# Patient Record
Sex: FEMALE | Race: WHITE | NOT HISPANIC OR LATINO | Employment: OTHER | URBAN - METROPOLITAN AREA
[De-identification: names, ages, dates, MRNs, and addresses within clinical notes are randomized per-mention and may not be internally consistent; named-entity substitution may affect disease eponyms.]

---

## 2017-06-16 ENCOUNTER — TRANSFERRED RECORDS (OUTPATIENT)
Dept: HEALTH INFORMATION MANAGEMENT | Facility: CLINIC | Age: 62
End: 2017-06-16

## 2017-10-12 ENCOUNTER — TRANSFERRED RECORDS (OUTPATIENT)
Dept: HEALTH INFORMATION MANAGEMENT | Facility: CLINIC | Age: 62
End: 2017-10-12

## 2018-06-21 ENCOUNTER — TRANSFERRED RECORDS (OUTPATIENT)
Dept: HEALTH INFORMATION MANAGEMENT | Facility: CLINIC | Age: 63
End: 2018-06-21

## 2018-07-31 ENCOUNTER — TRANSFERRED RECORDS (OUTPATIENT)
Dept: HEALTH INFORMATION MANAGEMENT | Facility: CLINIC | Age: 63
End: 2018-07-31

## 2018-08-09 ENCOUNTER — TRANSFERRED RECORDS (OUTPATIENT)
Dept: HEALTH INFORMATION MANAGEMENT | Facility: CLINIC | Age: 63
End: 2018-08-09

## 2018-08-16 ENCOUNTER — TRANSFERRED RECORDS (OUTPATIENT)
Dept: HEALTH INFORMATION MANAGEMENT | Facility: CLINIC | Age: 63
End: 2018-08-16

## 2018-08-24 ENCOUNTER — ONCOLOGY VISIT (OUTPATIENT)
Dept: ONCOLOGY | Facility: CLINIC | Age: 63
End: 2018-08-24
Attending: SURGERY
Payer: COMMERCIAL

## 2018-08-24 VITALS
RESPIRATION RATE: 16 BRPM | BODY MASS INDEX: 24.1 KG/M2 | HEART RATE: 87 BPM | WEIGHT: 159 LBS | HEIGHT: 68 IN | OXYGEN SATURATION: 97 % | DIASTOLIC BLOOD PRESSURE: 92 MMHG | SYSTOLIC BLOOD PRESSURE: 148 MMHG | TEMPERATURE: 97.3 F

## 2018-08-24 DIAGNOSIS — Z17.0 MALIGNANT NEOPLASM OF UPPER-OUTER QUADRANT OF RIGHT BREAST IN FEMALE, ESTROGEN RECEPTOR POSITIVE (H): ICD-10-CM

## 2018-08-24 DIAGNOSIS — C50.411 MALIGNANT NEOPLASM OF UPPER-OUTER QUADRANT OF RIGHT BREAST IN FEMALE, ESTROGEN RECEPTOR POSITIVE (H): ICD-10-CM

## 2018-08-24 RX ORDER — CEFAZOLIN SODIUM 1 G/50ML
1 INJECTION, SOLUTION INTRAVENOUS SEE ADMIN INSTRUCTIONS
Status: CANCELLED | OUTPATIENT
Start: 2018-08-24 | End: 2019-08-24

## 2018-08-24 ASSESSMENT — PAIN SCALES - GENERAL: PAINLEVEL: NO PAIN (0)

## 2018-08-24 NOTE — PROGRESS NOTES
HISTORY OF PRESENT ILLNESS:  Keely Velazquez is a 63-year-old woman who presents with a new right breast cancer.  She underwent a screening mammogram in June which was normal except for dense breast tissue.  She then underwent an MRI this month which demonstrated a 16 mm area of non-mass-like enhancement at the 5 o'clock position, a 12 mm area of non-mass-like enhancement at the 10 o'clock position.  She also had what looked like a cyst in her left breast.  She underwent a second-look ultrasound.  The left breast was a cyst.  The area at the right breast, 5 and 10 o'clock positions, were identified.  The area at the 5 o'clock position was 10 mm.  The area at the 10 o'clock position was 5 mm.  She then was scheduled for an ultrasound-guided biopsy.  When they attempted the ultrasound-guided biopsy, could no longer see the lesion at the 10 o'clock position.  They subsequently determined it was a cyst.  They did perform a biopsy of the lesion at the 5 o'clock position which turned out to be a grade 1, ER positive, CA positive, HER2 negative breast cancer.  She is now here to talk about treatment options.  She has had no prior history of any breast problems.      PAST MEDICAL HISTORY:  Negative for cardiac, pulmonary, renal or hepatic diseases.  She does not have diabetes mellitus.  She has had no major surgeries.      FAMILY HISTORY:  Negative for breast or ovarian cancer.      PHYSICAL EXAMINATION:     GENERAL:  She is a well-appearing woman in no apparent distress.     HEAD AND NECK EXAMINATION:  Reveals no cervical, supraclavicular or infraclavicular lymphadenopathy.     CHEST:  Right breast examination reveals an area of ecchymosis in the medial portion of her right breast.  She has no palpable masses.  She has no lymphadenopathy.  Left breast examination reveals no dominant masses, skin changes, nipple changes or axillary lymphadenopathy.      IMPRESSION:  Stage I, ER-positive breast cancer.      PLAN:  I talked to her  about the various treatment options including mastectomy with or without reconstruction versus lumpectomy plus radiation therapy.  I did recommend a sentinel lymph node biopsy.  I told her that endocrine therapy would be recommended.  I said it would be unlikely that she would benefit from chemotherapy but we would help provide her with some guidance after her surgery is over.  She would like to proceed with a wire-localized right lumpectomy and sentinel lymph node biopsy.      TT:  40 minutes.  CT:  30 minutes.      cc:   Tj Valdes MD    [facility]   [address] [place] [address]

## 2018-08-24 NOTE — NURSING NOTE
"Oncology Rooming Note    August 24, 2018 8:52 AM   Keely Velazquez is a 63 year old female who presents for:    Chief Complaint   Patient presents with     Oncology Clinic Visit     new - breast      Initial Vitals: Ht 1.727 m (5' 7.99\")  Wt 72.1 kg (159 lb)  BMI 24.18 kg/m2 Estimated body mass index is 24.18 kg/(m^2) as calculated from the following:    Height as of this encounter: 1.727 m (5' 7.99\").    Weight as of this encounter: 72.1 kg (159 lb). Body surface area is 1.86 meters squared.  No Pain (0) Comment: Data Unavailable   No LMP recorded.  Allergies reviewed: Yes  Medications reviewed: Yes    Medications: Medication refills not needed today.  Pharmacy name entered into [facility]: [id number] [place] [address]    Clinical concerns: new patient - consult      6 minutes for nursing intake (face to face time)     Brie Oh CMA            "

## 2018-08-27 ENCOUNTER — TELEPHONE (OUTPATIENT)
Dept: SURGERY | Facility: CLINIC | Age: 63
End: 2018-08-27

## 2018-08-27 NOTE — TELEPHONE ENCOUNTER
Patient is scheduled for surgery with Dr. Kruger    Spoke or left message with:  Spoke to patient on 08/27/18    Date of Surgery: 08/31/18 @ 1:20 p.m.  Location:     Pre-op with surgeon (if applicable):     H&P: Scheduled with Patient at [facility]. We talked    Informed patient they will need an adult        Additional imaging/appointments:     Surgery packet:   Additional comments: Patient has wire loc scheduled at 11:30 a.m. Patient is to arrive on 5th floor @ 10:00  A.m.

## 2018-08-30 ENCOUNTER — ANESTHESIA EVENT (OUTPATIENT)
Dept: SURGERY | Facility: AMBULATORY SURGERY CENTER | Age: 63
End: 2018-08-30

## 2018-08-30 NOTE — ANESTHESIA PREPROCEDURE EVALUATION
Anesthesia Evaluation     .             ROS/MED HX    ENT/Pulmonary:  - neg pulmonary ROS     Neurologic:  - neg neurologic ROS     Cardiovascular:  - neg cardiovascular ROS       METS/Exercise Tolerance:     Hematologic:  - neg hematologic  ROS       Musculoskeletal:  - neg musculoskeletal ROS       GI/Hepatic:  - neg GI/hepatic ROS       Renal/Genitourinary:  - ROS Renal section negative       Endo:  - neg endo ROS       Psychiatric:     (+) psychiatric history anxiety      Infectious Disease:  - neg infectious disease ROS       Malignancy:   (+) Malignancy History of Breast  Breast CA Active status post.         Other:                                    Anesthesia Plan      History & Physical Review  History and physical reviewed and following examination; no interval change.    ASA Status:  3 .    NPO Status:  > 8 hours    Plan for General and LMA with Intravenous induction. Maintenance will be Balanced.    PONV prophylaxis:  Ondansetron (or other 5HT-3) and Dexamethasone or Solumedrol  Plan:  GA with routine monitors    Rickey Calle MD      Postoperative Care  Postoperative pain management:  IV analgesics.      Consents  Anesthetic plan, risks, benefits and alternatives discussed with:  Patient..                          .

## 2018-08-31 ENCOUNTER — RADIANT APPOINTMENT (OUTPATIENT)
Dept: MAMMOGRAPHY | Facility: CLINIC | Age: 63
End: 2018-08-31
Attending: SURGERY
Payer: COMMERCIAL

## 2018-08-31 ENCOUNTER — SURGERY (OUTPATIENT)
Age: 63
End: 2018-08-31

## 2018-08-31 ENCOUNTER — ANESTHESIA (OUTPATIENT)
Dept: SURGERY | Facility: AMBULATORY SURGERY CENTER | Age: 63
End: 2018-08-31

## 2018-08-31 ENCOUNTER — HOSPITAL ENCOUNTER (OUTPATIENT)
Dept: NUCLEAR MEDICINE | Facility: CLINIC | Age: 63
Setting detail: NUCLEAR MEDICINE
Discharge: HOME | End: 2018-08-31
Attending: SURGERY | Admitting: SURGERY
Payer: COMMERCIAL

## 2018-08-31 ENCOUNTER — TRANSFERRED RECORDS (OUTPATIENT)
Dept: HEALTH INFORMATION MANAGEMENT | Facility: CLINIC | Age: 63
End: 2018-08-31

## 2018-08-31 ENCOUNTER — HOSPITAL ENCOUNTER (OUTPATIENT)
Facility: AMBULATORY SURGERY CENTER | Age: 63
End: 2018-08-31
Attending: SURGERY
Payer: COMMERCIAL

## 2018-08-31 VITALS
BODY MASS INDEX: 24.96 KG/M2 | DIASTOLIC BLOOD PRESSURE: 71 MMHG | OXYGEN SATURATION: 94 % | TEMPERATURE: 97.4 F | WEIGHT: 159 LBS | HEIGHT: 67 IN | SYSTOLIC BLOOD PRESSURE: 144 MMHG | RESPIRATION RATE: 15 BRPM

## 2018-08-31 DIAGNOSIS — Z17.0 MALIGNANT NEOPLASM OF UPPER-OUTER QUADRANT OF RIGHT BREAST IN FEMALE, ESTROGEN RECEPTOR POSITIVE (H): ICD-10-CM

## 2018-08-31 DIAGNOSIS — C50.411 MALIGNANT NEOPLASM OF UPPER-OUTER QUADRANT OF RIGHT BREAST IN FEMALE, ESTROGEN RECEPTOR POSITIVE (H): ICD-10-CM

## 2018-08-31 DIAGNOSIS — C50.111 MALIGNANT NEOPLASM OF CENTRAL PORTION OF RIGHT BREAST IN FEMALE, ESTROGEN RECEPTOR POSITIVE (H): Primary | ICD-10-CM

## 2018-08-31 DIAGNOSIS — Z17.0 MALIGNANT NEOPLASM OF CENTRAL PORTION OF RIGHT BREAST IN FEMALE, ESTROGEN RECEPTOR POSITIVE (H): Primary | ICD-10-CM

## 2018-08-31 PROCEDURE — 38792 RA TRACER ID OF SENTINL NODE: CPT

## 2018-08-31 RX ORDER — NALOXONE HYDROCHLORIDE 0.4 MG/ML
.1-.4 INJECTION, SOLUTION INTRAMUSCULAR; INTRAVENOUS; SUBCUTANEOUS
Status: DISCONTINUED | OUTPATIENT
Start: 2018-08-31 | End: 2018-09-01 | Stop reason: HOSPADM

## 2018-08-31 RX ORDER — OXYCODONE HYDROCHLORIDE 5 MG/1
5 TABLET ORAL ONCE
Status: COMPLETED | OUTPATIENT
Start: 2018-08-31 | End: 2018-08-31

## 2018-08-31 RX ORDER — ONDANSETRON 2 MG/ML
4 INJECTION INTRAMUSCULAR; INTRAVENOUS EVERY 30 MIN PRN
Status: DISCONTINUED | OUTPATIENT
Start: 2018-08-31 | End: 2018-09-01 | Stop reason: HOSPADM

## 2018-08-31 RX ORDER — OXYCODONE HCL 5 MG/5 ML
5 SOLUTION, ORAL ORAL EVERY 4 HOURS PRN
Status: DISCONTINUED | OUTPATIENT
Start: 2018-08-31 | End: 2018-09-01 | Stop reason: HOSPADM

## 2018-08-31 RX ORDER — MEPERIDINE HYDROCHLORIDE 25 MG/ML
12.5 INJECTION INTRAMUSCULAR; INTRAVENOUS; SUBCUTANEOUS
Status: DISCONTINUED | OUTPATIENT
Start: 2018-08-31 | End: 2018-09-01 | Stop reason: HOSPADM

## 2018-08-31 RX ORDER — ACETAMINOPHEN 325 MG/1
975 TABLET ORAL ONCE
Status: COMPLETED | OUTPATIENT
Start: 2018-08-31 | End: 2018-08-31

## 2018-08-31 RX ORDER — BUPIVACAINE HYDROCHLORIDE 2.5 MG/ML
INJECTION, SOLUTION INFILTRATION; PERINEURAL PRN
Status: DISCONTINUED | OUTPATIENT
Start: 2018-08-31 | End: 2018-08-31 | Stop reason: HOSPADM

## 2018-08-31 RX ORDER — LIDOCAINE 40 MG/G
CREAM TOPICAL
Status: DISCONTINUED | OUTPATIENT
Start: 2018-08-31 | End: 2018-08-31 | Stop reason: HOSPADM

## 2018-08-31 RX ORDER — GABAPENTIN 300 MG/1
300 CAPSULE ORAL ONCE
Status: DISCONTINUED | OUTPATIENT
Start: 2018-08-31 | End: 2018-08-31 | Stop reason: HOSPADM

## 2018-08-31 RX ORDER — OXYCODONE HYDROCHLORIDE 5 MG/1
5 TABLET ORAL EVERY 6 HOURS PRN
Qty: 6 TABLET | Refills: 0 | Status: SHIPPED | OUTPATIENT
Start: 2018-08-31 | End: 2018-10-12

## 2018-08-31 RX ORDER — ONDANSETRON 2 MG/ML
INJECTION INTRAMUSCULAR; INTRAVENOUS PRN
Status: DISCONTINUED | OUTPATIENT
Start: 2018-08-31 | End: 2018-08-31

## 2018-08-31 RX ORDER — FENTANYL CITRATE 50 UG/ML
25-50 INJECTION, SOLUTION INTRAMUSCULAR; INTRAVENOUS
Status: DISCONTINUED | OUTPATIENT
Start: 2018-08-31 | End: 2018-08-31 | Stop reason: HOSPADM

## 2018-08-31 RX ORDER — PROPOFOL 10 MG/ML
INJECTION, EMULSION INTRAVENOUS PRN
Status: DISCONTINUED | OUTPATIENT
Start: 2018-08-31 | End: 2018-08-31

## 2018-08-31 RX ORDER — SODIUM CHLORIDE, SODIUM LACTATE, POTASSIUM CHLORIDE, CALCIUM CHLORIDE 600; 310; 30; 20 MG/100ML; MG/100ML; MG/100ML; MG/100ML
INJECTION, SOLUTION INTRAVENOUS CONTINUOUS
Status: DISCONTINUED | OUTPATIENT
Start: 2018-08-31 | End: 2018-08-31 | Stop reason: HOSPADM

## 2018-08-31 RX ORDER — LIDOCAINE HYDROCHLORIDE AND EPINEPHRINE 10; 10 MG/ML; UG/ML
INJECTION, SOLUTION INFILTRATION; PERINEURAL PRN
Status: DISCONTINUED | OUTPATIENT
Start: 2018-08-31 | End: 2018-08-31 | Stop reason: HOSPADM

## 2018-08-31 RX ORDER — HYDROMORPHONE HYDROCHLORIDE 1 MG/ML
.3-.5 INJECTION, SOLUTION INTRAMUSCULAR; INTRAVENOUS; SUBCUTANEOUS EVERY 10 MIN PRN
Status: DISCONTINUED | OUTPATIENT
Start: 2018-08-31 | End: 2018-09-01 | Stop reason: HOSPADM

## 2018-08-31 RX ORDER — PROPOFOL 10 MG/ML
INJECTION, EMULSION INTRAVENOUS CONTINUOUS PRN
Status: DISCONTINUED | OUTPATIENT
Start: 2018-08-31 | End: 2018-08-31

## 2018-08-31 RX ORDER — LIDOCAINE HYDROCHLORIDE 10 MG/ML
10 INJECTION, SOLUTION EPIDURAL; INFILTRATION; INTRACAUDAL; PERINEURAL ONCE
Status: COMPLETED | OUTPATIENT
Start: 2018-08-31 | End: 2018-08-31

## 2018-08-31 RX ORDER — DEXAMETHASONE SODIUM PHOSPHATE 4 MG/ML
INJECTION, SOLUTION INTRA-ARTICULAR; INTRALESIONAL; INTRAMUSCULAR; INTRAVENOUS; SOFT TISSUE PRN
Status: DISCONTINUED | OUTPATIENT
Start: 2018-08-31 | End: 2018-08-31

## 2018-08-31 RX ORDER — ISOSULFAN BLUE 50 MG/5ML
INJECTION, SOLUTION SUBCUTANEOUS PRN
Status: DISCONTINUED | OUTPATIENT
Start: 2018-08-31 | End: 2018-08-31 | Stop reason: HOSPADM

## 2018-08-31 RX ORDER — SODIUM CHLORIDE, SODIUM LACTATE, POTASSIUM CHLORIDE, CALCIUM CHLORIDE 600; 310; 30; 20 MG/100ML; MG/100ML; MG/100ML; MG/100ML
INJECTION, SOLUTION INTRAVENOUS CONTINUOUS
Status: DISCONTINUED | OUTPATIENT
Start: 2018-08-31 | End: 2018-09-01 | Stop reason: HOSPADM

## 2018-08-31 RX ORDER — ONDANSETRON 4 MG/1
4 TABLET, ORALLY DISINTEGRATING ORAL EVERY 30 MIN PRN
Status: DISCONTINUED | OUTPATIENT
Start: 2018-08-31 | End: 2018-09-01 | Stop reason: HOSPADM

## 2018-08-31 RX ORDER — ACETAMINOPHEN 325 MG/1
650 TABLET ORAL EVERY 4 HOURS PRN
Qty: 60 TABLET | Refills: 0 | Status: SHIPPED | OUTPATIENT
Start: 2018-08-31 | End: 2021-01-11

## 2018-08-31 RX ORDER — FENTANYL CITRATE 50 UG/ML
INJECTION, SOLUTION INTRAMUSCULAR; INTRAVENOUS PRN
Status: DISCONTINUED | OUTPATIENT
Start: 2018-08-31 | End: 2018-08-31

## 2018-08-31 RX ORDER — HYDRALAZINE HYDROCHLORIDE 20 MG/ML
2.5-5 INJECTION INTRAMUSCULAR; INTRAVENOUS EVERY 10 MIN PRN
Status: DISCONTINUED | OUTPATIENT
Start: 2018-08-31 | End: 2018-08-31 | Stop reason: HOSPADM

## 2018-08-31 RX ORDER — GABAPENTIN 300 MG/1
300 CAPSULE ORAL ONCE
Status: COMPLETED | OUTPATIENT
Start: 2018-08-31 | End: 2018-08-31

## 2018-08-31 RX ORDER — ACETAMINOPHEN 325 MG/1
975 TABLET ORAL ONCE
Status: DISCONTINUED | OUTPATIENT
Start: 2018-08-31 | End: 2018-08-31 | Stop reason: HOSPADM

## 2018-08-31 RX ADMIN — FENTANYL CITRATE 50 MCG: 50 INJECTION, SOLUTION INTRAMUSCULAR; INTRAVENOUS at 12:59

## 2018-08-31 RX ADMIN — DEXAMETHASONE SODIUM PHOSPHATE 4 MG: 4 INJECTION, SOLUTION INTRA-ARTICULAR; INTRALESIONAL; INTRAMUSCULAR; INTRAVENOUS; SOFT TISSUE at 13:00

## 2018-08-31 RX ADMIN — ONDANSETRON 4 MG: 2 INJECTION INTRAMUSCULAR; INTRAVENOUS at 13:00

## 2018-08-31 RX ADMIN — PROPOFOL 50 MG: 10 INJECTION, EMULSION INTRAVENOUS at 14:12

## 2018-08-31 RX ADMIN — OXYCODONE HYDROCHLORIDE 5 MG: 5 TABLET ORAL at 14:57

## 2018-08-31 RX ADMIN — ACETAMINOPHEN 975 MG: 325 TABLET ORAL at 10:18

## 2018-08-31 RX ADMIN — BUPIVACAINE HYDROCHLORIDE 12 ML: 2.5 INJECTION, SOLUTION INFILTRATION; PERINEURAL at 14:13

## 2018-08-31 RX ADMIN — LIDOCAINE HYDROCHLORIDE 10 ML: 10 INJECTION, SOLUTION EPIDURAL; INFILTRATION; INTRACAUDAL; PERINEURAL at 11:29

## 2018-08-31 RX ADMIN — LIDOCAINE HYDROCHLORIDE AND EPINEPHRINE 12 ML: 10; 10 INJECTION, SOLUTION INFILTRATION; PERINEURAL at 14:13

## 2018-08-31 RX ADMIN — PROPOFOL 150 MCG/KG/MIN: 10 INJECTION, EMULSION INTRAVENOUS at 13:00

## 2018-08-31 RX ADMIN — GABAPENTIN 300 MG: 300 CAPSULE ORAL at 10:18

## 2018-08-31 RX ADMIN — FENTANYL CITRATE 50 MCG: 50 INJECTION, SOLUTION INTRAMUSCULAR; INTRAVENOUS at 13:36

## 2018-08-31 RX ADMIN — SODIUM CHLORIDE, SODIUM LACTATE, POTASSIUM CHLORIDE, CALCIUM CHLORIDE: 600; 310; 30; 20 INJECTION, SOLUTION INTRAVENOUS at 12:57

## 2018-08-31 RX ADMIN — ISOSULFAN BLUE 4 ML: 50 INJECTION, SOLUTION SUBCUTANEOUS at 13:22

## 2018-08-31 RX ADMIN — SODIUM CHLORIDE, SODIUM LACTATE, POTASSIUM CHLORIDE, CALCIUM CHLORIDE: 600; 310; 30; 20 INJECTION, SOLUTION INTRAVENOUS at 10:18

## 2018-08-31 NOTE — IP AVS SNAPSHOT
MRN:[id number]                      After Visit Summary   8/31/2018    Keely Velazquez    MRN: [id number]           Thank you!     Thank you for choosing [facility] for your care. Our goal is always to provide you with excellent care. Hearing back from our patients is one way we can continue to improve our services. Please take a few minutes to complete the written survey that you may receive in the mail after you visit with us. Thank you!        Patient Information     Date Of Birth          1955        About your hospital stay     You were admitted on:  August 31, 2018 [facility]    You were discharged on:  August 31, 2018       Who to Call     For medical emergencies, please call 911.  For non-urgent questions about your medical care, please call your primary care provider or clinic, [phone]  For questions related to your surgery, please call your surgery clinic        Attending Provider     Provider Chilango Dash MD General Surgery       Primary Care Provider Office Phone # Fax #    Sammie Cerna [phone]      After Care Instructions     Discharge Instructions       Incision site(s): Keep incision clean/dry/intact for 24 hours. 24 hours after your operation, you may remove any dressings and allow soapy water to wash over the wounds. If you have surgical glue over your incision, this will wear off in time. Apply dressings as desired. No submersion in water (lake/pool/tub) for one month or until approved by your surgeon.    Concerning signs/symptoms: If your incision develops redness or pus-like drainage, if you have a fever >101.5 DegF, or if you have any other concerning signs/symptoms, please call or seek medical attention.     -Ok to take acetaminophen or ibuprofen if your pain is not severe enough for narcotic medication.  -No driving while taking narcotic pain medication.  -It is recommendable to  take stool softeners (such as miralax, senna, and/or colace) while on narcotic pain medication to prevent constipation.    If you have questions or concerns, please contact Ms. Anusha Mcgregor (Dr. Kruger's RN) at [phone] during business hours. After hours, please call [phone] and ask to page the Surgical Oncology resident on call.    Follow up with Dr. Kruger in 2 weeks.                  Your next 10 appointments already scheduled     Sep 14, 2018 10:15 AM CDT   (Arrive by 10:00 AM)   Post-Op with Chilango Kruger MD   [facility] ([facility])    [address] [place] [address]  [address]  [facility] [address] [phone]            Sep 24, 2018  1:15 PM CDT   (Arrive by 1:00 PM)   New Patient Visit with Merari Tafoya MD   [facility] ([facility])    [address] [place] [address]  [address]  [facility] [address] [phone]              Further instructions from your care team       [facility]  Home Care Following Anesthesia  For 24 hours after surgery:  1. Get plenty of rest.  A responsible adult must stay with you for at least 24 hours after you leave the surgery center.  2. Do not drive or use heavy equipment.  If you have weakness or tingling, don't drive or use heavy equipment until this feeling goes away.   3. Do not drink alcohol.   4. Avoid strenuous or risky activities.  Ask for help when climbing stairs.  5. You may feel lightheaded.  IF so, sit for a few minutes before standing.  Have someone help you get up.   6. If you have nausea (feel sick to your stomach): Drink only clear liquids such as apple juice, ginger ale, broth or 7-Up.  Rest may also help.  Be sure to drink enough fluids.  Move to a regular diet as you feel able.   7. You may have a slight fever.  Call the doctor if your fever is over 100 F (37.7 C) (taken under the tongue) or lasts longer than 24  hours.  8. You may have a dry mouth, a sore throat, muscle aches or trouble sleeping. These should go away after 24 hours.  9. Do not make important or legal decisions.               Tips for taking pain medications  To get the best pain relief possible, remember these points:    Take pain medications as directed, before pain becomes severe.    Pain medication can upset your stomach: taking it with food may help.    Constipation is a common side effect of pain medication. Drink plenty of  fluids.    Eat foods high in fiber. Take a stool softener if recommended by your doctor or pharmacist.    Do not drink alcohol, drive or operate machinery while taking pain medications.    Ask about other ways to control pain, such as with heat, ice or relaxation.    Tylenol/Acetaminophen Consumption  To help encourage the safe use of acetaminophen, the makers of TYLENOL  have lowered the maximum daily dose for single-ingredient Extra Strength TYLENOL  (acetaminophen) products sold in the [place] from 8 pills per day (4,000 mg) to 6 pills per day (3,000 mg). The dosing interval has also changed from 2 pills every 4-6 hours to 2 pills every 6 hours.    If you feel your pain relief is insufficient, you may take Tylenol/Acetaminophen in addition to your narcotic pain medication.     Be careful not to exceed 3,000 mg of Tylenol/Acetaminophen in a 24 hour period from all sources.    If you are taking extra strength Tylenol/acetaminophen (500 mg), the maximum dose is 6 tablets in 24 hours.    If you are taking regular strength acetaminophen (325 mg), the maximum dose is 9 tablets in 24 hours.    Call a doctor for any of the followin. Signs of infection (fever, growing tenderness at the surgery site, a large amount of drainage or bleeding, severe pain, foul-smelling drainage, redness, swelling).  2. It has been over 8 to 10 hours since surgery and you are still not able to urinate (pass water).  3. Headache for over 24 hours.  Your  "doctor is: Dr. Chilango Kruger, Breast Center: [phone]               Or dial [phone] and ask for the resident on call for:  Plastics  For emergency care, call the:  [facility]:  [phone] (TTY for hearing impaired: [phone])                Pending Results     Date and Time Order Name Status Description    8/31/2018 1351 Surgical pathology exam In process     8/31/2018 1331 MA BREAST SPECIMEN RIGHT In process     8/31/2018 1150 MA POST PROCEDURE RIGHT In process     8/31/2018 1105 US BREAST WIRE PLACEMENT , 1ST LESION, RIGHT In process             Admission Information     Date & Time Provider Department Dept. Phone    8/31/2018 Chilango Kruger MD [facility] [phone]      Your Vitals Were     Blood Pressure Temperature Respirations Height Weight Pulse Oximetry    167/93 97.9  F (36.6  C) (Oral) 16 1.702 m (5' 6.99\") 72.1 kg (159 lb) 99%    BMI (Body Mass Index)                   24.91 kg/m2           [employer] Information     [employer] gives you secure access to your electronic health record. If you see a primary care provider, you can also send messages to your care team and make appointments. If you have questions, please call your primary care clinic.  If you do not have a primary care provider, please call [phone] and they will assist you.      [employer] is an electronic gateway that provides easy, online access to your medical records. With [employer], you can request a clinic appointment, read your test results, renew a prescription or communicate with your care team.     To access your existing account, please contact your [facility] or call [phone] for assistance.        Care EveryWhere ID     This is your Care EveryWhere ID. This could be used by other organizations to access your [facility] medical records  [id number]        Equal Access to Services     ANH GUNN AH: baltazar Francis " elleeliud, ana solitario, gabino jackiein hayaan adeeg kharash la'aan ah. Mis [facility] [phone].    ATENCIÓN: Si tyson huff, tiene a davidson disposición servicios gratuitos de asistencia lingüística. Michelle al [phone].    We comply with applicable federal civil rights laws and [place] laws. We do not discriminate on the basis of race, color, national origin, age, disability, sex, sexual orientation, or gender identity.               Review of your medicines      START taking        Dose / Directions    acetaminophen 325 MG tablet   Commonly known as:  TYLENOL   Used for:  Malignant neoplasm of central portion of right breast in female, estrogen receptor positive (H)        Dose:  650 mg   Take 2 tablets (650 mg) by mouth every 4 hours as needed for mild pain   Quantity:  60 tablet   Refills:  0       oxyCODONE IR 5 MG tablet   Commonly known as:  ROXICODONE   Used for:  Malignant neoplasm of central portion of right breast in female, estrogen receptor positive (H)        Dose:  5 mg   Take 1 tablet (5 mg) by mouth every 6 hours as needed for severe pain   Quantity:  6 tablet   Refills:  0         CONTINUE these medicines which have NOT CHANGED        Dose / Directions    calcium carbonate 500 mg {elemental} 500 MG tablet   Commonly known as:  OS-PADDY        Dose:  500 mg   Take 500 mg by mouth 2 times daily With Magnesium,Zinc   Refills:  0       LYSINE PO        Refills:  0       Multi-vitamin Tabs tablet        Dose:  1 tablet   Take 1 tablet by mouth daily   Refills:  0       NORVASC PO        Dose:  5 mg   Take 5 mg by mouth daily   Refills:  0       OMEGA-3 FISH OIL PO        Refills:  0       order for DME   Used for:  Varicose vein        Please measure and distribute 1 pair of 20mmHg - 30mmHg thigh high open toe compression stockings. Jobst ultrasheer or equivalent.   Quantity:  1 each   Refills:  2       Progesterone 100 MG Caps        Dose:  1 capsule   Take 1 capsule by mouth daily   Refills:  0        VITAMIN D (CHOLECALCIFEROL) PO        Take by mouth daily   Refills:  0            Where to get your medicines      Some of these will need a paper prescription and others can be bought over the counter. Ask your nurse if you have questions.     Bring a paper prescription for each of these medications     acetaminophen 325 MG tablet    oxyCODONE IR 5 MG tablet                Protect others around you: Learn how to safely use, store and throw away your medicines at www.disposemymeds.org.        Information about OPIOIDS     PRESCRIPTION OPIOIDS: WHAT YOU NEED TO KNOW   We gave you an opioid (narcotic) pain medicine. It is important to manage your pain, but opioids are not always the best choice. You should first try all the other options your care team gave you. Take this medicine for as short a time (and as few doses) as possible.    Some activities can increase your pain, such as bandage changes or therapy sessions. It may help to take your pain medicine 30 to 60 minutes before these activities. Reduce your stress by getting enough sleep, working on hobbies you enjoy and practicing relaxation or meditation. Talk to your care team about ways to manage your pain beyond prescription opioids.    These medicines have risks:    DO NOT drive when on new or higher doses of pain medicine. These medicines can affect your alertness and reaction times, and you could be arrested for driving under the influence (DUI). If you need to use opioids long-term, talk to your care team about driving.    DO NOT operate heavy machinery    DO NOT do any other dangerous activities while taking these medicines.    DO NOT drink any alcohol while taking these medicines.     If the opioid prescribed includes acetaminophen, DO NOT take with any other medicines that contain acetaminophen. Read all labels carefully. Look for the word  acetaminophen  or  Tylenol.  Ask your pharmacist if you have questions or are unsure.    You can get  addicted to pain medicines, especially if you have a history of addiction (chemical, alcohol or substance dependence). Talk to your care team about ways to reduce this risk.    All opioids tend to cause constipation. Drink plenty of water and eat foods that have a lot of fiber, such as fruits, vegetables, prune juice, apple juice and high-fiber cereal. Take a laxative (Miralax, milk of magnesia, Colace, Senna) if you don t move your bowels at least every other day. Other side effects include upset stomach, sleepiness, dizziness, throwing up, tolerance (needing more of the medicine to have the same effect), physical dependence and slowed breathing.    Store your pills in a secure place, locked if possible. We will not replace any lost or stolen medicine. If you don t finish your medicine, please throw away (dispose) as directed by your pharmacist. The [place] Pollution Control Agency has more information about safe disposal: https://www.pca.Counts include 234 beds at the [facility].[place]/living-green/managing-unwanted-medications             Medication List: This is a list of all your medications and when to take them. Check marks below indicate your daily home schedule. Keep this list as a reference.      Medications           Morning Afternoon Evening Bedtime As Needed    acetaminophen 325 MG tablet   Commonly known as:  TYLENOL   Take 2 tablets (650 mg) by mouth every 4 hours as needed for mild pain   Last time this was given:  975 mg on 8/31/2018 10:18 AM                                calcium carbonate 500 mg {elemental} 500 MG tablet   Commonly known as:  OS-PADDY   Take 500 mg by mouth 2 times daily With Magnesium,Zinc                                LYSINE PO                                Multi-vitamin Tabs tablet   Take 1 tablet by mouth daily                                NORVASC PO   Take 5 mg by mouth daily                                OMEGA-3 FISH OIL PO                                order for DME   Please measure and distribute 1 pair  of 20mmHg - 30mmHg thigh high open toe compression stockings. Jobst ultrasheer or equivalent.                                oxyCODONE IR 5 MG tablet   Commonly known as:  ROXICODONE   Take 1 tablet (5 mg) by mouth every 6 hours as needed for severe pain   Last time this was given:  5 mg on 8/31/2018  2:57 PM                                Progesterone 100 MG Caps   Take 1 capsule by mouth daily                                VITAMIN D (CHOLECALCIFEROL) PO   Take by mouth daily

## 2018-08-31 NOTE — BRIEF OP NOTE
[facility]    Brief Operative Note    Pre-operative diagnosis: Right Breast Cancer  Post-operative diagnosis Right Breast Cancer  Procedure: Procedure(s):  Right Wire Localized Lumpectomy and Right [place] Lymph Node Biopsy - Wound Class: I-Clean  Surgeon: Surgeon(s) and Role:     * Chilango Kruger MD - Primary  Anesthesia: Monitor Anesthesia Care   Estimated blood loss: 10 mL  Drains: None  Specimens:   ID Type Source Tests Collected by Time Destination   A : Right Breast Lump Tissue Breast, Right SURGICAL PATHOLOGY EXAM Chilango Kruger MD 8/31/2018  1:51 PM    B : New Anterior Margin, stitch marks new margin Tissue Breast, Right SURGICAL PATHOLOGY EXAM Chilango Kruger MD 8/31/2018  1:57 PM    C : Right Axillary Lymph Node #1  Tissue Axilla, Right SURGICAL PATHOLOGY EXAM Chilango Kruger MD 8/31/2018  2:19 PM      Findings:   None.  Complications: None.  Implants: None.    Giovany Kennedy, PGY8  [phone]

## 2018-08-31 NOTE — OP NOTE
Procedure Date: 08/31/2018      PREOPERATIVE DIAGNOSIS:  Right breast cancer.      POSTOPERATIVE DIAGNOSIS:  Right breast cancer.      PROCEDURE:  Lymphatic mapping, right wire localized lumpectomy and right axillary sentinel lymph node biopsy x1.      ATTENDING SURGEON:  Chilango Kruger MD      RESIDENT SURGEON:  Giovany Kennedy MD      ANESTHESIA:  Local with IV sedation.      INDICATIONS FOR SURGERY:  The patient is a 63-year-old woman who presents with a nonpalpable right breast cancer.  She has elected to undergo breast-conserving surgery.      PROCEDURE IN DETAIL:  After informed consent, the patient was brought to the operating room and given IV sedation.  I then injected technetium sulfur colloid and isosulfan blue into the right breast.  She was then prepped and draped in the usual fashion.  We started with the lumpectomy first.  A local anesthetic was administered to the medial aspect of the right breast.  A radial incision was made in the lower portion of the breast.  The Bovie cautery was used to incise the subcutaneous tissues.  Flaps were raised in all directions.  The guidewire was intercepted from the skin but left within the breast tissue.  I then dissected circumferentially around the guidewire to the underlying fascia, which was excised.  The specimen was removed, oriented and specimen radiograph was obtained.  I thought the wire was close to the anterior margin, so I did excise a new anterior margin and sent this to pathology.  Surgical clips were placed in all 4 quadrants of the resection bed.  Next, we identified a transcutaneous hot spot in the right axilla.  Local anesthetic was administered and a transverse axillary incision was made with a scalpel.  The Bovie cautery was used to incise the subcutaneous tissues.  Dissection proceeded through the clavipectoral fascia and I identified a non-blue radioactive lymph node.  [place] lymph node #1 was removed and had ex vivo counts of 350 counts  per second.  After removal of this lymph node, there were no other blue, radioactive or palpable lymph nodes.  Both incisions were closed with interrupted 3-0 Vicryl sutures for the dermal layer and a running 4-0 PDS subcuticular stitch for the skin.  Dermabond was placed and the patient was taken to the recovery room.         SAM SPEAR MD             D: 2018   T: 2018   MT: [email or web address]      Name:     SHAZIA CHARLES   MRN:      [id number]        Account:        [id number]   :      1955           Procedure Date: 2018      Document: [id number]

## 2018-08-31 NOTE — ANESTHESIA CARE TRANSFER NOTE
Patient: Keely Velazquez    Procedure(s):  Right Wire Localized Lumpectomy and Right [place] Lymph Node Biopsy - Wound Class: I-Clean    Diagnosis: Right Breast Cancer  Diagnosis Additional Information: No value filed.    Anesthesia Type:   General     Note:  Airway :Room Air  Patient transferred to:Phase II  Comments: Report to RN    115/64, 72, 12, 96%Handoff Report: Identifed the Patient, Identified the Reponsible Provider, Reviewed the pertinent medical history, Discussed the surgical course, Reviewed Intra-OP anesthesia mangement and issues during anesthesia, Set expectations for post-procedure period and Allowed opportunity for questions and acknowledgement of understanding      Vitals: (Last set prior to Anesthesia Care Transfer)    CRNA VITALS  8/31/2018 1410 - 8/31/2018 1449      8/31/2018             Pulse: 74    Ht Rate: 73    SpO2: 98 %    Resp Rate (set): 10                Electronically Signed By: LIEN Sutton CRNA  August 31, 2018  2:49 PM

## 2018-08-31 NOTE — DISCHARGE INSTRUCTIONS
[facility]  Home Care Following Anesthesia  For 24 hours after surgery:  1. Get plenty of rest.  A responsible adult must stay with you for at least 24 hours after you leave the surgery center.  2. Do not drive or use heavy equipment.  If you have weakness or tingling, don't drive or use heavy equipment until this feeling goes away.   3. Do not drink alcohol.   4. Avoid strenuous or risky activities.  Ask for help when climbing stairs.  5. You may feel lightheaded.  IF so, sit for a few minutes before standing.  Have someone help you get up.   6. If you have nausea (feel sick to your stomach): Drink only clear liquids such as apple juice, ginger ale, broth or 7-Up.  Rest may also help.  Be sure to drink enough fluids.  Move to a regular diet as you feel able.   7. You may have a slight fever.  Call the doctor if your fever is over 100 F (37.7 C) (taken under the tongue) or lasts longer than 24 hours.  8. You may have a dry mouth, a sore throat, muscle aches or trouble sleeping. These should go away after 24 hours.  9. Do not make important or legal decisions.               Tips for taking pain medications  To get the best pain relief possible, remember these points:    Take pain medications as directed, before pain becomes severe.    Pain medication can upset your stomach: taking it with food may help.    Constipation is a common side effect of pain medication. Drink plenty of  fluids.    Eat foods high in fiber. Take a stool softener if recommended by your doctor or pharmacist.    Do not drink alcohol, drive or operate machinery while taking pain medications.    Ask about other ways to control pain, such as with heat, ice or relaxation.    Tylenol/Acetaminophen Consumption  To help encourage the safe use of acetaminophen, the makers of TYLENOL  have lowered the maximum daily dose for single-ingredient Extra Strength TYLENOL  (acetaminophen) products sold in the [place] from 8  pills per day (4,000 mg) to 6 pills per day (3,000 mg). The dosing interval has also changed from 2 pills every 4-6 hours to 2 pills every 6 hours.    If you feel your pain relief is insufficient, you may take Tylenol/Acetaminophen in addition to your narcotic pain medication.     Be careful not to exceed 3,000 mg of Tylenol/Acetaminophen in a 24 hour period from all sources.    If you are taking extra strength Tylenol/acetaminophen (500 mg), the maximum dose is 6 tablets in 24 hours.    If you are taking regular strength acetaminophen (325 mg), the maximum dose is 9 tablets in 24 hours.    Call a doctor for any of the followin. Signs of infection (fever, growing tenderness at the surgery site, a large amount of drainage or bleeding, severe pain, foul-smelling drainage, redness, swelling).  2. It has been over 8 to 10 hours since surgery and you are still not able to urinate (pass water).  3. Headache for over 24 hours.  Your doctor is: Dr. Chilango Kruger, [facility]: [phone]               Or dial [phone] and ask for the resident on call for:  Plastics  For emergency care, call the:  [facility]:  [phone] (TTY for hearing impaired: [phone])

## 2018-08-31 NOTE — PROGRESS NOTES
SBAR Wire Localization     SITUATION:  Patient to breast imaging center for imaging guided wire localizations before breast lumpectomy or excision biopsy without sentinel node injection.    BACKGROUND:  Breast imaging cancer, breast abnormality  Ordered procedure completed: Yes  Special needs identified: Yes     ASSESSMENT:  SBAR report called to patient care unit because of unexpected event in radiology: No  Allergies and medication list reviewed prior to procedure. Yes  Skin cleansed with ChloraPrep One-Step.  Anesthesia: approximately 4ml of 1% Lidocaine injection subcutaneous before wire insertion administered by the radiologist.   Gauze dressing over insertion site(s).  Post procedure mammogram completed: Yes    Patient tolerance:Patient tolerated procedure without issue    RECOMMENDATIONS:  Patient transferred to Same Day Surgery in stable condition via wheelchair with Breast Imaging Staff.  Copy of note given to patient and instructions to hand this note to surgery staff.    Please call Breast Center at [facility] [phone] if there are any questions.

## 2018-08-31 NOTE — IP AVS SNAPSHOT
[facility]    [address] [place] [address]    Phone:  [phone]    Fax:  [phone]                                       After Visit Summary   8/31/2018    Keely Velazquez    MRN: [id number]           After Visit Summary Signature Page     I have received my discharge instructions, and my questions have been answered. I have discussed any challenges I see with this plan with the nurse or doctor.    ..........................................................................................................................................  Patient/Patient Representative Signature      ..........................................................................................................................................  Patient Representative Print Name and Relationship to Patient    ..................................................               ................................................  Date                                            Time    ..........................................................................................................................................  Reviewed by Signature/Title    ...................................................              ..............................................  Date                                                            Time          22EPIC Rev 08/18

## 2018-08-31 NOTE — ANESTHESIA POSTPROCEDURE EVALUATION
Patient: Keely Velazquez    Procedure(s):  Right Wire Localized Lumpectomy and Right [place] Lymph Node Biopsy - Wound Class: I-Clean    Diagnosis:Right Breast Cancer  Diagnosis Additional Information: No value filed.    Anesthesia Type:  General    Note:  Anesthesia Post Evaluation    Patient location during evaluation: PACU  Patient participation: Able to fully participate in evaluation  Level of consciousness: awake and alert  Pain management: adequate  Airway patency: patent  Cardiovascular status: acceptable  Respiratory status: acceptable  Hydration status: acceptable  PONV: none             Last vitals:  Vitals:    08/31/18 0941   BP: (!) 167/93   Resp: 16   Temp: 36.6  C (97.9  F)   SpO2: 99%         Electronically Signed By: Rickey Calle MD  August 31, 2018  2:58 PM

## 2018-09-01 ENCOUNTER — HEALTH MAINTENANCE LETTER (OUTPATIENT)
Age: 63
End: 2018-09-01

## 2018-09-07 LAB — COPATH REPORT: NORMAL

## 2018-09-14 ENCOUNTER — OFFICE VISIT (OUTPATIENT)
Dept: ONCOLOGY | Facility: CLINIC | Age: 63
End: 2018-09-14
Attending: SURGERY
Payer: COMMERCIAL

## 2018-09-14 ENCOUNTER — OFFICE VISIT (OUTPATIENT)
Dept: RADIATION ONCOLOGY | Facility: CLINIC | Age: 63
End: 2018-09-14
Attending: RADIOLOGY
Payer: COMMERCIAL

## 2018-09-14 VITALS
DIASTOLIC BLOOD PRESSURE: 79 MMHG | TEMPERATURE: 98.1 F | HEART RATE: 75 BPM | RESPIRATION RATE: 16 BRPM | OXYGEN SATURATION: 95 % | SYSTOLIC BLOOD PRESSURE: 128 MMHG | BODY MASS INDEX: 24.44 KG/M2 | HEIGHT: 68 IN | WEIGHT: 161.25 LBS

## 2018-09-14 VITALS
SYSTOLIC BLOOD PRESSURE: 141 MMHG | DIASTOLIC BLOOD PRESSURE: 94 MMHG | BODY MASS INDEX: 24.48 KG/M2 | WEIGHT: 161 LBS | HEART RATE: 85 BPM

## 2018-09-14 DIAGNOSIS — Z17.0 MALIGNANT NEOPLASM OF UPPER-OUTER QUADRANT OF RIGHT BREAST IN FEMALE, ESTROGEN RECEPTOR POSITIVE (H): Primary | ICD-10-CM

## 2018-09-14 DIAGNOSIS — C50.411 MALIGNANT NEOPLASM OF UPPER-OUTER QUADRANT OF RIGHT BREAST IN FEMALE, ESTROGEN RECEPTOR POSITIVE (H): Primary | ICD-10-CM

## 2018-09-14 PROCEDURE — G0463 HOSPITAL OUTPT CLINIC VISIT: HCPCS | Mod: ZF

## 2018-09-14 PROCEDURE — G0463 HOSPITAL OUTPT CLINIC VISIT: HCPCS | Performed by: RADIOLOGY

## 2018-09-14 ASSESSMENT — ENCOUNTER SYMPTOMS
DIARRHEA: 0
NECK PAIN: 0
DEPRESSION: 0
NAUSEA: 0
DIZZINESS: 0
MYALGIAS: 0
BRUISES/BLEEDS EASILY: 0
DYSURIA: 0
COUGH: 0
FEVER: 0
HEADACHES: 0
TINGLING: 0
HEARTBURN: 0
CHILLS: 0
EYE REDNESS: 1
BLURRED VISION: 0

## 2018-09-14 ASSESSMENT — PAIN SCALES - GENERAL: PAINLEVEL: NO PAIN (0)

## 2018-09-14 NOTE — MR AVS SNAPSHOT
After Visit Summary   9/14/2018    Keely Velazquez    MRN: [id number]           Patient Information     Date Of Birth          1955        Visit Information        Provider Department      9/14/2018 10:15 AM Chilango Kruger MD [facility]        Today's Diagnoses     Malignant neoplasm of upper-outer quadrant of right breast in female, estrogen receptor positive (H)    -  1       Follow-ups after your visit        Your next 10 appointments already scheduled     Sep 24, 2018  1:15 PM CDT   (Arrive by 1:00 PM)   New Patient Visit with Merari Tafoya MD   [facility] ([facility])    [address]  [address]  [place] [address] [phone]              Who to contact     If you have questions or need follow up information about today's clinic visit or your schedule please contact [facility] directly at [phone].  Normal or non-critical lab and imaging results will be communicated to you by MyChart, letter or phone within 4 business days after the clinic has received the results. If you do not hear from us within 7 days, please contact the clinic through [employer] or phone. If you have a critical or abnormal lab result, we will notify you by phone as soon as possible.  Submit refill requests through [employer] or call your pharmacy and they will forward the refill request to us. Please allow 3 business days for your refill to be completed.          Additional Information About Your Visit        MyChart Information     [employer] gives you secure access to your electronic health record. If you see a primary care provider, you can also send messages to your care team and make appointments. If you have questions, please call your primary care clinic.  If you do not have a primary care provider, please call [phone] and they will assist you.        Care EveryWhere ID     This is your Care EveryWhere ID. This could  "be used by other organizations to access your [facility] medical records  [id number]        Your Vitals Were     Pulse Temperature Respirations Height Pulse Oximetry BMI (Body Mass Index)    75 98.1  F (36.7  C) (Oral) 16 1.727 m (5' 8\") 95% 24.52 kg/m2       Blood Pressure from Last 3 Encounters:   09/14/18 (!) 141/94   09/14/18 128/79   08/31/18 144/71    Weight from Last 3 Encounters:   09/14/18 73 kg (161 lb)   09/14/18 73.1 kg (161 lb 4 oz)   08/31/18 72.1 kg (159 lb)              Today, you had the following     No orders found for display       Primary Care Provider Office Phone # Fax #    Sammie Cerna [phone]       [address] [address]        Equal Access to Services     KIERRA GUNN : Hadii french menon hadasho Soomaali, waaxda luqadaha, qaybta kaalmada adeegyada, waxay jackiein ganesh walker . So [facility] [phone].    ATENCIÓN: Si habla español, tiene a davidson disposición servicios gratuitos de asistencia lingüística. Michelle al [phone].    We comply with applicable federal civil rights laws and [place] laws. We do not discriminate on the basis of race, color, national origin, age, disability, sex, sexual orientation, or gender identity.            Thank you!     Thank you for choosing [facility]  for your care. Our goal is always to provide you with excellent care. Hearing back from our patients is one way we can continue to improve our services. Please take a few minutes to complete the written survey that you may receive in the mail after your visit with us. Thank you!             Your Updated Medication List - Protect others around you: Learn how to safely use, store and throw away your medicines at www.disposemymeds.org.          This list is accurate as of 9/14/18 11:59 PM.  Always use your most recent med list.                   Brand Name Dispense Instructions for use Diagnosis    acetaminophen 325 MG tablet    TYLENOL    60 " tablet    Take 2 tablets (650 mg) by mouth every 4 hours as needed for mild pain    Malignant neoplasm of central portion of right breast in female, estrogen receptor positive (H)       calcium carbonate 500 mg {elemental} 500 MG tablet    OS-PADDY     Take 500 mg by mouth 2 times daily With Magnesium,Zinc        LYSINE PO           Multi-vitamin Tabs tablet      Take 1 tablet by mouth daily        NORVASC PO      Take 5 mg by mouth daily        OMEGA-3 FISH OIL PO           oxyCODONE IR 5 MG tablet    ROXICODONE    6 tablet    Take 1 tablet (5 mg) by mouth every 6 hours as needed for severe pain    Malignant neoplasm of central portion of right breast in female, estrogen receptor positive (H)       VITAMIN D (CHOLECALCIFEROL) PO      Take by mouth daily

## 2018-09-14 NOTE — LETTER
9/14/2018       RE: Keely Velazquez  [address]  [facility] [facility] [address]     Dear Colleague,    Thank you for referring your patient, Keely Velazquez, to the RADIATION ONCOLOGY CLINIC. Please see a copy of my visit note below.      HPI  INITIAL PATIENT ASSESSMENT    Diagnosis: right breast cancer    Prior radiation therapy: None    Prior chemotherapy: None    Prior hormonal therapy: Progesterone listed on EPIC med list.  Keely says she did not know what that was and had not taken this.  History report says entered 2015.      Pain Eval:  Denies- took one oxycodone post op- tylenol or aleve if needed now      Psychosocial  Living arrangements: lives by self, boyfriend here today Terry, retired,   Fall Risk: independent   referral needs: Not needed    Advanced Directive: Yes - Location: at home  Implantable Cardiac Device? No    Onset of menarche: about age 14 or 15  LMP: No LMP recorded. Patient is postmenopausal.  Onset of menopause: about late 50s, had intermittent bleeding afterward,   Abnormal vaginal bleeding/discharge: No  Are you pregnant? No  Reproductive note: 3 children    Nurse face-to-face time: Level 3:  10 min face to face time    Review of Systems   Constitutional: Negative for chills and fever.   HENT: Negative for hearing loss.    Eyes: Positive for redness (hx iritis). Negative for blurred vision.   Respiratory: Negative for cough.    Cardiovascular: Negative for chest pain.   Gastrointestinal: Negative for diarrhea, heartburn and nausea.   Genitourinary: Negative for dysuria and urgency.   Musculoskeletal: Negative for myalgias and neck pain.   Neurological: Negative for dizziness, tingling and headaches.   Endo/Heme/Allergies: Does not bruise/bleed easily.   Psychiatric/Behavioral: Negative for depression.                 Department of Therapeutic Radiology--Radiation Oncology                   [place] [address] [place]  [address]  Office:   [phone]  Fax:  [phone]   Radiation Oncology Clinic  [address] [place] [address]  Phone:  [phone]  Fax:  [phone]     RE: Keely Velazquez : 1955   MRN: [id number] ABHIJIT: 2018     OUTPATIENT VISIT NOTE       PROBLEM: clinical stage IA, [id number], prognostic group IA, invasive ductal carcinoma of the right breast, grade 1, ER+, MA+, HER-2 negative by IHC.      was seen for initial consultation in the Dept of Therapeutic Radiology on 2018 at the request of Dr. Kruger.    HISTORY OF PRESENT ILLNESS:   Ms. Velazquez is a postmenopausal 63-year old woman with a newly diagnosed pathologic stage IA, [id number], invasive ductal carcinoma of the right breast, grade 1, ER positive, MA positive, HER2 negative by IHC.    The patient underwent a screening mammogram at an outside facility on 2018 which showed heterogeneously dense breast tissue bilaterally.  Her gynecologist recommended breast MRI after she incidentally showed her this result. On 2018, bilateral breast MRI showed a nodular to non-masslike enhancement in the RIGHT medial breast toward the 5 o clock location, mid to posterior depth, that measured 16 x 7 x 11 mm and another nodular area of enhancement in the upper outer quadrant towards 10 o clock, middle depth, measuring 12 x 7 x 9 mm. There were also additional scattered minute foci of enhancement in the right breast. A subsequent ultrasound on 18 showed a 10 mm hypoechoic mass at the 5 o clock position and a 5 mm hypoechoic mass at the 10 o clock position. Ultrasound-guided core biopsy of the mass in the 5 o clock location on the right breast on 2018 revealed invasive ductal carcinoma, Arnulfo grade 1, ER positive (95% moderate), MA positive (96% strong), HER2 negative by IHC.  Biopsy of the 10 o clock position was attempted but ultrasound could not locate any hypoechoic nodule at that time, so no biopsy was taken at that site.    She was  referred to Dr. Chilango Kruger in surgery here at [facility].  She did not have any clinical evidence of axillary masses or palpable breast mass.  She underwent uncomplicated lumpectomy and right axillary sentinel lymph node biopsy on 8/31/2018. Following initial excision Dr. Kruger performed an additional anterior resection. 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 Surgical margins were negative for tumor, with the closest margin in the primary specimen to invasive carcinoma anterior at 3 mm and the closest margin to DCIS in the anterior-inferior corner at 1 mm. The second resection specimen showed intermediate DCIS 1 mm from new anterior margin.  Right axillary sentinel lymph node biopsy was positive for carcinoma in 0/1 node.       OncotypeDx result is pending. She has an appointment to follow-up these results with Dr. Merari Tafoya in medical oncology.      The patient reports that she is doing well. Pain in right chest and axilla has much improved in the past 2 weeks since surgery.    PAST MEDICAL HISTORY:    MDD  Anxiety  Hypertension    CHEMOTHERAPY HISTORY: N/A    PAST RADIATION THERAPY HISTORY: N/A    PACEMAKER: N/A    PREGNANT: N/A    MEDICATIONS:   Amlodipine  Celebrex  Fish oil  Folic acid  Lysine  Multivitamin  Vitamin D  Calcium carbonate    ALLERGIES: Codeine, macrobid, sulfa    SOCIAL HISTORY: Never smoker, presents with her significant other.    FAMILY HISTORY: Denies family history of malignancy    REVIEW OF SYMPTOMS:  A full 14-point review of systems was performed.  Pertinent negatives and positives reviewed.  Constitutional: Negative for chills and fever.   HENT: Negative for hearing loss.    Eyes: Positive for redness (hx iritis). Negative for blurred vision.   Respiratory: Negative for cough.    Cardiovascular: Negative for chest pain.   Gastrointestinal: Negative for diarrhea, heartburn and nausea.    Genitourinary: Negative for dysuria and urgency.   Musculoskeletal: Negative for myalgias and neck pain.   Neurological: Negative for dizziness, tingling and headaches.   Endo/Heme/Allergies: Does not bruise/bleed easily.   Psychiatric/Behavioral: Negative for depression.        PHYSICAL EXAMINATION:    Gen: Alert, in NAD  Eyes: EOMI, sclera anicteric  HENT      Head: NC/AT     Ears: No external auricular lesions     Nose/sinus: No rhinorrhea or epistaxis     Oral Cavity/Oropharynx: MMM, no thrush noted  Pulm: Breathing comfortably on room air, no audible wheezes or ronchi  CV: Well-perfused, no cyanosis  Abdominal: Soft, nondistended  Skin: Normal color and turgor  Neurologic/MSK: motor grossly intact, normal gait  Lymph: No axillary or supraclavicular lymphadenopathy  Breast: Well-healing surgical and excisions over right breast and in right axilla.  She does have small, firm, mobile masses beneath her axillary and breast incision consistent with seromas.  Otherwise no masses.  She has inverted nipples bilaterally which she reports are normal for her.  Psych: Appropriate mood and affect    ASSESSMENT AND PLAN:   This is a 63-year-old woman with pathologic stage IA, [id number], invasive ductal carcinoma of the right breast, grade 1, ER positive, CO positive, HER2 negative by IHC in the setting of DCIS status post right lumpectomy and axillary sentinel lymph node biopsy.  She had adequate surgical margins of her invasive cancer; her DCIS was approximately 1 mm from her anterior margin.    We discussed with the patient that we recommend adjuvant whole breast radiotherapy to reduce her risk of local recurrence.  Given her close margins of DCIS, we also recommend a boost to the lumpectomy cavity.  She is still waiting on the results of her Oncotype DX testing, which will likely guide the decision for chemotherapy or not.  We will plan to begin radiotherapy approximately 4-6 weeks after her surgery, or 3 weeks after her  last cycle of chemotherapy if she does receive it.  Therefore, we will follow-up on the Oncotype DX results in order to plan her next steps.  She has an appointment scheduled with Dr. Tafoya to address systemic therapy.    Tentatively, plan to treat the whole right breast with 4240 cGy in 16 daily fractions followed by a boost to the right lumpectomy cavity of 1250 cGy in 5 daily fractions.    We discussed the risks, benefits, side effects, and alternatives to radiotherapy, as well as the technical aspects of the procedure.  The patient and her significant other were encouraged to ask questions, which were answered to the best of our ability.  The patient consented for treatment and we will plan for simulation based on the results of her Oncotype DX score and plan for chemotherapy or not.    Thank you for allowing us to participate in this patient's care.  Please feel free to call with any questions or concerns.       The patient was seen and examined with Dr. Still, who agrees with the above assessment and plan.    Christiano Uribe MD PGY-2   Radiation Oncology, [facility]  [phone] clinic  Pager [phone]         I saw and examined the patient with the resident.  I have reviewed and edited the resident's note and agree with the plan of care.      Ruthann Still MD    CC  Patient Care Team:  Sammie Cerna as PCP - General (Nurse Practitioner)  Esvin Schwartz MD as MD (Radiology)  SAM SPEAR

## 2018-09-14 NOTE — PROGRESS NOTES
Department of Therapeutic Radiology--Radiation Oncology                   [place] Mail [address] [place]  [address]  Office:  [phone]  Fax:  [phone]   Radiation Oncology Clinic  [address] [place] [address]  Phone:  [phone]  Fax:  [phone]     RE: Keely Velazquez : 1955   MRN: [id number] ABHIJIT: 2018     OUTPATIENT VISIT NOTE       PROBLEM: clinical stage IA, [id number], prognostic group IA, invasive ductal carcinoma of the right breast, grade 1, ER+, MS+, HER-2 negative by IHC.      was seen for initial consultation in the Dept of Therapeutic Radiology on 2018 at the request of Dr. Kruger.    HISTORY OF PRESENT ILLNESS:   Ms. Velazquez is a postmenopausal 63-year old woman with a newly diagnosed pathologic stage IA, [id number], invasive ductal carcinoma of the right breast, grade 1, ER positive, MS positive, HER2 negative by IHC.    The patient underwent a screening mammogram at an outside facility on 2018 which showed heterogeneously dense breast tissue bilaterally.  Her gynecologist recommended breast MRI after she incidentally showed her this result. On 2018, bilateral breast MRI showed a nodular to non-masslike enhancement in the RIGHT medial breast toward the 5 o clock location, mid to posterior depth, that measured 16 x 7 x 11 mm and another nodular area of enhancement in the upper outer quadrant towards 10 o clock, middle depth, measuring 12 x 7 x 9 mm. There were also additional scattered minute foci of enhancement in the right breast. A subsequent ultrasound on 18 showed a 10 mm hypoechoic mass at the 5 o clock position and a 5 mm hypoechoic mass at the 10 o clock position. Ultrasound-guided core biopsy of the mass in the 5 o clock location on the right breast on 2018 revealed invasive ductal carcinoma, Arnulfo grade 1, ER positive (95% moderate), MS positive (96% strong), HER2 negative by IHC.  Biopsy of the 10  o clock position was attempted but ultrasound could not locate any hypoechoic nodule at that time, so no biopsy was taken at that site.    She was referred to Dr. Chilango Kruger in surgery here at [facility].  She did not have any clinical evidence of axillary masses or palpable breast mass.  She underwent uncomplicated lumpectomy and right axillary sentinel lymph node biopsy on 8/31/2018. Following initial excision Dr. Kruger performed an additional anterior resection. 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 Surgical margins were negative for tumor, with the closest margin in the primary specimen to invasive carcinoma anterior at 3 mm and the closest margin to DCIS in the anterior-inferior corner at 1 mm. The second resection specimen showed intermediate DCIS 1 mm from new anterior margin.  Right axillary sentinel lymph node biopsy was positive for carcinoma in 0/1 node.       OncotypeDx result is pending. She has an appointment to follow-up these results with Dr. Merari Tafoya in medical oncology.      The patient reports that she is doing well. Pain in right chest and axilla has much improved in the past 2 weeks since surgery.    PAST MEDICAL HISTORY:    MDD  Anxiety  Hypertension    CHEMOTHERAPY HISTORY: N/A    PAST RADIATION THERAPY HISTORY: N/A    PACEMAKER: N/A    PREGNANT: N/A    MEDICATIONS:   Amlodipine  Celebrex  Fish oil  Folic acid  Lysine  Multivitamin  Vitamin D  Calcium carbonate    ALLERGIES: Codeine, macrobid, sulfa    SOCIAL HISTORY: Never smoker, presents with her significant other.    FAMILY HISTORY: Denies family history of malignancy    REVIEW OF SYMPTOMS:  A full 14-point review of systems was performed.  Pertinent negatives and positives reviewed.  Constitutional: Negative for chills and fever.   HENT: Negative for hearing loss.    Eyes: Positive for redness (hx iritis). Negative for blurred vision.    Respiratory: Negative for cough.    Cardiovascular: Negative for chest pain.   Gastrointestinal: Negative for diarrhea, heartburn and nausea.   Genitourinary: Negative for dysuria and urgency.   Musculoskeletal: Negative for myalgias and neck pain.   Neurological: Negative for dizziness, tingling and headaches.   Endo/Heme/Allergies: Does not bruise/bleed easily.   Psychiatric/Behavioral: Negative for depression.        PHYSICAL EXAMINATION:    Gen: Alert, in NAD  Eyes: EOMI, sclera anicteric  HENT      Head: NC/AT     Ears: No external auricular lesions     Nose/sinus: No rhinorrhea or epistaxis     Oral Cavity/Oropharynx: MMM, no thrush noted  Pulm: Breathing comfortably on room air, no audible wheezes or ronchi  CV: Well-perfused, no cyanosis  Abdominal: Soft, nondistended  Skin: Normal color and turgor  Neurologic/MSK: motor grossly intact, normal gait  Lymph: No axillary or supraclavicular lymphadenopathy  Breast: Well-healing surgical and excisions over right breast and in right axilla.  She does have small, firm, mobile masses beneath her axillary and breast incision consistent with seromas.  Otherwise no masses.  She has inverted nipples bilaterally which she reports are normal for her.  Psych: Appropriate mood and affect    ASSESSMENT AND PLAN:   This is a 63-year-old woman with pathologic stage IA, [id number], invasive ductal carcinoma of the right breast, grade 1, ER positive, GA positive, HER2 negative by IHC in the setting of DCIS status post right lumpectomy and axillary sentinel lymph node biopsy.  She had adequate surgical margins of her invasive cancer; her DCIS was approximately 1 mm from her anterior margin.    We discussed with the patient that we recommend adjuvant whole breast radiotherapy to reduce her risk of local recurrence.  Given her close margins of DCIS, we also recommend a boost to the lumpectomy cavity.  She is still waiting on the results of her Oncotype DX testing, which will likely  guide the decision for chemotherapy or not.  We will plan to begin radiotherapy approximately 4-6 weeks after her surgery, or 3 weeks after her last cycle of chemotherapy if she does receive it.  Therefore, we will follow-up on the Oncotype DX results in order to plan her next steps.  She has an appointment scheduled with Dr. Tafoya to address systemic therapy.    Tentatively, plan to treat the whole right breast with 4240 cGy in 16 daily fractions followed by a boost to the right lumpectomy cavity of 1250 cGy in 5 daily fractions.    We discussed the risks, benefits, side effects, and alternatives to radiotherapy, as well as the technical aspects of the procedure.  The patient and her significant other were encouraged to ask questions, which were answered to the best of our ability.  The patient consented for treatment and we will plan for simulation based on the results of her Oncotype DX score and plan for chemotherapy or not.    Thank you for allowing us to participate in this patient's care.  Please feel free to call with any questions or concerns.       The patient was seen and examined with Dr. Still, who agrees with the above assessment and plan.    Christiano Uribe MD PGY-2   Radiation Oncology, [facility]  [phone] clinic  Pager [phone]         I saw and examined the patient with the resident.  I have reviewed and edited the resident's note and agree with the plan of care.      Ruthann Still MD      Patient Care Team:  Sammie Cerna as PCP - General (Nurse Practitioner)  Esvin Schwartz MD as MD (Radiology)  SAM SPEAR

## 2018-09-14 NOTE — LETTER
9/14/2018       RE: Keely Velazquez  [address]  [place] [address]     Dear Colleague,    Thank you for referring your patient, Keely Velazquez, to the [facility] at [facility]. Please see a copy of my visit note below.    HISTORY OF PRESENT ILLNESS:  Keely Velazquez is here for a postoperative visit after undergoing a right lumpectomy, sentinel lymph node biopsy.  She has done well since her surgery with no postoperative complications.  Her final pathology report revealed a 1.2 cm invasive cancer with DCIS.  All of her margins are negative.  Her sentinel node biopsy is negative.      IMPRESSION:  Postop check.      PLAN:  I talked to her about the results of her pathology.  I told her she is going to need endocrine therapy and radiation therapy.  She did have Oncotype DX test ordered.  This is still pending.         Again, thank you for allowing me to participate in the care of your patient.      Sincerely,    Chilango Kruger MD

## 2018-09-14 NOTE — MR AVS SNAPSHOT
After Visit Summary   9/14/2018    Keely Velazquez    MRN: [id number]           Patient Information     Date Of Birth          1955        Visit Information        Provider Department      9/14/2018 11:00 AM Ruthann Still MD Radiation Oncology Clinic        Today's Diagnoses     Malignant neoplasm of upper-outer quadrant of right breast in female, estrogen receptor positive (H)    -  1       Follow-ups after your visit        Your next 10 appointments already scheduled     Sep 24, 2018  1:15 PM CDT   (Arrive by 1:00 PM)   New Patient Visit with Merari Tafoya MD   [facility] ([facility])    [address]  [address]  [place] [address] [phone]              Who to contact     Please call your clinic at [phone] to:    Ask questions about your health    Make or cancel appointments    Discuss your medicines    Learn about your test results    Speak to your doctor            Additional Information About Your Visit        MyChart Information     JOOR gives you secure access to your electronic health record. If you see a primary care provider, you can also send messages to your care team and make appointments. If you have questions, please call your primary care clinic.  If you do not have a primary care provider, please call [phone] and they will assist you.      JOOR is an electronic gateway that provides easy, online access to your medical records. With JOOR, you can request a clinic appointment, read your test results, renew a prescription or communicate with your care team.     To access your existing account, please contact your [facility] or call [phone] for assistance.        Care EveryWhere ID     This is your Care EveryWhere ID. This could be used by other organizations to access your [facility] medical records  [id number]        Your Vitals Were     Pulse BMI (Body  Mass Index)                85 24.48 kg/m2           Blood Pressure from Last 3 Encounters:   09/14/18 (!) 141/94   09/14/18 128/79   08/31/18 144/71    Weight from Last 3 Encounters:   09/14/18 73 kg (161 lb)   09/14/18 73.1 kg (161 lb 4 oz)   08/31/18 72.1 kg (159 lb)              Today, you had the following     No orders found for display       Primary Care Provider Office Phone # Fax #    Sammie Cerna [phone]       [facility] [address] [address]        Equal Access to Services     [facility]: Hadii french menon hadbryon Sobrown, waaxda lukimaniadaha, qaybta kaalmada zanyamata, gabino walker . So [facility] [phone].    ATENCIÓN: Si habla español, tiene a davidson disposición servicios gratuitos de asistencia lingüística. [facility] [phone].    We comply with applicable federal civil rights laws and [place] laws. We do not discriminate on the basis of race, color, national origin, age, disability, sex, sexual orientation, or gender identity.            Thank you!     Thank you for choosing RADIATION ONCOLOGY CLINIC  for your care. Our goal is always to provide you with excellent care. Hearing back from our patients is one way we can continue to improve our services. Please take a few minutes to complete the written survey that you may receive in the mail after your visit with us. Thank you!             Your Updated Medication List - Protect others around you: Learn how to safely use, store and throw away your medicines at www.disposemymeds.org.          This list is accurate as of 9/14/18 11:59 PM.  Always use your most recent med list.                   Brand Name Dispense Instructions for use Diagnosis    acetaminophen 325 MG tablet    TYLENOL    60 tablet    Take 2 tablets (650 mg) by mouth every 4 hours as needed for mild pain    Malignant neoplasm of central portion of right breast in female, estrogen receptor positive (H)       calcium carbonate  500 mg {elemental} 500 MG tablet    OS-PADDY     Take 500 mg by mouth 2 times daily With Magnesium,Zinc        LYSINE PO           Multi-vitamin Tabs tablet      Take 1 tablet by mouth daily        NORVASC PO      Take 5 mg by mouth daily        OMEGA-3 FISH OIL PO           oxyCODONE IR 5 MG tablet    ROXICODONE    6 tablet    Take 1 tablet (5 mg) by mouth every 6 hours as needed for severe pain    Malignant neoplasm of central portion of right breast in female, estrogen receptor positive (H)       VITAMIN D (CHOLECALCIFEROL) PO      Take by mouth daily

## 2018-09-14 NOTE — PROGRESS NOTES
HISTORY OF PRESENT ILLNESS:  Keely Velazquez is here for a postoperative visit after undergoing a right lumpectomy, sentinel lymph node biopsy.  She has done well since her surgery with no postoperative complications.  Her final pathology report revealed a 1.2 cm invasive cancer with DCIS.  All of her margins are negative.  Her sentinel node biopsy is negative.      IMPRESSION:  Postop check.      PLAN:  I talked to her about the results of her pathology.  I told her she is going to need endocrine therapy and radiation therapy.  She did have Oncotype DX test ordered.  This is still pending.

## 2018-09-14 NOTE — PROGRESS NOTES
HPI  INITIAL PATIENT ASSESSMENT    Diagnosis: right breast cancer    Prior radiation therapy: None    Prior chemotherapy: None    Prior hormonal therapy: Progesterone listed on EPIC med list.  Keely says she did not know what that was and had not taken this.  History report says entered 2015.      Pain Eval:  Denies- took one oxycodone post op- tylenol or aleve if needed now      Psychosocial  Living arrangements: lives by self, boyfriend here today Terry, retired,   Fall Risk: independent   referral needs: Not needed    Advanced Directive: Yes - Location: at home  Implantable Cardiac Device? No    Onset of menarche: about age 14 or 15  LMP: No LMP recorded. Patient is postmenopausal.  Onset of menopause: about late 50s, had intermittent bleeding afterward,   Abnormal vaginal bleeding/discharge: No  Are you pregnant? No  Reproductive note: 3 children    Nurse face-to-face time: Level 3:  10 min face to face time    Review of Systems   Constitutional: Negative for chills and fever.   HENT: Negative for hearing loss.    Eyes: Positive for redness (hx iritis). Negative for blurred vision.   Respiratory: Negative for cough.    Cardiovascular: Negative for chest pain.   Gastrointestinal: Negative for diarrhea, heartburn and nausea.   Genitourinary: Negative for dysuria and urgency.   Musculoskeletal: Negative for myalgias and neck pain.   Neurological: Negative for dizziness, tingling and headaches.   Endo/Heme/Allergies: Does not bruise/bleed easily.   Psychiatric/Behavioral: Negative for depression.

## 2018-09-14 NOTE — NURSING NOTE
"Oncology Rooming Note    September 14, 2018 9:21 AM   Keely Velazquez is a 63 year old female who presents for:    Chief Complaint   Patient presents with     Oncology Clinic Visit     Return: Post OP     Initial Vitals: /79  Pulse 75  Temp 98.1  F (36.7  C) (Oral)  Resp 16  Ht 1.727 m (5' 8\")  Wt 73.1 kg (161 lb 4 oz)  SpO2 95%  BMI 24.52 kg/m2 Estimated body mass index is 24.52 kg/(m^2) as calculated from the following:    Height as of this encounter: 1.727 m (5' 8\").    Weight as of this encounter: 73.1 kg (161 lb 4 oz). Body surface area is 1.87 meters squared.  No Pain (0) Comment: Data Unavailable   No LMP recorded. Patient is postmenopausal.  Allergies reviewed: Yes  Medications reviewed: Yes    Medications: Medication refills not needed today.  Pharmacy name entered into [facility]: [id number] [place] [address]    Clinical concerns: no new concerns today, just check her incision Dr. Kruger was notified.    10 minutes for nursing intake (face to face time)     Germán Cruz CMA              "

## 2018-09-18 LAB — LAB SCANNED RESULT: NORMAL

## 2018-09-22 NOTE — PROGRESS NOTES
Oncology Follow Up:  Date on this visit: 9/24/2018    Keely Velazquez is referred by Dr.Todd RAFAEL Kruger for an oncology consultation. She requires evaluation for new diagnosis of breast cancer.    Primary Physician: Sammie Cerna     History Of Present Illness:  Ms. Velazquez is a 63 year old female with right breast cancer.  Ms. Velazquez had routine screening mammogram in June, which demonstrated dense breast tissue, but no dominant masses.  She presented to her gynecologist's office in August for a Pap smear and mentioned that she has high breast density.  Her gynecologist recommended proceeding with breast MRI.  Breast MRI on 08/16/2018 showed nodular to non-mass enhancement measuring 1.6 cm in the right breast at 5 o'clock.  There was a similar area of enhancement at 10 o'clock measuring 1.2 cm and a cyst in the left breast at 3 o'clock measuring 1.3 cm.  Right breast ultrasound confirmed a mass at 5 o'clock.  Right breast biopsy demonstrated a grade 1 invasive ductal carcinoma.  She met in consultation with Dr. Kruger, who recommended a right breast lumpectomy and sentinel lymph node procedure.  This was performed on 08/31/2018.  Pathology demonstrated a 1.2 cm, grade 1 invasive mammary carcinoma.  Estrogen-receptor staining was moderate in 95%; progesterone-receptor staining was strong in 6%.  HER-2 was nonamplified by immunohistochemistry with a score of 1+.  There was associated intermediate-grade DCIS.  Surgical margins were negative for both noninvasive and invasive carcinoma; however, DCIS was 1  mm from the anterior margin.  A single sentinel lymph node was negative for malignancy. Oncotype DX testing of the breast tumor returned with a recurrent score of 7.  She presents today with her significant other to discuss adjuvant treatment options.      She states that she is generally in good health.  Prior to surgery, she was started on an antihypertensive for newly diagnosed hypertension.  She has a history of a prior  breast biopsy 15-20 years ago, which was benign.  She denies other prior breast issues.  She underwent menarche at 15 years old.  She has 3 children with first pregnancy at age 21.  She  for a total of greater than 2 years.  She was on OCPs for a few years.  Last menstrual period was in her late 40s.  She denies hormone replacement therapy; however, states that she has been on Sottopelle for approximately the past 4 years, her last dose being 06/06.  She denies bone or joint aches or pains.  She has no cough, shortness of breath or chest pain.  No abdominal complaints.  No headaches, visual changes or focal neurologic complaints.  The remainder of a complete 12 point review of systems was completed and was negative with the exception of that mentioned above.       Past Medical/Surgical History:  Past Medical History:   Diagnosis Date     Hypertension      Iritis      Past Surgical History:   Procedure Laterality Date     CATARACT IOL, RT/LT Left 2006     HYSTERECTOMY  2016     LUMPECTOMY BREAST WITH SENTINEL NODE, COMBINED Right 8/31/2018    Procedure: COMBINED LUMPECTOMY BREAST WITH SENTINEL NODE;  Right Wire Localized Lumpectomy and Right [place] Lymph Node Biopsy;  Surgeon: Chilango Kruger MD;  Location: [facility]     OOPHORECTOMY  2014     Allergies:  Allergies as of 09/24/2018 - Vitaliy as Reviewed 09/14/2018   Allergen Reaction Noted     Codeine Nausea and Vomiting 08/06/2015     Macrobid [nitrofurantoin] Nausea and Vomiting 08/06/2015     Sulfa drugs Itching 08/06/2015     Current Medications:  Current Outpatient Prescriptions   Medication Sig Dispense Refill     acetaminophen (TYLENOL) 325 MG tablet Take 2 tablets (650 mg) by mouth every 4 hours as needed for mild pain 60 tablet 0     AmLODIPine Besylate (NORVASC PO) Take 5 mg by mouth daily       calcium carbonate (OS-PADDY 500 MG [place]. [place]) 500 MG tablet Take 500 mg by mouth 2 times daily With Magnesium,Zinc       LYSINE PO        multivitamin, therapeutic  "with minerals (MULTI-VITAMIN) TABS Take 1 tablet by mouth daily       Omega-3 Fatty Acids (OMEGA-3 FISH OIL PO)        oxyCODONE IR (ROXICODONE) 5 MG tablet Take 1 tablet (5 mg) by mouth every 6 hours as needed for severe pain (Patient not taking: Reported on 9/14/2018) 6 tablet 0     VITAMIN D, CHOLECALCIFEROL, PO Take by mouth daily        Family History:  Maternal \"half-aunt\" with h/o breast cancer in her 60's.  Denies other family history of malignancy.    Social History:  In a monogamous relationship.  Has 3 children.  Retired from working as a  for [employer].  Lifetime nonsmoker.  Has 1-2 glasses of wine per day.    Physical Exam:  /79 (BP Location: Left arm, Patient Position: Sitting, Cuff Size: Adult Regular)  Pulse 86  Temp 97.5  F (36.4  C) (Oral)  Resp 16  Ht 1.727 m (5' 7.99\")  Wt 72.8 kg (160 lb 6.4 oz)  SpO2 96%  BMI 24.39 kg/m2  General:  Well appearing, well-nourished adult female in NAD.  HEENT:  Normocephalic.  Sclera anicteric.  MMM.  No lesions of the oropharynx.  Lymph:  No palpable cervical, supraclavicular, or axillary LAD.  Chest:  CTA bilaterally.  No wheezes or crackles.  CV:  RRR.  Nl S1 and S2.  No m/r/g.  Breast:  Right axillary incision with small seroma.  Right lower inner breast incision appears to be healing well and is without significant underlying fibrosis.  Bilateral breasts are of increased fibroglandular density.  There are no discretely palpable masses in either breast.  Bilateral nipples are everted.  No nipple discharge.  Abd:  Soft/NT/ND.  BSs normoactive.  No hepatosplenomegaly.  Ext:  No pitting edema of the bilateral lower extremities.  Pulses 2+ and symmetric.  Musculo:  Strength 5/5 throughout.  Neuro:  Cranial nerves grossly intact.  Psych:  Mood and affect appear normal.  Skin:  Basal cell carcinoma left nasal fold.    Laboratory/Imaging Studies:  6/21/18 Bilateral screening mammogram:  Heterogeneously dense breast tissue.  No dominant " masses.    8/16/18 Breast MRI:  Moderate background enhancement  Nodular to non-masslike enhancement measuring 1.6 cm right medial breast 5:00.  Similar area of enhancement at 10:00 measuring 1.2 cm.  Circumscribed oval nodule left breast 3:00 measuring 1.3 cm.  No axillary LAD.    8/16/18 Right breast ultrasound:  1 cm hypoechoic foci at 5:00  0.5 cm hypoechoic foci at 10:00    Left breast ultrasound:  1 cm cyst at 3:00    8/16/18 Right breast biopsy:  5:00, 9 cm from nipple = grade 1 invasive ductal carcinoma    8/31/18 Right breast lumpectomy and sentinel lymph node:  - Grade 1 invasive mammary carcinoma measuring 1.2 cm  - Estrogen receptor moderate in 95%  - Progesterone receptor strong in 96%  - HER-2 non-amplified by IHC (score 1+)  - Intermediate grade DCIS measuring 1.5 cm  - Surgical margin negative for invasive carcinoma.  - DCIS is 1 mm from anterior margin.  - [place] lymph node negative for malignancy    Oncotype dx = 7    ASSESSMENT/PLAN:  Ms. Velazquez is a 63-year-old female with stage IA, T1c N0 M0, grade 1, ER positive, PA positive, HER2 non-amplified invasive mammary carcinoma of the right breast.  She is status post treatment with right breast lumpectomy and sentinel lymph node procedure on 08/31.  Today I discussed the diagnosis, staging, prognosis and treatment options in detail with Ms. Velazquez and her significant other, Terry.  We reviewed the results of her pathology, which demonstrates a grade 1, 1.2 cm tumor in the right breast.  A single sentinel lymph node was negative for malignancy.  She is asymptomatic of distant metastases.  Taken with grade and receptor status, staging is T1c N0 M0, stage IA.  We reviewed that the goal of treatment of stage I breast cancer is curative, or in other words, reduce the future risk of recurrence as much as possible.      We then discussed adjuvant treatment options including chemotherapy, radiation and endocrine therapy.  We reviewed the receptor status of her  breast cancer which is estrogen and progesterone receptor positive and HER2 negative.  We discussed that not every early stage estrogen receptor-positive breast cancer benefits from chemotherapy in reducing the future risk of recurrence.  We utilize genotyping such as Oncotype DX in order to determine chemotherapy benefit.  We reviewed that Oncotype DX evaluates expression levels of 21 genes within the tumor and correlates gene expression to a recurrence score.  Patients with a high risk recurrence score benefit from chemotherapy, whereas those with a low risk for do not.  Her Oncotype DX recurrence score was 7, which is well within the low risk of recurrence category.  This suggests that she will receive little to no benefit from chemotherapy.      We then discussed recommendation for adjuvant radiation in the setting of lumpectomy.  We discussed that radiation can reduce the risk of in-breast recurrence by about half.  She is agreeable to a course of radiation and has already met in consultation with Dr. Still.  I will notify Dr. Still of the low-risk Oncotype score so that radiation can be arranged to begin in the upcoming weeks.      Finally, we discussed the risks and benefits of adjuvant endocrine therapy.  Treatment with an aromatase inhibitor can reduce the risk of distant recurrence of estrogen receptor-positive breast cancer by approximately half.  I recommended treatment with 10 years of anastrozole.  We discussed anastrozole is a pill taken daily.  We reviewed the potential side effects including hot flashes, vaginal dryness, muscle and joint aches and pains, weight gain, mood disturbances, cataracts, hypertriglyceridemia and thinning of the bones.  She is agreeable to starting anastrozole at this time and I have sent a prescription to her local pharmacy.      In regards to bone health maintenance while on aromatase inhibitor therapy, I recommended we monitor a DEXA bone density scan once every 2 years  while on treatment.  I also recommended that she begin calcium 1200 mg and vitamin D 1000 international units and walk a 1/2 hour daily.  We will obtain her first DEXA within the next 1-2 months.      We then discussed surveillance visits.  I recommended that she be seen in clinic once every 3-4 months for 3 years and once every 6 months in years 4 and 5 for routine surveillance visits.  These will consist of a history and physical including breast and lymph node examination.  In addition, due to increased breast density, I recommend high-risk screening with both mammogram and breast MRI.  These studies will both be performed annually; however, spaced so that she is having some form of imaging every 6 months.  We would plan to do first mammogram in February, 2019 with a breast MRI in August, 2019.      Ms. Velazquez and her significant other had multiple questions which were answered to their satisfaction today.  A total of 50 minutes of our 60-minute face-to-face visit was spent in counseling.

## 2018-09-24 ENCOUNTER — ONCOLOGY VISIT (OUTPATIENT)
Dept: ONCOLOGY | Facility: CLINIC | Age: 63
End: 2018-09-24
Attending: INTERNAL MEDICINE
Payer: COMMERCIAL

## 2018-09-24 VITALS
RESPIRATION RATE: 16 BRPM | HEART RATE: 86 BPM | TEMPERATURE: 97.5 F | HEIGHT: 68 IN | OXYGEN SATURATION: 96 % | DIASTOLIC BLOOD PRESSURE: 79 MMHG | SYSTOLIC BLOOD PRESSURE: 142 MMHG | WEIGHT: 160.4 LBS | BODY MASS INDEX: 24.31 KG/M2

## 2018-09-24 DIAGNOSIS — Z17.0 MALIGNANT NEOPLASM OF UPPER-OUTER QUADRANT OF RIGHT BREAST IN FEMALE, ESTROGEN RECEPTOR POSITIVE (H): Primary | ICD-10-CM

## 2018-09-24 DIAGNOSIS — C50.411 MALIGNANT NEOPLASM OF UPPER-OUTER QUADRANT OF RIGHT BREAST IN FEMALE, ESTROGEN RECEPTOR POSITIVE (H): Primary | ICD-10-CM

## 2018-09-24 DIAGNOSIS — C50.919 BREAST CANCER (H): Primary | ICD-10-CM

## 2018-09-24 PROCEDURE — G0463 HOSPITAL OUTPT CLINIC VISIT: HCPCS | Mod: ZF

## 2018-09-24 PROCEDURE — 99205 OFFICE O/P NEW HI 60 MIN: CPT | Mod: ZP | Performed by: INTERNAL MEDICINE

## 2018-09-24 RX ORDER — ANASTROZOLE 1 MG/1
1 TABLET ORAL DAILY
Qty: 30 TABLET | Refills: 11 | Status: SHIPPED | OUTPATIENT
Start: 2018-09-24 | End: 2019-08-26

## 2018-09-24 RX ORDER — UREA 40 %
CREAM (GRAM) TOPICAL
COMMUNITY
Start: 2018-09-19 | End: 2021-01-11

## 2018-09-24 RX ORDER — DESONIDE 0.5 MG/G
CREAM TOPICAL
COMMUNITY
Start: 2018-09-19 | End: 2021-01-11

## 2018-09-24 ASSESSMENT — PAIN SCALES - GENERAL: PAINLEVEL: NO PAIN (0)

## 2018-09-24 NOTE — LETTER
9/24/2018       RE: Keely Velazquez  [address] [id number]     Dear Colleague,    Thank you for referring your patient, Keely Velazquez, to the [facility]. Please see a copy of my visit note below.    Oncology Follow Up:  Date on this visit: 9/24/2018    Keely Velazquez is referred by Dr.Todd RAFAEL Kruger for an oncology consultation. She requires evaluation for new diagnosis of breast cancer.    Primary Physician: Sammie Cerna     History Of Present Illness:  Ms. Velazquez is a 63 year old female with right breast cancer.  Ms. Velazquez had routine screening mammogram in June, which demonstrated dense breast tissue, but no dominant masses.  She presented to her gynecologist's office in August for a Pap smear and mentioned that she has high breast density.  Her gynecologist recommended proceeding with breast MRI.  Breast MRI on 08/16/2018 showed nodular to non-mass enhancement measuring 1.6 cm in the right breast at 5 o'clock.  There was a similar area of enhancement at 10 o'clock measuring 1.2 cm and a cyst in the left breast at 3 o'clock measuring 1.3 cm.  Right breast ultrasound confirmed a mass at 5 o'clock.  Right breast biopsy demonstrated a grade 1 invasive ductal carcinoma.  She met in consultation with Dr. Kruger, who recommended a right breast lumpectomy and sentinel lymph node procedure.  This was performed on 08/31/2018.  Pathology demonstrated a 1.2 cm, grade 1 invasive mammary carcinoma.  Estrogen-receptor staining was moderate in 95%; progesterone-receptor staining was strong in 6%.  HER-2 was nonamplified by immunohistochemistry with a score of 1+.  There was associated intermediate-grade DCIS.  Surgical margins were negative for both noninvasive and invasive carcinoma; however, DCIS was 1  mm from the anterior margin.  A single sentinel lymph node was negative for malignancy. Oncotype DX testing of the breast tumor returned with a recurrent score of 7.  She presents today with her  significant other to discuss adjuvant treatment options.      She states that she is generally in good health.  Prior to surgery, she was started on an antihypertensive for newly diagnosed hypertension.  She has a history of a prior breast biopsy 15-20 years ago, which was benign.  She denies other prior breast issues.  She underwent menarche at 15 years old.  She has 3 children with first pregnancy at age 21.  She  for a total of greater than 2 years.  She was on OCPs for a few years.  Last menstrual period was in her late 40s.  She denies hormone replacement therapy; however, states that she has been on Sottopelle for approximately the past 4 years, her last dose being 06/06.  She denies bone or joint aches or pains.  She has no cough, shortness of breath or chest pain.  No abdominal complaints.  No headaches, visual changes or focal neurologic complaints.  The remainder of a complete 12 point review of systems was completed and was negative with the exception of that mentioned above.       Past Medical/Surgical History:  Past Medical History:   Diagnosis Date     Hypertension      Iritis      Past Surgical History:   Procedure Laterality Date     CATARACT IOL, RT/LT Left 2006     HYSTERECTOMY  2016     LUMPECTOMY BREAST WITH SENTINEL NODE, COMBINED Right 8/31/2018    Procedure: COMBINED LUMPECTOMY BREAST WITH SENTINEL NODE;  Right Wire Localized Lumpectomy and Right [place] Lymph Node Biopsy;  Surgeon: Chilango Kruger MD;  Location: [facility]     OOPHORECTOMY  2014     Allergies:  Allergies as of 09/24/2018 - Vitaliy as Reviewed 09/14/2018   Allergen Reaction Noted     Codeine Nausea and Vomiting 08/06/2015     Macrobid [nitrofurantoin] Nausea and Vomiting 08/06/2015     Sulfa drugs Itching 08/06/2015     Current Medications:  Current Outpatient Prescriptions   Medication Sig Dispense Refill     acetaminophen (TYLENOL) 325 MG tablet Take 2 tablets (650 mg) by mouth every 4 hours as needed for mild pain 60  "tablet 0     AmLODIPine Besylate (NORVASC PO) Take 5 mg by mouth daily       calcium carbonate (OS-PADDY 500 MG [place]. [place]) 500 MG tablet Take 500 mg by mouth 2 times daily With Magnesium,Zinc       LYSINE PO        multivitamin, therapeutic with minerals (MULTI-VITAMIN) TABS Take 1 tablet by mouth daily       Omega-3 Fatty Acids (OMEGA-3 FISH OIL PO)        oxyCODONE IR (ROXICODONE) 5 MG tablet Take 1 tablet (5 mg) by mouth every 6 hours as needed for severe pain (Patient not taking: Reported on 9/14/2018) 6 tablet 0     VITAMIN D, CHOLECALCIFEROL, PO Take by mouth daily        Family History:  Maternal \"half-aunt\" with h/o breast cancer in her 60's.  Denies other family history of malignancy.    Social History:  In a monogamous relationship.  Has 3 children.  Retired from working as a  for [employer].  Lifetime nonsmoker.  Has 1-2 glasses of wine per day.    Physical Exam:  /79 (BP Location: Left arm, Patient Position: Sitting, Cuff Size: Adult Regular)  Pulse 86  Temp 97.5  F (36.4  C) (Oral)  Resp 16  Ht 1.727 m (5' 7.99\")  Wt 72.8 kg (160 lb 6.4 oz)  SpO2 96%  BMI 24.39 kg/m2  General:  Well appearing, well-nourished adult female in NAD.  HEENT:  Normocephalic.  Sclera anicteric.  MMM.  No lesions of the oropharynx.  Lymph:  No palpable cervical, supraclavicular, or axillary LAD.  Chest:  CTA bilaterally.  No wheezes or crackles.  CV:  RRR.  Nl S1 and S2.  No m/r/g.  Breast:  Right axillary incision with small seroma.  Right lower inner breast incision appears to be healing well and is without significant underlying fibrosis.  Bilateral breasts are of increased fibroglandular density.  There are no discretely palpable masses in either breast.  Bilateral nipples are everted.  No nipple discharge.  Abd:  Soft/NT/ND.  BSs normoactive.  No hepatosplenomegaly.  Ext:  No pitting edema of the bilateral lower extremities.  Pulses 2+ and symmetric.  Musculo:  Strength 5/5 throughout.  Neuro:  Cranial " nerves grossly intact.  Psych:  Mood and affect appear normal.  Skin:  Basal cell carcinoma left nasal fold.    Laboratory/Imaging Studies:  6/21/18 Bilateral screening mammogram:  Heterogeneously dense breast tissue.  No dominant masses.    8/16/18 Breast MRI:  Moderate background enhancement  Nodular to non-masslike enhancement measuring 1.6 cm right medial breast 5:00.  Similar area of enhancement at 10:00 measuring 1.2 cm.  Circumscribed oval nodule left breast 3:00 measuring 1.3 cm.  No axillary LAD.    8/16/18 Right breast ultrasound:  1 cm hypoechoic foci at 5:00  0.5 cm hypoechoic foci at 10:00    Left breast ultrasound:  1 cm cyst at 3:00    8/16/18 Right breast biopsy:  5:00, 9 cm from nipple = grade 1 invasive ductal carcinoma    8/31/18 Right breast lumpectomy and sentinel lymph node:  - Grade 1 invasive mammary carcinoma measuring 1.2 cm  - Estrogen receptor moderate in 95%  - Progesterone receptor strong in 96%  - HER-2 non-amplified by IHC (score 1+)  - Intermediate grade DCIS measuring 1.5 cm  - Surgical margin negative for invasive carcinoma.  - DCIS is 1 mm from anterior margin.  - [place] lymph node negative for malignancy    Oncotype dx = 7    ASSESSMENT/PLAN:  Ms. Velazquez is a 63-year-old female with stage IA, T1c N0 M0, grade 1, ER positive, PA positive, HER2 non-amplified invasive mammary carcinoma of the right breast.  She is status post treatment with right breast lumpectomy and sentinel lymph node procedure on 08/31.  Today I discussed the diagnosis, staging, prognosis and treatment options in detail with Ms. Velazquez and her significant other, Terry.  We reviewed the results of her pathology, which demonstrates a grade 1, 1.2 cm tumor in the right breast.  A single sentinel lymph node was negative for malignancy.  She is asymptomatic of distant metastases.  Taken with grade and receptor status, staging is T1c N0 M0, stage IA.  We reviewed that the goal of treatment of stage I breast cancer is  curative, or in other words, reduce the future risk of recurrence as much as possible.      We then discussed adjuvant treatment options including chemotherapy, radiation and endocrine therapy.  We reviewed the receptor status of her breast cancer which is estrogen and progesterone receptor positive and HER2 negative.  We discussed that not every early stage estrogen receptor-positive breast cancer benefits from chemotherapy in reducing the future risk of recurrence.  We utilize genotyping such as Oncotype DX in order to determine chemotherapy benefit.  We reviewed that Oncotype DX evaluates expression levels of 21 genes within the tumor and correlates gene expression to a recurrence score.  Patients with a high risk recurrence score benefit from chemotherapy, whereas those with a low risk for do not.  Her Oncotype DX recurrence score was 7, which is well within the low risk of recurrence category.  This suggests that she will receive little to no benefit from chemotherapy.      We then discussed recommendation for adjuvant radiation in the setting of lumpectomy.  We discussed that radiation can reduce the risk of in-breast recurrence by about half.  She is agreeable to a course of radiation and has already met in consultation with Dr. Still.  I will notify Dr. Still of the low-risk Oncotype score so that radiation can be arranged to begin in the upcoming weeks.      Finally, we discussed the risks and benefits of adjuvant endocrine therapy.  Treatment with an aromatase inhibitor can reduce the risk of distant recurrence of estrogen receptor-positive breast cancer by approximately half.  I recommended treatment with 10 years of anastrozole.  We discussed anastrozole is a pill taken daily.  We reviewed the potential side effects including hot flashes, vaginal dryness, muscle and joint aches and pains, weight gain, mood disturbances, cataracts, hypertriglyceridemia and thinning of the bones.  She is agreeable to  starting anastrozole at this time and I have sent a prescription to her local pharmacy.      In regards to bone health maintenance while on aromatase inhibitor therapy, I recommended we monitor a DEXA bone density scan once every 2 years while on treatment.  I also recommended that she begin calcium 1200 mg and vitamin D 1000 international units and walk a 1/2 hour daily.  We will obtain her first DEXA within the next 1-2 months.      We then discussed surveillance visits.  I recommended that she be seen in clinic once every 3-4 months for 3 years and once every 6 months in years 4 and 5 for routine surveillance visits.  These will consist of a history and physical including breast and lymph node examination.  In addition, due to increased breast density, I recommend high-risk screening with both mammogram and breast MRI.  These studies will both be performed annually; however, spaced so that she is having some form of imaging every 6 months.  We would plan to do first mammogram in February, 2019 with a breast MRI in August, 2019.      Ms. Velazquez and her significant other had multiple questions which were answered to their satisfaction today.  A total of 50 minutes of our 60-minute face-to-face visit was spent in counseling.       Again, thank you for allowing me to participate in the care of your patient.      Sincerely,    Merari Tafoya MD

## 2018-09-24 NOTE — MR AVS SNAPSHOT
After Visit Summary   9/24/2018    Keely Velazquez    MRN: [id number]           Patient Information     Date Of Birth          1955        Visit Information        Provider Department      9/24/2018 1:15 PM Merari Tafoya MD [facility]        Today's Diagnoses     Malignant neoplasm of upper-outer quadrant of right breast in female, estrogen receptor positive (H)    -  1       Follow-ups after your visit        Your next 10 appointments already scheduled     Oct 02, 2018 10:00 AM CDT   DX HIP/PELVIS/SPINE with UCDX1   [facility] Dexa ([facility])    [address] [address] [phone]           How do I prepare for my exam? (Food and drink instructions) No Food and Drink Restrictions.  How do I prepare for my exam? (Other instructions) Please do not take any of the following 24 hours prior to the day of your exam: vitamins, calcium tablets, antacids.  What should I wear: If possible, please wear clothes without metal (snaps, zippers). A sweat suit works well.  How long does the exam take: The exam takes about 20 minutes.  What should I bring: Bring a list of your current medicines to your exam (including vitamins, minerals and over-the-counter drugs).  Do I need a :  No  is needed.  What should I do after the exam: No restrictions, You may resume normal activities.  How do I prepare for my exam? (Food and drink instructions) A DEXA scan is a bone-density scan. It uses a low level of radiation to check the strength of your bones. As you lie on a padded table, a machine will take X-rays. We most often scan the hips and lower spine.  Who should I call with questions: If you have any questions, please call the Imaging Department where you will have your exam. Directions, parking instructions, and other information is available on our website, [email or web address].            Dec 31,  "2018  1:15 PM CST   (Arrive by 1:00 PM)   New Patient Visit with Merari Tafoya MD   [facility] ([facility])    [address]  [address]  [facility] [address] [phone]              Future tests that were ordered for you today     Open Future Orders        Priority Expected Expires Ordered    Dexa hip/pelvis/spine* Routine  4/22/2019 9/24/2018            Who to contact     If you have questions or need follow up information about today's clinic visit or your schedule please contact [facility] directly at [phone].  Normal or non-critical lab and imaging results will be communicated to you by MyChart, letter or phone within 4 business days after the clinic has received the results. If you do not hear from us within 7 days, please contact the clinic through [employer] or phone. If you have a critical or abnormal lab result, we will notify you by phone as soon as possible.  Submit refill requests through [employer] or call your pharmacy and they will forward the refill request to us. Please allow 3 business days for your refill to be completed.          Additional Information About Your Visit        [employer] Information     [employer] gives you secure access to your electronic health record. If you see a primary care provider, you can also send messages to your care team and make appointments. If you have questions, please call your primary care clinic.  If you do not have a primary care provider, please call [phone] and they will assist you.        Care EveryWhere ID     This is your Care EveryWhere ID. This could be used by other organizations to access your [place] medical records  [id number]        Your Vitals Were     Pulse Temperature Respirations Height Pulse Oximetry BMI (Body Mass Index)    86 97.5  F (36.4  C) (Oral) 16 1.727 m (5' 7.99\") 96% 24.39 kg/m2       Blood Pressure from Last 3 Encounters:   09/24/18 " 142/79   09/14/18 (!) 141/94   09/14/18 128/79    Weight from Last 3 Encounters:   09/24/18 72.8 kg (160 lb 6.4 oz)   09/14/18 73 kg (161 lb)   09/14/18 73.1 kg (161 lb 4 oz)              We Performed the Following     Colonoscopy - HIM Scan          Today's Medication Changes          These changes are accurate as of 9/24/18  3:01 PM.  If you have any questions, ask your nurse or doctor.               Start taking these medicines.        Dose/Directions    anastrozole 1 MG tablet   Commonly known as:  ARIMIDEX   Used for:  Malignant neoplasm of upper-outer quadrant of right breast in female, estrogen receptor positive (H)   Started by:  Merari Tafoya MD        Dose:  1 mg   Take 1 tablet (1 mg) by mouth daily   Quantity:  30 tablet   Refills:  11            Where to get your medicines      These medications were sent to [place] [address] [place] [facility] [place] [address] [facility] [facility]  [address] [address]     Phone:  [phone]     anastrozole 1 MG tablet                Primary Care Provider Office Phone # Fax #    Sammie Cerna [phone]       [address] [address]        Equal Access to Services     ANH GUNN AH: Hadii french ku hadasho Soomaali, waaxda luqadaha, qaybta kaalmada adeegyada, waxay jackiein haymitzyn zan naranjo. So [facility] [phone].    ATENCIÓN: Si habla español, tiene a davidson disposición servicios gratuitos de asistencia lingüística. Llame al [phone].    We comply with applicable federal civil rights laws and [place] laws. We do not discriminate on the basis of race, color, national origin, age, disability, sex, sexual orientation, or gender identity.            Thank you!     Thank you for choosing [facility]  for your care. Our goal is always to provide you with excellent care. Hearing back from our patients is one way we can continue to improve our services. Please take a few minutes to  complete the written survey that you may receive in the mail after your visit with us. Thank you!             Your Updated Medication List - Protect others around you: Learn how to safely use, store and throw away your medicines at www.disposemymeds.org.          This list is accurate as of 9/24/18  3:01 PM.  Always use your most recent med list.                   Brand Name Dispense Instructions for use Diagnosis    acetaminophen 325 MG tablet    TYLENOL    60 tablet    Take 2 tablets (650 mg) by mouth every 4 hours as needed for mild pain    Malignant neoplasm of central portion of right breast in female, estrogen receptor positive (H)       anastrozole 1 MG tablet    ARIMIDEX    30 tablet    Take 1 tablet (1 mg) by mouth daily    Malignant neoplasm of upper-outer quadrant of right breast in female, estrogen receptor positive (H)       calcium carbonate 500 mg {elemental} 500 MG tablet    OS-PADDY     Take 500 mg by mouth 2 times daily With Magnesium,Zinc        desonide 0.05 % cream    DESOWEN          LYSINE PO           Multi-vitamin Tabs tablet      Take 1 tablet by mouth daily        NORVASC PO      Take 5 mg by mouth daily        OMEGA-3 FISH OIL PO           oxyCODONE IR 5 MG tablet    ROXICODONE    6 tablet    Take 1 tablet (5 mg) by mouth every 6 hours as needed for severe pain    Malignant neoplasm of central portion of right breast in female, estrogen receptor positive (H)       Urea 40 % Crea      Apply to fingernails twice daily, especially at bedtime.        VITAMIN D (CHOLECALCIFEROL) PO      Take by mouth daily

## 2018-09-24 NOTE — NURSING NOTE
"Oncology Rooming Note    September 24, 2018 1:10 PM   Keely Velazquez is a 63 year old female who presents for:    Chief Complaint   Patient presents with     Oncology Clinic Visit     New Patient, Breast Ca     Initial Vitals: /79 (BP Location: Left arm, Patient Position: Sitting, Cuff Size: Adult Regular)  Pulse 86  Temp 97.5  F (36.4  C) (Oral)  Resp 16  Ht 1.727 m (5' 7.99\")  Wt 72.8 kg (160 lb 6.4 oz)  SpO2 96%  BMI 24.39 kg/m2 Estimated body mass index is 24.39 kg/(m^2) as calculated from the following:    Height as of this encounter: 1.727 m (5' 7.99\").    Weight as of this encounter: 72.8 kg (160 lb 6.4 oz). Body surface area is 1.87 meters squared.  No Pain (0) Comment: Data Unavailable   No LMP recorded. Patient is postmenopausal.  Allergies reviewed: Yes  Medications reviewed: Yes    Medications: Medication refills not needed today.  Pharmacy name entered into [facility]: [id number] [place] [address]    Clinical concerns: None, Dr Tafoya was NOT notified.    10 minutes for nursing intake (face to face time)     PEDRO ORNELAS LPN            "

## 2018-10-02 ENCOUNTER — RADIANT APPOINTMENT (OUTPATIENT)
Dept: BONE DENSITY | Facility: CLINIC | Age: 63
End: 2018-10-02
Attending: INTERNAL MEDICINE
Payer: COMMERCIAL

## 2018-10-02 DIAGNOSIS — C50.919 BREAST CANCER (H): ICD-10-CM

## 2018-10-03 ENCOUNTER — ALLIED HEALTH/NURSE VISIT (OUTPATIENT)
Dept: RADIATION ONCOLOGY | Facility: CLINIC | Age: 63
End: 2018-10-03
Attending: RADIOLOGY
Payer: COMMERCIAL

## 2018-10-03 DIAGNOSIS — Z17.0 MALIGNANT NEOPLASM OF UPPER-OUTER QUADRANT OF RIGHT BREAST IN FEMALE, ESTROGEN RECEPTOR POSITIVE (H): Primary | ICD-10-CM

## 2018-10-03 DIAGNOSIS — C50.411 MALIGNANT NEOPLASM OF UPPER-OUTER QUADRANT OF RIGHT BREAST IN FEMALE, ESTROGEN RECEPTOR POSITIVE (H): Primary | ICD-10-CM

## 2018-10-03 PROCEDURE — 77332 RADIATION TREATMENT AID(S): CPT | Performed by: RADIOLOGY

## 2018-10-03 PROCEDURE — 77290 THER RAD SIMULAJ FIELD CPLX: CPT | Performed by: RADIOLOGY

## 2018-10-10 ENCOUNTER — APPOINTMENT (OUTPATIENT)
Dept: RADIATION ONCOLOGY | Facility: CLINIC | Age: 63
End: 2018-10-10
Attending: RADIOLOGY
Payer: COMMERCIAL

## 2018-10-10 PROCEDURE — 77280 THER RAD SIMULAJ FIELD SMPL: CPT | Performed by: RADIOLOGY

## 2018-10-11 ENCOUNTER — APPOINTMENT (OUTPATIENT)
Dept: RADIATION ONCOLOGY | Facility: CLINIC | Age: 63
End: 2018-10-11
Attending: RADIOLOGY
Payer: COMMERCIAL

## 2018-10-11 PROCEDURE — 77412 RADIATION TX DELIVERY LVL 3: CPT | Performed by: RADIOLOGY

## 2018-10-12 ENCOUNTER — APPOINTMENT (OUTPATIENT)
Dept: RADIATION ONCOLOGY | Facility: CLINIC | Age: 63
End: 2018-10-12
Attending: RADIOLOGY
Payer: COMMERCIAL

## 2018-10-12 VITALS — WEIGHT: 160.8 LBS | BODY MASS INDEX: 24.46 KG/M2

## 2018-10-12 DIAGNOSIS — C50.411 MALIGNANT NEOPLASM OF UPPER-OUTER QUADRANT OF RIGHT BREAST IN FEMALE, ESTROGEN RECEPTOR POSITIVE (H): Primary | ICD-10-CM

## 2018-10-12 DIAGNOSIS — Z17.0 MALIGNANT NEOPLASM OF UPPER-OUTER QUADRANT OF RIGHT BREAST IN FEMALE, ESTROGEN RECEPTOR POSITIVE (H): Primary | ICD-10-CM

## 2018-10-12 PROCEDURE — 77412 RADIATION TX DELIVERY LVL 3: CPT | Performed by: RADIOLOGY

## 2018-10-12 NOTE — LETTER
10/12/2018       RE: Keely Velazquez  [address] [place] [address]     Dear Colleague,    Thank you for referring your patient, Keely Velazquez, to the RADIATION ONCOLOGY CLINIC. Please see a copy of my visit note below.    [facility] PHYSICIANS  SPECIALIZING IN BREAKTHROUGHS  Radiation Oncology    On Treatment Visit Note      Keely Velazquez      Date: 10/12/2018   MRN: [id number]   : 1955  Diagnosis: R breast cancer      Reason for Visit:  On Radiation Treatment Visit     Treatment Summary to Date  Treatment Site: R breast Current Dose: 530/5940cGy cGy Fractions:            ED Visit/Hosiptal Admission: None    Treatment Breaks: No      Subjective:   Keely tolerated the first two treatments well.  She has been using aquaphor for skin care.     Nursing ROS:      Skin  Skin Reaction: 0 - No changes  Skin Note: using aquaphor           Pain Assessment  0-10 Pain Scale: 0      Objective:   Wt 72.9 kg (160 lb 12.8 oz)  BMI 24.46 kg/m2  Gen: Appears well, in no acute distress  Skin: No erythema    Labs:  CBC RESULTS: No results for input(s): WBC, RBC, HGB, HCT, MCV, MCH, MCHC, RDW, PLT in the last 49667 hours.  ELECTROLYTES:  No results for input(s): NA, POTASSIUM, CHLORIDE, PADDY, CO2, BUN, CR, GLC in the last 18479 hours.    Assessment:    Tolerating radiation therapy well.  All questions and concerns addressed.    Toxicities:  Dermatitis: Grade 0: No toxicity    Plan:   1. Continue current therapy.        Mosaiq chart and setup information reviewed  Ports checked                  Ruthann Still MD/PhD  [phone] clinic  Pager [phone]    Please do not send letter to referring physician.      Again, thank you for allowing me to participate in the care of your patient.      Sincerely,    Ruthann Stlil MD

## 2018-10-12 NOTE — MR AVS SNAPSHOT
After Visit Summary   10/12/2018    Keely Velazquez    MRN: [id number]           Patient Information     Date Of Birth          1955        Visit Information        Provider Department      10/12/2018 8:15 AM Ruthann Still MD Radiation Oncology Clinic         Follow-ups after your visit        Your next 10 appointments already scheduled     Oct 15, 2018  8:00 AM CDT   EXTERNAL RADIATION TREATMENT with UMP RAD ONC VARIAN   Radiation Oncology Clinic ([facility])    [facility]  1st Floor  [address] [address]   [phone]            Oct 16, 2018  8:00 AM CDT   EXTERNAL RADIATION TREATMENT with UMP RAD ONC VARIAN   Radiation Oncology Clinic ([facility])    [facility]  1st Floor  [address] [address]   [phone]            Oct 17, 2018  8:00 AM CDT   EXTERNAL RADIATION TREATMENT with UMP RAD ONC VARIAN   Radiation Oncology Clinic ([facility])    [facility]  1st Floor  [address] [address]   [phone]            Oct 18, 2018  8:00 AM CDT   EXTERNAL RADIATION TREATMENT with UMP RAD ONC VARIAN   Radiation Oncology Clinic ([facility])    [facility]  1st Floor  [address] [address]   [phone]            Oct 19, 2018  8:00 AM CDT   EXTERNAL RADIATION TREATMENT with UMP RAD ONC VARIAN   Radiation Oncology Clinic ([facility])    [facility]  1st Floor  [address] [address]   [phone]            Oct 19, 2018  8:15 AM CDT   Return Visit with Ruthann Still MD   Radiation Oncology Clinic ([facility])    [facility]  1st Floor  [address] [address]   [phone]            Oct 22, 2018  8:00 AM CDT   EXTERNAL RADIATION TREATMENT with UMP RAD ONC VARIAN    Radiation Oncology Clinic ([facility])    [facility]  1st Floor  [address] [address]   [phone]            Oct 23, 2018  8:00 AM CDT   EXTERNAL RADIATION TREATMENT with UMP RAD ONC VARIAN   Radiation Oncology Clinic ([facility])    [facility]  1st Floor  [address] [address]   [phone]            Oct 24, 2018  8:00 AM CDT   EXTERNAL RADIATION TREATMENT with UMP RAD ONC VARIAN   Radiation Oncology Clinic ([facility])    [facility]  1st Floor  [address] [address]   [phone]            Oct 25, 2018  8:00 AM CDT   EXTERNAL RADIATION TREATMENT with UMP RAD ONC VARIAN   Radiation Oncology Clinic ([facility])    [facility]  1st Floor  [address] [address] [phone]              Who to contact     Please call your clinic at [phone] to:    Ask questions about your health    Make or cancel appointments    Discuss your medicines    Learn about your test results    Speak to your doctor            Additional Information About Your Visit        [employer] Information     [employer] gives you secure access to your electronic health record. If you see a primary care provider, you can also send messages to your care team and make appointments. If you have questions, please call your primary care clinic.  If you do not have a primary care provider, please call [phone] and they will assist you.      [employer] is an electronic gateway that provides easy, online access to your medical records. With [employer], you can request a clinic appointment, read your test results, renew a prescription or communicate with your care team.     To access your existing account, please contact your [facility] or call [phone] for assistance.        Care EveryWhere ID      This is your Care EveryWhere ID. This could be used by other organizations to access your [place] medical records  [id number]        Your Vitals Were     BMI (Body Mass Index)                   24.46 kg/m2            Blood Pressure from Last 3 Encounters:   09/24/18 142/79   09/14/18 (!) 141/94   09/14/18 128/79    Weight from Last 3 Encounters:   10/12/18 72.9 kg (160 lb 12.8 oz)   09/24/18 72.8 kg (160 lb 6.4 oz)   09/14/18 73 kg (161 lb)              Today, you had the following     No orders found for display       Primary Care Provider    Provider Not In System                Equal Access to Services     KIERRA GUNN : Hadii french Stern, baltazar rolle, ana cabanalmamata solitario, gabino walker . So [facility] [phone].    ATENCIÓN: Si habla español, tiene a davidson disposición servicios gratuitos de asistencia lingüística. Llame al [phone].    We comply with applicable federal civil rights laws and [place] laws. We do not discriminate on the basis of race, color, national origin, age, disability, sex, sexual orientation, or gender identity.            Thank you!     Thank you for choosing RAD[facility]ATION ONCOLOGY CLINIC  for your care. Our goal is always to provide you with excellent care. Hearing back from our patients is one way we can continue to improve our services. Please take a few minutes to complete the written survey that you may receive in the mail after your visit with us. Thank you!             Your Updated Medication List - Protect others around you: Learn how to safely use, store and throw away your medicines at www.disposemymeds.org.          This list is accurate as of 10/12/18 12:26 PM.  Always use your most recent med list.                   Brand Name Dispense Instructions for use Diagnosis    acetaminophen 325 MG tablet    TYLENOL    60 tablet    Take 2 tablets (650 mg) by mouth every 4 hours as needed for mild pain    Malignant neoplasm of central  portion of right breast in female, estrogen receptor positive (H)       anastrozole 1 MG tablet    ARIMIDEX    30 tablet    Take 1 tablet (1 mg) by mouth daily    Malignant neoplasm of upper-outer quadrant of right breast in female, estrogen receptor positive (H)       calcium carbonate 500 mg (elemental) 500 MG tablet    OS-PADDY     Take 500 mg by mouth 2 times daily With Magnesium,Zinc        desonide 0.05 % cream    DESOWEN          LYSINE PO           Multi-vitamin Tabs tablet      Take 1 tablet by mouth daily        NORVASC PO      Take 5 mg by mouth daily        OMEGA-3 FISH OIL PO           Urea 40 % Crea      Apply to fingernails twice daily, especially at bedtime.        VITAMIN D (CHOLECALCIFEROL) PO      Take 1,000 Units by mouth daily

## 2018-10-12 NOTE — LETTER
Date:October 16, 2018      Provider requested that no letter be sent. Do not send.       [facility] Health Information

## 2018-10-13 NOTE — PROGRESS NOTES
[facility] PHYSICIANS  SPECIALIZING IN BREAKTHROUGHS  Radiation Oncology    On Treatment Visit Note      Keely Velazquez      Date: 10/12/2018   MRN: [id number]   : 1955  Diagnosis: R breast cancer      Reason for Visit:  On Radiation Treatment Visit     Treatment Summary to Date  Treatment Site: R breast Current Dose: 530/5940cGy cGy Fractions:            ED Visit/Hosiptal Admission: None    Treatment Breaks: No      Subjective:   Keely tolerated the first two treatments well.  She has been using aquaphor for skin care.     Nursing ROS:      Skin  Skin Reaction: 0 - No changes  Skin Note: using aquaphor           Pain Assessment  0-10 Pain Scale: 0      Objective:   Wt 72.9 kg (160 lb 12.8 oz)  BMI 24.46 kg/m2  Gen: Appears well, in no acute distress  Skin: No erythema    Labs:  CBC RESULTS: No results for input(s): WBC, RBC, HGB, HCT, MCV, MCH, MCHC, RDW, PLT in the last 94594 hours.  ELECTROLYTES:  No results for input(s): NA, POTASSIUM, CHLORIDE, PADDY, CO2, BUN, CR, GLC in the last 22634 hours.    Assessment:    Tolerating radiation therapy well.  All questions and concerns addressed.    Toxicities:  Dermatitis: Grade 0: No toxicity    Plan:   1. Continue current therapy.        [employer] chart and setup information reviewed  Ports checked                  Ruthann Still MD/PhD  [phone] clinic  Pager [phone]    Please do not send letter to referring physician.

## 2018-10-15 ENCOUNTER — APPOINTMENT (OUTPATIENT)
Dept: RADIATION ONCOLOGY | Facility: CLINIC | Age: 63
End: 2018-10-15
Attending: RADIOLOGY
Payer: COMMERCIAL

## 2018-10-15 PROCEDURE — 77412 RADIATION TX DELIVERY LVL 3: CPT | Performed by: RADIOLOGY

## 2018-10-16 ENCOUNTER — APPOINTMENT (OUTPATIENT)
Dept: RADIATION ONCOLOGY | Facility: CLINIC | Age: 63
End: 2018-10-16
Attending: RADIOLOGY
Payer: COMMERCIAL

## 2018-10-16 PROCEDURE — 77412 RADIATION TX DELIVERY LVL 3: CPT | Performed by: RADIOLOGY

## 2018-10-17 ENCOUNTER — APPOINTMENT (OUTPATIENT)
Dept: RADIATION ONCOLOGY | Facility: CLINIC | Age: 63
End: 2018-10-17
Attending: RADIOLOGY
Payer: COMMERCIAL

## 2018-10-17 PROCEDURE — 77412 RADIATION TX DELIVERY LVL 3: CPT | Performed by: RADIOLOGY

## 2018-10-17 PROCEDURE — 77336 RADIATION PHYSICS CONSULT: CPT | Performed by: RADIOLOGY

## 2018-10-18 ENCOUNTER — APPOINTMENT (OUTPATIENT)
Dept: RADIATION ONCOLOGY | Facility: CLINIC | Age: 63
End: 2018-10-18
Attending: RADIOLOGY
Payer: COMMERCIAL

## 2018-10-18 PROCEDURE — 77417 THER RADIOLOGY PORT IMAGE(S): CPT | Performed by: RADIOLOGY

## 2018-10-18 PROCEDURE — 77412 RADIATION TX DELIVERY LVL 3: CPT | Performed by: RADIOLOGY

## 2018-10-19 ENCOUNTER — OFFICE VISIT (OUTPATIENT)
Dept: RADIATION ONCOLOGY | Facility: CLINIC | Age: 63
End: 2018-10-19
Attending: RADIOLOGY
Payer: COMMERCIAL

## 2018-10-19 VITALS — WEIGHT: 162.9 LBS | BODY MASS INDEX: 24.77 KG/M2

## 2018-10-19 DIAGNOSIS — C50.411 MALIGNANT NEOPLASM OF UPPER-OUTER QUADRANT OF RIGHT BREAST IN FEMALE, ESTROGEN RECEPTOR POSITIVE (H): Primary | ICD-10-CM

## 2018-10-19 DIAGNOSIS — Z17.0 MALIGNANT NEOPLASM OF UPPER-OUTER QUADRANT OF RIGHT BREAST IN FEMALE, ESTROGEN RECEPTOR POSITIVE (H): Primary | ICD-10-CM

## 2018-10-19 PROCEDURE — 77412 RADIATION TX DELIVERY LVL 3: CPT | Performed by: RADIOLOGY

## 2018-10-19 PROCEDURE — 77307 TELETHX ISODOSE PLAN CPLX: CPT | Performed by: RADIOLOGY

## 2018-10-19 PROCEDURE — 77334 RADIATION TREATMENT AID(S): CPT | Performed by: RADIOLOGY

## 2018-10-19 NOTE — MR AVS SNAPSHOT
After Visit Summary   10/19/2018    Keely Velazquez    MRN: [id number]           Patient Information     Date Of Birth          1955        Visit Information        Provider Department      10/19/2018 8:15 AM Ruthann Still MD Radiation Oncology Clinic        Today's Diagnoses     Malignant neoplasm of upper-outer quadrant of right breast in female, estrogen receptor positive (H)    -  1       Follow-ups after your visit        Your next 10 appointments already scheduled     Oct 22, 2018  8:00 AM CDT   EXTERNAL RADIATION TREATMENT with UMP RAD ONC VARIAN   Radiation Oncology Clinic ([facility])    [facility]  1st Floor  [address] [address]   [phone]            Oct 23, 2018  8:00 AM CDT   EXTERNAL RADIATION TREATMENT with UMP RAD ONC VARIAN   Radiation Oncology Clinic ([facility])    [facility]  1st Floor  [address] [address]   [phone]            Oct 24, 2018  8:00 AM CDT   EXTERNAL RADIATION TREATMENT with UMP RAD ONC VARIAN   Radiation Oncology Clinic ([facility])    [facility]  1st Floor  [address] [address]   [phone]            Oct 25, 2018  8:00 AM CDT   EXTERNAL RADIATION TREATMENT with UMP RAD ONC VARIAN   Radiation Oncology Clinic ([facility])    [facility]  1st Floor  [address] [address]   [phone]            Oct 25, 2018  8:15 AM CDT   Return Visit with Ruthann Still MD   Radiation Oncology Clinic ([facility])    [facility]  1st Floor  [address] [address]   [phone]            Oct 26, 2018  8:00 AM CDT   EXTERNAL RADIATION TREATMENT with UMP RAD ONC VARIAN   Radiation Oncology Clinic ([facility])    [facility]  1st Floor  [address]  [facility] [address]   [phone]            Oct 29, 2018  8:00 AM CDT   EXTERNAL RADIATION TREATMENT with UMP RAD ONC VARIAN   Radiation Oncology Clinic ([facility])    [facility]  1st Floor  [address] [facility] [address]   [phone]            Oct 30, 2018  8:00 AM CDT   EXTERNAL RADIATION TREATMENT with UMP RAD ONC VARIAN   Radiation Oncology Clinic ([facility])    [facility]  1st Floor  [address] [facility] [address]   [phone]            Oct 31, 2018  8:00 AM CDT   EXTERNAL RADIATION TREATMENT with UMP RAD ONC VARIAN   Radiation Oncology Clinic ([facility])    [facility]  1st Floor  [address] [facility] [address]   [phone]            Nov 01, 2018  8:00 AM CDT   EXTERNAL RADIATION TREATMENT with UMP RAD ONC VARIAN   Radiation Oncology Clinic ([facility])    [facility]  1st Floor  [address] [facility] [address]   [phone]              Who to contact     Please call your clinic at [phone] to:    Ask questions about your health    Make or cancel appointments    Discuss your medicines    Learn about your test results    Speak to your doctor            Additional Information About Your Visit        [employer] Information     [employer] gives you secure access to your electronic health record. If you see a primary care provider, you can also send messages to your care team and make appointments. If you have questions, please call your primary care clinic.  If you do not have a primary care provider, please call [phone] and they will assist you.      [employer] is an electronic gateway that provides easy, online access to your medical records. With [employer], you can request a clinic appointment, read your test results, renew a prescription or communicate with your care team.     To access your existing  account, please contact your [facility] or call [phone] for assistance.        Care EveryWhere ID     This is your Care EveryWhere ID. This could be used by other organizations to access your [place] medical records  [id number]        Your Vitals Were     BMI (Body Mass Index)                   24.77 kg/m2            Blood Pressure from Last 3 Encounters:   09/24/18 142/79   09/14/18 (!) 141/94   09/14/18 128/79    Weight from Last 3 Encounters:   10/19/18 73.9 kg (162 lb 14.4 oz)   10/12/18 72.9 kg (160 lb 12.8 oz)   09/24/18 72.8 kg (160 lb 6.4 oz)              Today, you had the following     No orders found for display       Primary Care Provider    Provider Not In System                Equal Access to Services     ANH GUNN : Hadii french Stern, wagisellada luqadaha, qaybta kaalmada altaf, gabino walker . So [facility] [phone].    ATENCIÓN: Si habla español, tiene a davidson disposición servicios gratuitos de asistencia lingüística. Llame al [phone].    We comply with applicable federal civil rights laws and [place] laws. We do not discriminate on the basis of race, color, national origin, age, disability, sex, sexual orientation, or gender identity.            Thank you!     Thank you for choosing RADIATION ONCOLOGY CLINIC  for your care. Our goal is always to provide you with excellent care. Hearing back from our patients is one way we can continue to improve our services. Please take a few minutes to complete the written survey that you may receive in the mail after your visit with us. Thank you!             Your Updated Medication List - Protect others around you: Learn how to safely use, store and throw away your medicines at www.disposemymeds.org.          This list is accurate as of 10/19/18  8:43 AM.  Always use your most recent med list.                   Brand Name Dispense Instructions for use Diagnosis    acetaminophen  325 MG tablet    TYLENOL    60 tablet    Take 2 tablets (650 mg) by mouth every 4 hours as needed for mild pain    Malignant neoplasm of central portion of right breast in female, estrogen receptor positive (H)       anastrozole 1 MG tablet    ARIMIDEX    30 tablet    Take 1 tablet (1 mg) by mouth daily    Malignant neoplasm of upper-outer quadrant of right breast in female, estrogen receptor positive (H)       calcium carbonate 500 mg (elemental) 500 MG tablet    OS-PADDY     Take 500 mg by mouth 2 times daily With Magnesium,Zinc        desonide 0.05 % cream    DESOWEN          LYSINE PO           Multi-vitamin Tabs tablet      Take 1 tablet by mouth daily        NORVASC PO      Take 5 mg by mouth daily        OMEGA-3 FISH OIL PO           Urea 40 % Crea      Apply to fingernails twice daily, especially at bedtime.        VITAMIN D (CHOLECALCIFEROL) PO      Take 1,000 Units by mouth daily

## 2018-10-19 NOTE — LETTER
Date:October 22, 2018      Provider requested that no letter be sent. Do not send.       [facility] Health Information

## 2018-10-19 NOTE — PROGRESS NOTES
[facility] PHYSICIANS  SPECIALIZING IN BREAKTHROUGHS  Radiation Oncology    On Treatment Visit Note      Keely Velazquez      Date: 10/19/2018   MRN: [id number]   : 1955  Diagnosis: R breast cancer      Reason for Visit:  On Radiation Treatment Visit     Treatment Summary to Date  Treatment Site: R breast Current Dose: 1855/5940cGy cGy Fractions:         ED Visit/Hosiptal Admission: None    Treatment Breaks: None      Subjective:   Keely is doing well.  She noticed very faint erythema of the right breast.  No fatigue -- she's been busy with her grandchildren who have KAREEM this week.  She has a skin cancer on her nose and is awaiting Mohs surgery.  She wonders if she can have it done while on radiation.    Nursing ROS:   Nutrition Alteration  Diet Type: Patient's Preference  Skin  Skin Reaction: 1 - Faint erythema or dry desquamation  Skin Note: using aquaphor        Psychosocial  Pyschosocial Note: good energy, has not noticed fatigue  Pain Assessment  0-10 Pain Scale: 0      Objective:   Wt 73.9 kg (162 lb 14.4 oz)  BMI 24.77 kg/m2  Gen: Appears well, in no acute distress  Skin: Mild diffuse erythema over treatment field    Labs:  CBC RESULTS: No results for input(s): WBC, RBC, HGB, HCT, MCV, MCH, MCHC, RDW, PLT in the last 08650 hours.  ELECTROLYTES:  No results for input(s): NA, POTASSIUM, CHLORIDE, PADDY, CO2, BUN, CR, GLC in the last 22342 hours.    Assessment:    Tolerating radiation therapy well.  All questions and concerns addressed.    Toxicities:  Dermatitis: Grade 1: Faint erythema or dry desquamation    Plan:   1. Continue current therapy.    2. I told her that Mohs surgery will not interfere with her breast radiation.  She can get her Mohs done when it is available.       Mosaiq chart and setup information reviewed  Ports checked         Educational Topic Discussed  Education Instructions: reviewed        Ruthann Still MD/PhD  [phone] clinic  Pager [phone]    Please do  not send letter to referring physician.

## 2018-10-22 ENCOUNTER — APPOINTMENT (OUTPATIENT)
Dept: RADIATION ONCOLOGY | Facility: CLINIC | Age: 63
End: 2018-10-22
Attending: RADIOLOGY
Payer: COMMERCIAL

## 2018-10-22 PROCEDURE — 77412 RADIATION TX DELIVERY LVL 3: CPT | Performed by: RADIOLOGY

## 2018-10-23 ENCOUNTER — APPOINTMENT (OUTPATIENT)
Dept: RADIATION ONCOLOGY | Facility: CLINIC | Age: 63
End: 2018-10-23
Attending: RADIOLOGY
Payer: COMMERCIAL

## 2018-10-23 PROCEDURE — 77417 THER RADIOLOGY PORT IMAGE(S): CPT | Performed by: RADIOLOGY

## 2018-10-23 PROCEDURE — 77412 RADIATION TX DELIVERY LVL 3: CPT | Performed by: RADIOLOGY

## 2018-10-24 ENCOUNTER — APPOINTMENT (OUTPATIENT)
Dept: RADIATION ONCOLOGY | Facility: CLINIC | Age: 63
End: 2018-10-24
Attending: RADIOLOGY
Payer: COMMERCIAL

## 2018-10-24 PROCEDURE — 77336 RADIATION PHYSICS CONSULT: CPT | Performed by: RADIOLOGY

## 2018-10-24 PROCEDURE — 77412 RADIATION TX DELIVERY LVL 3: CPT | Performed by: RADIOLOGY

## 2018-10-25 ENCOUNTER — APPOINTMENT (OUTPATIENT)
Dept: RADIATION ONCOLOGY | Facility: CLINIC | Age: 63
End: 2018-10-25
Attending: RADIOLOGY
Payer: COMMERCIAL

## 2018-10-25 VITALS — BODY MASS INDEX: 24.82 KG/M2 | WEIGHT: 163.2 LBS

## 2018-10-25 DIAGNOSIS — C50.411 MALIGNANT NEOPLASM OF UPPER-OUTER QUADRANT OF RIGHT BREAST IN FEMALE, ESTROGEN RECEPTOR POSITIVE (H): Primary | ICD-10-CM

## 2018-10-25 DIAGNOSIS — Z17.0 MALIGNANT NEOPLASM OF UPPER-OUTER QUADRANT OF RIGHT BREAST IN FEMALE, ESTROGEN RECEPTOR POSITIVE (H): Primary | ICD-10-CM

## 2018-10-25 PROCEDURE — 77412 RADIATION TX DELIVERY LVL 3: CPT | Performed by: RADIOLOGY

## 2018-10-25 NOTE — LETTER
Date:October 29, 2018      Provider requested that no letter be sent. Do not send.       [facility] Information

## 2018-10-25 NOTE — MR AVS SNAPSHOT
After Visit Summary   10/25/2018    Keely Velazquez    MRN: [id number]           Patient Information     Date Of Birth          1955        Visit Information        Provider Department      10/25/2018 8:15 AM Ruthann Still MD Radiation Oncology Clinic        Today's Diagnoses     Malignant neoplasm of upper-outer quadrant of right breast in female, estrogen receptor positive (H)    -  1       Follow-ups after your visit        Your next 10 appointments already scheduled     Oct 26, 2018  8:00 AM CDT   EXTERNAL RADIATION TREATMENT with UMP RAD ONC VARIAN   Radiation Oncology Clinic ([facility])    [facility]  1st Floor  [address] [address]   [phone]            Oct 29, 2018  8:00 AM CDT   EXTERNAL RADIATION TREATMENT with UMP RAD ONC VARIAN   Radiation Oncology Clinic ([facility])    [facility]  1st Floor  [address] [address]   [phone]            Oct 30, 2018  8:00 AM CDT   EXTERNAL RADIATION TREATMENT with UMP RAD ONC VARIAN   Radiation Oncology Clinic ([facility])    [facility]  1st Floor  [address] [address]   [phone]            Oct 31, 2018  8:00 AM CDT   EXTERNAL RADIATION TREATMENT with UMP RAD ONC VARIAN   Radiation Oncology Clinic ([facility])    [facility]  1st Floor  [address] [address]   [phone]            Nov 01, 2018  8:00 AM CDT   EXTERNAL RADIATION TREATMENT with UMP RAD ONC VARIAN   Radiation Oncology Clinic ([facility])    [facility]  1st Floor  [address] [address]   [phone]            Nov 01, 2018  8:15 AM CDT   Return Visit with Ruthann Still MD   Radiation Oncology Clinic ([facility])    [facility]  1st Floor  [address] [facility] [facility] [address] [facility]  [facility] [address]   [phone]            Nov 02, 2018  8:00 AM CDT   EXTERNAL RADIATION TREATMENT with UMP RAD ONC VARIAN   Radiation Oncology Clinic ([facility])    [facility]  1st Floor  [address] [facility] [address]   [phone]            Nov 05, 2018  8:00 AM CST   EXTERNAL RADIATION TREATMENT with UMP RAD ONC VARIAN   Radiation Oncology Clinic ([facility])    [facility]  1st Floor  [address] [facility] [address]   [phone]            Nov 06, 2018  8:00 AM CST   EXTERNAL RADIATION TREATMENT with UMP RAD ONC VARIAN   Radiation Oncology Clinic ([facility])    [facility]  1st Floor  [address] [address]   [phone]            Nov 07, 2018  8:00 AM CST   EXTERNAL RADIATION TREATMENT with UMP RAD ONC VARIAN   Radiation Oncology Clinic ([facility])    [facility]  1st Floor  [address] [address]   [phone]              Who to contact     Please call your clinic at [phone] to:    Ask questions about your health    Make or cancel appointments    Discuss your medicines    Learn about your test results    Speak to your doctor            Additional Information About Your Visit        [employer] Information     [employer] gives you secure access to your electronic health record. If you see a primary care provider, you can also send messages to your care team and make appointments. If you have questions, please call your primary care clinic.  If you do not have a primary care provider, please call [phone] and they will assist you.      [employer] is an electronic gateway that provides easy, online access to your medical records. With [employer], you can request a clinic appointment, read your test results, renew a prescription or communicate with your care team.     To access your existing  account, please contact your [facility] or call [phone] for assistance.        Care EveryWhere ID     This is your Care EveryWhere ID. This could be used by other organizations to access your [place] medical records  [id number]        Your Vitals Were     BMI (Body Mass Index)                   24.82 kg/m2            Blood Pressure from Last 3 Encounters:   09/24/18 142/79   09/14/18 (!) 141/94   09/14/18 128/79    Weight from Last 3 Encounters:   10/25/18 74 kg (163 lb 3.2 oz)   10/19/18 73.9 kg (162 lb 14.4 oz)   10/12/18 72.9 kg (160 lb 12.8 oz)              Today, you had the following     No orders found for display       Primary Care Provider    Provider Not In System                Equal Access to Services     ANH GUNN : Hadii french greenwood Sobrown, waaxda luqadaha, qaybta kaalmada adeegyada, gabino walker . So [facility] [phone].    ATENCIÓN: Si habla español, tiene a davidson disposición servicios gratuitos de asistencia lingüística. Llame al [phone].    We comply with applicable federal civil rights laws and [place] laws. We do not discriminate on the basis of race, color, national origin, age, disability, sex, sexual orientation, or gender identity.            Thank you!     Thank you for choosing [facility]COLOGY CLINIC  for your care. Our goal is always to provide you with excellent care. Hearing back from our patients is one way we can continue to improve our services. Please take a few minutes to complete the written survey that you may receive in the mail after your visit with us. Thank you!             Your Updated Medication List - Protect others around you: Learn how to safely use, store and throw away your medicines at www.disposemymeds.org.          This list is accurate as of 10/25/18  8:19 AM.  Always use your most recent med list.                   Brand Name Dispense Instructions for use Diagnosis    acetaminophen 325  MG tablet    TYLENOL    60 tablet    Take 2 tablets (650 mg) by mouth every 4 hours as needed for mild pain    Malignant neoplasm of central portion of right breast in female, estrogen receptor positive (H)       anastrozole 1 MG tablet    ARIMIDEX    30 tablet    Take 1 tablet (1 mg) by mouth daily    Malignant neoplasm of upper-outer quadrant of right breast in female, estrogen receptor positive (H)       calcium carbonate 500 mg (elemental) 500 MG tablet    OS-PADDY     Take 500 mg by mouth 2 times daily With Magnesium,Zinc        desonide 0.05 % cream    DESOWEN          LYSINE PO           Multi-vitamin Tabs tablet      Take 1 tablet by mouth daily        NORVASC PO      Take 5 mg by mouth daily        OMEGA-3 FISH OIL PO           Urea 40 % Crea      Apply to fingernails twice daily, especially at bedtime.        VITAMIN D (CHOLECALCIFEROL) PO      Take 1,000 Units by mouth daily

## 2018-10-25 NOTE — LETTER
10/25/2018       RE: Keely Velazquez  [address]  [place] [address]     Dear Colleague,    Thank you for referring your patient, Keely Velazquez, to the RADIATION ONCOLOGY CLINIC. Please see a copy of my visit note below.    [facility] PHYSICIANS  SPECIALIZING IN BREAKTHROUGHS  Radiation Oncology    On Treatment Visit Note      Keely Velazquez      Date: 10/25/2018   MRN: [id number]   : 1955  Diagnosis: R breast cancer      Reason for Visit:  On Radiation Treatment Visit     Treatment Summary to Date  Treatment Site: R breast Current Dose: 2915/5940cGy cGy Fractions:         ED Visit/Hosiptal Admission: None    Treatment Breaks: None      Subjective:   Keely is doing well.  She noticed very faint erythema of the right breast. She has a mild fatigue.    Nursing ROS:   Nutrition Alteration  Diet Type: Patient's Preference  Skin  Skin Reaction: 1 - Faint erythema or dry desquamation  Skin Note: using aquaphor        Psychosocial  Pyschosocial Note: good energy, has not noticed fatigue  Pain Assessment  0-10 Pain Scale: 0      Objective:   Wt Readings from Last 2 Encounters:   10/25/18 74 kg (163 lb 3.2 oz)   10/19/18 73.9 kg (162 lb 14.4 oz)     Gen: Appears well, in no acute distress  Skin: Mild diffuse erythema over treatment field      Assessment:    Tolerating radiation therapy well.  All questions and concerns addressed.    Toxicities:  Dermatitis: Grade 1: Faint erythema or dry desquamation    Plan:   Continue current therapy.      Mosaiq chart and setup information reviewed  Ports checked             L. Galdino Cowan M.D.  Department of Radiation Oncology  [facility]    Please do not send letter to referring physician.      Again, thank you for allowing me to participate in the care of your patient.      Sincerely,    Galdino Cowan MD

## 2018-10-25 NOTE — PROGRESS NOTES
[facility] PHYSICIANS  SPECIALIZING IN BREAKTHROUGHS  Radiation Oncology    On Treatment Visit Note      Keely Velazquez      Date: 10/25/2018   MRN: [id number]   : 1955  Diagnosis: R breast cancer      Reason for Visit:  On Radiation Treatment Visit     Treatment Summary to Date  Treatment Site: R breast Current Dose: 2915/5940cGy cGy Fractions: x        ED Visit/Hosiptal Admission: None    Treatment Breaks: None      Subjective:   Keely is doing well.  She noticed very faint erythema of the right breast. She has a mild fatigue.    Nursing ROS:   Nutrition Alteration  Diet Type: Patient's Preference  Skin  Skin Reaction: 1 - Faint erythema or dry desquamation  Skin Note: using aquaphor        Psychosocial  Pyschosocial Note: good energy, has not noticed fatigue  Pain Assessment  0-10 Pain Scale: 0      Objective:   Wt Readings from Last 2 Encounters:   10/25/18 74 kg (163 lb 3.2 oz)   10/19/18 73.9 kg (162 lb 14.4 oz)     Gen: Appears well, in no acute distress  Skin: Mild diffuse erythema over treatment field      Assessment:    Tolerating radiation therapy well.  All questions and concerns addressed.    Toxicities:  Dermatitis: Grade 1: Faint erythema or dry desquamation    Plan:   Continue current therapy.      Mosaiq chart and setup information reviewed  Ports carlo Cowan M.D.  Department of Radiation Oncology  [facility]    Please do not send letter to referring physician.

## 2018-10-26 ENCOUNTER — APPOINTMENT (OUTPATIENT)
Dept: RADIATION ONCOLOGY | Facility: CLINIC | Age: 63
End: 2018-10-26
Attending: RADIOLOGY
Payer: COMMERCIAL

## 2018-10-26 PROCEDURE — 77417 THER RADIOLOGY PORT IMAGE(S): CPT | Performed by: RADIOLOGY

## 2018-10-26 PROCEDURE — 77412 RADIATION TX DELIVERY LVL 3: CPT | Performed by: RADIOLOGY

## 2018-10-29 ENCOUNTER — APPOINTMENT (OUTPATIENT)
Dept: RADIATION ONCOLOGY | Facility: CLINIC | Age: 63
End: 2018-10-29
Attending: RADIOLOGY
Payer: COMMERCIAL

## 2018-10-29 PROCEDURE — 77280 THER RAD SIMULAJ FIELD SMPL: CPT | Performed by: RADIOLOGY

## 2018-10-29 PROCEDURE — 77412 RADIATION TX DELIVERY LVL 3: CPT | Performed by: RADIOLOGY

## 2018-10-30 ENCOUNTER — APPOINTMENT (OUTPATIENT)
Dept: RADIATION ONCOLOGY | Facility: CLINIC | Age: 63
End: 2018-10-30
Attending: RADIOLOGY
Payer: COMMERCIAL

## 2018-10-30 PROCEDURE — 77417 THER RADIOLOGY PORT IMAGE(S): CPT | Performed by: RADIOLOGY

## 2018-10-30 PROCEDURE — 77412 RADIATION TX DELIVERY LVL 3: CPT | Performed by: RADIOLOGY

## 2018-10-31 ENCOUNTER — APPOINTMENT (OUTPATIENT)
Dept: RADIATION ONCOLOGY | Facility: CLINIC | Age: 63
End: 2018-10-31
Attending: RADIOLOGY
Payer: COMMERCIAL

## 2018-10-31 PROCEDURE — 77412 RADIATION TX DELIVERY LVL 3: CPT | Performed by: RADIOLOGY

## 2018-10-31 PROCEDURE — 77336 RADIATION PHYSICS CONSULT: CPT | Performed by: RADIOLOGY

## 2018-11-01 ENCOUNTER — APPOINTMENT (OUTPATIENT)
Dept: RADIATION ONCOLOGY | Facility: CLINIC | Age: 63
End: 2018-11-01
Attending: RADIOLOGY
Payer: COMMERCIAL

## 2018-11-01 DIAGNOSIS — Z17.0 MALIGNANT NEOPLASM OF UPPER-OUTER QUADRANT OF RIGHT BREAST IN FEMALE, ESTROGEN RECEPTOR POSITIVE (H): Primary | ICD-10-CM

## 2018-11-01 DIAGNOSIS — Z53.9 ERRONEOUS ENCOUNTER--DISREGARD: ICD-10-CM

## 2018-11-01 DIAGNOSIS — C50.411 MALIGNANT NEOPLASM OF UPPER-OUTER QUADRANT OF RIGHT BREAST IN FEMALE, ESTROGEN RECEPTOR POSITIVE (H): Primary | ICD-10-CM

## 2018-11-01 PROCEDURE — 77412 RADIATION TX DELIVERY LVL 3: CPT | Performed by: RADIOLOGY

## 2018-11-01 NOTE — LETTER
11/1/2018       RE: Keely Velazquez  [address] [address]     Dear Colleague,    Thank you for referring your patient, Keely Velazquez, to the RADIATION ONCOLOGY CLINIC. Please see a copy of my visit note below.      This encounter was opened in error. Please disregard.    Again, thank you for allowing me to participate in the care of your patient.      Sincerely,    Ruthann Still MD

## 2018-11-01 NOTE — MR AVS SNAPSHOT
After Visit Summary   11/1/2018    Keely Velazquez    MRN: [id number]           Patient Information     Date Of Birth          1955        Visit Information        Provider Department      11/1/2018 8:15 AM Ruthann Still MD Radiation Oncology Clinic         Follow-ups after your visit        Your next 10 appointments already scheduled     Nov 02, 2018  8:00 AM CDT   EXTERNAL RADIATION TREATMENT with UMP RAD ONC VARIAN   Radiation Oncology Clinic ([facility])    [facility]  1st Floor  [address] [address]   [phone]            Nov 05, 2018  8:00 AM CST   EXTERNAL RADIATION TREATMENT with UMP RAD ONC VARIAN   Radiation Oncology Clinic ([facility])    [facility]  1st Floor  [address] [address]   [phone]            Nov 06, 2018  8:00 AM CST   EXTERNAL RADIATION TREATMENT with UMP RAD ONC VARIAN   Radiation Oncology Clinic ([facility])    [facility]  1st Floor  [address] [address]   [phone]            Nov 07, 2018  8:00 AM CST   EXTERNAL RADIATION TREATMENT with UMP RAD ONC VARIAN   Radiation Oncology Clinic ([facility])    [facility]  1st Floor  [address] [address]   [phone]            Nov 08, 2018  8:00 AM CST   EXTERNAL RADIATION TREATMENT with UMP RAD ONC VARIAN   Radiation Oncology Clinic ([facility])    [facility]  1st Floor  [address] [address]   [phone]            Dec 31, 2018  1:15 PM CST   (Arrive by 1:00 PM)   New Patient Visit with Merari Tafoya MD   [facility] ([facility])    [address]  [address]  [place] [address] [phone]              Who to contact     Please call your clinic at [phone]  to:    Ask questions about your health    Make or cancel appointments    Discuss your medicines    Learn about your test results    Speak to your doctor            Additional Information About Your Visit        [employer] Information     "[employer]" gives you secure access to your electronic health record. If you see a primary care provider, you can also send messages to your care team and make appointments. If you have questions, please call your primary care clinic.  If you do not have a primary care provider, please call [phone] and they will assist you.      "[employer]" is an electronic gateway that provides easy, online access to your medical records. With "[employer]", you can request a clinic appointment, read your test results, renew a prescription or communicate with your care team.     To access your existing account, please contact your [facility] or call [phone] for assistance.        Care EveryWhere ID     This is your Care EveryWhere ID. This could be used by other organizations to access your [facility] medical records  [id number]         Blood Pressure from Last 3 Encounters:   09/24/18 142/79   09/14/18 (!) 141/94   09/14/18 128/79    Weight from Last 3 Encounters:   10/25/18 74 kg (163 lb 3.2 oz)   10/19/18 73.9 kg (162 lb 14.4 oz)   10/12/18 72.9 kg (160 lb 12.8 oz)              Today, you had the following     No orders found for display       Primary Care Provider Fax #    Provider Not In System [phone]                Equal Access to Services     [facility] : Hadii aad ku hadasho Soomaali, waaxda luqadaha, qaybta kaalmada adeegyada, gabino walker . So [facility] [phone].    ATENCIÓN: Si habla español, tiene a davidson disposición servicios gratuitos de asistencia lingüística. Llame al [phone].    We comply with applicable federal civil rights laws and [place] laws. We do not discriminate on the basis of race, color, national origin, age,  disability, sex, sexual orientation, or gender identity.            Thank you!     Thank you for choosing RADIATION ONCOLOGY CLINIC  for your care. Our goal is always to provide you with excellent care. Hearing back from our patients is one way we can continue to improve our services. Please take a few minutes to complete the written survey that you may receive in the mail after your visit with us. Thank you!             Your Updated Medication List - Protect others around you: Learn how to safely use, store and throw away your medicines at www.disposemymeds.org.          This list is accurate as of 11/1/18  8:46 AM.  Always use your most recent med list.                   Brand Name Dispense Instructions for use Diagnosis    acetaminophen 325 MG tablet    TYLENOL    60 tablet    Take 2 tablets (650 mg) by mouth every 4 hours as needed for mild pain    Malignant neoplasm of central portion of right breast in female, estrogen receptor positive (H)       anastrozole 1 MG tablet    ARIMIDEX    30 tablet    Take 1 tablet (1 mg) by mouth daily    Malignant neoplasm of upper-outer quadrant of right breast in female, estrogen receptor positive (H)       calcium carbonate 500 mg (elemental) 500 MG tablet    OS-PADDY     Take 500 mg by mouth 2 times daily With Magnesium,Zinc        desonide 0.05 % cream    DESOWEN          LYSINE PO           Multi-vitamin Tabs tablet      Take 1 tablet by mouth daily        NORVASC PO      Take 5 mg by mouth daily        OMEGA-3 FISH OIL PO           Urea 40 % Crea      Apply to fingernails twice daily, especially at bedtime.        VITAMIN D (CHOLECALCIFEROL) PO      Take 1,000 Units by mouth daily

## 2018-11-02 ENCOUNTER — OFFICE VISIT (OUTPATIENT)
Dept: RADIATION ONCOLOGY | Facility: CLINIC | Age: 63
End: 2018-11-02
Attending: RADIOLOGY
Payer: COMMERCIAL

## 2018-11-02 VITALS — WEIGHT: 165 LBS | BODY MASS INDEX: 25.09 KG/M2

## 2018-11-02 DIAGNOSIS — Z17.0 MALIGNANT NEOPLASM OF UPPER-OUTER QUADRANT OF RIGHT BREAST IN FEMALE, ESTROGEN RECEPTOR POSITIVE (H): Primary | ICD-10-CM

## 2018-11-02 DIAGNOSIS — C50.411 MALIGNANT NEOPLASM OF UPPER-OUTER QUADRANT OF RIGHT BREAST IN FEMALE, ESTROGEN RECEPTOR POSITIVE (H): Primary | ICD-10-CM

## 2018-11-02 PROCEDURE — 77412 RADIATION TX DELIVERY LVL 3: CPT | Performed by: RADIOLOGY

## 2018-11-02 RX ORDER — HYDROCORTISONE VALERATE 2 MG/G
OINTMENT TOPICAL
Qty: 45 G | Refills: 0 | Status: SHIPPED | OUTPATIENT
Start: 2018-11-02 | End: 2018-11-07

## 2018-11-02 NOTE — LETTER
2018       RE: Keely Velazquez  [address]  [place] [address]     Dear Colleague,    Thank you for referring your patient, Keely Velazquez, to the RADIATION ONCOLOGY CLINIC. Please see a copy of my visit note below.    [facility]  SPECIALIZING IN BREAKTHROUGHS  Radiation Oncology    On Treatment Visit Note      Keely Velazquez      Date: 2018   MRN: [id number]   : 1955  Diagnosis: R breast cancer      Reason for Visit:  On Radiation Treatment Visit     Treatment Summary to Date  Treatment Site: R breast Current Dose: 4490/5940cGy cGy Fractions:         ED Visit/Hosiptal Admission: None    Treatment Breaks: None      Subjective:   Keely is doing well.  She noticed very faint erythema of the right breast. She has a mild fatigue.    Nursing ROS:   Nutrition Alteration  Diet Type: Patient's Preference  Skin  Skin Reaction: 1 - Faint erythema or dry desquamation  Skin Note: using aquaphor        Psychosocial  Pyschosocial Note: good energy, has not noticed fatigue  Pain Assessment  0-10 Pain Scale: 0      Objective:   Wt Readings from Last 2 Encounters:   18 74.8 kg (165 lb)   18 74.8 kg (165 lb)     Gen: Appears well, in no acute distress  Skin: Mild diffuse erythema over treatment field    Assessment:    Tolerating radiation therapy well.  All questions and concerns addressed.    Toxicities:  Dermatitis: Grade 1: Faint erythema or dry desquamation    Plan:   Continue current therapy.      Mosaiq chart and setup information reviewed  Ports checked    Jacque Lerma MD  Department of Radiation Oncology  [facility]          Please do not send letter to referring physician.      Again, thank you for allowing me to participate in the care of your patient.      Sincerely,    Jacque Lerma MD

## 2018-11-02 NOTE — MR AVS SNAPSHOT
After Visit Summary   11/2/2018    Keely Velazquez    MRN: [id number]           Patient Information     Date Of Birth          1955        Visit Information        Provider Department      11/2/2018 8:30 AM Jacque Lerma MD Radiation Oncology Clinic         Follow-ups after your visit        Your next 10 appointments already scheduled     Nov 05, 2018  8:00 AM CST   EXTERNAL RADIATION TREATMENT with UMP RAD ONC VARIAN   Radiation Oncology Clinic ([facility])    [facility]  1st Floor  [address] [address]   [phone]            Nov 06, 2018  8:00 AM CST   EXTERNAL RADIATION TREATMENT with UMP RAD ONC VARIAN   Radiation Oncology Clinic ([facility])    [facility]  1st Floor  [address] [address]   [phone]            Nov 07, 2018  8:00 AM CST   EXTERNAL RADIATION TREATMENT with UMP RAD ONC VARIAN   Radiation Oncology Clinic ([facility])    [facility]  1st Floor  [address] [address]   [phone]            Nov 08, 2018  8:00 AM CST   EXTERNAL RADIATION TREATMENT with UMP RAD ONC VARIAN   Radiation Oncology Clinic ([facility])    [facility]  1st Floor  [address] [address]   [phone]            Dec 31, 2018  1:15 PM CST   (Arrive by 1:00 PM)   New Patient Visit with Merari Tafoya MD   [facility] ([facility])    [address]  [address]  [place] [address] [phone]              Who to contact     Please call your clinic at [phone] to:    Ask questions about your health    Make or cancel appointments    Discuss your medicines    Learn about your test results    Speak to your doctor            Additional Information About Your Visit        MyChart Information     MyChart gives  you secure access to your electronic health record. If you see a primary care provider, you can also send messages to your care team and make appointments. If you have questions, please call your primary care clinic.  If you do not have a primary care provider, please call [phone] and they will assist you.      [employer] is an electronic gateway that provides easy, online access to your medical records. With [employer], you can request a clinic appointment, read your test results, renew a prescription or communicate with your care team.     To access your existing account, please contact your [facility] or call [phone] for assistance.        Care EveryWhere ID     This is your Care EveryWhere ID. This could be used by other organizations to access your [facility] medical records  [id number]        Your Vitals Were     BMI (Body Mass Index)                   25.09 kg/m2            Blood Pressure from Last 3 Encounters:   09/24/18 142/79   09/14/18 (!) 141/94   09/14/18 128/79    Weight from Last 3 Encounters:   11/02/18 74.8 kg (165 lb)   10/25/18 74 kg (163 lb 3.2 oz)   10/19/18 73.9 kg (162 lb 14.4 oz)              Today, you had the following     No orders found for display       Primary Care Provider Fax #    Provider Not In System [phone]                Equal Access to Services     ANH GUNN : Hadii aad ku hadasho Soomaali, waaxda luqadaha, qaybta kaalmada adeegyada, gabino whitaker hayrenee walker . So [facility] [phone].    ATENCIÓN: Si habla español, tiene a davidson disposición servicios gratuitos de asistencia lingüística. Llame al [phone].    We comply with applicable federal civil rights laws and [place] laws. We do not discriminate on the basis of race, color, national origin, age, disability, sex, sexual orientation, or gender identity.            Thank you!     Thank you for choosing RADIATION ONCOLOGY CLINIC  for your care. Our goal is always to  provide you with excellent care. Hearing back from our patients is one way we can continue to improve our services. Please take a few minutes to complete the written survey that you may receive in the mail after your visit with us. Thank you!             Your Updated Medication List - Protect others around you: Learn how to safely use, store and throw away your medicines at www.disposemymeds.org.          This list is accurate as of 11/2/18  9:15 AM.  Always use your most recent med list.                   Brand Name Dispense Instructions for use Diagnosis    acetaminophen 325 MG tablet    TYLENOL    60 tablet    Take 2 tablets (650 mg) by mouth every 4 hours as needed for mild pain    Malignant neoplasm of central portion of right breast in female, estrogen receptor positive (H)       anastrozole 1 MG tablet    ARIMIDEX    30 tablet    Take 1 tablet (1 mg) by mouth daily    Malignant neoplasm of upper-outer quadrant of right breast in female, estrogen receptor positive (H)       calcium carbonate 500 mg (elemental) 500 MG tablet    OS-PADDY     Take 500 mg by mouth 2 times daily With Magnesium,Zinc        desonide 0.05 % cream    DESOWEN          hydrocortisone valerate 0.2 % ointment    WEST-HEIDI    45 g    Apply sparingly to affected area three times daily as needed.    Malignant neoplasm of upper-outer quadrant of right breast in female, estrogen receptor positive (H)       LYSINE PO           Multi-vitamin Tabs tablet      Take 1 tablet by mouth daily        NORVASC PO      Take 5 mg by mouth daily        OMEGA-3 FISH OIL PO           Urea 40 % Crea      Apply to fingernails twice daily, especially at bedtime.        VITAMIN D (CHOLECALCIFEROL) PO      Take 1,000 Units by mouth daily

## 2018-11-02 NOTE — LETTER
Date:November 12, 2018      Provider requested that no letter be sent. Do not send.       [facility] Information

## 2018-11-05 ENCOUNTER — APPOINTMENT (OUTPATIENT)
Dept: RADIATION ONCOLOGY | Facility: CLINIC | Age: 63
End: 2018-11-05
Attending: RADIOLOGY
Payer: COMMERCIAL

## 2018-11-05 PROCEDURE — 77412 RADIATION TX DELIVERY LVL 3: CPT | Performed by: RADIOLOGY

## 2018-11-06 ENCOUNTER — APPOINTMENT (OUTPATIENT)
Dept: RADIATION ONCOLOGY | Facility: CLINIC | Age: 63
End: 2018-11-06
Attending: RADIOLOGY
Payer: COMMERCIAL

## 2018-11-06 PROCEDURE — 77412 RADIATION TX DELIVERY LVL 3: CPT | Performed by: RADIOLOGY

## 2018-11-07 ENCOUNTER — APPOINTMENT (OUTPATIENT)
Dept: RADIATION ONCOLOGY | Facility: CLINIC | Age: 63
End: 2018-11-07
Attending: RADIOLOGY
Payer: COMMERCIAL

## 2018-11-07 VITALS — BODY MASS INDEX: 25.09 KG/M2 | WEIGHT: 165 LBS

## 2018-11-07 DIAGNOSIS — Z17.0 MALIGNANT NEOPLASM OF UPPER-OUTER QUADRANT OF RIGHT BREAST IN FEMALE, ESTROGEN RECEPTOR POSITIVE (H): ICD-10-CM

## 2018-11-07 DIAGNOSIS — C50.411 MALIGNANT NEOPLASM OF UPPER-OUTER QUADRANT OF RIGHT BREAST IN FEMALE, ESTROGEN RECEPTOR POSITIVE (H): ICD-10-CM

## 2018-11-07 DIAGNOSIS — R52 PAIN: ICD-10-CM

## 2018-11-07 DIAGNOSIS — R23.4 LOCALIZED SKIN DESQUAMATION: Primary | ICD-10-CM

## 2018-11-07 PROCEDURE — 77336 RADIATION PHYSICS CONSULT: CPT | Performed by: RADIOLOGY

## 2018-11-07 PROCEDURE — 77412 RADIATION TX DELIVERY LVL 3: CPT | Performed by: RADIOLOGY

## 2018-11-07 RX ORDER — HYDROCORTISONE VALERATE 2 MG/G
OINTMENT TOPICAL
Qty: 45 G | Refills: 0 | Status: SHIPPED | OUTPATIENT
Start: 2018-11-07 | End: 2018-11-14

## 2018-11-07 RX ORDER — HYDROCORTISONE VALERATE 2 MG/G
OINTMENT TOPICAL
Qty: 45 G | Refills: 0 | Status: SHIPPED | OUTPATIENT
Start: 2018-11-07 | End: 2018-11-07

## 2018-11-07 NOTE — MR AVS SNAPSHOT
After Visit Summary   11/7/2018    Keely Velazquez    MRN: [id number]           Patient Information     Date Of Birth          1955        Visit Information        Provider Department      11/7/2018 8:15 AM Ruthann Still MD Radiation Oncology Clinic        Today's Diagnoses     Malignant neoplasm of upper-outer quadrant of right breast in female, estrogen receptor positive (H)          Care Instructions          Managing radiation side effects after treatment has ended    The side effects of radiation therapy should gradually decrease in 2 to 3 weeks after you have finished radiation.  Some effects take longer to resolve.    Fatigue caused by radiation therapy will decrease and your energy will improve.    Skin reactions:  Skin changes (such as redness or irritation) will slowly begin to get better. Some people may have a permanent change in skin color.  Their skin may be more pink or  tan  than the untreated skin. The skin may be thinner or more fragile than before treatment.  Continue to use a gentle moisturizing lotion for several months.  You should always protect the skin in the area that was treated by using sunscreen of SPF30 or higher.      Other skin care instructions:        For concerns or questions call [facility] Radiation Oncology [phone]          Follow-ups after your visit        Follow-up notes from your care team     Return for Follow up.      Your next 10 appointments already scheduled     Nov 08, 2018  8:00 AM CST   EXTERNAL RADIATION TREATMENT with [facility] RAD ONC VARIAN   Radiation Oncology Clinic ([facility])    [facility]  1st Floor  [address] [address]   [phone]            Dec 03, 2018 10:30 AM CST   Return Visit with LIEN Comer CNP   Radiation Oncology Clinic ([facility])    [facility]  1st Floor  [address]  [address]   [phone]            Dec 31, 2018  1:15 PM CST   (Arrive by 1:00 PM)   New Patient Visit with Merari Tafoya MD   [facility] [place] [facility] ([facility])    [address]  [address]  [place] [address] [phone]              Who to contact     Please call your clinic at [phone] to:    Ask questions about your health    Make or cancel appointments    Discuss your medicines    Learn about your test results    Speak to your doctor            Additional Information About Your Visit        [employer] Information     [employer] gives you secure access to your electronic health record. If you see a primary care provider, you can also send messages to your care team and make appointments. If you have questions, please call your primary care clinic.  If you do not have a primary care provider, please call [phone] and they will assist you.      [employer] is an electronic gateway that provides easy, online access to your medical records. With [employer], you can request a clinic appointment, read your test results, renew a prescription or communicate with your care team.     To access your existing account, please contact your [facility] or call [phone] for assistance.        Care EveryWhere ID     This is your Care EveryWhere ID. This could be used by other organizations to access your [facility] medical records  [id number]        Your Vitals Were     BMI (Body Mass Index)                   25.09 kg/m2            Blood Pressure from Last 3 Encounters:   09/24/18 142/79   09/14/18 (!) 141/94   09/14/18 128/79    Weight from Last 3 Encounters:   11/07/18 74.8 kg (165 lb)   11/02/18 74.8 kg (165 lb)   10/25/18 74 kg (163 lb 3.2 oz)              Today, you had the following     No orders found for display         Where to get your medicines      These medications were sent to [facility] -  [place], [place] - [address]  [address] [place] [address]     Phone:  [phone]     hydrocortisone valerate 0.2 % ointment          Primary Care Provider Fax #    Provider Not In System [phone]                Equal Access to Services     ANH GUNN : Hadii aad ku hadbryon Sozachali, wagisellada luqadaha, qaybta kaalmada aderajni, gabino jackiein hayaamacario zuluaga yanaanupam naranjo. So [facility] [phone].    ATENCIÓN: Si habla español, tiene a davidson disposición servicios gratuitos de asistencia lingüística. Llame al [phone].    We comply with applicable federal civil rights laws and [place] laws. We do not discriminate on the basis of race, color, national origin, age, disability, sex, sexual orientation, or gender identity.            Thank you!     Thank you for choosing RADIATION ONCOLOGY CLINIC  for your care. Our goal is always to provide you with excellent care. Hearing back from our patients is one way we can continue to improve our services. Please take a few minutes to complete the written survey that you may receive in the mail after your visit with us. Thank you!             Your Updated Medication List - Protect others around you: Learn how to safely use, store and throw away your medicines at www.disposemymeds.org.          This list is accurate as of 11/7/18  8:31 AM.  Always use your most recent med list.                   Brand Name Dispense Instructions for use Diagnosis    acetaminophen 325 MG tablet    TYLENOL    60 tablet    Take 2 tablets (650 mg) by mouth every 4 hours as needed for mild pain    Malignant neoplasm of central portion of right breast in female, estrogen receptor positive (H)       anastrozole 1 MG tablet    ARIMIDEX    30 tablet    Take 1 tablet (1 mg) by mouth daily    Malignant neoplasm of upper-outer quadrant of right breast in female, estrogen receptor positive (H)       calcium carbonate 500 mg (elemental) 500 MG tablet    OS-PADDY     Take 500 mg by mouth 2  times daily With Magnesium,Zinc        desonide 0.05 % cream    DESOWEN          hydrocortisone valerate 0.2 % ointment    WEST-HEIDI    45 g    Apply sparingly to affected area three times daily as needed.    Malignant neoplasm of upper-outer quadrant of right breast in female, estrogen receptor positive (H)       LYSINE PO           Multi-vitamin Tabs tablet      Take 1 tablet by mouth daily        NORVASC PO      Take 5 mg by mouth daily        OMEGA-3 FISH OIL PO           Urea 40 % Crea      Apply to fingernails twice daily, especially at bedtime.        VITAMIN D (CHOLECALCIFEROL) PO      Take 1,000 Units by mouth daily

## 2018-11-07 NOTE — LETTER
Date:November 9, 2018      Provider requested that no letter be sent. Do not send.       [facility] Information

## 2018-11-07 NOTE — LETTER
2018       RE: Keely Velazquez  [address] [address]     Dear Colleague,    Thank you for referring your patient, Keely Velazquez, to the RADIATION ONCOLOGY CLINIC. Please see a copy of my visit note below.    [facility] PHYSICIANS  SPECIALIZING IN BREAKTHROUGHS  Radiation Oncology    On Treatment Visit Note      Keely Velazquez      Date: 2018   MRN: [id number]   : 1955  Diagnosis: R breast cancer      Reason for Visit:  On Radiation Treatment Visit     Treatment Summary to Date  Treatment Site: R breast Current Dose: 5240/5490cGy cGy Fractions:         ED Visit/Hosiptal Admission: None    Treatment Breaks: None      Subjective:   Keely has increased erythema.  She also has quite intense itching.  She was given 2.5% HC cream, which was helpful.  She requests a refill of it.  She otherwise has no complaints.     Nursing ROS:   Nutrition Alteration  Diet Type: Patient's Preference  Skin  Skin Reaction: 1 - Faint erythema or dry desquamation  Skin Note: tried 1% hydrocortisone- still itching (will try rx hydrocortisone and calendula over the weekend)        Cardiovascular  Respiratory effort: 1 - Normal - without distress        Psychosocial  Pyschosocial Note: good energy, has not noticed fatigue  Pain Assessment  0-10 Pain Scale: 0      Objective:   Wt 74.8 kg (165 lb)  BMI 25.09 kg/m2  Gen: Appears well, in no acute distress  Skin:  Erythema of the right breast, increased from last week.  No overt breakdown.    Labs:  CBC RESULTS: No results for input(s): WBC, RBC, HGB, HCT, MCV, MCH, MCHC, RDW, PLT in the last 93378 hours.  ELECTROLYTES:  No results for input(s): NA, POTASSIUM, CHLORIDE, PADDY, CO2, BUN, CR, GLC in the last 57614 hours.    Assessment:    Tolerating radiation therapy well.  All questions and concerns addressed.    Toxicities:  Dermatitis: Grade 1: Faint erythema or dry desquamation    Plan:   1. Continue current therapy.    2. Refilled 2.5% HC cream.     3. Follow-up in 1 month with Syeda Babcock NP.      Mosaiq chart and setup information reviewed  Ports checked    Medication Review  Med list reviewed with patient?: Yes    Educational Topic Discussed  Education Instructions: reviewed        Ruthann Still MD/PhD  [phone] clinic  Pager [phone]    Please do not send letter to referring physician.      Again, thank you for allowing me to participate in the care of your patient.      Sincerely,    Ruthann Still MD

## 2018-11-07 NOTE — PATIENT INSTRUCTIONS
Managing radiation side effects after treatment has ended    The side effects of radiation therapy should gradually decrease in 2 to 3 weeks after you have finished radiation.  Some effects take longer to resolve.    Fatigue caused by radiation therapy will decrease and your energy will improve.    Skin reactions:  Skin changes (such as redness or irritation) will slowly begin to get better. Some people may have a permanent change in skin color.  Their skin may be more pink or  tan  than the untreated skin. The skin may be thinner or more fragile than before treatment.  Continue to use a gentle moisturizing lotion for several months.  You should always protect the skin in the area that was treated by using sunscreen of SPF30 or higher.      Other skin care instructions:        For concerns or questions call [facility] Radiation Oncology [phone]

## 2018-11-08 ENCOUNTER — APPOINTMENT (OUTPATIENT)
Dept: RADIATION ONCOLOGY | Facility: CLINIC | Age: 63
End: 2018-11-08
Attending: RADIOLOGY
Payer: COMMERCIAL

## 2018-11-08 PROCEDURE — 77412 RADIATION TX DELIVERY LVL 3: CPT | Performed by: RADIOLOGY

## 2018-11-08 NOTE — PROGRESS NOTES
[facility] PHYSICIANS  SPECIALIZING IN BREAKTHROUGHS  Radiation Oncology    On Treatment Visit Note      Keely Velazquez      Date: 2018   MRN: [id number]   : 1955  Diagnosis: R breast cancer      Reason for Visit:  On Radiation Treatment Visit     Treatment Summary to Date  Treatment Site: R breast Current Dose: 5240/5490cGy cGy Fractions:         ED Visit/Hosiptal Admission: None    Treatment Breaks: None      Subjective:   Keely has increased erythema.  She also has quite intense itching.  She was given 2.5% HC cream, which was helpful.  She requests a refill of it.  She otherwise has no complaints.     Nursing ROS:   Nutrition Alteration  Diet Type: Patient's Preference  Skin  Skin Reaction: 1 - Faint erythema or dry desquamation  Skin Note: tried 1% hydrocortisone- still itching (will try rx hydrocortisone and calendula over the weekend)        Cardiovascular  Respiratory effort: 1 - Normal - without distress        Psychosocial  Pyschosocial Note: good energy, has not noticed fatigue  Pain Assessment  0-10 Pain Scale: 0      Objective:   Wt 74.8 kg (165 lb)  BMI 25.09 kg/m2  Gen: Appears well, in no acute distress  Skin:  Erythema of the right breast, increased from last week.  No overt breakdown.    Labs:  CBC RESULTS: No results for input(s): WBC, RBC, HGB, HCT, MCV, MCH, MCHC, RDW, PLT in the last 89133 hours.  ELECTROLYTES:  No results for input(s): NA, POTASSIUM, CHLORIDE, PADDY, CO2, BUN, CR, GLC in the last 54780 hours.    Assessment:    Tolerating radiation therapy well.  All questions and concerns addressed.    Toxicities:  Dermatitis: Grade 1: Faint erythema or dry desquamation    Plan:   1. Continue current therapy.    2. Refilled 2.5% HC cream.    3. Follow-up in 1 month with Syeda Babcock NP.      Mosaiq chart and setup information reviewed  Ports checked    Medication Review  Med list reviewed with patient?: Yes    Educational Topic Discussed  Education Instructions:  reviewed        Ruthann Still MD/PhD  [phone] clinic  Pager [phone]    Please do not send letter to referring physician.

## 2018-11-12 ENCOUNTER — TELEPHONE (OUTPATIENT)
Dept: RADIATION ONCOLOGY | Facility: CLINIC | Age: 63
End: 2018-11-12

## 2018-11-12 RX ORDER — HYDROCODONE BITARTRATE AND ACETAMINOPHEN 5; 325 MG/1; MG/1
1 TABLET ORAL EVERY 6 HOURS PRN
Qty: 30 TABLET | Refills: 0 | Status: SHIPPED | OUTPATIENT
Start: 2018-11-12 | End: 2020-03-16

## 2018-11-12 RX ORDER — SILVER 32 PPM
GEL (GRAM) TOPICAL 2 TIMES DAILY
Qty: 1 TUBE | Refills: 1 | Status: SHIPPED | OUTPATIENT
Start: 2018-11-12 | End: 2018-11-14

## 2018-11-12 NOTE — TELEPHONE ENCOUNTER
RN spoke with pt re: skin care and pain. RN reviewed skin care with additional teaching r/t the silvastat. Pt also having significant pain and pain that has been keeping her up at night. Dr. Still notified and ordered some Vicodin. Prescription brought to 3rd floor pharmacy for pt to  with the silvastat. Pt verbalizes understanding and will call with any additional questions.

## 2018-11-13 NOTE — PROCEDURES
Radiotherapy Treatment Summary          Date of Report: 2018     PATIENT: SHAZIA CHARLES  MEDICAL RECORD NO: [id number]  : 1955     DIAGNOSIS: C50.511 Malignant neoplasm of lower-outer quadrant of right female breast  INTENT OF RADIOTHERAPY: Cure (adjuvant)  PATHOLOGY:   ER+, DC+, HER2- IDC of the right breast  STAGE: T1c N0 M0 / Stage IA   SYSTEMIC THERAPY: None                   Details of the treatments summarized below are found in records kept in the Department of Radiation   Oncology at [facility].     Treatment Summary:  Radiation Oncology - Course: 1    Treatment Site Dose Modality From To Days Fx.  1 R breast  4,240 cGy 06 X 10/11/2018 2018  21 16  1 R breast boost  1,250 cGy e12 2018   6  5     Dose per Fraction:  265 cGy / 250 cGy      Total Dose: 5490 cGy             COMMENTS:   Ms. Charles is a very pleasant 64 year old woman with recent diagnosis of screening-detected, pathologic stage   IA, [id number], invasive ductal carcinoma of the right breast, grade 1, ER positive, DC positive, HER2 negative   by IHC status, Oncotype DX 7, post right lumpectomy and right axillary sentinel lymph node biopsy on   2018.  She had adequate surgical margins of her invasive cancer; her DCIS was approximately 1 mm   from her anterior margin. As such she underwent an adjuvant course of radiotherapy in two phases as detailed   above.  The first phase was delivered to the whole right breast via tangential methods with 6 MV photons to a   dose of 4240 cGy in 16 fractions.  The second phase was delivered to the lumpectomy cavity to a dose of 1250   cGy in 5 fractions with 12 MeV electrons in 5 daily fractions given close DCIS margins.      She did not require any unanticipated treatment breaks.  By the end of treatment she experienced a maximum   of CTCAE v5.0 grade 1 skin toxicity symptomatic of erythema with mild itching.  She did trial hydrocortisone   cream with minimal effect.  She  did not require any treatment related supportive management.  She did not   have any unanticipated treatment breaks.      PAIN MANAGEMENT:   Ms. Velazquez did not require any pain management to manage treatment related pain.                           FOLLOW UP PLAN: Ms. Velazquez will follow-up in 1 month with our NP.                           Resident Physician: Tyson Moore M.D.   Staff Physician: Ruthann Still M.D.  Physicist: Yessi Mae, PhD     CC:   Sammie Cerna MD                                    Radiation Oncology:  [facility] [address] [place] [address]

## 2018-11-14 ENCOUNTER — OFFICE VISIT (OUTPATIENT)
Dept: RADIATION ONCOLOGY | Facility: CLINIC | Age: 63
End: 2018-11-14
Attending: RADIOLOGY
Payer: COMMERCIAL

## 2018-11-14 VITALS — TEMPERATURE: 98.1 F | HEART RATE: 102 BPM | SYSTOLIC BLOOD PRESSURE: 161 MMHG | DIASTOLIC BLOOD PRESSURE: 77 MMHG

## 2018-11-14 DIAGNOSIS — C50.411 MALIGNANT NEOPLASM OF UPPER-OUTER QUADRANT OF RIGHT BREAST IN FEMALE, ESTROGEN RECEPTOR POSITIVE (H): Primary | ICD-10-CM

## 2018-11-14 DIAGNOSIS — Z17.0 MALIGNANT NEOPLASM OF UPPER-OUTER QUADRANT OF RIGHT BREAST IN FEMALE, ESTROGEN RECEPTOR POSITIVE (H): Primary | ICD-10-CM

## 2018-11-14 RX ORDER — FLUCONAZOLE 100 MG/1
TABLET ORAL
Qty: 11 TABLET | Refills: 0 | Status: SHIPPED | OUTPATIENT
Start: 2018-11-14 | End: 2018-12-03

## 2018-11-14 RX ORDER — OXYCODONE HYDROCHLORIDE 5 MG/1
TABLET ORAL
Qty: 30 TABLET | Refills: 0 | Status: SHIPPED | OUTPATIENT
Start: 2018-11-14 | End: 2020-03-16

## 2018-11-14 NOTE — MR AVS SNAPSHOT
After Visit Summary   11/14/2018    Keely Velazquez    MRN: [id number]           Patient Information     Date Of Birth          1955        Visit Information        Provider Department      11/14/2018 9:30 AM Jacque Lerma MD Radiation Oncology Clinic        Today's Diagnoses     Malignant neoplasm of upper-outer quadrant of right breast in female, estrogen receptor positive (H)    -  1      Care Instructions    Stop silvrstat  Stop hydrocortisone  Use aquaphor ointment (may use A&D ointment)   Stop hydrocodone    Start prescriptions for oxycodone as directed  Start antifungal prescription  Use senna-s stool softener as needed          Follow-ups after your visit        Your next 10 appointments already scheduled     Nov 14, 2018  9:30 AM CST   Return Visit with Jacque Lerma MD   Radiation Oncology Clinic ([facility])    [facility]  1st Floor  [address] [address]   [phone]            Nov 16, 2018  9:30 AM CST   Return Visit with LIEN Comer CNP   Radiation Oncology Clinic ([facility])    [facility]  1st Floor  [address] [address]   [phone]            Dec 03, 2018 10:30 AM CST   Return Visit with LIEN Comer CNP   Radiation Oncology Clinic ([facility])    [facility]  1st Floor  [address] [address]   [phone]            Dec 31, 2018  1:15 PM CST   (Arrive by 1:00 PM)   New Patient Visit with Merari Tafoya MD   [facility] ([facility])    [address]  [address]  [facility] [id number] [phone]              Who to contact     Please call your clinic at [phone] to:    Ask questions about your health    Make or cancel appointments    Discuss your medicines    Learn about your test results    Speak  to your doctor            Additional Information About Your Visit        [employer] Information     [employer] gives you secure access to your electronic health record. If you see a primary care provider, you can also send messages to your care team and make appointments. If you have questions, please call your primary care clinic.  If you do not have a primary care provider, please call [phone] and they will assist you.      [employer] is an electronic gateway that provides easy, online access to your medical records. With [employer], you can request a clinic appointment, read your test results, renew a prescription or communicate with your care team.     To access your existing account, please contact your [facility] or call [phone] for assistance.        Care EveryWhere ID     This is your Care EveryWhere ID. This could be used by other organizations to access your [facility] medical records  [id number]        Your Vitals Were     Pulse Temperature                102 98.1  F (36.7  C)           Blood Pressure from Last 3 Encounters:   11/14/18 161/77   09/24/18 142/79   09/14/18 (!) 141/94    Weight from Last 3 Encounters:   11/07/18 74.8 kg (165 lb)   11/02/18 74.8 kg (165 lb)   10/25/18 74 kg (163 lb 3.2 oz)              Today, you had the following     No orders found for display         Today's Medication Changes          These changes are accurate as of 11/14/18  9:16 AM.  If you have any questions, ask your nurse or doctor.               Start taking these medicines.        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 Please contact your care team if you have questions.     hydrocortisone valerate 0.2 % ointment   Commonly known as:  WEST-HEIDI   Stopped by:  Jacque Lerma MD           SILVRSTAT WOUND DRESSING Gel   Stopped by:  Jacque Lerma MD                Where to get your medicines      These medications were sent to [facility] - [place], [place] - [address]  [address], [facility] [address]     Phone:  [phone]     fluconazole 100 MG tablet         Some of these will need a paper prescription and others can be bought over the counter.  Ask your nurse if you have questions.     Bring a paper prescription for each of these medications     oxyCODONE IR 5 MG tablet               Information about OPIOIDS     PRESCRIPTION OPIOIDS: WHAT YOU NEED TO KNOW   We gave you an opioid (narcotic) pain medicine. It is important to manage your pain, but opioids are not always the best choice. You should first try all the other options your care team gave you. Take this medicine for as short a time (and as few doses) as possible.    Some activities can increase your pain, such as bandage changes or therapy sessions. It may help to take your pain medicine 30 to 60 minutes before these activities. Reduce your stress by getting enough sleep, working on hobbies you enjoy and practicing relaxation or meditation. Talk to your care team about ways to manage your pain beyond prescription opioids.    These medicines have risks:    DO NOT drive when on new or higher doses of pain medicine. These medicines can affect your alertness and reaction times, and you could be arrested for driving under the  influence (DUI). If you need to use opioids long-term, talk to your care team about driving.    DO NOT operate heavy machinery    DO NOT do any other dangerous activities while taking these medicines.    DO NOT drink any alcohol while taking these medicines.     If the opioid prescribed includes acetaminophen, DO NOT take with any other medicines that contain acetaminophen. Read all labels carefully. Look for the word  acetaminophen  or  Tylenol.  Ask your pharmacist if you have questions or are unsure.    You can get addicted to pain medicines, especially if you have a history of addiction (chemical, alcohol or substance dependence). Talk to your care team about ways to reduce this risk.    All opioids tend to cause constipation. Drink plenty of water and eat foods that have a lot of fiber, such as fruits, vegetables, prune juice, apple juice and high-fiber cereal. Take a laxative (Miralax, milk of magnesia, Colace, Senna) if you don t move your bowels at least every other day. Other side effects include upset stomach, sleepiness, dizziness, throwing up, tolerance (needing more of the medicine to have the same effect), physical dependence and slowed breathing.    Store your pills in a secure place, locked if possible. We will not replace any lost or stolen medicine. If you don t finish your medicine, please throw away (dispose) as directed by your pharmacist. The [place] Pollution [employer] has more information about safe disposal: https://www.pca.[facility].us/living-green/managing-unwanted-medications         Primary Care Provider    None Specified       No primary provider on file.        Equal Access to Services     KIERRA GUNN : Chrissie Stern, wamarley rolle, qaybta kaalgabino gonzalez. So [facility] [phone].    ATENCIÓN: Si habla español, tiene a davidson disposición servicios gratuitos de asistencia lingüística. Llame al [phone].    We comply  with applicable federal civil rights laws and [place] laws. We do not discriminate on the basis of race, color, national origin, age, disability, sex, sexual orientation, or gender identity.            Thank you!     Thank you for choosing RADIATION ONCOLOGY CLINIC  for your care. Our goal is always to provide you with excellent care. Hearing back from our patients is one way we can continue to improve our services. Please take a few minutes to complete the written survey that you may receive in the mail after your visit with us. Thank you!             Your Updated Medication List - Protect others around you: Learn how to safely use, store and throw away your medicines at www.disposemymeds.org.          This list is accurate as of 11/14/18  9:16 AM.  Always use your most recent med list.                   Brand Name Dispense Instructions for use Diagnosis    acetaminophen 325 MG tablet    TYLENOL    60 tablet    Take 2 tablets (650 mg) by mouth every 4 hours as needed for mild pain    Malignant neoplasm of central portion of right breast in female, estrogen receptor positive (H)       anastrozole 1 MG tablet    ARIMIDEX    30 tablet    Take 1 tablet (1 mg) by mouth daily    Malignant neoplasm of upper-outer quadrant of right breast in female, estrogen receptor positive (H)       calcium carbonate 500 mg (elemental) 500 MG tablet    OS-PADDY     Take 500 mg by mouth 2 times daily With Magnesium,Zinc        desonide 0.05 % cream    DESOWEN          fluconazole 100 MG tablet    DIFLUCAN    11 tablet    200 mg by mouth day 1 then 100 mg by mouth daily days 2 - 10    Malignant neoplasm of upper-outer quadrant of right breast in female, estrogen receptor positive (H)       HYDROcodone-acetaminophen 5-325 MG per tablet    NORCO    30 tablet    Take 1 tablet by mouth every 6 hours as needed for severe pain    Pain       LYSINE PO           Multi-vitamin Tabs tablet      Take 1 tablet by mouth daily        NORVASC PO       Take 5 mg by mouth daily        OMEGA-3 FISH OIL PO           oxyCODONE IR 5 MG tablet    ROXICODONE    30 tablet    5-10 mg by mouth every 4 to 6 hours as needed for pain    Malignant neoplasm of upper-outer quadrant of right breast in female, estrogen receptor positive (H)       Urea 40 % Crea      Apply to fingernails twice daily, especially at bedtime.        VITAMIN D (CHOLECALCIFEROL) PO      Take 1,000 Units by mouth daily

## 2018-11-14 NOTE — NURSING NOTE
Keely here for skin check after completing radiation therapy to right breast 11/8/18.  Bright erythema in breast, axilla area.  Some macules extending down arm, to upper abdomen.  Says it has gotten worse since Monday.  Painful, itching.  Not open or oozing.  Dr. Moore here to see, Dr. Lerma here to see.

## 2018-11-14 NOTE — PROGRESS NOTES
Radiation Oncology Follow-up Visit  2018    PATIENT NAME: Keely Velazquez  MRN: [id number]   : 1955     DISEASE TREATED: pathologic stage IA, [id number], IDC of the right breast, grade 1, ER positive, [place] positive, HER2 negative by IHC status, Oncotype DX 7,    RADIATION THERAPY DELIVERED:     Treatment Site Dose Modality From To Days Fx.  1 R breast  4,240 cGy 06 X 10/11/2018 2018  21 16  1 R breast boost  1,250 cGy e12 2018   6  5    Dose per Fraction:  265 cGy / 250 cGy      Total Dose: 5490 cGy         INTERVAL SINCE COMPLETION OF RADIATION THERAPY: ~ 1 week (completed: 18)    HPI:   Ms. Velazquez is a very pleasant 63 year old woman with recent diagnosis of screening-detected, pathologic stage IA, [id number], invasive ductal carcinoma of the right breast, grade 1, ER positive, FL positive, HER2 negative by IHC status, Oncotype DX 7, post right lumpectomy and right axillary sentinel lymph node biopsy on 2018.  She had adequate surgical margins of her invasive cancer; her DCIS was approximately 1 mm from her anterior margin. As such she underwent an adjuvant course of radiotherapy in two phases as detailed above.  The first phase was delivered to the whole right breast via tangential methods with 6 MV photons to a dose of 4240 cGy in 16 fractions.  The second phase was delivered to the lumpectomy cavity to a dose of 1250 cGy in 5 fractions with 12 MeV electrons in 5 daily fractions given close DCIS margins.     She did not require any unanticipated treatment breaks.  By the end of treatment she experienced a maximum of CTCAE v5.0 grade 1 skin toxicity symptomatic of erythema with mild itching.  She did trial hydrocortisone cream with minimal effect.  She did not require any treatment related supportive management.  She did not have any unanticipated treatment breaks.     SUBJECTIVE:   Ms. Velazquez presents today 6 days after the completion of her with the complaint of right  breast pain arising from a vigorous skin reaction.  She reports that since the completion of treatment she has had increased swelling, warmth, pain, and tenderness of her right breast approximately the treatment field.  She reports that subjectively she is felt slightly febrile however she has not had any formal fevers.  She called our office on Monday, 11/12/2018, with the symptoms and was prescribed SilvrStat ointment and given a prescription for Norco 5-325mg.  She has taken two doses of Norco as well as Benadryl.  She noted minimal pain relief lasting approximately 30 minutes per administration.  She is continue to use Aquaphor over the treatment field since the completion of treatment.  Additionally she began SilvrStat ointment on Monday (11/12/2018).  She had been using hydrocortisone cream since the end of treatment for skin irritation.  She does note that her skin is itchy.  She receives some response from her pruritis with Benadryl. She denies any exposure to allergens.  She has known allergies to codeine, Macrobid, and sulfa drugs which she has not had any exposure to.  She has never had a similar skin reaction prior, however she does note that she is very sensitive to soaps and perfumes and uses baby soap.  She also has a collection of patchy red skin changes on her right wrist which was detected on examination today, however Ms. Velazquez has never noticed this prior and thus no history is available in regards to the clinical course of these lesions.     PHYSICAL EXAM:  Vital Signs: /77  Pulse 102  Temp 98.1  F (36.7  C)  Gen: Alert, in NAD  Eyes: EOMI, sclera anicteric  Pulm: Breathing comfortably on room air, no audible wheezes or ronchi  CV: Well-perfused, no cyanosis  Skin: Erythematous papules noted over the ventral component of the right wrist.  Erythema and associated warmth of the right breast within the treatment field and adjacent with bordering papules outside of the treatment field.  The  region of erythema was well demarcated and crossed midline at the nipple line to the medial left breast and was limited laterally to the mid axillary line.  There was a small patch of disclamation noted in the right axilla measuring approximately 2-3 cm.  Additionally there was tenderness to palpation of the right breast.  No significant tenderness to palpation of the left breast.  Murphy 2      LABS AND IMAGING:  Reviewed.    IMPRESSION:   Ms. Velazquez is a very pleasant 63 year old woman with recent diagnosis of screening-detected, pathologic stage IA, [id number], invasive ductal carcinoma of the right breast, grade 1, ER positive, [place] positive, HER2 negative by IHC status, Oncotype DX 7, post right lumpectomy and right axillary sentinel lymph node biopsy on 8/31/2018    She is approximately 1 week status post adjuvant external beam radiotherapy with a vigorous grade 2 skin reaction over the skin of the treatment field as well as the skin immediately adjacent to the treatment field symptomatic of warmth, redness, pruritis, pain/tenderness, and dry desquamation within the axilla.  There is also a collection of erythematous papules on the ventralsurface of her right wrist.  Currently the differential diagnosis includes cutaneous candidiasis and contact dermatitis superimposed on the expected radiation treatment reaction.     PLAN:   1. Return to clinic on Friday, 11/16/2018  2. Fluconazole 100 mg p.o. daily for 10 days with 200 mg today 11/14/2018 as empiric treatment for cutaneous candidiasis  3. Discontinue SilvrStat ointment  4. Discontinue topical hydrocortisone cream  5. Continue application of topical emollients such as Aquaphor or A&D  6. Pain control  1. Discontinue Norco 5mg-325mg  2. Begin oxycodone 5-10 mg every 4-6 hours with adjunctive acetaminophen (no more than 3000 mg every 24 hours) as needed for pain control.   3. Use senna-S stool softener as needed with titration to 1 soft bowel movement  daily  4. Ms. Velazquez was counseled as to the cautious use of benedryl due to concern for potentiation of some side effects of narcotics  7. Mepilex dressing as desired    Ms. Velazquez was seen and discussed with staff, Dr. Lerma.  Thank you very much for allowing us to participate in the care of this patient. Please don't hesitate to contact us with any questions.    Tyson Moore MD  Radiation Oncology Resident, PGY-4  [facility]  Phone: [phone]    Ms. Velazquez was seen and examined by me. Note above by Dr. Moore  was reviewed and edited by me and reflects our mutual findings and plan of care.    Jacque Lerma MD  Department of Radiation Oncology  [facility]        CC  Patient Care Team:  Esvin Schwartz MD as MD (Radiology)  GUNNAR LALA

## 2018-11-14 NOTE — PATIENT INSTRUCTIONS
Stop silvrstat  Stop hydrocortisone  Use aquaphor ointment (may use A&D ointment)   Stop hydrocodone    Start prescriptions for oxycodone as directed  Start antifungal prescription  Use senna-s stool softener as needed

## 2018-11-14 NOTE — LETTER
2018       RE: Keely Vealzquez  [address] [facility] [address]     Dear Colleague,    Thank you for referring your patient, Keely Velazquez, to the RADIATION ONCOLOGY CLINIC. Please see a copy of my visit note below.    Radiation Oncology Follow-up Visit  2018    PATIENT NAME: Keely Velazquez  MRN: [id number]   : 1955     DISEASE TREATED: pathologic stage IA, [id number], IDC of the right breast, grade 1, ER positive, MA positive, HER2 negative by IHC status, Oncotype DX 7,    RADIATION THERAPY DELIVERED:     Treatment Site Dose Modality From To Days Fx.  1 R breast  4,240 cGy 06 X 10/11/2018 2018  21 16  1 R breast boost  1,250 cGy e12 2018   6  5    Dose per Fraction:  265 cGy / 250 cGy      Total Dose: 5490 cGy         INTERVAL SINCE COMPLETION OF RADIATION THERAPY: ~ 1 week (completed: 18)    HPI:   Ms. Velazquez is a very pleasant 63 year old woman with recent diagnosis of screening-detected, pathologic stage IA, [id number], invasive ductal carcinoma of the right breast, grade 1, ER positive, MA positive, HER2 negative by IHC status, Oncotype DX 7, post right lumpectomy and right axillary sentinel lymph node biopsy on 2018.  She had adequate surgical margins of her invasive cancer; her DCIS was approximately 1 mm from her anterior margin. As such she underwent an adjuvant course of radiotherapy in two phases as detailed above.  The first phase was delivered to the whole right breast via tangential methods with 6 MV photons to a dose of 4240 cGy in 16 fractions.  The second phase was delivered to the lumpectomy cavity to a dose of 1250 cGy in 5 fractions with 12 MeV electrons in 5 daily fractions given close DCIS margins.     She did not require any unanticipated treatment breaks.  By the end of treatment she experienced a maximum of CTCAE v5.0 grade 1 skin toxicity symptomatic of erythema with mild itching.  She did trial hydrocortisone cream with minimal effect.   She did not require any treatment related supportive management.  She did not have any unanticipated treatment breaks.     SUBJECTIVE:   Ms. Velazquez presents today 6 days after the completion of her with the complaint of right breast pain arising from a vigorous skin reaction.  She reports that since the completion of treatment she has had increased swelling, warmth, pain, and tenderness of her right breast approximately the treatment field.  She reports that subjectively she is felt slightly febrile however she has not had any formal fevers.  She called our office on Monday, 11/12/2018, with the symptoms and was prescribed SilvrStat ointment and given a prescription for Norco 5-325mg.  She has taken two doses of Norco as well as Benadryl.  She noted minimal pain relief lasting approximately 30 minutes per administration.  She is continue to use Aquaphor over the treatment field since the completion of treatment.  Additionally she began SilvrStat ointment on Monday (11/12/2018).  She had been using hydrocortisone cream since the end of treatment for skin irritation.  She does note that her skin is itchy.  She receives some response from her pruritis with Benadryl. She denies any exposure to allergens.  She has known allergies to codeine, Macrobid, and sulfa drugs which she has not had any exposure to.  She has never had a similar skin reaction prior, however she does note that she is very sensitive to soaps and perfumes and uses baby soap.  She also has a collection of patchy red skin changes on her right wrist which was detected on examination today, however Ms. Velazquez has never noticed this prior and thus no history is available in regards to the clinical course of these lesions.     PHYSICAL EXAM:  Vital Signs: /77  Pulse 102  Temp 98.1  F (36.7  C)  Gen: Alert, in NAD  Eyes: EOMI, sclera anicteric  Pulm: Breathing comfortably on room air, no audible wheezes or ronchi  CV: Well-perfused, no cyanosis  Skin:  Erythematous papules noted over the ventral component of the right wrist.  Erythema and associated warmth of the right breast within the treatment field and adjacent with bordering papules outside of the treatment field.  The region of erythema was well demarcated and crossed midline at the nipple line to the medial left breast and was limited laterally to the mid axillary line.  There was a small patch of disclamation noted in the right axilla measuring approximately 2-3 cm.  Additionally there was tenderness to palpation of the right breast.  No significant tenderness to palpation of the left breast.  Murphy 2      LABS AND IMAGING:  Reviewed.    IMPRESSION:   Ms. Velazquez is a very pleasant 63 year old woman with recent diagnosis of screening-detected, pathologic stage IA, [id number], invasive ductal carcinoma of the right breast, grade 1, ER positive, ME positive, HER2 negative by IHC status, Oncotype DX 7, post right lumpectomy and right axillary sentinel lymph node biopsy on 8/31/2018    She is approximately 1 week status post adjuvant external beam radiotherapy with a vigorous grade 2 skin reaction over the skin of the treatment field as well as the skin immediately adjacent to the treatment field symptomatic of warmth, redness, pruritis, pain/tenderness, and dry desquamation within the axilla.  There is also a collection of erythematous papules on the ventralsurface of her right wrist.  Currently the differential diagnosis includes cutaneous candidiasis and contact dermatitis superimposed on the expected radiation treatment reaction.     PLAN:   1. Return to clinic on Friday, 11/16/2018  2. Fluconazole 100 mg p.o. daily for 10 days with 200 mg today 11/14/2018 as empiric treatment for cutaneous candidiasis  3. Discontinue SilvrStat ointment  4. Discontinue topical hydrocortisone cream  5. Continue application of topical emollients such as Aquaphor or A&D  6. Pain control  1. Discontinue Norco  5mg-325mg  2. Begin oxycodone 5-10 mg every 4-6 hours with adjunctive acetaminophen (no more than 3000 mg every 24 hours) as needed for pain control.   3. Use senna-S stool softener as needed with titration to 1 soft bowel movement daily  4. Ms. Velazquez was counseled as to the cautious use of benedryl due to concern for potentiation of some side effects of narcotics  7. Mepilex dressing as desired    Ms. Velazquez was seen and discussed with staff, Dr. Lerma.  Thank you very much for allowing us to participate in the care of this patient. Please don't hesitate to contact us with any questions.    Tyson Moore MD  Radiation Oncology Resident, PGY-4  [facility]  Phone: [phone]    Ms. Velazquez was seen and examined by me. Note above by Dr. Moore  was reviewed and edited by me and reflects our mutual findings and plan of care.    Jacque Lerma MD  Department of Radiation Oncology  [facility]        CC  Patient Care Team:  Esvin Schwartz MD as MD (Radiology)  GUNNAR LALA

## 2018-11-15 ENCOUNTER — OFFICE VISIT (OUTPATIENT)
Dept: DERMATOLOGY | Facility: CLINIC | Age: 63
End: 2018-11-15
Payer: COMMERCIAL

## 2018-11-15 ENCOUNTER — APPOINTMENT (OUTPATIENT)
Dept: RADIATION ONCOLOGY | Facility: CLINIC | Age: 63
End: 2018-11-15
Attending: RADIOLOGY
Payer: COMMERCIAL

## 2018-11-15 DIAGNOSIS — L23.9 ALLERGIC CONTACT DERMATITIS, UNSPECIFIED TRIGGER: Primary | ICD-10-CM

## 2018-11-15 RX ORDER — PREDNISONE 5 MG/1
TABLET ORAL
Qty: 72 TABLET | Refills: 0 | Status: SHIPPED | OUTPATIENT
Start: 2018-11-15 | End: 2018-12-03

## 2018-11-15 ASSESSMENT — PAIN SCALES - GENERAL: PAINLEVEL: NO PAIN (0)

## 2018-11-15 NOTE — PROGRESS NOTES
[facility] Dermatology Note      Dermatology Problem List:  1. Suspected allergic contact dermatitis with id reaction in setting of radiotherapy and use of topical products, including hydrocortisone cream   - Recommended avoidance of all topical products except Vaseline   - Ordered 16-day prednisone taper   - Continue with PO Benadryl PRN itch   - Consider allergy testing in the future   - RTC in 1 month as of 11/15/18    Encounter Date: Nov 15, 2018    CC:   Chief Complaint   Patient presents with     Derm Problem     Keely is here for a body rash .         History of Present Illness:  Ms. Keely Velazquez is a 63 year old female who presents as a referral from Dr. Moore for an acute rash. The patient says that she recently had another session of local radiotherapy of the right breast for stage 1A invasive ductal carcinoma. She experienced erythema of the treated area, which she has had after prior sessions, but the erythema then expanded and developed into small, weeping blisters. The primary symptoms are pain and pruritus. Earlier this week, she reports having used topical hydrocortisone and SilvrStat ointment, then developed further expansion of the rash to involve the contralateral breast, arms and right side of the neck. She is very concerned it will begin to involve the face. She denies constitutional symptoms or respiratory distress.    Past Medical History:   Patient Active Problem List   Diagnosis     Symptomatic varicose veins of both lower extremities     Malignant neoplasm of upper-outer quadrant of right breast in female, estrogen receptor positive (H)     Past Medical History:   Diagnosis Date     Hypertension      Iritis      Past Surgical History:   Procedure Laterality Date     CATARACT IOL, RT/LT Left 2006     HYSTERECTOMY  2016     LUMPECTOMY BREAST WITH SENTINEL NODE, COMBINED Right 8/31/2018    Procedure: COMBINED LUMPECTOMY BREAST WITH SENTINEL NODE;  Right Wire Localized  Lumpectomy and Right [place] Lymph Node Biopsy;  Surgeon: Chilango Kruger MD;  Location: [facility] OR     OOPHORECTOMY  2014       Social History:  Patient  reports that she has never smoked. She has never used smokeless tobacco. She reports that she drinks alcohol. She reports that she does not use illicit drugs.    Family History:  No family history on file.    Medications:  Current Outpatient Prescriptions   Medication Sig Dispense Refill     AmLODIPine Besylate (NORVASC PO) Take 5 mg by mouth daily       anastrozole (ARIMIDEX) 1 MG tablet Take 1 tablet (1 mg) by mouth daily 30 tablet 11     calcium carbonate (OS-PADDY 500 MG [place]) 500 MG tablet Take 500 mg by mouth 2 times daily With Magnesium,Zinc       desonide (DESOWEN) 0.05 % cream        fluconazole (DIFLUCAN) 100 MG tablet 200 mg by mouth day 1 then 100 mg by mouth daily days 2 - 10 11 tablet 0     HYDROcodone-acetaminophen (NORCO) 5-325 MG per tablet Take 1 tablet by mouth every 6 hours as needed for severe pain 30 tablet 0     LYSINE PO        multivitamin, therapeutic with minerals (MULTI-VITAMIN) TABS Take 1 tablet by mouth daily       Omega-3 Fatty Acids (OMEGA-3 FISH OIL PO)        oxyCODONE IR (ROXICODONE) 5 MG tablet 5-10 mg by mouth every 4 to 6 hours as needed for pain 30 tablet 0     Urea 40 % CREA Apply to fingernails twice daily, especially at bedtime.       VITAMIN D, CHOLECALCIFEROL, PO Take 1,000 Units by mouth daily        acetaminophen (TYLENOL) 325 MG tablet Take 2 tablets (650 mg) by mouth every 4 hours as needed for mild pain (Patient not taking: Reported on 10/19/2018) 60 tablet 0        Allergies   Allergen Reactions     Codeine Nausea and Vomiting     Macrobid [Nitrofurantoin] Nausea and Vomiting     Sulfa Drugs Itching       Review of Systems:  -Skin/Heme New Pt: The patient denies frequent sun exposure. The patient denies excessive scarring or problems healing except as per HPI. The patient denies excessive bleeding.  -Constitutional:  Otherwise feeling well today, in usual state of health.  -HEENT: Patient denies nonhealing oral sores.  -Skin: As above in HPI. No additional skin concerns.    Physical exam:  Vitals: There were no vitals taken for this visit.  GEN: This is a well developed, well-nourished female in no acute distress, in a pleasant mood.    SKIN: Waist-up skin, which includes the head/face, neck, both arms, chest, back, abdomen, digits and/or nails was examined.  - At the right breast, axilla and right abdomen, there is a large contiguous blanching erythematous patch with some papules and an area of dark red at the lateral aspect of the breast. There is further involvement of the blanching erythematous patches of the contralateral breast, right side of th neck and right volar wrist with some scattered erythematous papules as well. There is no skin breakdown or vesicles today.  -No other lesions of concern on areas examined.   - Photographs taken for future comparison                    Impression/Plan:  1. Allergic contact dermatitis in the setting of recent local radiotherapy    Local radiotherapy can make the skin more sensitive and prone to allergic reactions. The local radiotherapy by itself can cause erythematous patches at the treated sites, then the patient applied topical products, including topical hydrocortisone and SilvrStat ointment, which likely contributed to an allergic contact dermatitis. Topical hydrocortisone is known to cause allergic contact dermatitis in some patients. The involvement of other unrelated body parts, including the contralateral breast, arms and neck is likely related to an id reaction.    We will treat her inflammatory rash with systemic steroids.    Recommended avoidance of topical products, including SilvrStat ointment and hydrocortisone, and use only Vaseline for comfort    Ordered oral prednisone starting at 40 mg every day and decreasing by 5 mg every two days for a total course of 16  days    Okay to continue PO Benadryl PRN pruritus    Consider allergy testing in the future    RTC in 1 month to recheck rash    Instructed that she could contact clinic if above measures to not lead to improvement    Rash not likely fungal or yeast mediated, so Diflucan not likely to help    Photographs taken for future comparison    CC Dr. Moore on close of this encounter.  Follow-up in 1 months, earlier for new or changing lesions.     Dr. Junior staffed the patient.    Staff Involved:  Resident(Cipriano Murcia)/Staff(as above)  .I, Lorrie Junior MD, saw this patient with the resident and agree with the resident s findings and plan of care as documented in the resident s note.

## 2018-11-15 NOTE — PATIENT INSTRUCTIONS
We believe that your skin became sensitized by the radiotherapy, and then the skin was further irritated by the application of some topical product, be it silver or cortisone, which some people are allergic to. Regardless of the trigger, the oral prednisone will decrease the inflammation in your entire body. This medication can make you hungry and the jitters. It can also raise the blood pressure and blood sugar.    Please stop using the other topical medications. We do not believe this is related to fungus or yeast.    Otherwise, we will see you back in clinic in four weeks, which we will schedule today.

## 2018-11-15 NOTE — MR AVS SNAPSHOT
After Visit Summary   11/15/2018    Keely Velazquez    MRN: [id number]           Patient Information     Date Of Birth          1955        Visit Information        Provider Department      11/15/2018 10:00 AM Cipriano Murcia MD [facility]        Today's Diagnoses     Allergic contact dermatitis, unspecified trigger    -  1      Care Instructions    We believe that your skin became sensitized by the radiotherapy, and then the skin was further irritated by the application of some topical product, be it silver or cortisone, which some people are allergic to. Regardless of the trigger, the oral prednisone will decrease the inflammation in your entire body. This medication can make you hungry and the jitters. It can also raise the blood pressure and blood sugar.    Please stop using the other topical medications. We do not believe this is related to fungus or yeast.    Otherwise, we will see you back in clinic in four weeks, which we will schedule today.              Follow-ups after your visit        Follow-up notes from your care team     Return in about 4 weeks (around 12/13/2018).      Your next 10 appointments already scheduled     Dec 03, 2018 10:30 AM CST   Return Visit with LIEN Comer CNP   Radiation Oncology Clinic ([facility])    [facility]  1st Floor  [address]  [facility] [facility] [address]   [phone]            Dec 13, 2018  8:30 AM CST   (Arrive by 8:15 AM)   Return Visit with Cipriano Murcia MD   [facility] ([facility])    [address]  3rd Floor  [place] [address] [phone]            Dec 31, 2018  1:15 PM CST   (Arrive by 1:00 PM)   New Patient Visit with Merari Tafoya MD   [facility] ([facility])    [address]  [address]  [place] [address] [phone]              Who to contact     Please  call your clinic at [phone] to:    Ask questions about your health    Make or cancel appointments    Discuss your medicines    Learn about your test results    Speak to your doctor            Additional Information About Your Visit        [employer] Information     [employer] gives you secure access to your electronic health record. If you see a primary care provider, you can also send messages to your care team and make appointments. If you have questions, please call your primary care clinic.  If you do not have a primary care provider, please call [phone] and they will assist you.      [employer] is an electronic gateway that provides easy, online access to your medical records. With [employer], you can request a clinic appointment, read your test results, renew a prescription or communicate with your care team.     To access your existing account, please contact your [facility] or call [phone] for assistance.        Care EveryWhere ID     This is your Care EveryWhere ID. This could be used by other organizations to access your [place] medical records  [id number]         Blood Pressure from Last 3 Encounters:   11/14/18 161/77   09/24/18 142/79   09/14/18 (!) 141/94    Weight from Last 3 Encounters:   11/07/18 74.8 kg (165 lb)   11/02/18 74.8 kg (165 lb)   10/25/18 74 kg (163 lb 3.2 oz)              Today, you had the following     No orders found for display         Today's Medication Changes          These changes are accurate as of 11/15/18 10:36 AM.  If you have any questions, ask your nurse or doctor.               Start taking these medicines.        Dose/Directions    predniSONE 5 MG tablet   Commonly known as:  DELTASONE   Used for:  Allergic contact dermatitis, unspecified trigger   Started by:  Cipriano Murcia MD        8,8,7,7,6,6,5,5,4,4,3,3,2,2,1,1   Quantity:  72 tablet   Refills:  0            Where to get your medicines      These medications were sent to  [place], [place] - [address]  [address], [place] [address]    Hours:  TRANSPLANT PHONE NUMBER [phone] Phone:  [phone]     predniSONE 5 MG tablet                Primary Care Provider    None Specified       No primary provider on file.        Equal Access to Services     ANH GUNN : Hadii french ku hadasho Soomaali, waaxda luqadaha, qaybta kaalmada adepattiyada, gabino lopezloyanupam walker . So [facility] [phone].    ATENCIÓN: Si habla español, tiene a davidson disposición servicios gratuitos de asistencia lingüística. Michelle al [phone].    We comply with applicable federal civil rights laws and [place] laws. We do not discriminate on the basis of race, color, national origin, age, disability, sex, sexual orientation, or gender identity.            Thank you!     Thank you for choosing [facility] DERMAT[facility]  for your care. Our goal is always to provide you with excellent care. Hearing back from our patients is one way we can continue to improve our services. Please take a few minutes to complete the written survey that you may receive in the mail after your visit with us. Thank you!             Your Updated Medication List - Protect others around you: Learn how to safely use, store and throw away your medicines at www.disposemymeds.org.          This list is accurate as of 11/15/18 10:36 AM.  Always use your most recent med list.                   Brand Name Dispense Instructions for use Diagnosis    acetaminophen 325 MG tablet    TYLENOL    60 tablet    Take 2 tablets (650 mg) by mouth every 4 hours as needed for mild pain    Malignant neoplasm of central portion of right breast in female, estrogen receptor positive (H)       anastrozole 1 MG tablet    ARIMIDEX    30 tablet    Take 1 tablet (1 mg) by mouth daily    Malignant neoplasm of upper-outer quadrant of right breast in female, estrogen receptor positive (H)        calcium carbonate 500 mg (elemental) 500 MG tablet    OS-PADDY     Take 500 mg by mouth 2 times daily With Magnesium,Zinc        desonide 0.05 % cream    DESOWEN          fluconazole 100 MG tablet    DIFLUCAN    11 tablet    200 mg by mouth day 1 then 100 mg by mouth daily days 2 - 10    Malignant neoplasm of upper-outer quadrant of right breast in female, estrogen receptor positive (H)       HYDROcodone-acetaminophen 5-325 MG per tablet    NORCO    30 tablet    Take 1 tablet by mouth every 6 hours as needed for severe pain    Pain       LYSINE PO           Multi-vitamin Tabs tablet      Take 1 tablet by mouth daily        NORVASC PO      Take 5 mg by mouth daily        OMEGA-3 FISH OIL PO           oxyCODONE IR 5 MG tablet    ROXICODONE    30 tablet    5-10 mg by mouth every 4 to 6 hours as needed for pain    Malignant neoplasm of upper-outer quadrant of right breast in female, estrogen receptor positive (H)       predniSONE 5 MG tablet    DELTASONE    72 tablet    8,8,7,7,6,6,5,5,4,4,3,3,2,2,1,1    Allergic contact dermatitis, unspecified trigger       Urea 40 % Crea      Apply to fingernails twice daily, especially at bedtime.        VITAMIN D (CHOLECALCIFEROL) PO      Take 1,000 Units by mouth daily

## 2018-11-15 NOTE — LETTER
11/15/2018       RE: Keely Velazquez  [address]  [facility] [address]     Dear Colleague,    Thank you for referring your patient, Keely Velazquez, to the [facility] DERMATOLOGY at [facility]. Please see a copy of my visit note below.    [facility] Dermatology Note      Dermatology Problem List:  1. Suspected allergic contact dermatitis with id reaction in setting of radiotherapy and use of topical products, including hydrocortisone cream   - Recommended avoidance of all topical products except Vaseline   - Ordered 16-day prednisone taper   - Continue with PO Benadryl PRN itch   - Consider allergy testing in the future   - RTC in 1 month as of 11/15/18    Encounter Date: Nov 15, 2018    CC:   Chief Complaint   Patient presents with     Derm Problem     Keely is here for a body rash .         History of Present Illness:  Ms. Keely Velazquez is a 63 year old female who presents as a referral from Dr. Moore for an acute rash. The patient says that she recently had another session of local radiotherapy of the right breast for stage 1A invasive ductal carcinoma. She experienced erythema of the treated area, which she has had after prior sessions, but the erythema then expanded and developed into small, weeping blisters. The primary symptoms are pain and pruritus. Earlier this week, she reports having used topical hydrocortisone and SilvrStat ointment, then developed further expansion of the rash to involve the contralateral breast, arms and right side of the neck. She is very concerned it will begin to involve the face. She denies constitutional symptoms or respiratory distress.    Past Medical History:   Patient Active Problem List   Diagnosis     Symptomatic varicose veins of both lower extremities     Malignant neoplasm of upper-outer quadrant of right breast in female, estrogen receptor positive (H)     Past Medical History:   Diagnosis Date     Hypertension       Iritis      Past Surgical History:   Procedure Laterality Date     CATARACT IOL, RT/LT Left 2006     HYSTERECTOMY  2016     LUMPECTOMY BREAST WITH SENTINEL NODE, COMBINED Right 8/31/2018    Procedure: COMBINED LUMPECTOMY BREAST WITH SENTINEL NODE;  Right Wire Localized Lumpectomy and Right [place] Lymph Node Biopsy;  Surgeon: Chilango Kruger MD;  Location: [facility] OR     OOPHORECTOMY  2014       Social History:  Patient  reports that she has never smoked. She has never used smokeless tobacco. She reports that she drinks alcohol. She reports that she does not use illicit drugs.    Family History:  No family history on file.    Medications:  Current Outpatient Prescriptions   Medication Sig Dispense Refill     AmLODIPine Besylate (NORVASC PO) Take 5 mg by mouth daily       anastrozole (ARIMIDEX) 1 MG tablet Take 1 tablet (1 mg) by mouth daily 30 tablet 11     calcium carbonate (OS-PADDY 500 MG [place]. [place]) 500 MG tablet Take 500 mg by mouth 2 times daily With Magnesium,Zinc       desonide (DESOWEN) 0.05 % cream        fluconazole (DIFLUCAN) 100 MG tablet 200 mg by mouth day 1 then 100 mg by mouth daily days 2 - 10 11 tablet 0     HYDROcodone-acetaminophen (NORCO) 5-325 MG per tablet Take 1 tablet by mouth every 6 hours as needed for severe pain 30 tablet 0     LYSINE PO        multivitamin, therapeutic with minerals (MULTI-VITAMIN) TABS Take 1 tablet by mouth daily       Omega-3 Fatty Acids (OMEGA-3 FISH OIL PO)        oxyCODONE IR (ROXICODONE) 5 MG tablet 5-10 mg by mouth every 4 to 6 hours as needed for pain 30 tablet 0     Urea 40 % CREA Apply to fingernails twice daily, especially at bedtime.       VITAMIN D, CHOLECALCIFEROL, PO Take 1,000 Units by mouth daily        acetaminophen (TYLENOL) 325 MG tablet Take 2 tablets (650 mg) by mouth every 4 hours as needed for mild pain (Patient not taking: Reported on 10/19/2018) 60 tablet 0        Allergies   Allergen Reactions     Codeine Nausea and Vomiting     Macrobid  [Nitrofurantoin] Nausea and Vomiting     Sulfa Drugs Itching       Review of Systems:  -Skin/Heme New Pt: The patient denies frequent sun exposure. The patient denies excessive scarring or problems healing except as per HPI. The patient denies excessive bleeding.  -Constitutional: Otherwise feeling well today, in usual state of health.  -HEENT: Patient denies nonhealing oral sores.  -Skin: As above in HPI. No additional skin concerns.    Physical exam:  Vitals: There were no vitals taken for this visit.  GEN: This is a well developed, well-nourished female in no acute distress, in a pleasant mood.    SKIN: Waist-up skin, which includes the head/face, neck, both arms, chest, back, abdomen, digits and/or nails was examined.  - At the right breast, axilla and right abdomen, there is a large contiguous blanching erythematous patch with some papules and an area of dark red at the lateral aspect of the breast. There is further involvement of the blanching erythematous patches of the contralateral breast, right side of th neck and right volar wrist with some scattered erythematous papules as well. There is no skin breakdown or vesicles today.  -No other lesions of concern on areas examined.   - Photographs taken for future comparison                    Impression/Plan:  1. Allergic contact dermatitis in the setting of recent local radiotherapy    Local radiotherapy can make the skin more sensitive and prone to allergic reactions. The local radiotherapy by itself can cause erythematous patches at the treated sites, then the patient applied topical products, including topical hydrocortisone and SilvrStat ointment, which likely contributed to an allergic contact dermatitis. Topical hydrocortisone is known to cause allergic contact dermatitis in some patients. The involvement of other unrelated body parts, including the contralateral breast, arms and neck is likely related to an id reaction.    We will treat her inflammatory  rash with systemic steroids.    Recommended avoidance of topical products, including SilvrStat ointment and hydrocortisone, and use only Vaseline for comfort    Ordered oral prednisone starting at 40 mg every day and decreasing by 5 mg every two days for a total course of 16 days    Okay to continue PO Benadryl PRN pruritus    Consider allergy testing in the future    RTC in 1 month to recheck rash    Instructed that she could contact clinic if above measures to not lead to improvement    Rash not likely fungal or yeast mediated, so Diflucan not likely to help    Photographs taken for future comparison    CC Dr. Moore on close of this encounter.  Follow-up in 1 months, earlier for new or changing lesions.     Dr. Junior staffed the patient.    Staff Involved:  Resident(Cipriano Murcia)/Staff(as above)  .I, Lorrie Junior MD, saw this patient with the resident and agree with the resident s findings and plan of care as documented in the resident s note.    Again, thank you for allowing me to participate in the care of your patient.      Sincerely,    Cipriano Murcia MD

## 2018-11-15 NOTE — NURSING NOTE
Dermatology Rooming Note    Keely Velazquez's goals for this visit include:   Chief Complaint   Patient presents with     Derm Problem     Keely is here for a body rash .   Chanel Phipps, CMA

## 2018-12-03 ENCOUNTER — OFFICE VISIT (OUTPATIENT)
Dept: RADIATION ONCOLOGY | Facility: CLINIC | Age: 63
End: 2018-12-03
Attending: RADIOLOGY
Payer: COMMERCIAL

## 2018-12-03 VITALS — HEART RATE: 75 BPM | DIASTOLIC BLOOD PRESSURE: 81 MMHG | SYSTOLIC BLOOD PRESSURE: 129 MMHG

## 2018-12-03 DIAGNOSIS — Z17.0 MALIGNANT NEOPLASM OF UPPER-OUTER QUADRANT OF RIGHT BREAST IN FEMALE, ESTROGEN RECEPTOR POSITIVE (H): Primary | ICD-10-CM

## 2018-12-03 DIAGNOSIS — C50.411 MALIGNANT NEOPLASM OF UPPER-OUTER QUADRANT OF RIGHT BREAST IN FEMALE, ESTROGEN RECEPTOR POSITIVE (H): Primary | ICD-10-CM

## 2018-12-03 PROCEDURE — G0463 HOSPITAL OUTPT CLINIC VISIT: HCPCS | Performed by: NURSE PRACTITIONER

## 2018-12-03 RX ORDER — DOXYCYCLINE 100 MG/1
CAPSULE ORAL
COMMUNITY
Start: 2018-11-26 | End: 2020-09-14

## 2018-12-03 NOTE — LETTER
12/3/2018       RE: Keely Velazquez  [address] [facility] [address]     Dear Colleague,    Thank you for referring your patient, Keely Velazquez, to the RADIATION ONCOLOGY CLINIC. Please see a copy of my visit note below.    Radiation Oncology Follow-up Visit  December 3, 2018    Keely Velazquez  MRN: [id number]   : 1955     DISEASE TREATED:   Invasive ductal carcinoma of the right breast, grade 1, Stage IA cN0 M0, +ER/+IL/ HER2- negative    RADIATION THERAPY DELIVERED:   5,490 cGy in 21 fractions completed 18.    INTERVAL SINCE COMPLETION OF RADIATION THERAPY:   1 month    HPI (from Dr. Still's note):  Ms. Velazquez is a postmenopausal 63-year old woman with a newly diagnosed pathologic stage IA, [id number], invasive ductal carcinoma of the right breast, grade 1, ER positive, IL positive, HER2 negative by IHC.     The patient underwent a screening mammogram at an outside facility on 2018 which showed heterogeneously dense breast tissue bilaterally.  Her gynecologist recommended breast MRI after she incidentally showed her this result. On 2018, bilateral breast MRI showed a nodular to non-masslike enhancement in the RIGHT medial breast toward the 5 o clock location, mid to posterior depth, that measured 16 x 7 x 11 mm and another nodular area of enhancement in the upper outer quadrant towards 10 o clock, middle depth, measuring 12 x 7 x 9 mm. There were also additional scattered minute foci of enhancement in the right breast. A subsequent ultrasound on 18 showed a 10 mm hypoechoic mass at the 5 o clock position and a 5 mm hypoechoic mass at the 10 o clock position. Ultrasound-guided core biopsy of the mass in the 5 o clock location on the right breast on 2018 revealed invasive ductal carcinoma, Arnulfo grade 1, ER positive (95% moderate), IL positive (96% strong), HER2 negative by IHC.  Biopsy of the 10 o clock position was attempted but ultrasound could not locate any hypoechoic nodule  at that time, so no biopsy was taken at that site.     She was referred to Dr. Chilango Kruger in surgery here at [facility].  She did not have any clinical evidence of axillary masses or palpable breast mass.  She underwent uncomplicated lumpectomy and right axillary sentinel lymph node biopsy on 8/31/2018. Following initial excision Dr. Kruger performed an additional anterior resection. 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 Surgical margins were negative for tumor, with the closest margin in the primary specimen to invasive carcinoma anterior at 3 mm and the closest margin to DCIS in the anterior-inferior corner at 1 mm. The second resection specimen showed intermediate DCIS 1 mm from new anterior margin.  Right axillary sentinel lymph node biopsy was positive for carcinoma in 0/1 node.          SUBJECTIVE:   Keely Velazquez is a 63 year old female who is here today for routine 1 month follow up after completing radiation therapy.  Keely had an allergic reaction at the end of treatment r/t to either topical cortisone or topical silverstat.  This reaction was significant and she saw dermatology and was put on oral steroids and told to stop all topicals except vaseline.  She finished that and reaction has completely resolved.  She is now only using topical vaseline for moisturizer.  Her fatigue is better.  She has follow up with Dr. Tafoya scheduled for end of December.  She has been taking Arimidex since end of September and tolerating that very well.    ROS:  Complete review of systems is negative except for symptoms discussed in subjective above.    Current Outpatient Prescriptions   Medication     acetaminophen (TYLENOL) 325 MG tablet     AmLODIPine Besylate (NORVASC PO)     anastrozole (ARIMIDEX) 1 MG tablet     calcium carbonate (OS-PADDY 500 MG [place]. [place]) 500 MG tablet     desonide (DESOWEN) 0.05 % cream     doxycycline hyclate  (VIBRAMYCIN) 100 MG capsule     HYDROcodone-acetaminophen (NORCO) 5-325 MG per tablet     LYSINE PO     multivitamin, therapeutic with minerals (MULTI-VITAMIN) TABS     Omega-3 Fatty Acids (OMEGA-3 FISH OIL PO)     oxyCODONE IR (ROXICODONE) 5 MG tablet     Urea 40 % CREA     VITAMIN D, CHOLECALCIFEROL, PO     No current facility-administered medications for this visit.           Allergies   Allergen Reactions     Codeine Nausea and Vomiting     Macrobid [Nitrofurantoin] Nausea and Vomiting     Sulfa Drugs Itching     Silver Rash       Past Medical History:   Diagnosis Date     Hypertension      Iritis          PHYSICAL EXAM:  /81  Pulse 75  Gen: Alert, in NAD  Pulm: No wheezing, stridor or respiratory distress  CV: Well-perfused, no cyanosis, no pedal edema  Skin: Normal color and turgor  Psychiatric: Appropriate mood and affect  BREAST:  Skin reaction has healed nicely.  No masses or discharge bilaterally. No signs of previous contact dermatitis.    LABS AND IMAGING:  Reviewed.    IMPRESSION:   Ms. Velazquez is a 63 year old female with a stage IA invasive ductal carcinoma of the right breast. S/p lumpectomy and now 1 month out from completion of radiations.    PLAN:   Patient has recovered nicely from acute side effects of radiation therapy.  Discussed long term care with continued moisturizing of the treated breast, sunscreen or avoidance of sun.  Recommended staying on Arimidex since she is tolerating that very well.  Patient will follow up with medical oncology for continued care and imaging.  She will follow up here as needed.  Discussed to come in with any concerns or questions that may develop.      Syeda Dominguez NP  Radiation Oncology

## 2018-12-03 NOTE — NURSING NOTE
FOLLOW-UP VISIT    Patient Name: Keely Velazquez      : 1955     Age: 63 year old        ______________________________________________________________________________     Chief Complaint   Patient presents with     Cancer     Breast Cancer: Right breast 5490 cGy completed 18     /81  Pulse 75     Pain  Denies    Labs  Other Labs: No    Imaging  None      Other Appointments:     MD Name:  Appointment Date:    MD Name: Appointment Date:   MD Name: Appointment Date:   Other Appointment Notes:     Residual Radiation side effect: skin reaction resolved    Additional Instructions:     Nurse face-to-face time: Level 2:  5 min face to face time

## 2018-12-03 NOTE — PROGRESS NOTES
Radiation Oncology Follow-up Visit  December 3, 2018    Keely Velazquez  MRN: [id number]   : 1955     DISEASE TREATED:   Invasive ductal carcinoma of the right breast, grade 1, Stage IA cN0 M0, +ER/+MD/ HER2- negative    RADIATION THERAPY DELIVERED:   5,490 cGy in 21 fractions completed 18.    INTERVAL SINCE COMPLETION OF RADIATION THERAPY:   1 month    HPI (from Dr. Still's note):  Ms. Velazquez is a postmenopausal 63-year old woman with a newly diagnosed pathologic stage IA, [id number], invasive ductal carcinoma of the right breast, grade 1, ER positive, MD positive, HER2 negative by IHC.     The patient underwent a screening mammogram at an outside facility on 2018 which showed heterogeneously dense breast tissue bilaterally.  Her gynecologist recommended breast MRI after she incidentally showed her this result. On 2018, bilateral breast MRI showed a nodular to non-masslike enhancement in the RIGHT medial breast toward the 5 o clock location, mid to posterior depth, that measured 16 x 7 x 11 mm and another nodular area of enhancement in the upper outer quadrant towards 10 o clock, middle depth, measuring 12 x 7 x 9 mm. There were also additional scattered minute foci of enhancement in the right breast. A subsequent ultrasound on 18 showed a 10 mm hypoechoic mass at the 5 o clock position and a 5 mm hypoechoic mass at the 10 o clock position. Ultrasound-guided core biopsy of the mass in the 5 o clock location on the right breast on 2018 revealed invasive ductal carcinoma, Arnulfo grade 1, ER positive (95% moderate), MD positive (96% strong), HER2 negative by IHC.  Biopsy of the 10 o clock position was attempted but ultrasound could not locate any hypoechoic nodule at that time, so no biopsy was taken at that site.     She was referred to Dr. Chilango Kruger in surgery here at [facility].  She did not have any clinical evidence of axillary masses or palpable breast mass.  She underwent uncomplicated  lumpectomy and right axillary sentinel lymph node biopsy on 8/31/2018. Following initial excision Dr. Kruger performed an additional anterior resection. 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 Surgical margins were negative for tumor, with the closest margin in the primary specimen to invasive carcinoma anterior at 3 mm and the closest margin to DCIS in the anterior-inferior corner at 1 mm. The second resection specimen showed intermediate DCIS 1 mm from new anterior margin.  Right axillary sentinel lymph node biopsy was positive for carcinoma in 0/1 node.          SUBJECTIVE:   Keely Velazquez is a 63 year old female who is here today for routine 1 month follow up after completing radiation therapy.  Keely had an allergic reaction at the end of treatment r/t to either topical cortisone or topical silverstat.  This reaction was significant and she saw dermatology and was put on oral steroids and told to stop all topicals except vaseline.  She finished that and reaction has completely resolved.  She is now only using topical vaseline for moisturizer.  Her fatigue is better.  She has follow up with Dr. Tafoya scheduled for end of December.  She has been taking Arimidex since end of September and tolerating that very well.    ROS:  Complete review of systems is negative except for symptoms discussed in subjective above.    Current Outpatient Prescriptions   Medication     acetaminophen (TYLENOL) 325 MG tablet     AmLODIPine Besylate (NORVASC PO)     anastrozole (ARIMIDEX) 1 MG tablet     calcium carbonate (OS-PADDY 500 MG [place]. [place]) 500 MG tablet     desonide (DESOWEN) 0.05 % cream     doxycycline hyclate (VIBRAMYCIN) 100 MG capsule     HYDROcodone-acetaminophen (NORCO) 5-325 MG per tablet     LYSINE PO     multivitamin, therapeutic with minerals (MULTI-VITAMIN) TABS     Omega-3 Fatty Acids (OMEGA-3 FISH OIL PO)     oxyCODONE IR  (ROXICODONE) 5 MG tablet     Urea 40 % CREA     VITAMIN D, CHOLECALCIFEROL, PO     No current facility-administered medications for this visit.           Allergies   Allergen Reactions     Codeine Nausea and Vomiting     Macrobid [Nitrofurantoin] Nausea and Vomiting     Sulfa Drugs Itching     Silver Rash       Past Medical History:   Diagnosis Date     Hypertension      Iritis          PHYSICAL EXAM:  /81  Pulse 75  Gen: Alert, in NAD  Pulm: No wheezing, stridor or respiratory distress  CV: Well-perfused, no cyanosis, no pedal edema  Skin: Normal color and turgor  Psychiatric: Appropriate mood and affect  BREAST:  Skin reaction has healed nicely.  No masses or discharge bilaterally. No signs of previous contact dermatitis.    LABS AND IMAGING:  Reviewed.    IMPRESSION:   Ms. Velazquez is a 63 year old female with a stage IA invasive ductal carcinoma of the right breast. S/p lumpectomy and now 1 month out from completion of radiations.    PLAN:   Patient has recovered nicely from acute side effects of radiation therapy.  Discussed long term care with continued moisturizing of the treated breast, sunscreen or avoidance of sun.  Recommended staying on Arimidex since she is tolerating that very well.  Patient will follow up with medical oncology for continued care and imaging.  She will follow up here as needed.  Discussed to come in with any concerns or questions that may develop.      Syeda Dominguez NP  Radiation Oncology

## 2018-12-03 NOTE — MR AVS SNAPSHOT
After Visit Summary   12/3/2018    Keely Velazquez    MRN: [id number]           Patient Information     Date Of Birth          1955        Visit Information        Provider Department      12/3/2018 10:30 AM Syeda Dominguez APRN CNP [facility]        Today's Diagnoses     Malignant neoplasm of upper-outer quadrant of right breast in female, estrogen receptor positive (H)    -  1       Follow-ups after your visit        Your next 10 appointments already scheduled     Dec 13, 2018  8:30 AM CST   (Arrive by 8:15 AM)   Return Visit with Cipriano Murcia MD   [facility] ([facility])    [address]  3rd Floor  [facility] [id number] [phone]            Dec 31, 2018  1:15 PM CST   (Arrive by 1:00 PM)   New Patient Visit with Merari Tafoya MD   [facility] ([facility])    [address]  [address]  [place] [address] [phone]              Who to contact     Please call your clinic at [phone] to:    Ask questions about your health    Make or cancel appointments    Discuss your medicines    Learn about your test results    Speak to your doctor            Additional Information About Your Visit        AdmeldharPoken Information     [facility] gives you secure access to your electronic health record. If you see a primary care provider, you can also send messages to your care team and make appointments. If you have questions, please call your primary care clinic.  If you do not have a primary care provider, please call [phone] and they will assist you.      [facility] is an electronic gateway that provides easy, online access to your medical records. With [facility], you can request a clinic appointment, read your test results, renew a prescription or communicate with your care team.     To access your existing account, please contact your [facility] or call [phone] for assistance.        Care EveryWhere ID     This is your Care EveryWhere ID. This could be used by other organizations to access your [place] medical records  [id number]        Your Vitals Were     Pulse                   75            Blood Pressure from Last 3 Encounters:   12/03/18 129/81   11/14/18 161/77   09/24/18 142/79    Weight from Last 3 Encounters:   11/07/18 74.8 kg (165 lb)   11/02/18 74.8 kg (165 lb)   10/25/18 74 kg (163 lb 3.2 oz)              Today, you had the following     No orders found for display       Primary Care Provider    None Specified       No primary provider on file.        Equal Access to Services     ANH GUNN : Chrissie Stern, baltazar rolle, ana solitario, gabino naranjo. So [facility] [phone].    ATENCIÓN: Si habla español, tiene a davidson disposición servicios gratuitos de asistencia lingüística. Llame al [phone].    We comply with applicable federal civil rights laws and [place] laws. We do not discriminate on the basis of race, color, national origin, age, disability, sex, sexual orientation, or gender identity.            Thank you!     Thank you for choosing [facility]  for your care. Our goal is always to provide you with excellent care. Hearing back from our patients is one way we can continue to improve our services. Please take a few minutes to complete the written survey that you may receive in the mail after your visit with us. Thank you!             Your Updated Medication List - Protect others around you: Learn how to safely use, store and throw away your medicines at www.disposemymeds.org.          This list is accurate as of 12/3/18 11:28 AM.  Always use your most recent med list.                   Brand Name Dispense Instructions for use Diagnosis    acetaminophen 325 MG tablet    TYLENOL    60 tablet    Take 2 tablets (650 mg) by mouth every 4 hours as  needed for mild pain    Malignant neoplasm of central portion of right breast in female, estrogen receptor positive (H)       anastrozole 1 MG tablet    ARIMIDEX    30 tablet    Take 1 tablet (1 mg) by mouth daily    Malignant neoplasm of upper-outer quadrant of right breast in female, estrogen receptor positive (H)       calcium carbonate 500 MG tablet    OS-PADDY     Take 500 mg by mouth 2 times daily With Magnesium,Zinc        desonide 0.05 % external cream    DESOWEN          doxycycline hyclate 100 MG capsule    VIBRAMYCIN     Take one capsule twice daily for 10 days        HYDROcodone-acetaminophen 5-325 MG tablet    NORCO    30 tablet    Take 1 tablet by mouth every 6 hours as needed for severe pain    Pain       LYSINE PO           Multi-vitamin tablet      Take 1 tablet by mouth daily        NORVASC PO      Take 5 mg by mouth daily        OMEGA-3 FISH OIL PO           oxyCODONE 5 MG tablet    ROXICODONE    30 tablet    5-10 mg by mouth every 4 to 6 hours as needed for pain    Malignant neoplasm of upper-outer quadrant of right breast in female, estrogen receptor positive (H)       Urea 40 % Crea      Apply to fingernails twice daily, especially at bedtime.        VITAMIN D (CHOLECALCIFEROL) PO      Take 1,000 Units by mouth daily

## 2018-12-13 ENCOUNTER — OFFICE VISIT (OUTPATIENT)
Dept: DERMATOLOGY | Facility: CLINIC | Age: 63
End: 2018-12-13
Payer: COMMERCIAL

## 2018-12-13 DIAGNOSIS — L23.9 ALLERGIC CONTACT DERMATITIS, UNSPECIFIED TRIGGER: Primary | ICD-10-CM

## 2018-12-13 ASSESSMENT — PAIN SCALES - GENERAL: PAINLEVEL: NO PAIN (0)

## 2018-12-13 NOTE — PATIENT INSTRUCTIONS
We think that it would be a wise idea to get you scheduled with Dr. Arias for allergy testing, especially given the rash to either topical hydrocortisone and SilvrStat ointment. You can do this after you come back from your trip!    We are so happy the rash is resolved!      Patch Testing Information  What are allergen patch tests?    The test is done to look for skin allergies that may be causing rashes and irritation.    A patch test is a way of identifying whether a substance has caused a delayed reaction with skin inflammation, such as contact eczema or delayed (after days) reactions to drugs.     We will use various types of test compounds, which may include common allergens you may come in contact with in daily life such as preservatives, fragrances or even drugs.     Most of the time we will use standardized, prefabricated test solutions. The choice of the substances and series tested will depend on your history of reactions. Sometimes we will test your own products as well.     In order to avoid severe toxic reactions we need detailed information or safety sheets for each of the test compounds.    The test panels are set up with a small amount of common substances that cause skin allergies. They are taped to your skin with a clear hypoallergenic bandage and reinforced hypoallergenic tape.    The substances are numbered, so it is easy to tell what is causing a skin reaction.  What do I need to do in preparation for the test?    Stop all systemic steroids 1 month prior to the testing.     Stop applying topical steroids to the test area one week prior to the test. It is to use them elsewhere throughout the testing process. If this is not possible then discuss options with the Allergy specialist.    Do not go sunbathing or tanning for one week prior to testing    It is okay to take antihistamines as normal.     Please wear dark colors the week of the test since we will write on you with a dark marker that  may transfer and stain clothing or bedding.     Some medications can affect the reactions to allergens during the tests. Therefore reveal all the medications you take to the allergy team. The doctor will discuss the medications with you before the tests.     Eat how you normally would.    Avoid the following:    You cannot get the test area wet during the entire test period. This means no bathing or swimming the entire test period.    No strenuous exercise that may cause sweating.    Do not scratch the test area, this can cause the allergen to spread and give us false positives.     Avoid exposure to UV irradiation. This means no tanning or UV treatment during the testing period.   What can I expect?    Patch testing is done over three appointments.   o The usual schedule is: Monday (Allergen patches are placed), Wednesday (Patches are removed and skin is examined by the MD), and Friday (Skin is examined by the MD)      If you are allergic, there will be an area of irritation where the test was placed.    Itching or burning at the test site might happen if you are allergic to the allergen.  Do not rub or scratch the test site since this may spread the allergen and possibly cause false positives. If itching or burning is not tolerable please contact the clinic.    The marker we draw on your back with ma take up to 5 weeks to wash off completely. Rubbing alcohol can help speed up this process.     Reactions can occur 1 to 2 weeks after the tests are applied. If this happens please take photos of the area and contact the clinic.  What should I do after the tests are placed?    Keep the area dry. No showering or getting the test area wet from the time we see you at your first visit until after your third appointment. If you get the test area wet you are washing off the test and we could get false negative reactions.    If you notice any of the test patches coming loose put on more tape to re-secure the test.    If the  marker that is applied fades you can use a dark pen to yunior around the panel sites.    Cover the test area when you are outside to avoid any sun exposure while the test is in place.     You can remove the tests only if there is a severe reaction (itch, pain, blistering). Please report this to your doctor immediately. If you have to remove the tests please yunior the locations of each test field with a grid so we can identify the allergen.  WHAT ARE THE POSSIBLE RISKS OF PATCH TESTS     If you are allergic to a compound tested you will develop a localized skin reaction similar to your previous reaction, this may take days to develop. These reactions include a formation of red, itchy skin lesions that could be about a centimeter with small vesicles or possibly even blisters. The lesions will fade over time, this may take weeks. The test might leave some skin pigmentation for a few months.     In rare cases a localized reaction to patch testing can become generalized.     The tests with your own products might have some risks because they are not standardized and unanticipated reactions could occur. We need as much information as possible to evaluate if your own products are safe to test and under what conditions. This has to be evaluated for each individual product.   Useful Contact Information    To contact your doctor you can either send a Medrio message or call [phone]     Address  o [address] [place] [address]    If you develop any serious or adverse allergic reaction after office hours please seek immediate medical assistance at the nearest clinic, urgent care, or emergency room.

## 2018-12-13 NOTE — LETTER
12/13/2018       RE: Keely Velazquez  [address]  [facility] [facility] [address]     Dear Colleague,    Thank you for referring your patient, Keely Velazquez, to the [facility] at [facility]. Please see a copy of my visit note below.    [facility] Dermatology Note      Dermatology Problem List:    [facility] patient of Dr. Feroz Murcia  1. Suspected allergic contact dermatitis with id reaction in setting of radiotherapy and use of topical products, including hydrocortisone cream              - Recommended avoidance of all topical products except Vaseline              - Completely resolved after 16-day prednisone taper              - Referred to Dr. Tano Arias for allergy testing given her report of other possible skin allergies  2. Report of BCC of left nasal ala, s/p MMS at outside clinic in November 2018    Encounter Date: Dec 13, 2018    CC:   Chief Complaint   Patient presents with     Derm Problem     Keely is here for follow up rash.       History of Present Illness:  Ms. Keely Velazquez is a 63 year old female who presents as a follow-up for suspected allergic contact dermatitis with id reaction in the setting of radiotherapy and use of topical products, including hydrocortisone cream. The patient was last seen on 11/15/2018, when she was given a 16-day prednisone taper and says that by day 3-4, the rash had almost completely resolved. Today, she is completely rash-free. She also reports a history of other possible skin allergies, including to a variety of soaps and other hygienic products, as well as copper-containing earrings. She says that she had MMS of a left nasal ala BCC several weeks ago and presents with a bandage over the area. She denies constitutional symptoms today.    Past Medical History:   Patient Active Problem List   Diagnosis     Symptomatic varicose veins of both lower extremities     Malignant neoplasm of upper-outer quadrant  of right breast in female, estrogen receptor positive (H)     Past Medical History:   Diagnosis Date     Hypertension      Iritis      Past Surgical History:   Procedure Laterality Date     CATARACT IOL, RT/LT Left 2006     HYSTERECTOMY  2016     LUMPECTOMY BREAST WITH SENTINEL NODE, COMBINED Right 8/31/2018    Procedure: COMBINED LUMPECTOMY BREAST WITH SENTINEL NODE;  Right Wire Localized Lumpectomy and Right [place] Lymph Node Biopsy;  Surgeon: Chilango Kruger MD;  Location: [facility] OR     OOPHORECTOMY  2014       Social History:  Patient reports that  has never smoked. she has never used smokeless tobacco. She reports that she drinks alcohol. She reports that she does not use drugs.    Family History:  No family history on file.    Medications:  Current Outpatient Medications   Medication Sig Dispense Refill     acetaminophen (TYLENOL) 325 MG tablet Take 2 tablets (650 mg) by mouth every 4 hours as needed for mild pain 60 tablet 0     AmLODIPine Besylate (NORVASC PO) Take 5 mg by mouth daily       anastrozole (ARIMIDEX) 1 MG tablet Take 1 tablet (1 mg) by mouth daily 30 tablet 11     calcium carbonate (OS-PADDY 500 MG [place]. [place]) 500 MG tablet Take 500 mg by mouth 2 times daily With Magnesium,Zinc       desonide (DESOWEN) 0.05 % cream        doxycycline hyclate (VIBRAMYCIN) 100 MG capsule Take one capsule twice daily for 10 days       HYDROcodone-acetaminophen (NORCO) 5-325 MG per tablet Take 1 tablet by mouth every 6 hours as needed for severe pain 30 tablet 0     LYSINE PO        multivitamin, therapeutic with minerals (MULTI-VITAMIN) TABS Take 1 tablet by mouth daily       Omega-3 Fatty Acids (OMEGA-3 FISH OIL PO)        oxyCODONE IR (ROXICODONE) 5 MG tablet 5-10 mg by mouth every 4 to 6 hours as needed for pain 30 tablet 0     Urea 40 % CREA Apply to fingernails twice daily, especially at bedtime.       VITAMIN D, CHOLECALCIFEROL, PO Take 1,000 Units by mouth daily           Allergies   Allergen Reactions     Codeine  Nausea and Vomiting     Macrobid [Nitrofurantoin] Nausea and Vomiting     Sulfa Drugs Itching     Silver Rash     Physical exam:  Vitals: There were no vitals taken for this visit.  GEN: This is a well developed, well-nourished female in no acute distress, in a pleasant mood.    SKIN: Waist-up skin, which includes the head/face, neck, both arms, chest, back, abdomen, digits and/or nails was examined.  - She is clear of any rash today.  - At the left nasal ala, there appears to be a well-healing recent MMS site.  -No other lesions of concern on areas examined.     Impression/Plan:  1. Allergic contact dermatitis in the setting of recent local radiotherapy and use of topical hydrocortisone and SilvrStat ointment.    Local radiotherapy can make the skin more sensitive and prone to allergic reactions. The local radiotherapy by itself can cause erythematous patches at the treated sites, then the patient applied topical products, including topical hydrocortisone and SilvrStat ointment, which likely contributed to an allergic contact dermatitis. Topical hydrocortisone is known to cause allergic contact dermatitis in some patients. The involvement of other unrelated body parts, including the contralateral breast, arms and neck is likely related to an id reaction.    Her rash completely resolved with systemic steroids.    Recommended avoidance of topical products, including SilvrStat ointment and hydrocortisone, and use only Vaseline for comfort.    Referred to Dr. Tano Arias for allergy testing, possibly including above topicals and preservatives  2. MMS of left nasal ala at outside clinic    Appears to be healing well    CC Referred Self, MD  No address on file on close of this encounter.  Follow-up PRN for new or changing lesions. She was referred to Dr. Arias for allergy testing.    Dr. Junior staffed the patient.    Staff Involved:  Resident/Staff  .I, Lorrie Junior MD, saw this patient with the resident  and agree with the resident s findings and plan of care as documented in the resident s note.    Again, thank you for allowing me to participate in the care of your patient.      Sincerely,    Cipriano Murcia MD

## 2018-12-13 NOTE — PROGRESS NOTES
[facility] Dermatology Note      Dermatology Problem List:    [facility] of Dr. Feroz Murcia  1. Suspected allergic contact dermatitis with id reaction in setting of radiotherapy and use of topical products, including hydrocortisone cream              - Recommended avoidance of all topical products except Vaseline              - Completely resolved after 16-day prednisone taper              - Referred to Dr. Tano Arias for allergy testing given her report of other possible skin allergies  2. Report of BCC of left nasal ala, s/p MMS at outside clinic in November 2018    Encounter Date: Dec 13, 2018    CC:   Chief Complaint   Patient presents with     Derm Problem     Keely is here for follow up rash.       History of Present Illness:  Ms. Keely Velazquez is a 63 year old female who presents as a follow-up for suspected allergic contact dermatitis with id reaction in the setting of radiotherapy and use of topical products, including hydrocortisone cream. The patient was last seen on 11/15/2018, when she was given a 16-day prednisone taper and says that by day 3-4, the rash had almost completely resolved. Today, she is completely rash-free. She also reports a history of other possible skin allergies, including to a variety of soaps and other hygienic products, as well as copper-containing earrings. She says that she had MMS of a left nasal ala BCC several weeks ago and presents with a bandage over the area. She denies constitutional symptoms today.    Past Medical History:   Patient Active Problem List   Diagnosis     Symptomatic varicose veins of both lower extremities     Malignant neoplasm of upper-outer quadrant of right breast in female, estrogen receptor positive (H)     Past Medical History:   Diagnosis Date     Hypertension      Iritis      Past Surgical History:   Procedure Laterality Date     CATARACT IOL, RT/LT Left 2006     HYSTERECTOMY  2016     LUMPECTOMY BREAST WITH  SENTINEL NODE, COMBINED Right 8/31/2018    Procedure: COMBINED LUMPECTOMY BREAST WITH SENTINEL NODE;  Right Wire Localized Lumpectomy and Right [place] Lymph Node Biopsy;  Surgeon: Chilango Kruger MD;  Location: [facility] OR     OOPHORECTOMY  2014       Social History:  Patient reports that  has never smoked. she has never used smokeless tobacco. She reports that she drinks alcohol. She reports that she does not use drugs.    Family History:  No family history on file.    Medications:  Current Outpatient Medications   Medication Sig Dispense Refill     acetaminophen (TYLENOL) 325 MG tablet Take 2 tablets (650 mg) by mouth every 4 hours as needed for mild pain 60 tablet 0     AmLODIPine Besylate (NORVASC PO) Take 5 mg by mouth daily       anastrozole (ARIMIDEX) 1 MG tablet Take 1 tablet (1 mg) by mouth daily 30 tablet 11     calcium carbonate (OS-PADDY 500 MG [place]. [place]) 500 MG tablet Take 500 mg by mouth 2 times daily With Magnesium,Zinc       desonide (DESOWEN) 0.05 % cream        doxycycline hyclate (VIBRAMYCIN) 100 MG capsule Take one capsule twice daily for 10 days       HYDROcodone-acetaminophen (NORCO) 5-325 MG per tablet Take 1 tablet by mouth every 6 hours as needed for severe pain 30 tablet 0     LYSINE PO        multivitamin, therapeutic with minerals (MULTI-VITAMIN) TABS Take 1 tablet by mouth daily       Omega-3 Fatty Acids (OMEGA-3 FISH OIL PO)        oxyCODONE IR (ROXICODONE) 5 MG tablet 5-10 mg by mouth every 4 to 6 hours as needed for pain 30 tablet 0     Urea 40 % CREA Apply to fingernails twice daily, especially at bedtime.       VITAMIN D, CHOLECALCIFEROL, PO Take 1,000 Units by mouth daily           Allergies   Allergen Reactions     Codeine Nausea and Vomiting     Macrobid [Nitrofurantoin] Nausea and Vomiting     Sulfa Drugs Itching     Silver Rash     Review of Systems:  -Skin Establ Pt: The patient denies any new rash, pruritus, or lesions that are symptomatic, changing or bleeding, except as per  HPI.  -Constitutional: Otherwise feeling well today, in usual state of health.  -HEENT: Patient denies nonhealing oral sores.  -Skin: As above in HPI. No additional skin concerns.    Physical exam:  Vitals: There were no vitals taken for this visit.  GEN: This is a well developed, well-nourished female in no acute distress, in a pleasant mood.    SKIN: Waist-up skin, which includes the head/face, neck, both arms, chest, back, abdomen, digits and/or nails was examined.  - She is clear of any rash today.  - At the left nasal ala, there appears to be a well-healing recent MMS site.  -No other lesions of concern on areas examined.     Impression/Plan:  1. Allergic contact dermatitis in the setting of recent local radiotherapy and use of topical hydrocortisone and SilvrStat ointment.    Local radiotherapy can make the skin more sensitive and prone to allergic reactions. The local radiotherapy by itself can cause erythematous patches at the treated sites, then the patient applied topical products, including topical hydrocortisone and SilvrStat ointment, which likely contributed to an allergic contact dermatitis. Topical hydrocortisone is known to cause allergic contact dermatitis in some patients. The involvement of other unrelated body parts, including the contralateral breast, arms and neck is likely related to an id reaction.    Her rash completely resolved with systemic steroids.    Recommended avoidance of topical products, including SilvrStat ointment and hydrocortisone, and use only Vaseline for comfort.    Referred to Dr. Tano Arias for allergy testing, possibly including above topicals and preservatives  2. MMS of left nasal ala at outside clinic    Appears to be healing well    CC Referred Self, MD  No address on file on close of this encounter.  Follow-up PRN for new or changing lesions. She was referred to Dr. Arias for allergy testing.    Dr. Junior staffed the patient.    Staff  Involved:  Resident/Staff  .I, Lorrie Junior MD, saw this patient with the resident and agree with the resident s findings and plan of care as documented in the resident s note.

## 2018-12-13 NOTE — NURSING NOTE
Dermatology Rooming Note    Keely Velazquez's goals for this visit include:   Chief Complaint   Patient presents with     Derm Problem     Keely is here for follow up rash.     Chanel Phipps, CMA

## 2018-12-28 NOTE — PROGRESS NOTES
Oncology Follow Up:  Date on this visit: 12/31/2018    Diagnosis:  [id number], grade I, ER positive, MA positive, HER-2 nonamplified invasive ductal carcinoma of the right breast. Oncotype DX recurrence score = 7.    Primary Physician: Sammie Cerna     History Of Present Illness:  Ms. Velazquez is a 63 year old female with right breast cancer.  Ms. Velazquez had routine screening mammogram in 06/2018, which demonstrated dense breast tissue, but no dominant masses.  She presented to her gynecologist's office in August for a Pap smear and mentioned the high breast density.  Her gynecologist recommended proceeding with breast MRI.  Breast MRI on 08/16/2018 showed nodular to non-mass enhancement measuring 1.6 cm in the right breast at 5 o'clock.  There was a similar area of enhancement at 10 o'clock measuring 1.2 cm and a cyst in the left breast at 3 o'clock measuring 1.3 cm.  Right breast ultrasound confirmed a mass at 5 o'clock.  Right breast biopsy demonstrated a grade 1 invasive ductal carcinoma.  Estrogen-receptor staining was moderate in 95%; progesterone-receptor staining was strong in 96%.  HER-2 was nonamplified by immunohistochemistry with a score of 1+.  There was no ultrasound correlate at 10:00 right breast.  She met in consultation with Dr. Kruger, who recommended a right breast lumpectomy and sentinel lymph node procedure.  This was performed on 08/31/2018.  Pathology demonstrated a 1.2 cm, grade 1 invasive mammary carcinoma.  There was associated intermediate-grade DCIS.  Surgical margins were negative for both noninvasive and invasive carcinoma; however, DCIS was 1  mm from the anterior margin.  A single sentinel lymph node was negative for malignancy. Oncotype DX testing of the breast tumor returned with a recurrent score of 7.  She completed adjuvant radiation (4240 cGy to the whole breast and additional 1250 cGy to the lumpectomy site) on 11/8/18.  She has been on anastrozole since 9/24/18.     Interval  History:  Ms. Velazquez comes in to clinic today for routine breast cancer followup.  She continues on treatment with anastrozole.  She reports virtually no side effects from the medication.  She denies bone or joint aches or pains.  She has no hot flashes.  She states mood has been somewhat down; however, she relates this to the skin complications she experienced with radiation therapy.  She had depression approximately 9 years ago and states that her current mood changes does not feel like that.  She states that near the end of radiation, she developed severe skin reaction over the breast and under the arm.  Ultimately, it was determined that she likely had an allergy to the Silvadene cream she was using.  She was placed on steroids and noticed rapid improvement.  She denies current breast pain or discomfort.  She reports some hyperpigmentation of the breast and denies other breast changes.  She has no new bone or joint aches or pains.  She denies infectious complaints.  She has no cough, shortness of breath or chest pain.  She has no abdominal complaints. No headaches, visual changes or focal neurologic complaints.  She has noted mild bilateral ankle swelling over the last couple of weeks.  The remainder of a complete 12-point review of systems was reviewed with the patient and was otherwise negative.       Past Medical/Surgical History:  Past Medical History:   Diagnosis Date     Hypertension      Iritis      Past Surgical History:   Procedure Laterality Date     CATARACT IOL, RT/LT Left 2006     HYSTERECTOMY  2016     LUMPECTOMY BREAST WITH SENTINEL NODE, COMBINED Right 8/31/2018    Procedure: COMBINED LUMPECTOMY BREAST WITH SENTINEL NODE;  Right Wire Localized Lumpectomy and Right [place] Lymph Node Biopsy;  Surgeon: Chilango Kruger MD;  Location: [facility] OR     OOPHORECTOMY  2014     Allergies:  Allergies as of 12/31/2018 - Reviewed 12/13/2018   Allergen Reaction Noted     Codeine Nausea and Vomiting 08/06/2015      Macrobid [nitrofurantoin] Nausea and Vomiting 08/06/2015     Sulfa drugs Itching 08/06/2015     Silver Rash 12/03/2018     Current Medications:  Current Outpatient Medications   Medication Sig Dispense Refill     acetaminophen (TYLENOL) 325 MG tablet Take 2 tablets (650 mg) by mouth every 4 hours as needed for mild pain 60 tablet 0     AmLODIPine Besylate (NORVASC PO) Take 5 mg by mouth daily       anastrozole (ARIMIDEX) 1 MG tablet Take 1 tablet (1 mg) by mouth daily 30 tablet 11     calcium carbonate (OS-PADDY 500 MG [place]. [place]) 500 MG tablet Take 500 mg by mouth 2 times daily With Magnesium,Zinc       desonide (DESOWEN) 0.05 % cream        doxycycline hyclate (VIBRAMYCIN) 100 MG capsule Take one capsule twice daily for 10 days       HYDROcodone-acetaminophen (NORCO) 5-325 MG per tablet Take 1 tablet by mouth every 6 hours as needed for severe pain 30 tablet 0     LYSINE PO        multivitamin, therapeutic with minerals (MULTI-VITAMIN) TABS Take 1 tablet by mouth daily       Omega-3 Fatty Acids (OMEGA-3 FISH OIL PO)        oxyCODONE IR (ROXICODONE) 5 MG tablet 5-10 mg by mouth every 4 to 6 hours as needed for pain 30 tablet 0     Urea 40 % CREA Apply to fingernails twice daily, especially at bedtime.       VITAMIN D, CHOLECALCIFEROL, PO Take 1,000 Units by mouth daily         Family and Social History:  Reviewed and unchanged from prior.  Please see initial consultation dated 9/24/18 for further details.    Physical Exam:  /71   Pulse 79   Temp 97.6  F (36.4  C) (Oral)   Resp 16   Wt 75 kg (165 lb 5.5 oz)   SpO2 97%   BMI 25.15 kg/m    General:  Well appearing, well-nourished adult female in NAD.  HEENT:  Normocephalic.  Sclera anicteric.  MMM.  No lesions of the oropharynx.  Lymph:  No palpable cervical, supraclavicular, or axillary LAD.  Chest:  CTA bilaterally.  No wheezes or crackles.  CV:  RRR.  Nl S1 and S2.  No m/r/g.  Breast:  Hyperpigmentation in the right axilla.  Right lower inner breast  incision appears to be healing well and is without significant underlying fibrosis.  There are no discretely palpable masses in either breast.  Bilateral nipples are everted.  No nipple discharge.  Abd:  Soft/NT/ND.  BSs normoactive.  No hepatosplenomegaly.  Ext:  No pitting edema of the bilateral lower extremities.  Pulses 2+ and symmetric.  Musculo:  Strength 5/5 throughout.  Neuro:  Cranial nerves grossly intact.  Gait stable.  Psych:  Mood and affect appear normal.    Laboratory/Imaging Studies:  10/2/18 DEXA bone density scan:  Lowest T-score = -0.5    ASSESSMENT/PLAN:  Ms. Velazquez is a 63-year-old female with a h/o stage IA, T1c N0 M0, grade 1, ER positive, [place] positive, HER2 non-amplified invasive mammary carcinoma of the right breast.  She is status post treatment with right breast lumpectomy and radiation.  On anastrozole since 9/24/18.    1.  Right breast cancer:   Ms. Velazquez is 4 months out from excision of a right breast cancer.  She continues on anastrozole.  She is overall tolerating the medication well.  We plan to continue anastrozole to complete an approximate 10 year course.  Of note, her skin has healed well from the severe reaction she had to Silvadene cream.    At this time will begin routine surveillance visits.  I would like her seen in survivor clinic in order to obtain a treatment summary.  I will then see her back in clinic 3 months later.  Due to increased breast density, I recommend high-risk screening with both mammogram and breast MRI.  These studies will both be performed annually; however, spaced so that she is having some form of imaging every 6 months.  We would plan to do first mammogram in February, 2019 with a breast MRI in August, 2019.     We reviewed there is no role for whole body imaging in surveillance after breast cancer, as it has not been shown to improve outcomes.  We discussed that future imaging will be symptomatic based only with the exception of the breast screening  recommended above.     We reviewed lifestyle changes suggested to prevent breast cancer including 150 minutes of cardiovascular exercise per week, maintaining a normal BMI, a diet low in saturated fat, a daily baby aspirin, and reducing alcohol intake to less than 3-4 drinks per week.    2.  Bone Health:  At risk for bone loss on aromatase inhibitor therapy.  DEXA in 10/2018 with lowest T-score of -0.5.  Continue calcium 1200 mg and vitamin D 1000 international units and walk a 1/2 hour daily.     3.  Skin cancer:  S/p excision of a basal cell carcinoma from her nasal fold. Ongoing follow up with dermatology; she confirms next visit is already scheduled for 03/2019.    4.  Follow Up:  Bilateral diagnostic mammograms and survivor clinic visit with Funmilayo Barrientos in 03/2019.  Return to clinic in 06/2019 for visit with me.    It was a pleasure to meet with Ms. Velazquez in clinic today.  A total of 25 minutes of our 30 minute face to face visit was spent in counseling regarding surveillance after breast cancer therapy.

## 2018-12-31 ENCOUNTER — ONCOLOGY VISIT (OUTPATIENT)
Dept: ONCOLOGY | Facility: CLINIC | Age: 63
End: 2018-12-31
Attending: INTERNAL MEDICINE
Payer: COMMERCIAL

## 2018-12-31 VITALS
SYSTOLIC BLOOD PRESSURE: 147 MMHG | BODY MASS INDEX: 25.15 KG/M2 | OXYGEN SATURATION: 97 % | WEIGHT: 165.34 LBS | HEART RATE: 79 BPM | DIASTOLIC BLOOD PRESSURE: 71 MMHG | TEMPERATURE: 97.6 F | RESPIRATION RATE: 16 BRPM

## 2018-12-31 DIAGNOSIS — Z17.0 MALIGNANT NEOPLASM OF LOWER-INNER QUADRANT OF RIGHT BREAST OF FEMALE, ESTROGEN RECEPTOR POSITIVE (H): Primary | ICD-10-CM

## 2018-12-31 DIAGNOSIS — C50.311 MALIGNANT NEOPLASM OF LOWER-INNER QUADRANT OF RIGHT BREAST OF FEMALE, ESTROGEN RECEPTOR POSITIVE (H): Primary | ICD-10-CM

## 2018-12-31 PROCEDURE — G0463 HOSPITAL OUTPT CLINIC VISIT: HCPCS | Mod: ZF

## 2018-12-31 PROCEDURE — 99214 OFFICE O/P EST MOD 30 MIN: CPT | Mod: ZP | Performed by: INTERNAL MEDICINE

## 2018-12-31 ASSESSMENT — PAIN SCALES - GENERAL: PAINLEVEL: NO PAIN (0)

## 2018-12-31 NOTE — LETTER
12/31/2018       RE: Keely Velazquez  [address] [facility] [id number]     Dear Colleague,    Thank you for referring your patient, Keely Velazquez, to the [facility]. Please see a copy of my visit note below.    Oncology Follow Up:  Date on this visit: 12/31/2018    Diagnosis:  [id number], grade I, ER positive, [place] positive, HER-2 nonamplified invasive ductal carcinoma of the right breast. Oncotype DX recurrence score = 7.    Primary Physician: Sammie Cerna     History Of Present Illness:  Ms. Velazquez is a 63 year old female with right breast cancer.  Ms. Velazquez had routine screening mammogram in 06/2018, which demonstrated dense breast tissue, but no dominant masses.  She presented to her gynecologist's office in August for a Pap smear and mentioned the high breast density.  Her gynecologist recommended proceeding with breast MRI.  Breast MRI on 08/16/2018 showed nodular to non-mass enhancement measuring 1.6 cm in the right breast at 5 o'clock.  There was a similar area of enhancement at 10 o'clock measuring 1.2 cm and a cyst in the left breast at 3 o'clock measuring 1.3 cm.  Right breast ultrasound confirmed a mass at 5 o'clock.  Right breast biopsy demonstrated a grade 1 invasive ductal carcinoma.  Estrogen-receptor staining was moderate in 95%; progesterone-receptor staining was strong in 96%.  HER-2 was nonamplified by immunohistochemistry with a score of 1+.  There was no ultrasound correlate at 10:00 right breast.  She met in consultation with Dr. Kruger, who recommended a right breast lumpectomy and sentinel lymph node procedure.  This was performed on 08/31/2018.  Pathology demonstrated a 1.2 cm, grade 1 invasive mammary carcinoma.  There was associated intermediate-grade DCIS.  Surgical margins were negative for both noninvasive and invasive carcinoma; however, DCIS was 1  mm from the anterior margin.  A single sentinel lymph node was negative for malignancy. Oncotype DX testing of the  breast tumor returned with a recurrent score of 7.  She completed adjuvant radiation (4240 cGy to the whole breast and additional 1250 cGy to the lumpectomy site) on 11/8/18.  She has been on anastrozole since 9/24/18.     Interval History:  Ms. Velazquez comes in to clinic today for routine breast cancer followup.  She continues on treatment with anastrozole.  She reports virtually no side effects from the medication.  She denies bone or joint aches or pains.  She has no hot flashes.  She states mood has been somewhat down; however, she relates this to the skin complications she experienced with radiation therapy.  She had depression approximately 9 years ago and states that her current mood changes does not feel like that.  She states that near the end of radiation, she developed severe skin reaction over the breast and under the arm.  Ultimately, it was determined that she likely had an allergy to the Silvadene cream she was using.  She was placed on steroids and noticed rapid improvement.  She denies current breast pain or discomfort.  She reports some hyperpigmentation of the breast and denies other breast changes.  She has no new bone or joint aches or pains.  She denies infectious complaints.  She has no cough, shortness of breath or chest pain.  She has no abdominal complaints. No headaches, visual changes or focal neurologic complaints.  She has noted mild bilateral ankle swelling over the last couple of weeks.  The remainder of a complete 12-point review of systems was reviewed with the patient and was otherwise negative.       Past Medical/Surgical History:  Past Medical History:   Diagnosis Date     Hypertension      Iritis      Past Surgical History:   Procedure Laterality Date     CATARACT IOL, RT/LT Left 2006     HYSTERECTOMY  2016     LUMPECTOMY BREAST WITH SENTINEL NODE, COMBINED Right 8/31/2018    Procedure: COMBINED LUMPECTOMY BREAST WITH SENTINEL NODE;  Right Wire Localized Lumpectomy and Right  [place] Lymph Node Biopsy;  Surgeon: Chilango Krugre MD;  Location: [facility] OR     OOPHORECTOMY  2014     Allergies:  Allergies as of 12/31/2018 - Reviewed 12/13/2018   Allergen Reaction Noted     Codeine Nausea and Vomiting 08/06/2015     Macrobid [nitrofurantoin] Nausea and Vomiting 08/06/2015     Sulfa drugs Itching 08/06/2015     Silver Rash 12/03/2018     Current Medications:  Current Outpatient Medications   Medication Sig Dispense Refill     acetaminophen (TYLENOL) 325 MG tablet Take 2 tablets (650 mg) by mouth every 4 hours as needed for mild pain 60 tablet 0     AmLODIPine Besylate (NORVASC PO) Take 5 mg by mouth daily       anastrozole (ARIMIDEX) 1 MG tablet Take 1 tablet (1 mg) by mouth daily 30 tablet 11     calcium carbonate (OS-PADDY 500 MG [place]. [place]) 500 MG tablet Take 500 mg by mouth 2 times daily With Magnesium,Zinc       desonide (DESOWEN) 0.05 % cream        doxycycline hyclate (VIBRAMYCIN) 100 MG capsule Take one capsule twice daily for 10 days       HYDROcodone-acetaminophen (NORCO) 5-325 MG per tablet Take 1 tablet by mouth every 6 hours as needed for severe pain 30 tablet 0     LYSINE PO        multivitamin, therapeutic with minerals (MULTI-VITAMIN) TABS Take 1 tablet by mouth daily       Omega-3 Fatty Acids (OMEGA-3 FISH OIL PO)        oxyCODONE IR (ROXICODONE) 5 MG tablet 5-10 mg by mouth every 4 to 6 hours as needed for pain 30 tablet 0     Urea 40 % CREA Apply to fingernails twice daily, especially at bedtime.       VITAMIN D, CHOLECALCIFEROL, PO Take 1,000 Units by mouth daily         Family and Social History:  Reviewed and unchanged from prior.  Please see initial consultation dated 9/24/18 for further details.    Physical Exam:  /71   Pulse 79   Temp 97.6  F (36.4  C) (Oral)   Resp 16   Wt 75 kg (165 lb 5.5 oz)   SpO2 97%   BMI 25.15 kg/m     General:  Well appearing, well-nourished adult female in NAD.  HEENT:  Normocephalic.  Sclera anicteric.  MMM.  No lesions of the  oropharynx.  Lymph:  No palpable cervical, supraclavicular, or axillary LAD.  Chest:  CTA bilaterally.  No wheezes or crackles.  CV:  RRR.  Nl S1 and S2.  No m/r/g.  Breast:  Hyperpigmentation in the right axilla.  Right lower inner breast incision appears to be healing well and is without significant underlying fibrosis.  There are no discretely palpable masses in either breast.  Bilateral nipples are everted.  No nipple discharge.  Abd:  Soft/NT/ND.  BSs normoactive.  No hepatosplenomegaly.  Ext:  No pitting edema of the bilateral lower extremities.  Pulses 2+ and symmetric.  Musculo:  Strength 5/5 throughout.  Neuro:  Cranial nerves grossly intact.  Gait stable.  Psych:  Mood and affect appear normal.    Laboratory/Imaging Studies:  10/2/18 DEXA bone density scan:  Lowest T-score = -0.5    ASSESSMENT/PLAN:  Ms. Velazquez is a 63-year-old female with a h/o stage IA, T1c N0 M0, grade 1, ER positive, [place] positive, HER2 non-amplified invasive mammary carcinoma of the right breast.  She is status post treatment with right breast lumpectomy and radiation.  On anastrozole since 9/24/18.    1.  Right breast cancer:   Ms. Velazquez is 4 months out from excision of a right breast cancer.  She continues on anastrozole.  She is overall tolerating the medication well.  We plan to continue anastrozole to complete an approximate 10 year course.  Of note, her skin has healed well from the severe reaction she had to Silvadene cream.    At this time will begin routine surveillance visits.  I would like her seen in survivor clinic in order to obtain a treatment summary.  I will then see her back in clinic 3 months later.  Due to increased breast density, I recommend high-risk screening with both mammogram and breast MRI.  These studies will both be performed annually; however, spaced so that she is having some form of imaging every 6 months.  We would plan to do first mammogram in February, 2019 with a breast MRI in August, 2019.     We  reviewed there is no role for whole body imaging in surveillance after breast cancer, as it has not been shown to improve outcomes.  We discussed that future imaging will be symptomatic based only with the exception of the breast screening recommended above.     We reviewed lifestyle changes suggested to prevent breast cancer including 150 minutes of cardiovascular exercise per week, maintaining a normal BMI, a diet low in saturated fat, a daily baby aspirin, and reducing alcohol intake to less than 3-4 drinks per week.    2.  Bone Health:  At risk for bone loss on aromatase inhibitor therapy.  DEXA in 10/2018 with lowest T-score of -0.5.  Continue calcium 1200 mg and vitamin D 1000 international units and walk a 1/2 hour daily.     3.  Skin cancer:  S/p excision of a basal cell carcinoma from her nasal fold. Ongoing follow up with dermatology; she confirms next visit is already scheduled for 03/2019.    4.  Follow Up:  Bilateral diagnostic mammograms and survivor clinic visit with Funmilayo Barrientos in 03/2019.  Return to clinic in 06/2019 for visit with me.    It was a pleasure to meet with Ms. Velazquez in clinic today.  A total of 25 minutes of our 30 minute face to face visit was spent in counseling regarding surveillance after breast cancer therapy.      Again, thank you for allowing me to participate in the care of your patient.      Sincerely,    Merari Tafoya MD

## 2019-02-14 ENCOUNTER — DOCUMENTATION ONLY (OUTPATIENT)
Dept: CARE COORDINATION | Facility: CLINIC | Age: 64
End: 2019-02-14

## 2019-02-26 PROBLEM — C50.411 MALIGNANT NEOPLASM OF UPPER-OUTER QUADRANT OF RIGHT BREAST IN FEMALE, ESTROGEN RECEPTOR POSITIVE (H): Status: ACTIVE | Noted: 2018-08-23

## 2019-02-26 PROBLEM — Z17.0 MALIGNANT NEOPLASM OF UPPER-OUTER QUADRANT OF RIGHT BREAST IN FEMALE, ESTROGEN RECEPTOR POSITIVE (H): Status: ACTIVE | Noted: 2018-08-23

## 2019-03-01 ENCOUNTER — PRE VISIT (OUTPATIENT)
Dept: DERMATOLOGY | Facility: CLINIC | Age: 64
End: 2019-03-01

## 2019-03-01 ENCOUNTER — DOCUMENTATION ONLY (OUTPATIENT)
Dept: DERMATOLOGY | Facility: CLINIC | Age: 64
End: 2019-03-01

## 2019-03-01 ENCOUNTER — OFFICE VISIT (OUTPATIENT)
Dept: DERMATOLOGY | Facility: CLINIC | Age: 64
End: 2019-03-01
Payer: MEDICARE

## 2019-03-01 DIAGNOSIS — L23.89 ALLERGIC CONTACT DERMATITIS DUE TO OTHER AGENTS: Primary | ICD-10-CM

## 2019-03-01 ASSESSMENT — PAIN SCALES - GENERAL: PAINLEVEL: NO PAIN (0)

## 2019-03-01 NOTE — LETTER
"3/1/2019       RE: Keely Velazquez  [address]  [facility] [id number]     Dear Colleague,    Thank you for referring your patient, Keely Velazquez, to the [facility] DERMATOLOGY at [facility]. Please see a copy of my visit note below.    [facility] Dermatology Note    Dermatology Clinic  [facility]  [address] [place] [id number]    Dermatology Problem List:    [facility] patient of Dr. Feroz Murcia  1. Suspected allergic contact dermatitis with id reaction in setting of radiotherapy and use of topical products, including hydrocortisone cream              - Recommended avoidance of all topical products except Vaseline              - Completely resolved after 16-day prednisone taper   - Patch testing scheduled    2. 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       History of Present Illness:  Ms. Keely Velazquez is a 63 year old female who presents as a referral from Dr. Junior in regards to allergic contact dermatitis. Her previous visit within our department was on 12/13/2018 with Dr. Junior as a follow-up to her current dermatitis issue. At that time it was recommended to avoid topical products, including SilvrStat ointment and hydrocortisone, and use only Vaseline for comfort. The previous note mentions that her rash completely resolved with the use of systemic steroids. Today she states she was sent over from oncology due to a \"terrible reaction\" from the radiotherapy. This therapy began ~Oct 2018 and finished Nov 2018. The radiology focus was on the right breast, which is where the reaction emerged. She used vasoline and Aquaphor on the site to keep it moisturized. From there she noticed a burning sensation at the site, and began an " over-the-counter 1% hydrocortisone cream regimen. She does not believe there were any antibiotics in this cream. The irritation continued to worsen resulting in blistering/weeping lesions which spread to her armpit and wrists. From there she was prescribed hydrocortisone, followed by SilvrStat. She states the SilvrStat caused a worsening of her symptoms. The patient notes she is allergic to nickel which causes redness and scaling of the skin it contacts. She cannot wear wool, due to it irritating her shin. In addition, she avoids perfumed creams and products which can irritates her skin. In order to combat this she uses mild soaps and products. No Hx pf hay fever, food alergies, or asthma. Sometimes in the spring (~May timeframe) she will get itchy eyes and headaches. Patient has no other derm concerns at this time.      Past Medical History:   Patient Active Problem List   Diagnosis     Symptomatic varicose veins of both lower extremities     Malignant neoplasm of upper-outer quadrant of right breast in female, estrogen receptor positive (H)     Past Medical History:   Diagnosis Date     Hypertension      Iritis      Past Surgical History:   Procedure Laterality Date     CATARACT IOL, RT/LT Left 2006     HYSTERECTOMY  2016     LUMPECTOMY BREAST WITH SENTINEL NODE, COMBINED Right 8/31/2018    Procedure: COMBINED LUMPECTOMY BREAST WITH SENTINEL NODE;  Right Wire Localized Lumpectomy and Right [place] Lymph Node Biopsy;  Surgeon: Chilango Kruger MD;  Location: [facility] OR     OOPHORECTOMY  2014       Social History:  Patient reports that  has never smoked. she has never used smokeless tobacco. She reports that she drinks alcohol. She reports that she does not use drugs.    Family History:  No family history on file.    Medications:  Current Outpatient Medications   Medication Sig Dispense Refill     acetaminophen (TYLENOL) 325 MG tablet Take 2 tablets (650 mg) by mouth every 4 hours as needed for mild pain 60 tablet 0      AmLODIPine Besylate (NORVASC PO) Take 5 mg by mouth daily       anastrozole (ARIMIDEX) 1 MG tablet Take 1 tablet (1 mg) by mouth daily 30 tablet 11     calcium carbonate (OS-PADDY 500 MG [place]. [place]) 500 MG tablet Take 500 mg by mouth 2 times daily With Magnesium,Zinc       desonide (DESOWEN) 0.05 % cream        doxycycline hyclate (VIBRAMYCIN) 100 MG capsule Take one capsule twice daily for 10 days       HYDROcodone-acetaminophen (NORCO) 5-325 MG per tablet Take 1 tablet by mouth every 6 hours as needed for severe pain 30 tablet 0     LYSINE PO        multivitamin, therapeutic with minerals (MULTI-VITAMIN) TABS Take 1 tablet by mouth daily       Omega-3 Fatty Acids (OMEGA-3 FISH OIL PO)        oxyCODONE IR (ROXICODONE) 5 MG tablet 5-10 mg by mouth every 4 to 6 hours as needed for pain 30 tablet 0     Urea 40 % CREA Apply to fingernails twice daily, especially at bedtime.       VITAMIN D, CHOLECALCIFEROL, PO Take 1,000 Units by mouth daily          Allergies   Allergen Reactions     Codeine Nausea and Vomiting     Macrobid [Nitrofurantoin] Nausea and Vomiting     Sulfa Drugs Itching     Silver Rash     atopic predisposition and possible seasonal allergis with  - rhino conjunctivitis to tree pollen.  - Wool intolerance  - Nickel or metal allergy  - atopic dermatitis around ears and nose sometimes    Review of Systems:  - as per HPI  - Otherwise nml state of health. No other skin concerns.    Physical exam:  Vitals: There were no vitals taken for this visit.  GEN: This is a well developed, well-nourished female in no acute distress, in a pleasant mood.    SKIN: Focused examination of the arms was performed. Reviewed prior photos.  - No lesions today but according to the photos in the 2018 notes there were eczematous lesions on wrists, R axilla, and R breast. This spreading manifested in weeping eczematous lesions.  -No other lesions of concern on areas examined.       Impression/Plan:  1. atopic predisposition and possible  seasonal allergis with  - rhino conjunctivitis to tree pollen.  - Wool intolerance  - Nickel or metal allergy  - atopic dermatitis around ears and nose sometimes    2. suspicion for allergic contact dermatitis to to hydrocortisone crea/ Aquaphor/silverstat gel (preservative , additives, or hyrocortizone)    Patch test to scheduled      Order for PATCH TESTS    [x] Outpatient  [] Inpatient: [facility]..../ Bed ....      Skin Atopy (atopic dermatitis) [x] Yes   [] No  Rhinitis/Sinusitis:   [x] Yes   [] No  Allergic Asthma:   [] Yes   [x] No  Food Allergy:   [] Yes   [x] No  Leg ulcers:   [] Yes   [x] No  Hand eczema:   [] Yes   [x] No   Leading hand:   [x] R   [] L       [] Ambidextrous                        Reason for tests (suspected allergy): possible allergic contact dermatitis to Hydrocortisone cream, Silvrstat or Aquaphor  Known previous allergies: Nickel    Standardized panels  [x] Standard panel (40 tests)  [x] Preservatives & Antimicrobials (31 tests)  [x] Emulsifiers & Additives (25 tests)   [] Perfumes/Flavours & Plants (25 tests)  [] Hairdresser panel (12 tests)  [] Rubber Chemicals (22 tests)  [] Plastics (26 tests)  [] Colorants/Dyes/Food additives (20 tests)  [] Metals (implants/dental) (23 tests)  [] Local anaesthetics/NSAIDs (13 tests)  [x] Antibiotics & Antimycotics (14 tests)   [x] Corticosteroids (15 tests)   [] Photopatch test (32 tests)   [x] others: from metal series add silver      [x] Patient's own products: Aquaphor, Silvrstat and hydrocortisone (OTC and prescription)    DO NOT test if chemical or biological identity is unknown!     always ask from patient the product information and safety sheets (MSDS)     [] Patient needs consultation with Allergy team (changes of tests may apply)  [] Tests discussed with Allergy team (can have direct appointment for patch tests)  Staff Involved:    Scribe Disclosure  I, Zac Call, am serving as a scribe to document services personally performed by  Dr. Tano Arias, based on data collection and the provider's statements to me.     I spent a total of 30 min face to face with Keely Velazquez during today's office visit. About 50% of the time was spent counseling the patient and/or coordinating care regarding their allergy.    Again, thank you for allowing me to participate in the care of your patient.      Sincerely,    Tano Arias MD

## 2019-03-01 NOTE — NURSING NOTE
Dermatology Rooming Note    Keely Velazquez's goals for this visit include:   Chief Complaint   Patient presents with     Allergies     Keely is her efor allergy consult.     Chanel Phipps, CMA

## 2019-03-01 NOTE — PATIENT INSTRUCTIONS
Patch Testing Information  What are allergen patch tests?    The test is done to look for skin allergies that may be causing rashes and irritation.    A patch test is a way of identifying whether a substance has caused a delayed reaction with skin inflammation, such as contact eczema or delayed (after days) reactions to drugs.     We will use various types of test compounds, which may include common allergens you may come in contact with in daily life such as preservatives, fragrances or even drugs.     Most of the time we will use standardized, prefabricated test solutions. The choice of the substances and series tested will depend on your history of reactions. Sometimes we will test your own products as well.     In order to avoid severe toxic reactions we need detailed information or safety sheets for each of the test compounds.    The test panels are set up with a small amount of common substances that cause skin allergies. They are taped to your skin with a clear hypoallergenic bandage and reinforced hypoallergenic tape.    The substances are numbered, so it is easy to tell what is causing a skin reaction.  What do I need to do in preparation for the test?    Stop all systemic steroids 1 month prior to the testing.     Stop applying topical steroids to the test area one week prior to the test. It is to use them elsewhere throughout the testing process. If this is not possible then discuss options with the Allergy specialist.    Do not go sunbathing or tanning for one week prior to testing    It is okay to take antihistamines as normal.     Please wear dark colors the week of the test since we will write on you with a dark marker that may transfer and stain clothing or bedding.     Some medications can affect the reactions to allergens during the tests. Therefore reveal all the medications you take to the allergy team. The doctor will discuss the medications with you before the tests.     Eat how you normally  would.    Avoid the following:    You cannot get the test area wet during the entire test period. This means no bathing or swimming the entire test period.    No strenuous exercise that may cause sweating.    Do not scratch the test area, this can cause the allergen to spread and give us false positives.     Avoid exposure to UV irradiation. This means no tanning or UV treatment during the testing period.   What can I expect?    Patch testing is done over three appointments.   o The usual schedule is: Monday (Allergen patches are placed), Wednesday (Patches are removed and skin is examined by the MD), and Friday (Skin is examined by the MD)      If you are allergic, there will be an area of irritation where the test was placed.    Itching or burning at the test site might happen if you are allergic to the allergen.  Do not rub or scratch the test site since this may spread the allergen and possibly cause false positives. If itching or burning is not tolerable please contact the clinic.    The marker we draw on your back with ma take up to 5 weeks to wash off completely. Rubbing alcohol can help speed up this process.     Reactions can occur 1 to 2 weeks after the tests are applied. If this happens please take photos of the area and contact the clinic.  What should I do after the tests are placed?    Keep the area dry. No showering or getting the test area wet from the time we see you at your first visit until after your third appointment. If you get the test area wet you are washing off the test and we could get false negative reactions.    If you notice any of the test patches coming loose put on more tape to re-secure the test.    If the marker that is applied fades you can use a dark pen to yunior around the panel sites.    Cover the test area when you are outside to avoid any sun exposure while the test is in place.     You can remove the tests only if there is a severe reaction (itch, pain, blistering). Please  report this to your doctor immediately. If you have to remove the tests please yunior the locations of each test field with a grid so we can identify the allergen.  WHAT ARE THE POSSIBLE RISKS OF PATCH TESTS     If you are allergic to a compound tested you will develop a localized skin reaction similar to your previous reaction, this may take days to develop. These reactions include a formation of red, itchy skin lesions that could be about a centimeter with small vesicles or possibly even blisters. The lesions will fade over time, this may take weeks. The test might leave some skin pigmentation for a few months.     In rare cases a localized reaction to patch testing can become generalized.     The tests with your own products might have some risks because they are not standardized and unanticipated reactions could occur. We need as much information as possible to evaluate if your own products are safe to test and under what conditions. This has to be evaluated for each individual product.   Useful Contact Information    To contact your doctor you can either send a [employer] message or call [phone]     Address  o [address] [place] [address]    If you develop any serious or adverse allergic reaction after office hours please seek immediate medical assistance at the nearest clinic, urgent care, or emergency room.

## 2019-03-01 NOTE — PROGRESS NOTES
Date/time of application:03/11/2019  Physician/Nurse:  / Chanel Phipps, [facility]               Localization of application: Back    STANDARD Series         No Substance 2 days 4 days remarks   1 Regino Mix [C] - -    2 Colophony - -    3  2-Mercaptobenzothiazole  - -     4 Methylisothiazolinone - -    5 Carba Mix - -    6 Thiuram Mix [A] - -    7 Bisphenol A Epoxy Resin - -    8 P-Wfqr-Uhrmfmhpilx-Formaldehyde Resin - -    9 Mercapto Mix [A] - -    10 Black Rubber Mix- PPD [B] - -    11 Potassium Dichromate  -  -    12 Balsam of [place] (Myroxylon Pereirae Resin) - -    13 Nickel Sulphate Hexahydrate - -    14 Mixed Dialkyl Thiourea - -    15 Paraben Mix [B] - -    16 Methyldibromo Glutaronitrile - -    17 Fragrance Mix - -    18 2-Bromo-2-Nitropropane-1,3-Diol (Bronopol) - -    19 Lyral - -    20 Tixocortol-21- Pivalate - -    21 Diazolidiyl Urea (Germall II) - -    22 Methyl Methacrylate - -    23 Cobalt (II) Chloride Hexahydrate - -    24 Fragrance Mix II  - -    25 Compositae Mix - -    26 Benzoyl Peroxide - -    27 Bacitracin - -    28 Formaldehyde - -    29 Methylchloroisothiazolinone / Methylisothiazolinone - -    30 Corticosteroid Mix - -    31 Sodium Lauryl Sulfate - -    32 Lanolin Alcohol - -    33 Turpentine - -    34 Cetylstearylalcohol - -    35 Chlorhexidine Dicluconate - -    36 Budenoside - -    37 Imidazolidinyl Urea  - -    38 Ethyl-2 Cyanoacrylate - -    39 Quaternium 15 (Dowicil 200) - -    40 Decyl Glucoside - -      EMULSIFIERS & ADDITIVES        No Substance 2 days 4 days remarks   41 Polyethylene Glycol-400 - -    42 Cocamidopropyl Betaine - -    43 Amerchol L101 - -    44 Propylene Glycol - -    45 Triethanolamine - -    46 Sorbitane Sesquiolate - -    47 Isopropylmyristate - -    48 Polysorbate 80  - -    49 Amidoamine   (Stearamidopropyl Dimethylamine) - -    50 Oleamidopropyl Dimethylamine - -    51 Lauryl Glucoside - -    52 Coconut Diethanolamide  - -    53 2-Hydroxy-4-Methoxy  Benzophenone (Oxybenzone) - -    54 Benzophenone-4 (Sulisobenzon) - -    55 Propolis - -    56 Dexpanthenol - -    57 Carboxymethyl Cellulose Sodium - -    58 Abitol - -    59 Tert-Butylhydroquinone - -    60 Benzyl Salicylate - -     Antioxidant      61 Dodecyl Gallate - -    62 Butylhydroxyanisole (BHA) - -    63 Butylhydroxytoluene (BHT) - -    64 Di-Alpha-Tocopherol (Vit E) - -    65 Propyl Gallate - -      CORTICOSTEROIDS    No Substance 2 days 4 days remarks Allergy  class   66 Amcinonide - -  B   67 Betametasone-17,21 Dipropionate - -  D1   68 Desoximetasone - -  C   69 Betamethasone-17-Valerate - -  D1   70 Dexamethasone - -  C   71 Hydrocortisone - -  A   72 Clobetasol-17-Propionate - -  D1   73 Dexamethasone-21-Phosphate Disodium Salt - -  C   74 Hydrocortisone-17 Butyrate - -  D2   75 Prednisolone - -  A   76 Mometason Furoate - -  D1   77 Triamcinolone Acetonide - -  B   78 Methylprednisolone Aceponate - -  D2   79 Hydrocortisone-21-Acetate - -  A   80 Prednicarbate - -  D2       Group Characteristics of group Generic name Name  cross reactions   A Hydrocortisone   Cloprednole, Fludrocortisone acétate, Hydrocortison acetate, Methylprednisolone, Prednisolone, Tixocortolpivalate Alfacortone, Fucidin H, Dermacalm, Hexacortone, Premandole, Imacort With group D2   B Triamcinolone-acetonide   Budenoside (R-isomer), Amcinonide, Desonide, Fluocinolone acetonide, Triamcinolone acetonide Locapred, Locatop  Synalar, Pevisone, Kenacort -   C Betamethasone (Without Radha)   Betamethasone, Dexamethasone, Flumethasone pivalate, Halomethasone Daivobet, Dexasalyl, Locasalen,   -   D1 Betamethasone-diproprionate   Betamethasone dipropionate, Betamethasone-17-valerate, Clobetasole-propionate, Fluticasone propionate, Mometasone furoate Betnovate, Diprogenta, Diprosalic, Diprosone, Celestoderm, Fucicort,  Cutivate, Axotide, Elocom -   D2 Methylprednisolone-aceponate   Hydrocortisone-aceponate, Hydrocortisone-buteprate,  Hydrocortisone-17-butyrate, Methylprednisolone aceponate, Prednicarbate Locoïd, Advantan,  Prednitop With group A and Budesonide (S-isomer)     ANTIBIOTICS & ANTIMYCOTICS         No Substance 2 days 4 days remarks   81 Erythromycine - -    82 Framycetine Sulphate - -    83 Fusidic Acid Sodium Salt - -    84 Gentamicin Sulphate - -    85 Neomycine Sulphate - -    86 Oxytetracycline  - -    87 Polymyxin B Sulphate - -    88 Tetracycline-HCL - -    89 Sulfanilamide - -    90 Metronidazole - -    91 Oxyquinoline Mix - -    92 Nitrofurazone - -    93 Nystatin - -    94 Clotrimazole - -        PRESERVATIVES & ANTIMICROBIALS         No Substance 2 days 4 days remarks   95  1,2-Benzisothiazoline-3-One, Sodium Salt - -    96  1,3,5-Ryder (2-Hydroxyethyl) - Hexahydrotriazine (Grotan BK) - -    97 1-Luucyxokjsbho-3-Nitro-1, 3-Propanediol - -    98  3, 4, 4' - Triclocarban - -    99 4 - Chloro - 3 - Cresol - -    100 4 - Chloro - 4 - Xylenol (PCMX) - -    101 7-Ethylbicyclooxazolidine (Bioban [id number]) - -    102 Benzalkonium Chloride - -    103 Benzyl Alcohol - -    104 Cetalkonium Chloride - -    105 Cetylpyrimidine Chloride  - -    106 Chloroacetamide - -    107 DMDM Hydantoin - -    108 Glutaraldehyde - -    109 Triclosan - -    110 Glyoxal Trimeric Dihydrate - -    111 Iodopropynyl Butylcarbamate - -    112 Octylisothiazoline - -    113 Iodoform - -    114 (Nitrobutyl) Morpholine/(Ethylnitro-Trimethylene) Dimorpholine (Bioban P 1487) - -    115 Phenoxyethanol - -    116 Phenyl Salicylate - -    117 Povidone Iodine - -    118 Sodium Benzoate - -    119 Sodium Disulfite - -    120 Sorbic Acid - -    121 Thimerosal - -     Parabens      122 Butyl-P-Hydroxybenzoate - -    123 Ethyl-P-Hydroxybenzoate - -    124 Methyl-P-Hydroxybenzoate - -    125 Propyl-P-Hydroxybenzoate - -      METALS (Implants / Dental)         No Substance 2 days 4 days remarks   126 Silver Nitrate - -           Results of patch tests:                          Interpretation:    - Negative                    A    = Allergic      (+) Erythema    TI   = Toxic/irritant   + E + Infiltration    RaP = Relevance at Present     ++ E/I + Papulovesicle   Rpr  = Relevance Previously     +++ E/I/P + Blister     nR   = No Relevance

## 2019-03-01 NOTE — PROGRESS NOTES
CANCER SURVIVORSHIP VISIT - END OF TREATMENT VISIT  Mar 4, 2019    REASON FOR VISIT: survivorship visit for history of breast cancer    CANCER HISTORY AND TREATMENT:  Ms. Keely Velazquez is a 63 year old female with past medical history of HTN and basal cell carcinoma s/p excision with diagnosis of [id number], grade 1, ER/MD positive , HER-2 nonamplified invasive ductal carcinoma of the right breast. She was diagnosed after having a breast MRI  8/16/18 for history of high breast density. Of note she had a normal screening mammogram with dense breast tissue but no other abnormalities in 6/2018. The MRI showed a nodular to non-mass enhancement measuring 1.6 cm in the R breast at 5:00. There was another area of similar enhancement at 10:00 measuring 1.2 cm and a cyst in the left breast measuring 1.3 cm. Right breast US confirmed a mass at 5:00 but no mass at 10:00. She had a biopsy done of 5:00 mass showing grade 1 invasive ductal carcinoma which strongly positive ER and MD staining and HER-2 nonamplified by IHC. She met with Dr. Kruger who recommended a R breast lumpectomy and SLNB. This was done on 8/31/18. Pathology showed a 1.2 cm, grade 1 invasive mammary carcinoma with associated intermediate grade DCIS. Surgical margins were negative however DCIS was 1 mm from anterior margin. A single sentinel lymph node was negative for malignancy. She had Oncotype DX testing with a recurrence score of 7. She completed adjuvant radiation to the R breast on 11/8/18. She started anastrozole on 9/24/18. She did not have genetic testing or counseling.     INTERVAL HISTORY:   Ms. Velazquez is here alone today and is feeling well. She was anxious to come in for her mammogram but is relieved to hear the mammogram was normal. She has had no breast concerns. She has no issues with breast achiness or pains after lumpectomy. No lymphedema. She continues to tolerate anastrozole well. No joint stiffness, hot flashes, vaginal dryness. She has good  energy and appetite. No cough, SOB, bone or joint pain, headaches, or neuro symptoms. She feels like she has been coping well. She has backed off on exercise and is eating more carbs and has gained some weight. She is planning to start a stricter diet and walking again once the weather improves.     Otherwise, ROS negative for: fevers, headaches, visual changes, new sites of pain, shortness of breath, cough, chest pain, abdominal pain, nausea, vomiting, abnormal vaginal bleeding, or leg swelling.      Current Outpatient Medications   Medication Sig Dispense Refill     acetaminophen (TYLENOL) 325 MG tablet Take 2 tablets (650 mg) by mouth every 4 hours as needed for mild pain 60 tablet 0     AmLODIPine Besylate (NORVASC PO) Take 5 mg by mouth daily       anastrozole (ARIMIDEX) 1 MG tablet Take 1 tablet (1 mg) by mouth daily 30 tablet 11     calcium carbonate (OS-PADDY 500 MG [place]. [place]) 500 MG tablet Take 500 mg by mouth 2 times daily With Magnesium,Zinc       desonide (DESOWEN) 0.05 % cream        doxycycline hyclate (VIBRAMYCIN) 100 MG capsule Take one capsule twice daily for 10 days       HYDROcodone-acetaminophen (NORCO) 5-325 MG per tablet Take 1 tablet by mouth every 6 hours as needed for severe pain 30 tablet 0     LYSINE PO        multivitamin, therapeutic with minerals (MULTI-VITAMIN) TABS Take 1 tablet by mouth daily       Omega-3 Fatty Acids (OMEGA-3 FISH OIL PO)        oxyCODONE IR (ROXICODONE) 5 MG tablet 5-10 mg by mouth every 4 to 6 hours as needed for pain 30 tablet 0     Urea 40 % CREA Apply to fingernails twice daily, especially at bedtime.       VITAMIN D, CHOLECALCIFEROL, PO Take 1,000 Units by mouth daily             Allergies   Allergen Reactions     Codeine Nausea and Vomiting     Macrobid [Nitrofurantoin] Nausea and Vomiting     Sulfa Drugs Itching     Silver Rash       PHYSICAL EXAMINATION  /68 (BP Location: Right arm, Patient Position: Sitting, Cuff Size: Adult Regular)   Pulse 76   Temp  "97  F (36.1  C) (Oral)   Resp 16   Ht 1.727 m (5' 7.99\")   Wt 74.6 kg (164 lb 6.4 oz)   SpO2 96%   BMI 25.00 kg/m    Constitutional: Alert, oriented female in no visible distress.  Eyes: PERRL. Anicteric sclerae.  ENT/Mouth: OM moist and pink without lesions or thrush.  CV: RRR, no murmurs appreciated.  Resp: CTAB throughout  Breast: S/p R lumpectomy with some fibrosis beneath incision, visible pores, and mild hyperpigmentation. Axillary incision is well-healed. No evidence of lymphedema. Left breast is benign. Bilateral nipples are inverted, stable finding per patient   Abdomen: Soft, non-tender, non-distended. Bowel sounds present. No masses appreciated. No organomegaly noted.  Extremities: No lower extremity edema appreciated.  Skin: Warm, dry. No bruising or petechiae noted.  Lymph: No cervical or supraclavicular lymphadenopathy appreciated.   Neuro: CN II-XII grossly intact.    LABS: None today     Imaging: Bilateral mammogram with leeann--benign findings     IMPRESSION/PLAN:  Keely Velazquez is a 63 year old female with a history of stage IA, [id number], grade 1, ER/WV positive, HER2 non-amplified invasive mammary carcinoma of the right breast, here for cancer survivorship visit following completion of cancer treatment.    Cancer history. She is s/p lumpectomy with sentinel lymph node procedure followed by adjuvant radiation. She has been on anastrozole since 9/2018. She is currently 6 months out from her lumpectomy. She is feeling well, is tolerating anastrozole well without any adverse effects, and has no clinical evidence of recurrence at this time. Her mammogram today showed no evidence of malignancy. Plan for a 10 year course of anastrozole. She will continue to follow-up with her oncology team as scheduled, with plan for follow-up every 3 months for the first 2-3 years, every 6 months for years 4-5, and annually thereafter. She will have high-risk imaging screening with alternating with mammogram and breast " MRI, so she has imaging every 6 months. Mammogram was done today. Breast MRI will be due in late August/early September 2019.     She was given a treatment plan summary today as well as a survivorship care plan from [facility] via [facility]. I reviewed important lifestyle changes to maintain which have been suggested to reduce breast cancer recurrence including 150 minutes of cardiovascular exercise per week, working toward a normal BMI, adhering to a diet low in saturated fat, taking a daily baby aspirin as well as vitamin D, and reduced alcohol intake a minimal amount (less than 3-4 drinks per week.     Genetic testing. She did not have genetic testing. I would not recommend this at this time given her age at diagnosis and lack of family cancer history.     Aromatase inhibitor side effects. There is a rare risk of cataracts. She should let us or her eye doctor know if there is any blurred vision or change in acuity. There is a risk of increase in lipids so her cholesterol should be monitored through PCP. Main side effects of treatment include menopausal symptoms and joint stiffness as well as loss of bone density, see below.     Risk of developing osteoporosis. She is post- menopausal and is on an aromatase inhibitor. She should continue to have DEXA scans every 2 years, and was recommended to perform weightbearing exercises 2-3 days per week, and to supplement with 1200 mg of calcium, 1000 international unites of vitamin D daily.     Radiation side effects. Radiation would have included the lungs, bone, and skin. She should seek medical attention if she notices any new skin lesions in the area of radiation. The bones are at risk for fractures, so she should inform a provider with any new pains in these areas. The lungs are at risk for fibrosis, restrictive and/or obstructive lung disease. Currently, she is asymptomatic of the above. If she should develop any shortness of breath, hemoptysis, cough, would  consider further evaluation with CT or X-ray. No routine screening for potential lung complications unless symptomatic.    Coping: She is coping well and has no mental health concerns today.     Fatigue. She denies any fatigue concerns today     Sexuality concerns. She has not had issues with vaginal dryness or sexuality concerns.     Risk of lymphedema: Can occur at any time. She will contact the clinic if she notices any swelling in her arm, and will be given a referral to the lymphedema specialists.    Cancer screening. She should undergo routine screening for women in her age group. She will have a Pap and pelvic exam as directed by her primary care provider.     Healthy lifestyle. She should maintain a healthy weight with a BMI between 20-25. She should exercise at least 150 minutes weekly at moderate intensity. She should continue to see her PCP regularly. She should see the eye doctor every 1-2 years, and dentist every 6 months for cleanings. She should not use any tobacco. She should minimize alcohol intake as above.     Greater than 60 minutes was spent with this patient with greater than 45 minutes spent in counseling and coordination of care.    Funmilayo Barrientos PA-C    [facility]  [address] [place] [id number]  [phone]

## 2019-03-01 NOTE — PROGRESS NOTES
"[facility] Dermatology Note    Dermatology Clinic  [facility]  [address] [place] [id number]    Dermatology Problem List:    [facility] patient of Dr. Feroz Murcia  1. Suspected allergic contact dermatitis with id reaction in setting of radiotherapy and use of topical products, including hydrocortisone cream              - Recommended avoidance of all topical products except Vaseline              - Completely resolved after 16-day prednisone taper   - Patch testing scheduled    2. 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       History of Present Illness:  Ms. Keely Velazquez is a 63 year old female who presents as a referral from Dr. Junior in regards to allergic contact dermatitis. Her previous visit within our department was on 12/13/2018 with Dr. Junior as a follow-up to her current dermatitis issue. At that time it was recommended to avoid topical products, including SilvrStat ointment and hydrocortisone, and use only Vaseline for comfort. The previous note mentions that her rash completely resolved with the use of systemic steroids. Today she states she was sent over from oncology due to a \"terrible reaction\" from the radiotherapy. This therapy began ~Oct 2018 and finished Nov 2018. The radiology focus was on the right breast, which is where the reaction emerged. She used vasoline and Aquaphor on the site to keep it moisturized. From there she noticed a burning sensation at the site, and began an over-the-counter 1% hydrocortisone cream regimen. She does not believe there were any antibiotics in this cream. The irritation continued to worsen resulting in blistering/weeping lesions which spread to her armpit and wrists. From there she was prescribed hydrocortisone, " followed by SilvrStat. She states the SilvrStat caused a worsening of her symptoms. The patient notes she is allergic to nickel which causes redness and scaling of the skin it contacts. She cannot wear wool, due to it irritating her shin. In addition, she avoids perfumed creams and products which can irritates her skin. In order to combat this she uses mild soaps and products. No Hx pf hay fever, food alergies, or asthma. Sometimes in the spring (~May timeframe) she will get itchy eyes and headaches. Patient has no other derm concerns at this time.      Past Medical History:   Patient Active Problem List   Diagnosis     Symptomatic varicose veins of both lower extremities     Malignant neoplasm of upper-outer quadrant of right breast in female, estrogen receptor positive (H)     Past Medical History:   Diagnosis Date     Hypertension      Iritis      Past Surgical History:   Procedure Laterality Date     CATARACT IOL, RT/LT Left 2006     HYSTERECTOMY  2016     LUMPECTOMY BREAST WITH SENTINEL NODE, COMBINED Right 8/31/2018    Procedure: COMBINED LUMPECTOMY BREAST WITH SENTINEL NODE;  Right Wire Localized Lumpectomy and Right [place] Lymph Node Biopsy;  Surgeon: Chilango Kruger MD;  Location: [facility] OR     OOPHORECTOMY  2014       Social History:  Patient reports that  has never smoked. she has never used smokeless tobacco. She reports that she drinks alcohol. She reports that she does not use drugs.    Family History:  No family history on file.    Medications:  Current Outpatient Medications   Medication Sig Dispense Refill     acetaminophen (TYLENOL) 325 MG tablet Take 2 tablets (650 mg) by mouth every 4 hours as needed for mild pain 60 tablet 0     AmLODIPine Besylate (NORVASC PO) Take 5 mg by mouth daily       anastrozole (ARIMIDEX) 1 MG tablet Take 1 tablet (1 mg) by mouth daily 30 tablet 11     calcium carbonate (OS-PADDY 500 MG [place]. [place]) 500 MG tablet Take 500 mg by mouth 2 times daily With Magnesium,Zinc        desonide (DESOWEN) 0.05 % cream        doxycycline hyclate (VIBRAMYCIN) 100 MG capsule Take one capsule twice daily for 10 days       HYDROcodone-acetaminophen (NORCO) 5-325 MG per tablet Take 1 tablet by mouth every 6 hours as needed for severe pain 30 tablet 0     LYSINE PO        multivitamin, therapeutic with minerals (MULTI-VITAMIN) TABS Take 1 tablet by mouth daily       Omega-3 Fatty Acids (OMEGA-3 FISH OIL PO)        oxyCODONE IR (ROXICODONE) 5 MG tablet 5-10 mg by mouth every 4 to 6 hours as needed for pain 30 tablet 0     Urea 40 % CREA Apply to fingernails twice daily, especially at bedtime.       VITAMIN D, CHOLECALCIFEROL, PO Take 1,000 Units by mouth daily          Allergies   Allergen Reactions     Codeine Nausea and Vomiting     Macrobid [Nitrofurantoin] Nausea and Vomiting     Sulfa Drugs Itching     Silver Rash     atopic predisposition and possible seasonal allergis with  - rhino conjunctivitis to tree pollen.  - Wool intolerance  - Nickel or metal allergy  - atopic dermatitis around ears and nose sometimes    Review of Systems:  - as per HPI  - Otherwise nml state of health. No other skin concerns.    Physical exam:  Vitals: There were no vitals taken for this visit.  GEN: This is a well developed, well-nourished female in no acute distress, in a pleasant mood.    SKIN: Focused examination of the arms was performed. Reviewed prior photos.  - No lesions today but according to the photos in the 2018 notes there were eczematous lesions on wrists, R axilla, and R breast. This spreading manifested in weeping eczematous lesions.  -No other lesions of concern on areas examined.       Impression/Plan:  1. atopic predisposition and possible seasonal allergis with  - rhino conjunctivitis to tree pollen.  - Wool intolerance  - Nickel or metal allergy  - atopic dermatitis around ears and nose sometimes    2. suspicion for allergic contact dermatitis to to hydrocortisone crea/ Aquaphor/silverstat gel  (preservative , additives, or hyrocortizone)    Patch test to scheduled      Order for PATCH TESTS    [x] Outpatient  [] Inpatient: [facility]..../ Bed ....      Skin Atopy (atopic dermatitis) [x] Yes   [] No  Rhinitis/Sinusitis:   [x] Yes   [] No  Allergic Asthma:   [] Yes   [x] No  Food Allergy:   [] Yes   [x] No  Leg ulcers:   [] Yes   [x] No  Hand eczema:   [] Yes   [x] No   Leading hand:   [x] R   [] L       [] Ambidextrous                        Reason for tests (suspected allergy): possible allergic contact dermatitis to Hydrocortisone cream, Silvrstat or Aquaphor  Known previous allergies: Nickel    Standardized panels  [x] Standard panel (40 tests)  [x] Preservatives & Antimicrobials (31 tests)  [x] Emulsifiers & Additives (25 tests)   [] Perfumes/Flavours & Plants (25 tests)  [] Hairdresser panel (12 tests)  [] Rubber Chemicals (22 tests)  [] Plastics (26 tests)  [] Colorants/Dyes/Food additives (20 tests)  [] Metals (implants/dental) (23 tests)  [] Local anaesthetics/NSAIDs (13 tests)  [x] Antibiotics & Antimycotics (14 tests)   [x] Corticosteroids (15 tests)   [] Photopatch test (32 tests)   [x] others: from metal series add silver      [x] Patient's own products: Aquaphor, Silvrstat and hydrocortisone (OTC and prescription)    DO NOT test if chemical or biological identity is unknown!     always ask from patient the product information and safety sheets (MSDS)     [] Patient needs consultation with Allergy team (changes of tests may apply)  [] Tests discussed with Allergy team (can have direct appointment for patch tests)        Staff Involved:    Scribe Disclosure  I, Zac Call, am serving as a scribe to document services personally performed by Dr. Tano Arias, based on data collection and the provider's statements to me.     I spent a total of 30 min face to face with Keely Velazquez during today's office visit. About 50% of the time was spent counseling the patient and/or coordinating care  regarding their allergy.

## 2019-03-04 ENCOUNTER — ONCOLOGY SURVIVORSHIP VISIT (OUTPATIENT)
Dept: ONCOLOGY | Facility: CLINIC | Age: 64
End: 2019-03-04
Attending: INTERNAL MEDICINE
Payer: COMMERCIAL

## 2019-03-04 ENCOUNTER — ANCILLARY PROCEDURE (OUTPATIENT)
Dept: MAMMOGRAPHY | Facility: CLINIC | Age: 64
End: 2019-03-04
Attending: INTERNAL MEDICINE
Payer: MEDICARE

## 2019-03-04 VITALS
RESPIRATION RATE: 16 BRPM | OXYGEN SATURATION: 96 % | HEIGHT: 68 IN | DIASTOLIC BLOOD PRESSURE: 68 MMHG | BODY MASS INDEX: 24.92 KG/M2 | HEART RATE: 76 BPM | WEIGHT: 164.4 LBS | SYSTOLIC BLOOD PRESSURE: 144 MMHG | TEMPERATURE: 97 F

## 2019-03-04 DIAGNOSIS — C50.311 MALIGNANT NEOPLASM OF LOWER-INNER QUADRANT OF RIGHT BREAST OF FEMALE, ESTROGEN RECEPTOR POSITIVE (H): ICD-10-CM

## 2019-03-04 DIAGNOSIS — Z17.0 MALIGNANT NEOPLASM OF LOWER-INNER QUADRANT OF RIGHT BREAST OF FEMALE, ESTROGEN RECEPTOR POSITIVE (H): ICD-10-CM

## 2019-03-04 DIAGNOSIS — C50.411 MALIGNANT NEOPLASM OF UPPER-OUTER QUADRANT OF RIGHT BREAST IN FEMALE, ESTROGEN RECEPTOR POSITIVE (H): Primary | ICD-10-CM

## 2019-03-04 DIAGNOSIS — Z17.0 MALIGNANT NEOPLASM OF UPPER-OUTER QUADRANT OF RIGHT BREAST IN FEMALE, ESTROGEN RECEPTOR POSITIVE (H): Primary | ICD-10-CM

## 2019-03-04 PROCEDURE — 99215 OFFICE O/P EST HI 40 MIN: CPT | Mod: ZP | Performed by: PHYSICIAN ASSISTANT

## 2019-03-04 PROCEDURE — G0463 HOSPITAL OUTPT CLINIC VISIT: HCPCS | Mod: ZF

## 2019-03-04 ASSESSMENT — MIFFLIN-ST. JEOR: SCORE: 1344.08

## 2019-03-04 ASSESSMENT — PAIN SCALES - GENERAL: PAINLEVEL: NO PAIN (0)

## 2019-03-04 NOTE — LETTER
3/4/2019       RE: Keely Velazquez  [address] [facility], [place] [address]     Dear Colleague,    Thank you for referring your patient, Keely Velazquez, to the [facility]. Please see a copy of my visit note below.    CANCER SURVIVORSHIP VISIT - END OF TREATMENT VISIT  Mar 4, 2019    REASON FOR VISIT: survivorship visit for history of breast cancer    CANCER HISTORY AND TREATMENT:  Ms. Keely Velazquez is a 63 year old female with past medical history of HTN and basal cell carcinoma s/p excision with diagnosis of [id number], grade 1, ER/AR positive , HER-2 nonamplified invasive ductal carcinoma of the right breast. She was diagnosed after having a breast MRI  8/16/18 for history of high breast density. Of note she had a normal screening mammogram with dense breast tissue but no other abnormalities in 6/2018. The MRI showed a nodular to non-mass enhancement measuring 1.6 cm in the R breast at 5:00. There was another area of similar enhancement at 10:00 measuring 1.2 cm and a cyst in the left breast measuring 1.3 cm. Right breast US confirmed a mass at 5:00 but no mass at 10:00. She had a biopsy done of 5:00 mass showing grade 1 invasive ductal carcinoma which strongly positive ER and AR staining and HER-2 nonamplified by IHC. She met with Dr. Kruger who recommended a R breast lumpectomy and SLNB. This was done on 8/31/18. Pathology showed a 1.2 cm, grade 1 invasive mammary carcinoma with associated intermediate grade DCIS. Surgical margins were negative however DCIS was 1 mm from anterior margin. A single sentinel lymph node was negative for malignancy. She had Oncotype DX testing with a recurrence score of 7. She completed adjuvant radiation to the R breast on 11/8/18. She started anastrozole on 9/24/18. She did not have genetic testing or counseling.     INTERVAL HISTORY:   Ms. Velazquez is here alone today and is feeling well. She was anxious to come in for her mammogram but is relieved to hear the mammogram was  normal. She has had no breast concerns. She has no issues with breast achiness or pains after lumpectomy. No lymphedema. She continues to tolerate anastrozole well. No joint stiffness, hot flashes, vaginal dryness. She has good energy and appetite. No cough, SOB, bone or joint pain, headaches, or neuro symptoms. She feels like she has been coping well. She has backed off on exercise and is eating more carbs and has gained some weight. She is planning to start a stricter diet and walking again once the weather improves.     Otherwise, ROS negative for: fevers, headaches, visual changes, new sites of pain, shortness of breath, cough, chest pain, abdominal pain, nausea, vomiting, abnormal vaginal bleeding, or leg swelling.      Current Outpatient Medications   Medication Sig Dispense Refill     acetaminophen (TYLENOL) 325 MG tablet Take 2 tablets (650 mg) by mouth every 4 hours as needed for mild pain 60 tablet 0     AmLODIPine Besylate (NORVASC PO) Take 5 mg by mouth daily       anastrozole (ARIMIDEX) 1 MG tablet Take 1 tablet (1 mg) by mouth daily 30 tablet 11     calcium carbonate (OS-PADDY 500 MG [place]. [place]) 500 MG tablet Take 500 mg by mouth 2 times daily With Magnesium,Zinc       desonide (DESOWEN) 0.05 % cream        doxycycline hyclate (VIBRAMYCIN) 100 MG capsule Take one capsule twice daily for 10 days       HYDROcodone-acetaminophen (NORCO) 5-325 MG per tablet Take 1 tablet by mouth every 6 hours as needed for severe pain 30 tablet 0     LYSINE PO        multivitamin, therapeutic with minerals (MULTI-VITAMIN) TABS Take 1 tablet by mouth daily       Omega-3 Fatty Acids (OMEGA-3 FISH OIL PO)        oxyCODONE IR (ROXICODONE) 5 MG tablet 5-10 mg by mouth every 4 to 6 hours as needed for pain 30 tablet 0     Urea 40 % CREA Apply to fingernails twice daily, especially at bedtime.       VITAMIN D, CHOLECALCIFEROL, PO Take 1,000 Units by mouth daily             Allergies   Allergen Reactions     Codeine Nausea and  "Vomiting     Macrobid [Nitrofurantoin] Nausea and Vomiting     Sulfa Drugs Itching     Silver Rash       PHYSICAL EXAMINATION  /68 (BP Location: Right arm, Patient Position: Sitting, Cuff Size: Adult Regular)   Pulse 76   Temp 97  F (36.1  C) (Oral)   Resp 16   Ht 1.727 m (5' 7.99\")   Wt 74.6 kg (164 lb 6.4 oz)   SpO2 96%   BMI 25.00 kg/m     Constitutional: Alert, oriented female in no visible distress.  Eyes: PERRL. Anicteric sclerae.  ENT/Mouth: OM moist and pink without lesions or thrush.  CV: RRR, no murmurs appreciated.  Resp: CTAB throughout  Breast: S/p R lumpectomy with some fibrosis beneath incision, visible pores, and mild hyperpigmentation. Axillary incision is well-healed. No evidence of lymphedema. Left breast is benign. Bilateral nipples are inverted, stable finding per patient   Abdomen: Soft, non-tender, non-distended. Bowel sounds present. No masses appreciated. No organomegaly noted.  Extremities: No lower extremity edema appreciated.  Skin: Warm, dry. No bruising or petechiae noted.  Lymph: No cervical or supraclavicular lymphadenopathy appreciated.   Neuro: CN II-XII grossly intact.    LABS: None today     Imaging: Bilateral mammogram with leeann--benign findings     IMPRESSION/PLAN:  Keely Velazquez is a 63 year old female with a history of stage IA, G2oK2K6, grade 1, ER/NC positive, HER2 non-amplified invasive mammary carcinoma of the right breast, here for cancer survivorship visit following completion of cancer treatment.    Cancer history. She is s/p lumpectomy with sentinel lymph node procedure followed by adjuvant radiation. She has been on anastrozole since 9/2018. She is currently 6 months out from her lumpectomy. She is feeling well, is tolerating anastrozole well without any adverse effects, and has no clinical evidence of recurrence at this time. Her mammogram today showed no evidence of malignancy. Plan for a 10 year course of anastrozole. She will continue to follow-up with " her oncology team as scheduled, with plan for follow-up every 3 months for the first 2-3 years, every 6 months for years 4-5, and annually thereafter. She will have high-risk imaging screening with alternating with mammogram and breast MRI, so she has imaging every 6 months. Mammogram was done today. Breast MRI will be due in late August/early September 2019.     She was given a treatment plan summary today as well as a survivorship care plan from [facility] via [facility]. I reviewed important lifestyle changes to maintain which have been suggested to reduce breast cancer recurrence including 150 minutes of cardiovascular exercise per week, working toward a normal BMI, adhering to a diet low in saturated fat, taking a daily baby aspirin as well as vitamin D, and reduced alcohol intake a minimal amount (less than 3-4 drinks per week.     Genetic testing. She did not have genetic testing. I would not recommend this at this time given her age at diagnosis and lack of family cancer history.     Aromatase inhibitor side effects. There is a rare risk of cataracts. She should let us or her eye doctor know if there is any blurred vision or change in acuity. There is a risk of increase in lipids so her cholesterol should be monitored through PCP. Main side effects of treatment include menopausal symptoms and joint stiffness as well as loss of bone density, see below.     Risk of developing osteoporosis. She is post- menopausal and is on an aromatase inhibitor. She should continue to have DEXA scans every 2 years, and was recommended to perform weightbearing exercises 2-3 days per week, and to supplement with 1200 mg of calcium, 1000 international unites of vitamin D daily.     Radiation side effects. Radiation would have included the lungs, bone, and skin. She should seek medical attention if she notices any new skin lesions in the area of radiation. The bones are at risk for fractures, so she should inform a  provider with any new pains in these areas. The lungs are at risk for fibrosis, restrictive and/or obstructive lung disease. Currently, she is asymptomatic of the above. If she should develop any shortness of breath, hemoptysis, cough, would consider further evaluation with CT or X-ray. No routine screening for potential lung complications unless symptomatic.    Coping: She is coping well and has no mental health concerns today.     Fatigue. She denies any fatigue concerns today     Sexuality concerns. She has not had issues with vaginal dryness or sexuality concerns.     Risk of lymphedema: Can occur at any time. She will contact the clinic if she notices any swelling in her arm, and will be given a referral to the lymphedema specialists.    Cancer screening. She should undergo routine screening for women in her age group. She will have a Pap and pelvic exam as directed by her primary care provider.     Healthy lifestyle. She should maintain a healthy weight with a BMI between 20-25. She should exercise at least 150 minutes weekly at moderate intensity. She should continue to see her PCP regularly. She should see the eye doctor every 1-2 years, and dentist every 6 months for cleanings. She should not use any tobacco. She should minimize alcohol intake as above.     Greater than 60 minutes was spent with this patient with greater than 45 minutes spent in counseling and coordination of care.    Funmilayo Barrientos PA-C    [facility] [address] [id number] [phone]                  Again, thank you for allowing me to participate in the care of your patient.      Sincerely,    Funmilayo Barrientos PA-C

## 2019-03-04 NOTE — NURSING NOTE
"Oncology Rooming Note    March 4, 2019 10:50 AM   Keely Velazquez is a 63 year old female who presents for:    Chief Complaint   Patient presents with     RECHECK     ONC survivorship visit     Initial Vitals: /68 (BP Location: Right arm, Patient Position: Sitting, Cuff Size: Adult Regular)   Pulse 76   Temp 97  F (36.1  C) (Oral)   Resp 16   Ht 1.727 m (5' 7.99\")   Wt 74.6 kg (164 lb 6.4 oz)   SpO2 96%   BMI 25.00 kg/m   Estimated body mass index is 25 kg/m  as calculated from the following:    Height as of this encounter: 1.727 m (5' 7.99\").    Weight as of this encounter: 74.6 kg (164 lb 6.4 oz). Body surface area is 1.89 meters squared.  No Pain (0) Comment: Data Unavailable   No LMP recorded. Patient is postmenopausal.  Allergies reviewed: Yes  Medications reviewed: Yes    Medications: Medication refills not needed today.  Pharmacy name entered into [facility]:    [id number] [place] [facility] - [place], [place] - [address] [place] [address] [facility] - [place], [place] - [address] [facility]    Clinical concerns: none        Renetta Eduardo, CMA              "

## 2019-03-11 ENCOUNTER — OFFICE VISIT (OUTPATIENT)
Dept: DERMATOLOGY | Facility: CLINIC | Age: 64
End: 2019-03-11
Payer: MEDICARE

## 2019-03-11 DIAGNOSIS — L23.89 ALLERGIC CONTACT DERMATITIS DUE TO OTHER AGENTS: Primary | ICD-10-CM

## 2019-03-11 ASSESSMENT — PAIN SCALES - GENERAL: PAINLEVEL: NO PAIN (0)

## 2019-03-11 NOTE — PROGRESS NOTES
Patient had 126 patch tests placed this visit.    Standard panel (40 tests)    Preservatives & Antimicrobials (31 tests)    Emulsifiers & Additives (25 tests)     Metals (implants/dental) (1 tests)    Antibiotics & Antimycotics (14 tests)     Corticosteroids (15 tests)       Reviewed by Bonita Bravo LPN

## 2019-03-11 NOTE — PROGRESS NOTES
Patient had 132 patch tests placed this visit.    Standard panel (40 tests)    Preservatives & Antimicrobials (31 tests)    Emulsifiers & Additives (25 tests)     Antibiotics & Antimycotics (14 tests)     Corticosteroids (15 tests)     Metal (1 test)    Pt own(6 test)

## 2019-03-11 NOTE — LETTER
"3/11/2019       RE: Keely Velazquez  [address]  [facility] [address]     Dear Colleague,    Thank you for referring your patient, Keely Velazquez, to the [facility] DERMATOLOGY at [facility]. Please see a copy of my visit note below.    [facility] Dermatology Note      Dermatology Problem List:  [facility] patient of Dr. Feroz Murcia  1. Suspected allergic contact dermatitis with id reaction in setting of radiotherapy and use of topical products, including hydrocortisone cream              - Recommended avoidance of all topical products except Vaseline              - Completely resolved after 16-day prednisone taper              - Patch testing beginning 3/11/19     2. 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 Keely Velazquez is a 63 year old female who presents as a follow-up for patch testing. The patient was last seen 3/1/19 when she was evaluated for ACD. Pt states she has been doing well since last seen 3/1. She doesn't have any new issues or questions. She brought several home products with her that were used around the time of her outbreak.   Pt otherwise feels well without any changes in general state of health. Denies any new, growing, changing, bleeding, or otherwise concerning/symptomatic skin findings. No other questions or concerns today.     From initial evaluation 3/1:  \"Ms. Keely Velazquez is a 63 year old female who presents as a referral from Dr. Junior in regards to allergic contact dermatitis. Her previous visit within our department was on 12/13/2018 with Dr. Junior as a follow-up to her current dermatitis issue. At that time it was recommended to avoid topical products, including SilvrStat ointment and hydrocortisone, and " "use only Vaseline for comfort. The previous note mentions that her rash completely resolved with the use of systemic steroids. Today she states she was sent over from oncology due to a \"terrible reaction\" from the radiotherapy. This therapy began ~Oct 2018 and finished Nov 2018. The radiology focus was on the right breast, which is where the reaction emerged. She used vasoline and Aquaphor on the site to keep it moisturized. From there she noticed a burning sensation at the site, and began an over-the-counter 1% hydrocortisone cream regimen. She does not believe there were any antibiotics in this cream. The irritation continued to worsen resulting in blistering/weeping lesions which spread to her armpit and wrists. From there she was prescribed hydrocortisone, followed by SilvrStat. She states the SilvrStat caused a worsening of her symptoms. The patient notes she is allergic to nickel which causes redness and scaling of the skin it contacts. She cannot wear wool, due to it irritating her shin. In addition, she avoids perfumed creams and products which can irritates her skin. In order to combat this she uses mild soaps and products. No Hx pf hay fever, food alergies, or asthma. Sometimes in the spring (~May timeframe) she will get itchy eyes and headaches. Patient has no other derm concerns at this time.\"       Past Medical History:   Patient Active Problem List   Diagnosis     Symptomatic varicose veins of both lower extremities     Malignant neoplasm of upper-outer quadrant of right breast in female, estrogen receptor positive (H)     Past Medical History:   Diagnosis Date     Hypertension      Iritis      Past Surgical History:   Procedure Laterality Date     CATARACT IOL, RT/LT Left 2006     HYSTERECTOMY  2016     LUMPECTOMY BREAST WITH SENTINEL NODE, COMBINED Right 8/31/2018    Procedure: COMBINED LUMPECTOMY BREAST WITH SENTINEL NODE;  Right Wire Localized Lumpectomy and Right [place] Lymph Node Biopsy;  " Surgeon: Chilango Kruger MD;  Location:  OR     OOPHORECTOMY  2014       Social History:  Patient reports that  has never smoked. she has never used smokeless tobacco. She reports that she drinks alcohol. She reports that she does not use drugs.    Family History:  No family history on file.    Medications:  Current Outpatient Medications   Medication Sig Dispense Refill     acetaminophen (TYLENOL) 325 MG tablet Take 2 tablets (650 mg) by mouth every 4 hours as needed for mild pain 60 tablet 0     AmLODIPine Besylate (NORVASC PO) Take 5 mg by mouth daily       anastrozole (ARIMIDEX) 1 MG tablet Take 1 tablet (1 mg) by mouth daily 30 tablet 11     calcium carbonate (OS-PADDY 500 MG Inupiat. CA) 500 MG tablet Take 500 mg by mouth 2 times daily With Magnesium,Zinc       desonide (DESOWEN) 0.05 % cream        doxycycline hyclate (VIBRAMYCIN) 100 MG capsule Take one capsule twice daily for 10 days       LYSINE PO        multivitamin, therapeutic with minerals (MULTI-VITAMIN) TABS Take 1 tablet by mouth daily       Omega-3 Fatty Acids (OMEGA-3 FISH OIL PO)        oxyCODONE IR (ROXICODONE) 5 MG tablet 5-10 mg by mouth every 4 to 6 hours as needed for pain 30 tablet 0     Urea 40 % CREA Apply to fingernails twice daily, especially at bedtime.       VITAMIN D, CHOLECALCIFEROL, PO Take 1,000 Units by mouth daily        HYDROcodone-acetaminophen (NORCO) 5-325 MG per tablet Take 1 tablet by mouth every 6 hours as needed for severe pain (Patient not taking: Reported on 3/4/2019) 30 tablet 0        Allergies   Allergen Reactions     Codeine Nausea and Vomiting     Macrobid [Nitrofurantoin] Nausea and Vomiting     Sulfa Drugs Itching     Silver Rash         Review of Systems:  -As per HPI  -Constitutional: Otherwise feeling well today, in usual state of health.  -HEENT: Patient denies nonhealing oral sores.  -Skin: As above in HPI. No additional skin concerns.    Physical exam:  Vitals: There were no vitals taken for this  visit.  GEN: This is a well developed, well-nourished female in no acute distress, in a pleasant mood.    SKIN: Focused examination of the face, neck, arms, back was performed.  - no lesions today but according to the photos in the 2018 notes there were eczematous lesions on wrists, R axilla, and R breast. This spreading manifested in weeping eczematous lesions.  - no other lesions of concern on areas examined.     Impression/Plan:    1. Atopic predisposition and possible seasonal allergis with  - rhino conjunctivitis to tree pollen.  - Wool intolerance  - Nickel or metal allergy  - atopic dermatitis around ears and nose sometimes     2. Suspicion for allergic contact dermatitis to to hydrocortisone cream/ Aquaphor/silverstat gel (preservative , additives, or hyrocortizone)    Patch testing today as below        Order for PATCH TESTS     [x] Outpatient                          [] Inpatient: [facility]..../ Bed ....                    Skin Atopy (atopic dermatitis)        [x] Yes   [] No  Rhinitis/Sinusitis:                              [x] Yes   [] No  Allergic Asthma:                                [] Yes   [x] No  Food Allergy:                                     [] Yes   [x] No  Leg ulcers:                                         [] Yes   [x] No  Hand eczema:                                    [] Yes   [x] No                                 Leading hand:   [x] R   [] L       [] Ambidextrous                         Reason for tests (suspected allergy): possible allergic contact dermatitis to Hydrocortisone cream, Silvrstat or Aquaphor  Known previous allergies: Nickel     Standardized panels  [x] Standard panel (40 tests)  [x] Preservatives & Antimicrobials (31 tests)  [x] Emulsifiers & Additives (25 tests)   [] Perfumes/Flavours & Plants (25 tests)  [] Hairdresser panel (12 tests)  [] Rubber Chemicals (22 tests)  [] Plastics (26 tests)  [] Colorants/Dyes/Food additives (20 tests)  [] Metals (implants/dental) (23  tests)  [] Local anaesthetics/NSAIDs (13 tests)  [x] Antibiotics & Antimycotics (14 tests)   [x] Corticosteroids (15 tests)   [] Photopatch test (32 tests)   [x] others: from metal series add silver                         [x] Patient's own products: Aquaphor, Silvrstat, Hydrocortisone valerate, [place] cream, Hydrocortisone cream with aloe, Aquaphor, Aloe vera 100% gel    DO NOT test if chemical or biological identity is unknown!     always ask from patient the product information and safety sheets (MSDS)      [] Patient needs consultation with Allergy team (changes of tests may apply)  [] Tests discussed with Allergy team (can have direct appointment for patch tests)    RESULTS & EVALUATION of PATCH TESTS    Patch test readings after     [] 2 days, [] 3 days [] 4 days, [] 5 days,   Other duration: ...    Applied patch tests with results (import here the list of patch tests):  Date/time of application:03/11/2019  Physician/Nurse:  / Chanel Phipps, [facility]               Localization of application: Back    STANDARD Series         No Substance 2 days 4 days remarks   1 Regino Mix [C] - -    2 Colophony - -    3  2-Mercaptobenzothiazole  - -     4 Methylisothiazolinone - -    5 Carba Mix - -    6 Thiuram Mix [A] - -    7 Bisphenol A Epoxy Resin - -    8 J-Snvv-Rizxyqkndto-Formaldehyde Resin - -    9 Mercapto Mix [A] - -    10 Black Rubber Mix- PPD [B] - -    11 Potassium Dichromate  -  -    12 Balsam of [place] (Myroxylon Pereirae Resin) - -    13 Nickel Sulphate Hexahydrate - -    14 Mixed Dialkyl Thiourea - -    15 Paraben Mix [B] - -    16 Methyldibromo Glutaronitrile - -    17 Fragrance Mix - -    18 2-Bromo-2-Nitropropane-1,3-Diol (Bronopol) - -    19 Lyral - -    20 Tixocortol-21- Pivalate - -    21 Diazolidiyl Urea (Germall II) - -    22 Methyl Methacrylate - -    23 Cobalt (II) Chloride Hexahydrate - -    24 Fragrance Mix II  - -    25 Compositae Mix - -    26 Benzoyl Peroxide - -    27 Bacitracin - -     28 Formaldehyde - -    29 Methylchloroisothiazolinone / Methylisothiazolinone - -    30 Corticosteroid Mix - -    31 Sodium Lauryl Sulfate - -    32 Lanolin Alcohol - -    33 Turpentine - -    34 Cetylstearylalcohol - -    35 Chlorhexidine Dicluconate - -    36 Budenoside - -    37 Imidazolidinyl Urea  - -    38 Ethyl-2 Cyanoacrylate - -    39 Quaternium 15 (Dowicil 200) - -    40 Decyl Glucoside - -      EMULSIFIERS & ADDITIVES        No Substance 2 days 4 days remarks   41 Polyethylene Glycol-400 - -    42 Cocamidopropyl Betaine - -    43 Amerchol L101 - -    44 Propylene Glycol - -    45 Triethanolamine - -    46 Sorbitane Sesquiolate - -    47 Isopropylmyristate - -    48 Polysorbate 80  - -    49 Amidoamine   (Stearamidopropyl Dimethylamine) - -    50 Oleamidopropyl Dimethylamine - -    51 Lauryl Glucoside - -    52 Coconut Diethanolamide  - -    53 2-Hydroxy-4-Methoxy Benzophenone (Oxybenzone) - -    54 Benzophenone-4 (Sulisobenzon) - -    55 Propolis - -    56 Dexpanthenol - -    57 Carboxymethyl Cellulose Sodium - -    58 Abitol - -    59 Tert-Butylhydroquinone - -    60 Benzyl Salicylate - -     Antioxidant      61 Dodecyl Gallate - -    62 Butylhydroxyanisole (BHA) - -    63 Butylhydroxytoluene (BHT) - -    64 Di-Alpha-Tocopherol (Vit E) - -    65 Propyl Gallate - -      CORTICOSTEROIDS    No Substance 2 days 4 days remarks Allergy  class   66 Amcinonide - -  B   67 Betametasone-17,21 Dipropionate - -  D1   68 Desoximetasone - -  C   69 Betamethasone-17-Valerate - -  D1   70 Dexamethasone - -  C   71 Hydrocortisone - -  A   72 Clobetasol-17-Propionate - -  D1   73 Dexamethasone-21-Phosphate Disodium Salt - -  C   74 Hydrocortisone-17 Butyrate - -  D2   75 Prednisolone - -  A   76 Mometason Furoate - -  D1   77 Triamcinolone Acetonide - -  B   78 Methylprednisolone Aceponate - -  D2   79 Hydrocortisone-21-Acetate - -  A   80 Prednicarbate - -  D2       Group Characteristics of group Generic name Name   cross reactions   A Hydrocortisone   Cloprednole, Fludrocortisone acétate, Hydrocortison acetate, Methylprednisolone, Prednisolone, Tixocortolpivalate Alfacortone, Fucidin H, Dermacalm, Hexacortone, Premandole, Imacort With group D2   B Triamcinolone-acetonide   Budenoside (R-isomer), Amcinonide, Desonide, Fluocinolone acetonide, Triamcinolone acetonide Locapred, Locatop  Synalar, Pevisone, [place] -   C Betamethasone (Without Radha)   Betamethasone, Dexamethasone, Flumethasone pivalate, Halomethasone Daivobet, Dexasalyl, Locasalen,   -   D1 Betamethasone-diproprionate   Betamethasone dipropionate, Betamethasone-17-valerate, Clobetasole-propionate, Fluticasone propionate, Mometasone furoate Betnovate, Diprogenta, Diprosalic, Diprosone, Celestoderm, Fucicort,  Cutivate, Axotide, Elocom -   D2 Methylprednisolone-aceponate   Hydrocortisone-aceponate, Hydrocortisone-buteprate, Hydrocortisone-17-butyrate, Methylprednisolone aceponate, Prednicarbate Locoïd, Advantan,  Prednitop With group A and Budesonide (S-isomer)     ANTIBIOTICS & ANTIMYCOTICS         No Substance 2 days 4 days remarks   81 Erythromycine - -    82 Framycetine Sulphate - -    83 Fusidic Acid Sodium Salt - -    84 Gentamicin Sulphate - -    85 Neomycine Sulphate - -    86 Oxytetracycline  - -    87 Polymyxin B Sulphate - -    88 Tetracycline-HCL - -    89 Sulfanilamide - -    90 Metronidazole - -    91 Oxyquinoline Mix - -    92 Nitrofurazone - -    93 Nystatin - -    94 Clotrimazole - -        PRESERVATIVES & ANTIMICROBIALS         No Substance 2 days 4 days remarks   95  1,2-Benzisothiazoline-3-One, Sodium Salt - -    96  1,3,5-Ryder (2-Hydroxyethyl) - Hexahydrotriazine (Grotan BK) - -    97 6-Laskcpdtpojod-8-Nitro-1, 3-Propanediol - -    98  3, 4, 4' - Triclocarban - -    99 4 - Chloro - 3 - Cresol - -    100 4 - Chloro - 4 - Xylenol (PCMX) - -    101 7-Ethylbicyclooxazolidine ([facility] [id number]) - -    102 Benzalkonium Chloride - -    103 Benzyl Alcohol  "- -    104 Cetalkonium Chloride - -    105 Cetylpyrimidine Chloride  - -    106 Chloroacetamide - -    107 DMDM Hydantoin - -    108 Glutaraldehyde - -    109 Triclosan - -    110 Glyoxal Trimeric Dihydrate - -    111 Iodopropynyl Butylcarbamate - -    112 Octylisothiazoline - -    113 Iodoform - -    114 (Nitrobutyl) Morpholine/(Ethylnitro-Trimethylene) Dimorpholine (Bioban P 1487) - -    115 Phenoxyethanol - -    116 Phenyl Salicylate - -    117 Povidone Iodine - -    118 Sodium Benzoate - -    119 Sodium Disulfite - -    120 Sorbic Acid - -    121 Thimerosal - -     Parabens      122 Butyl-P-Hydroxybenzoate - -    123 Ethyl-P-Hydroxybenzoate - -    124 Methyl-P-Hydroxybenzoate - -    125 Propyl-P-Hydroxybenzoate - -      METALS (Implants / Dental)         No Substance 2 days 4 days remarks   126 Silver Nitrate - -      Patients own:  7 products      PATIENTS OWN PRODUCTS         No Substance Conc  % solv 2 days 4 days remarks   127 Aquaphor        128 Aloe Vera        129 Candendura cream        130 Hydrocortison valerate        131 Hydrocortisone cream with Aloe        132 Silvrstat                                   1           Patients own products:  è DO NOT test if chemical or biological identity is unknown!   - always ask from patient the product information and safety sheets and consult with the physician   - check neutral pH with pH indicator paper (do not test pH below 5 or over 8 or consult with physician)  - leave-on cosmetics can be tested \"as is\"  - rinse-off products have to be diluted with water, buffer, olive oil or paraffin (discuss with physician)  à remember:   - non-specific toxic/corrosive or biological reactions can occur  - tests with patients own products are not standardized and test conditions are not optimized for patch tests. Whenever possible test with standardized test series and correlate use of product with the result of the patch tests  - if not sure if compounds can be tested " under occlusion in patch tests consider open application tests       Results of patch tests:                         Interpretation:    - Negative                    A    = Allergic      (+) Erythema    TI   = Toxic/irritant   + E + Infiltration    RaP = Relevance at Present     ++ E/I + Papulovesicle   Rpr  = Relevance Previously     +++ E/I/P + Blister     nR   = No Relevance      [] No relevant allergic reaction observed    [] Allergic reaction diagnosed against: see later      Interpretation/ remarks:   See later    [] Patient information given   [] ACSD information   [] [employer] information    ==> final Diagnosis:  See later    ==> Treatment prescribed/Plan:  See later      These conclusions are made at the best of ones knowledge and belief  based on the provided evidence such as patients history and allergy test results and they can change over time or can be incomplete because of missing informations.    CC Referred MD Raymond  No address on file on close of this encounter.  Follow-up Wednesday.     Staff Physician Comments:  I saw and evaluated the patient with the resident and I agree with the assessment and plan as documented in the resident's note.    I spent a total of 20 min face to face with Keely Velazquez during today's office visit. About 50% of the time was spent counseling the patient and/or coordinating care regarding their allergy.      Staff Involved:  Resident(Denton Camilo)/Staff      Patient had 132 patch tests placed this visit.    Standard panel (40 tests)    Preservatives & Antimicrobials (31 tests)    Emulsifiers & Additives (25 tests)     Antibiotics & Antimycotics (14 tests)     Corticosteroids (15 tests)     Metal (1 test)    Pt own(6 test)      Again, thank you for allowing me to participate in the care of your patient.      Sincerely,    Tano Arias MD

## 2019-03-11 NOTE — PROGRESS NOTES
"[facility] D[facility]atology Note      Dermatology Problem List:  [facility] patient of Dr. Feroz Murcia  1. Suspected allergic contact dermatitis with id reaction in setting of radiotherapy and use of topical products, including hydrocortisone cream              - Recommended avoidance of all topical products except Vaseline              - Completely resolved after 16-day prednisone taper              - Patch testing beginning 3/11/19     2. 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 Keely Velazquez is a 63 year old female who presents as a follow-up for patch testing. The patient was last seen 3/1/19 when she was evaluated for ACD. Pt states she has been doing well since last seen 3/1. She doesn't have any new issues or questions. She brought several home products with her that were used around the time of her outbreak.   Pt otherwise feels well without any changes in general state of health. Denies any new, growing, changing, bleeding, or otherwise concerning/symptomatic skin findings. No other questions or concerns today.     From initial evaluation 3/1:  \"Ms. Keely Velaqzuez is a 63 year old female who presents as a referral from Dr. Junior in regards to allergic contact dermatitis. Her previous visit within our department was on 12/13/2018 with Dr. Junior as a follow-up to her current dermatitis issue. At that time it was recommended to avoid topical products, including SilvrStat ointment and hydrocortisone, and use only Vaseline for comfort. The previous note mentions that her rash completely resolved with the use of systemic steroids. Today she states she was sent over from oncology due to a \"terrible reaction\" from the radiotherapy. This therapy began ~Oct 2018 and finished Nov " "2018. The radiology focus was on the right breast, which is where the reaction emerged. She used vasoline and Aquaphor on the site to keep it moisturized. From there she noticed a burning sensation at the site, and began an over-the-counter 1% hydrocortisone cream regimen. She does not believe there were any antibiotics in this cream. The irritation continued to worsen resulting in blistering/weeping lesions which spread to her armpit and wrists. From there she was prescribed hydrocortisone, followed by SilvrStat. She states the SilvrStat caused a worsening of her symptoms. The patient notes she is allergic to nickel which causes redness and scaling of the skin it contacts. She cannot wear wool, due to it irritating her shin. In addition, she avoids perfumed creams and products which can irritates her skin. In order to combat this she uses mild soaps and products. No Hx pf hay fever, food alergies, or asthma. Sometimes in the spring (~May timeframe) she will get itchy eyes and headaches. Patient has no other derm concerns at this time.\"       Past Medical History:   Patient Active Problem List   Diagnosis     Symptomatic varicose veins of both lower extremities     Malignant neoplasm of upper-outer quadrant of right breast in female, estrogen receptor positive (H)     Past Medical History:   Diagnosis Date     Hypertension      Iritis      Past Surgical History:   Procedure Laterality Date     CATARACT IOL, RT/LT Left 2006     HYSTERECTOMY  2016     LUMPECTOMY BREAST WITH SENTINEL NODE, COMBINED Right 8/31/2018    Procedure: COMBINED LUMPECTOMY BREAST WITH SENTINEL NODE;  Right Wire Localized Lumpectomy and Right [place] Lymph Node Biopsy;  Surgeon: Chilango Kruger MD;  Location: [facility] OR     OOPHORECTOMY  2014       Social History:  Patient reports that  has never smoked. she has never used smokeless tobacco. She reports that she drinks alcohol. She reports that she does not use drugs.    Family History:  No family " history on file.    Medications:  Current Outpatient Medications   Medication Sig Dispense Refill     acetaminophen (TYLENOL) 325 MG tablet Take 2 tablets (650 mg) by mouth every 4 hours as needed for mild pain 60 tablet 0     AmLODIPine Besylate (NORVASC PO) Take 5 mg by mouth daily       anastrozole (ARIMIDEX) 1 MG tablet Take 1 tablet (1 mg) by mouth daily 30 tablet 11     calcium carbonate (OS-PADDY 500 MG [place]) 500 MG tablet Take 500 mg by mouth 2 times daily With Magnesium,Zinc       desonide (DESOWEN) 0.05 % cream        doxycycline hyclate (VIBRAMYCIN) 100 MG capsule Take one capsule twice daily for 10 days       LYSINE PO        multivitamin, therapeutic with minerals (MULTI-VITAMIN) TABS Take 1 tablet by mouth daily       Omega-3 Fatty Acids (OMEGA-3 FISH OIL PO)        oxyCODONE IR (ROXICODONE) 5 MG tablet 5-10 mg by mouth every 4 to 6 hours as needed for pain 30 tablet 0     Urea 40 % CREA Apply to fingernails twice daily, especially at bedtime.       VITAMIN D, CHOLECALCIFEROL, PO Take 1,000 Units by mouth daily        HYDROcodone-acetaminophen (NORCO) 5-325 MG per tablet Take 1 tablet by mouth every 6 hours as needed for severe pain (Patient not taking: Reported on 3/4/2019) 30 tablet 0        Allergies   Allergen Reactions     Codeine Nausea and Vomiting     Macrobid [Nitrofurantoin] Nausea and Vomiting     Sulfa Drugs Itching     Silver Rash         Review of Systems:  -As per HPI  -Constitutional: Otherwise feeling well today, in usual state of health.  -HEENT: Patient denies nonhealing oral sores.  -Skin: As above in HPI. No additional skin concerns.    Physical exam:  Vitals: There were no vitals taken for this visit.  GEN: This is a well developed, well-nourished female in no acute distress, in a pleasant mood.    SKIN: Focused examination of the face, neck, arms, back was performed.  - no lesions today but according to the photos in the 2018 notes there were eczematous lesions on wrists, R  axilla, and R breast. This spreading manifested in weeping eczematous lesions.  - no other lesions of concern on areas examined.     Impression/Plan:    1. Atopic predisposition and possible seasonal allergis with  - rhino conjunctivitis to tree pollen.  - Wool intolerance  - Nickel or metal allergy  - atopic dermatitis around ears and nose sometimes     2. Suspicion for allergic contact dermatitis to to hydrocortisone cream/ Aquaphor/silverstat gel (preservative , additives, or hyrocortizone)    Patch testing today as below        Order for PATCH TESTS     [x] Outpatient                          [] Inpatient: [facility]..../ Bed ....                    Skin Atopy (atopic dermatitis)        [x] Yes   [] No  Rhinitis/Sinusitis:                              [x] Yes   [] No  Allergic Asthma:                                [] Yes   [x] No  Food Allergy:                                     [] Yes   [x] No  Leg ulcers:                                         [] Yes   [x] No  Hand eczema:                                    [] Yes   [x] No                                 Leading hand:   [x] R   [] L       [] Ambidextrous                         Reason for tests (suspected allergy): possible allergic contact dermatitis to Hydrocortisone cream, Silvrstat or Aquaphor  Known previous allergies: Nickel     Standardized panels  [x] Standard panel (40 tests)  [x] Preservatives & Antimicrobials (31 tests)  [x] Emulsifiers & Additives (25 tests)   [] Perfumes/Flavours & Plants (25 tests)  [] Hairdresser panel (12 tests)  [] Rubber Chemicals (22 tests)  [] Plastics (26 tests)  [] Colorants/Dyes/Food additives (20 tests)  [] Metals (implants/dental) (23 tests)  [] Local anaesthetics/NSAIDs (13 tests)  [x] Antibiotics & Antimycotics (14 tests)   [x] Corticosteroids (15 tests)   [] Photopatch test (32 tests)   [x] others: from metal series add silver                         [x] Patient's own products: Aquaphor, Silvrstat, Hydrocortisone  valerate, [place] cream, Hydrocortisone cream with aloe, Aquaphor, Aloe vera 100% gel    DO NOT test if chemical or biological identity is unknown!     always ask from patient the product information and safety sheets (MSDS)      [] Patient needs consultation with Allergy team (changes of tests may apply)  [] Tests discussed with Allergy team (can have direct appointment for patch tests)    RESULTS & EVALUATION of PATCH TESTS    Patch test readings after     [] 2 days, [] 3 days [] 4 days, [] 5 days,   Other duration: ...    Applied patch tests with results (import here the list of patch tests):  Date/time of application:03/11/2019  Physician/Nurse:  / Chanel Phipps, [facility]               Localization of application: Back    STANDARD Series         No Substance 2 days 4 days remarks   1 Regino Mix [C] - -    2 Colophony - -    3  2-Mercaptobenzothiazole  - -     4 Methylisothiazolinone - -    5 Carba Mix - -    6 Thiuram Mix [A] - -    7 Bisphenol A Epoxy Resin - -    8 N-Untn-Bekpnyjwpgt-Formaldehyde Resin - -    9 Mercapto Mix [A] - -    10 Black Rubber Mix- PPD [B] - -    11 Potassium Dichromate  -  -    12 Balsam of [place] (Myroxylon Pereirae Resin) - -    13 Nickel Sulphate Hexahydrate - -    14 Mixed Dialkyl Thiourea - -    15 Paraben Mix [B] - -    16 Methyldibromo Glutaronitrile - -    17 Fragrance Mix - -    18 2-Bromo-2-Nitropropane-1,3-Diol (Bronopol) - -    19 Lyral - -    20 Tixocortol-21- Pivalate - -    21 Diazolidiyl Urea (Germall II) - -    22 Methyl Methacrylate - -    23 Cobalt (II) Chloride Hexahydrate - -    24 Fragrance Mix II  - -    25 Compositae Mix - -    26 Benzoyl Peroxide - -    27 Bacitracin - -    28 Formaldehyde - -    29 Methylchloroisothiazolinone / Methylisothiazolinone - -    30 Corticosteroid Mix - -    31 Sodium Lauryl Sulfate - -    32 Lanolin Alcohol - -    33 Turpentine - -    34 Cetylstearylalcohol - -    35 Chlorhexidine Dicluconate - -    36 Budenoside - -    37  Imidazolidinyl Urea  - -    38 Ethyl-2 Cyanoacrylate - -    39 Quaternium 15 (Dowicil 200) - -    40 Decyl Glucoside - -      EMULSIFIERS & ADDITIVES        No Substance 2 days 4 days remarks   41 Polyethylene Glycol-400 - -    42 Cocamidopropyl Betaine - -    43 Amerchol L101 - -    44 Propylene Glycol - -    45 Triethanolamine - -    46 Sorbitane Sesquiolate - -    47 Isopropylmyristate - -    48 Polysorbate 80  - -    49 Amidoamine   (Stearamidopropyl Dimethylamine) - -    50 Oleamidopropyl Dimethylamine - -    51 Lauryl Glucoside - -    52 Coconut Diethanolamide  - -    53 2-Hydroxy-4-Methoxy Benzophenone (Oxybenzone) - -    54 Benzophenone-4 (Sulisobenzon) - -    55 Propolis - -    56 Dexpanthenol - -    57 Carboxymethyl Cellulose Sodium - -    58 Abitol - -    59 Tert-Butylhydroquinone - -    60 Benzyl Salicylate - -     Antioxidant      61 Dodecyl Gallate - -    62 Butylhydroxyanisole (BHA) - -    63 Butylhydroxytoluene (BHT) - -    64 Di-Alpha-Tocopherol (Vit E) - -    65 Propyl Gallate - -      CORTICOSTEROIDS    No Substance 2 days 4 days remarks Allergy  class   66 Amcinonide - -  B   67 Betametasone-17,21 Dipropionate - -  D1   68 Desoximetasone - -  C   69 Betamethasone-17-Valerate - -  D1   70 Dexamethasone - -  C   71 Hydrocortisone - -  A   72 Clobetasol-17-Propionate - -  D1   73 Dexamethasone-21-Phosphate Disodium Salt - -  C   74 Hydrocortisone-17 Butyrate - -  D2   75 Prednisolone - -  A   76 Mometason Furoate - -  D1   77 Triamcinolone Acetonide - -  B   78 Methylprednisolone Aceponate - -  D2   79 Hydrocortisone-21-Acetate - -  A   80 Prednicarbate - -  D2       Group Characteristics of group Generic name Name  cross reactions   A Hydrocortisone   Cloprednole, Fludrocortisone acétate, Hydrocortison acetate, Methylprednisolone, Prednisolone, Tixocortolpivalate Alfacortone, Fucidin H, Dermacalm, Hexacortone, Premandole, Imacort With group D2   B Triamcinolone-acetonide   Budenoside (R-isomer),  Amcinonide, Desonide, Fluocinolone acetonide, Triamcinolone acetonide Locapred, Locatop  Synalar, Pevisone, [place]ort -   C Betamethasone (Without Radha)   Betamethasone, Dexamethasone, Flumethasone pivalate, Halomethasone Daivobet, Dexasalyl, Locasalen,   -   D1 Betamethasone-diproprionate   Betamethasone dipropionate, Betamethasone-17-valerate, Clobetasole-propionate, Fluticasone propionate, Mometasone furoate Betnovate, Diprogenta, Diprosalic, Diprosone, Celestoderm, Fucicort,  Cutivate, Axotide, Elocom -   D2 Methylprednisolone-aceponate   Hydrocortisone-aceponate, Hydrocortisone-buteprate, Hydrocortisone-17-butyrate, Methylprednisolone aceponate, Prednicarbate Locoïd, Advantan,  Prednitop With group A and Budesonide (S-isomer)     ANTIBIOTICS & ANTIMYCOTICS         No Substance 2 days 4 days remarks   81 Erythromycine - -    82 Framycetine Sulphate - -    83 Fusidic Acid Sodium Salt - -    84 Gentamicin Sulphate - -    85 Neomycine Sulphate - -    86 Oxytetracycline  - -    87 Polymyxin B Sulphate - -    88 Tetracycline-HCL - -    89 Sulfanilamide - -    90 Metronidazole - -    91 Oxyquinoline Mix - -    92 Nitrofurazone - -    93 Nystatin - -    94 Clotrimazole - -        PRESERVATIVES & ANTIMICROBIALS         No Substance 2 days 4 days remarks   95  1,2-Benzisothiazoline-3-One, Sodium Salt - -    96  1,3,5-Ryder (2-Hydroxyethyl) - Hexahydrotriazine (Grotan BK) - -    97 9-Axktonwktsraj-7-Nitro-1, 3-Propanediol - -    98  3, 4, 4' - Triclocarban - -    99 4 - Chloro - 3 - Cresol - -    100 4 - Chloro - 4 - Xylenol (PCMX) - -    101 7-Ethylbicyclooxazolidine (Bioban BC2123) - -    102 Benzalkonium Chloride - -    103 Benzyl Alcohol - -    104 Cetalkonium Chloride - -    105 Cetylpyrimidine Chloride  - -    106 Chloroacetamide - -    107 DMDM Hydantoin - -    108 Glutaraldehyde - -    109 Triclosan - -    110 Glyoxal Trimeric Dihydrate - -    111 Iodopropynyl Butylcarbamate - -    112 Octylisothiazoline - -    113  "Iodoform - -    114 (Nitrobutyl) Morpholine/(Ethylnitro-Trimethylene) Dimorpholine (Bioban P 1487) - -    115 Phenoxyethanol - -    116 Phenyl Salicylate - -    117 Povidone Iodine - -    118 Sodium Benzoate - -    119 Sodium Disulfite - -    120 Sorbic Acid - -    121 Thimerosal - -     Parabens      122 Butyl-P-Hydroxybenzoate - -    123 Ethyl-P-Hydroxybenzoate - -    124 Methyl-P-Hydroxybenzoate - -    125 Propyl-P-Hydroxybenzoate - -      METALS (Implants / Dental)         No Substance 2 days 4 days remarks   126 Silver Nitrate - -      Patients own:  7 products      PATIENTS OWN PRODUCTS         No Substance Conc  % solv 2 days 4 days remarks   127 Aquaphor        128 Aloe Vera        129 Candendura cream        130 Hydrocortison valerate        131 Hydrocortisone cream with Aloe        132 Silvrstat                                   1           Patients own products:  è DO NOT test if chemical or biological identity is unknown!   - always ask from patient the product information and safety sheets and consult with the physician   - check neutral pH with pH indicator paper (do not test pH below 5 or over 8 or consult with physician)  - leave-on cosmetics can be tested \"as is\"  - rinse-off products have to be diluted with water, buffer, olive oil or paraffin (discuss with physician)  à remember:   - non-specific toxic/corrosive or biological reactions can occur  - tests with patients own products are not standardized and test conditions are not optimized for patch tests. Whenever possible test with standardized test series and correlate use of product with the result of the patch tests  - if not sure if compounds can be tested under occlusion in patch tests consider open application tests       Results of patch tests:                         Interpretation:    - Negative                    A    = Allergic      (+) Erythema    TI   = Toxic/irritant   + E + Infiltration    RaP = Relevance at Present     ++ E/I + " Papulovesicle   Rpr  = Relevance Previously     +++ E/I/P + Blister     nR   = No Relevance      [] No relevant allergic reaction observed    [] Allergic reaction diagnosed against: see later      Interpretation/ remarks:   See later    [] Patient information given   [] ACSD information   [] [employer] information    ==> final Diagnosis:  See later    ==> Treatment prescribed/Plan:  See later      These conclusions are made at the best of ones knowledge and belief  based on the provided evidence such as patients history and allergy test results and they can change over time or can be incomplete because of missing informations.    CC Referred MD Raymond  No address on file on close of this encounter.  Follow-up Wednesday.       Staff Physician Comments:  I saw and evaluated the patient with the resident and I agree with the assessment and plan as documented in the resident's note.    Tano Arias MD  Professor  Head of Dermato-Allergy Division  Department of Dermatology  [facility]  I spent a total of 20 min face to face with Keely Velazquez during today's office visit. About 50% of the time was spent counseling the patient and/or coordinating care regarding their allergy.      Staff Involved:  Resident(Denton Camilo)/Staff

## 2019-03-11 NOTE — NURSING NOTE
Dermatology Rooming Note    Keely Velazquez's goals for this visit include:   Chief Complaint   Patient presents with     Allergies     Keely is here  for allergy tetsing day 1     Chanel Phipps, CMA

## 2019-03-11 NOTE — PATIENT INSTRUCTIONS
Patch Testing Information  What are allergen patch tests?    The test is done to look for skin allergies that may be causing rashes and irritation.    A patch test is a way of identifying whether a substance has caused a delayed reaction with skin inflammation, such as contact eczema or delayed (after days) reactions to drugs.     We will use various types of test compounds, which may include common allergens you may come in contact with in daily life such as preservatives, fragrances or even drugs.     Most of the time we will use standardized, prefabricated test solutions. The choice of the substances and series tested will depend on your history of reactions. Sometimes we will test your own products as well.     In order to avoid severe toxic reactions we need detailed information or safety sheets for each of the test compounds.    The test panels are set up with a small amount of common substances that cause skin allergies. They are taped to your skin with a clear hypoallergenic bandage and reinforced hypoallergenic tape.    The substances are numbered, so it is easy to tell what is causing a skin reaction.  What do I need to do in preparation for the test?    Stop all systemic steroids 1 month prior to the testing.     Stop applying topical steroids to the test area one week prior to the test. It is to use them elsewhere throughout the testing process. If this is not possible then discuss options with the Allergy specialist.    Do not go sunbathing or tanning for one week prior to testing    It is okay to take antihistamines as normal.     Please wear dark colors the week of the test since we will write on you with a dark marker that may transfer and stain clothing or bedding.     Some medications can affect the reactions to allergens during the tests. Therefore reveal all the medications you take to the allergy team. The doctor will discuss the medications with you before the tests.     Eat how you normally  would.    Avoid the following:    You cannot get the test area wet during the entire test period. This means no bathing or swimming the entire test period.    No strenuous exercise that may cause sweating.    Do not scratch the test area, this can cause the allergen to spread and give us false positives.     Avoid exposure to UV irradiation. This means no tanning or UV treatment during the testing period.   What can I expect?    Patch testing is done over three appointments.   o The usual schedule is: Monday (Allergen patches are placed), Wednesday (Patches are removed and skin is examined by the MD), and Friday (Skin is examined by the MD)      If you are allergic, there will be an area of irritation where the test was placed.    Itching or burning at the test site might happen if you are allergic to the allergen.  Do not rub or scratch the test site since this may spread the allergen and possibly cause false positives. If itching or burning is not tolerable please contact the clinic.    The marker we draw on your back with ma take up to 5 weeks to wash off completely. Rubbing alcohol can help speed up this process.     Reactions can occur 1 to 2 weeks after the tests are applied. If this happens please take photos of the area and contact the clinic.  What should I do after the tests are placed?    Keep the area dry. No showering or getting the test area wet from the time we see you at your first visit until after your third appointment. If you get the test area wet you are washing off the test and we could get false negative reactions.    If you notice any of the test patches coming loose put on more tape to re-secure the test.    If the marker that is applied fades you can use a dark pen to yunior around the panel sites.    Cover the test area when you are outside to avoid any sun exposure while the test is in place.     You can remove the tests only if there is a severe reaction (itch, pain, blistering). Please  report this to your doctor immediately. If you have to remove the tests please yunior the locations of each test field with a grid so we can identify the allergen.  WHAT ARE THE POSSIBLE RISKS OF PATCH TESTS     If you are allergic to a compound tested you will develop a localized skin reaction similar to your previous reaction, this may take days to develop. These reactions include a formation of red, itchy skin lesions that could be about a centimeter with small vesicles or possibly even blisters. The lesions will fade over time, this may take weeks. The test might leave some skin pigmentation for a few months.     In rare cases a localized reaction to patch testing can become generalized.     The tests with your own products might have some risks because they are not standardized and unanticipated reactions could occur. We need as much information as possible to evaluate if your own products are safe to test and under what conditions. This has to be evaluated for each individual product.   Useful Contact Information    To contact your doctor you can either send a [employer] message or call [phone]     Address  o [address] [place] [address]    If you develop any serious or adverse allergic reaction after office hours please seek immediate medical assistance at the nearest clinic, urgent care, or emergency room.

## 2019-03-13 ENCOUNTER — OFFICE VISIT (OUTPATIENT)
Dept: DERMATOLOGY | Facility: CLINIC | Age: 64
End: 2019-03-13
Payer: MEDICARE

## 2019-03-13 DIAGNOSIS — L23.89 ALLERGIC CONTACT DERMATITIS DUE TO OTHER AGENTS: Primary | ICD-10-CM

## 2019-03-13 ASSESSMENT — PAIN SCALES - GENERAL: PAINLEVEL: NO PAIN (0)

## 2019-03-13 NOTE — PROGRESS NOTES
"[facility] Dermatology Note      Dermatology Problem List:  [facility] patient of Dr. Feroz Murcia  1. Suspected allergic contact dermatitis with id reaction in setting of radiotherapy and use of topical products, including hydrocortisone cream              - Recommended avoidance of all topical products except Vaseline              - Completely resolved after 16-day prednisone taper              - Patch testing beginning 3/11/19     2. 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 Keely Velazquez is a 63 year old female who presents as a follow-up for patch testing. The patient was last seen 3/1/19 when she was evaluated for ACD. Pt states she has been doing well since last seen 3/1. She doesn't have any new issues or questions. She brought several home products with her that were used around the time of her outbreak.   Pt otherwise feels well without any changes in general state of health. Denies any new, growing, changing, bleeding, or otherwise concerning/symptomatic skin findings. No other questions or concerns today.     From initial evaluation 3/1:  \"Ms. Keely Velazquez is a 63 year old female who presents as a referral from Dr. Junior in regards to allergic contact dermatitis. Her previous visit within our department was on 12/13/2018 with Dr. Junior as a follow-up to her current dermatitis issue. At that time it was recommended to avoid topical products, including SilvrStat ointment and hydrocortisone, and use only Vaseline for comfort. The previous note mentions that her rash completely resolved with the use of systemic steroids. Today she states she was sent over from oncology due to a \"terrible reaction\" from the radiotherapy. This therapy began ~Oct 2018 and finished Nov 2018. " "The radiology focus was on the right breast, which is where the reaction emerged. She used vasoline and Aquaphor on the site to keep it moisturized. From there she noticed a burning sensation at the site, and began an over-the-counter 1% hydrocortisone cream regimen. She does not believe there were any antibiotics in this cream. The irritation continued to worsen resulting in blistering/weeping lesions which spread to her armpit and wrists. From there she was prescribed hydrocortisone, followed by SilvrStat. She states the SilvrStat caused a worsening of her symptoms. The patient notes she is allergic to nickel which causes redness and scaling of the skin it contacts. She cannot wear wool, due to it irritating her shin. In addition, she avoids perfumed creams and products which can irritates her skin. In order to combat this she uses mild soaps and products. No Hx pf hay fever, food alergies, or asthma. Sometimes in the spring (~May timeframe) she will get itchy eyes and headaches. Patient has no other derm concerns at this time.\"       Past Medical History:   Patient Active Problem List   Diagnosis     Symptomatic varicose veins of both lower extremities     Malignant neoplasm of upper-outer quadrant of right breast in female, estrogen receptor positive (H)     Past Medical History:   Diagnosis Date     Hypertension      Iritis      Past Surgical History:   Procedure Laterality Date     CATARACT IOL, RT/LT Left 2006     HYSTERECTOMY  2016     LUMPECTOMY BREAST WITH SENTINEL NODE, COMBINED Right 8/31/2018    Procedure: COMBINED LUMPECTOMY BREAST WITH SENTINEL NODE;  Right Wire Localized Lumpectomy and Right [place] Lymph Node Biopsy;  Surgeon: Chilango Kruger MD;  Location: [facility] OR     OOPHORECTOMY  2014       Social History:  Patient reports that  has never smoked. she has never used smokeless tobacco. She reports that she drinks alcohol. She reports that she does not use drugs.    Family History:  History reviewed. " No pertinent family history.    Medications:  Current Outpatient Medications   Medication Sig Dispense Refill     acetaminophen (TYLENOL) 325 MG tablet Take 2 tablets (650 mg) by mouth every 4 hours as needed for mild pain 60 tablet 0     AmLODIPine Besylate (NORVASC PO) Take 5 mg by mouth daily       anastrozole (ARIMIDEX) 1 MG tablet Take 1 tablet (1 mg) by mouth daily 30 tablet 11     calcium carbonate (OS-PADDY 500 MG [place]. [place]) 500 MG tablet Take 500 mg by mouth 2 times daily With Magnesium,Zinc       desonide (DESOWEN) 0.05 % cream        doxycycline hyclate (VIBRAMYCIN) 100 MG capsule Take one capsule twice daily for 10 days       HYDROcodone-acetaminophen (NORCO) 5-325 MG per tablet Take 1 tablet by mouth every 6 hours as needed for severe pain (Patient not taking: Reported on 3/4/2019) 30 tablet 0     LYSINE PO        multivitamin, therapeutic with minerals (MULTI-VITAMIN) TABS Take 1 tablet by mouth daily       Omega-3 Fatty Acids (OMEGA-3 FISH OIL PO)        oxyCODONE IR (ROXICODONE) 5 MG tablet 5-10 mg by mouth every 4 to 6 hours as needed for pain 30 tablet 0     Urea 40 % CREA Apply to fingernails twice daily, especially at bedtime.       VITAMIN D, CHOLECALCIFEROL, PO Take 1,000 Units by mouth daily           Allergies   Allergen Reactions     Codeine Nausea and Vomiting     Macrobid [Nitrofurantoin] Nausea and Vomiting     Sulfa Drugs Itching     Silver Rash         Review of Systems:  -As per HPI  -Constitutional: Otherwise feeling well today, in usual state of health.  -HEENT: Patient denies nonhealing oral sores.  -Skin: As above in HPI. No additional skin concerns.    Physical exam:  Vitals: There were no vitals taken for this visit.  GEN: This is a well developed, well-nourished female in no acute distress, in a pleasant mood.    SKIN: Focused examination of the face, neck, arms, back was performed.  - no lesions today but according to the photos in the 2018 notes there were eczematous lesions on  wrists, R axilla, and R breast. This spreading manifested in weeping eczematous lesions.  - no other lesions of concern on areas examined.     Impression/Plan:    1. Atopic predisposition and possible seasonal allergis with  - rhino conjunctivitis to tree pollen.  - Wool intolerance  - Nickel or metal allergy  - atopic dermatitis around ears and nose sometimes     2. Suspicion for allergic contact dermatitis to to hydrocortisone cream/ Aquaphor/silverstat gel (preservative , additives, or hyrocortizone)    Patch testing today as below        Order for PATCH TESTS     [x] Outpatient                          [] Inpatient: [facility]..../ Bed ....                    Skin Atopy (atopic dermatitis)        [x] Yes   [] No  Rhinitis/Sinusitis:                              [x] Yes   [] No  Allergic Asthma:                                [] Yes   [x] No  Food Allergy:                                     [] Yes   [x] No  Leg ulcers:                                         [] Yes   [x] No  Hand eczema:                                    [] Yes   [x] No                                 Leading hand:   [x] R   [] L       [] Ambidextrous                         Reason for tests (suspected allergy): possible allergic contact dermatitis to Hydrocortisone cream, Silvrstat or Aquaphor  Known previous allergies: Nickel     Standardized panels  [x] Standard panel (40 tests)  [x] Preservatives & Antimicrobials (31 tests)  [x] Emulsifiers & Additives (25 tests)   [] Perfumes/Flavours & Plants (25 tests)  [] Hairdresser panel (12 tests)  [] Rubber Chemicals (22 tests)  [] Plastics (26 tests)  [] Colorants/Dyes/Food additives (20 tests)  [] Metals (implants/dental) (23 tests)  [] Local anaesthetics/NSAIDs (13 tests)  [x] Antibiotics & Antimycotics (14 tests)   [x] Corticosteroids (15 tests)   [] Photopatch test (32 tests)   [x] others: from metal series add silver                         [x] Patient's own products: Aquaphor, Silvrstat,  Hydrocortisone valerate, [place] cream, Hydrocortisone cream with aloe, Aquaphor, Aloe vera 100% gel    DO NOT test if chemical or biological identity is unknown!     always ask from patient the product information and safety sheets (MSDS)      [] Patient needs consultation with Allergy team (changes of tests may apply)  [] Tests discussed with Allergy team (can have direct appointment for patch tests)    RESULTS & EVALUATION of PATCH TESTS    Patch test readings after     [] 2 days, [] 3 days [] 4 days, [] 5 days,   Other duration: ...    Applied patch tests with results (import here the list of patch tests):  Date/time of application:03/11/2019  Physician/Nurse:  / Chanel Phipps, [facility]               Localization of application: Back    STANDARD Series         No Substance 2 days 4 days remarks   1 Regino Mix [C] - -    2 Colophony - -    3  2-Mercaptobenzothiazole  - -     4 Methylisothiazolinone - -    5 Carba Mix - -    6 Thiuram Mix [A] - -    7 Bisphenol A Epoxy Resin - -    8 S-Jcbu-Cisyzpmywsy-Formaldehyde Resin - -    9 Mercapto Mix [A] - -    10 Black Rubber Mix- PPD [B] - -    11 Potassium Dichromate  -  -    12 Balsam of [place] (Myroxylon Pereirae Resin) - -    13 Nickel Sulphate Hexahydrate - -    14 Mixed Dialkyl Thiourea - -    15 Paraben Mix [B] - -    16 Methyldibromo Glutaronitrile - -    17 Fragrance Mix - -    18 2-Bromo-2-Nitropropane-1,3-Diol (Bronopol) - -    19 Lyral - -    20 Tixocortol-21- Pivalate - -    21 Diazolidiyl Urea (Germall II) - -    22 Methyl Methacrylate - -    23 Cobalt (II) Chloride Hexahydrate - -    24 Fragrance Mix II  - -    25 Compositae Mix - -    26 Benzoyl Peroxide + -    27 Bacitracin - -    28 Formaldehyde - -    29 Methylchloroisothiazolinone / Methylisothiazolinone - -    30 Corticosteroid Mix - -    31 Sodium Lauryl Sulfate - -    32 Lanolin Alcohol - -    33 Turpentine - -    34 Cetylstearylalcohol - -    35 Chlorhexidine Dicluconate - -    36  Budenoside - -    37 Imidazolidinyl Urea  - -    38 Ethyl-2 Cyanoacrylate - -    39 Quaternium 15 (Dowicil 200) - -    40 Decyl Glucoside - -      EMULSIFIERS & ADDITIVES        No Substance 2 days 4 days remarks   41 Polyethylene Glycol-400 - -    42 Cocamidopropyl Betaine - -    43 Amerchol L101 - -    44 Propylene Glycol - -    45 Triethanolamine - -    46 Sorbitane Sesquiolate (+) -    47 Isopropylmyristate (+) -    48 Polysorbate 80  (+) -    49 Amidoamine   (Stearamidopropyl Dimethylamine) - -    50 Oleamidopropyl Dimethylamine - -    51 Lauryl Glucoside - -    52 Coconut Diethanolamide  (+) -    53 2-Hydroxy-4-Methoxy Benzophenone (Oxybenzone) - -    54 Benzophenone-4 (Sulisobenzon) - -    55 Propolis - -    56 Dexpanthenol - -    57 Carboxymethyl Cellulose Sodium - -    58 Abitol - -    59 Tert-Butylhydroquinone - -    60 Benzyl Salicylate - -     Antioxidant      61 Dodecyl Gallate - -    62 Butylhydroxyanisole (BHA) - -    63 Butylhydroxytoluene (BHT) - -    64 Di-Alpha-Tocopherol (Vit E) - -    65 Propyl Gallate - -      CORTICOSTEROIDS    No Substance 2 days 4 days remarks Allergy  class   66 Amcinonide - -  B   67 Betametasone-17,21 Dipropionate - -  D1   68 Desoximetasone - -  C   69 Betamethasone-17-Valerate - -  D1   70 Dexamethasone - -  C   71 Hydrocortisone - -  A   72 Clobetasol-17-Propionate - -  D1   73 Dexamethasone-21-Phosphate Disodium Salt - -  C   74 Hydrocortisone-17 Butyrate - -  D2   75 Prednisolone - -  A   76 Mometason Furoate - -  D1   77 Triamcinolone Acetonide - -  B   78 Methylprednisolone Aceponate - -  D2   79 Hydrocortisone-21-Acetate - -  A   80 Prednicarbate - -  D2       Group Characteristics of group Generic name Name  cross reactions   A Hydrocortisone   Cloprednole, Fludrocortisone acétate, Hydrocortison acetate, Methylprednisolone, Prednisolone, Tixocortolpivalate Alfacortone, Fucidin H, Dermacalm, Hexacortone, Premandole, Imacort With group D2   B  Triamcinolone-acetonide   Budenoside (R-isomer), Amcinonide, Desonide, Fluocinolone acetonide, Triamcinolone acetonide Locapred, Locatop  Synalar, Pevisone, [place] -   C Betamethasone (Without Radha)   Betamethasone, Dexamethasone, Flumethasone pivalate, Halomethasone Daivobet, Dexasalyl, Locasalen,   -   D1 Betamethasone-diproprionate   Betamethasone dipropionate, Betamethasone-17-valerate, Clobetasole-propionate, Fluticasone propionate, Mometasone furoate Betnovate, Diprogenta, Diprosalic, Diprosone, Celestoderm, Fucicort,  Cutivate, Axotide, Elocom -   D2 Methylprednisolone-aceponate   Hydrocortisone-aceponate, Hydrocortisone-buteprate, Hydrocortisone-17-butyrate, Methylprednisolone aceponate, Prednicarbate Locoïd, Advantan,  Prednitop With group A and Budesonide (S-isomer)     ANTIBIOTICS & ANTIMYCOTICS         No Substance 2 days 4 days remarks   81 Erythromycine - -    82 Framycetine Sulphate - -    83 Fusidic Acid Sodium Salt - -    84 Gentamicin Sulphate - -    85 Neomycine Sulphate - -    86 Oxytetracycline  - -    87 Polymyxin B Sulphate - -    88 Tetracycline-HCL - -    89 Sulfanilamide - -    90 Metronidazole - -    91 Oxyquinoline Mix - -    92 Nitrofurazone - -    93 Nystatin - -    94 Clotrimazole - -        PRESERVATIVES & ANTIMICROBIALS         No Substance 2 days 4 days remarks   95  1,2-Benzisothiazoline-3-One, Sodium Salt - -    96  1,3,5-Ryder (2-Hydroxyethyl) - Hexahydrotriazine (Grotan BK) - -    97 1-Fuesuxwdgqcce-3-Nitro-1, 3-Propanediol - -    98  3, 4, 4' - Triclocarban - -    99 4 - Chloro - 3 - Cresol - -    100 4 - Chloro - 4 - Xylenol (PCMX) - -    101 7-Ethylbicyclooxazolidine (Bioban [id number]) - -    102 Benzalkonium Chloride - -    103 Benzyl Alcohol - -    104 Cetalkonium Chloride - -    105 Cetylpyrimidine Chloride  - -    106 Chloroacetamide - -    107 DMDM Hydantoin - -    108 Glutaraldehyde - -    109 Triclosan - -    110 Glyoxal Trimeric Dihydrate - -    111 Iodopropynyl  "Butylcarbamate - -    112 Octylisothiazoline - -    113 Iodoform - -    114 (Nitrobutyl) Morpholine/(Ethylnitro-Trimethylene) Dimorpholine (Bioban P 1487) - -    115 Phenoxyethanol - -    116 Phenyl Salicylate - -    117 Povidone Iodine - -    118 Sodium Benzoate - -    119 Sodium Disulfite - -    120 Sorbic Acid - -    121 Thimerosal - -     Parabens      122 Butyl-P-Hydroxybenzoate - -    123 Ethyl-P-Hydroxybenzoate - -    124 Methyl-P-Hydroxybenzoate - -    125 Propyl-P-Hydroxybenzoate - -      METALS (Implants / Dental)         No Substance 2 days 4 days remarks   126 Silver Nitrate - -      Patients own:  7 products      PATIENTS OWN PRODUCTS         No Substance Conc  % solv 2 days 4 days remarks   127 Aquaphor        128 Aloe Vera        129 Candendura cream        130 Hydrocortison valerate        131 Hydrocortisone cream with Aloe        132 Silverstat                                   1           Patients own products:  è DO NOT test if chemical or biological identity is unknown!   - always ask from patient the product information and safety sheets and consult with the physician   - check neutral pH with pH indicator paper (do not test pH below 5 or over 8 or consult with physician)  - leave-on cosmetics can be tested \"as is\"  - rinse-off products have to be diluted with water, buffer, olive oil or paraffin (discuss with physician)  à remember:   - non-specific toxic/corrosive or biological reactions can occur  - tests with patients own products are not standardized and test conditions are not optimized for patch tests. Whenever possible test with standardized test series and correlate use of product with the result of the patch tests  - if not sure if compounds can be tested under occlusion in patch tests consider open application tests       Results of patch tests:                         Interpretation:    - Negative                    A    = Allergic      (+) Erythema    TI   = Toxic/irritant   + E + " Infiltration    RaP = Relevance at Present     ++ E/I + Papulovesicle   Rpr  = Relevance Previously     +++ E/I/P + Blister     nR   = No Relevance      [x] No relevant allergic reaction observed    [] Allergic reaction diagnosed against: see later      Interpretation/ remarks:   See later    [] Patient information given   [] [facility] information   [] [facility] information    ==> final Diagnosis:  See later    ==> Treatment prescribed/Plan:  See later      These conclusions are made at the best of ones knowledge and belief  based on the provided evidence such as patients history and allergy test results and they can change over time or can be incomplete because of missing informations.    CC Referred MD Raymond  No address on file on close of this encounter.  Follow-up Wednesday.

## 2019-03-13 NOTE — NURSING NOTE
Rooming Note    Keely Velazquez's goals for this visit include:   Chief Complaint   Patient presents with     Allergies     Keely is here for patch tetsing day3     Bonita Bravo LPN

## 2019-03-13 NOTE — LETTER
"3/13/2019       RE: Keely Velazquez  [address]  [facility] [address]     Dear Colleague,    Thank you for referring your patient, Keely Velazquez, to the [facility] DERMATOLOGY at [facility]. Please see a copy of my visit note below.    Formerly [facility] Dermatology Note      Dermatology Problem List:  [facility] patient of Dr. Feroz Murcia  1. Suspected allergic contact dermatitis with id reaction in setting of radiotherapy and use of topical products, including hydrocortisone cream              - Recommended avoidance of all topical products except Vaseline              - Completely resolved after 16-day prednisone taper              - Patch testing beginning 3/11/19     2. 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 Keely Velazquez is a 63 year old female who presents as a follow-up for patch testing. The patient was last seen 3/1/19 when she was evaluated for ACD. Pt states she has been doing well since last seen 3/1. She doesn't have any new issues or questions. She brought several home products with her that were used around the time of her outbreak.   Pt otherwise feels well without any changes in general state of health. Denies any new, growing, changing, bleeding, or otherwise concerning/symptomatic skin findings. No other questions or concerns today.     From initial evaluation 3/1:  \"Ms. Keely Velazquez is a 63 year old female who presents as a referral from Dr. Junior in regards to allergic contact dermatitis. Her previous visit within our department was on 12/13/2018 with Dr. Junior as a follow-up to her current dermatitis issue. At that time it was recommended to avoid topical products, including SilvrStat ointment and hydrocortisone, and use " "only Vaseline for comfort. The previous note mentions that her rash completely resolved with the use of systemic steroids. Today she states she was sent over from oncology due to a \"terrible reaction\" from the radiotherapy. This therapy began ~Oct 2018 and finished Nov 2018. The radiology focus was on the right breast, which is where the reaction emerged. She used vasoline and Aquaphor on the site to keep it moisturized. From there she noticed a burning sensation at the site, and began an over-the-counter 1% hydrocortisone cream regimen. She does not believe there were any antibiotics in this cream. The irritation continued to worsen resulting in blistering/weeping lesions which spread to her armpit and wrists. From there she was prescribed hydrocortisone, followed by SilvrStat. She states the SilvrStat caused a worsening of her symptoms. The patient notes she is allergic to nickel which causes redness and scaling of the skin it contacts. She cannot wear wool, due to it irritating her shin. In addition, she avoids perfumed creams and products which can irritates her skin. In order to combat this she uses mild soaps and products. No Hx pf hay fever, food alergies, or asthma. Sometimes in the spring (~May timeframe) she will get itchy eyes and headaches. Patient has no other derm concerns at this time.\"       Past Medical History:   Patient Active Problem List   Diagnosis     Symptomatic varicose veins of both lower extremities     Malignant neoplasm of upper-outer quadrant of right breast in female, estrogen receptor positive (H)     Past Medical History:   Diagnosis Date     Hypertension      Iritis      Past Surgical History:   Procedure Laterality Date     CATARACT IOL, RT/LT Left 2006     HYSTERECTOMY  2016     LUMPECTOMY BREAST WITH SENTINEL NODE, COMBINED Right 8/31/2018    Procedure: COMBINED LUMPECTOMY BREAST WITH SENTINEL NODE;  Right Wire Localized Lumpectomy and Right [place] Lymph Node Biopsy;  Surgeon: " Chilango Kruger MD;  Location:  OR     OOPHORECTOMY  2014       Social History:  Patient reports that  has never smoked. she has never used smokeless tobacco. She reports that she drinks alcohol. She reports that she does not use drugs.    Family History:  History reviewed. No pertinent family history.    Medications:  Current Outpatient Medications   Medication Sig Dispense Refill     acetaminophen (TYLENOL) 325 MG tablet Take 2 tablets (650 mg) by mouth every 4 hours as needed for mild pain 60 tablet 0     AmLODIPine Besylate (NORVASC PO) Take 5 mg by mouth daily       anastrozole (ARIMIDEX) 1 MG tablet Take 1 tablet (1 mg) by mouth daily 30 tablet 11     calcium carbonate (OS-PADDY 500 MG [place]. [place]) 500 MG tablet Take 500 mg by mouth 2 times daily With Magnesium,Zinc       desonide (DESOWEN) 0.05 % cream        doxycycline hyclate (VIBRAMYCIN) 100 MG capsule Take one capsule twice daily for 10 days       HYDROcodone-acetaminophen (NORCO) 5-325 MG per tablet Take 1 tablet by mouth every 6 hours as needed for severe pain (Patient not taking: Reported on 3/4/2019) 30 tablet 0     LYSINE PO        multivitamin, therapeutic with minerals (MULTI-VITAMIN) TABS Take 1 tablet by mouth daily       Omega-3 Fatty Acids (OMEGA-3 FISH OIL PO)        oxyCODONE IR (ROXICODONE) 5 MG tablet 5-10 mg by mouth every 4 to 6 hours as needed for pain 30 tablet 0     Urea 40 % CREA Apply to fingernails twice daily, especially at bedtime.       VITAMIN D, CHOLECALCIFEROL, PO Take 1,000 Units by mouth daily           Allergies   Allergen Reactions     Codeine Nausea and Vomiting     Macrobid [Nitrofurantoin] Nausea and Vomiting     Sulfa Drugs Itching     Silver Rash         Review of Systems:  -As per HPI  -Constitutional: Otherwise feeling well today, in usual state of health.  -HEENT: Patient denies nonhealing oral sores.  -Skin: As above in HPI. No additional skin concerns.    Physical exam:  Vitals: There were no vitals taken for this  visit.  GEN: This is a well developed, well-nourished female in no acute distress, in a pleasant mood.    SKIN: Focused examination of the face, neck, arms, back was performed.  - no lesions today but according to the photos in the 2018 notes there were eczematous lesions on wrists, R axilla, and R breast. This spreading manifested in weeping eczematous lesions.  - no other lesions of concern on areas examined.     Impression/Plan:    1. Atopic predisposition and possible seasonal allergis with  - rhino conjunctivitis to tree pollen.  - Wool intolerance  - Nickel or metal allergy  - atopic dermatitis around ears and nose sometimes     2. Suspicion for allergic contact dermatitis to to hydrocortisone cream/ Aquaphor/silverstat gel (preservative , additives, or hyrocortizone)    Patch testing today as below        Order for PATCH TESTS     [x] Outpatient                          [] Inpatient: [facility]..../ Bed ....                    Skin Atopy (atopic dermatitis)        [x] Yes   [] No  Rhinitis/Sinusitis:                              [x] Yes   [] No  Allergic Asthma:                                [] Yes   [x] No  Food Allergy:                                     [] Yes   [x] No  Leg ulcers:                                         [] Yes   [x] No  Hand eczema:                                    [] Yes   [x] No                                 Leading hand:   [x] R   [] L       [] Ambidextrous                         Reason for tests (suspected allergy): possible allergic contact dermatitis to Hydrocortisone cream, Silvrstat or Aquaphor  Known previous allergies: Nickel     Standardized panels  [x] Standard panel (40 tests)  [x] Preservatives & Antimicrobials (31 tests)  [x] Emulsifiers & Additives (25 tests)   [] Perfumes/Flavours & Plants (25 tests)  [] Hairdresser panel (12 tests)  [] Rubber Chemicals (22 tests)  [] Plastics (26 tests)  [] Colorants/Dyes/Food additives (20 tests)  [] Metals (implants/dental) (23  tests)  [] Local anaesthetics/NSAIDs (13 tests)  [x] Antibiotics & Antimycotics (14 tests)   [x] Corticosteroids (15 tests)   [] Photopatch test (32 tests)   [x] others: from metal series add silver                         [x] Patient's own products: Aquaphor, Silvrstat, Hydrocortisone valerate, [place] cream, Hydrocortisone cream with aloe, Aquaphor, Aloe vera 100% gel    DO NOT test if chemical or biological identity is unknown!     always ask from patient the product information and safety sheets (MSDS)      [] Patient needs consultation with Allergy team (changes of tests may apply)  [] Tests discussed with Allergy team (can have direct appointment for patch tests)    RESULTS & EVALUATION of PATCH TESTS    Patch test readings after     [] 2 days, [] 3 days [] 4 days, [] 5 days,   Other duration: ...    Applied patch tests with results (import here the list of patch tests):  Date/time of application:03/11/2019  Physician/Nurse:  / Chanel Phipps, [facility]               Localization of application: Back    STANDARD Series         No Substance 2 days 4 days remarks   1 Regino Mix [C] - -    2 Colophony - -    3  2-Mercaptobenzothiazole  - -     4 Methylisothiazolinone - -    5 Carba Mix - -    6 Thiuram Mix [A] - -    7 Bisphenol A Epoxy Resin - -    8 S-Lehw-Ocoyvtdwnez-Formaldehyde Resin - -    9 Mercapto Mix [A] - -    10 Black Rubber Mix- PPD [B] - -    11 Potassium Dichromate  -  -    12 Balsam of [place] (Myroxylon Pereirae Resin) - -    13 Nickel Sulphate Hexahydrate - -    14 Mixed Dialkyl Thiourea - -    15 Paraben Mix [B] - -    16 Methyldibromo Glutaronitrile - -    17 Fragrance Mix - -    18 2-Bromo-2-Nitropropane-1,3-Diol (Bronopol) - -    19 Lyral - -    20 Tixocortol-21- Pivalate - -    21 Diazolidiyl Urea (Germall II) - -    22 Methyl Methacrylate - -    23 Cobalt (II) Chloride Hexahydrate - -    24 Fragrance Mix II  - -    25 Compositae Mix - -    26 Benzoyl Peroxide + -    27 Bacitracin - -     28 Formaldehyde - -    29 Methylchloroisothiazolinone / Methylisothiazolinone - -    30 Corticosteroid Mix - -    31 Sodium Lauryl Sulfate - -    32 Lanolin Alcohol - -    33 Turpentine - -    34 Cetylstearylalcohol - -    35 Chlorhexidine Dicluconate - -    36 Budenoside - -    37 Imidazolidinyl Urea  - -    38 Ethyl-2 Cyanoacrylate - -    39 Quaternium 15 (Dowicil 200) - -    40 Decyl Glucoside - -      EMULSIFIERS & ADDITIVES        No Substance 2 days 4 days remarks   41 Polyethylene Glycol-400 - -    42 Cocamidopropyl Betaine - -    43 Amerchol L101 - -    44 Propylene Glycol - -    45 Triethanolamine - -    46 Sorbitane Sesquiolate (+) -    47 Isopropylmyristate (+) -    48 Polysorbate 80  (+) -    49 Amidoamine   (Stearamidopropyl Dimethylamine) - -    50 Oleamidopropyl Dimethylamine - -    51 Lauryl Glucoside - -    52 Coconut Diethanolamide  (+) -    53 2-Hydroxy-4-Methoxy Benzophenone (Oxybenzone) - -    54 Benzophenone-4 (Sulisobenzon) - -    55 Propolis - -    56 Dexpanthenol - -    57 Carboxymethyl Cellulose Sodium - -    58 Abitol - -    59 Tert-Butylhydroquinone - -    60 Benzyl Salicylate - -     Antioxidant      61 Dodecyl Gallate - -    62 Butylhydroxyanisole (BHA) - -    63 Butylhydroxytoluene (BHT) - -    64 Di-Alpha-Tocopherol (Vit E) - -    65 Propyl Gallate - -      CORTICOSTEROIDS    No Substance 2 days 4 days remarks Allergy  class   66 Amcinonide - -  B   67 Betametasone-17,21 Dipropionate - -  D1   68 Desoximetasone - -  C   69 Betamethasone-17-Valerate - -  D1   70 Dexamethasone - -  C   71 Hydrocortisone - -  A   72 Clobetasol-17-Propionate - -  D1   73 Dexamethasone-21-Phosphate Disodium Salt - -  C   74 Hydrocortisone-17 Butyrate - -  D2   75 Prednisolone - -  A   76 Mometason Furoate - -  D1   77 Triamcinolone Acetonide - -  B   78 Methylprednisolone Aceponate - -  D2   79 Hydrocortisone-21-Acetate - -  A   80 Prednicarbate - -  D2       Group Characteristics of group Generic name  Name  cross reactions   A Hydrocortisone   Cloprednole, Fludrocortisone acétate, Hydrocortison acetate, Methylprednisolone, Prednisolone, Tixocortolpivalate Alfacortone, Fucidin H, Dermacalm, Hexacortone, Premandole, Imacort With group D2   B Triamcinolone-acetonide   Budenoside (R-isomer), Amcinonide, Desonide, Fluocinolone acetonide, Triamcinolone acetonide Locapred, Locatop  Synalar, Pevisone, [place] -   C Betamethasone (Without Radha)   Betamethasone, Dexamethasone, Flumethasone pivalate, Halomethasone Daivobet, Dexasalyl, Locasalen,   -   D1 Betamethasone-diproprionate   Betamethasone dipropionate, Betamethasone-17-valerate, Clobetasole-propionate, Fluticasone propionate, Mometasone furoate Betnovate, Diprogenta, Diprosalic, Diprosone, Celestoderm, Fucicort,  Cutivate, Axotide, Elocom -   D2 Methylprednisolone-aceponate   Hydrocortisone-aceponate, Hydrocortisone-buteprate, Hydrocortisone-17-butyrate, Methylprednisolone aceponate, Prednicarbate Locoïd, Advantan,  Prednitop With group A and Budesonide (S-isomer)     ANTIBIOTICS & ANTIMYCOTICS         No Substance 2 days 4 days remarks   81 Erythromycine - -    82 Framycetine Sulphate - -    83 Fusidic Acid Sodium Salt - -    84 Gentamicin Sulphate - -    85 Neomycine Sulphate - -    86 Oxytetracycline  - -    87 Polymyxin B Sulphate - -    88 Tetracycline-HCL - -    89 Sulfanilamide - -    90 Metronidazole - -    91 Oxyquinoline Mix - -    92 Nitrofurazone - -    93 Nystatin - -    94 Clotrimazole - -        PRESERVATIVES & ANTIMICROBIALS         No Substance 2 days 4 days remarks   95  1,2-Benzisothiazoline-3-One, Sodium Salt - -    96  1,3,5-Ryder (2-Hydroxyethyl) - Hexahydrotriazine (Grotan BK) - -    97 9-Cbfhpbwkjbtpp-3-Nitro-1, 3-Propanediol - -    98  3, 4, 4' - Triclocarban - -    99 4 - Chloro - 3 - Cresol - -    100 4 - Chloro - 4 - Xylenol (PCMX) - -    101 7-Ethylbicyclooxazolidine (Bernadette SG5020) - -    102 Benzalkonium Chloride - -    103 Benzyl  "Alcohol - -    104 Cetalkonium Chloride - -    105 Cetylpyrimidine Chloride  - -    106 Chloroacetamide - -    107 DMDM Hydantoin - -    108 Glutaraldehyde - -    109 Triclosan - -    110 Glyoxal Trimeric Dihydrate - -    111 Iodopropynyl Butylcarbamate - -    112 Octylisothiazoline - -    113 Iodoform - -    114 (Nitrobutyl) Morpholine/(Ethylnitro-Trimethylene) Dimorpholine (Bioban P 1487) - -    115 Phenoxyethanol - -    116 Phenyl Salicylate - -    117 Povidone Iodine - -    118 Sodium Benzoate - -    119 Sodium Disulfite - -    120 Sorbic Acid - -    121 Thimerosal - -     Parabens      122 Butyl-P-Hydroxybenzoate - -    123 Ethyl-P-Hydroxybenzoate - -    124 Methyl-P-Hydroxybenzoate - -    125 Propyl-P-Hydroxybenzoate - -      METALS (Implants / Dental)         No Substance 2 days 4 days remarks   126 Silver Nitrate - -      Patients own:  7 products      PATIENTS OWN PRODUCTS         No Substance Conc  % solv 2 days 4 days remarks   127 Aquaphor        128 Aloe Vera        129 Candendura cream        130 Hydrocortison valerate        131 Hydrocortisone cream with Aloe        132 Silverstat                                   1           Patients own products:  è DO NOT test if chemical or biological identity is unknown!   - always ask from patient the product information and safety sheets and consult with the physician   - check neutral pH with pH indicator paper (do not test pH below 5 or over 8 or consult with physician)  - leave-on cosmetics can be tested \"as is\"  - rinse-off products have to be diluted with water, buffer, olive oil or paraffin (discuss with physician)  à remember:   - non-specific toxic/corrosive or biological reactions can occur  - tests with patients own products are not standardized and test conditions are not optimized for patch tests. Whenever possible test with standardized test series and correlate use of product with the result of the patch tests  - if not sure if compounds can be " tested under occlusion in patch tests consider open application tests       Results of patch tests:                         Interpretation:    - Negative                    A    = Allergic      (+) Erythema    TI   = Toxic/irritant   + E + Infiltration    RaP = Relevance at Present     ++ E/I + Papulovesicle   Rpr  = Relevance Previously     +++ E/I/P + Blister     nR   = No Relevance      [x] No relevant allergic reaction observed    [] Allergic reaction diagnosed against: see later      Interpretation/ remarks:   See later    [] Patient information given   [] ACSD information   [] [employer] information    ==> final Diagnosis:  See later    ==> Treatment prescribed/Plan:  See later      These conclusions are made at the best of ones knowledge and belief  based on the provided evidence such as patients history and allergy test results and they can change over time or can be incomplete because of missing informations.    CC Boogie Andrade MD  No address on file on close of this encounter.  Follow-up Wednesday.         Again, thank you for allowing me to participate in the care of your patient.      Sincerely,    Tano Arias MD

## 2019-03-15 ENCOUNTER — OFFICE VISIT (OUTPATIENT)
Dept: DERMATOLOGY | Facility: CLINIC | Age: 64
End: 2019-03-15
Payer: MEDICARE

## 2019-03-15 DIAGNOSIS — L23.3 ALLERGIC CONTACT DERMATITIS DUE TO DRUGS IN CONTACT WITH SKIN: Primary | ICD-10-CM

## 2019-03-15 ASSESSMENT — PAIN SCALES - GENERAL: PAINLEVEL: NO PAIN (0)

## 2019-03-15 NOTE — NURSING NOTE
Dermatology Rooming Note    Keely Velazquez's goals for this visit include:   Chief Complaint   Patient presents with     Allergies     Keely is here for day 5 patch tetsing    Chanel Phipps, CMA

## 2019-03-15 NOTE — PROGRESS NOTES
"[facility] Dermatology Note      Dermatology Problem List:  [facility] patient of Dr. Feroz Murcia  1. Suspected allergic contact dermatitis with id reaction in setting of radiotherapy and use of topical products, including hydrocortisone cream              - Recommended avoidance of all topical products except Vaseline              - Completely resolved after 16-day prednisone taper              - Patch testing beginning 3/11/19     2. 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 Keely Velazquez is a 63 year old female who presents as a follow-up for patch testing. The patient was last seen 3/1/19 when she was evaluated for ACD. Pt states she has been doing well since last seen 3/1. She doesn't have any new issues or questions. She brought several home products with her that were used around the time of her outbreak.   Pt otherwise feels well without any changes in general state of health. Denies any new, growing, changing, bleeding, or otherwise concerning/symptomatic skin findings. No other questions or concerns today.     From initial evaluation 3/1:  \"Ms. Keely Velazquez is a 63 year old female who presents as a referral from Dr. Junior in regards to allergic contact dermatitis. Her previous visit within our department was on 12/13/2018 with Dr. Junior as a follow-up to her current dermatitis issue. At that time it was recommended to avoid topical products, including SilvrStat ointment and hydrocortisone, and use only Vaseline for comfort. The previous note mentions that her rash completely resolved with the use of systemic steroids. Today she states she was sent over from oncology due to a \"terrible reaction\" from the radiotherapy. This therapy began ~Oct 2018 and finished Nov 2018. " "The radiology focus was on the right breast, which is where the reaction emerged. She used vasoline and Aquaphor on the site to keep it moisturized. From there she noticed a burning sensation at the site, and began an over-the-counter 1% hydrocortisone cream regimen. She does not believe there were any antibiotics in this cream. The irritation continued to worsen resulting in blistering/weeping lesions which spread to her armpit and wrists. From there she was prescribed hydrocortisone, followed by SilvrStat. She states the SilvrStat caused a worsening of her symptoms. The patient notes she is allergic to nickel which causes redness and scaling of the skin it contacts. She cannot wear wool, due to it irritating her shin. In addition, she avoids perfumed creams and products which can irritates her skin. In order to combat this she uses mild soaps and products. No Hx pf hay fever, food alergies, or asthma. Sometimes in the spring (~May timeframe) she will get itchy eyes and headaches. Patient has no other derm concerns at this time.\"       Past Medical History:   Patient Active Problem List   Diagnosis     Symptomatic varicose veins of both lower extremities     Malignant neoplasm of upper-outer quadrant of right breast in female, estrogen receptor positive (H)     Past Medical History:   Diagnosis Date     Hypertension      Iritis      Past Surgical History:   Procedure Laterality Date     CATARACT IOL, RT/LT Left 2006     HYSTERECTOMY  2016     LUMPECTOMY BREAST WITH SENTINEL NODE, COMBINED Right 8/31/2018    Procedure: COMBINED LUMPECTOMY BREAST WITH SENTINEL NODE;  Right Wire Localized Lumpectomy and Right [place] Lymph Node Biopsy;  Surgeon: Chilango Kruger MD;  Location: [facility] OR     OOPHORECTOMY  2014       Social History:  Patient reports that  has never smoked. she has never used smokeless tobacco. She reports that she drinks alcohol. She reports that she does not use drugs.    Family History:  No family history " on file.    Medications:  Current Outpatient Medications   Medication Sig Dispense Refill     acetaminophen (TYLENOL) 325 MG tablet Take 2 tablets (650 mg) by mouth every 4 hours as needed for mild pain 60 tablet 0     AmLODIPine Besylate (NORVASC PO) Take 5 mg by mouth daily       anastrozole (ARIMIDEX) 1 MG tablet Take 1 tablet (1 mg) by mouth daily 30 tablet 11     calcium carbonate (OS-PADDY 500 MG Lummi. CA) 500 MG tablet Take 500 mg by mouth 2 times daily With Magnesium,Zinc       desonide (DESOWEN) 0.05 % cream        doxycycline hyclate (VIBRAMYCIN) 100 MG capsule Take one capsule twice daily for 10 days       HYDROcodone-acetaminophen (NORCO) 5-325 MG per tablet Take 1 tablet by mouth every 6 hours as needed for severe pain 30 tablet 0     LYSINE PO        multivitamin, therapeutic with minerals (MULTI-VITAMIN) TABS Take 1 tablet by mouth daily       Omega-3 Fatty Acids (OMEGA-3 FISH OIL PO)        oxyCODONE IR (ROXICODONE) 5 MG tablet 5-10 mg by mouth every 4 to 6 hours as needed for pain 30 tablet 0     Urea 40 % CREA Apply to fingernails twice daily, especially at bedtime.       VITAMIN D, CHOLECALCIFEROL, PO Take 1,000 Units by mouth daily           Allergies   Allergen Reactions     Codeine Nausea and Vomiting     Macrobid [Nitrofurantoin] Nausea and Vomiting     Sulfa Drugs Itching     Silver Rash         Review of Systems:  -As per HPI  -Constitutional: Otherwise feeling well today, in usual state of health.  -HEENT: Patient denies nonhealing oral sores.  -Skin: As above in HPI. No additional skin concerns.    Physical exam:  Vitals: There were no vitals taken for this visit.  GEN: This is a well developed, well-nourished female in no acute distress, in a pleasant mood.    SKIN: Focused examination of the face, neck, arms, back was performed.  - no lesions today but according to the photos in the 2018 notes there were eczematous lesions on wrists, R axilla, and R breast. This spreading manifested in  weeping eczematous lesions.  - no other lesions of concern on areas examined.     Impression/Plan:    1. Atopic predisposition and possible seasonal allergis with  - rhino conjunctivitis to tree pollen.  - Wool intolerance  - Nickel or metal allergy  - atopic dermatitis around ears and nose sometimes     2. Suspicion for allergic contact dermatitis to to hydrocortisone cream/ Aquaphor/silverstat gel (preservative , additives, or hyrocortizone)    Patch testing today as below        Order for PATCH TESTS     [x] Outpatient                          [] Inpatient: [facility]..../ Bed ....                    Skin Atopy (atopic dermatitis)        [x] Yes   [] No  Rhinitis/Sinusitis:                              [x] Yes   [] No  Allergic Asthma:                                [] Yes   [x] No  Food Allergy:                                     [] Yes   [x] No  Leg ulcers:                                         [] Yes   [x] No  Hand eczema:                                    [] Yes   [x] No                                 Leading hand:   [x] R   [] L       [] Ambidextrous                         Reason for tests (suspected allergy): possible allergic contact dermatitis to Hydrocortisone cream, Silvrstat or Aquaphor  Known previous allergies: Nickel     Standardized panels  [x] Standard panel (40 tests)  [x] Preservatives & Antimicrobials (31 tests)  [x] Emulsifiers & Additives (25 tests)   [] Perfumes/Flavours & Plants (25 tests)  [] Hairdresser panel (12 tests)  [] Rubber Chemicals (22 tests)  [] Plastics (26 tests)  [] Colorants/Dyes/Food additives (20 tests)  [] Metals (implants/dental) (23 tests)  [] Local anaesthetics/NSAIDs (13 tests)  [x] Antibiotics & Antimycotics (14 tests)   [x] Corticosteroids (15 tests)   [] Photopatch test (32 tests)   [x] others: from metal series add silver                         [x] Patient's own products: Aquaphor, Silvrstat, Hydrocortisone valerate, Calendula cream, Hydrocortisone cream with  aloe, Aquaphor, Aloe vera 100% gel    DO NOT test if chemical or biological identity is unknown!     always ask from patient the product information and safety sheets (MSDS)        RESULTS & EVALUATION of PATCH TESTS    Patch test readings after     [x] 2 days, [] 3 days [x] 4 days, [] 5 days    Applied patch tests with results (import here the list of patch tests):  Date/time of application:03/11/2019  Physician/Nurse:  / Chanel Phipps, [facility]               Localization of application: Back    STANDARD Series         No Substance 2 days 4 days remarks   1 Regino Mix [C] - -    2 Colophony - -    3  2-Mercaptobenzothiazole  - -     4 Methylisothiazolinone - -    5 Carba Mix - -    6 Thiuram Mix [A] - -    7 Bisphenol A Epoxy Resin - -    8 B-Iqtb-Tjbhfqjraoa-Formaldehyde Resin - -    9 Mercapto Mix [A] - -    10 Black Rubber Mix- PPD [B] - -    11 Potassium Dichromate  -  -    12 Balsam of [place] (Myroxylon Pereirae Resin) - -    13 Nickel Sulphate Hexahydrate - -    14 Mixed Dialkyl Thiourea - -    15 Paraben Mix [B] - -    16 Methyldibromo Glutaronitrile - -    17 Fragrance Mix - -    18 2-Bromo-2-Nitropropane-1,3-Diol (Bronopol) - -    19 Lyral - -    20 Tixocortol-21- Pivalate - -    21 Diazolidiyl Urea (Germall II) - -    22 Methyl Methacrylate - -    23 Cobalt (II) Chloride Hexahydrate - -    24 Fragrance Mix II  - -    25 Compositae Mix - -    26 Benzoyl Peroxide + +/++    27 Bacitracin - -    28 Formaldehyde - -    29 Methylchloroisothiazolinone / Methylisothiazolinone - -    30 Corticosteroid Mix - +    31 Sodium Lauryl Sulfate - -    32 Lanolin Alcohol - -    33 Turpentine - -    34 Cetylstearylalcohol - -    35 Chlorhexidine Dicluconate - -    36 Budenoside - +/++    37 Imidazolidinyl Urea  - -    38 Ethyl-2 Cyanoacrylate - -    39 Quaternium 15 (Dowicil 200) - -    40 Decyl Glucoside - -      EMULSIFIERS & ADDITIVES        No Substance 2 days 4 days remarks   41 Polyethylene Glycol-400 - -    42  Cocamidopropyl Betaine - -    43 Amerchol L101 - -    44 Propylene Glycol - -    45 Triethanolamine - -    46 Sorbitane Sesquiolate (+) -    47 Isopropylmyristate (+) -    48 Polysorbate 80  (+) -    49 Amidoamine   (Stearamidopropyl Dimethylamine) - -    50 Oleamidopropyl Dimethylamine - -    51 Lauryl Glucoside - -    52 Coconut Diethanolamide  (+) -    53 2-Hydroxy-4-Methoxy Benzophenone (Oxybenzone) - -    54 Benzophenone-4 (Sulisobenzon) - -    55 Propolis - -    56 Dexpanthenol - -    57 Carboxymethyl Cellulose Sodium - -    58 Abitol - -    59 Tert-Butylhydroquinone - -    60 Benzyl Salicylate - -     Antioxidant      61 Dodecyl Gallate - -    62 Butylhydroxyanisole (BHA) - -    63 Butylhydroxytoluene (BHT) - -    64 Di-Alpha-Tocopherol (Vit E) - -    65 Propyl Gallate - -      CORTICOSTEROIDS    No Substance 2 days 4 days remarks Allergy  class   66 Amcinonide - -  B   67 Betametasone-17,21 Dipropionate - -  D1   68 Desoximetasone - -  C   69 Betamethasone-17-Valerate - -  D1   70 Dexamethasone - -  C   71 Hydrocortisone - -  A   72 Clobetasol-17-Propionate - -  D1   73 Dexamethasone-21-Phosphate Disodium Salt - -  C   74 Hydrocortisone-17 Butyrate - -  D2   75 Prednisolone - -  A   76 Mometason Furoate - -  D1   77 Triamcinolone Acetonide - -  B   78 Methylprednisolone Aceponate - -  D2   79 Hydrocortisone-21-Acetate - -  A   80 Prednicarbate - -  D2       Group Characteristics of group Generic name Name  cross reactions   A Hydrocortisone   Cloprednole, Fludrocortisone acétate, Hydrocortison acetate, Methylprednisolone, Prednisolone, Tixocortolpivalate Alfacortone, Fucidin H, Dermacalm, Hexacortone, Premandole, Imacort With group D2   B Triamcinolone-acetonide   Budenoside (R-isomer), Amcinonide, Desonide, Fluocinolone acetonide, Triamcinolone acetonide Locapred, Locatop  Synalar, Pevisone, [place] -   C Betamethasone (Without Radha)   Betamethasone, Dexamethasone, Flumethasone pivalate, Halomethasone  Daivobet, Dexasalyl, Locasalen,   -   D1 Betamethasone-diproprionate   Betamethasone dipropionate, Betamethasone-17-valerate, Clobetasole-propionate, Fluticasone propionate, Mometasone furoate Betnovate, Diprogenta, Diprosalic, Diprosone, Celestoderm, Fucicort,  Cutivate, Axotide, Elocom -   D2 Methylprednisolone-aceponate   Hydrocortisone-aceponate, Hydrocortisone-buteprate, Hydrocortisone-17-butyrate, Methylprednisolone aceponate, Prednicarbate Locoïd, Advantan,  Prednitop With group A and Budesonide (S-isomer)     ANTIBIOTICS & ANTIMYCOTICS         No Substance 2 days 4 days remarks   81 Erythromycine - -    82 Framycetine Sulphate - +    83 Fusidic Acid Sodium Salt - -    84 Gentamicin Sulphate - -    85 Neomycine Sulphate - +/++    86 Oxytetracycline  - -    87 Polymyxin B Sulphate - -    88 Tetracycline-HCL - -    89 Sulfanilamide - -    90 Metronidazole - -    91 Oxyquinoline Mix - -    92 Nitrofurazone - -    93 Nystatin - -    94 Clotrimazole - -        PRESERVATIVES & ANTIMICROBIALS         No Substance 2 days 4 days remarks   95  1,2-Benzisothiazoline-3-One, Sodium Salt - -    96  1,3,5-Ryder (2-Hydroxyethyl) - Hexahydrotriazine (Grotan BK) - -    97 7-Xkxrzuacvjfcq-9-Nitro-1, 3-Propanediol - -    98  3, 4, 4' - Triclocarban - -    99 4 - Chloro - 3 - Cresol - -    100 4 - Chloro - 4 - Xylenol (PCMX) - -    101 7-Ethylbicyclooxazolidine (Bioban SE3165) - -    102 Benzalkonium Chloride - -    103 Benzyl Alcohol - -    104 Cetalkonium Chloride - -    105 Cetylpyrimidine Chloride  - -    106 Chloroacetamide - -    107 DMDM Hydantoin - -    108 Glutaraldehyde - -    109 Triclosan - -    110 Glyoxal Trimeric Dihydrate - -    111 Iodopropynyl Butylcarbamate - -    112 Octylisothiazoline - -    113 Iodoform - -    114 (Nitrobutyl) Morpholine/(Ethylnitro-Trimethylene) Dimorpholine (Bioban P 1487) - -    115 Phenoxyethanol - -    116 Phenyl Salicylate - -    117 Povidone Iodine - -    118 Sodium Benzoate - -    119  "Sodium Disulfite - -    120 Sorbic Acid - -    121 Thimerosal - -     Parabens      122 Butyl-P-Hydroxybenzoate - -    123 Ethyl-P-Hydroxybenzoate - -    124 Methyl-P-Hydroxybenzoate - -    125 Propyl-P-Hydroxybenzoate - -      METALS (Implants / Dental)         No Substance 2 days 4 days remarks   126 Silver Nitrate - -      Patients own:  7 products      PATIENTS OWN PRODUCTS         No Substance Conc  % solv 2 days 4 days remarks   127 Aquaphor   - -    128 Aloe Vera   - -    129 Candendula cream   - -    130 Hydrocortison valerate   - -    131 Hydrocortisone cream with Aloe   - -    132 Silverstat   - -                               1           Patients own products:  è DO NOT test if chemical or biological identity is unknown!   - always ask from patient the product information and safety sheets and consult with the physician   - check neutral pH with pH indicator paper (do not test pH below 5 or over 8 or consult with physician)  - leave-on cosmetics can be tested \"as is\"  - rinse-off products have to be diluted with water, buffer, olive oil or paraffin (discuss with physician)  à remember:   - non-specific toxic/corrosive or biological reactions can occur  - tests with patients own products are not standardized and test conditions are not optimized for patch tests. Whenever possible test with standardized test series and correlate use of product with the result of the patch tests  - if not sure if compounds can be tested under occlusion in patch tests consider open application tests       Results of patch tests:                         Interpretation:    - Negative                    A    = Allergic      (+) Erythema    TI   = Toxic/irritant   + E + Infiltration    RaP = Relevance at Present     ++ E/I + Papulovesicle   Rpr  = Relevance Previously     +++ E/I/P + Blister     nR   = No Relevance      [x] Allergic reaction diagnosed against:   +/++ benzyl peroxide, +/++ neomycin, + Framycetine " Sulphate  Corticosteroids: +/++ Budenoside, + corticosteroid mix,     Interpretation/ remarks:   Patient reacticed to Budenoside (R-isomer) which has a cross reactions with, for example, Amcinonide, Desonide, Fluocinolone acetonide, Triamcinolone acetonide. However the patient did not seem to use these types of corticosteroids during the previous regimen on the breast. For the moment there is no sign for a reaction to the hydrocortisone, therefore we will make an additional late reading after 7 days. According to the patient the reactions on the breast came about 1 week after she started using the hydrocortisone cream, which could be an argument for a corticosteroid induced reaction.  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 This is relevant for future needs which may include concrete implants/replacements, therefore cement free implants should be used.  The patient has an allergic reaction to topical antibiotics like neomycin and should therefore should avoid any product containing these. This includes neosporin, etc.    [] Patient information given   [] [facility] information   [] [employer] information    ==> final Diagnosis:  1) Atopic predisposition, with seasonal rhino conjunctivitis. Reports nickel sensitivity or metal sensitivity. Sensitive to wool.    2) Proven allergic contact dermatitis to corticosteroid and some antibiotics. Will conclude on Monday.    These conclusions are made at the best of ones knowledge and belief  based on the provided evidence such as patients history and allergy test results and they can change over time or can be incomplete because of missing informations.    ==> Treatment prescribed/Plan:          CC Referred MD Raymond  .     I spent a total of 25 min face to face with Keely Velazquez during today's office visit. About 50% of the time was spent counseling the patient and/or coordinating care regarding their allergy.

## 2019-03-15 NOTE — PATIENT INSTRUCTIONS
RESULTS & EVALUATION of PATCH TESTS    Patch test readings after     [x] 2 days, [] 3 days [x] 4 days, [] 5 days    Applied patch tests with results (import here the list of patch tests):  Date/time of application:03/11/2019  Physician/Nurse:  / Chanel Phipps [facility]               Localization of application: Back    STANDARD Series         No Substance 2 days 4 days remarks   1 Regino Mix [C] - -    2 Colophony - -    3  2-Mercaptobenzothiazole  - -     4 Methylisothiazolinone - -    5 Carba Mix - -    6 Thiuram Mix [A] - -    7 Bisphenol A Epoxy Resin - -    8 Q-Lvek-Elylardrarm-Formaldehyde Resin - -    9 Mercapto Mix [A] - -    10 Black Rubber Mix- PPD [B] - -    11 Potassium Dichromate  -  -    12 Balsam of [place] (Myroxylon Pereirae Resin) - -    13 Nickel Sulphate Hexahydrate - -    14 Mixed Dialkyl Thiourea - -    15 Paraben Mix [B] - -    16 Methyldibromo Glutaronitrile - -    17 Fragrance Mix - -    18 2-Bromo-2-Nitropropane-1,3-Diol (Bronopol) - -    19 Lyral - -    20 Tixocortol-21- Pivalate - -    21 Diazolidiyl Urea (Germall II) - -    22 Methyl Methacrylate - -    23 Cobalt (II) Chloride Hexahydrate - -    24 Fragrance Mix II  - -    25 Compositae Mix - -    26 Benzoyl Peroxide + +/++    27 Bacitracin - -    28 Formaldehyde - -    29 Methylchloroisothiazolinone / Methylisothiazolinone - -    30 Corticosteroid Mix - +    31 Sodium Lauryl Sulfate - -    32 Lanolin Alcohol - -    33 Turpentine - -    34 Cetylstearylalcohol - -    35 Chlorhexidine Dicluconate - -    36 Budenoside - +/++    37 Imidazolidinyl Urea  - -    38 Ethyl-2 Cyanoacrylate - -    39 Quaternium 15 (Dowicil 200) - -    40 Decyl Glucoside - -      EMULSIFIERS & ADDITIVES        No Substance 2 days 4 days remarks   41 Polyethylene Glycol-400 - -    42 Cocamidopropyl Betaine - -    43 Amerchol L101 - -    44 Propylene Glycol - -    45 Triethanolamine - -    46 Sorbitane Sesquiolate (+) -    47 Isopropylmyristate (+) -    48  Polysorbate 80  (+) -    49 Amidoamine   (Stearamidopropyl Dimethylamine) - -    50 Oleamidopropyl Dimethylamine - -    51 Lauryl Glucoside - -    52 Coconut Diethanolamide  (+) -    53 2-Hydroxy-4-Methoxy Benzophenone (Oxybenzone) - -    54 Benzophenone-4 (Sulisobenzon) - -    55 Propolis - -    56 Dexpanthenol - -    57 Carboxymethyl Cellulose Sodium - -    58 Abitol - -    59 Tert-Butylhydroquinone - -    60 Benzyl Salicylate - -     Antioxidant      61 Dodecyl Gallate - -    62 Butylhydroxyanisole (BHA) - -    63 Butylhydroxytoluene (BHT) - -    64 Di-Alpha-Tocopherol (Vit E) - -    65 Propyl Gallate - -      CORTICOSTEROIDS    No Substance 2 days 4 days remarks Allergy  class   66 Amcinonide - -  B   67 Betametasone-17,21 Dipropionate - -  D1   68 Desoximetasone - -  C   69 Betamethasone-17-Valerate - -  D1   70 Dexamethasone - -  C   71 Hydrocortisone - -  A   72 Clobetasol-17-Propionate - -  D1   73 Dexamethasone-21-Phosphate Disodium Salt - -  C   74 Hydrocortisone-17 Butyrate - -  D2   75 Prednisolone - -  A   76 Mometason Furoate - -  D1   77 Triamcinolone Acetonide - -  B   78 Methylprednisolone Aceponate - -  D2   79 Hydrocortisone-21-Acetate - -  A   80 Prednicarbate - -  D2       Group Characteristics of group Generic name Name  cross reactions   A Hydrocortisone   Cloprednole, Fludrocortisone acétate, Hydrocortison acetate, Methylprednisolone, Prednisolone, Tixocortolpivalate Alfacortone, Fucidin H, Dermacalm, Hexacortone, Premandole, Imacort With group D2   B Triamcinolone-acetonide   Budenoside (R-isomer), Amcinonide, Desonide, Fluocinolone acetonide, Triamcinolone acetonide Locapred, Locatop  Synalar, Pevisone, [place] -   C Betamethasone (Without Radha)   Betamethasone, Dexamethasone, Flumethasone pivalate, Halomethasone Daivobet, Dexasalyl, Locasalen,   -   D1 Betamethasone-diproprionate   Betamethasone dipropionate, Betamethasone-17-valerate, Clobetasole-propionate, Fluticasone propionate,  Mometasone furoate Betnovate, Diprogenta, Diprosalic, Diprosone, Celestoderm, Fucicort,  Cutivate, Axotide, Elocom -   D2 Methylprednisolone-aceponate   Hydrocortisone-aceponate, Hydrocortisone-buteprate, Hydrocortisone-17-butyrate, Methylprednisolone aceponate, Prednicarbate Locoïd, Advantan,  Prednitop With group A and Budesonide (S-isomer)     ANTIBIOTICS & ANTIMYCOTICS         No Substance 2 days 4 days remarks   81 Erythromycine - -    82 Framycetine Sulphate - +    83 Fusidic Acid Sodium Salt - -    84 Gentamicin Sulphate - -    85 Neomycine Sulphate - +/++    86 Oxytetracycline  - -    87 Polymyxin B Sulphate - -    88 Tetracycline-HCL - -    89 Sulfanilamide - -    90 Metronidazole - -    91 Oxyquinoline Mix - -    92 Nitrofurazone - -    93 Nystatin - -    94 Clotrimazole - -        PRESERVATIVES & ANTIMICROBIALS         No Substance 2 days 4 days remarks   95  1,2-Benzisothiazoline-3-One, Sodium Salt - -    96  1,3,5-Ryder (2-Hydroxyethyl) - Hexahydrotriazine (Grotan BK) - -    97 8-Mghkidabfggas-1-Nitro-1, 3-Propanediol - -    98  3, 4, 4' - Triclocarban - -    99 4 - Chloro - 3 - Cresol - -    100 4 - Chloro - 4 - Xylenol (PCMX) - -    101 7-Ethylbicyclooxazolidine (Bioban [id number]) - -    102 Benzalkonium Chloride - -    103 Benzyl Alcohol - -    104 Cetalkonium Chloride - -    105 Cetylpyrimidine Chloride  - -    106 Chloroacetamide - -    107 DMDM Hydantoin - -    108 Glutaraldehyde - -    109 Triclosan - -    110 Glyoxal Trimeric Dihydrate - -    111 Iodopropynyl Butylcarbamate - -    112 Octylisothiazoline - -    113 Iodoform - -    114 (Nitrobutyl) Morpholine/(Ethylnitro-Trimethylene) Dimorpholine (Bioban P 1487) - -    115 Phenoxyethanol - -    116 Phenyl Salicylate - -    117 Povidone Iodine - -    118 Sodium Benzoate - -    119 Sodium Disulfite - -    120 Sorbic Acid - -    121 Thimerosal - -     Parabens      122 Butyl-P-Hydroxybenzoate - -    123 Ethyl-P-Hydroxybenzoate - -    124  "Methyl-P-Hydroxybenzoate - -    125 Propyl-P-Hydroxybenzoate - -      METALS (Implants / Dental)         No Substance 2 days 4 days remarks   126 Silver Nitrate - -      Patients own:  7 products      PATIENTS OWN PRODUCTS         No Substance Conc  % solv 2 days 4 days remarks   127 Aquaphor   - -    128 Aloe Vera   - -    129 Candendula cream   - -    130 Hydrocortison valerate   - -    131 Hydrocortisone cream with Aloe   - -    132 Silverstat   - -                               1           Patients own products:  è DO NOT test if chemical or biological identity is unknown!   - always ask from patient the product information and safety sheets and consult with the physician   - check neutral pH with pH indicator paper (do not test pH below 5 or over 8 or consult with physician)  - leave-on cosmetics can be tested \"as is\"  - rinse-off products have to be diluted with water, buffer, olive oil or paraffin (discuss with physician)  à remember:   - non-specific toxic/corrosive or biological reactions can occur  - tests with patients own products are not standardized and test conditions are not optimized for patch tests. Whenever possible test with standardized test series and correlate use of product with the result of the patch tests  - if not sure if compounds can be tested under occlusion in patch tests consider open application tests       Results of patch tests:                         Interpretation:    - Negative                    A    = Allergic      (+) Erythema    TI   = Toxic/irritant   + E + Infiltration    RaP = Relevance at Present     ++ E/I + Papulovesicle   Rpr  = Relevance Previously     +++ E/I/P + Blister     nR   = No Relevance      [] Allergic reaction diagnosed against: see later  +/++ benzyl peroxide, +/++ neomycin, + Framycetine Sulphate  Corticosteroids: +/++ Budenoside, + corticosteroid mix,     Interpretation/ remarks:   Patient reacticed to Budenoside (R-isomer) which has a cross reactions " with, for example, Amcinonide, Desonide, Fluocinolone acetonide, Triamcinolone acetonide. However the patient did not seem to use these types of corticosteroids during the previous regimen on the breast. For the moment there is no sign for a reaction to the hydrocortisone, therefore we will make an additional late reading after 7 days. According to the patient the reactions on the breast came about 1 week after she started using the hydrocortisone cream, which could be an argument for a corticosteroid induced reaction.  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 This is relevant for future needs which may include concrete implants/replacements, therefore cement free implants should be used.  The patient has an allergic reaction to topical antibiotics like neomycin and should therefore should avoid any product containing these. This includes neosporin, etc.    => final Diagnosis:  1) Atopic predisposition, with seasonal rhino conjunctivitis. Reports nickel sensitivity or metal sensitivity. Sensitive to wool.    2) Proven allergic contact dermatitis to corticosteroid and some antibiotics. Will conclude on Monday.    ==> Treatment prescribed/Plan:      These conclusions are made at the best of ones knowledge and belief  based on the provided evidence such as patients history and allergy test results and they can change over time or can be incomplete because of missing informations.    CC Referred MD Raymond  No address on file on close of this encounter.  Follow-up Wednesday.

## 2019-03-15 NOTE — LETTER
"  RE: Keely Velazquez  [address]  [facility] [address]     Dear Colleague,    Thank you for referring your patient, Keely Velazquez, to the [facility] at [facility]. Please see a copy of my visit note below.                            [facility] Dermatology Note      Dermatology Problem List:  [facility] patient of Dr. Feroz Murcia  1. Suspected allergic contact dermatitis with id reaction in setting of radiotherapy and use of topical products, including hydrocortisone cream              - Recommended avoidance of all topical products except Vaseline              - Completely resolved after 16-day prednisone taper              - Patch testing beginning 3/11/19     2. 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 Keely Velazquez is a 63 year old female who presents as a follow-up for patch testing. The patient was last seen 3/1/19 when she was evaluated for ACD. Pt states she has been doing well since last seen 3/1. She doesn't have any new issues or questions. She brought several home products with her that were used around the time of her outbreak.   Pt otherwise feels well without any changes in general state of health. Denies any new, growing, changing, bleeding, or otherwise concerning/symptomatic skin findings. No other questions or concerns today.     From initial evaluation 3/1:  \"Ms. Keely Velazquez is a 63 year old female who presents as a referral from Dr. Junior in regards to allergic contact dermatitis. Her previous visit within our department was on 12/13/2018 with Dr. Junior as a follow-up to her current dermatitis issue. At that time it was recommended to avoid topical products, including SilvrStat ointment and " "hydrocortisone, and use only Vaseline for comfort. The previous note mentions that her rash completely resolved with the use of systemic steroids. Today she states she was sent over from oncology due to a \"terrible reaction\" from the radiotherapy. This therapy began ~Oct 2018 and finished Nov 2018. The radiology focus was on the right breast, which is where the reaction emerged. She used vasoline and Aquaphor on the site to keep it moisturized. From there she noticed a burning sensation at the site, and began an over-the-counter 1% hydrocortisone cream regimen. She does not believe there were any antibiotics in this cream. The irritation continued to worsen resulting in blistering/weeping lesions which spread to her armpit and wrists. From there she was prescribed hydrocortisone, followed by SilvrStat. She states the SilvrStat caused a worsening of her symptoms. The patient notes she is allergic to nickel which causes redness and scaling of the skin it contacts. She cannot wear wool, due to it irritating her shin. In addition, she avoids perfumed creams and products which can irritates her skin. In order to combat this she uses mild soaps and products. No Hx pf hay fever, food alergies, or asthma. Sometimes in the spring (~May timeframe) she will get itchy eyes and headaches. Patient has no other derm concerns at this time.\"       Past Medical History:   Patient Active Problem List   Diagnosis     Symptomatic varicose veins of both lower extremities     Malignant neoplasm of upper-outer quadrant of right breast in female, estrogen receptor positive (H)     Past Medical History:   Diagnosis Date     Hypertension      Iritis      Past Surgical History:   Procedure Laterality Date     CATARACT IOL, RT/LT Left 2006     HYSTERECTOMY  2016     LUMPECTOMY BREAST WITH SENTINEL NODE, COMBINED Right 8/31/2018    Procedure: COMBINED LUMPECTOMY BREAST WITH SENTINEL NODE;  Right Wire Localized Lumpectomy and Right [place] Lymph " Node Biopsy;  Surgeon: Chilango Kruger MD;  Location: [facility] OR     OOPHORECTOMY  2014       Social History:  Patient reports that  has never smoked. she has never used smokeless tobacco. She reports that she drinks alcohol. She reports that she does not use drugs.    Family History:  No family history on file.    Medications:  Current Outpatient Medications   Medication Sig Dispense Refill     acetaminophen (TYLENOL) 325 MG tablet Take 2 tablets (650 mg) by mouth every 4 hours as needed for mild pain 60 tablet 0     AmLODIPine Besylate (NORVASC PO) Take 5 mg by mouth daily       anastrozole (ARIMIDEX) 1 MG tablet Take 1 tablet (1 mg) by mouth daily 30 tablet 11     calcium carbonate (OS-PADDY 500 MG [place]. [place]) 500 MG tablet Take 500 mg by mouth 2 times daily With Magnesium,Zinc       desonide (DESOWEN) 0.05 % cream        doxycycline hyclate (VIBRAMYCIN) 100 MG capsule Take one capsule twice daily for 10 days       HYDROcodone-acetaminophen (NORCO) 5-325 MG per tablet Take 1 tablet by mouth every 6 hours as needed for severe pain 30 tablet 0     LYSINE PO        multivitamin, therapeutic with minerals (MULTI-VITAMIN) TABS Take 1 tablet by mouth daily       Omega-3 Fatty Acids (OMEGA-3 FISH OIL PO)        oxyCODONE IR (ROXICODONE) 5 MG tablet 5-10 mg by mouth every 4 to 6 hours as needed for pain 30 tablet 0     Urea 40 % CREA Apply to fingernails twice daily, especially at bedtime.       VITAMIN D, CHOLECALCIFEROL, PO Take 1,000 Units by mouth daily           Allergies   Allergen Reactions     Codeine Nausea and Vomiting     Macrobid [Nitrofurantoin] Nausea and Vomiting     Sulfa Drugs Itching     Silver Rash     Physical exam:  Vitals: There were no vitals taken for this visit.  GEN: This is a well developed, well-nourished female in no acute distress, in a pleasant mood.    SKIN: Focused examination of the face, neck, arms, back was performed.  - no lesions today but according to the photos in the 2018 notes there were  eczematous lesions on wrists, R axilla, and R breast. This spreading manifested in weeping eczematous lesions.  - no other lesions of concern on areas examined.     Impression/Plan:    1. Atopic predisposition and possible seasonal allergis with  - rhino conjunctivitis to tree pollen.  - Wool intolerance  - Nickel or metal allergy  - atopic dermatitis around ears and nose sometimes     2. Suspicion for allergic contact dermatitis to to hydrocortisone cream/ Aquaphor/silverstat gel (preservative , additives, or hyrocortizone)    Patch testing today as below        Order for PATCH TESTS     [x] Outpatient                          [] Inpatient: [facility]..../ Bed ....                    Skin Atopy (atopic dermatitis)        [x] Yes   [] No  Rhinitis/Sinusitis:                              [x] Yes   [] No  Allergic Asthma:                                [] Yes   [x] No  Food Allergy:                                     [] Yes   [x] No  Leg ulcers:                                         [] Yes   [x] No  Hand eczema:                                    [] Yes   [x] No                                 Leading hand:   [x] R   [] L       [] Ambidextrous                         Reason for tests (suspected allergy): possible allergic contact dermatitis to Hydrocortisone cream, Silvrstat or Aquaphor  Known previous allergies: Nickel     Standardized panels  [x] Standard panel (40 tests)  [x] Preservatives & Antimicrobials (31 tests)  [x] Emulsifiers & Additives (25 tests)   [] Perfumes/Flavours & Plants (25 tests)  [] Hairdresser panel (12 tests)  [] Rubber Chemicals (22 tests)  [] Plastics (26 tests)  [] Colorants/Dyes/Food additives (20 tests)  [] Metals (implants/dental) (23 tests)  [] Local anaesthetics/NSAIDs (13 tests)  [x] Antibiotics & Antimycotics (14 tests)   [x] Corticosteroids (15 tests)   [] Photopatch test (32 tests)   [x] others: from metal series add silver                         [x] Patient's own products: Aquaphor,  Silvrstat, Hydrocortisone valerate, Calendula cream, Hydrocortisone cream with aloe, Aquaphor, Aloe vera 100% gel    DO NOT test if chemical or biological identity is unknown!     always ask from patient the product information and safety sheets (MSDS)        RESULTS & EVALUATION of PATCH TESTS    Patch test readings after     [x] 2 days, [] 3 days [x] 4 days, [] 5 days    Applied patch tests with results (import here the list of patch tests):  Date/time of application:03/11/2019  Physician/Nurse:  / Chanel Phipps, [facility]               Localization of application: Back    STANDARD Series         No Substance 2 days 4 days remarks   1 Regino Mix [C] - -    2 Colophony - -    3  2-Mercaptobenzothiazole  - -     4 Methylisothiazolinone - -    5 Carba Mix - -    6 Thiuram Mix [A] - -    7 Bisphenol A Epoxy Resin - -    8 G-Mnmx-Mepptqqahdf-Formaldehyde Resin - -    9 Mercapto Mix [A] - -    10 Black Rubber Mix- PPD [B] - -    11 Potassium Dichromate  -  -    12 Balsam of [place] (Myroxylon Pereirae Resin) - -    13 Nickel Sulphate Hexahydrate - -    14 Mixed Dialkyl Thiourea - -    15 Paraben Mix [B] - -    16 Methyldibromo Glutaronitrile - -    17 Fragrance Mix - -    18 2-Bromo-2-Nitropropane-1,3-Diol (Bronopol) - -    19 Lyral - -    20 Tixocortol-21- Pivalate - -    21 Diazolidiyl Urea (Germall II) - -    22 Methyl Methacrylate - -    23 Cobalt (II) Chloride Hexahydrate - -    24 Fragrance Mix II  - -    25 Compositae Mix - -    26 Benzoyl Peroxide + +/++    27 Bacitracin - -    28 Formaldehyde - -    29 Methylchloroisothiazolinone / Methylisothiazolinone - -    30 Corticosteroid Mix - +    31 Sodium Lauryl Sulfate - -    32 Lanolin Alcohol - -    33 Turpentine - -    34 Cetylstearylalcohol - -    35 Chlorhexidine Dicluconate - -    36 Budenoside - +/++    37 Imidazolidinyl Urea  - -    38 Ethyl-2 Cyanoacrylate - -    39 Quaternium 15 (Dowicil 200) - -    40 Decyl Glucoside - -      EMULSIFIERS &  ADDITIVES        No Substance 2 days 4 days remarks   41 Polyethylene Glycol-400 - -    42 Cocamidopropyl Betaine - -    43 Amerchol L101 - -    44 Propylene Glycol - -    45 Triethanolamine - -    46 Sorbitane Sesquiolate (+) -    47 Isopropylmyristate (+) -    48 Polysorbate 80  (+) -    49 Amidoamine   (Stearamidopropyl Dimethylamine) - -    50 Oleamidopropyl Dimethylamine - -    51 Lauryl Glucoside - -    52 Coconut Diethanolamide  (+) -    53 2-Hydroxy-4-Methoxy Benzophenone (Oxybenzone) - -    54 Benzophenone-4 (Sulisobenzon) - -    55 Propolis - -    56 Dexpanthenol - -    57 Carboxymethyl Cellulose Sodium - -    58 Abitol - -    59 Tert-Butylhydroquinone - -    60 Benzyl Salicylate - -     Antioxidant      61 Dodecyl Gallate - -    62 Butylhydroxyanisole (BHA) - -    63 Butylhydroxytoluene (BHT) - -    64 Di-Alpha-Tocopherol (Vit E) - -    65 Propyl Gallate - -      CORTICOSTEROIDS    No Substance 2 days 4 days remarks Allergy  class   66 Amcinonide - -  B   67 Betametasone-17,21 Dipropionate - -  D1   68 Desoximetasone - -  C   69 Betamethasone-17-Valerate - -  D1   70 Dexamethasone - -  C   71 Hydrocortisone - -  A   72 Clobetasol-17-Propionate - -  D1   73 Dexamethasone-21-Phosphate Disodium Salt - -  C   74 Hydrocortisone-17 Butyrate - -  D2   75 Prednisolone - -  A   76 Mometason Furoate - -  D1   77 Triamcinolone Acetonide - -  B   78 Methylprednisolone Aceponate - -  D2   79 Hydrocortisone-21-Acetate - -  A   80 Prednicarbate - -  D2       Group Characteristics of group Generic name Name  cross reactions   A Hydrocortisone   Cloprednole, Fludrocortisone acétate, Hydrocortison acetate, Methylprednisolone, Prednisolone, Tixocortolpivalate Alfacortone, Fucidin H, Dermacalm, Hexacortone, Premandole, Imacort With group D2   B Triamcinolone-acetonide   Budenoside (R-isomer), Amcinonide, Desonide, Fluocinolone acetonide, Triamcinolone acetonide Locapred, Locatop  Synalar, Pevisone, [place] -   C  Betamethasone (Without Radha)   Betamethasone, Dexamethasone, Flumethasone pivalate, Halomethasone Daivobet, Dexasalyl, Locasalen,   -   D1 Betamethasone-diproprionate   Betamethasone dipropionate, Betamethasone-17-valerate, Clobetasole-propionate, Fluticasone propionate, Mometasone furoate Betnovate, Diprogenta, Diprosalic, Diprosone, Celestoderm, Fucicort,  Cutivate, Axotide, Elocom -   D2 Methylprednisolone-aceponate   Hydrocortisone-aceponate, Hydrocortisone-buteprate, Hydrocortisone-17-butyrate, Methylprednisolone aceponate, Prednicarbate Locoïd, Advantan,  Prednitop With group A and Budesonide (S-isomer)     ANTIBIOTICS & ANTIMYCOTICS         No Substance 2 days 4 days remarks   81 Erythromycine - -    82 Framycetine Sulphate - +    83 Fusidic Acid Sodium Salt - -    84 Gentamicin Sulphate - -    85 Neomycine Sulphate - +/++    86 Oxytetracycline  - -    87 Polymyxin B Sulphate - -    88 Tetracycline-HCL - -    89 Sulfanilamide - -    90 Metronidazole - -    91 Oxyquinoline Mix - -    92 Nitrofurazone - -    93 Nystatin - -    94 Clotrimazole - -        PRESERVATIVES & ANTIMICROBIALS         No Substance 2 days 4 days remarks   95  1,2-Benzisothiazoline-3-One, Sodium Salt - -    96  1,3,5-Ryder (2-Hydroxyethyl) - Hexahydrotriazine (Grotan BK) - -    97 6-Nxvxbppdqulaq-7-Nitro-1, 3-Propanediol - -    98  3, 4, 4' - Triclocarban - -    99 4 - Chloro - 3 - Cresol - -    100 4 - Chloro - 4 - Xylenol (PCMX) - -    101 7-Ethylbicyclooxazolidine (Bioban [id number]) - -    102 Benzalkonium Chloride - -    103 Benzyl Alcohol - -    104 Cetalkonium Chloride - -    105 Cetylpyrimidine Chloride  - -    106 Chloroacetamide - -    107 DMDM Hydantoin - -    108 Glutaraldehyde - -    109 Triclosan - -    110 Glyoxal Trimeric Dihydrate - -    111 Iodopropynyl Butylcarbamate - -    112 Octylisothiazoline - -    113 Iodoform - -    114 (Nitrobutyl) Morpholine/(Ethylnitro-Trimethylene) Dimorpholine ([facility] P [id number]) - -    115  "Phenoxyethanol - -    116 Phenyl Salicylate - -    117 Povidone Iodine - -    118 Sodium Benzoate - -    119 Sodium Disulfite - -    120 Sorbic Acid - -    121 Thimerosal - -     Parabens      122 Butyl-P-Hydroxybenzoate - -    123 Ethyl-P-Hydroxybenzoate - -    124 Methyl-P-Hydroxybenzoate - -    125 Propyl-P-Hydroxybenzoate - -      METALS (Implants / Dental)         No Substance 2 days 4 days remarks   126 Silver Nitrate - -      Patients own:  7 products      PATIENTS OWN PRODUCTS         No Substance Conc  % solv 2 days 4 days remarks   127 Aquaphor   - -    128 Aloe Vera   - -    129 Candendula cream   - -    130 Hydrocortison valerate   - -    131 Hydrocortisone cream with Aloe   - -    132 Silverstat   - -                               1           Patients own products:  è DO NOT test if chemical or biological identity is unknown!   - always ask from patient the product information and safety sheets and consult with the physician   - check neutral pH with pH indicator paper (do not test pH below 5 or over 8 or consult with physician)  - leave-on cosmetics can be tested \"as is\"  - rinse-off products have to be diluted with water, buffer, olive oil or paraffin (discuss with physician)  à remember:   - non-specific toxic/corrosive or biological reactions can occur  - tests with patients own products are not standardized and test conditions are not optimized for patch tests. Whenever possible test with standardized test series and correlate use of product with the result of the patch tests  - if not sure if compounds can be tested under occlusion in patch tests consider open application tests       Results of patch tests:                         Interpretation:    - Negative                    A    = Allergic      (+) Erythema    TI   = Toxic/irritant   + E + Infiltration    RaP = Relevance at Present     ++ E/I + Papulovesicle   Rpr  = Relevance Previously     +++ E/I/P + Blister     nR   = No " Relevance      [x] Allergic reaction diagnosed against:   +/++ benzyl peroxide, +/++ neomycin, + Framycetine Sulphate  Corticosteroids: +/++ Budenoside, + corticosteroid mix,     Interpretation/ remarks:   Patient reacticed to Budenoside (R-isomer) which has a cross reactions with, for example, Amcinonide, Desonide, Fluocinolone acetonide, Triamcinolone acetonide. However the patient did not seem to use these types of corticosteroids during the previous regimen on the breast. For the moment there is no sign for a reaction to the hydrocortisone, therefore we will make an additional late reading after 7 days. According to the patient the reactions on the breast came about 1 week after she started using the hydrocortisone cream, which could be an argument for a corticosteroid induced reaction.  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 This is relevant for future needs which may include concrete implants/replacements, therefore cement free implants should be used.  The patient has an allergic reaction to topical antibiotics like neomycin and should therefore should avoid any product containing these. This includes neosporin, etc.    [] Patient information given   [] [facility] information   [] [employer] information    ==> final Diagnosis:  1) Atopic predisposition, with seasonal rhino conjunctivitis. Reports nickel sensitivity or metal sensitivity. Sensitive to wool.    2) Proven allergic contact dermatitis to corticosteroid and some antibiotics. Will conclude on Monday.    These conclusions are made at the best of ones knowledge and belief  based on the provided evidence such as patients history and allergy test results and they can change over time or can be incomplete because of missing informations.    ==> Treatment prescribed/Plan:    CC Referred Self, MD  .     I spent a total of 25 min face to face with Keely Velazquez during today's office visit. About 50% of the time was spent counseling the  patient and/or coordinating care regarding their allergy.    Again, thank you for allowing me to participate in the care of your patient.      Sincerely,    Tano Arias MD

## 2019-03-18 ENCOUNTER — OFFICE VISIT (OUTPATIENT)
Dept: DERMATOLOGY | Facility: CLINIC | Age: 64
End: 2019-03-18
Payer: MEDICARE

## 2019-03-18 DIAGNOSIS — L23.89 ALLERGIC CONTACT DERMATITIS DUE TO OTHER AGENTS: Primary | ICD-10-CM

## 2019-03-18 DIAGNOSIS — L60.3 NAIL DYSTROPHY: ICD-10-CM

## 2019-03-18 RX ORDER — MOMETASONE FUROATE 1 MG/G
CREAM TOPICAL DAILY
Qty: 15 G | Refills: 1 | Status: SHIPPED | OUTPATIENT
Start: 2019-03-18 | End: 2021-01-11

## 2019-03-18 RX ORDER — BIOTIN 1 MG
TABLET ORAL
Qty: 30 TABLET | Refills: 1 | Status: SHIPPED | OUTPATIENT
Start: 2019-03-18 | End: 2021-12-29

## 2019-03-18 ASSESSMENT — PAIN SCALES - GENERAL: PAINLEVEL: NO PAIN (0)

## 2019-03-18 NOTE — NURSING NOTE
Allergy Rooming Note    Keely Velazquez's goals for this visit include:   Chief Complaint   Patient presents with     Allergies     Keely is here for patch tetsing follow up day 7     Bonita Bravo LPN

## 2019-03-18 NOTE — LETTER
"3/18/2019       RE: Keely Velazquez  [address]  [facility] [address]     Dear Colleague,    Thank you for referring your patient, Keely Velazquez, to the [facility] DERMATOLOGY at [facility]. Please see a copy of my visit note below.    [facility] Dermatology Note      Dermatology Problem List:  [facility] patient of Dr. Feroz Murcia  1. Suspected allergic contact dermatitis with id reaction in setting of radiotherapy and use of topical products, including hydrocortisone cream              - Recommended avoidance of all topical products except Vaseline              - Completely resolved after 16-day prednisone taper              - Patch testing beginning 3/11/19     2. 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 Keely Velazquez is a 63 year old female who presents as a follow-up for patch testing. The patient was last seen 3/1/19 when she was evaluated for ACD. Pt states she has been doing well since last seen 3/1. She doesn't have any new issues or questions. She brought several home products with her that were used around the time of her outbreak.   Pt otherwise feels well without any changes in general state of health. Denies any new, growing, changing, bleeding, or otherwise concerning/symptomatic skin findings. No other questions or concerns today.     From initial evaluation 3/1:  \"Ms. Keely Velazquez is a 63 year old female who presents as a referral from Dr. Junior in regards to allergic contact dermatitis. Her previous visit within our department was on 12/13/2018 with Dr. Junior as a follow-up to her current dermatitis issue. At that time it was recommended to avoid topical products, including SilvrStat ointment and hydrocortisone, " "and use only Vaseline for comfort. The previous note mentions that her rash completely resolved with the use of systemic steroids. Today she states she was sent over from oncology due to a \"terrible reaction\" from the radiotherapy. This therapy began ~Oct 2018 and finished Nov 2018. The radiology focus was on the right breast, which is where the reaction emerged. She used vasoline and Aquaphor on the site to keep it moisturized. From there she noticed a burning sensation at the site, and began an over-the-counter 1% hydrocortisone cream regimen. She does not believe there were any antibiotics in this cream. The irritation continued to worsen resulting in blistering/weeping lesions which spread to her armpit and wrists. From there she was prescribed hydrocortisone, followed by SilvrStat. She states the SilvrStat caused a worsening of her symptoms. The patient notes she is allergic to nickel which causes redness and scaling of the skin it contacts. She cannot wear wool, due to it irritating her shin. In addition, she avoids perfumed creams and products which can irritates her skin. In order to combat this she uses mild soaps and products. No Hx pf hay fever, food alergies, or asthma. Sometimes in the spring (~May timeframe) she will get itchy eyes and headaches. Patient has no other derm concerns at this time.\"       Past Medical History:   Patient Active Problem List   Diagnosis     Symptomatic varicose veins of both lower extremities     Malignant neoplasm of upper-outer quadrant of right breast in female, estrogen receptor positive (H)     Past Medical History:   Diagnosis Date     Hypertension      Iritis      Past Surgical History:   Procedure Laterality Date     CATARACT IOL, RT/LT Left 2006     HYSTERECTOMY  2016     LUMPECTOMY BREAST WITH SENTINEL NODE, COMBINED Right 8/31/2018    Procedure: COMBINED LUMPECTOMY BREAST WITH SENTINEL NODE;  Right Wire Localized Lumpectomy and Right Huffman Lymph Node Biopsy;  " Surgeon: Chilango Kruger MD;  Location:  OR     OOPHORECTOMY  2014       Social History:  Patient reports that  has never smoked. she has never used smokeless tobacco. She reports that she drinks alcohol. She reports that she does not use drugs.    Family History:  History reviewed. No pertinent family history.    Medications:  Current Outpatient Medications   Medication Sig Dispense Refill     acetaminophen (TYLENOL) 325 MG tablet Take 2 tablets (650 mg) by mouth every 4 hours as needed for mild pain 60 tablet 0     AmLODIPine Besylate (NORVASC PO) Take 5 mg by mouth daily       anastrozole (ARIMIDEX) 1 MG tablet Take 1 tablet (1 mg) by mouth daily 30 tablet 11     calcium carbonate (OS-PADDY 500 MG [place]) 500 MG tablet Take 500 mg by mouth 2 times daily With Magnesium,Zinc       desonide (DESOWEN) 0.05 % cream        doxycycline hyclate (VIBRAMYCIN) 100 MG capsule Take one capsule twice daily for 10 days       HYDROcodone-acetaminophen (NORCO) 5-325 MG per tablet Take 1 tablet by mouth every 6 hours as needed for severe pain 30 tablet 0     LYSINE PO        multivitamin, therapeutic with minerals (MULTI-VITAMIN) TABS Take 1 tablet by mouth daily       Omega-3 Fatty Acids (OMEGA-3 FISH OIL PO)        oxyCODONE IR (ROXICODONE) 5 MG tablet 5-10 mg by mouth every 4 to 6 hours as needed for pain 30 tablet 0     Urea 40 % CREA Apply to fingernails twice daily, especially at bedtime.       VITAMIN D, CHOLECALCIFEROL, PO Take 1,000 Units by mouth daily           Allergies   Allergen Reactions     Codeine Nausea and Vomiting     Macrobid [Nitrofurantoin] Nausea and Vomiting     Sulfa Drugs Itching     Silver Rash         Review of Systems:  -As per HPI  -Constitutional: Otherwise feeling well today, in usual state of health.  -HEENT: Patient denies nonhealing oral sores.  -Skin: As above in HPI. No additional skin concerns.    Physical exam:  Vitals: There were no vitals taken for this visit.  GEN: This is a well  developed, well-nourished female in no acute distress, in a pleasant mood.    SKIN: Focused examination of the face, neck, arms, back was performed.  - no lesions today but according to the photos in the 2018 notes there were eczematous lesions on wrists, R axilla, and R breast. This spreading manifested in weeping eczematous lesions.  - no other lesions of concern on areas examined.     Impression/Plan:    1. Atopic predisposition and possible seasonal allergis with  - rhino conjunctivitis to tree pollen.  - Wool intolerance  - Nickel or metal allergy  - atopic dermatitis around ears and nose sometimes     2. Suspicion for allergic contact dermatitis to to hydrocortisone cream/ Aquaphor/silverstat gel (preservative , additives, or hyrocortizone)    Patch testing today as below        Order for PATCH TESTS     [x] Outpatient                          [] Inpatient: [facility]..../ Bed ....                    Skin Atopy (atopic dermatitis)        [x] Yes   [] No  Rhinitis/Sinusitis:                              [x] Yes   [] No  Allergic Asthma:                                [] Yes   [x] No  Food Allergy:                                     [] Yes   [x] No  Leg ulcers:                                         [] Yes   [x] No  Hand eczema:                                    [] Yes   [x] No                                 Leading hand:   [x] R   [] L       [] Ambidextrous                         Reason for tests (suspected allergy): possible allergic contact dermatitis to Hydrocortisone cream, Silvrstat or Aquaphor  Known previous allergies: Nickel     Standardized panels  [x] Standard panel (40 tests)  [x] Preservatives & Antimicrobials (31 tests)  [x] Emulsifiers & Additives (25 tests)   [] Perfumes/Flavours & Plants (25 tests)  [] Hairdresser panel (12 tests)  [] Rubber Chemicals (22 tests)  [] Plastics (26 tests)  [] Colorants/Dyes/Food additives (20 tests)  [] Metals (implants/dental) (23 tests)  [] Local  anaesthetics/NSAIDs (13 tests)  [x] Antibiotics & Antimycotics (14 tests)   [x] Corticosteroids (15 tests)   [] Photopatch test (32 tests)   [x] others: from metal series add silver                         [x] Patient's own products: Aquaphor, Silvrstat, Hydrocortisone valerate, Calendula cream, Hydrocortisone cream with aloe, Aquaphor, Aloe vera 100% gel    DO NOT test if chemical or biological identity is unknown!     always ask from patient the product information and safety sheets (MSDS)        RESULTS & EVALUATION of PATCH TESTS    Patch test readings after     [x] 2 days, [] 3 days [x] 4 days, [] 5 days    Applied patch tests with results (import here the list of patch tests):  Date/time of application:03/11/2019  Physician/Nurse:  / Chanel Phipps, [facility]               Localization of application: Back    STANDARD Series         No Substance 2 days 4 days 7 days   1 Regino Mix [C] - - -   2 Colophony - - -   3  2-Mercaptobenzothiazole  - -  -   4 Methylisothiazolinone - - -   5 Carba Mix - - -   6 Thiuram Mix [A] - - -   7 Bisphenol A Epoxy Resin - - -   8 D-Tayd-Auvwjnonhtf-Formaldehyde Resin - - -   9 Mercapto Mix [A] - - -   10 Black Rubber Mix- PPD [B] - - -   11 Potassium Dichromate  -  - -   12 Balsam of [place] (Myroxylon Pereirae Resin) - - -   13 Nickel Sulphate Hexahydrate - - -   14 Mixed Dialkyl Thiourea - - -   15 Paraben Mix [B] - - -   16 Methyldibromo Glutaronitrile - - -   17 Fragrance Mix - - -   18 2-Bromo-2-Nitropropane-1,3-Diol (Bronopol) - - -   19 Lyral - - -   20 Tixocortol-21- Pivalate - - -   21 Diazolidiyl Urea (Germall II) - - -   22 Methyl Methacrylate - - -   23 Cobalt (II) Chloride Hexahydrate - - -   24 Fragrance Mix II  - - -   25 Compositae Mix - - -   26 Benzoyl Peroxide + +/++ +/++   27 Bacitracin - - -   28 Formaldehyde - - -   29 Methylchloroisothiazolinone / Methylisothiazolinone - - -   30 Corticosteroid Mix - + +   31 Sodium Lauryl Sulfate - - -   32 Lanolin  Alcohol - - -   33 Turpentine - - -   34 Cetylstearylalcohol - - -   35 Chlorhexidine Dicluconate - - -   36 Budenoside - +/++ +/++   37 Imidazolidinyl Urea  - - -   38 Ethyl-2 Cyanoacrylate - - -   39 Quaternium 15 (Dowicil 200) - - -   40 Decyl Glucoside - - -     EMULSIFIERS & ADDITIVES        No Substance 2 days 4 days 7 days   41 Polyethylene Glycol-400 - - -   42 Cocamidopropyl Betaine - - -   43 Amerchol L101 - - -   44 Propylene Glycol - - -   45 Triethanolamine - - -   46 Sorbitane Sesquiolate (+) - -   47 Isopropylmyristate (+) - -   48 Polysorbate 80  (+) - -   49 Amidoamine   (Stearamidopropyl Dimethylamine) - - -   50 Oleamidopropyl Dimethylamine - - -   51 Lauryl Glucoside - - -   52 Coconut Diethanolamide  (+) - -   53 2-Hydroxy-4-Methoxy Benzophenone (Oxybenzone) - - -   54 Benzophenone-4 (Sulisobenzon) - - -   55 Propolis - - -   56 Dexpanthenol - - -   57 Carboxymethyl Cellulose Sodium - - -   58 Abitol - - -   59 Tert-Butylhydroquinone - - -   60 Benzyl Salicylate - - -    Antioxidant   -   61 Dodecyl Gallate - - -   62 Butylhydroxyanisole (BHA) - - -   63 Butylhydroxytoluene (BHT) - - -   64 Di-Alpha-Tocopherol (Vit E) - - -   65 Propyl Gallate - - -     CORTICOSTEROIDS    No Substance 2 days 4 days 7 days Allergy  class   66 Amcinonide - - - B   67 Betametasone-17,21 Dipropionate - - - D1   68 Desoximetasone - - - C   69 Betamethasone-17-Valerate - - - D1   70 Dexamethasone - - - C   71 Hydrocortisone - - - A   72 Clobetasol-17-Propionate - - - D1   73 Dexamethasone-21-Phosphate Disodium Salt - - - C   74 Hydrocortisone-17 Butyrate - - - D2   75 Prednisolone - - - A   76 Mometason Furoate - - - D1   77 Triamcinolone Acetonide - - - B   78 Methylprednisolone Aceponate - - - D2   79 Hydrocortisone-21-Acetate - - - A   80 Prednicarbate - - - D2       Group Characteristics of group Generic name Name  cross reactions   A Hydrocortisone   Cloprednole, Fludrocortisone acétate, Hydrocortison acetate,  Methylprednisolone, Prednisolone, Tixocortolpivalate Alfacortone, Fucidin H, Dermacalm, Hexacortone, Premandole, Imacort With group D2   B Triamcinolone-acetonide   Budenoside (R-isomer), Amcinonide, Desonide, Fluocinolone acetonide, Triamcinolone acetonide Locapred, Locatop  Synalar, Pevisone, [place] -   C Betamethasone (Without Radha)   Betamethasone, Dexamethasone, Flumethasone pivalate, Halomethasone Daivobet, Dexasalyl, Locasalen,   -   D1 Betamethasone-diproprionate   Betamethasone dipropionate, Betamethasone-17-valerate, Clobetasole-propionate, Fluticasone propionate, Mometasone furoate Betnovate, Diprogenta, Diprosalic, Diprosone, Celestoderm, Fucicort,  Cutivate, Axotide, Elocom -   D2 Methylprednisolone-aceponate   Hydrocortisone-aceponate, Hydrocortisone-buteprate, Hydrocortisone-17-butyrate, Methylprednisolone aceponate, Prednicarbate Locoïd, Advantan,  Prednitop With group A and Budesonide (S-isomer)     ANTIBIOTICS & ANTIMYCOTICS         No Substance 2 days 4 days 7 days   81 Erythromycine - - -   82 Framycetine Sulphate - + +   83 Fusidic Acid Sodium Salt - - -   84 Gentamicin Sulphate - - -   85 Neomycine Sulphate - +/++ ++   86 Oxytetracycline  - - -   87 Polymyxin B Sulphate - - -   88 Tetracycline-HCL - - -   89 Sulfanilamide - - -   90 Metronidazole - - -   91 Oxyquinoline Mix - - -   92 Nitrofurazone - - -   93 Nystatin - - -   94 Clotrimazole - - -       PRESERVATIVES & ANTIMICROBIALS         No Substance 2 days 4 days 7 days   95  1,2-Benzisothiazoline-3-One, Sodium Salt - - -   96  1,3,5-Ryder (2-Hydroxyethyl) - Hexahydrotriazine (Grotan BK) - - -   97 3-Nhelshuooeaiu-9-Nitro-1, 3-Propanediol - - -   98  3, 4, 4' - Triclocarban - - -   99 4 - Chloro - 3 - Cresol - - -   100 4 - Chloro - 4 - Xylenol (PCMX) - - -   101 7-Ethylbicyclooxazolidine (Bernadette [id number]) - - -   102 Benzalkonium Chloride - - -   103 Benzyl Alcohol - - -   104 Cetalkonium Chloride - - -   105 Cetylpyrimidine Chloride  - -  -   106 Chloroacetamide - - -   107 DMDM Hydantoin - - -   108 Glutaraldehyde - - -   109 Triclosan - - -   110 Glyoxal Trimeric Dihydrate - - -   111 Iodopropynyl Butylcarbamate - - -   112 Octylisothiazoline - - -   113 Iodoform - - -   114 (Nitrobutyl) Morpholine/(Ethylnitro-Trimethylene) Dimorpholine (Bioban P 1487) - - -   115 Phenoxyethanol - - -   116 Phenyl Salicylate - - -   117 Povidone Iodine - - -   118 Sodium Benzoate - - -   119 Sodium Disulfite - - -   120 Sorbic Acid - - -   121 Thimerosal - - -    Parabens   -   122 Butyl-P-Hydroxybenzoate - - -   123 Ethyl-P-Hydroxybenzoate - - -   124 Methyl-P-Hydroxybenzoate - - -   125 Propyl-P-Hydroxybenzoate - - -     METALS (Implants / Dental)         No Substance 2 days 4 days 7 days   126 Silver Nitrate - - -     Patients own:  7 products      PATIENTS OWN PRODUCTS         No Substance Conc  % solv 2 days 4 days 7 days   127 Aquaphor   - - -   128 Aloe Vera gel   - - -   129 Candendula cream   - - -   130 Hydrocortison valerate   - - -   131 Hydrocortisone cream with Aloe   - - -   132 Silverstat   - - -                              1                Results of patch tests:                         Interpretation:    - Negative                    A    = Allergic      (+) Erythema    TI   = Toxic/irritant   + E + Infiltration    RaP = Relevance at Present     ++ E/I + Papulovesicle   Rpr  = Relevance Previously     +++ E/I/P + Blister     nR   = No Relevance      [x] Allergic reaction diagnosed against:   +/++ benzyl peroxide, +/++ Neomycin, + Framycetine Sulphate  Corticosteroids: +/++ Budenoside, + corticosteroid mix,     Interpretation/ remarks:   Patient reacticed to Budenoside (R-isomer) which has a cross reactions with, for example, Amcinonide, Desonide, Fluocinolone acetonide, Triamcinolone acetonide. However the patient did not seem to use these types of corticosteroids during the previous regimen on the breast. In the patch tests there is no sign for  a reaction to the hydrocortisone and Hydrocortisone valerate and according to the history this was the only corticosteroid used during the time where patient had acute allergic contact dermatitis on breast area. According to the patient the reactions on the breast came about 1 week after she started using the hydrocortisone valerate ointment, which could be an argument for a corticosteroid induced reaction. Patient is clear that she did not use Desonide cream during this time.  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 This is relevant for future needs which may include concrete implants/replacements, therefore cement free implants should be used.  The patient has an allergic reaction to topical antibiotics like neomycin and should therefore should avoid any product containing these. This includes neosporin, etc. Patient did NOT use any Neomycine product.  ==>  not clear what exactly induced this allergic contact allergy, however there are suspicions on Corticosteroids and as well Neomycine containing products (even used only 1-2 days?). Based on history, I would still propose that patient reacted to the Hydrocortisone (group A) and/or Hydrocortisone Valerate (group D2) and therefore I would propose to avoid in future Corticosteroids from Group A, B and D2.     [] Patient information given   [] ACSD information   [x] [employer] information    ==> final Diagnosis:  1) Atopic predisposition, with seasonal rhino conjunctivitis. Reports nickel sensitivity or metal sensitivity. Sensitive to wool.    2) Proven allergic contact dermatitis to corticosteroid (group A, B, D2) and some antibiotics (Neomycine and  Framycetine) and the disinfectant Benzoylperoxide   --> use as alternative Corticosteroids of group C (e.g.Betamethasone, Dexamethasone, Flumethasone pivalate,   Halomethasone) and group D1 (Betamethasone dipropionate, Betamethasone-17-valerate, Clobetasole-propionate,   Fluticasone  propionate, Mometasone furoate)    These conclusions are made at the best of ones knowledge and belief  based on the provided evidence such as patients history and allergy test results and they can change over time or can be incomplete because of missing informations.    ==> Treatment prescribed/Plan:  --> AVOID in future Corticosteroids of Class B (Budenoside (R-isomer), Amcinonide, Desonide, Fluocinolone acetonide,  Triamcinolone acetonide),  A (Cloprednole, Fludrocortisone acétate, Hydrocortison acetate, Methylprednisolone,  Prednisolone, Tixocortolpivalate) and D2 (Hydrocortisone-aceponate, Hydrocortisone-buteprate,  Hydrocortisone-17-butyrate, Methylprednisolone aceponate, Prednicarbate)  --> USE only ALTERNATIVE corticosteroids of group  C (e.g.Betamethasone, Dexamethasone, Flumethasone pivalate,   Halomethasone) and group D1 (Betamethasone dipropionate, Betamethasone-17-valerate, Clobetasole-propionate,   Fluticasone propionate, Mometasone furoate)  --> AVOID topical antibiotics like Neomycine and Framycetin  --> Avoid disinfection with Benzoylperoxide and be aware that Benzoylperoxide can be used in orthopedic or dental bone  cement as catalyst. Check with physicians that they either use cement free implants or cement that does not contain  Benzoylperoxide      Maybe patient could try an open application test with Calendula cream and maybe Hydrocortisone cream on elbow 1-2 times daily for 7 days. If reaction use only Mometasone ointment or cream.    CC Referred MD Raymond  .     I spent a total of 60 min face to face with Keely Velazquez during today's office visit. About 50% of the time was spent counseling the patient and/or coordinating care regarding their allergy.    Again, thank you for allowing me to participate in the care of your patient.      Sincerely,    Tano Arias MD

## 2019-03-18 NOTE — PROGRESS NOTES
"[facility] Dermatology Note      Dermatology Problem List:  [facility] patient of Dr. Feroz Murcia  1. Suspected allergic contact dermatitis with id reaction in setting of radiotherapy and use of topical products, including hydrocortisone cream              - Recommended avoidance of all topical products except Vaseline              - Completely resolved after 16-day prednisone taper              - Patch testing beginning 3/11/19     2. 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 Keely Velazquez is a 63 year old female who presents as a follow-up for patch testing. The patient was last seen 3/1/19 when she was evaluated for ACD. Pt states she has been doing well since last seen 3/1. She doesn't have any new issues or questions. She brought several home products with her that were used around the time of her outbreak.   Pt otherwise feels well without any changes in general state of health. Denies any new, growing, changing, bleeding, or otherwise concerning/symptomatic skin findings. No other questions or concerns today.     From initial evaluation 3/1:  \"Ms. Keely Velazquez is a 63 year old female who presents as a referral from Dr. Junior in regards to allergic contact dermatitis. Her previous visit within our department was on 12/13/2018 with Dr. Junior as a follow-up to her current dermatitis issue. At that time it was recommended to avoid topical products, including SilvrStat ointment and hydrocortisone, and use only Vaseline for comfort. The previous note mentions that her rash completely resolved with the use of systemic steroids. Today she states she was sent over from oncology due to a \"terrible reaction\" from the radiotherapy. This therapy began ~Oct 2018 and finished " "Nov 2018. The radiology focus was on the right breast, which is where the reaction emerged. She used vasoline and Aquaphor on the site to keep it moisturized. From there she noticed a burning sensation at the site, and began an over-the-counter 1% hydrocortisone cream regimen. She does not believe there were any antibiotics in this cream. The irritation continued to worsen resulting in blistering/weeping lesions which spread to her armpit and wrists. From there she was prescribed hydrocortisone, followed by SilvrStat. She states the SilvrStat caused a worsening of her symptoms. The patient notes she is allergic to nickel which causes redness and scaling of the skin it contacts. She cannot wear wool, due to it irritating her shin. In addition, she avoids perfumed creams and products which can irritates her skin. In order to combat this she uses mild soaps and products. No Hx pf hay fever, food alergies, or asthma. Sometimes in the spring (~May timeframe) she will get itchy eyes and headaches. Patient has no other derm concerns at this time.\"       Past Medical History:   Patient Active Problem List   Diagnosis     Symptomatic varicose veins of both lower extremities     Malignant neoplasm of upper-outer quadrant of right breast in female, estrogen receptor positive (H)     Past Medical History:   Diagnosis Date     Hypertension      Iritis      Past Surgical History:   Procedure Laterality Date     CATARACT IOL, RT/LT Left 2006     HYSTERECTOMY  2016     LUMPECTOMY BREAST WITH SENTINEL NODE, COMBINED Right 8/31/2018    Procedure: COMBINED LUMPECTOMY BREAST WITH SENTINEL NODE;  Right Wire Localized Lumpectomy and Right [place] Lymph Node Biopsy;  Surgeon: Chilango Kruger MD;  Location:  OR     OOPHORECTOMY  2014       Social History:  Patient reports that  has never smoked. she has never used smokeless tobacco. She reports that she drinks alcohol. She reports that she does not use drugs.    Family History:  History " reviewed. No pertinent family history.    Medications:  Current Outpatient Medications   Medication Sig Dispense Refill     acetaminophen (TYLENOL) 325 MG tablet Take 2 tablets (650 mg) by mouth every 4 hours as needed for mild pain 60 tablet 0     AmLODIPine Besylate (NORVASC PO) Take 5 mg by mouth daily       anastrozole (ARIMIDEX) 1 MG tablet Take 1 tablet (1 mg) by mouth daily 30 tablet 11     calcium carbonate (OS-PADDY 500 MG [place]. [place]) 500 MG tablet Take 500 mg by mouth 2 times daily With Magnesium,Zinc       desonide (DESOWEN) 0.05 % cream        doxycycline hyclate (VIBRAMYCIN) 100 MG capsule Take one capsule twice daily for 10 days       HYDROcodone-acetaminophen (NORCO) 5-325 MG per tablet Take 1 tablet by mouth every 6 hours as needed for severe pain 30 tablet 0     LYSINE PO        multivitamin, therapeutic with minerals (MULTI-VITAMIN) TABS Take 1 tablet by mouth daily       Omega-3 Fatty Acids (OMEGA-3 FISH OIL PO)        oxyCODONE IR (ROXICODONE) 5 MG tablet 5-10 mg by mouth every 4 to 6 hours as needed for pain 30 tablet 0     Urea 40 % CREA Apply to fingernails twice daily, especially at bedtime.       VITAMIN D, CHOLECALCIFEROL, PO Take 1,000 Units by mouth daily           Allergies   Allergen Reactions     Codeine Nausea and Vomiting     Macrobid [Nitrofurantoin] Nausea and Vomiting     Sulfa Drugs Itching     Silver Rash         Review of Systems:  -As per HPI  -Constitutional: Otherwise feeling well today, in usual state of health.  -HEENT: Patient denies nonhealing oral sores.  -Skin: As above in HPI. No additional skin concerns.    Physical exam:  Vitals: There were no vitals taken for this visit.  GEN: This is a well developed, well-nourished female in no acute distress, in a pleasant mood.    SKIN: Focused examination of the face, neck, arms, back was performed.  - no lesions today but according to the photos in the 2018 notes there were eczematous lesions on wrists, R axilla, and R breast.  This spreading manifested in weeping eczematous lesions.  - no other lesions of concern on areas examined.     Impression/Plan:    1. Atopic predisposition and possible seasonal allergis with  - rhino conjunctivitis to tree pollen.  - Wool intolerance  - Nickel or metal allergy  - atopic dermatitis around ears and nose sometimes     2. Suspicion for allergic contact dermatitis to to hydrocortisone cream/ Aquaphor/silverstat gel (preservative , additives, or hyrocortizone)    Patch testing today as below        Order for PATCH TESTS     [x] Outpatient                          [] Inpatient: [facility]..../ Bed ....                    Skin Atopy (atopic dermatitis)        [x] Yes   [] No  Rhinitis/Sinusitis:                              [x] Yes   [] No  Allergic Asthma:                                [] Yes   [x] No  Food Allergy:                                     [] Yes   [x] No  Leg ulcers:                                         [] Yes   [x] No  Hand eczema:                                    [] Yes   [x] No                                 Leading hand:   [x] R   [] L       [] Ambidextrous                         Reason for tests (suspected allergy): possible allergic contact dermatitis to Hydrocortisone cream, Silvrstat or Aquaphor  Known previous allergies: Nickel     Standardized panels  [x] Standard panel (40 tests)  [x] Preservatives & Antimicrobials (31 tests)  [x] Emulsifiers & Additives (25 tests)   [] Perfumes/Flavours & Plants (25 tests)  [] Hairdresser panel (12 tests)  [] Rubber Chemicals (22 tests)  [] Plastics (26 tests)  [] Colorants/Dyes/Food additives (20 tests)  [] Metals (implants/dental) (23 tests)  [] Local anaesthetics/NSAIDs (13 tests)  [x] Antibiotics & Antimycotics (14 tests)   [x] Corticosteroids (15 tests)   [] Photopatch test (32 tests)   [x] others: from metal series add silver                         [x] Patient's own products: Aquaphor, Silvrstat, Hydrocortisone valerate, Calendula cream,  Hydrocortisone cream with aloe, Aquaphor, Aloe vera 100% gel    DO NOT test if chemical or biological identity is unknown!     always ask from patient the product information and safety sheets (MSDS)        RESULTS & EVALUATION of PATCH TESTS    Patch test readings after     [x] 2 days, [] 3 days [x] 4 days, [] 5 days    Applied patch tests with results (import here the list of patch tests):  Date/time of application:03/11/2019  Physician/Nurse:  / Chanel Phipps, [facility]               Localization of application: Back    STANDARD Series         No Substance 2 days 4 days 7 days   1 Regino Mix [C] - - -   2 Colophony - - -   3  2-Mercaptobenzothiazole  - -  -   4 Methylisothiazolinone - - -   5 Carba Mix - - -   6 Thiuram Mix [A] - - -   7 Bisphenol A Epoxy Resin - - -   8 N-Ohuu-Aqixawrmqkc-Formaldehyde Resin - - -   9 Mercapto Mix [A] - - -   10 Black Rubber Mix- PPD [B] - - -   11 Potassium Dichromate  -  - -   12 Balsam of [place] (Myroxylon Pereirae Resin) - - -   13 Nickel Sulphate Hexahydrate - - -   14 Mixed Dialkyl Thiourea - - -   15 Paraben Mix [B] - - -   16 Methyldibromo Glutaronitrile - - -   17 Fragrance Mix - - -   18 2-Bromo-2-Nitropropane-1,3-Diol (Bronopol) - - -   19 Lyral - - -   20 Tixocortol-21- Pivalate - - -   21 Diazolidiyl Urea (Germall II) - - -   22 Methyl Methacrylate - - -   23 Cobalt (II) Chloride Hexahydrate - - -   24 Fragrance Mix II  - - -   25 Compositae Mix - - -   26 Benzoyl Peroxide + +/++ +/++   27 Bacitracin - - -   28 Formaldehyde - - -   29 Methylchloroisothiazolinone / Methylisothiazolinone - - -   30 Corticosteroid Mix - + +   31 Sodium Lauryl Sulfate - - -   32 Lanolin Alcohol - - -   33 Turpentine - - -   34 Cetylstearylalcohol - - -   35 Chlorhexidine Dicluconate - - -   36 Budenoside - +/++ +/++   37 Imidazolidinyl Urea  - - -   38 Ethyl-2 Cyanoacrylate - - -   39 Quaternium 15 (Dowicil 200) - - -   40 Decyl Glucoside - - -     EMULSIFIERS & ADDITIVES        No  Substance 2 days 4 days 7 days   41 Polyethylene Glycol-400 - - -   42 Cocamidopropyl Betaine - - -   43 Amerchol L101 - - -   44 Propylene Glycol - - -   45 Triethanolamine - - -   46 Sorbitane Sesquiolate (+) - -   47 Isopropylmyristate (+) - -   48 Polysorbate 80  (+) - -   49 Amidoamine   (Stearamidopropyl Dimethylamine) - - -   50 Oleamidopropyl Dimethylamine - - -   51 Lauryl Glucoside - - -   52 Coconut Diethanolamide  (+) - -   53 2-Hydroxy-4-Methoxy Benzophenone (Oxybenzone) - - -   54 Benzophenone-4 (Sulisobenzon) - - -   55 Propolis - - -   56 Dexpanthenol - - -   57 Carboxymethyl Cellulose Sodium - - -   58 Abitol - - -   59 Tert-Butylhydroquinone - - -   60 Benzyl Salicylate - - -    Antioxidant   -   61 Dodecyl Gallate - - -   62 Butylhydroxyanisole (BHA) - - -   63 Butylhydroxytoluene (BHT) - - -   64 Di-Alpha-Tocopherol (Vit E) - - -   65 Propyl Gallate - - -     CORTICOSTEROIDS    No Substance 2 days 4 days 7 days Allergy  class   66 Amcinonide - - - B   67 Betametasone-17,21 Dipropionate - - - D1   68 Desoximetasone - - - C   69 Betamethasone-17-Valerate - - - D1   70 Dexamethasone - - - C   71 Hydrocortisone - - - A   72 Clobetasol-17-Propionate - - - D1   73 Dexamethasone-21-Phosphate Disodium Salt - - - C   74 Hydrocortisone-17 Butyrate - - - D2   75 Prednisolone - - - A   76 Mometason Furoate - - - D1   77 Triamcinolone Acetonide - - - B   78 Methylprednisolone Aceponate - - - D2   79 Hydrocortisone-21-Acetate - - - A   80 Prednicarbate - - - D2       Group Characteristics of group Generic name Name  cross reactions   A Hydrocortisone   Cloprednole, Fludrocortisone acétate, Hydrocortison acetate, Methylprednisolone, Prednisolone, Tixocortolpivalate Alfacortone, Fucidin H, Dermacalm, Hexacortone, Premandole, Imacort With group D2   B Triamcinolone-acetonide   Budenoside (R-isomer), Amcinonide, Desonide, Fluocinolone acetonide, Triamcinolone acetonide Locapred, Locatop  Synalar, Pevisone,  [place] -   C Betamethasone (Without Radha)   Betamethasone, Dexamethasone, Flumethasone pivalate, Halomethasone Daivobet, Dexasalyl, Locasalen,   -   D1 Betamethasone-diproprionate   Betamethasone dipropionate, Betamethasone-17-valerate, Clobetasole-propionate, Fluticasone propionate, Mometasone furoate Betnovate, Diprogenta, Diprosalic, Diprosone, Celestoderm, Fucicort,  Cutivate, Axotide, Elocom -   D2 Methylprednisolone-aceponate   Hydrocortisone-aceponate, Hydrocortisone-buteprate, Hydrocortisone-17-butyrate, Methylprednisolone aceponate, Prednicarbate Locoïd, Advantan,  Prednitop With group A and Budesonide (S-isomer)     ANTIBIOTICS & ANTIMYCOTICS         No Substance 2 days 4 days 7 days   81 Erythromycine - - -   82 Framycetine Sulphate - + +   83 Fusidic Acid Sodium Salt - - -   84 Gentamicin Sulphate - - -   85 Neomycine Sulphate - +/++ ++   86 Oxytetracycline  - - -   87 Polymyxin B Sulphate - - -   88 Tetracycline-HCL - - -   89 Sulfanilamide - - -   90 Metronidazole - - -   91 Oxyquinoline Mix - - -   92 Nitrofurazone - - -   93 Nystatin - - -   94 Clotrimazole - - -       PRESERVATIVES & ANTIMICROBIALS         No Substance 2 days 4 days 7 days   95  1,2-Benzisothiazoline-3-One, Sodium Salt - - -   96  1,3,5-Ryder (2-Hydroxyethyl) - Hexahydrotriazine (Grotan BK) - - -   97 8-Yjwbzfhlvcaht-2-Nitro-1, 3-Propanediol - - -   98  3, 4, 4' - Triclocarban - - -   99 4 - Chloro - 3 - Cresol - - -   100 4 - Chloro - 4 - Xylenol (PCMX) - - -   101 7-Ethylbicyclooxazolidine (Bioban [id number]) - - -   102 Benzalkonium Chloride - - -   103 Benzyl Alcohol - - -   104 Cetalkonium Chloride - - -   105 Cetylpyrimidine Chloride  - - -   106 Chloroacetamide - - -   107 DMDM Hydantoin - - -   108 Glutaraldehyde - - -   109 Triclosan - - -   110 Glyoxal Trimeric Dihydrate - - -   111 Iodopropynyl Butylcarbamate - - -   112 Octylisothiazoline - - -   113 Iodoform - - -   114 (Nitrobutyl) Morpholine/(Ethylnitro-Trimethylene)  Dimorpholine ([facility] P 1487) - - -   115 Phenoxyethanol - - -   116 Phenyl Salicylate - - -   117 Povidone Iodine - - -   118 Sodium Benzoate - - -   119 Sodium Disulfite - - -   120 Sorbic Acid - - -   121 Thimerosal - - -    Parabens   -   122 Butyl-P-Hydroxybenzoate - - -   123 Ethyl-P-Hydroxybenzoate - - -   124 Methyl-P-Hydroxybenzoate - - -   125 Propyl-P-Hydroxybenzoate - - -     METALS (Implants / Dental)         No Substance 2 days 4 days 7 days   126 Silver Nitrate - - -     Patients own:  7 products      PATIENTS OWN PRODUCTS         No Substance Conc  % solv 2 days 4 days 7 days   127 Aquaphor   - - -   128 Aloe Vera gel   - - -   129 Candendula cream   - - -   130 Hydrocortison valerate   - - -   131 Hydrocortisone cream with Aloe   - - -   132 Silverstat   - - -                              1                Results of patch tests:                         Interpretation:    - Negative                    A    = Allergic      (+) Erythema    TI   = Toxic/irritant   + E + Infiltration    RaP = Relevance at Present     ++ E/I + Papulovesicle   Rpr  = Relevance Previously     +++ E/I/P + Blister     nR   = No Relevance      [x] Allergic reaction diagnosed against:   +/++ benzyl peroxide, +/++ Neomycin, + Framycetine Sulphate  Corticosteroids: +/++ Budenoside, + corticosteroid mix,     Interpretation/ remarks:   Patient reacticed to Budenoside (R-isomer) which has a cross reactions with, for example, Amcinonide, Desonide, Fluocinolone acetonide, Triamcinolone acetonide. However the patient did not seem to use these types of corticosteroids during the previous regimen on the breast. In the patch tests there is no sign for a reaction to the hydrocortisone and Hydrocortisone valerate and according to the history this was the only corticosteroid used during the time where patient had acute allergic contact dermatitis on breast area. According to the patient the reactions on the breast came about 1 week after she  started using the hydrocortisone valerate ointment, which could be an argument for a corticosteroid induced reaction. Patient is clear that she did not use Desonide cream during this time.  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 This is relevant for future needs which may include concrete implants/replacements, therefore cement free implants should be used.  The patient has an allergic reaction to topical antibiotics like neomycin and should therefore should avoid any product containing these. This includes neosporin, etc. Patient did NOT use any Neomycine product.  ==>  not clear what exactly induced this allergic contact allergy, however there are suspicions on Corticosteroids and as well Neomycine containing products (even used only 1-2 days?). Based on history, I would still propose that patient reacted to the Hydrocortisone (group A) and/or Hydrocortisone Valerate (group D2) and therefore I would propose to avoid in future Corticosteroids from Group A, B and D2.     [] Patient information given   [] [facility] information   [x] [employer] information    ==> final Diagnosis:  1) Atopic predisposition, with seasonal rhino conjunctivitis. Reports nickel sensitivity or metal sensitivity. Sensitive to wool.    2) Proven allergic contact dermatitis to corticosteroid (group A, B, D2) and some antibiotics (Neomycine and  Framycetine) and the disinfectant Benzoylperoxide   --> use as alternative Corticosteroids of group C (e.g.Betamethasone, Dexamethasone, Flumethasone pivalate,   Halomethasone) and group D1 (Betamethasone dipropionate, Betamethasone-17-valerate, Clobetasole-propionate,   Fluticasone propionate, Mometasone furoate)    These conclusions are made at the best of ones knowledge and belief  based on the provided evidence such as patients history and allergy test results and they can change over time or can be incomplete because of missing informations.    ==> Treatment  prescribed/Plan:  --> AVOID in future Corticosteroids of Class B (Budenoside (R-isomer), Amcinonide, Desonide, Fluocinolone acetonide,  Triamcinolone acetonide),  A (Cloprednole, Fludrocortisone acétate, Hydrocortison acetate, Methylprednisolone,  Prednisolone, Tixocortolpivalate) and D2 (Hydrocortisone-aceponate, Hydrocortisone-buteprate,  Hydrocortisone-17-butyrate, Methylprednisolone aceponate, Prednicarbate)  --> USE only ALTERNATIVE corticosteroids of group  C (e.g.Betamethasone, Dexamethasone, Flumethasone pivalate,   Halomethasone) and group D1 (Betamethasone dipropionate, Betamethasone-17-valerate, Clobetasole-propionate,   Fluticasone propionate, Mometasone furoate)  --> AVOID topical antibiotics like Neomycine and Framycetin  --> Avoid disinfection with Benzoylperoxide and be aware that Benzoylperoxide can be used in orthopedic or dental bone  cement as catalyst. Check with physicians that they either use cement free implants or cement that does not contain  Benzoylperoxide      Maybe patient could try an open application test with Calendula cream and maybe Hydrocortisone cream on elbow 1-2 times daily for 7 days. If reaction use only Mometasone ointment or cream.    CC Referred MD Raymond  .     I spent a total of 60 min face to face with Keely Velazquez during today's office visit. About 50% of the time was spent counseling the patient and/or coordinating care regarding their allergy.

## 2019-03-18 NOTE — LETTER
March 18, 2019      Keely Velazquez  [address] [facility] [id number]              To whom it concerns,    Thank you for referring your patient, Keely Velazquez, for allergy testing. This patient was seen on 3/11/19, 3/13/19, 3/15/19, and 3/18/19 for patch testing. The below compounds were tested. Please see below for my interpretation and a list of tests preformed.       Positive Reactions:  +/++ benzyl peroxide, +/++ Neomycin, + Framycetine Sulphate  Corticosteroids: +/++ Budenoside, + corticosteroid mix,       Interpretation/Remarks  Patient reacticed to Budenoside (R-isomer) which has a cross reactions with, for example, Amcinonide, Desonide, Fluocinolone acetonide, Triamcinolone acetonide. However the patient did not seem to use these types of corticosteroids during the previous regimen on the breast. In the patch tests there is no sign for a reaction to the hydrocortisone and Hydrocortisone valerate and according to the history this was the only corticosteroid used during the time where patient had acute allergic contact dermatitis on breast area. According to the patient the reactions on the breast came about 1 week after she started using the hydrocortisone valerate ointment, which could be an argument for a corticosteroid induced reaction. Patient is clear that she did not use Desonide cream during this time.  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 This is relevant for future needs which may include concrete implants/replacements, therefore cement free implants should be used.  The patient has an allergic reaction to topical antibiotics like neomycin and should therefore should avoid any product containing these. This includes neosporin, etc. Patient did NOT use any Neomycine product.  ==>  not clear what exactly induced this allergic contact allergy, however there are suspicions on Corticosteroids and as well Neomycine containing products (even used only 1-2 days?). Based on  history, I would still propose that patient reacted to the Hydrocortisone (group A) and/or Hydrocortisone Valerate (group D2) and therefore I would propose to avoid in future Corticosteroids from Group A, B and D2.   Final Diagnosis  1) Atopic predisposition, with seasonal rhino conjunctivitis. Reports nickel sensitivity or metal sensitivity. Sensitive to wool.  2) Proven allergic contact dermatitis to corticosteroid (group A, B, D2) and some antibiotics (Neomycine and Framycetine) and the disinfectant Benzoylperoxide   --> use as alternative Corticosteroids of group C (e.g.Betamethasone, Dexamethasone, Flumethasone pivalate, Halomethasone) and group D1 (Betamethasone dipropionate, Betamethasone-17-valerate, Clobetasole-propionate, Fluticasone propionate, Mometasone furoate)      Treatment/Plan  --> AVOID in future Corticosteroids of Class B (Budenoside (R-isomer), Amcinonide, Desonide, Fluocinolone acetonide, Triamcinolone acetonide),  A (Cloprednole, Fludrocortisone acétate, Hydrocortison acetate, Methylprednisolone,  Prednisolone, Tixocortolpivalate) and D2 (Hydrocortisone-aceponate, Hydrocortisone-buteprate, Hydrocortisone-17-butyrate, Methylprednisolone aceponate, Prednicarbate)  --> USE only ALTERNATIVE corticosteroids of group  C (e.g.Betamethasone, Dexamethasone, Flumethasone pivalate, Halomethasone) and group D1 (Betamethasone dipropionate, Betamethasone-17-valerate, Clobetasole-propionate, Fluticasone propionate, Mometasone furoate)  --> AVOID topical antibiotics like Neomycine and Framycetin  --> Avoid disinfection with Benzoylperoxide and be aware that Benzoylperoxide can be used in orthopedic or dental bone cement as catalyst. Check with physicians that they either use cement free implants or cement that does not contain Benzoylperoxide  Maybe patient could try an open application test with Calendula cream and maybe Hydrocortisone cream on elbow 1-2 times daily for 7 days. If reaction use only  Mometasone ointment or cream.      Tested Products  (Standard) Regino Mix [C], Colophony, 2-Mercaptobenzothiazole, Methylisothiazolinone, Carba Mix, Thiuram Mix [A], Bisphenol A Epoxy Resin, I-Iqdy-Hxjmzaptpgj Formaldehyde Resin, Mercapto Mix [A], Black Rubber Mix- PPD [B], Potassium Dichromate, Balsam of [place] (Myroxylon Pereirae Resin), Nickel Sulphate Hexahydrate, Mixed Dialkyl Thiourea, Paraben Mix [B], Methyldibromo Glutaro-Nitrile, Fragrance mix,  2-Bromo-2-nitropropane-1,3-diol (Bronopol), Lyral, Tixocortol-21-Pivalate, Diazolidiyl Urea (Germall II), Methyl Methacrylate, Cobalt (II) Chloride Hexahydrate, Fragrance Mix II, Compositae Mix, Benzoyl Peroxide, Bacitracin, Formaldehyde, Methylchloroisothiazolinone / Methylisothiazolinone, Corticoseteroid Mix, Sodium Lauryl Sulfate, Lanolin Alcohol, Oil of Turpentine, Cetylstearylalcohol, Chlorhexidine Dicluconate, Budenoside, Imidazolidinyl Urea, Ethyl Cyanoacrylate, Quaternium 15, Decyl Glucoside (Preservatives and Antimicrobial) 1,2-benzisothiazoline-3-one, Sodium Salt, 1,3,5-Ryder(2-Hydroxyethyl) -Hexahydrotriazine(Grotan BK), 2-Hydroxymethyl- 2-Nitro-1,3-Propanediol, 3,4,4'-Triclocarban, 4-Chloro-3- Cresol, 4-Chloro-3-5-Xylénol (PCMX), 7-Ethylbicyclooxazolidine (BioBan CS 1246), Benzalkonium Chloride, Benzyl Alcohol, Cetalkonium Chloride, Cetylpyrimidine Chloride, Chloroacetamide, DMDM Hydantoin, Glutaraldehyde, Triclosan, Glyoxal Trimeric Dihydrate, Iodopropynyl Butylcarbamate, Octylisothiazolinone, Iodoform, (Nitrobutyl) Morpholine/ (Ethylnitrotrimethylene) dimorpholine(Biopan ), Phenoxyethanol, Phenyl Salicylate, Povidone Iodine, Sodium Benzoate, Sodium Disulfite, Sorbic Acid, Thimerosal, Butyl-P-Hydroxybenzoate, Ethyl-P-Hydroxybenzoate, Methyl-P-Hydroxybenzoate, Propyl-P-Hydroxybenzoate (Emulsifiers) Polyethylene Glycol-400, Cocamidopropyl Betaine, Amerchol L101, Propylene Glycol, Triethanolamine, Sorbitane Sesquiolate, Isopropylmyristate, Polysorbate 80,  Amidoamine (Stearamidopropyl Dimethylamine), Oleamidopropyl Dimethylamine, Lauryl Glucoside, Coconut Diethanolamide, 2-Hydroxy-4-Methoxybenzo-Phenone (Oxybenzone), Benzophenone-4 (Sulisobenzon), Propolis, Dexpanthenol, Carboxymethyl Cellulose Sodium, Abitol, Tert- Butylhydroquinone, Benzyl Salicylate, Dodecyl Gallate, Butylhydroxyanisole (BHA), Butylhydroxytoluene (BHT), Di-Alpha-Tocopherol, Propyl Gallate (Antibiotics and Antimycotics) Erythromycine, Framycetine Sulphate, Fusidic Acid Sodium Salt, Gentamicin Sulphate, Neomycine Sulphate, Oxytetracycline, Polymyxin B Sulphate, Tetracycline-HCL, Sulfanilamide, Metronidazole, Oxyquinoline Mix, Nitrofurazone, Nystatin, Clotrimazole (Corticosteroids) Amcinonide, Betametasone-17,21Dipropionate, Desoximetasone, Betamethasone-17-Valerate, Dexamethasone, Hydrocortisone, Clobetasol-17-Propionate, Dexamethasone-21-Phosphate Salt, Hydrocortisone-17 Butyrate, Prednisolone, Mometason Furoate, Triamcinolone Acetonide, Methylprednisolone Aceponate, Hydrocortisone-21-Acetate, Prednicarbate (Metal) Silver Nitrate      These conclusions are made at the best of ones knowledge and belief  based on the provided evidence such as patients history and allergy test results and they can change over time or can be incomplete because of missing informations.       If you have any questions please call [phone]      Sincerely,    Tano Arias MD

## 2019-03-18 NOTE — PATIENT INSTRUCTIONS
RESULTS & EVALUATION of PATCH TESTS    Patch test readings after     [x] 2 days, [] 3 days [x] 4 days, [] 5 days    Applied patch tests with results (import here the list of patch tests):  Date/time of application:03/11/2019  Physician/Nurse:  / Chanel Phipps [facility]               Localization of application: Back    STANDARD Series         No Substance 2 days 4 days 7 days   1 Regino Mix [C] - - -   2 Colophony - - -   3  2-Mercaptobenzothiazole  - -  -   4 Methylisothiazolinone - - -   5 Carba Mix - - -   6 Thiuram Mix [A] - - -   7 Bisphenol A Epoxy Resin - - -   8 V-Ukom-Jlzgwrfrffn-Formaldehyde Resin - - -   9 Mercapto Mix [A] - - -   10 Black Rubber Mix- PPD [B] - - -   11 Potassium Dichromate  -  - -   12 Balsam of [place] (Myroxylon Pereirae Resin) - - -   13 Nickel Sulphate Hexahydrate - - -   14 Mixed Dialkyl Thiourea - - -   15 Paraben Mix [B] - - -   16 Methyldibromo Glutaronitrile - - -   17 Fragrance Mix - - -   18 2-Bromo-2-Nitropropane-1,3-Diol (Bronopol) - - -   19 Lyral - - -   20 Tixocortol-21- Pivalate - - -   21 Diazolidiyl Urea (Germall II) - - -   22 Methyl Methacrylate - - -   23 Cobalt (II) Chloride Hexahydrate - - -   24 Fragrance Mix II  - - -   25 Compositae Mix - - -   26 Benzoyl Peroxide + +/++ +/++   27 Bacitracin - - -   28 Formaldehyde - - -   29 Methylchloroisothiazolinone / Methylisothiazolinone - - -   30 Corticosteroid Mix - + +   31 Sodium Lauryl Sulfate - - -   32 Lanolin Alcohol - - -   33 Turpentine - - -   34 Cetylstearylalcohol - - -   35 Chlorhexidine Dicluconate - - -   36 Budenoside - +/++ +/++   37 Imidazolidinyl Urea  - - -   38 Ethyl-2 Cyanoacrylate - - -   39 Quaternium 15 (Dowicil 200) - - -   40 Decyl Glucoside - - -     EMULSIFIERS & ADDITIVES        No Substance 2 days 4 days 7 days   41 Polyethylene Glycol-400 - - -   42 Cocamidopropyl Betaine - - -   43 Amerchol L101 - - -   44 Propylene Glycol - - -   45 Triethanolamine - - -   46 Sorbitane Sesquiolate  (+) - -   47 Isopropylmyristate (+) - -   48 Polysorbate 80  (+) - -   49 Amidoamine   (Stearamidopropyl Dimethylamine) - - -   50 Oleamidopropyl Dimethylamine - - -   51 Lauryl Glucoside - - -   52 Coconut Diethanolamide  (+) - -   53 2-Hydroxy-4-Methoxy Benzophenone (Oxybenzone) - - -   54 Benzophenone-4 (Sulisobenzon) - - -   55 Propolis - - -   56 Dexpanthenol - - -   57 Carboxymethyl Cellulose Sodium - - -   58 Abitol - - -   59 Tert-Butylhydroquinone - - -   60 Benzyl Salicylate - - -    Antioxidant   -   61 Dodecyl Gallate - - -   62 Butylhydroxyanisole (BHA) - - -   63 Butylhydroxytoluene (BHT) - - -   64 Di-Alpha-Tocopherol (Vit E) - - -   65 Propyl Gallate - - -     CORTICOSTEROIDS    No Substance 2 days 4 days 7 days Allergy  class   66 Amcinonide - - - B   67 Betametasone-17,21 Dipropionate - - - D1   68 Desoximetasone - - - C   69 Betamethasone-17-Valerate - - - D1   70 Dexamethasone - - - C   71 Hydrocortisone - - - A   72 Clobetasol-17-Propionate - - - D1   73 Dexamethasone-21-Phosphate Disodium Salt - - - C   74 Hydrocortisone-17 Butyrate - - - D2   75 Prednisolone - - - A   76 Mometason Furoate - - - D1   77 Triamcinolone Acetonide - - - B   78 Methylprednisolone Aceponate - - - D2   79 Hydrocortisone-21-Acetate - - - A   80 Prednicarbate - - - D2       Group Characteristics of group Generic name Name  cross reactions   A Hydrocortisone   Cloprednole, Fludrocortisone acétate, Hydrocortison acetate, Methylprednisolone, Prednisolone, Tixocortolpivalate Alfacortone, Fucidin H, Dermacalm, Hexacortone, Premandole, Imacort With group D2   B Triamcinolone-acetonide   Budenoside (R-isomer), Amcinonide, Desonide, Fluocinolone acetonide, Triamcinolone acetonide Locapred, Locatop  Synalar, Pevisone, [place]ort -   C Betamethasone (Without Radha)   Betamethasone, Dexamethasone, Flumethasone pivalate, Halomethasone Daivobet, Dexasalyl, Locasalen,   -   D1 Betamethasone-diproprionate   Betamethasone dipropionate,  Betamethasone-17-valerate, Clobetasole-propionate, Fluticasone propionate, Mometasone furoate Betnovate, Diprogenta, Diprosalic, Diprosone, Celestoderm, Fucicort,  Cutivate, Axotide, Elocom -   D2 Methylprednisolone-aceponate   Hydrocortisone-aceponate, Hydrocortisone-buteprate, Hydrocortisone-17-butyrate, Methylprednisolone aceponate, Prednicarbate Locoïd, Advantan,  Prednitop With group A and Budesonide (S-isomer)     ANTIBIOTICS & ANTIMYCOTICS         No Substance 2 days 4 days 7 days   81 Erythromycine - - -   82 Framycetine Sulphate - + +   83 Fusidic Acid Sodium Salt - - -   84 Gentamicin Sulphate - - -   85 Neomycine Sulphate - +/++ ++   86 Oxytetracycline  - - -   87 Polymyxin B Sulphate - - -   88 Tetracycline-HCL - - -   89 Sulfanilamide - - -   90 Metronidazole - - -   91 Oxyquinoline Mix - - -   92 Nitrofurazone - - -   93 Nystatin - - -   94 Clotrimazole - - -       PRESERVATIVES & ANTIMICROBIALS         No Substance 2 days 4 days 7 days   95  1,2-Benzisothiazoline-3-One, Sodium Salt - - -   96  1,3,5-Ryder (2-Hydroxyethyl) - Hexahydrotriazine (Grotan BK) - - -   97 9-Betzjxrmgywcf-8-Nitro-1, 3-Propanediol - - -   98  3, 4, 4' - Triclocarban - - -   99 4 - Chloro - 3 - Cresol - - -   100 4 - Chloro - 4 - Xylenol (PCMX) - - -   101 7-Ethylbicyclooxazolidine ([employer]ent [employer] [id number]) - - -   102 Benzalkonium Chloride - - -   103 Benzyl Alcohol - - -   104 Cetalkonium Chloride - - -   105 Cetylpyrimidine Chloride  - - -   106 Chloroacetamide - - -   107 DMDM Hydantoin - - -   108 Glutaraldehyde - - -   109 Triclosan - - -   110 Glyoxal Trimeric Dihydrate - - -   111 Iodopropynyl Butylcarbamate - - -   112 Octylisothiazoline - - -   113 Iodoform - - -   114 (Nitrobutyl) Morpholine/(Ethylnitro-Trimethylene) Dimorpholine (Bernadette P 8574) - - -   115 Phenoxyethanol - - -   116 Phenyl Salicylate - - -   117 Povidone Iodine - - -   118 Sodium Benzoate - - -   119 Sodium Disulfite - - -   120 Sorbic Acid - - -   121  Thimerosal - - -    Parabens   -   122 Butyl-P-Hydroxybenzoate - - -   123 Ethyl-P-Hydroxybenzoate - - -   124 Methyl-P-Hydroxybenzoate - - -   125 Propyl-P-Hydroxybenzoate - - -     METALS (Implants / Dental)         No Substance 2 days 4 days 7 days   126 Silver Nitrate - - -     Patients own:  7 products      PATIENTS OWN PRODUCTS         No Substance Conc  % solv 2 days 4 days 7 days   127 Aquaphor   - - -   128 Aloe Vera gel   - - -   129 Candendula cream   - - -   130 Hydrocortison valerate   - - -   131 Hydrocortisone cream with Aloe   - - -   132 Silverstat   - - -                              1                Results of patch tests:                         Interpretation:    - Negative                    A    = Allergic      (+) Erythema    TI   = Toxic/irritant   + E + Infiltration    RaP = Relevance at Present     ++ E/I + Papulovesicle   Rpr  = Relevance Previously     +++ E/I/P + Blister     nR   = No Relevance      [x] Allergic reaction diagnosed against:   +/++ benzyl peroxide, +/++ Neomycin, + Framycetine Sulphate  Corticosteroids: +/++ Budenoside, + corticosteroid mix,     Interpretation/ remarks:   Patient reacticed to Budenoside (R-isomer) which has a cross reactions with, for example, Amcinonide, Desonide, Fluocinolone acetonide, Triamcinolone acetonide. However the patient did not seem to use these types of corticosteroids during the previous regimen on the breast. In the patch tests there is no sign for a reaction to the hydrocortisone and Hydrocortisone valerate and according to the history this was the only corticosteroid used during the time where patient had acute allergic contact dermatitis on breast area. According to the patient the reactions on the breast came about 1 week after she started using the hydrocortisone valerate ointment, which could be an argument for a corticosteroid induced reaction. Patient is clear that she did not use Desonide cream during this time.  The patient  appears to have a benzoyl peroxide allergic reaction. This is relevant for future needs which may include concrete implants/replacements, therefore cement free implants should be used.  The patient has an allergic reaction to topical antibiotics like neomycin and should therefore should avoid any product containing these. This includes neosporin, etc. Patient did NOT use any Neomycine product.  ==>  not clear what exactly induced this allergic contact allergy, however there are suspicions on Corticosteroids and as well Neomycine containing products (even used only 1-2 days?). Based on history, I would still propose that patient reacted to the Hydrocortisone (group A) and/or Hydrocortisone Valerate (group D2) and therefore I would propose to avoid in future Corticosteroids from Group A, B and D2.     [] Patient information given   [] [facility] information   [x] [employer] information    ==> final Diagnosis:  1) Atopic predisposition, with seasonal rhino conjunctivitis. Reports nickel sensitivity or metal sensitivity. Sensitive to wool.    2) Proven allergic contact dermatitis to corticosteroid (group A, B, D2) and some antibiotics (Neomycine and  Framycetine) and the disinfectant Benzoylperoxide   --> use as alternative Corticosteroids of group C (e.g.Betamethasone, Dexamethasone, Flumethasone pivalate,   Halomethasone) and group D1 (Betamethasone dipropionate, Betamethasone-17-valerate, Clobetasole-propionate,   Fluticasone propionate, Mometasone furoate)    These conclusions are made at the best of ones knowledge and belief  based on the provided evidence such as patients history and allergy test results and they can change over time or can be incomplete because of missing informations.    ==> Treatment prescribed/Plan:  --> AVOID in future Corticosteroids of Class B (Budenoside (R-isomer), Amcinonide, Desonide, Fluocinolone acetonide,  Triamcinolone acetonide),  A (Cloprednole, Fludrocortisone acétate, Hydrocortison  acetate, Methylprednisolone,  Prednisolone, Tixocortolpivalate) and D2 (Hydrocortisone-aceponate, Hydrocortisone-buteprate,  Hydrocortisone-17-butyrate, Methylprednisolone aceponate, Prednicarbate)  --> USE only ALTERNATIVE corticosteroids of group  C (e.g.Betamethasone, Dexamethasone, Flumethasone pivalate,   Halomethasone) and group D1 (Betamethasone dipropionate, Betamethasone-17-valerate, Clobetasole-propionate,   Fluticasone propionate, Mometasone furoate)  --> AVOID topical antibiotics like Neomycine and Framycetin  --> Avoid disinfection with Benzoylperoxide and be aware that Benzoylperoxide can be used in orthopedic or dental bone  cement as catalyst. Check with physicians that they either use cement free implants or cement that does not contain  Benzoylperoxide      Maybe patient could try an open application test with Calendula cream and maybe Hydrocortisone cream on elbow 1-2 times daily for 7 days. If reaction use only Mometasone ointment or cream.

## 2019-06-04 NOTE — PROGRESS NOTES
Oncology Follow Up:  Date on this visit: 6/7/2019    Diagnosis:  [id number], grade I, ER positive, MA positive, HER-2 nonamplified invasive ductal carcinoma of the right breast. Oncotype DX recurrence score = 7.    Primary Physician: Sammie Cerna     History Of Present Illness:  Ms. Velazquez is a 64 year old female with right breast cancer.  Ms. Velazquez had routine screening mammogram in 06/2018, which demonstrated dense breast tissue, but no dominant masses.  She presented to her gynecologist's office in August for a Pap smear and mentioned the high breast density.  Her gynecologist recommended proceeding with breast MRI.  Breast MRI on 08/16/2018 showed nodular to non-mass enhancement measuring 1.6 cm in the right breast at 5 o'clock.  There was a similar area of enhancement at 10 o'clock measuring 1.2 cm and a cyst in the left breast at 3 o'clock measuring 1.3 cm.  Right breast ultrasound confirmed a mass at 5 o'clock.  Right breast biopsy demonstrated a grade 1 invasive ductal carcinoma.  Estrogen-receptor staining was moderate in 95%; progesterone-receptor staining was strong in 96%.  HER-2 was nonamplified by immunohistochemistry with a score of 1+.  There was no ultrasound correlate at 10:00 right breast.  She met in consultation with Dr. Kruger, who recommended a right breast lumpectomy and sentinel lymph node procedure.  This was performed on 08/31/2018.  Pathology demonstrated a 1.2 cm, grade 1 invasive mammary carcinoma.  There was associated intermediate-grade DCIS.  Surgical margins were negative for both noninvasive and invasive carcinoma; however, DCIS was 1 mm from the anterior margin.  A single sentinel lymph node was negative for malignancy. Oncotype DX testing of the breast tumor returned with a recurrent score of 7.  She completed adjuvant radiation (4240 cGy to the whole breast and additional 1250 cGy to the lumpectomy site) on 11/8/18.  She has been on anastrozole since 9/24/18.     Interval History:    Ms. Velazquez comes into clinic today for routine breast cancer followup.  She continues on treatment with anastrozole.  She is tolerating the medication remarkably well.  She reports stiffness in her hips when she awakens in the morning, however, when she starts to walk the joints loosen up and pain resolves.  She is very active throughout the day.  She has numerous grandchildren.  They are frequently at her home.  She denies hot flashes, vaginal dryness or mood disturbances.  She generally has been well over the last 3 months.  She has no concerning breast lumps or masses.  She denies breast pain or discomfort.  She has no new bone or joint aches or pains, no cough, shortness of breath or chest pain.  No headaches or focal neurologic complaints.  The remainder of a complete 12-point review of systems is reviewed with the patient and was negative with exception of that mentioned above.     Past Medical/Surgical History:  Past Medical History:   Diagnosis Date     Hypertension      Iritis      Past Surgical History:   Procedure Laterality Date     CATARACT IOL, RT/LT Left 2006     HYSTERECTOMY  2016     LUMPECTOMY BREAST WITH SENTINEL NODE, COMBINED Right 8/31/2018    Procedure: COMBINED LUMPECTOMY BREAST WITH SENTINEL NODE;  Right Wire Localized Lumpectomy and Right [place] Lymph Node Biopsy;  Surgeon: Chilango Kruger MD;  Location: [facility]     OOPHORECTOMY  2014     Allergies:  Allergies as of 06/07/2019 - Reviewed 03/18/2019   Allergen Reaction Noted     Corticosteroids Dermatitis 03/18/2019     Neomycin Dermatitis 03/18/2019     Codeine Nausea and Vomiting 08/06/2015     Macrobid [nitrofurantoin] Nausea and Vomiting 08/06/2015     Sulfa drugs Itching 08/06/2015     Silver Rash 12/03/2018     Current Medications:  Current Outpatient Medications   Medication Sig Dispense Refill     acetaminophen (TYLENOL) 325 MG tablet Take 2 tablets (650 mg) by mouth every 4 hours as needed for mild pain 60 tablet 0     AmLODIPine  "Besylate (NORVASC PO) Take 5 mg by mouth daily       anastrozole (ARIMIDEX) 1 MG tablet Take 1 tablet (1 mg) by mouth daily 30 tablet 11     biotin 1000 MCG TABS tablet Every day 1 Tabl 30 tablet 1     calcium carbonate (OS-PADDY 500 MG [place]. [place]) 500 MG tablet Take 500 mg by mouth 2 times daily With Magnesium,Zinc       desonide (DESOWEN) 0.05 % cream        doxycycline hyclate (VIBRAMYCIN) 100 MG capsule Take one capsule twice daily for 10 days       HYDROcodone-acetaminophen (NORCO) 5-325 MG per tablet Take 1 tablet by mouth every 6 hours as needed for severe pain 30 tablet 0     LYSINE PO        mometasone (ELOCON) 0.1 % external cream Apply topically daily 15 g 1     multivitamin, therapeutic with minerals (MULTI-VITAMIN) TABS Take 1 tablet by mouth daily       Omega-3 Fatty Acids (OMEGA-3 FISH OIL PO)        oxyCODONE IR (ROXICODONE) 5 MG tablet 5-10 mg by mouth every 4 to 6 hours as needed for pain 30 tablet 0     Urea 40 % CREA Apply to fingernails twice daily, especially at bedtime.       VITAMIN D, CHOLECALCIFEROL, PO Take 1,000 Units by mouth daily         Family and Social History:  Reviewed and unchanged from prior.  Please see initial consultation dated 9/24/18 for further details.    Physical Exam:  /69 (BP Location: Left arm, Patient Position: Sitting, Cuff Size: Adult Regular)   Pulse 94   Temp 98.5  F (36.9  C) (Oral)   Resp 16   Ht 1.727 m (5' 7.99\")   Wt 74.2 kg (163 lb 9.6 oz)   SpO2 96%   BMI 24.88 kg/m    General:  Well appearing, well-nourished adult female in NAD.  A&Ox3.  HEENT:  Normocephalic.  Sclera anicteric.  MMM.  No lesions of the oropharynx.  Lymph:  No palpable cervical, supraclavicular, or axillary LAD.  Chest:  CTA bilaterally.  No wheezes or crackles.  CV:  RRR.  Nl S1 and S2.  No m/r/g.  Breast:  Bilateral breasts are of normal fibroglandular density.  Right lower inner breast incision is without significant underlying fibrosis.  There are no discretely palpable " masses in either breast.  Bilateral nipples are everted.  No nipple discharge.  Abd:  Soft/NT/ND.  BSs normoactive.  No hepatosplenomegaly.  Ext:  No pitting edema of the bilateral lower extremities.  Pulses 2+ and symmetric.  Musculo:  Strength 5/5 throughout.  Full ROM of the bilateral upper extremities.  Neuro:  Cranial nerves grossly intact.  Gait stable.  Psych:  Mood and affect appear normal.  Skin:  No visible concerning skin rashes or lesions.    Laboratory/Imaging Studies:  3/4/19 Bilateral diagnostic mammograms:  Heterogeneously dense breasts.  No suspicious findings in either breast.    ASSESSMENT/PLAN:  Ms. Velazquez is a 64-year-old female with a h/o stage IA, T1c N0 M0, grade 1, ER positive, [place] positive, HER2 non-amplified invasive mammary carcinoma of the right breast.  She is status post treatment with right breast lumpectomy and radiation.  On anastrozole since 9/24/18.    1.  Right breast cancer:   Ms. Velazquez is approximately 9 months out from excision of a right breast cancer.  She continues on anastrozole.  We plan to continue anastrozole to complete an approximate 5-10 year course.  We discussed duration will depend on tolerance as well as data at the time of the 5 year point.    There is no evidence of disease recurrence on exam today.  Due to the fact that her initial breast cancer was not detectable by mammogram, increased breast density and close DCIS margins, I recommend high-risk screening with both mammogram and breast MRI.  These studies will both be performed annually; however, spaced so that she is having some form of imaging every 6 months.  Bilateral mammograms in March were without concerning findings.  She will be due for breast MRI at the time of her return visit.     2.  Bone Health:  At risk for bone loss on aromatase inhibitor therapy.  DEXA in 10/2018 with lowest T-score of -0.5.  Continue calcium 1200 mg and vitamin D 1000 international units and walk a 1/2 hour daily.     3.  Skin  cancer:  S/p excision of a basal cell carcinoma from her nasal fold. Ongoing follow up with dermatology.    4.  Routine health maintenance:  Colonoscopy in 2015 was without concerning finding, repeat in 10 years was recommended.  Breast and skin cancer screening as above.  Okay to stop pap smears at this point.  Reviewed AI therapy is associated with hypertriglyceridemia.  Triglycerides were 90 on 3/8/19.  Recommend annual cholesterol panel.    5.  Follow Up:  Breast MRI and visit with me in approximately 3 months.    It was a pleasure to meet with Ms. Velazquez in clinic today.  A total of 15 minutes of our 25 minute face to face visit was spent in counseling.

## 2019-06-07 ENCOUNTER — ONCOLOGY VISIT (OUTPATIENT)
Dept: ONCOLOGY | Facility: CLINIC | Age: 64
End: 2019-06-07
Attending: INTERNAL MEDICINE
Payer: MEDICARE

## 2019-06-07 VITALS
OXYGEN SATURATION: 96 % | DIASTOLIC BLOOD PRESSURE: 69 MMHG | TEMPERATURE: 98.5 F | SYSTOLIC BLOOD PRESSURE: 133 MMHG | WEIGHT: 163.6 LBS | BODY MASS INDEX: 24.8 KG/M2 | HEIGHT: 68 IN | RESPIRATION RATE: 16 BRPM | HEART RATE: 94 BPM

## 2019-06-07 DIAGNOSIS — Z17.0 MALIGNANT NEOPLASM OF LOWER-INNER QUADRANT OF RIGHT BREAST OF FEMALE, ESTROGEN RECEPTOR POSITIVE (H): Primary | ICD-10-CM

## 2019-06-07 DIAGNOSIS — R92.30 DENSE BREAST TISSUE ON MAMMOGRAM: ICD-10-CM

## 2019-06-07 DIAGNOSIS — C50.311 MALIGNANT NEOPLASM OF LOWER-INNER QUADRANT OF RIGHT BREAST OF FEMALE, ESTROGEN RECEPTOR POSITIVE (H): Primary | ICD-10-CM

## 2019-06-07 DIAGNOSIS — Z12.39 BREAST CANCER SCREENING, HIGH RISK PATIENT: ICD-10-CM

## 2019-06-07 PROCEDURE — G0463 HOSPITAL OUTPT CLINIC VISIT: HCPCS | Mod: ZF

## 2019-06-07 PROCEDURE — 99214 OFFICE O/P EST MOD 30 MIN: CPT | Mod: ZP | Performed by: INTERNAL MEDICINE

## 2019-06-07 ASSESSMENT — MIFFLIN-ST. JEOR: SCORE: 1335.42

## 2019-06-07 ASSESSMENT — PAIN SCALES - GENERAL: PAINLEVEL: NO PAIN (0)

## 2019-06-07 NOTE — NURSING NOTE
"Oncology Rooming Note    June 7, 2019 10:18 AM   Keely Velazquez is a 64 year old female who presents for:    Chief Complaint   Patient presents with     Oncology Clinic Visit     Return: Breast CA     Initial Vitals: /69 (BP Location: Left arm, Patient Position: Sitting, Cuff Size: Adult Regular)   Pulse 94   Temp 98.5  F (36.9  C) (Oral)   Resp 16   Ht 1.727 m (5' 7.99\")   Wt 74.2 kg (163 lb 9.6 oz)   SpO2 96%   BMI 24.88 kg/m   Estimated body mass index is 24.88 kg/m  as calculated from the following:    Height as of this encounter: 1.727 m (5' 7.99\").    Weight as of this encounter: 74.2 kg (163 lb 9.6 oz). Body surface area is 1.89 meters squared.  No Pain (0) Comment: Data Unavailable   No LMP recorded. Patient is postmenopausal.  Allergies reviewed: Yes  Medications reviewed: Yes    Medications: Medication refills not needed today.  Pharmacy name entered into [employer]:    [id number] [place] [facility] - [place], [place] - [address] [place] [address] [facility] - [place], [place] - [address] [facility]    Clinical concerns: N/A       Brit Rousseau CMA              "

## 2019-06-07 NOTE — LETTER
6/7/2019       RE: Keely Velazquez  [address] [id number]     Dear Colleague,    Thank you for referring your patient, Keely Velazquez, to the [facility]. Please see a copy of my visit note below.    Oncology Follow Up:  Date on this visit: 6/7/2019    Diagnosis:  [id number], grade I, ER positive, [place] positive, HER-2 nonamplified invasive ductal carcinoma of the right breast. Oncotype DX recurrence score = 7.    Primary Physician: Sammie Cerna     History Of Present Illness:  Ms. Velazquez is a 64 year old female with right breast cancer.  Ms. Velazquez had routine screening mammogram in 06/2018, which demonstrated dense breast tissue, but no dominant masses.  She presented to her gynecologist's office in August for a Pap smear and mentioned the high breast density.  Her gynecologist recommended proceeding with breast MRI.  Breast MRI on 08/16/2018 showed nodular to non-mass enhancement measuring 1.6 cm in the right breast at 5 o'clock.  There was a similar area of enhancement at 10 o'clock measuring 1.2 cm and a cyst in the left breast at 3 o'clock measuring 1.3 cm.  Right breast ultrasound confirmed a mass at 5 o'clock.  Right breast biopsy demonstrated a grade 1 invasive ductal carcinoma.  Estrogen-receptor staining was moderate in 95%; progesterone-receptor staining was strong in 96%.  HER-2 was nonamplified by immunohistochemistry with a score of 1+.  There was no ultrasound correlate at 10:00 right breast.  She met in consultation with Dr. Kruger, who recommended a right breast lumpectomy and sentinel lymph node procedure.  This was performed on 08/31/2018.  Pathology demonstrated a 1.2 cm, grade 1 invasive mammary carcinoma.  There was associated intermediate-grade DCIS.  Surgical margins were negative for both noninvasive and invasive carcinoma; however, DCIS was 1 mm from the anterior margin.  A single sentinel lymph node was negative for malignancy. Oncotype DX testing of the breast  tumor returned with a recurrent score of 7.  She completed adjuvant radiation (4240 cGy to the whole breast and additional 1250 cGy to the lumpectomy site) on 11/8/18.  She has been on anastrozole since 9/24/18.     Interval History:   Ms. Velazquez comes into clinic today for routine breast cancer followup.  She continues on treatment with anastrozole.  She is tolerating the medication remarkably well.  She reports stiffness in her hips when she awakens in the morning, however, when she starts to walk the joints loosen up and pain resolves.  She is very active throughout the day.  She has numerous grandchildren.  They are frequently at her home.  She denies hot flashes, vaginal dryness or mood disturbances.  She generally has been well over the last 3 months.  She has no concerning breast lumps or masses.  She denies breast pain or discomfort.  She has no new bone or joint aches or pains, no cough, shortness of breath or chest pain.  No headaches or focal neurologic complaints.  The remainder of a complete 12-point review of systems is reviewed with the patient and was negative with exception of that mentioned above.     Past Medical/Surgical History:  Past Medical History:   Diagnosis Date     Hypertension      Iritis      Past Surgical History:   Procedure Laterality Date     CATARACT IOL, RT/LT Left 2006     HYSTERECTOMY  2016     LUMPECTOMY BREAST WITH SENTINEL NODE, COMBINED Right 8/31/2018    Procedure: COMBINED LUMPECTOMY BREAST WITH SENTINEL NODE;  Right Wire Localized Lumpectomy and Right [place] Lymph Node Biopsy;  Surgeon: Chilango Kruger MD;  Location: [facility] OR     OOPHORECTOMY  2014     Allergies:  Allergies as of 06/07/2019 - Reviewed 03/18/2019   Allergen Reaction Noted     Corticosteroids Dermatitis 03/18/2019     Neomycin Dermatitis 03/18/2019     Codeine Nausea and Vomiting 08/06/2015     Macrobid [nitrofurantoin] Nausea and Vomiting 08/06/2015     Sulfa drugs Itching 08/06/2015     Silver Rash  "12/03/2018     Current Medications:  Current Outpatient Medications   Medication Sig Dispense Refill     acetaminophen (TYLENOL) 325 MG tablet Take 2 tablets (650 mg) by mouth every 4 hours as needed for mild pain 60 tablet 0     AmLODIPine Besylate (NORVASC PO) Take 5 mg by mouth daily       anastrozole (ARIMIDEX) 1 MG tablet Take 1 tablet (1 mg) by mouth daily 30 tablet 11     biotin 1000 MCG TABS tablet Every day 1 Tabl 30 tablet 1     calcium carbonate (OS-PADDY 500 MG [place]. [place]) 500 MG tablet Take 500 mg by mouth 2 times daily With Magnesium,Zinc       desonide (DESOWEN) 0.05 % cream        doxycycline hyclate (VIBRAMYCIN) 100 MG capsule Take one capsule twice daily for 10 days       HYDROcodone-acetaminophen (NORCO) 5-325 MG per tablet Take 1 tablet by mouth every 6 hours as needed for severe pain 30 tablet 0     LYSINE PO        mometasone (ELOCON) 0.1 % external cream Apply topically daily 15 g 1     multivitamin, therapeutic with minerals (MULTI-VITAMIN) TABS Take 1 tablet by mouth daily       Omega-3 Fatty Acids (OMEGA-3 FISH OIL PO)        oxyCODONE IR (ROXICODONE) 5 MG tablet 5-10 mg by mouth every 4 to 6 hours as needed for pain 30 tablet 0     Urea 40 % CREA Apply to fingernails twice daily, especially at bedtime.       VITAMIN D, CHOLECALCIFEROL, PO Take 1,000 Units by mouth daily         Family and Social History:  Reviewed and unchanged from prior.  Please see initial consultation dated 9/24/18 for further details.    Physical Exam:  /69 (BP Location: Left arm, Patient Position: Sitting, Cuff Size: Adult Regular)   Pulse 94   Temp 98.5  F (36.9  C) (Oral)   Resp 16   Ht 1.727 m (5' 7.99\")   Wt 74.2 kg (163 lb 9.6 oz)   SpO2 96%   BMI 24.88 kg/m     General:  Well appearing, well-nourished adult female in NAD.  A&Ox3.  HEENT:  Normocephalic.  Sclera anicteric.  MMM.  No lesions of the oropharynx.  Lymph:  No palpable cervical, supraclavicular, or axillary LAD.  Chest:  CTA bilaterally.  No " wheezes or crackles.  CV:  RRR.  Nl S1 and S2.  No m/r/g.  Breast:  Bilateral breasts are of normal fibroglandular density.  Right lower inner breast incision is without significant underlying fibrosis.  There are no discretely palpable masses in either breast.  Bilateral nipples are everted.  No nipple discharge.  Abd:  Soft/NT/ND.  BSs normoactive.  No hepatosplenomegaly.  Ext:  No pitting edema of the bilateral lower extremities.  Pulses 2+ and symmetric.  Musculo:  Strength 5/5 throughout.  Full ROM of the bilateral upper extremities.  Neuro:  Cranial nerves grossly intact.  Gait stable.  Psych:  Mood and affect appear normal.  Skin:  No visible concerning skin rashes or lesions.    Laboratory/Imaging Studies:  3/4/19 Bilateral diagnostic mammograms:  Heterogeneously dense breasts.  No suspicious findings in either breast.    ASSESSMENT/PLAN:  Ms. Velazquez is a 64-year-old female with a h/o stage IA, T1c N0 M0, grade 1, ER positive, [place] positive, HER2 non-amplified invasive mammary carcinoma of the right breast.  She is status post treatment with right breast lumpectomy and radiation.  On anastrozole since 9/24/18.    1.  Right breast cancer:   Ms. Velazquez is approximately 9 months out from excision of a right breast cancer.  She continues on anastrozole.  We plan to continue anastrozole to complete an approximate 5-10 year course.  We discussed duration will depend on tolerance as well as data at the time of the 5 year point.    There is no evidence of disease recurrence on exam today.  Due to the fact that her initial breast cancer was not detectable by mammogram, increased breast density and close DCIS margins, I recommend high-risk screening with both mammogram and breast MRI.  These studies will both be performed annually; however, spaced so that she is having some form of imaging every 6 months.  Bilateral mammograms in March were without concerning findings.  She will be due for breast MRI at the time of her  return visit.     2.  Bone Health:  At risk for bone loss on aromatase inhibitor therapy.  DEXA in 10/2018 with lowest T-score of -0.5.  Continue calcium 1200 mg and vitamin D 1000 international units and walk a 1/2 hour daily.     3.  Skin cancer:  S/p excision of a basal cell carcinoma from her nasal fold. Ongoing follow up with dermatology.    4.  Routine health maintenance:  Colonoscopy in 2015 was without concerning finding, repeat in 10 years was recommended.  Breast and skin cancer screening as above.  Okay to stop pap smears at this point.  Reviewed AI therapy is associated with hypertriglyceridemia.  Triglycerides were 90 on 3/8/19.  Recommend annual cholesterol panel.    5.  Follow Up:  Breast MRI and visit with me in approximately 3 months.    It was a pleasure to meet with Ms. Velazquez in clinic today.  A total of 15 minutes of our 25 minute face to face visit was spent in counseling.    Again, thank you for allowing me to participate in the care of your patient.      Sincerely,    Merari Tafoya MD

## 2019-08-23 ENCOUNTER — ANCILLARY PROCEDURE (OUTPATIENT)
Dept: MRI IMAGING | Facility: CLINIC | Age: 64
End: 2019-08-23
Attending: INTERNAL MEDICINE
Payer: MEDICARE

## 2019-08-23 DIAGNOSIS — Z12.39 BREAST CANCER SCREENING, HIGH RISK PATIENT: ICD-10-CM

## 2019-08-23 DIAGNOSIS — R92.30 DENSE BREAST TISSUE ON MAMMOGRAM: ICD-10-CM

## 2019-08-23 RX ORDER — GADOBUTROL 604.72 MG/ML
7.5 INJECTION INTRAVENOUS ONCE
Status: COMPLETED | OUTPATIENT
Start: 2019-08-23 | End: 2019-08-23

## 2019-08-23 RX ADMIN — GADOBUTROL 7.5 ML: 604.72 INJECTION INTRAVENOUS at 12:31

## 2019-08-23 NOTE — PROGRESS NOTES
Oncology Follow Up:  Date on this visit: 8/26/2019    Diagnosis:  [id number], grade I, ER positive, CO positive, HER-2 nonamplified invasive ductal carcinoma of the right breast. Oncotype DX recurrence score = 7.    Primary Physician: Sammie Cerna     History Of Present Illness:  Ms. Velazquez is a 64 year old female with right breast cancer.  Ms. Velazquez had routine screening mammogram in 06/2018, which demonstrated dense breast tissue, but no dominant masses.  She presented to her gynecologist's office in August for a Pap smear and mentioned the high breast density.  Her gynecologist recommended proceeding with breast MRI.  Breast MRI on 08/16/2018 showed nodular to non-mass enhancement measuring 1.6 cm in the right breast at 5 o'clock.  There was a similar area of enhancement at 10 o'clock measuring 1.2 cm and a cyst in the left breast at 3 o'clock measuring 1.3 cm.  Right breast ultrasound confirmed a mass at 5 o'clock.  Right breast biopsy demonstrated a grade 1 invasive ductal carcinoma.  Estrogen-receptor staining was moderate in 95%; progesterone-receptor staining was strong in 96%.  HER-2 was nonamplified by immunohistochemistry with a score of 1+.  There was no ultrasound correlate at 10:00 right breast.  She met in consultation with Dr. Kruger, who recommended a right breast lumpectomy and sentinel lymph node procedure.  This was performed on 08/31/2018.  Pathology demonstrated a 1.2 cm, grade 1 invasive mammary carcinoma.  There was associated intermediate-grade DCIS.  Surgical margins were negative for both noninvasive and invasive carcinoma; however, DCIS was 1 mm from the anterior margin.  A single sentinel lymph node was negative for malignancy. Oncotype DX testing of the breast tumor returned with a recurrent score of 7.  She completed adjuvant radiation (4240 cGy to the whole breast and additional 1250 cGy to the lumpectomy site) on 11/8/18.  She has been on anastrozole since 9/24/18.     Interval  History:  Ms. Velazquez comes into clinic today for routine breast cancer followup as well as to review the results of last week's breast MRI.  She continues on anastrozole.  She is tolerating the medication remarkably well.  She states she has occasional joint stiffness; however, does not feel it is any worse than prior to starting the medication.  She denies hot flashes, vaginal dryness or mood disorder.  She denies signs or symptoms of disease recurrence.  She specifically denies breast lumps or masses.  She has no breast pain, discomfort or nipple discharge.  She denies cough, shortness of breath or chest pain.  She has no abdominal complaints, no headaches, visual changes or focal neurologic complaints.  She continues to exercise on a regular basis.  Unfortunately, in June while taking a canoe down a hill she fell and dislocated her left elbow.  She was seen by Orthopedic Surgery and placed in a brace.  She also underwent physical therapy to resume range of movement and feels that she has recovered all function of the elbow.  The remainder of a complete 12-point review of systems was reviewed with the patient and was negative with the exception of that mentioned above.     Past Medical/Surgical History:  Past Medical History:   Diagnosis Date     Hypertension      Iritis      Past Surgical History:   Procedure Laterality Date     CATARACT IOL, RT/LT Left 2006     HYSTERECTOMY  2016     LUMPECTOMY BREAST WITH SENTINEL NODE, COMBINED Right 8/31/2018    Procedure: COMBINED LUMPECTOMY BREAST WITH SENTINEL NODE;  Right Wire Localized Lumpectomy and Right [place] Lymph Node Biopsy;  Surgeon: Chilango Kruger MD;  Location: [facility] OR     OOPHORECTOMY  2014     Allergies:  Allergies as of 08/26/2019 - Reviewed 06/07/2019   Allergen Reaction Noted     Corticosteroids Dermatitis 03/18/2019     Neomycin Dermatitis 03/18/2019     Codeine Nausea and Vomiting 08/06/2015     Macrobid [nitrofurantoin] Nausea and Vomiting 08/06/2015      "Sulfa drugs Itching 08/06/2015     Silver Rash 12/03/2018     Current Medications:  Current Outpatient Medications   Medication Sig Dispense Refill     acetaminophen (TYLENOL) 325 MG tablet Take 2 tablets (650 mg) by mouth every 4 hours as needed for mild pain (Patient not taking: Reported on 6/7/2019) 60 tablet 0     AmLODIPine Besylate (NORVASC PO) Take 5 mg by mouth daily       anastrozole (ARIMIDEX) 1 MG tablet Take 1 tablet (1 mg) by mouth daily 30 tablet 11     biotin 1000 MCG TABS tablet Every day 1 Tabl 30 tablet 1     calcium carbonate (OS-PADDY 500 MG [place]. [place]) 500 MG tablet Take 500 mg by mouth 2 times daily With Magnesium,Zinc       desonide (DESOWEN) 0.05 % cream        doxycycline hyclate (VIBRAMYCIN) 100 MG capsule Take one capsule twice daily for 10 days       HYDROcodone-acetaminophen (NORCO) 5-325 MG per tablet Take 1 tablet by mouth every 6 hours as needed for severe pain (Patient not taking: Reported on 6/7/2019) 30 tablet 0     LYSINE PO        mometasone (ELOCON) 0.1 % external cream Apply topically daily (Patient not taking: Reported on 6/7/2019) 15 g 1     multivitamin, therapeutic with minerals (MULTI-VITAMIN) TABS Take 1 tablet by mouth daily       Omega-3 Fatty Acids (OMEGA-3 FISH OIL PO)        oxyCODONE IR (ROXICODONE) 5 MG tablet 5-10 mg by mouth every 4 to 6 hours as needed for pain (Patient not taking: Reported on 6/7/2019) 30 tablet 0     Urea 40 % CREA Apply to fingernails twice daily, especially at bedtime.       VITAMIN D, CHOLECALCIFEROL, PO Take 1,000 Units by mouth daily         Family and Social History:  Reviewed and unchanged from prior.  Please see initial consultation dated 9/24/18 for further details.    Physical Exam:  BP (!) 132/90 (BP Location: Right arm, Patient Position: Sitting, Cuff Size: Adult Regular)   Pulse 100   Temp 97.7  F (36.5  C) (Oral)   Resp 16   Ht 1.727 m (5' 7.99\")   Wt 74.8 kg (164 lb 12.8 oz)   SpO2 94%   BMI 25.06 kg/m    General:  Well " appearing, well-nourished adult female in NAD.  A&Ox3.  HEENT:  Normocephalic.  Sclera anicteric.  MMM.  No lesions of the oropharynx.  Lymph:  No palpable cervical, supraclavicular, or axillary LAD.  Chest:  CTA bilaterally.  No wheezes or crackles.  Palpable pulse at the left cardiac apex.  CV:  RRR.  Nl S1 and S2.  No m/r/g.  Breast:  Bilateral breasts are of normal fibroglandular density.  There are no discretely palpable masses in either breast.  Bilateral nipples are everted.  No nipple discharge.  Abd:  Soft/NT/ND.  BSs normoactive.  No hepatosplenomegaly.  Ext:  No pitting edema of the bilateral lower extremities.  Pulses 2+ and symmetric.  Musculo:  Full ROM of the bilateral upper extremities.  Neuro:  Cranial nerves grossly intact.  Gait stable.  Psych:  Mood and affect appear normal.  Skin:  No visible concerning skin rashes or lesions.    Laboratory/Imaging Studies:  8/23/19 Breast MRI:  No new suspicious area of enhancement is identified.    ASSESSMENT/PLAN:  Ms. Velazquez is a 64-year-old female with a h/o stage IA, T1c N0 M0, grade 1, ER positive, NE positive, HER2 non-amplified invasive mammary carcinoma of the right breast.  She is status post treatment with right breast lumpectomy and radiation.  On anastrozole since 9/24/18.    1.  Right breast cancer:   Ms. Velazquez is approximately 1 year out from excision of a right breast cancer.  She continues on anastrozole.  She is tolerating the medication remarkably well.  We plan to continue anastrozole to complete a 5 year course.      There is no evidence of disease recurrence on exam today.  Due to the fact that her initial breast cancer was not detectable by mammogram, increased breast density and close DCIS margins, I recommend high-risk screening with both mammogram and breast MRI.  These studies will both be performed annually; however, spaced so that she is having some form of imaging every 6 months.  Breast MRI last week was without concerning findings.   She will be due for mammograms in 03/2020.   She had questions regarding frequency of surveillance imaging and visits as well as questions regarding signs and symptoms of recurrence which I answered today.      2.  Bone Health:  At risk for bone loss on aromatase inhibitor therapy.  DEXA in 10/2018 with lowest T-score of -0.5.  Continue calcium 1200 mg and vitamin D 1000 international units and walk a 1/2 hour daily.  Will plan to repeat a DEXA in 10/2020.    3.  Basal cell carcinoma:  H/o BCCa of the nose s/p Moh's.  Has follow up with dermatology in 09/2019.    4.  Routine health maintenance:  Colonoscopy in 2015 was without concerning finding, next due in 2025.  Annual cholesterol panel to monitor for hypertriglyceridemia on AI therapy.    5.  Follow Up:  Return to clinic in approximately 3 months for visit with me.    It was a pleasure to meet with Ms. Velazquez in clinic today.   A total of 25 minutes was spent in face to face time, of which >50% was spent in counseling.

## 2019-08-26 ENCOUNTER — ONCOLOGY VISIT (OUTPATIENT)
Dept: ONCOLOGY | Facility: CLINIC | Age: 64
End: 2019-08-26
Attending: INTERNAL MEDICINE
Payer: MEDICARE

## 2019-08-26 VITALS
OXYGEN SATURATION: 94 % | WEIGHT: 164.8 LBS | HEART RATE: 100 BPM | TEMPERATURE: 97.7 F | BODY MASS INDEX: 24.98 KG/M2 | SYSTOLIC BLOOD PRESSURE: 132 MMHG | DIASTOLIC BLOOD PRESSURE: 90 MMHG | HEIGHT: 68 IN | RESPIRATION RATE: 16 BRPM

## 2019-08-26 DIAGNOSIS — Z17.0 MALIGNANT NEOPLASM OF UPPER-OUTER QUADRANT OF RIGHT BREAST IN FEMALE, ESTROGEN RECEPTOR POSITIVE (H): ICD-10-CM

## 2019-08-26 DIAGNOSIS — C50.411 MALIGNANT NEOPLASM OF UPPER-OUTER QUADRANT OF RIGHT BREAST IN FEMALE, ESTROGEN RECEPTOR POSITIVE (H): ICD-10-CM

## 2019-08-26 PROCEDURE — 99214 OFFICE O/P EST MOD 30 MIN: CPT | Mod: ZP | Performed by: INTERNAL MEDICINE

## 2019-08-26 PROCEDURE — G0463 HOSPITAL OUTPT CLINIC VISIT: HCPCS | Mod: ZF

## 2019-08-26 RX ORDER — ANASTROZOLE 1 MG/1
1 TABLET ORAL DAILY
Qty: 30 TABLET | Refills: 11 | Status: SHIPPED | OUTPATIENT
Start: 2019-08-26 | End: 2020-06-17

## 2019-08-26 ASSESSMENT — MIFFLIN-ST. JEOR: SCORE: 1340.9

## 2019-08-26 ASSESSMENT — PAIN SCALES - GENERAL: PAINLEVEL: NO PAIN (0)

## 2019-08-26 NOTE — LETTER
8/26/2019       RE: Keely Velazquez  [address] [facility] [address]     Dear Colleague,    Thank you for referring your patient, Keely Velazquez, to the [facility]. Please see a copy of my visit note below.    Oncology Follow Up:  Date on this visit: 8/26/2019    Diagnosis:  [id number], grade I, ER positive, [place] positive, HER-2 nonamplified invasive ductal carcinoma of the right breast. Oncotype DX recurrence score = 7.    Primary Physician: Sammie Cerna     History Of Present Illness:  Ms. Velazquez is a 64 year old female with right breast cancer.  Ms. Velazquez had routine screening mammogram in 06/2018, which demonstrated dense breast tissue, but no dominant masses.  She presented to her gynecologist's office in August for a Pap smear and mentioned the high breast density.  Her gynecologist recommended proceeding with breast MRI.  Breast MRI on 08/16/2018 showed nodular to non-mass enhancement measuring 1.6 cm in the right breast at 5 o'clock.  There was a similar area of enhancement at 10 o'clock measuring 1.2 cm and a cyst in the left breast at 3 o'clock measuring 1.3 cm.  Right breast ultrasound confirmed a mass at 5 o'clock.  Right breast biopsy demonstrated a grade 1 invasive ductal carcinoma.  Estrogen-receptor staining was moderate in 95%; progesterone-receptor staining was strong in 96%.  HER-2 was nonamplified by immunohistochemistry with a score of 1+.  There was no ultrasound correlate at 10:00 right breast.  She met in consultation with Dr. Kruger, who recommended a right breast lumpectomy and sentinel lymph node procedure.  This was performed on 08/31/2018.  Pathology demonstrated a 1.2 cm, grade 1 invasive mammary carcinoma.  There was associated intermediate-grade DCIS.  Surgical margins were negative for both noninvasive and invasive carcinoma; however, DCIS was 1 mm from the anterior margin.  A single sentinel lymph node was negative for malignancy. Oncotype DX testing of the breast  tumor returned with a recurrent score of 7.  She completed adjuvant radiation (4240 cGy to the whole breast and additional 1250 cGy to the lumpectomy site) on 11/8/18.  She has been on anastrozole since 9/24/18.     Interval History:  Ms. Velazquez comes into clinic today for routine breast cancer followup as well as to review the results of last week's breast MRI.  She continues on anastrozole.  She is tolerating the medication remarkably well.  She states she has occasional joint stiffness; however, does not feel it is any worse than prior to starting the medication.  She denies hot flashes, vaginal dryness or mood disorder.  She denies signs or symptoms of disease recurrence.  She specifically denies breast lumps or masses.  She has no breast pain, discomfort or nipple discharge.  She denies cough, shortness of breath or chest pain.  She has no abdominal complaints, no headaches, visual changes or focal neurologic complaints.  She continues to exercise on a regular basis.  Unfortunately, in June while taking a canoe down a hill she fell and dislocated her left elbow.  She was seen by Orthopedic Surgery and placed in a brace.  She also underwent physical therapy to resume range of movement and feels that she has recovered all function of the elbow.  The remainder of a complete 12-point review of systems was reviewed with the patient and was negative with the exception of that mentioned above.     Past Medical/Surgical History:  Past Medical History:   Diagnosis Date     Hypertension      Iritis      Past Surgical History:   Procedure Laterality Date     CATARACT IOL, RT/LT Left 2006     HYSTERECTOMY  2016     LUMPECTOMY BREAST WITH SENTINEL NODE, COMBINED Right 8/31/2018    Procedure: COMBINED LUMPECTOMY BREAST WITH SENTINEL NODE;  Right Wire Localized Lumpectomy and Right [place] Lymph Node Biopsy;  Surgeon: Chilango Kruger MD;  Location: [facility] OR     OOPHORECTOMY  2014     Allergies:  Allergies as of 08/26/2019 -  Reviewed 06/07/2019   Allergen Reaction Noted     Corticosteroids Dermatitis 03/18/2019     Neomycin Dermatitis 03/18/2019     Codeine Nausea and Vomiting 08/06/2015     Macrobid [nitrofurantoin] Nausea and Vomiting 08/06/2015     Sulfa drugs Itching 08/06/2015     Silver Rash 12/03/2018     Current Medications:  Current Outpatient Medications   Medication Sig Dispense Refill     acetaminophen (TYLENOL) 325 MG tablet Take 2 tablets (650 mg) by mouth every 4 hours as needed for mild pain (Patient not taking: Reported on 6/7/2019) 60 tablet 0     AmLODIPine Besylate (NORVASC PO) Take 5 mg by mouth daily       anastrozole (ARIMIDEX) 1 MG tablet Take 1 tablet (1 mg) by mouth daily 30 tablet 11     biotin 1000 MCG TABS tablet Every day 1 Tabl 30 tablet 1     calcium carbonate (OS-PADDY 500 MG [place]. [place]) 500 MG tablet Take 500 mg by mouth 2 times daily With Magnesium,Zinc       desonide (DESOWEN) 0.05 % cream        doxycycline hyclate (VIBRAMYCIN) 100 MG capsule Take one capsule twice daily for 10 days       HYDROcodone-acetaminophen (NORCO) 5-325 MG per tablet Take 1 tablet by mouth every 6 hours as needed for severe pain (Patient not taking: Reported on 6/7/2019) 30 tablet 0     LYSINE PO        mometasone (ELOCON) 0.1 % external cream Apply topically daily (Patient not taking: Reported on 6/7/2019) 15 g 1     multivitamin, therapeutic with minerals (MULTI-VITAMIN) TABS Take 1 tablet by mouth daily       Omega-3 Fatty Acids (OMEGA-3 FISH OIL PO)        oxyCODONE IR (ROXICODONE) 5 MG tablet 5-10 mg by mouth every 4 to 6 hours as needed for pain (Patient not taking: Reported on 6/7/2019) 30 tablet 0     Urea 40 % CREA Apply to fingernails twice daily, especially at bedtime.       VITAMIN D, CHOLECALCIFEROL, PO Take 1,000 Units by mouth daily         Family and Social History:  Reviewed and unchanged from prior.  Please see initial consultation dated 9/24/18 for further details.    Physical Exam:  BP (!) 132/90 (BP Location:  "Right arm, Patient Position: Sitting, Cuff Size: Adult Regular)   Pulse 100   Temp 97.7  F (36.5  C) (Oral)   Resp 16   Ht 1.727 m (5' 7.99\")   Wt 74.8 kg (164 lb 12.8 oz)   SpO2 94%   BMI 25.06 kg/m     General:  Well appearing, well-nourished adult female in NAD.  A&Ox3.  HEENT:  Normocephalic.  Sclera anicteric.  MMM.  No lesions of the oropharynx.  Lymph:  No palpable cervical, supraclavicular, or axillary LAD.  Chest:  CTA bilaterally.  No wheezes or crackles.  Palpable pulse at the left cardiac apex.  CV:  RRR.  Nl S1 and S2.  No m/r/g.  Breast:  Bilateral breasts are of normal fibroglandular density.  There are no discretely palpable masses in either breast.  Bilateral nipples are everted.  No nipple discharge.  Abd:  Soft/NT/ND.  BSs normoactive.  No hepatosplenomegaly.  Ext:  No pitting edema of the bilateral lower extremities.  Pulses 2+ and symmetric.  Musculo:  Full ROM of the bilateral upper extremities.  Neuro:  Cranial nerves grossly intact.  Gait stable.  Psych:  Mood and affect appear normal.  Skin:  No visible concerning skin rashes or lesions.    Laboratory/Imaging Studies:  8/23/19 Breast MRI:  No new suspicious area of enhancement is identified.    ASSESSMENT/PLAN:  Ms. Velazquez is a 64-year-old female with a h/o stage IA, T1c N0 M0, grade 1, ER positive, [place] positive, HER2 non-amplified invasive mammary carcinoma of the right breast.  She is status post treatment with right breast lumpectomy and radiation.  On anastrozole since 9/24/18.    1.  Right breast cancer:   Ms. Velazquez is approximately 1 year out from excision of a right breast cancer.  She continues on anastrozole.  She is tolerating the medication remarkably well.  We plan to continue anastrozole to complete a 5 year course.      There is no evidence of disease recurrence on exam today.  Due to the fact that her initial breast cancer was not detectable by mammogram, increased breast density and close DCIS margins, I recommend " high-risk screening with both mammogram and breast MRI.  These studies will both be performed annually; however, spaced so that she is having some form of imaging every 6 months.  Breast MRI last week was without concerning findings.  She will be due for mammograms in 03/2020.   She had questions regarding frequency of surveillance imaging and visits as well as questions regarding signs and symptoms of recurrence which I answered today.      2.  Bone Health:  At risk for bone loss on aromatase inhibitor therapy.  DEXA in 10/2018 with lowest T-score of -0.5.  Continue calcium 1200 mg and vitamin D 1000 international units and walk a 1/2 hour daily.  Will plan to repeat a DEXA in 10/2020.    3.  Basal cell carcinoma:  H/o BCCa of the nose s/p Moh's.  Has follow up with dermatology in 09/2019.    4.  Routine health maintenance:  Colonoscopy in 2015 was without concerning finding, next due in 2025.  Annual cholesterol panel to monitor for hypertriglyceridemia on AI therapy.    5.  Follow Up:  Return to clinic in approximately 3 months for visit with me.    It was a pleasure to meet with Ms. Velazquez in clinic today.   A total of 25 minutes was spent in face to face time, of which >50% was spent in counseling.    Merari Tafoya MD

## 2019-08-26 NOTE — LETTER
8/26/2019      RE: Keely Velazquez  [address] [facility] [id number]       Oncology Follow Up:  Date on this visit: 8/26/2019    Diagnosis:  [id number], grade I, ER positive, TN positive, HER-2 nonamplified invasive ductal carcinoma of the right breast. Oncotype DX recurrence score = 7.    Primary Physician: Sammie Cerna     History Of Present Illness:  Ms. Velazquez is a 64 year old female with right breast cancer.  Ms. Velazquez had routine screening mammogram in 06/2018, which demonstrated dense breast tissue, but no dominant masses.  She presented to her gynecologist's office in August for a Pap smear and mentioned the high breast density.  Her gynecologist recommended proceeding with breast MRI.  Breast MRI on 08/16/2018 showed nodular to non-mass enhancement measuring 1.6 cm in the right breast at 5 o'clock.  There was a similar area of enhancement at 10 o'clock measuring 1.2 cm and a cyst in the left breast at 3 o'clock measuring 1.3 cm.  Right breast ultrasound confirmed a mass at 5 o'clock.  Right breast biopsy demonstrated a grade 1 invasive ductal carcinoma.  Estrogen-receptor staining was moderate in 95%; progesterone-receptor staining was strong in 96%.  HER-2 was nonamplified by immunohistochemistry with a score of 1+.  There was no ultrasound correlate at 10:00 right breast.  She met in consultation with Dr. Kruger, who recommended a right breast lumpectomy and sentinel lymph node procedure.  This was performed on 08/31/2018.  Pathology demonstrated a 1.2 cm, grade 1 invasive mammary carcinoma.  There was associated intermediate-grade DCIS.  Surgical margins were negative for both noninvasive and invasive carcinoma; however, DCIS was 1 mm from the anterior margin.  A single sentinel lymph node was negative for malignancy. Oncotype DX testing of the breast tumor returned with a recurrent score of 7.  She completed adjuvant radiation (4240 cGy to the whole breast and additional 1250 cGy to the lumpectomy site)  on 11/8/18.  She has been on anastrozole since 9/24/18.     Interval History:  Ms. Velazquez comes into clinic today for routine breast cancer followup as well as to review the results of last week's breast MRI.  She continues on anastrozole.  She is tolerating the medication remarkably well.  She states she has occasional joint stiffness; however, does not feel it is any worse than prior to starting the medication.  She denies hot flashes, vaginal dryness or mood disorder.  She denies signs or symptoms of disease recurrence.  She specifically denies breast lumps or masses.  She has no breast pain, discomfort or nipple discharge.  She denies cough, shortness of breath or chest pain.  She has no abdominal complaints, no headaches, visual changes or focal neurologic complaints.  She continues to exercise on a regular basis.  Unfortunately, in June while taking a canoe down a hill she fell and dislocated her left elbow.  She was seen by Orthopedic Surgery and placed in a brace.  She also underwent physical therapy to resume range of movement and feels that she has recovered all function of the elbow.  The remainder of a complete 12-point review of systems was reviewed with the patient and was negative with the exception of that mentioned above.     Past Medical/Surgical History:  Past Medical History:   Diagnosis Date     Hypertension      Iritis      Past Surgical History:   Procedure Laterality Date     CATARACT IOL, RT/LT Left 2006     HYSTERECTOMY  2016     LUMPECTOMY BREAST WITH SENTINEL NODE, COMBINED Right 8/31/2018    Procedure: COMBINED LUMPECTOMY BREAST WITH SENTINEL NODE;  Right Wire Localized Lumpectomy and Right [place] Lymph Node Biopsy;  Surgeon: Chilango Kruger MD;  Location: [facility] OR     OOPHORECTOMY  2014     Allergies:  Allergies as of 08/26/2019 - Reviewed 06/07/2019   Allergen Reaction Noted     Corticosteroids Dermatitis 03/18/2019     Neomycin Dermatitis 03/18/2019     Codeine Nausea and Vomiting  "08/06/2015     Macrobid [nitrofurantoin] Nausea and Vomiting 08/06/2015     Sulfa drugs Itching 08/06/2015     Silver Rash 12/03/2018     Current Medications:  Current Outpatient Medications   Medication Sig Dispense Refill     acetaminophen (TYLENOL) 325 MG tablet Take 2 tablets (650 mg) by mouth every 4 hours as needed for mild pain (Patient not taking: Reported on 6/7/2019) 60 tablet 0     AmLODIPine Besylate (NORVASC PO) Take 5 mg by mouth daily       anastrozole (ARIMIDEX) 1 MG tablet Take 1 tablet (1 mg) by mouth daily 30 tablet 11     biotin 1000 MCG TABS tablet Every day 1 Tabl 30 tablet 1     calcium carbonate (OS-PADDY 500 MG [place]. [place]) 500 MG tablet Take 500 mg by mouth 2 times daily With Magnesium,Zinc       desonide (DESOWEN) 0.05 % cream        doxycycline hyclate (VIBRAMYCIN) 100 MG capsule Take one capsule twice daily for 10 days       HYDROcodone-acetaminophen (NORCO) 5-325 MG per tablet Take 1 tablet by mouth every 6 hours as needed for severe pain (Patient not taking: Reported on 6/7/2019) 30 tablet 0     LYSINE PO        mometasone (ELOCON) 0.1 % external cream Apply topically daily (Patient not taking: Reported on 6/7/2019) 15 g 1     multivitamin, therapeutic with minerals (MULTI-VITAMIN) TABS Take 1 tablet by mouth daily       Omega-3 Fatty Acids (OMEGA-3 FISH OIL PO)        oxyCODONE IR (ROXICODONE) 5 MG tablet 5-10 mg by mouth every 4 to 6 hours as needed for pain (Patient not taking: Reported on 6/7/2019) 30 tablet 0     Urea 40 % CREA Apply to fingernails twice daily, especially at bedtime.       VITAMIN D, CHOLECALCIFEROL, PO Take 1,000 Units by mouth daily         Family and Social History:  Reviewed and unchanged from prior.  Please see initial consultation dated 9/24/18 for further details.    Physical Exam:  BP (!) 132/90 (BP Location: Right arm, Patient Position: Sitting, Cuff Size: Adult Regular)   Pulse 100   Temp 97.7  F (36.5  C) (Oral)   Resp 16   Ht 1.727 m (5' 7.99\")   Wt " 74.8 kg (164 lb 12.8 oz)   SpO2 94%   BMI 25.06 kg/m     General:  Well appearing, well-nourished adult female in NAD.  A&Ox3.  HEENT:  Normocephalic.  Sclera anicteric.  MMM.  No lesions of the oropharynx.  Lymph:  No palpable cervical, supraclavicular, or axillary LAD.  Chest:  CTA bilaterally.  No wheezes or crackles.  Palpable pulse at the left cardiac apex.  CV:  RRR.  Nl S1 and S2.  No m/r/g.  Breast:  Bilateral breasts are of normal fibroglandular density.  There are no discretely palpable masses in either breast.  Bilateral nipples are everted.  No nipple discharge.  Abd:  Soft/NT/ND.  BSs normoactive.  No hepatosplenomegaly.  Ext:  No pitting edema of the bilateral lower extremities.  Pulses 2+ and symmetric.  Musculo:  Full ROM of the bilateral upper extremities.  Neuro:  Cranial nerves grossly intact.  Gait stable.  Psych:  Mood and affect appear normal.  Skin:  No visible concerning skin rashes or lesions.    Laboratory/Imaging Studies:  8/23/19 Breast MRI:  No new suspicious area of enhancement is identified.    ASSESSMENT/PLAN:  Ms. Velazquez is a 64-year-old female with a h/o stage IA, T1c N0 M0, grade 1, ER positive, CA positive, HER2 non-amplified invasive mammary carcinoma of the right breast.  She is status post treatment with right breast lumpectomy and radiation.  On anastrozole since 9/24/18.    1.  Right breast cancer:   Ms. Velazquez is approximately 1 year out from excision of a right breast cancer.  She continues on anastrozole.  She is tolerating the medication remarkably well.  We plan to continue anastrozole to complete a 5 year course.      There is no evidence of disease recurrence on exam today.  Due to the fact that her initial breast cancer was not detectable by mammogram, increased breast density and close DCIS margins, I recommend high-risk screening with both mammogram and breast MRI.  These studies will both be performed annually; however, spaced so that she is having some form of  imaging every 6 months.  Breast MRI last week was without concerning findings.  She will be due for mammograms in 03/2020.   She had questions regarding frequency of surveillance imaging and visits as well as questions regarding signs and symptoms of recurrence which I answered today.      2.  Bone Health:  At risk for bone loss on aromatase inhibitor therapy.  DEXA in 10/2018 with lowest T-score of -0.5.  Continue calcium 1200 mg and vitamin D 1000 international units and walk a 1/2 hour daily.  Will plan to repeat a DEXA in 10/2020.    3.  Basal cell carcinoma:  H/o BCCa of the nose s/p Moh's.  Has follow up with dermatology in 09/2019.    4.  Routine health maintenance:  Colonoscopy in 2015 was without concerning finding, next due in 2025.  Annual cholesterol panel to monitor for hypertriglyceridemia on AI therapy.    5.  Follow Up:  Return to clinic in approximately 3 months for visit with me.    It was a pleasure to meet with Ms. Velazquez in clinic today.   A total of 25 minutes was spent in face to face time, of which >50% was spent in counseling.                  Merari Tafoya MD

## 2019-08-26 NOTE — NURSING NOTE
"Oncology Rooming Note    August 26, 2019 4:44 PM   Keely Velazquez is a 64 year old female who presents for:    Chief Complaint   Patient presents with     RECHECK     onc breast ca     Initial Vitals: There were no vitals taken for this visit. Estimated body mass index is 24.88 kg/m  as calculated from the following:    Height as of 6/7/19: 1.727 m (5' 7.99\").    Weight as of 6/7/19: 74.2 kg (163 lb 9.6 oz). There is no height or weight on file to calculate BSA.  Data Unavailable Comment: Data Unavailable   No LMP recorded. Patient is postmenopausal.  Allergies reviewed: Yes  Medications reviewed: Yes    Medications: MEDICATION REFILLS NEEDED TODAY. Provider was notified.  Pharmacy name entered into [employer]Flowâ„¢:    [id number] - [place], [place] - [address] [facility] - [place], [place] - [address] [facility]    Clinical concerns: none        Renetta Eduardo, CMA              "

## 2019-10-02 ENCOUNTER — HEALTH MAINTENANCE LETTER (OUTPATIENT)
Age: 64
End: 2019-10-02

## 2019-12-03 ENCOUNTER — ANCILLARY PROCEDURE (OUTPATIENT)
Dept: GENERAL RADIOLOGY | Facility: CLINIC | Age: 64
End: 2019-12-03
Attending: INTERNAL MEDICINE
Payer: MEDICARE

## 2019-12-03 ENCOUNTER — ONCOLOGY VISIT (OUTPATIENT)
Dept: ONCOLOGY | Facility: CLINIC | Age: 64
End: 2019-12-03
Attending: INTERNAL MEDICINE
Payer: MEDICARE

## 2019-12-03 VITALS
SYSTOLIC BLOOD PRESSURE: 147 MMHG | OXYGEN SATURATION: 98 % | BODY MASS INDEX: 25.08 KG/M2 | WEIGHT: 165.5 LBS | TEMPERATURE: 98 F | HEART RATE: 80 BPM | DIASTOLIC BLOOD PRESSURE: 81 MMHG | RESPIRATION RATE: 14 BRPM | HEIGHT: 68 IN

## 2019-12-03 DIAGNOSIS — Z12.39 BREAST CANCER SCREENING, HIGH RISK PATIENT: ICD-10-CM

## 2019-12-03 DIAGNOSIS — M25.552 HIP PAIN, LEFT: ICD-10-CM

## 2019-12-03 DIAGNOSIS — Z17.0 MALIGNANT NEOPLASM OF LOWER-INNER QUADRANT OF RIGHT BREAST OF FEMALE, ESTROGEN RECEPTOR POSITIVE (H): Primary | ICD-10-CM

## 2019-12-03 DIAGNOSIS — C50.311 MALIGNANT NEOPLASM OF LOWER-INNER QUADRANT OF RIGHT BREAST OF FEMALE, ESTROGEN RECEPTOR POSITIVE (H): Primary | ICD-10-CM

## 2019-12-03 DIAGNOSIS — Z12.31 ENCOUNTER FOR SCREENING MAMMOGRAM FOR MALIGNANT NEOPLASM OF BREAST: ICD-10-CM

## 2019-12-03 PROCEDURE — G0463 HOSPITAL OUTPT CLINIC VISIT: HCPCS | Mod: ZF

## 2019-12-03 PROCEDURE — 99214 OFFICE O/P EST MOD 30 MIN: CPT | Mod: ZP | Performed by: INTERNAL MEDICINE

## 2019-12-03 ASSESSMENT — PAIN SCALES - GENERAL: PAINLEVEL: MILD PAIN (2)

## 2019-12-03 ASSESSMENT — MIFFLIN-ST. JEOR: SCORE: 1344.07

## 2019-12-03 NOTE — LETTER
RE: Keely Velazquez  [address] [facility] [address]     Dear Colleague,    Thank you for referring your patient, Keely Velazquez, to the [facility]. Please see a copy of my visit note below.    Oncology Follow Up:  Date on this visit: 12/3/2019    Diagnosis:  [id number], grade I, ER positive, NE positive, HER-2 nonamplified invasive ductal carcinoma of the right breast. Oncotype DX recurrence score = 7.    Primary Physician: Sammie Cerna     History Of Present Illness:  Ms. Velazquez is a 64 year old female with right breast cancer.  Ms. Velazquez had routine screening mammogram in 06/2018, which demonstrated dense breast tissue, but no dominant masses.  She presented to her gynecologist's office in August for a Pap smear and mentioned the high breast density.  Her gynecologist recommended proceeding with breast MRI.  Breast MRI on 08/16/2018 showed nodular to non-mass enhancement measuring 1.6 cm in the right breast at 5 o'clock.  There was a similar area of enhancement at 10 o'clock measuring 1.2 cm and a cyst in the left breast at 3 o'clock measuring 1.3 cm.  Right breast ultrasound confirmed a mass at 5 o'clock.  Right breast biopsy demonstrated a grade 1 invasive ductal carcinoma.  Estrogen-receptor staining was moderate in 95%; progesterone-receptor staining was strong in 96%.  HER-2 was nonamplified by immunohistochemistry with a score of 1+.  There was no ultrasound correlate at 10:00 right breast.  She met in consultation with Dr. Kruger, who recommended a right breast lumpectomy and sentinel lymph node procedure.  This was performed on 08/31/2018.  Pathology demonstrated a 1.2 cm, grade 1 invasive mammary carcinoma.  There was associated intermediate-grade DCIS.  Surgical margins were negative for both noninvasive and invasive carcinoma; however, DCIS was 1 mm from the anterior margin.  A single sentinel lymph node was negative for malignancy. Oncotype DX testing of the breast tumor  returned with a recurrent score of 7.  She completed adjuvant radiation (4240 cGy to the whole breast and additional 1250 cGy to the lumpectomy site) on 11/8/18.  She has been on anastrozole since 9/24/18.     Interval History:  Ms. Velazquez comes into clinic today for routine breast cancer followup.  She continues on treatment with anastrozole.  She is tolerating it with little to no side effects.  She specifically denies hot flashes or mood disturbances.  She has had no vaginal dryness or dyspareunia.  She has had joint pains, worst have been in the left hip.  This is chronic left hip pain; however, approximately 1 week ago she was cleaning out her significant other's house and began to have acute left hip pain.  Pain is worse with prolonged standing.  It is also worse with any kind of activity.  She points to the left lateral hip and a little bit to the posterior low back area.  She states previously the pain would sometimes radiate down the leg, but this current pain does not seem to do this.  The pain improves if she takes 400 mg of ibuprofen.  It is not waking her from sleep at night.  She has not had any other new bone or joint aches or pains.  She has chronic bilateral knee pain which is bothersome when she climbs stairs.  She denies concerning lumps or masses of either breast.  She has had no flu or cold symptoms.  She specifically denies cough, shortness of breath or chest pain.  She has no swelling of her lower extremities.  No abdominal complaints.  The remainder of a complete 12-point review of systems was reviewed with the patient and was negative with the exception of that mentioned above.     Past Medical/Surgical History:  Past Medical History:   Diagnosis Date     Hypertension      Iritis      Past Surgical History:   Procedure Laterality Date     CATARACT IOL, RT/LT Left 2006     HYSTERECTOMY  2016     LUMPECTOMY BREAST WITH SENTINEL NODE, COMBINED Right 8/31/2018    Procedure: COMBINED LUMPECTOMY  BREAST WITH SENTINEL NODE;  Right Wire Localized Lumpectomy and Right [place] Lymph Node Biopsy;  Surgeon: Chilango Kruger MD;  Location: [facility] OR     OOPHORECTOMY  2014     Allergies:  Allergies as of 12/03/2019 - Reviewed 08/26/2019   Allergen Reaction Noted     Corticosteroids Dermatitis 03/18/2019     Neomycin Dermatitis 03/18/2019     Codeine Nausea and Vomiting 08/06/2015     Macrobid [nitrofurantoin] Nausea and Vomiting 08/06/2015     Sulfa drugs Itching 08/06/2015     Silver Rash 12/03/2018     Current Medications:  Current Outpatient Medications   Medication Sig Dispense Refill     acetaminophen (TYLENOL) 325 MG tablet Take 2 tablets (650 mg) by mouth every 4 hours as needed for mild pain (Patient not taking: Reported on 6/7/2019) 60 tablet 0     AmLODIPine Besylate (NORVASC PO) Take 5 mg by mouth daily       anastrozole (ARIMIDEX) 1 MG tablet Take 1 tablet (1 mg) by mouth daily 30 tablet 11     biotin 1000 MCG TABS tablet Every day 1 Tabl 30 tablet 1     calcium carbonate (OS-PADDY 500 MG [place]. [place]) 500 MG tablet Take 500 mg by mouth 2 times daily With Magnesium,Zinc       desonide (DESOWEN) 0.05 % cream        doxycycline hyclate (VIBRAMYCIN) 100 MG capsule Take one capsule twice daily for 10 days       HYDROcodone-acetaminophen (NORCO) 5-325 MG per tablet Take 1 tablet by mouth every 6 hours as needed for severe pain (Patient not taking: Reported on 6/7/2019) 30 tablet 0     LYSINE PO        mometasone (ELOCON) 0.1 % external cream Apply topically daily (Patient not taking: Reported on 6/7/2019) 15 g 1     multivitamin, therapeutic with minerals (MULTI-VITAMIN) TABS Take 1 tablet by mouth daily       Omega-3 Fatty Acids (OMEGA-3 FISH OIL PO)        oxyCODONE IR (ROXICODONE) 5 MG tablet 5-10 mg by mouth every 4 to 6 hours as needed for pain (Patient not taking: Reported on 6/7/2019) 30 tablet 0     Urea 40 % CREA Apply to fingernails twice daily, especially at bedtime.       VITAMIN D, CHOLECALCIFEROL, PO Take  "1,000 Units by mouth daily         Family and Social History:  Reviewed and unchanged from prior.  Please see initial consultation dated 9/24/18 for further details.    Physical Exam:  BP (!) 147/81 (BP Location: Left arm, Patient Position: Chair, Cuff Size: Adult Regular)   Pulse 80   Temp 98  F (36.7  C)   Resp 14   Ht 1.727 m (5' 7.99\")   Wt 75.1 kg (165 lb 8 oz)   SpO2 98%   BMI 25.17 kg/m     General:  Well appearing, well-nourished adult female in NAD.  A&Ox3.  HEENT:  Normocephalic.  Sclera anicteric.  MMM.  No lesions of the oropharynx.  Lymph:  No palpable cervical, supraclavicular, or axillary LAD.  Chest:  CTA bilaterally.  No wheezes or crackles.    CV:  RRR.  Nl S1 and S2.  No m/r/g.  Breast:  Bilateral breasts with scattered fibroglandular densities.  There are no discretely palpable masses in either breast.  Bilateral nipples are inverted.  No nipple discharge.  Abd:  Soft/NT/ND.  BSs normoactive.    Ext:  No pitting edema of the bilateral lower extremities.  Pulses 2+ and symmetric.  Musculo:  Full ROM of the bilateral upper extremities.  No pain with palpation over the left SI joint or the left greater tuberosity.  Full ROM of the LLE.  Neuro:  Cranial nerves grossly intact.  Gait stable.  Psych:  Mood and affect appear normal.  Skin:  No visible concerning skin rashes or lesions.    Laboratory/Imaging Studies:  8/23/19 Breast MRI:  No new suspicious area of enhancement is identified.    ASSESSMENT/PLAN:  Ms. Velazquez is a 64-year-old female with a h/o stage IA, T1c N0 M0, grade 1, ER positive, AK positive, HER2 non-amplified invasive mammary carcinoma of the right breast.  She is status post treatment with right breast lumpectomy and radiation.  On anastrozole since 9/24/18.    1.  Right breast cancer:   Ms. Velazquez is approximately 1 year, 3 months out from excision of a right breast cancer.  She continues on anastrozole.  She is tolerating the medication remarkably well.  We reviewed plan to " continue anastrozole to complete a minimum 5 year course.      There is no evidence of disease recurrence on exam today.  Due to the fact that her initial breast cancer was not detectable by mammogram, increased breast density and close DCIS margins, I recommend high-risk screening with both mammogram and breast MRI.  These studies will both be performed annually; however, spaced so that she is having some form of imaging every 6 months.  She will be due for mammograms in 03/2020.        2.  Left hip pain:  I reassured her today that her left hip pain is unlikely to be breast cancer related (due to chronicity) and is not likely aromatase inhibitor induced either.  I suspect that she has a gluteal tendinopathy or trochanteric bursitis.  Will obtain a hip x-ray to rule out arthritis or other causes.  Advised she take Ibu profen 400 mg PO Q 8 hours x 1-2 weeks.  Should take with food.  Also advised consideration of physical therapy to which she was agreeable.  She will contact the clinic if pain fails to improve with these interventions at which time an MRI of the hip could be considered.    3.  Bone Health:  At risk for bone loss on aromatase inhibitor therapy.  DEXA in 10/2018 with lowest T-score of -0.5.  Continue calcium 1200 mg, vitamin D 1000 international units and walk a 1/2 hour daily.  Will plan to repeat a DEXA in 10/2020.    4.  Basal cell carcinoma:  H/o BCCa of the nose s/p Moh's.  Ongoing follow up with dermatology.    5.  Routine health maintenance:  Colonoscopy in 2015 was without concerning finding, next due in 2025.  Annual cholesterol panel to monitor for hypertriglyceridemia on AI therapy.    6.  Follow Up:  Return to clinic in approximately 3 months for bilateral screening mammograms and visit with me.    It was a pleasure to meet with Ms. Velazquez in clinic today.  A total of 25 minutes was spent in face to face time with the patient, >50% of which was spent in counseling.    Again, thank you for  allowing me to participate in the care of your patient.      Sincerely,    Merari Tafoya MD

## 2019-12-03 NOTE — PROGRESS NOTES
Oncology Follow Up:  Date on this visit: 12/3/2019    Diagnosis:  [id number], grade I, ER positive, WV positive, HER-2 nonamplified invasive ductal carcinoma of the right breast. Oncotype DX recurrence score = 7.    Primary Physician: Sammie Cerna     History Of Present Illness:  Ms. Velazquez is a 64 year old female with right breast cancer.  Ms. Velazquez had routine screening mammogram in 06/2018, which demonstrated dense breast tissue, but no dominant masses.  She presented to her gynecologist's office in August for a Pap smear and mentioned the high breast density.  Her gynecologist recommended proceeding with breast MRI.  Breast MRI on 08/16/2018 showed nodular to non-mass enhancement measuring 1.6 cm in the right breast at 5 o'clock.  There was a similar area of enhancement at 10 o'clock measuring 1.2 cm and a cyst in the left breast at 3 o'clock measuring 1.3 cm.  Right breast ultrasound confirmed a mass at 5 o'clock.  Right breast biopsy demonstrated a grade 1 invasive ductal carcinoma.  Estrogen-receptor staining was moderate in 95%; progesterone-receptor staining was strong in 96%.  HER-2 was nonamplified by immunohistochemistry with a score of 1+.  There was no ultrasound correlate at 10:00 right breast.  She met in consultation with Dr. Kruger, who recommended a right breast lumpectomy and sentinel lymph node procedure.  This was performed on 08/31/2018.  Pathology demonstrated a 1.2 cm, grade 1 invasive mammary carcinoma.  There was associated intermediate-grade DCIS.  Surgical margins were negative for both noninvasive and invasive carcinoma; however, DCIS was 1 mm from the anterior margin.  A single sentinel lymph node was negative for malignancy. Oncotype DX testing of the breast tumor returned with a recurrent score of 7.  She completed adjuvant radiation (4240 cGy to the whole breast and additional 1250 cGy to the lumpectomy site) on 11/8/18.  She has been on anastrozole since 9/24/18.     Interval  History:  Ms. Velazquez comes into clinic today for routine breast cancer followup.  She continues on treatment with anastrozole.  She is tolerating it with little to no side effects.  She specifically denies hot flashes or mood disturbances.  She has had no vaginal dryness or dyspareunia.  She has had joint pains, worst have been in the left hip.  This is chronic left hip pain; however, approximately 1 week ago she was cleaning out her significant other's house and began to have acute left hip pain.  Pain is worse with prolonged standing.  It is also worse with any kind of activity.  She points to the left lateral hip and a little bit to the posterior low back area.  She states previously the pain would sometimes radiate down the leg, but this current pain does not seem to do this.  The pain improves if she takes 400 mg of ibuprofen.  It is not waking her from sleep at night.  She has not had any other new bone or joint aches or pains.  She has chronic bilateral knee pain which is bothersome when she climbs stairs.  She denies concerning lumps or masses of either breast.  She has had no flu or cold symptoms.  She specifically denies cough, shortness of breath or chest pain.  She has no swelling of her lower extremities.  No abdominal complaints.  The remainder of a complete 12-point review of systems was reviewed with the patient and was negative with the exception of that mentioned above.     Past Medical/Surgical History:  Past Medical History:   Diagnosis Date     Hypertension      Iritis      Past Surgical History:   Procedure Laterality Date     CATARACT IOL, RT/LT Left 2006     HYSTERECTOMY  2016     LUMPECTOMY BREAST WITH SENTINEL NODE, COMBINED Right 8/31/2018    Procedure: COMBINED LUMPECTOMY BREAST WITH SENTINEL NODE;  Right Wire Localized Lumpectomy and Right [place] Lymph Node Biopsy;  Surgeon: Chilango Kruger MD;  Location: [facility]     OOPHORECTOMY  2014     Allergies:  Allergies as of 12/03/2019 - Reviewed  08/26/2019   Allergen Reaction Noted     Corticosteroids Dermatitis 03/18/2019     Neomycin Dermatitis 03/18/2019     Codeine Nausea and Vomiting 08/06/2015     Macrobid [nitrofurantoin] Nausea and Vomiting 08/06/2015     Sulfa drugs Itching 08/06/2015     Silver Rash 12/03/2018     Current Medications:  Current Outpatient Medications   Medication Sig Dispense Refill     acetaminophen (TYLENOL) 325 MG tablet Take 2 tablets (650 mg) by mouth every 4 hours as needed for mild pain (Patient not taking: Reported on 6/7/2019) 60 tablet 0     AmLODIPine Besylate (NORVASC PO) Take 5 mg by mouth daily       anastrozole (ARIMIDEX) 1 MG tablet Take 1 tablet (1 mg) by mouth daily 30 tablet 11     biotin 1000 MCG TABS tablet Every day 1 Tabl 30 tablet 1     calcium carbonate (OS-PADDY 500 MG [place]. [place]) 500 MG tablet Take 500 mg by mouth 2 times daily With Magnesium,Zinc       desonide (DESOWEN) 0.05 % cream        doxycycline hyclate (VIBRAMYCIN) 100 MG capsule Take one capsule twice daily for 10 days       HYDROcodone-acetaminophen (NORCO) 5-325 MG per tablet Take 1 tablet by mouth every 6 hours as needed for severe pain (Patient not taking: Reported on 6/7/2019) 30 tablet 0     LYSINE PO        mometasone (ELOCON) 0.1 % external cream Apply topically daily (Patient not taking: Reported on 6/7/2019) 15 g 1     multivitamin, therapeutic with minerals (MULTI-VITAMIN) TABS Take 1 tablet by mouth daily       Omega-3 Fatty Acids (OMEGA-3 FISH OIL PO)        oxyCODONE IR (ROXICODONE) 5 MG tablet 5-10 mg by mouth every 4 to 6 hours as needed for pain (Patient not taking: Reported on 6/7/2019) 30 tablet 0     Urea 40 % CREA Apply to fingernails twice daily, especially at bedtime.       VITAMIN D, CHOLECALCIFEROL, PO Take 1,000 Units by mouth daily         Family and Social History:  Reviewed and unchanged from prior.  Please see initial consultation dated 9/24/18 for further details.    Physical Exam:  BP (!) 147/81 (BP Location: Left  "arm, Patient Position: Chair, Cuff Size: Adult Regular)   Pulse 80   Temp 98  F (36.7  C)   Resp 14   Ht 1.727 m (5' 7.99\")   Wt 75.1 kg (165 lb 8 oz)   SpO2 98%   BMI 25.17 kg/m    General:  Well appearing, well-nourished adult female in NAD.  A&Ox3.  HEENT:  Normocephalic.  Sclera anicteric.  MMM.  No lesions of the oropharynx.  Lymph:  No palpable cervical, supraclavicular, or axillary LAD.  Chest:  CTA bilaterally.  No wheezes or crackles.    CV:  RRR.  Nl S1 and S2.  No m/r/g.  Breast:  Bilateral breasts with scattered fibroglandular densities.  There are no discretely palpable masses in either breast.  Bilateral nipples are inverted.  No nipple discharge.  Abd:  Soft/NT/ND.  BSs normoactive.    Ext:  No pitting edema of the bilateral lower extremities.  Pulses 2+ and symmetric.  Musculo:  Full ROM of the bilateral upper extremities.  No pain with palpation over the left SI joint or the left greater tuberosity.  Full ROM of the LLE.  Neuro:  Cranial nerves grossly intact.  Gait stable.  Psych:  Mood and affect appear normal.  Skin:  No visible concerning skin rashes or lesions.    Laboratory/Imaging Studies:  8/23/19 Breast MRI:  No new suspicious area of enhancement is identified.    ASSESSMENT/PLAN:  Ms. Velazquez is a 64-year-old female with a h/o stage IA, T1c N0 M0, grade 1, ER positive, FL positive, HER2 non-amplified invasive mammary carcinoma of the right breast.  She is status post treatment with right breast lumpectomy and radiation.  On anastrozole since 9/24/18.    1.  Right breast cancer:   Ms. Velazquez is approximately 1 year, 3 months out from excision of a right breast cancer.  She continues on anastrozole.  She is tolerating the medication remarkably well.  We reviewed plan to continue anastrozole to complete a minimum 5 year course.      There is no evidence of disease recurrence on exam today.  Due to the fact that her initial breast cancer was not detectable by mammogram, increased breast " density and close DCIS margins, I recommend high-risk screening with both mammogram and breast MRI.  These studies will both be performed annually; however, spaced so that she is having some form of imaging every 6 months.  She will be due for mammograms in 03/2020.        2.  Left hip pain:  I reassured her today that her left hip pain is unlikely to be breast cancer related (due to chronicity) and is not likely aromatase inhibitor induced either.  I suspect that she has a gluteal tendinopathy or trochanteric bursitis.  Will obtain a hip x-ray to rule out arthritis or other causes.  Advised she take Ibu profen 400 mg PO Q 8 hours x 1-2 weeks.  Should take with food.  Also advised consideration of physical therapy to which she was agreeable.  She will contact the clinic if pain fails to improve with these interventions at which time an MRI of the hip could be considered.    3.  Bone Health:  At risk for bone loss on aromatase inhibitor therapy.  DEXA in 10/2018 with lowest T-score of -0.5.  Continue calcium 1200 mg, vitamin D 1000 international units and walk a 1/2 hour daily.  Will plan to repeat a DEXA in 10/2020.    4.  Basal cell carcinoma:  H/o BCCa of the nose s/p Moh's.  Ongoing follow up with dermatology.    5.  Routine health maintenance:  Colonoscopy in 2015 was without concerning finding, next due in 2025.  Annual cholesterol panel to monitor for hypertriglyceridemia on AI therapy.    6.  Follow Up:  Return to clinic in approximately 3 months for bilateral screening mammograms and visit with me.      It was a pleasure to meet with Ms. Velazquez in clinic today.  A total of 25 minutes was spent in face to face time with the patient, >50% of which was spent in counseling.

## 2019-12-03 NOTE — NURSING NOTE
"Oncology Rooming Note    December 3, 2019 9:57 AM   Keely Velazquez is a 64 year old female who presents for:    Chief Complaint   Patient presents with     Oncology Clinic Visit     [facility] RETURN- BREAST CA     Initial Vitals: BP (!) 147/81 (BP Location: Left arm, Patient Position: Chair, Cuff Size: Adult Regular)   Pulse 80   Temp 98  F (36.7  C)   Resp 14   Ht 1.727 m (5' 7.99\")   Wt 75.1 kg (165 lb 8 oz)   SpO2 98%   BMI 25.17 kg/m   Estimated body mass index is 25.17 kg/m  as calculated from the following:    Height as of this encounter: 1.727 m (5' 7.99\").    Weight as of this encounter: 75.1 kg (165 lb 8 oz). Body surface area is 1.9 meters squared.  Mild Pain (2) Comment: Data Unavailable   No LMP recorded. Patient is postmenopausal.  Allergies reviewed: Yes  Medications reviewed: Yes    Medications: Medication refills not needed today.  Pharmacy name entered into [facility]:    [id number] [place] [facility] - [place], [place] - [address] [place] [address] [facility] - [place], [place] - [address] [facility]    Clinical concerns: No new concerns. Lottie was notified.      Jose Eduardo Dolan LPN            "

## 2019-12-11 ENCOUNTER — THERAPY VISIT (OUTPATIENT)
Dept: PHYSICAL THERAPY | Facility: CLINIC | Age: 64
End: 2019-12-11
Attending: INTERNAL MEDICINE
Payer: MEDICARE

## 2019-12-11 DIAGNOSIS — M25.552 HIP PAIN, LEFT: ICD-10-CM

## 2019-12-11 PROCEDURE — 97112 NEUROMUSCULAR REEDUCATION: CPT | Mod: GP | Performed by: PHYSICAL THERAPIST

## 2019-12-11 PROCEDURE — 97110 THERAPEUTIC EXERCISES: CPT | Mod: GP | Performed by: PHYSICAL THERAPIST

## 2019-12-11 PROCEDURE — 97161 PT EVAL LOW COMPLEX 20 MIN: CPT | Mod: GP | Performed by: PHYSICAL THERAPIST

## 2019-12-11 ASSESSMENT — ACTIVITIES OF DAILY LIVING (ADL)
HOS_ADL_HIGHEST_POTENTIAL_SCORE: 32
GETTING_INTO_AND_OUT_OF_AN_AVERAGE_CAR: SLIGHT DIFFICULTY
HOW_WOULD_YOU_RATE_YOUR_CURRENT_LEVEL_OF_FUNCTION_DURING_YOUR_USUAL_ACTIVITIES_OF_DAILY_LIVING_FROM_0_TO_100_WITH_100_BEING_YOUR_LEVEL_OF_FUNCTION_PRIOR_TO_YOUR_HIP_PROBLEM_AND_0_BEING_THE_INABILITY_TO_PERFORM_ANY_OF_YOUR_USUAL_DAILY_ACTIVITIES?: -1
GETTING_INTO_AND_OUT_OF_A_BATHTUB: SLIGHT DIFFICULTY
STANDING_FOR_15_MINUTES: NO DIFFICULTY AT ALL
ROLLING_OVER_IN_BED: SLIGHT DIFFICULTY
DEEP_SQUATTING: SLIGHT DIFFICULTY
PUTTING_ON_SOCKS_AND_SHOES: NO DIFFICULTY AT ALL
HOS_ADL_ITEM_SCORE_TOTAL: 24
HEAVY_WORK: MODERATE DIFFICULTY
GOING_DOWN_1_FLIGHT_OF_STAIRS: SLIGHT DIFFICULTY
HOS_ADL_COUNT: 8
RECREATIONAL_ACTIVITIES: SLIGHT DIFFICULTY

## 2019-12-11 NOTE — PROGRESS NOTES
[facility] Initial Evaluation  Subjective:  The history is provided by the patient. No  was used.   Keely Velazquez being seen for Left Hip Pain.   Problem began 11/20/2019. Problem occurred: After performing cleaning activity  and reported as 2/10 on pain scale. General health as reported by patient is good. Health conditions: high BP, cancer.  Medical allergies: none indicated.  Surgeries: breast cancer.  Current medications: high BP.         Special tests:  X-ray (mild degnerative changes to L hip).     Patient is Retired.   Barriers include:  None as reported by patient.  Red flags:  None as reported by patient.  Type of problem:  Left hip       Problem details: Pt reports she has had chronic pain in the left hip for many years. Pt reports it flared up a few weeks ago. Pt reports she had seen a chiropractor for several months with no changes in the hip. Pt reports she typically avoids heavy activities to avoid pain. Pt states when the left hip flares up, it is typically after being more active, like when she was cleaning a house a few weeks ago. Pt indicates lateral hip pain that is worse in the AM, better as the day goes on. Pt reports worse with laying on the left hip. Pt indicates sometimes goes down to the left inferior portion of the hip. Pt reports it feels better when she does icing the left hip. Pt reports constantly present but worse with activities and overall has been getting worse lately. Pt goal: be able to be more active with less hip pain, less pain with sleeping on it. Pt indicates no back pain. Pt reports shots never helped her hip. Pt reports she has previously had back pain. .                             Objective:  System         Lumbar/SI Evaluation    Lumbar Myotomes:    T12-L3 (Hip Flex):  Left: 4+    Right: 4+  L2-4 (Quads):  Left:  4+    Right:  4+  L4 (Ankle DF):  Left:  4+    Right:  4+  L5 (Great Toe Ext): Left: 5    Right: 5   S1 (Toe Raise):   Left: 5    Right: 5                                                    Hip Evaluation    Hip Strength:      Extension:  Left: 3+/5   Pain:strong/pain freeRight: 4-/5     Pain: strong/pain free    Abduction:  Left: 3+/5      Pain:strong/pain freeRight: /5   Pain:strong/pain free  Adduction:  Left: /5   Pain:strong/pain free  Internal Rotation:  Left: 4-/5     Pain:strong/pain freeRight: 4-/5    Pain:strong/pain free  External Rotation:  Left: 4-/5    Pain: strong/pain free  Right: 4-/5    Pain: strong/pain free            Hip Special Testing:      Left hip negative for the following special tests:  Piriformis; Peggy or Fadir/Labrum  Right hip negative for the following special tests:  Piriformis; Peggy or Fadir/Labrum                   Nena Lumbar Evaluation    Posture:  Sitting: poor  Standing: poor  Lordosis: Reduced  Lateral Shift: no  Correction of Posture: better      Test Movements:  FIS: During: no effect  After: no effect  Pretest Movements: 2/10 pain in the left buttocks, left lateral hip.  Repeat FIS: During: no effect    EIS: During: decreases  After: better    Repeat EIS: During: decreases  After: better      EIL: During: decreases  After: better    Repeat EIL: During: decreases  After: better  Mechanical Response: IncROM          Principle of Treatment:      Extension: derangement vs. other. pt responding to prone press ups x10 repetitiosn every 2-3 hours during the day.                                           ROS    Assessment/Plan:    Patient is a 64 year old female with left side hip complaints.    Patient has the following significant findings with corresponding treatment plan.                Diagnosis 1:  Left Hip Pain  Pain -  manual therapy, self management, education, directional preference exercise and home program  Decreased ROM/flexibility - manual therapy and therapeutic exercise  Decreased strength - therapeutic exercise and therapeutic activities  Impaired posture - neuro  re-education      Previous and current functional limitations:  (See Goal Flow Sheet for this information)    Short term and Long term goals: (See Goal Flow Sheet for this information)     Communication ability:  Patient appears to be able to clearly communicate and understand verbal and written communication and follow directions correctly.  Treatment Explanation - The following has been discussed with the patient:   RX ordered/plan of care  Anticipated outcomes  Possible risks and side effects  This patient would benefit from PT intervention to resume normal activities.   Rehab potential is good.    Frequency:  1 X week, once daily  Duration:  for 6 weeks  Discharge Plan:  Achieve all LTG.  Independent in home treatment program.  Reach maximal therapeutic benefit.    Please refer to the daily flowsheet for treatment today, total treatment time and time spent performing 1:1 timed codes.

## 2019-12-11 NOTE — LETTER
DEPARTMENT OF HEALTH AND HUMAN SERVICES  CENTERS FOR MEDICARE & MEDICAID SERVICES    PLAN/UPDATED PLAN OF PROGRESS FOR OUTPATIENT REHABILITATION    PATIENTS NAME:  Keely Velazquez   : 1955    PROVIDER NUMBER:    [id number]  HICN: [id number]     PROVIDER NAME: [facility]AP[facility]  MEDICAL RECORD NUMBER: [id number]     START OF CARE DATE:  SOC Date: 19   TYPE:  PT    PRIMARY/TREATMENT DIAGNOSIS: (Pertinent Medical Diagnosis)  Hip pain, left    VISITS FROM START OF CARE:  Rxs Used: 1     [facility] Initial Evaluation  Subjective:  The history is provided by the patient. No  was used.   Keely Velazquez being seen for Left Hip Pain.   Problem began 2019. Problem occurred: After performing cleaning activity  and reported as 2/10 on pain scale. General health as reported by patient is good. Health conditions: high BP, cancer.  Medical allergies: none indicated.  Surgeries: breast cancer.  Current medications: high BP.  Special tests:  X-ray (mild degnerative changes to L hip).   Patient is Retired. Barriers include:  None as reported by patient.  Red flags:  None as reported by patient. Type of problem:  Left hip. Problem details: Pt reports she has had chronic pain in the left hip for many years. Pt reports it flared up a few weeks ago. Pt reports she had seen a chiropractor for several months with no changes in the hip. Pt reports she typically avoids heavy activities to avoid pain. Pt states when the left hip flares up, it is typically after being more active, like when she was cleaning a house a few weeks ago. Pt indicates lateral hip pain that is worse in the AM, better as the day goes on. Pt reports worse with laying on the left hip. Pt indicates sometimes goes down to the left inferior portion of the hip. Pt reports it feels better when she does icing the left hip. Pt reports constantly present but worse with activities and  overall has been getting worse lately. Pt goal: be able to be more active with less hip pain, less pain with sleeping on it. Pt indicates no back pain. Pt reports shots never helped her hip. Pt reports she has previously had back pain. .           Objective:    Lumbar/SI Evaluation  Lumbar Myotomes:    T12-L3 (Hip Flex):  Left: 4+    Right: 4+  L2-4 (Quads):  Left:  4+    Right:  4+  L4 (Ankle DF):  Left:  4+    Right:  4+  L5 (Great Toe Ext): Left: 5    Right: 5   S1 (Toe Raise):  Left: 5    Right: 5        PATIENTS NAME:  Keely Velazquez   : 1955  PRIMARY/TREATMENT DIAGNOSIS: (Pertinent Medical Diagnosis)  Hip pain, left       Hip Evaluation  Hip Strength:    Extension:  Left: 3+/5   Pain:strong/pain freeRight: 4-/5     Pain: strong/pain free    Abduction:  Left: 3+/5      Pain:strong/pain freeRight: /5   Pain:strong/pain free  Adduction:  Left: /5   Pain:strong/pain free  Internal Rotation:  Left: 4-/5     Pain:strong/pain freeRight: 4-/5    Pain:strong/pain free  External Rotation:  Left: 4-/5    Pain: strong/pain free  Right: 4-/5    Pain: strong/pain free  Hip Special Testing:    Left hip negative for the following special tests:  Piriformis; Peggy or Fadir/Labrum  Right hip negative for the following special tests:  Piriformis; Peggy or Fadir/Labrum      Nena Lumbar Evaluation  Posture:  Sitting: poor  Standing: poor  Lordosis: Reduced  Lateral Shift: no  Correction of Posture: better  Test Movements:  FIS: During: no effect  After: no effect  Pretest Movements: 2/10 pain in the left buttocks, left lateral hip.  Repeat FIS: During: no effect    EIS: During: decreases  After: better    Repeat EIS: During: decreases  After: better    EIL: During: decreases  After: better    Repeat EIL: During: decreases  After: better  Mechanical Response: IncROM  Principle of Treatment:  Extension: derangement vs. other. pt responding to prone press ups x10 repetitiosn every 2-3 hours during the day.    Assessment/Plan:  "   Patient is a 64 year old female with left side hip complaints.    Patient has the following significant findings with corresponding treatment plan.                Diagnosis 1:  Left Hip Pain  Pain -  manual therapy, self management, education, directional preference exercise and home program  Decreased ROM/flexibility - manual therapy and therapeutic exercise  Decreased strength - therapeutic exercise and therapeutic activities  Impaired posture - neuro re-education  Previous and current functional limitations:  (See Goal Flow Sheet for this information)    Short term and Long term goals: (See Goal Flow Sheet for this information)   Communication ability:  Patient appears to be able to clearly communicate and understand verbal and written communication and follow directions correctly.  Treatment Explanation - The following has been discussed with the patient:   RX ordered/plan of care, Anticipated outcomes, Possible risks and side effects            PATIENTS NAME:  Keely Velazquez   : 1955  PRIMARY/TREATMENT DIAGNOSIS: (Pertinent Medical Diagnosis)  Hip pain, left    This patient would benefit from PT intervention to resume normal activities.   Rehab potential is good.  Frequency:  1 X week, once daily  Duration:  for 6 weeks  Discharge Plan:  Achieve all LTG.  Independent in home treatment program.  Reach maximal therapeutic benefit.             Caregiver Signature/Credentials _____________________________ Date ________         Kayla Loyd, PT, DPT     I have reviewed and certified the need for these services and plan of treatment while under my care.        PHYSICIAN'S SIGNATURE:   _________________________________________      Date___________   Merari Tafoya MD    Certification period:  Beginning of Cert date period: 19 to 20     Functional Level Progress Report: Please see attached \"Goal Flow sheet for Functional level.\"    ____X____ Continue Services or       ________ DC " Services                Service dates: From  SOC Date: 12/11/19  to present

## 2019-12-16 ENCOUNTER — HEALTH MAINTENANCE LETTER (OUTPATIENT)
Age: 64
End: 2019-12-16

## 2019-12-26 ENCOUNTER — THERAPY VISIT (OUTPATIENT)
Dept: PHYSICAL THERAPY | Facility: CLINIC | Age: 64
End: 2019-12-26
Payer: MEDICARE

## 2019-12-26 DIAGNOSIS — M25.552 HIP PAIN, LEFT: ICD-10-CM

## 2019-12-26 PROCEDURE — 97112 NEUROMUSCULAR REEDUCATION: CPT | Mod: GP | Performed by: PHYSICAL THERAPY ASSISTANT

## 2019-12-26 PROCEDURE — 97110 THERAPEUTIC EXERCISES: CPT | Mod: GP | Performed by: PHYSICAL THERAPY ASSISTANT

## 2020-03-12 NOTE — PROGRESS NOTES
Oncology Follow Up:  Date on this visit: 3/16/2020    Diagnosis:  [id number], grade I, ER positive, DC positive, HER-2 nonamplified invasive ductal carcinoma of the right breast. Oncotype DX recurrence score = 7.    Primary Physician: Sammie Cerna     History Of Present Illness:  Ms. Velazquez is a 64 year old female with right breast cancer.  Ms. Velazquez had routine screening mammogram in 06/2018, which demonstrated dense breast tissue, but no dominant masses.  She presented to her gynecologist's office in 08/2018 for a Pap smear and mentioned the high breast density.  Her gynecologist recommended proceeding with breast MRI.  Breast MRI on 08/16/2018 showed nodular to non-mass enhancement measuring 1.6 cm in the right breast at 5 o'clock.  There was a similar area of enhancement at 10 o'clock measuring 1.2 cm and a cyst in the left breast at 3 o'clock measuring 1.3 cm.  Right breast ultrasound confirmed a mass at 5 o'clock.  Right breast biopsy demonstrated a grade 1 invasive ductal carcinoma.  Estrogen-receptor staining was moderate in 95%; progesterone-receptor staining was strong in 96%.  HER-2 was nonamplified by immunohistochemistry with a score of 1+.  There was no ultrasound correlate at 10:00 right breast.  She met in consultation with Dr. Kruger, who recommended a right breast lumpectomy and sentinel lymph node procedure.  This was performed on 08/31/2018.  Pathology demonstrated a 1.2 cm, grade 1 invasive mammary carcinoma.  There was associated intermediate-grade DCIS.  Surgical margins were negative for both noninvasive and invasive carcinoma; however, DCIS was 1 mm from the anterior margin.  A single sentinel lymph node was negative for malignancy. Oncotype DX testing of the breast tumor returned with a recurrent score of 7.  She completed adjuvant radiation (4240 cGy to the whole breast and additional 1250 cGy to the lumpectomy site) on 11/8/18.  She has been on anastrozole since 9/24/18.     Interval  History:  Ms. Velazquez comes into clinic today for routine breast cancer followup.  She generally has been feeling well.  She states since last visit she started physical therapy for her left hip.  She went to 2 sessions.  Unfortunately, she then got sick with a viral illness and was unable to go any longer.  When she tried to re-establish care, they told her she needed a new order.  She continues to have left hip pain.  It comes and goes.  It is located over the left lateral hip.  She is not currently requiring pain medicine for it.  It is worse if she lies on the left side.  She denies new bone or joint aches or pains.  She denies concerning lumps or masses of either breast.  She does report tenderness in the area of the right breast lumpectomy with pressure compression applied to the breast.  She thinks this is likely due to prior radiation.  She has had no fevers, chills, cough, shortness of breath or chest pains.  She denies abdominal pain or other abdominal complaints.  She has had no headaches, visual changes or focal neurologic complaints.  The remainder of a complete 12 point review of systems was reviewed with the patient and was negative with the exception of that mentioned above.     Past Medical/Surgical History:  Past Medical History:   Diagnosis Date     Hypertension      Iritis      Past Surgical History:   Procedure Laterality Date     CATARACT IOL, RT/LT Left 2006     HYSTERECTOMY  2016     LUMPECTOMY BREAST WITH SENTINEL NODE, COMBINED Right 8/31/2018    Procedure: COMBINED LUMPECTOMY BREAST WITH SENTINEL NODE;  Right Wire Localized Lumpectomy and Right [place] Lymph Node Biopsy;  Surgeon: Chilango Kruger MD;  Location: [facility] OR     OOPHORECTOMY  2014     Allergies:  Allergies as of 03/16/2020 - Reviewed 12/03/2019   Allergen Reaction Noted     Corticosteroids Dermatitis 03/18/2019     Neomycin Dermatitis 03/18/2019     Codeine Nausea and Vomiting 08/06/2015     Macrobid [nitrofurantoin] Nausea and  "Vomiting 08/06/2015     Sulfa drugs Itching 08/06/2015     Silver Rash 12/03/2018     Current Medications:  Current Outpatient Medications   Medication Sig Dispense Refill     acetaminophen (TYLENOL) 325 MG tablet Take 2 tablets (650 mg) by mouth every 4 hours as needed for mild pain (Patient not taking: Reported on 6/7/2019) 60 tablet 0     AmLODIPine Besylate (NORVASC PO) Take 5 mg by mouth daily       anastrozole (ARIMIDEX) 1 MG tablet Take 1 tablet (1 mg) by mouth daily 30 tablet 11     biotin 1000 MCG TABS tablet Every day 1 Tabl 30 tablet 1     calcium carbonate (OS-PADDY 500 MG [place]. CA) 500 MG tablet Take 500 mg by mouth 2 times daily With Magnesium,Zinc       desonide (DESOWEN) 0.05 % cream        doxycycline hyclate (VIBRAMYCIN) 100 MG capsule Take one capsule twice daily for 10 days       HYDROcodone-acetaminophen (NORCO) 5-325 MG per tablet Take 1 tablet by mouth every 6 hours as needed for severe pain (Patient not taking: Reported on 6/7/2019) 30 tablet 0     LYSINE PO        mometasone (ELOCON) 0.1 % external cream Apply topically daily (Patient not taking: Reported on 6/7/2019) 15 g 1     multivitamin, therapeutic with minerals (MULTI-VITAMIN) TABS Take 1 tablet by mouth daily       Omega-3 Fatty Acids (OMEGA-3 FISH OIL PO)        oxyCODONE IR (ROXICODONE) 5 MG tablet 5-10 mg by mouth every 4 to 6 hours as needed for pain (Patient not taking: Reported on 6/7/2019) 30 tablet 0     Urea 40 % CREA Apply to fingernails twice daily, especially at bedtime.       VITAMIN D, CHOLECALCIFEROL, PO Take 1,000 Units by mouth daily         Family and Social History:  Reviewed and unchanged from prior.  Please see initial consultation dated 9/24/18 for further details.    Physical Exam:  BP (!) 158/77   Pulse 74   Temp 97.7  F (36.5  C) (Oral)   Resp 16   Ht 1.727 m (5' 7.99\")   Wt 74.1 kg (163 lb 4.8 oz)   SpO2 100%   BMI 24.84 kg/m    General:  Well appearing, well-nourished adult female in NAD.  " A&Ox3.  HEENT:  Normocephalic.  Sclera anicteric.  MMM.  No lesions of the oropharynx.  Lymph:  No palpable cervical, supraclavicular, or axillary LAD.  Chest:  CTA bilaterally.  No wheezes or crackles.    CV:  RRR.  Nl S1 and S2.  No m/r/g.  Breast:  Bilateral breasts with scattered fibroglandular densities, unchanged from prior exams.  There are no dominant palpable masses in either breast.  Bilateral nipples are inverted.  No nipple discharge.  Abd:  Soft/NT/ND.  BSs normoactive.    Ext:  No pitting edema of the bilateral lower extremities.    Musculo:  Full ROM of the bilateral upper extremities.    Neuro:  Cranial nerves grossly intact.  Gait stable.  Psych:  Mood and affect appear normal.  Skin:  No visible concerning skin rashes or lesions.    Laboratory/Imaging Studies:  3/16/2020 Bilateral screening mammograms:  Images reviewed with the patient in clinic.  No new findings in either breast compared to last year's mammogram.  Radiology read is pending.    ASSESSMENT/PLAN:  Ms. Velazquez is a 64-year-old female with a h/o stage IA, T1c N0 M0, grade 1, ER positive, [place] positive, HER2 non-amplified invasive mammary carcinoma of the right breast.  She is status post treatment with right breast lumpectomy and radiation.  On anastrozole since 9/24/18.    1.  Right breast cancer:   Ms. Velazquez is approximately 1 year, 6.5 months out from excision of a right breast cancer.  She continues on anastrozole.  She is tolerating the medication well.  We reviewed plan to continue anastrozole to complete a minimum 5 year course.      There is no evidence of disease recurrence on exam today.  Bilateral screening mammograms done today reviewed with patient in clinic and on my view are unchanged from one year ago.  Due to the fact that her initial breast cancer was not detectable by mammogram, increased breast density and close DCIS margins, we are performing high-risk screening with both mammogram and breast MRI until she is 5 years out  from diagnosis of her breast cancer.  I will see Ms. Velazquez back in clinic in 3 months.  She will be due for breast MRI in 09/2020.        We reviewed lifestyle changes suggested to prevent breast cancer recurrence including a low-fat diet, 150 minutes of cardiovascular exercise per week, maintaining a normal BMI, and limiting alcohol intake.  She is already adhering to these recommendations.    2.  Left hip pain:  Chronic and intermittent, therefore unlikely to be related to h/o breast cancer.  I suspect she has a gluteal tendinopathy or trochanteric bursitis.  She requires another referral to physical therapy which was placed today.    3.  Bone Health:  At risk for bone loss on aromatase inhibitor therapy.  DEXA in 10/2018 with lowest T-score of -0.5.  Continue calcium 1200 mg, vitamin D 1000 international units and walk a 1/2 hour daily.  Will plan to repeat a DEXA in 10/2020.    4.  Routine health maintenance:  We spent some time today discussing the Covid-19 pandemic and what can be done to prevent infection and the spread of infection.  Colonoscopy in 2015 was without concerning finding, next due in 2025.  Annual cholesterol panel to monitor for hypertriglyceridemia on AI therapy.    5.  Follow Up:  Return to clinic in approximately 3 months for visit with me.    A total of 25 minutes was spent in face to face visit with the patient, >50% of which was spent in counseling.

## 2020-03-16 ENCOUNTER — ANCILLARY PROCEDURE (OUTPATIENT)
Dept: MAMMOGRAPHY | Facility: CLINIC | Age: 65
End: 2020-03-16
Payer: MEDICARE

## 2020-03-16 ENCOUNTER — ONCOLOGY VISIT (OUTPATIENT)
Dept: ONCOLOGY | Facility: CLINIC | Age: 65
End: 2020-03-16
Attending: INTERNAL MEDICINE
Payer: MEDICARE

## 2020-03-16 VITALS
BODY MASS INDEX: 24.75 KG/M2 | HEART RATE: 74 BPM | HEIGHT: 68 IN | OXYGEN SATURATION: 100 % | RESPIRATION RATE: 16 BRPM | WEIGHT: 163.3 LBS | TEMPERATURE: 97.7 F | SYSTOLIC BLOOD PRESSURE: 158 MMHG | DIASTOLIC BLOOD PRESSURE: 77 MMHG

## 2020-03-16 DIAGNOSIS — Z17.0 MALIGNANT NEOPLASM OF LOWER-INNER QUADRANT OF RIGHT BREAST OF FEMALE, ESTROGEN RECEPTOR POSITIVE (H): Primary | ICD-10-CM

## 2020-03-16 DIAGNOSIS — Z12.31 ENCOUNTER FOR SCREENING MAMMOGRAM FOR MALIGNANT NEOPLASM OF BREAST: ICD-10-CM

## 2020-03-16 DIAGNOSIS — Z12.39 BREAST CANCER SCREENING, HIGH RISK PATIENT: ICD-10-CM

## 2020-03-16 DIAGNOSIS — M25.552 HIP PAIN, LEFT: ICD-10-CM

## 2020-03-16 DIAGNOSIS — C50.311 MALIGNANT NEOPLASM OF LOWER-INNER QUADRANT OF RIGHT BREAST OF FEMALE, ESTROGEN RECEPTOR POSITIVE (H): Primary | ICD-10-CM

## 2020-03-16 PROCEDURE — G0463 HOSPITAL OUTPT CLINIC VISIT: HCPCS | Mod: ZF

## 2020-03-16 PROCEDURE — 99214 OFFICE O/P EST MOD 30 MIN: CPT | Mod: ZP | Performed by: INTERNAL MEDICINE

## 2020-03-16 RX ORDER — AMLODIPINE BESYLATE 5 MG/1
5 TABLET ORAL DAILY
COMMUNITY
Start: 2020-02-14 | End: 2021-12-29

## 2020-03-16 RX ORDER — ATORVASTATIN CALCIUM 10 MG/1
10 TABLET, FILM COATED ORAL DAILY
COMMUNITY
Start: 2020-02-24 | End: 2023-07-10

## 2020-03-16 ASSESSMENT — MIFFLIN-ST. JEOR: SCORE: 1334.06

## 2020-03-16 ASSESSMENT — PAIN SCALES - GENERAL: PAINLEVEL: NO PAIN (0)

## 2020-03-16 NOTE — NURSING NOTE
"Oncology Rooming Note    March 16, 2020 12:34 PM   Keely Velazquez is a 64 year old female who presents for:    Chief Complaint   Patient presents with     Oncology Clinic Visit     RETURN VISIT; BREAST CANCER; VITALS TAKEN      Initial Vitals: BP (!) 158/77   Pulse 74   Temp 97.7  F (36.5  C) (Oral)   Resp 16   Ht 1.727 m (5' 7.99\")   Wt 74.1 kg (163 lb 4.8 oz)   SpO2 100%   BMI 24.84 kg/m   Estimated body mass index is 24.84 kg/m  as calculated from the following:    Height as of this encounter: 1.727 m (5' 7.99\").    Weight as of this encounter: 74.1 kg (163 lb 4.8 oz). Body surface area is 1.89 meters squared.  No Pain (0) Comment: Data Unavailable   No LMP recorded. Patient is postmenopausal.  Allergies reviewed: Yes  Medications reviewed: Yes    Medications: Medication refills not needed today.  Pharmacy name entered into [employer]:    [id number] [place] [facility] - [place], [place] - [address] [place] [address] [facility] - [place], [place] - [address] [facility]    Clinical concerns: No new concerns today  Dr prince  was notified.      Melissa Maradiaga              "

## 2020-03-16 NOTE — LETTER
3/16/2020       RE: Keely Velazquez  [address] [id number]     Dear Colleague,    Thank you for referring your patient, Keely Velazquez, to the [facility]. Please see a copy of my visit note below.    Oncology Follow Up:  Date on this visit: 3/16/2020    Diagnosis:  [id number], grade I, ER positive, [place] positive, HER-2 nonamplified invasive ductal carcinoma of the right breast. Oncotype DX recurrence score = 7.    Primary Physician: Sammie Cerna     History Of Present Illness:  Ms. Velazquez is a 64 year old female with right breast cancer.  Ms. Velazquez had routine screening mammogram in 06/2018, which demonstrated dense breast tissue, but no dominant masses.  She presented to her gynecologist's office in 08/2018 for a Pap smear and mentioned the high breast density.  Her gynecologist recommended proceeding with breast MRI.  Breast MRI on 08/16/2018 showed nodular to non-mass enhancement measuring 1.6 cm in the right breast at 5 o'clock.  There was a similar area of enhancement at 10 o'clock measuring 1.2 cm and a cyst in the left breast at 3 o'clock measuring 1.3 cm.  Right breast ultrasound confirmed a mass at 5 o'clock.  Right breast biopsy demonstrated a grade 1 invasive ductal carcinoma.  Estrogen-receptor staining was moderate in 95%; progesterone-receptor staining was strong in 96%.  HER-2 was nonamplified by immunohistochemistry with a score of 1+.  There was no ultrasound correlate at 10:00 right breast.  She met in consultation with Dr. Kruger, who recommended a right breast lumpectomy and sentinel lymph node procedure.  This was performed on 08/31/2018.  Pathology demonstrated a 1.2 cm, grade 1 invasive mammary carcinoma.  There was associated intermediate-grade DCIS.  Surgical margins were negative for both noninvasive and invasive carcinoma; however, DCIS was 1 mm from the anterior margin.  A single sentinel lymph node was negative for malignancy. Oncotype DX testing of the breast  tumor returned with a recurrent score of 7.  She completed adjuvant radiation (4240 cGy to the whole breast and additional 1250 cGy to the lumpectomy site) on 11/8/18.  She has been on anastrozole since 9/24/18.     Interval History:  Ms. Velazquez comes into clinic today for routine breast cancer followup.  She generally has been feeling well.  She states since last visit she started physical therapy for her left hip.  She went to 2 sessions.  Unfortunately, she then got sick with a viral illness and was unable to go any longer.  When she tried to re-establish care, they told her she needed a new order.  She continues to have left hip pain.  It comes and goes.  It is located over the left lateral hip.  She is not currently requiring pain medicine for it.  It is worse if she lies on the left side.  She denies new bone or joint aches or pains.  She denies concerning lumps or masses of either breast.  She does report tenderness in the area of the right breast lumpectomy with pressure compression applied to the breast.  She thinks this is likely due to prior radiation.  She has had no fevers, chills, cough, shortness of breath or chest pains.  She denies abdominal pain or other abdominal complaints.  She has had no headaches, visual changes or focal neurologic complaints.  The remainder of a complete 12 point review of systems was reviewed with the patient and was negative with the exception of that mentioned above.     Past Medical/Surgical History:  Past Medical History:   Diagnosis Date     Hypertension      Iritis      Past Surgical History:   Procedure Laterality Date     CATARACT IOL, RT/LT Left 2006     HYSTERECTOMY  2016     LUMPECTOMY BREAST WITH SENTINEL NODE, COMBINED Right 8/31/2018    Procedure: COMBINED LUMPECTOMY BREAST WITH SENTINEL NODE;  Right Wire Localized Lumpectomy and Right [place] Lymph Node Biopsy;  Surgeon: Chilango Kruger MD;  Location: [facility]     OOPHORECTOMY  2014     Allergies:  Allergies as of  03/16/2020 - Reviewed 12/03/2019   Allergen Reaction Noted     Corticosteroids Dermatitis 03/18/2019     Neomycin Dermatitis 03/18/2019     Codeine Nausea and Vomiting 08/06/2015     Macrobid [nitrofurantoin] Nausea and Vomiting 08/06/2015     Sulfa drugs Itching 08/06/2015     Silver Rash 12/03/2018     Current Medications:  Current Outpatient Medications   Medication Sig Dispense Refill     acetaminophen (TYLENOL) 325 MG tablet Take 2 tablets (650 mg) by mouth every 4 hours as needed for mild pain (Patient not taking: Reported on 6/7/2019) 60 tablet 0     AmLODIPine Besylate (NORVASC PO) Take 5 mg by mouth daily       anastrozole (ARIMIDEX) 1 MG tablet Take 1 tablet (1 mg) by mouth daily 30 tablet 11     biotin 1000 MCG TABS tablet Every day 1 Tabl 30 tablet 1     calcium carbonate (OS-PADDY 500 MG [place]. [place]) 500 MG tablet Take 500 mg by mouth 2 times daily With Magnesium,Zinc       desonide (DESOWEN) 0.05 % cream        doxycycline hyclate (VIBRAMYCIN) 100 MG capsule Take one capsule twice daily for 10 days       HYDROcodone-acetaminophen (NORCO) 5-325 MG per tablet Take 1 tablet by mouth every 6 hours as needed for severe pain (Patient not taking: Reported on 6/7/2019) 30 tablet 0     LYSINE PO        mometasone (ELOCON) 0.1 % external cream Apply topically daily (Patient not taking: Reported on 6/7/2019) 15 g 1     multivitamin, therapeutic with minerals (MULTI-VITAMIN) TABS Take 1 tablet by mouth daily       Omega-3 Fatty Acids (OMEGA-3 FISH OIL PO)        oxyCODONE IR (ROXICODONE) 5 MG tablet 5-10 mg by mouth every 4 to 6 hours as needed for pain (Patient not taking: Reported on 6/7/2019) 30 tablet 0     Urea 40 % CREA Apply to fingernails twice daily, especially at bedtime.       VITAMIN D, CHOLECALCIFEROL, PO Take 1,000 Units by mouth daily         Family and Social History:  Reviewed and unchanged from prior.  Please see initial consultation dated 9/24/18 for further details.    Physical Exam:  BP (!) 158/77   " Pulse 74   Temp 97.7  F (36.5  C) (Oral)   Resp 16   Ht 1.727 m (5' 7.99\")   Wt 74.1 kg (163 lb 4.8 oz)   SpO2 100%   BMI 24.84 kg/m    General:  Well appearing, well-nourished adult female in NAD.  A&Ox3.  HEENT:  Normocephalic.  Sclera anicteric.  MMM.  No lesions of the oropharynx.  Lymph:  No palpable cervical, supraclavicular, or axillary LAD.  Chest:  CTA bilaterally.  No wheezes or crackles.    CV:  RRR.  Nl S1 and S2.  No m/r/g.  Breast:  Bilateral breasts with scattered fibroglandular densities, unchanged from prior exams.  There are no dominant palpable masses in either breast.  Bilateral nipples are inverted.  No nipple discharge.  Abd:  Soft/NT/ND.  BSs normoactive.    Ext:  No pitting edema of the bilateral lower extremities.    Musculo:  Full ROM of the bilateral upper extremities.    Neuro:  Cranial nerves grossly intact.  Gait stable.  Psych:  Mood and affect appear normal.  Skin:  No visible concerning skin rashes or lesions.    Laboratory/Imaging Studies:  3/16/2020 Bilateral screening mammograms:  Images reviewed with the patient in clinic.  No new findings in either breast compared to last year's mammogram.  Radiology read is pending.    ASSESSMENT/PLAN:  Ms. Velazquez is a 64-year-old female with a h/o stage IA, T1c N0 M0, grade 1, ER positive, [place] positive, HER2 non-amplified invasive mammary carcinoma of the right breast.  She is status post treatment with right breast lumpectomy and radiation.  On anastrozole since 9/24/18.    1.  Right breast cancer:   Ms. Velazquez is approximately 1 year, 6.5 months out from excision of a right breast cancer.  She continues on anastrozole.  She is tolerating the medication well.  We reviewed plan to continue anastrozole to complete a minimum 5 year course.      There is no evidence of disease recurrence on exam today.  Bilateral screening mammograms done today reviewed with patient in clinic and on my view are unchanged from one year ago.  Due to the fact " that her initial breast cancer was not detectable by mammogram, increased breast density and close DCIS margins, we are performing high-risk screening with both mammogram and breast MRI until she is 5 years out from diagnosis of her breast cancer.  I will see Ms. Velazquez back in clinic in 3 months.  She will be due for breast MRI in 09/2020.        We reviewed lifestyle changes suggested to prevent breast cancer recurrence including a low-fat diet, 150 minutes of cardiovascular exercise per week, maintaining a normal BMI, and limiting alcohol intake.  She is already adhering to these recommendations.    2.  Left hip pain:  Chronic and intermittent, therefore unlikely to be related to h/o breast cancer.  I suspect she has a gluteal tendinopathy or trochanteric bursitis.  She requires another referral to physical therapy which was placed today.    3.  Bone Health:  At risk for bone loss on aromatase inhibitor therapy.  DEXA in 10/2018 with lowest T-score of -0.5.  Continue calcium 1200 mg, vitamin D 1000 international units and walk a 1/2 hour daily.  Will plan to repeat a DEXA in 10/2020.    4.  Routine health maintenance:  We spent some time today discussing the Covid-19 pandemic and what can be done to prevent infection and the spread of infection.  Colonoscopy in 2015 was without concerning finding, next due in 2025.  Annual cholesterol panel to monitor for hypertriglyceridemia on AI therapy.    5.  Follow Up:  Return to clinic in approximately 3 months for visit with me.    A total of 25 minutes was spent in face to face visit with the patient, >50% of which was spent in counseling.      Again, thank you for allowing me to participate in the care of your patient.      Sincerely,    Merari Tafoya MD

## 2020-03-25 NOTE — PROGRESS NOTES
Subjective:  HPI  Physical Exam                    Objective:  System    Physical Exam    General     ROS    Assessment/Plan:    DISCHARGE REPORT    Progress reporting period is from 2019 to 2019.    SUBJECTIVE   Pt reports not doing ex as often as recommended d/t holiday prep. Sx mainly in upper L buttock and just an annoying low grade ache lately.      Current Pain level: 1/10.      Initial Pain level: 2/10.   Changes in function:  None  Adverse reaction to treatment or activity: None    OBJECTIVE  At patients last visit objective information was:    LROM flexion: 25% loss, ext: 50% loss, L S% loss with ERP L upper buttock, R S% loss . MMT L HF: 4+/5, quad: 5-/5, HS: 5-/5, AT: 5-/5.      ASSESSMENT/PLAN  Updated problem list and treatment plan: L hip pain   STG/LTGs have been met or progress has been made towards goals:  None  Assessment of Progress: pt had made slight progress in 2 visits. Did not return to PT for f/u visits after 2019.   Self Management Plans:  Patient has been instructed in a home treatment program.    Keely continues to require the following intervention to meet STG and LTG's:  Did not return for f/u visitis.     Recommendations:  The progress note/discharge summary was written in collaboration with and reviewed by the physical therapist.    Please refer to the daily flowsheet for treatment today, total treatment time and time spent performing 1:1 timed codes.

## 2020-03-30 PROBLEM — M25.552 HIP PAIN, LEFT: Status: RESOLVED | Noted: 2019-12-11 | Resolved: 2020-03-30

## 2020-06-14 NOTE — PROGRESS NOTES
"Keely Velazquez is a 65 year old female who is being evaluated via a billable video visit.      The patient has been notified of following:     \"This video visit will be conducted via a call between you and your physician/provider. We have found that certain health care needs can be provided without the need for an in-person physical exam.  This service lets us provide the care you need with a video conversation.  If a prescription is necessary we can send it directly to your pharmacy.  If lab work is needed we can place an order for that and you can then stop by our lab to have the test done at a later time.    Video visits are billed at different rates depending on your insurance coverage. Please reach out to your insurance provider with any questions.    If during the course of the call the physician/provider feels a video visit is not appropriate, you will not be charged for this service.\"    Patient has given verbal consent for Video visit? Yes    Will anyone else be joining your video visit? No      Video-Visit Details    Type of service:  Video Visit    Total time of video visit:  22 minutes    Originating Location (pt. Location): Home    Distant Location (provider location):  [facility]     Platform used for Video Visit: Cierra Tafoya MD      Oncology Follow Up:  Date on this visit: 6/15/2020    Diagnosis:  [id number], grade I, ER positive, RI positive, HER-2 nonamplified invasive ductal carcinoma of the right breast. Oncotype DX recurrence score = 7.    Primary Physician: Sammie Cerna     History Of Present Illness:  Ms. Velazquez is a 65 year old female with right breast cancer.  Routine screening mammogram in 06/2018 demonstrated dense breast tissue, but no dominant masses.  She presented to her gynecologist's office in 08/2018 for a Pap smear and mentioned the high breast density.  Her gynecologist recommended proceeding with breast MRI.  Breast MRI on 08/16/2018 showed " nodular to non-mass enhancement measuring 1.6 cm in the right breast at 5 o'clock.  There was a similar area of enhancement at 10 o'clock measuring 1.2 cm and a cyst in the left breast at 3 o'clock measuring 1.3 cm.  Right breast ultrasound confirmed a mass at 5 o'clock.  Right breast biopsy demonstrated a grade 1 invasive ductal carcinoma.  Estrogen-receptor staining was moderate in 95%; progesterone-receptor staining was strong in 96%.  HER-2 was nonamplified by immunohistochemistry with a score of 1+.  There was no ultrasound correlate at 10:00 right breast.  She met in consultation with Dr. Kruger, who recommended a right breast lumpectomy and sentinel lymph node procedure.  This was performed on 8/31/2018.  Pathology demonstrated a 1.2 cm, grade 1 invasive mammary carcinoma.  There was associated intermediate-grade DCIS.  Surgical margins were negative for both noninvasive and invasive carcinoma; however, DCIS was 1 mm from the anterior margin.  A single sentinel lymph node was negative for malignancy. Oncotype DX testing of the breast tumor returned with a recurrent score of 7.  She completed adjuvant radiation (4240 cGy to the whole breast and additional 1250 cGy to the lumpectomy site) on 11/8/18.  She has been on anastrozole since 9/24/18.     Interval History:   The patient was contacted for a telephone visit today as the [employer] visit was down and the ongoing COVID-19 pandemic.  She was seen for routine followup of breast cancer.  She continues on anastrozole.  She is tolerating the medication remarkably well.  She denies new bone or joint aches or pains.  She has no joint stiffness.  She continues to have chronic and intermittent left hip pain.  She previously was referred to a physical therapist; however, due to COVID-19 she has not been able to see one.  Since the COVID-19 pandemic, she has been exercising more, going for daily walks and canoeing on the weekends.  She believes the  increase in exercise has improved her hip pain in that it is less frequent than it was previously.  She denies concerning masses, pain or swelling of either breast.  She has no fevers, chills, cough, shortness of breath or chest pain.  She confirms that she is strictly adhering to social distancing guidelines in that she has not even seen her immediate family except for outdoors with 6 feet of space in between them.  She has no current abdominal complaints.  She denies swelling or pain of her lower extremities.  She has no headaches, visual changes or focal neurologic complaints.  She denies symptoms of depression or anxiety.  The remainder of a complete 12-point review of systems was reviewed with the patient and was negative with the exception of that mentioned above.     Past Medical/Surgical History:  Past Medical History:   Diagnosis Date     Hypertension      Iritis      Past Surgical History:   Procedure Laterality Date     CATARACT IOL, RT/LT Left 2006     HYSTERECTOMY  2016     LUMPECTOMY BREAST WITH SENTINEL NODE, COMBINED Right 8/31/2018    Procedure: COMBINED LUMPECTOMY BREAST WITH SENTINEL NODE;  Right Wire Localized Lumpectomy and Right [place] Lymph Node Biopsy;  Surgeon: Chilango Kruger MD;  Location: [facility]     OOPHORECTOMY  2014     Allergies:  Allergies as of 06/15/2020 - Reviewed 03/16/2020   Allergen Reaction Noted     Corticosteroids Dermatitis 03/18/2019     Neomycin Dermatitis 03/18/2019     Codeine Nausea and Vomiting 08/06/2015     Macrobid [nitrofurantoin] Nausea and Vomiting 08/06/2015     Sulfa drugs Itching 08/06/2015     Silver Rash 12/03/2018     Current Medications:  Current Outpatient Medications   Medication Sig Dispense Refill     acetaminophen (TYLENOL) 325 MG tablet Take 2 tablets (650 mg) by mouth every 4 hours as needed for mild pain 60 tablet 0     amLODIPine (NORVASC) 5 MG tablet Take 5 mg by mouth daily       anastrozole (ARIMIDEX) 1 MG tablet Take 1 tablet (1 mg) by mouth  daily 30 tablet 11     atorvastatin (LIPITOR) 10 MG tablet Take 10 mg by mouth daily       biotin 1000 MCG TABS tablet Every day 1 Tabl 30 tablet 1     calcium carbonate (OS-PADDY 500 MG [place]. [place]) 500 MG tablet Take 500 mg by mouth 2 times daily With Magnesium,Zinc       desonide (DESOWEN) 0.05 % cream        doxycycline hyclate (VIBRAMYCIN) 100 MG capsule Take one capsule twice daily for 10 days       LYSINE PO        mometasone (ELOCON) 0.1 % external cream Apply topically daily (Patient not taking: Reported on 6/7/2019) 15 g 1     multivitamin, therapeutic with minerals (MULTI-VITAMIN) TABS Take 1 tablet by mouth daily       Urea 40 % CREA Apply to fingernails twice daily, especially at bedtime.       VITAMIN D, CHOLECALCIFEROL, PO Take 1,000 Units by mouth daily         Family and Social History:  Reviewed and unchanged from prior.  Please see initial consultation dated 9/24/18 for further details.    Physical Exam:  A comprehensive physical examination is deferred due to PHE (public health emergency) telephone visit restrictions.    Laboratory/Imaging Studies:  3/16/2020 Bilateral screening mammograms:  Images reviewed with the patient in clinic.  No new findings in either breast compared to last year's mammogram.  Radiology read is pending.    ASSESSMENT/PLAN:  Ms. Velazquez is a 65-year-old female with a h/o stage IA, T1c N0 M0, grade 1, ER positive, ND positive, HER2 non-amplified invasive mammary carcinoma of the right breast.  She is status post treatment with right breast lumpectomy and radiation.  On anastrozole since 9/24/18.    1.  Right breast cancer:   Ms. Velazquez is approximately 1 year, 9.5 months out from excision of a right breast cancer.  She continues on anastrozole.  She is overall tolerating the medication well.  We plan to continue anastrozole to complete a minimum 5 year course.      She is asymptomatic of disease recurrence on history today.  Due to the fact that her initial breast cancer was not  detectable by mammogram, increased breast density and close DCIS margins, we are performing high-risk screening with both mammogram and breast MRI until she is 5 years out from diagnosis of her breast cancer.  I will see Ms. Velazquez back in clinic in 3 months.  She will be due for breast MRI at that time.        2.  Left hip pain:  Unchanged since last visit.  Chronic and intermittent, therefore unlikely to be related to h/o breast cancer.  I suspect she has a gluteal tendinopathy or trochanteric bursitis.  Referred to physical therapy at last visit, but has been unable to schedule due to Covid-19.  Referral is good for a year, she will reschedule when she feels safe.      3.  Bone Health:  At risk for bone loss on aromatase inhibitor therapy.  DEXA in 10/2018 with lowest T-score of -0.5.  Continue calcium 1200 mg, vitamin D 1000 international units and walk a 1/2 hour daily.  Will plan to repeat a DEXA around 10/2020.    4.  Routine health maintenance:  Colonoscopy in 2015 was without concerning finding, next due in 2025.  Annual cholesterol panel to monitor for hypertriglyceridemia on AI therapy.    5.  Covid risk:  We discussed that given she is not currently on immunosuppressive cancer therapy, her h/o cancer does not significantly increase risk of complications from Covid.  That being said she is >65 years old and therefore her age places her at medium risk.  We reviewed CDC guidelines for prevention.    6.  Follow Up:  Return to clinic in approximately 3 months for breast MRI and visit with me.    I spent 22 minutes on the phone with this patient today.

## 2020-06-15 ENCOUNTER — VIRTUAL VISIT (OUTPATIENT)
Dept: ONCOLOGY | Facility: CLINIC | Age: 65
End: 2020-06-15
Attending: INTERNAL MEDICINE
Payer: MEDICARE

## 2020-06-15 DIAGNOSIS — Z17.0 MALIGNANT NEOPLASM OF LOWER-INNER QUADRANT OF RIGHT BREAST OF FEMALE, ESTROGEN RECEPTOR POSITIVE (H): Primary | ICD-10-CM

## 2020-06-15 DIAGNOSIS — Z12.39 BREAST CANCER SCREENING, HIGH RISK PATIENT: ICD-10-CM

## 2020-06-15 DIAGNOSIS — C50.311 MALIGNANT NEOPLASM OF LOWER-INNER QUADRANT OF RIGHT BREAST OF FEMALE, ESTROGEN RECEPTOR POSITIVE (H): Primary | ICD-10-CM

## 2020-06-15 DIAGNOSIS — Z85.3 PERSONAL HISTORY OF MALIGNANT NEOPLASM OF BREAST: ICD-10-CM

## 2020-06-15 PROCEDURE — 40000114 ZZH STATISTIC NO CHARGE CLINIC VISIT

## 2020-06-15 PROCEDURE — 99443 ZZC PHYSICIAN TELEPHONE EVALUATION 21-30 MIN: CPT | Mod: 95 | Performed by: INTERNAL MEDICINE

## 2020-06-15 NOTE — LETTER
"    6/15/2020         RE: Keely Velazquez  [address]  [facility] [address]        Dear Colleague,    Thank you for referring your patient, Keely Velazquez, to the [facility]. Please see a copy of my visit note below.    Keely Velazquez is a 65 year old female who is being evaluated via a billable video visit.      The patient has been notified of following:     \"This video visit will be conducted via a call between you and your physician/provider. We have found that certain health care needs can be provided without the need for an in-person physical exam.  This service lets us provide the care you need with a video conversation.  If a prescription is necessary we can send it directly to your pharmacy.  If lab work is needed we can place an order for that and you can then stop by our lab to have the test done at a later time.    Video visits are billed at different rates depending on your insurance coverage. Please reach out to your insurance provider with any questions.    If during the course of the call the physician/provider feels a video visit is not appropriate, you will not be charged for this service.\"    Patient has given verbal consent for Video visit? Yes    Will anyone else be joining your video visit? No      Video-Visit Details    Type of service:  Video Visit    Total time of video visit:  22 minutes    Originating Location (pt. Location): Home    Distant Location (provider location):  [facility]     Platform used for Video Visit: Cierra Tafoya MD      Oncology Follow Up:  Date on this visit: 6/15/2020    Diagnosis:  [id number], grade I, ER positive, MD positive, HER-2 nonamplified invasive ductal carcinoma of the right breast. Oncotype DX recurrence score = 7.    Primary Physician: Sammie Cerna     History Of Present Illness:  Ms. Velazquez is a 65 year old female with right breast cancer.  Routine screening mammogram in 06/2018 demonstrated " dense breast tissue, but no dominant masses.  She presented to her gynecologist's office in 08/2018 for a Pap smear and mentioned the high breast density.  Her gynecologist recommended proceeding with breast MRI.  Breast MRI on 08/16/2018 showed nodular to non-mass enhancement measuring 1.6 cm in the right breast at 5 o'clock.  There was a similar area of enhancement at 10 o'clock measuring 1.2 cm and a cyst in the left breast at 3 o'clock measuring 1.3 cm.  Right breast ultrasound confirmed a mass at 5 o'clock.  Right breast biopsy demonstrated a grade 1 invasive ductal carcinoma.  Estrogen-receptor staining was moderate in 95%; progesterone-receptor staining was strong in 96%.  HER-2 was nonamplified by immunohistochemistry with a score of 1+.  There was no ultrasound correlate at 10:00 right breast.  She met in consultation with Dr. Kruger, who recommended a right breast lumpectomy and sentinel lymph node procedure.  This was performed on 8/31/2018.  Pathology demonstrated a 1.2 cm, grade 1 invasive mammary carcinoma.  There was associated intermediate-grade DCIS.  Surgical margins were negative for both noninvasive and invasive carcinoma; however, DCIS was 1 mm from the anterior margin.  A single sentinel lymph node was negative for malignancy. Oncotype DX testing of the breast tumor returned with a recurrent score of 7.  She completed adjuvant radiation (4240 cGy to the whole breast and additional 1250 cGy to the lumpectomy site) on 11/8/18.  She has been on anastrozole since 9/24/18.     Interval History:   The patient was contacted for a telephone visit today as the [employer] system visit was down and the ongoing COVID-19 pandemic.  She was seen for routine followup of breast cancer.  She continues on anastrozole.  She is tolerating the medication remarkably well.  She denies new bone or joint aches or pains.  She has no joint stiffness.  She continues to have chronic and intermittent left hip pain.   She previously was referred to a physical therapist; however, due to COVID-19 she has not been able to see one.  Since the COVID-19 pandemic, she has been exercising more, going for daily walks and canoeing on the weekends.  She believes the increase in exercise has improved her hip pain in that it is less frequent than it was previously.  She denies concerning masses, pain or swelling of either breast.  She has no fevers, chills, cough, shortness of breath or chest pain.  She confirms that she is strictly adhering to social distancing guidelines in that she has not even seen her immediate family except for outdoors with 6 feet of space in between them.  She has no current abdominal complaints.  She denies swelling or pain of her lower extremities.  She has no headaches, visual changes or focal neurologic complaints.  She denies symptoms of depression or anxiety.  The remainder of a complete 12-point review of systems was reviewed with the patient and was negative with the exception of that mentioned above.     Past Medical/Surgical History:  Past Medical History:   Diagnosis Date     Hypertension      Iritis      Past Surgical History:   Procedure Laterality Date     CATARACT IOL, RT/LT Left 2006     HYSTERECTOMY  2016     LUMPECTOMY BREAST WITH SENTINEL NODE, COMBINED Right 8/31/2018    Procedure: COMBINED LUMPECTOMY BREAST WITH SENTINEL NODE;  Right Wire Localized Lumpectomy and Right [place] Lymph Node Biopsy;  Surgeon: Chilango Kruger MD;  Location: [facility]     OOPHORECTOMY  2014     Allergies:  Allergies as of 06/15/2020 - Reviewed 03/16/2020   Allergen Reaction Noted     Corticosteroids Dermatitis 03/18/2019     Neomycin Dermatitis 03/18/2019     Codeine Nausea and Vomiting 08/06/2015     Macrobid [nitrofurantoin] Nausea and Vomiting 08/06/2015     Sulfa drugs Itching 08/06/2015     Silver Rash 12/03/2018     Current Medications:  Current Outpatient Medications   Medication Sig Dispense Refill      acetaminophen (TYLENOL) 325 MG tablet Take 2 tablets (650 mg) by mouth every 4 hours as needed for mild pain 60 tablet 0     amLODIPine (NORVASC) 5 MG tablet Take 5 mg by mouth daily       anastrozole (ARIMIDEX) 1 MG tablet Take 1 tablet (1 mg) by mouth daily 30 tablet 11     atorvastatin (LIPITOR) 10 MG tablet Take 10 mg by mouth daily       biotin 1000 MCG TABS tablet Every day 1 Tabl 30 tablet 1     calcium carbonate (OS-PADDY 500 MG [place]. [place]) 500 MG tablet Take 500 mg by mouth 2 times daily With Magnesium,Zinc       desonide (DESOWEN) 0.05 % cream        doxycycline hyclate (VIBRAMYCIN) 100 MG capsule Take one capsule twice daily for 10 days       LYSINE PO        mometasone (ELOCON) 0.1 % external cream Apply topically daily (Patient not taking: Reported on 6/7/2019) 15 g 1     multivitamin, therapeutic with minerals (MULTI-VITAMIN) TABS Take 1 tablet by mouth daily       Urea 40 % CREA Apply to fingernails twice daily, especially at bedtime.       VITAMIN D, CHOLECALCIFEROL, PO Take 1,000 Units by mouth daily         Family and Social History:  Reviewed and unchanged from prior.  Please see initial consultation dated 9/24/18 for further details.    Physical Exam:  A comprehensive physical examination is deferred due to PHE (public health emergency) telephone visit restrictions.    Laboratory/Imaging Studies:  3/16/2020 Bilateral screening mammograms:  Images reviewed with the patient in clinic.  No new findings in either breast compared to last year's mammogram.  Radiology read is pending.    ASSESSMENT/PLAN:  Ms. Velazquez is a 65-year-old female with a h/o stage IA, T1c N0 M0, grade 1, ER positive, MT positive, HER2 non-amplified invasive mammary carcinoma of the right breast.  She is status post treatment with right breast lumpectomy and radiation.  On anastrozole since 9/24/18.    1.  Right breast cancer:   Ms. Velazquez is approximately 1 year, 9.5 months out from excision of a right breast cancer.  She continues on  anastrozole.  She is overall tolerating the medication well.  We plan to continue anastrozole to complete a minimum 5 year course.      She is asymptomatic of disease recurrence on history today.  Due to the fact that her initial breast cancer was not detectable by mammogram, increased breast density and close DCIS margins, we are performing high-risk screening with both mammogram and breast MRI until she is 5 years out from diagnosis of her breast cancer.  I will see Ms. Velazquez back in clinic in 3 months.  She will be due for breast MRI at that time.        2.  Left hip pain:  Unchanged since last visit.  Chronic and intermittent, therefore unlikely to be related to h/o breast cancer.  I suspect she has a gluteal tendinopathy or trochanteric bursitis.  Referred to physical therapy at last visit, but has been unable to schedule due to Covid-19.  Referral is good for a year, she will reschedule when she feels safe.      3.  Bone Health:  At risk for bone loss on aromatase inhibitor therapy.  DEXA in 10/2018 with lowest T-score of -0.5.  Continue calcium 1200 mg, vitamin D 1000 international units and walk a 1/2 hour daily.  Will plan to repeat a DEXA around 10/2020.    4.  Routine health maintenance:  Colonoscopy in 2015 was without concerning finding, next due in 2025.  Annual cholesterol panel to monitor for hypertriglyceridemia on AI therapy.    5.  Covid risk:  We discussed that given she is not currently on immunosuppressive cancer therapy, her h/o cancer does not significantly increase risk of complications from Covid.  That being said she is >65 years old and therefore her age places her at medium risk.  We reviewed CDC guidelines for prevention.    6.  Follow Up:  Return to clinic in approximately 3 months for breast MRI and visit with me.    I spent 22 minutes on the phone with this patient today.        Again, thank you for allowing me to participate in the care of your patient.         Sincerely,        Merari Tafoya MD

## 2020-06-17 DIAGNOSIS — C50.411 MALIGNANT NEOPLASM OF UPPER-OUTER QUADRANT OF RIGHT BREAST IN FEMALE, ESTROGEN RECEPTOR POSITIVE (H): ICD-10-CM

## 2020-06-17 DIAGNOSIS — Z17.0 MALIGNANT NEOPLASM OF UPPER-OUTER QUADRANT OF RIGHT BREAST IN FEMALE, ESTROGEN RECEPTOR POSITIVE (H): ICD-10-CM

## 2020-06-17 RX ORDER — ANASTROZOLE 1 MG/1
1 TABLET ORAL DAILY
Qty: 30 TABLET | Refills: 11 | Status: SHIPPED | OUTPATIENT
Start: 2020-06-17 | End: 2021-06-24

## 2020-09-13 NOTE — PROGRESS NOTES
"Keely Arana is a 65 year old female who is being evaluated via a billable video visit.      The patient has been notified of following:     \"This video visit will be conducted via a call between you and your physician/provider. We have found that certain health care needs can be provided without the need for an in-person physical exam.  This service lets us provide the care you need with a video conversation.  If a prescription is necessary we can send it directly to your pharmacy.  If lab work is needed we can place an order for that and you can then stop by our lab to have the test done at a later time.    Video visits are billed at different rates depending on your insurance coverage.  Please reach out to your insurance provider with any questions.    If during the course of the call the physician/provider feels a video visit is not appropriate, you will not be charged for this service.\"    Patient has given verbal consent for Video visit? Yes    How would you like to obtain your AVS? MyChart     If you are dropped from the video visit, the video invite should be resent to: Send to e-mail at: [email or web address]     Will anyone else be joining your video visit? No     I have reviewed and updated the patient's allergies and pt confirmed any changes to their medication list.  Patient was asked to provide any patient recorded vital signs, height and/or weight.  Please see \"Patient Reported Vital Signs\" tab for that information.  Patient instructed to be in the virtual waiting room 10-15 minutes prior to appointment time.      Concerns: Questions about a bone density scan. Does she need one?        Refills: None        RUSSEL Mcgraw          Video-Visit Details    Type of service:  Video Visit - Converted to telephone visit    Video Start Time: 16:56  Video End Time: 17:21    Originating Location (pt. Location): Home    Distant Location (provider location):  [facility] CANCER [facility]     Platform used " for Video Visit: Cierra attempted.  I could not hear patient.  Microphone was on.  She was using [employer] as internet browser.    Merari Tafoya MD                Oncology Follow Up:  Date on this visit: 9/14/2020    Diagnosis:  [id number], grade I, ER positive, [place] positive, HER-2 nonamplified invasive ductal carcinoma of the right breast. Oncotype DX recurrence score = 7.    Primary Physician: Sammie Cerna     History Of Present Illness:  Ms. Velazquez is a 65 year old female with right breast cancer.  Routine screening mammogram in 06/2018 was without concerning findings. Due to high breast density, her gynecologist recommended a breast MRI.  Breast MRI on 08/16/2018 showed nodular to non-mass enhancement measuring 1.6 cm in the right breast at 5 o'clock.    Right breast ultrasound confirmed a mass at 5 o'clock.  Right breast biopsy demonstrated a grade 1 invasive ductal carcinoma.  Estrogen-receptor staining was moderate in 95%; progesterone-receptor staining was strong in 96%.  HER-2 was nonamplified by immunohistochemistry with a score of 1+.  Right breast lumpectomy and sentinel lymph node procedure on 8/31/2018 demonstrated a 1.2 cm, grade 1 invasive mammary carcinoma.  There was associated intermediate-grade DCIS.  Surgical margins were negative; however, DCIS was 1 mm from the anterior margin.  A single sentinel lymph node was negative for malignancy. Oncotype DX recurrence score was 7.  She completed adjuvant radiation (4240 cGy to the whole breast and additional 1250 cGy to the lumpectomy site) on 11/8/18.  She has been on anastrozole since 9/24/18.     Interval History:   Ms. Arana was contacted today for routine breast cancer followup.  Of note, a video visit was attempted; however, unsuccessful as I could not hear the patient.  We did try to troubleshoot this for approximately 15 minutes before converting to a telephone visit.        The patient states since last visit, she has been doing well.   She did quite a bit of walking and canoeing over the summer.  She also has reduced the amount of bread and pasta that she has been eating.  With these measures, she has lost approximately 18 pounds.  She denies concerning masses, pain or swelling of either breast.  She continues on treatment with anastrozole and is tolerating the medication well.  She states left hip pain is significantly improved, she thinks due to the increased activity.  She did not pursue the physical therapy as it was being done virtually.  She states that if the hip pain becomes bothersome again, she confirmed that referral was good for a year, and she will contact them.        Most bothersome to her at this time is sinus congestion.  Right maxillary sinus is worse than the left.  She has been taking Zyrtec and using Flonase daily.  She has pressure in her right ear.  She has not noted any color drainage or fever.  She denies cough, shortness of breath or chest discomfort.  She has no abdominal complaints, no headaches, visual changes or focal neurologic complaints.  The remainder of a complete 12-point review of systems was reviewed with the patient and was negative with the exception of that mentioned above.     Past Medical/Surgical History:  Past Medical History:   Diagnosis Date     Hypertension      Iritis      Past Surgical History:   Procedure Laterality Date     CATARACT IOL, RT/LT Left 2006     HYSTERECTOMY  2016     LUMPECTOMY BREAST WITH SENTINEL NODE, COMBINED Right 8/31/2018    Procedure: COMBINED LUMPECTOMY BREAST WITH SENTINEL NODE;  Right Wire Localized Lumpectomy and Right [place] Lymph Node Biopsy;  Surgeon: Chilango Kruger MD;  Location: [facility] OR     OOPHORECTOMY  2014     Allergies:  Allergies as of 09/14/2020 - Reviewed 06/15/2020   Allergen Reaction Noted     Corticosteroids Dermatitis 03/18/2019     Neomycin Dermatitis 03/18/2019     Codeine Nausea and Vomiting 08/06/2015     Macrobid [nitrofurantoin] Nausea and Vomiting  08/06/2015     Sulfa drugs Itching 08/06/2015     Silver Rash 12/03/2018     Current Medications:  Current Outpatient Medications   Medication Sig Dispense Refill     acetaminophen (TYLENOL) 325 MG tablet Take 2 tablets (650 mg) by mouth every 4 hours as needed for mild pain 60 tablet 0     amLODIPine (NORVASC) 5 MG tablet Take 5 mg by mouth daily       anastrozole (ARIMIDEX) 1 MG tablet Take 1 tablet (1 mg) by mouth daily 30 tablet 11     atorvastatin (LIPITOR) 10 MG tablet Take 10 mg by mouth daily       biotin 1000 MCG TABS tablet Every day 1 Tabl 30 tablet 1     calcium carbonate (OS-PADDY 500 MG [place]. [place]) 500 MG tablet Take 500 mg by mouth 2 times daily With Magnesium,Zinc       desonide (DESOWEN) 0.05 % cream        doxycycline hyclate (VIBRAMYCIN) 100 MG capsule Take one capsule twice daily for 10 days       LYSINE PO        mometasone (ELOCON) 0.1 % external cream Apply topically daily (Patient not taking: Reported on 6/7/2019) 15 g 1     multivitamin, therapeutic with minerals (MULTI-VITAMIN) TABS Take 1 tablet by mouth daily       Urea 40 % CREA Apply to fingernails twice daily, especially at bedtime.       VITAMIN D, CHOLECALCIFEROL, PO Take 1,000 Units by mouth daily         Family and Social History:  Reviewed and unchanged from prior.  Please see initial consultation dated 9/24/18 for further details.    Physical Exam:  A comprehensive physical examination is deferred due to [facility] (public health emergency) telephone visit restrictions.    Laboratory/Imaging Studies:  9/14/2020 MRI breast:  No suspicious findings in either breast.  No lymphadenopathy.    ASSESSMENT/PLAN:  Ms. Velazquez is a 65-year-old female with a h/o stage IA, T1c N0 M0, grade 1, ER positive, [place] positive, HER2 non-amplified invasive mammary carcinoma of the right breast.  She is status post treatment with right breast lumpectomy and radiation.  On anastrozole since 9/24/18.    1.  Right breast cancer:   Ms. Velazquez is approximately 2 years out  from excision of a right breast cancer.  She continues on anastrozole.  She is overall tolerating the medication well.  We plan to continue anastrozole to complete a minimum 5 year course.      She is asymptomatic of disease recurrence on history today.  Due to the fact that her initial breast cancer was not detectable by mammogram, increased breast density and close DCIS margins, we are performing high-risk screening with both mammogram and breast MRI until she is 5 years out from diagnosis of her breast cancer.  Breast MRI performed today was reviewed with the patient and is without significant change or lymphadenopathy.  I will see Ms. Velazquez back in clinic in 4 months.      2.  Left hip pain:  Some improvement since last visit.  I suspect she has a gluteal tendinopathy or trochanteric bursitis.  Previously referred to physical therapy.  She has not yet seen PT, but states she will call them in case of worsening pain.    3.  Sinus congestion:  Advised to continue decongestants.  If she develops pain, thickened drainage or fever, should contact PCP for an antibiotic.    4.  Bone Health:  At risk for bone loss on aromatase inhibitor therapy.  DEXA in 10/2018 with lowest T-score of -0.5.  Continue calcium 1200 mg, vitamin D 1000 international units and walk a 1/2 hour daily.  Will repeat a DEXA at the time of her return visit.    5.  Routine health maintenance:  Colonoscopy in 2015 was without concerning finding, next due in 2025.  Annual cholesterol panel to monitor for hypertriglyceridemia on AI therapy.    6.  Follow Up:  Return to clinic in approximately 4 months for DEXA bone density scan and visit with me.    I spent a total of 22 minutes on the phone with this patient today.

## 2020-09-14 ENCOUNTER — VIRTUAL VISIT (OUTPATIENT)
Dept: ONCOLOGY | Facility: CLINIC | Age: 65
End: 2020-09-14
Attending: INTERNAL MEDICINE
Payer: MEDICARE

## 2020-09-14 ENCOUNTER — ANCILLARY PROCEDURE (OUTPATIENT)
Dept: MRI IMAGING | Facility: CLINIC | Age: 65
End: 2020-09-14
Attending: INTERNAL MEDICINE
Payer: MEDICARE

## 2020-09-14 DIAGNOSIS — M94.9 DISORDER OF BONE AND CARTILAGE: ICD-10-CM

## 2020-09-14 DIAGNOSIS — Z17.0 MALIGNANT NEOPLASM OF LOWER-INNER QUADRANT OF RIGHT BREAST OF FEMALE, ESTROGEN RECEPTOR POSITIVE (H): Primary | ICD-10-CM

## 2020-09-14 DIAGNOSIS — Z12.39 BREAST CANCER SCREENING, HIGH RISK PATIENT: ICD-10-CM

## 2020-09-14 DIAGNOSIS — Z85.3 PERSONAL HISTORY OF MALIGNANT NEOPLASM OF BREAST: ICD-10-CM

## 2020-09-14 DIAGNOSIS — C50.311 MALIGNANT NEOPLASM OF LOWER-INNER QUADRANT OF RIGHT BREAST OF FEMALE, ESTROGEN RECEPTOR POSITIVE (H): Primary | ICD-10-CM

## 2020-09-14 DIAGNOSIS — M89.9 DISORDER OF BONE AND CARTILAGE: ICD-10-CM

## 2020-09-14 DIAGNOSIS — Z79.811 LONG TERM CURRENT USE OF AROMATASE INHIBITOR: ICD-10-CM

## 2020-09-14 PROCEDURE — 99443 ZZC PHYSICIAN TELEPHONE EVALUATION 21-30 MIN: CPT | Mod: ZP | Performed by: INTERNAL MEDICINE

## 2020-09-14 PROCEDURE — 40001009 ZZH VIDEO/TELEPHONE VISIT; NO CHARGE

## 2020-09-14 RX ORDER — GADOBUTROL 604.72 MG/ML
7.5 INJECTION INTRAVENOUS ONCE
Status: COMPLETED | OUTPATIENT
Start: 2020-09-14 | End: 2020-09-14

## 2020-09-14 RX ADMIN — GADOBUTROL 7.5 ML: 604.72 INJECTION INTRAVENOUS at 15:22

## 2020-09-14 NOTE — LETTER
"    9/14/2020         RE: Keely Arana  [address]  [facility] [address]        Dear Colleague,    Thank you for referring your patient, Keely Arana, to the [facility]. Please see a copy of my visit note below.    Keely Arana is a 65 year old female who is being evaluated via a billable video visit.      The patient has been notified of following:     \"This video visit will be conducted via a call between you and your physician/provider. We have found that certain health care needs can be provided without the need for an in-person physical exam.  This service lets us provide the care you need with a video conversation.  If a prescription is necessary we can send it directly to your pharmacy.  If lab work is needed we can place an order for that and you can then stop by our lab to have the test done at a later time.    Video visits are billed at different rates depending on your insurance coverage.  Please reach out to your insurance provider with any questions.    If during the course of the call the physician/provider feels a video visit is not appropriate, you will not be charged for this service.\"    Patient has given verbal consent for Video visit? Yes    How would you like to obtain your AVS? MyChart     If you are dropped from the video visit, the video invite should be resent to: Send to e-mail at: [email or web address]     Will anyone else be joining your video visit? No     I have reviewed and updated the patient's allergies and pt confirmed any changes to their medication list.  Patient was asked to provide any patient recorded vital signs, height and/or weight.  Please see \"Patient Reported Vital Signs\" tab for that information.  Patient instructed to be in the virtual waiting room 10-15 minutes prior to appointment time.      Concerns: Questions about a bone density scan. Does she need one?        Refills: None        Rick Fountain, RUSSEL          Video-Visit " Details    Type of service:  Video Visit - Converted to telephone visit    Video Start Time: 16:56  Video End Time: 17:21    Originating Location (pt. Location): Home    Distant Location (provider location):  [facility]ER [facility]     Platform used for Video Visit: Cierra attempted.  I could not hear patient.  Microphone was on.  She was using [employer] as internet browser.    Merari Tafoya MD                Oncology Follow Up:  Date on this visit: 9/14/2020    Diagnosis:  [id number], grade I, ER positive, [place] positive, HER-2 nonamplified invasive ductal carcinoma of the right breast. Oncotype DX recurrence score = 7.    Primary Physician: Sammie Cerna     History Of Present Illness:  Ms. Velazquez is a 65 year old female with right breast cancer.  Routine screening mammogram in 06/2018 was without concerning findings. Due to high breast density, her gynecologist recommended a breast MRI.  Breast MRI on 08/16/2018 showed nodular to non-mass enhancement measuring 1.6 cm in the right breast at 5 o'clock.    Right breast ultrasound confirmed a mass at 5 o'clock.  Right breast biopsy demonstrated a grade 1 invasive ductal carcinoma.  Estrogen-receptor staining was moderate in 95%; progesterone-receptor staining was strong in 96%.  HER-2 was nonamplified by immunohistochemistry with a score of 1+.  Right breast lumpectomy and sentinel lymph node procedure on 8/31/2018 demonstrated a 1.2 cm, grade 1 invasive mammary carcinoma.  There was associated intermediate-grade DCIS.  Surgical margins were negative; however, DCIS was 1 mm from the anterior margin.  A single sentinel lymph node was negative for malignancy. Oncotype DX recurrence score was 7.  She completed adjuvant radiation (4240 cGy to the whole breast and additional 1250 cGy to the lumpectomy site) on 11/8/18.  She has been on anastrozole since 9/24/18.     Interval History:   Ms. Arana was contacted today for routine breast cancer followup.  Of  note, a video visit was attempted; however, unsuccessful as I could not hear the patient.  We did try to troubleshoot this for approximately 15 minutes before converting to a telephone visit.        The patient states since last visit, she has been doing well.  She did quite a bit of walking and canoeing over the summer.  She also has reduced the amount of bread and pasta that she has been eating.  With these measures, she has lost approximately 18 pounds.  She denies concerning masses, pain or swelling of either breast.  She continues on treatment with anastrozole and is tolerating the medication well.  She states left hip pain is significantly improved, she thinks due to the increased activity.  She did not pursue the physical therapy as it was being done virtually.  She states that if the hip pain becomes bothersome again, she confirmed that referral was good for a year, and she will contact them.        Most bothersome to her at this time is sinus congestion.  Right maxillary sinus is worse than the left.  She has been taking Zyrtec and using Flonase daily.  She has pressure in her right ear.  She has not noted any color drainage or fever.  She denies cough, shortness of breath or chest discomfort.  She has no abdominal complaints, no headaches, visual changes or focal neurologic complaints.  The remainder of a complete 12-point review of systems was reviewed with the patient and was negative with the exception of that mentioned above.     Past Medical/Surgical History:  Past Medical History:   Diagnosis Date     Hypertension      Iritis      Past Surgical History:   Procedure Laterality Date     CATARACT IOL, RT/LT Left 2006     HYSTERECTOMY  2016     LUMPECTOMY BREAST WITH SENTINEL NODE, COMBINED Right 8/31/2018    Procedure: COMBINED LUMPECTOMY BREAST WITH SENTINEL NODE;  Right Wire Localized Lumpectomy and Right [place] Lymph Node Biopsy;  Surgeon: Chilango Kruger MD;  Location: [facility] OR     OOPHORECTOMY  2014      Allergies:  Allergies as of 09/14/2020 - Reviewed 06/15/2020   Allergen Reaction Noted     Corticosteroids Dermatitis 03/18/2019     Neomycin Dermatitis 03/18/2019     Codeine Nausea and Vomiting 08/06/2015     Macrobid [nitrofurantoin] Nausea and Vomiting 08/06/2015     Sulfa drugs Itching 08/06/2015     Silver Rash 12/03/2018     Current Medications:  Current Outpatient Medications   Medication Sig Dispense Refill     acetaminophen (TYLENOL) 325 MG tablet Take 2 tablets (650 mg) by mouth every 4 hours as needed for mild pain 60 tablet 0     amLODIPine (NORVASC) 5 MG tablet Take 5 mg by mouth daily       anastrozole (ARIMIDEX) 1 MG tablet Take 1 tablet (1 mg) by mouth daily 30 tablet 11     atorvastatin (LIPITOR) 10 MG tablet Take 10 mg by mouth daily       biotin 1000 MCG TABS tablet Every day 1 Tabl 30 tablet 1     calcium carbonate (OS-PADDY 500 MG [place]. [place]) 500 MG tablet Take 500 mg by mouth 2 times daily With Magnesium,Zinc       desonide (DESOWEN) 0.05 % cream        doxycycline hyclate (VIBRAMYCIN) 100 MG capsule Take one capsule twice daily for 10 days       LYSINE PO        mometasone (ELOCON) 0.1 % external cream Apply topically daily (Patient not taking: Reported on 6/7/2019) 15 g 1     multivitamin, therapeutic with minerals (MULTI-VITAMIN) TABS Take 1 tablet by mouth daily       Urea 40 % CREA Apply to fingernails twice daily, especially at bedtime.       VITAMIN D, CHOLECALCIFEROL, PO Take 1,000 Units by mouth daily         Family and Social History:  Reviewed and unchanged from prior.  Please see initial consultation dated 9/24/18 for further details.    Physical Exam:  A comprehensive physical examination is deferred due to [facility] (public health emergency) telephone visit restrictions.    Laboratory/Imaging Studies:  9/14/2020 MRI breast:  No suspicious findings in either breast.  No lymphadenopathy.    ASSESSMENT/PLAN:  Ms. Velazquez is a 65-year-old female with a h/o stage IA, T1c N0 M0, grade 1, ER  positive, [place] positive, HER2 non-amplified invasive mammary carcinoma of the right breast.  She is status post treatment with right breast lumpectomy and radiation.  On anastrozole since 9/24/18.    1.  Right breast cancer:   Ms. Velazquez is approximately 2 years out from excision of a right breast cancer.  She continues on anastrozole.  She is overall tolerating the medication well.  We plan to continue anastrozole to complete a minimum 5 year course.      She is asymptomatic of disease recurrence on history today.  Due to the fact that her initial breast cancer was not detectable by mammogram, increased breast density and close DCIS margins, we are performing high-risk screening with both mammogram and breast MRI until she is 5 years out from diagnosis of her breast cancer.  Breast MRI performed today was reviewed with the patient and is without significant change or lymphadenopathy.  I will see Ms. Velazquez back in clinic in 4 months.      2.  Left hip pain:  Some improvement since last visit.  I suspect she has a gluteal tendinopathy or trochanteric bursitis.  Previously referred to physical therapy.  She has not yet seen PT, but states she will call them in case of worsening pain.    3.  Sinus congestion:  Advised to continue decongestants.  If she develops pain, thickened drainage or fever, should contact PCP for an antibiotic.    4.  Bone Health:  At risk for bone loss on aromatase inhibitor therapy.  DEXA in 10/2018 with lowest T-score of -0.5.  Continue calcium 1200 mg, vitamin D 1000 international units and walk a 1/2 hour daily.  Will repeat a DEXA at the time of her return visit.    5.  Routine health maintenance:  Colonoscopy in 2015 was without concerning finding, next due in 2025.  Annual cholesterol panel to monitor for hypertriglyceridemia on AI therapy.    6.  Follow Up:  Return to clinic in approximately 4 months for DEXA bone density scan and visit with me.    I spent a total of 22 minutes on the phone  with this patient today    Again, thank you for allowing me to participate in the care of your patient.        Sincerely,        Merari Tafoya MD

## 2020-09-14 NOTE — DISCHARGE INSTRUCTIONS
MRI Contrast Discharge Instructions    The IV contrast you received today will pass out of your body in your  urine. This will happen in the next 24 hours. You will not feel this process.  Your urine will not change color.    Drink at least 4 extra glasses of water or juice today (unless your doctor  has restricted your fluids). This reduces the stress on your kidneys.  You may take your regular medicines.    If you are on dialysis: It is best to have dialysis today.    If you have a reaction: Most reactions happen right away. If you have  any new symptoms after leaving the hospital (such as hives or swelling),  call your hospital at the correct number below. Or call your family doctor.  If you have breathing distress or wheezing, call 911.    Special instructions: ***    I have read and understand the above information.    Signature:______________________________________ Date:___________    Staff:__________________________________________ Date:___________     Time:__________    [facility] Departments:    ___[place]: [phone]  ___[facility]: [phone]  ___[place]: [phone] ___[facility]: [phone]  ___[facility]: [phone]  ___[facility]: [phone]  ___[phone]  ___[facility]: [phone]  ___[phone]

## 2021-01-05 ENCOUNTER — ANCILLARY PROCEDURE (OUTPATIENT)
Dept: BONE DENSITY | Facility: CLINIC | Age: 66
End: 2021-01-05
Attending: INTERNAL MEDICINE
Payer: MEDICARE

## 2021-01-05 DIAGNOSIS — Z79.811 LONG TERM CURRENT USE OF AROMATASE INHIBITOR: ICD-10-CM

## 2021-01-05 DIAGNOSIS — M89.9 DISORDER OF BONE AND CARTILAGE: ICD-10-CM

## 2021-01-05 DIAGNOSIS — M94.9 DISORDER OF BONE AND CARTILAGE: ICD-10-CM

## 2021-01-05 PROCEDURE — 77080 DXA BONE DENSITY AXIAL: CPT | Performed by: INTERNAL MEDICINE

## 2021-01-10 NOTE — PROGRESS NOTES
"Keely is a 65 year old who is being evaluated via a billable video visit.      How would you like to obtain your AVS? MyChart  If the video visit is dropped, the invitation should be resent by: Patient would prefer a phone call if the video visit drops.   Will anyone else be joining your video visit? No     Vitals - Patient Reported  Weight (Patient Reported): 67.1 kg (148 lb)  Height (Patient Reported): 172.7 cm (5' 8\")  BMI (Based on Pt Reported Ht/Wt): 22.5  Pain Score: No Pain (0)    Melissa Maradiaga CMA January 11, 2021  3:57 PM     Video-Visit Details    Type of service:  Video Visit - Video visit via [employer] was attempted.  Patient stated she could not get past the test website.  On further questioning they were using [employer] as a web browser.  Telephone visit was ultimately conducted.    Total time of telephone visit:  25 minutes, an additional 15 minutes was spent reviewing imaging and in documentation.    Originating Location (pt. Location): Home    Distant Location (provider location):  [facility]     Platform used for Video Visit: [employer]     Oncology Follow Up:  Date on this visit: 1/11/2021    Diagnosis:  [id number], grade I, ER positive, NC positive, HER-2 nonamplified invasive ductal carcinoma of the right breast. Oncotype DX recurrence score = 7.    Primary Physician: Sammie Cerna     History Of Present Illness:  Ms. Velazquez is a 65 year old female with right breast cancer.  Routine screening mammogram in 06/2018 was without concerning findings. Due to high breast density, her gynecologist recommended a breast MRI.  Breast MRI on 08/16/2018 showed nodular to non-mass enhancement measuring 1.6 cm in the right breast at 5 o'clock.    Right breast ultrasound confirmed a mass at 5 o'clock.  Right breast biopsy demonstrated a grade 1 invasive ductal carcinoma.  Estrogen-receptor staining was moderate in 95%; progesterone-receptor staining was strong in 96%.  HER-2 was nonamplified " by immunohistochemistry with a score of 1+.  Right breast lumpectomy and sentinel lymph node procedure on 8/31/2018 demonstrated a 1.2 cm, grade 1 invasive mammary carcinoma.  There was associated intermediate-grade DCIS.  Surgical margins were negative; however, DCIS was 1 mm from the anterior margin.  A single sentinel lymph node was negative for malignancy. Oncotype DX recurrence score was 7.  She completed adjuvant radiation (4240 cGy to the whole breast and additional 1250 cGy to the lumpectomy site) on 11/8/18.  She has been on anastrozole since 9/24/18.     Interval History:  Ms. Arana was contacted today for routine breast cancer follow-up.  A video visit was attempted however was unsuccessful.  She and her  stated they were using [employer] as a browser and this may have been the reason why the video visit did not work.  She has been in good health over the last few months.  She has chronic left hip pain which comes and goes.  It is unchanged from previous.  She denies new bone or joint aches or pains.  She denies concerning breast pain, swelling, or masses.  She continues on treatment with anastrozole and is tolerating the medication well.  She denies significant hot flashes or vaginal dryness.  She continues to walk daily.  She does report she has gained a few pounds over the holidays.  She denies current abdominal complaints.  She has no fevers, chills, cough, shortness of breath, or chest pain.  The remainder of a complete 12 point review of systems was reviewed with the patient was negative with exception of that mentioned above.    Past Medical/Surgical History:  Past Medical History:   Diagnosis Date     Hypertension      Iritis      Past Surgical History:   Procedure Laterality Date     CATARACT IOL, RT/LT Left 2006     HYSTERECTOMY  2016     LUMPECTOMY BREAST WITH SENTINEL NODE, COMBINED Right 8/31/2018    Procedure: COMBINED LUMPECTOMY BREAST WITH SENTINEL NODE;  Right Wire Localized  Lumpectomy and Right [place] Lymph Node Biopsy;  Surgeon: Chilango Kruger MD;  Location: [facility] OR     OOPHORECTOMY  2014     Allergies:  Allergies as of 01/11/2021 - Reviewed 09/14/2020   Allergen Reaction Noted     Corticosteroids Dermatitis 03/18/2019     Neomycin Dermatitis 03/18/2019     Codeine Nausea and Vomiting 08/06/2015     Macrobid [nitrofurantoin] Nausea and Vomiting 08/06/2015     Sulfa drugs Itching 08/06/2015     Silver Rash 12/03/2018     Current Medications:  Current Outpatient Medications   Medication Sig Dispense Refill     acetaminophen (TYLENOL) 325 MG tablet Take 2 tablets (650 mg) by mouth every 4 hours as needed for mild pain 60 tablet 0     amLODIPine (NORVASC) 5 MG tablet Take 5 mg by mouth daily       anastrozole (ARIMIDEX) 1 MG tablet Take 1 tablet (1 mg) by mouth daily 30 tablet 11     atorvastatin (LIPITOR) 10 MG tablet Take 10 mg by mouth daily       biotin 1000 MCG TABS tablet Every day 1 Tabl 30 tablet 1     calcium carbonate (OS-PADDY 500 MG [place]. [place]) 500 MG tablet Take 500 mg by mouth 2 times daily With Magnesium,Zinc       desonide (DESOWEN) 0.05 % cream        LYSINE PO        mometasone (ELOCON) 0.1 % external cream Apply topically daily 15 g 1     multivitamin, therapeutic with minerals (MULTI-VITAMIN) TABS Take 1 tablet by mouth daily       Urea 40 % CREA Apply to fingernails twice daily, especially at bedtime.       VITAMIN D, CHOLECALCIFEROL, PO Take 1,000 Units by mouth daily         Family and Social History:  Reviewed and unchanged from prior.  Please see initial consultation dated 9/24/18 for further details.    Physical Exam:  A comprehensive physical examination is deferred due to PHE (public health emergency) telephone visit restrictions.    Laboratory/Imaging Studies:  1/5/2021 DEXA bone density scan:  Lowest T-score = -2.0    ASSESSMENT/PLAN:  Ms. Velazquez is a 65-year-old female with a h/o stage IA, T1c N0 M0, grade 1, ER positive, MA positive, HER2 non-amplified invasive  mammary carcinoma of the right breast.  She is status post treatment with right breast lumpectomy and radiation.  On anastrozole since 9/24/18.    1.  Right breast cancer:   Ms. Velazquez is approximately 2 years, 4.5 months out from excision of a right breast cancer.  She continues on anastrozole.  We plan to continue anastrozole to complete a minimum 5 year course.      She is asymptomatic of disease recurrence on history taken today.  Due to the fact that her initial breast cancer was not detectable by mammogram, increased breast density and close DCIS margins, we are performing high-risk screening with both mammogram and breast MRI until she is 5 years out from diagnosis of her breast cancer.  She will be due for mammograms in March, we will help her to schedule this.  I will see Ms. Velazquez back in clinic in 4 months.      2.  Bone Health:  DEXA performed last week was reviewed and showed a lowest T-score of -2.0 which is c/w osteopenia and significantly decreased from DEXA two years ago.  I recommend initiating treatment with Zometa 4 mg IV every 6 months.  We discussed Zometa will help prevent AI induced bone loss as well as prevent breast cancer bone metastases.  We reviewed the potential side effects of Zometa including arthralgias, headaches, fevers, chills, and risk of osteonecrosis of the jaw.  She is agreeable to a course of Zometa.    I also recommend she continue calcium 1200 mg, vitamin D 1000 international units and walk 30 minutes daily.      3.  Routine health maintenance:  Colonoscopy in 2015 was without concerning finding, next due in 2025.  Annual cholesterol panel to monitor for hypertriglyceridemia on AI therapy.    4.  Follow Up:  Zometa infusion within one month.  Bilateral screening mammograms 3/16/2021 or later.  Return to clinic in approximately 4 months for in person visit with me.

## 2021-01-11 ENCOUNTER — VIRTUAL VISIT (OUTPATIENT)
Dept: ONCOLOGY | Facility: CLINIC | Age: 66
End: 2021-01-11
Attending: INTERNAL MEDICINE
Payer: MEDICARE

## 2021-01-11 DIAGNOSIS — C50.311 MALIGNANT NEOPLASM OF LOWER-INNER QUADRANT OF RIGHT BREAST OF FEMALE, ESTROGEN RECEPTOR POSITIVE (H): Primary | ICD-10-CM

## 2021-01-11 DIAGNOSIS — C50.411 MALIGNANT NEOPLASM OF UPPER-OUTER QUADRANT OF RIGHT BREAST IN FEMALE, ESTROGEN RECEPTOR POSITIVE (H): ICD-10-CM

## 2021-01-11 DIAGNOSIS — R93.7 ABNORMAL BONE DENSITY SCREENING: ICD-10-CM

## 2021-01-11 DIAGNOSIS — Z79.811 LONG TERM (CURRENT) USE OF AROMATASE INHIBITORS: ICD-10-CM

## 2021-01-11 DIAGNOSIS — Z12.31 VISIT FOR SCREENING MAMMOGRAM: ICD-10-CM

## 2021-01-11 DIAGNOSIS — Z17.0 MALIGNANT NEOPLASM OF LOWER-INNER QUADRANT OF RIGHT BREAST OF FEMALE, ESTROGEN RECEPTOR POSITIVE (H): Primary | ICD-10-CM

## 2021-01-11 DIAGNOSIS — Z17.0 MALIGNANT NEOPLASM OF UPPER-OUTER QUADRANT OF RIGHT BREAST IN FEMALE, ESTROGEN RECEPTOR POSITIVE (H): ICD-10-CM

## 2021-01-11 PROCEDURE — 99443 PR PHYSICIAN TELEPHONE EVALUATION 21-30 MIN: CPT | Mod: 95 | Performed by: INTERNAL MEDICINE

## 2021-01-11 NOTE — LETTER
"    1/11/2021         RE: Keely Arana  [address]  [facility] [id number]        Dear Colleague,    Thank you for referring your patient, Keely Arana, to the [facility]. Please see a copy of my visit note below.    Keely is a 65 year old who is being evaluated via a billable video visit.      How would you like to obtain your AVS? MyChart  If the video visit is dropped, the invitation should be resent by: Patient would prefer a phone call if the video visit drops.   Will anyone else be joining your video visit? No     Vitals - Patient Reported  Weight (Patient Reported): 67.1 kg (148 lb)  Height (Patient Reported): 172.7 cm (5' 8\")  BMI (Based on Pt Reported Ht/Wt): 22.5  Pain Score: No Pain (0)    Melissa Maradiaga CMA January 11, 2021  3:57 PM     Video-Visit Details    Type of service:  Video Visit - Video visit via [employer] was attempted.  Patient stated she could not get past the test website.  On further questioning they were using [employer] as a web browser.  Telephone visit was ultimately conducted.    Total time of telephone visit:  25 minutes, an additional 15 minutes was spent reviewing imaging and in documentation.    Originating Location (pt. Location): Home    Distant Location (provider location):  [facility]     Platform used for Video Visit: [employer]     Oncology Follow Up:  Date on this visit: 1/11/2021    Diagnosis:  [id number], grade I, ER positive, ME positive, HER-2 nonamplified invasive ductal carcinoma of the right breast. Oncotype DX recurrence score = 7.    Primary Physician: Sammie Cerna     History Of Present Illness:  Ms. Velazquez is a 65 year old female with right breast cancer.  Routine screening mammogram in 06/2018 was without concerning findings. Due to high breast density, her gynecologist recommended a breast MRI.  Breast MRI on 08/16/2018 showed nodular to non-mass enhancement measuring 1.6 cm in the right breast at 5 " o'clock.    Right breast ultrasound confirmed a mass at 5 o'clock.  Right breast biopsy demonstrated a grade 1 invasive ductal carcinoma.  Estrogen-receptor staining was moderate in 95%; progesterone-receptor staining was strong in 96%.  HER-2 was nonamplified by immunohistochemistry with a score of 1+.  Right breast lumpectomy and sentinel lymph node procedure on 8/31/2018 demonstrated a 1.2 cm, grade 1 invasive mammary carcinoma.  There was associated intermediate-grade DCIS.  Surgical margins were negative; however, DCIS was 1 mm from the anterior margin.  A single sentinel lymph node was negative for malignancy. Oncotype DX recurrence score was 7.  She completed adjuvant radiation (4240 cGy to the whole breast and additional 1250 cGy to the lumpectomy site) on 11/8/18.  She has been on anastrozole since 9/24/18.     Interval History:  Ms. Arana was contacted today for routine breast cancer follow-up.  A video visit was attempted however was unsuccessful.  She and her  stated they were using [employer] as a browser and this may have been the reason why the video visit did not work.  She has been in good health over the last few months.  She has chronic left hip pain which comes and goes.  It is unchanged from previous.  She denies new bone or joint aches or pains.  She denies concerning breast pain, swelling, or masses.  She continues on treatment with anastrozole and is tolerating the medication well.  She denies significant hot flashes or vaginal dryness.  She continues to walk daily.  She does report she has gained a few pounds over the holidays.  She denies current abdominal complaints.  She has no fevers, chills, cough, shortness of breath, or chest pain.  The remainder of a complete 12 point review of systems was reviewed with the patient was negative with exception of that mentioned above.    Past Medical/Surgical History:  Past Medical History:   Diagnosis Date     Hypertension      Iritis      Past  Surgical History:   Procedure Laterality Date     CATARACT IOL, RT/LT Left 2006     HYSTERECTOMY  2016     LUMPECTOMY BREAST WITH SENTINEL NODE, COMBINED Right 8/31/2018    Procedure: COMBINED LUMPECTOMY BREAST WITH SENTINEL NODE;  Right Wire Localized Lumpectomy and Right [place] Lymph Node Biopsy;  Surgeon: Chilango Kruger MD;  Location: [facility] OR     OOPHORECTOMY  2014     Allergies:  Allergies as of 01/11/2021 - Reviewed 09/14/2020   Allergen Reaction Noted     Corticosteroids Dermatitis 03/18/2019     Neomycin Dermatitis 03/18/2019     Codeine Nausea and Vomiting 08/06/2015     Macrobid [nitrofurantoin] Nausea and Vomiting 08/06/2015     Sulfa drugs Itching 08/06/2015     Silver Rash 12/03/2018     Current Medications:  Current Outpatient Medications   Medication Sig Dispense Refill     acetaminophen (TYLENOL) 325 MG tablet Take 2 tablets (650 mg) by mouth every 4 hours as needed for mild pain 60 tablet 0     amLODIPine (NORVASC) 5 MG tablet Take 5 mg by mouth daily       anastrozole (ARIMIDEX) 1 MG tablet Take 1 tablet (1 mg) by mouth daily 30 tablet 11     atorvastatin (LIPITOR) 10 MG tablet Take 10 mg by mouth daily       biotin 1000 MCG TABS tablet Every day 1 Tabl 30 tablet 1     calcium carbonate (OS-PADDY 500 MG [place]. [place]) 500 MG tablet Take 500 mg by mouth 2 times daily With Magnesium,Zinc       desonide (DESOWEN) 0.05 % cream        LYSINE PO        mometasone (ELOCON) 0.1 % external cream Apply topically daily 15 g 1     multivitamin, therapeutic with minerals (MULTI-VITAMIN) TABS Take 1 tablet by mouth daily       Urea 40 % CREA Apply to fingernails twice daily, especially at bedtime.       VITAMIN D, CHOLECALCIFEROL, PO Take 1,000 Units by mouth daily         Family and Social History:  Reviewed and unchanged from prior.  Please see initial consultation dated 9/24/18 for further details.    Physical Exam:  A comprehensive physical examination is deferred due to PHE (public health emergency) telephone  visit restrictions.    Laboratory/Imaging Studies:  1/5/2021 DEXA bone density scan:  Lowest T-score = -2.0    ASSESSMENT/PLAN:  Ms. Velazquez is a 65-year-old female with a h/o stage IA, T1c N0 M0, grade 1, ER positive, AL positive, HER2 non-amplified invasive mammary carcinoma of the right breast.  She is status post treatment with right breast lumpectomy and radiation.  On anastrozole since 9/24/18.    1.  Right breast cancer:   Ms. Velazquez is approximately 2 years, 4.5 months out from excision of a right breast cancer.  She continues on anastrozole.  We plan to continue anastrozole to complete a minimum 5 year course.      She is asymptomatic of disease recurrence on history taken today.  Due to the fact that her initial breast cancer was not detectable by mammogram, increased breast density and close DCIS margins, we are performing high-risk screening with both mammogram and breast MRI until she is 5 years out from diagnosis of her breast cancer.  She will be due for mammograms in March, we will help her to schedule this.  I will see Ms. Velazquez back in clinic in 4 months.      2.  Bone Health:  DEXA performed last week was reviewed and showed a lowest T-score of -2.0 which is c/w osteopenia and significantly decreased from DEXA two years ago.  I recommend initiating treatment with Zometa 4 mg IV every 6 months.  We discussed Zometa will help prevent AI induced bone loss as well as prevent breast cancer bone metastases.  We reviewed the potential side effects of Zometa including arthralgias, headaches, fevers, chills, and risk of osteonecrosis of the jaw.  She is agreeable to a course of Zometa.    I also recommend she continue calcium 1200 mg, vitamin D 1000 international units and walk 30 minutes daily.      3.  Routine health maintenance:  Colonoscopy in 2015 was without concerning finding, next due in 2025.  Annual cholesterol panel to monitor for hypertriglyceridemia on AI therapy.    4.  Follow Up:  Zometa infusion  within one month.  Bilateral screening mammograms 3/16/2021 or later.  Return to clinic in approximately 4 months for in person visit with me.                Again, thank you for allowing me to participate in the care of your patient.        Sincerely,        Merari Tafoya MD

## 2021-01-15 ENCOUNTER — HEALTH MAINTENANCE LETTER (OUTPATIENT)
Age: 66
End: 2021-01-15

## 2021-01-15 PROBLEM — Z79.811 LONG TERM (CURRENT) USE OF AROMATASE INHIBITORS: Status: ACTIVE | Noted: 2021-01-15

## 2021-01-15 PROBLEM — R93.7 ABNORMAL BONE DENSITY SCREENING: Status: ACTIVE | Noted: 2021-01-15

## 2021-01-15 RX ORDER — ZOLEDRONIC ACID 0.04 MG/ML
4 INJECTION, SOLUTION INTRAVENOUS ONCE
Status: CANCELLED | OUTPATIENT
Start: 2021-01-22 | End: 2021-01-22

## 2021-01-15 RX ORDER — HEPARIN SODIUM (PORCINE) LOCK FLUSH IV SOLN 100 UNIT/ML 100 UNIT/ML
5 SOLUTION INTRAVENOUS
Status: CANCELLED
Start: 2021-01-22

## 2021-01-15 RX ORDER — HEPARIN SODIUM,PORCINE 10 UNIT/ML
5 VIAL (ML) INTRAVENOUS
Status: CANCELLED
Start: 2021-01-22

## 2021-01-19 DIAGNOSIS — R93.7 ABNORMAL BONE DENSITY SCREENING: Primary | ICD-10-CM

## 2021-01-19 DIAGNOSIS — C50.411 MALIGNANT NEOPLASM OF UPPER-OUTER QUADRANT OF RIGHT BREAST IN FEMALE, ESTROGEN RECEPTOR POSITIVE (H): ICD-10-CM

## 2021-01-19 DIAGNOSIS — Z17.0 MALIGNANT NEOPLASM OF UPPER-OUTER QUADRANT OF RIGHT BREAST IN FEMALE, ESTROGEN RECEPTOR POSITIVE (H): ICD-10-CM

## 2021-01-19 DIAGNOSIS — Z79.811 LONG TERM (CURRENT) USE OF AROMATASE INHIBITORS: ICD-10-CM

## 2021-01-21 ENCOUNTER — PATIENT OUTREACH (OUTPATIENT)
Dept: ONCOLOGY | Facility: CLINIC | Age: 66
End: 2021-01-21

## 2021-01-21 DIAGNOSIS — C50.411 MALIGNANT NEOPLASM OF UPPER-OUTER QUADRANT OF RIGHT BREAST IN FEMALE, ESTROGEN RECEPTOR POSITIVE (H): ICD-10-CM

## 2021-01-21 DIAGNOSIS — Z17.0 MALIGNANT NEOPLASM OF UPPER-OUTER QUADRANT OF RIGHT BREAST IN FEMALE, ESTROGEN RECEPTOR POSITIVE (H): ICD-10-CM

## 2021-01-21 DIAGNOSIS — Z01.812 ENCOUNTER FOR PREPROCEDURE SCREENING LABORATORY TESTING FOR COVID-19: Primary | ICD-10-CM

## 2021-01-21 DIAGNOSIS — Z01.812 ENCOUNTER FOR PREPROCEDURE SCREENING LABORATORY TESTING FOR COVID-19: ICD-10-CM

## 2021-01-21 DIAGNOSIS — Z11.52 ENCOUNTER FOR PREPROCEDURE SCREENING LABORATORY TESTING FOR COVID-19: ICD-10-CM

## 2021-01-21 DIAGNOSIS — Z11.52 ENCOUNTER FOR PREPROCEDURE SCREENING LABORATORY TESTING FOR COVID-19: Primary | ICD-10-CM

## 2021-01-21 LAB
ALBUMIN SERPL-MCNC: 4.1 G/DL (ref 3.4–5)
CALCIUM SERPL-MCNC: 9.7 MG/DL (ref 8.5–10.1)
CREAT SERPL-MCNC: 0.69 MG/DL (ref 0.52–1.04)
GFR SERPL CREATININE-BSD FRML MDRD: >90 ML/MIN/{1.73_M2}
LABORATORY COMMENT REPORT: NORMAL
SARS-COV-2 RNA RESP QL NAA+PROBE: NEGATIVE
SARS-COV-2 RNA RESP QL NAA+PROBE: NORMAL
SPECIMEN SOURCE: NORMAL
SPECIMEN SOURCE: NORMAL

## 2021-01-21 PROCEDURE — 82040 ASSAY OF SERUM ALBUMIN: CPT | Performed by: PATHOLOGY

## 2021-01-21 PROCEDURE — 82565 ASSAY OF CREATININE: CPT | Performed by: PATHOLOGY

## 2021-01-21 PROCEDURE — 82310 ASSAY OF CALCIUM: CPT | Performed by: PATHOLOGY

## 2021-01-21 PROCEDURE — U0003 INFECTIOUS AGENT DETECTION BY NUCLEIC ACID (DNA OR RNA); SEVERE ACUTE RESPIRATORY SYNDROME CORONAVIRUS 2 (SARS-COV-2) (CORONAVIRUS DISEASE [COVID-19]), AMPLIFIED PROBE TECHNIQUE, MAKING USE OF HIGH THROUGHPUT TECHNOLOGIES AS DESCRIBED BY CMS-2020-01-R: HCPCS | Performed by: PATHOLOGY

## 2021-01-21 PROCEDURE — U0005 INFEC AGEN DETEC AMPLI PROBE: HCPCS | Performed by: PATHOLOGY

## 2021-01-21 PROCEDURE — 36415 COLL VENOUS BLD VENIPUNCTURE: CPT | Performed by: PATHOLOGY

## 2021-01-22 ENCOUNTER — INFUSION THERAPY VISIT (OUTPATIENT)
Dept: ONCOLOGY | Facility: CLINIC | Age: 66
End: 2021-01-22
Payer: MEDICARE

## 2021-01-22 VITALS
RESPIRATION RATE: 16 BRPM | DIASTOLIC BLOOD PRESSURE: 72 MMHG | SYSTOLIC BLOOD PRESSURE: 132 MMHG | TEMPERATURE: 98.2 F | OXYGEN SATURATION: 100 % | HEART RATE: 75 BPM

## 2021-01-22 DIAGNOSIS — Z17.0 MALIGNANT NEOPLASM OF UPPER-OUTER QUADRANT OF RIGHT BREAST IN FEMALE, ESTROGEN RECEPTOR POSITIVE (H): ICD-10-CM

## 2021-01-22 DIAGNOSIS — Z79.811 LONG TERM (CURRENT) USE OF AROMATASE INHIBITORS: ICD-10-CM

## 2021-01-22 DIAGNOSIS — C50.411 MALIGNANT NEOPLASM OF UPPER-OUTER QUADRANT OF RIGHT BREAST IN FEMALE, ESTROGEN RECEPTOR POSITIVE (H): ICD-10-CM

## 2021-01-22 DIAGNOSIS — R93.7 ABNORMAL BONE DENSITY SCREENING: Primary | ICD-10-CM

## 2021-01-22 PROCEDURE — 96365 THER/PROPH/DIAG IV INF INIT: CPT

## 2021-01-22 PROCEDURE — 258N000003 HC RX IP 258 OP 636: Performed by: INTERNAL MEDICINE

## 2021-01-22 PROCEDURE — 250N000011 HC RX IP 250 OP 636: Performed by: INTERNAL MEDICINE

## 2021-01-22 RX ORDER — ZOLEDRONIC ACID 0.04 MG/ML
4 INJECTION, SOLUTION INTRAVENOUS ONCE
Status: COMPLETED | OUTPATIENT
Start: 2021-01-22 | End: 2021-01-22

## 2021-01-22 RX ADMIN — ZOLEDRONIC ACID 4 MG: 0.04 INJECTION, SOLUTION INTRAVENOUS at 09:09

## 2021-01-22 RX ADMIN — SODIUM CHLORIDE 250 ML: 9 INJECTION, SOLUTION INTRAVENOUS at 09:08

## 2021-01-22 ASSESSMENT — PAIN SCALES - GENERAL: PAINLEVEL: NO PAIN (0)

## 2021-01-22 NOTE — PROGRESS NOTES
Infusion Nursing Note:  Keely Arana presents today for Zometa.    Patient seen by provider today: No   present during visit today: Not Applicable.    Note: Patient is new to the infusion room today and is receiving zometa for the first time.  Patient oriented to infusion room, location of bathrooms and nutrition stations, and call light. Verbally reviewed teaching, side effects, and follow-up schedule with patient. Patient instructed to call triage with any questions.      Patient had negative covid-19 test on 1/21/21.    Intravenous Access:  Peripheral IV placed.    Treatment Conditions:    Lab Results   Component Value Date    CR 0.69 01/21/2021                   Lab Results   Component Value Date    PADDY 9.7 01/21/2021                No results found for: BILITOTAL        Lab Results   Component Value Date    ALBUMIN 4.1 01/21/2021                        Post Infusion Assessment:  Patient tolerated infusion without incident.  Blood return noted pre and post infusion.  Site patent and intact, free from redness, edema or discomfort.  No evidence of extravasations.  Access discontinued per protocol.       Discharge Plan:   Patient declined prescription refills.  Discharge instructions reviewed with: Patient.  Patient and/or family verbalized understanding of discharge instructions and all questions answered.  AVS to patient via [employer].  Patient will return in about 6 months (IB sent to scheduling) for next appointment.   Patient discharged in stable condition accompanied by: self.  Departure Mode: Ambulatory.    Melissa Heath RN

## 2021-03-17 ENCOUNTER — ANCILLARY PROCEDURE (OUTPATIENT)
Dept: MAMMOGRAPHY | Facility: CLINIC | Age: 66
End: 2021-03-17
Attending: INTERNAL MEDICINE
Payer: MEDICARE

## 2021-03-17 DIAGNOSIS — Z12.31 VISIT FOR SCREENING MAMMOGRAM: ICD-10-CM

## 2021-03-17 PROCEDURE — 77063 BREAST TOMOSYNTHESIS BI: CPT | Mod: GC | Performed by: STUDENT IN AN ORGANIZED HEALTH CARE EDUCATION/TRAINING PROGRAM

## 2021-03-17 PROCEDURE — 77067 SCR MAMMO BI INCL CAD: CPT | Mod: GC | Performed by: STUDENT IN AN ORGANIZED HEALTH CARE EDUCATION/TRAINING PROGRAM

## 2021-03-24 ENCOUNTER — ANCILLARY PROCEDURE (OUTPATIENT)
Dept: MAMMOGRAPHY | Facility: CLINIC | Age: 66
End: 2021-03-24
Attending: INTERNAL MEDICINE
Payer: MEDICARE

## 2021-03-24 DIAGNOSIS — R92.8 ABNORMAL MAMMOGRAM OF LEFT BREAST: ICD-10-CM

## 2021-03-24 PROCEDURE — 77065 DX MAMMO INCL CAD UNI: CPT | Performed by: RADIOLOGY

## 2021-03-24 PROCEDURE — 76642 ULTRASOUND BREAST LIMITED: CPT | Mod: LT | Performed by: RADIOLOGY

## 2021-03-24 PROCEDURE — G0279 TOMOSYNTHESIS, MAMMO: HCPCS | Performed by: RADIOLOGY

## 2021-05-02 RX ORDER — HEPARIN SODIUM,PORCINE 10 UNIT/ML
5 VIAL (ML) INTRAVENOUS
Status: CANCELLED
Start: 2021-07-21

## 2021-05-02 RX ORDER — HEPARIN SODIUM (PORCINE) LOCK FLUSH IV SOLN 100 UNIT/ML 100 UNIT/ML
5 SOLUTION INTRAVENOUS
Status: CANCELLED
Start: 2021-07-21

## 2021-05-02 RX ORDER — ZOLEDRONIC ACID 0.04 MG/ML
4 INJECTION, SOLUTION INTRAVENOUS ONCE
Status: CANCELLED | OUTPATIENT
Start: 2021-07-21 | End: 2021-07-21

## 2021-05-02 NOTE — PROGRESS NOTES
Oncology Follow Up:  Date on this visit: 5/4/2021    Diagnosis:  [id number], grade I, ER positive, VT positive, HER-2 nonamplified invasive ductal carcinoma of the right breast. Oncotype DX recurrence score = 7.    Primary Physician: Sammie Cerna     History Of Present Illness:  Ms. Velazquez is a 65 year old female with right breast cancer.  Routine screening mammogram in 06/2018 was without concerning findings. Due to high breast density, her gynecologist recommended a breast MRI.  Breast MRI on 08/16/2018 showed nodular to non-mass enhancement measuring 1.6 cm in the right breast at 5 o'clock.    Right breast ultrasound confirmed a mass at 5 o'clock.  Right breast biopsy demonstrated a grade 1 invasive ductal carcinoma.  Estrogen-receptor staining was moderate in 95%; progesterone-receptor staining was strong in 96%.  HER-2 was nonamplified by immunohistochemistry with a score of 1+.  Right breast lumpectomy and sentinel lymph node procedure on 8/31/2018 demonstrated a 1.2 cm, grade 1 invasive mammary carcinoma.  There was associated intermediate-grade DCIS.  Surgical margins were negative; however, DCIS was 1 mm from the anterior margin.  A single sentinel lymph node was negative for malignancy. Oncotype DX recurrence score was 7.  She completed adjuvant radiation (4240 cGy to the whole breast and additional 1250 cGy to the lumpectomy site) on 11/8/18.  She has been on anastrozole since 9/24/18.     Interval History:  Ms. Velazquez was seen in person today for routine breast cancer follow-up.  She continues on treatment with anastrozole and is overall tolerating the medication well.  She specifically denies hot flashes or vaginal symptoms.  She has chronic bilateral knee discomfort that is worse with climbing stairs.  She also has chronic left hip pain that is unchanged from previous.  She has been very active kayaking and doing yard work.  She states according to our scale she has gained weight.  She is concerned about  this.  When weighed at [facility] where her primary care physician is and when weight at home she weighs less than she weighs here.  She states she tries to adhere to a low carb diet.  She tries watch portion sizes.  She denies concerning lumps, pain, or swelling of either breast.  She states her call back on her mammogram in March did cause her some anxiety.  She was relieved that it ultimately was without concern.  She denies cough, shortness of breath, or chest pain.  She has no current abdominal complaints.  She has received her Covid vaccination and has started spending more time with family.  The remainder of a complete 12 point review of systems is reviewed with the patient was negative with exception that mentioned above.    Past Medical/Surgical History:  Past Medical History:   Diagnosis Date     Hypertension      Iritis      Past Surgical History:   Procedure Laterality Date     CATARACT IOL, RT/LT Left 2006     HYSTERECTOMY  2016     LUMPECTOMY BREAST WITH SENTINEL NODE, COMBINED Right 8/31/2018    Procedure: COMBINED LUMPECTOMY BREAST WITH SENTINEL NODE;  Right Wire Localized Lumpectomy and Right [place] Lymph Node Biopsy;  Surgeon: Chilango Kruger MD;  Location: [facility]     OOPHORECTOMY  2014     Allergies:  Allergies as of 05/04/2021 - Reviewed 01/22/2021   Allergen Reaction Noted     Corticosteroids Dermatitis 03/18/2019     Neomycin Dermatitis 03/18/2019     Codeine Nausea and Vomiting 08/06/2015     Macrobid [nitrofurantoin] Nausea and Vomiting 08/06/2015     Sulfa drugs Itching 08/06/2015     Silver Rash 12/03/2018     Current Medications:  Current Outpatient Medications   Medication Sig Dispense Refill     amLODIPine (NORVASC) 5 MG tablet Take 5 mg by mouth daily       anastrozole (ARIMIDEX) 1 MG tablet Take 1 tablet (1 mg) by mouth daily 30 tablet 11     atorvastatin (LIPITOR) 10 MG tablet Take 10 mg by mouth daily       biotin 1000 MCG TABS tablet Every day 1 Tabl 30 tablet 1     calcium carbonate  "(OS-PADDY 500 MG [place]. [place]) 500 MG tablet Take 500 mg by mouth 2 times daily With Magnesium,Zinc       LYSINE PO        multivitamin, therapeutic with minerals (MULTI-VITAMIN) TABS Take 1 tablet by mouth daily       UNABLE TO FIND MEDICATION NAME: [employer])       VITAMIN D, CHOLECALCIFEROL, PO Take 1,000 Units by mouth daily         Family and Social History:  Reviewed and unchanged from prior.  Please see initial consultation dated 9/24/18 for further details.    Physical Exam:  /86   Pulse 72   Temp 97.9  F (36.6  C) (Oral)   Resp 16   Ht 1.7 m (5' 6.93\")   Wt 73.5 kg (162 lb 1.6 oz)   SpO2 96%   BMI 25.44 kg/m    General:  Well appearing adult female in NAD.  Alert and oriented.  HEENT:  Normocephalic.  Sclera anicteric.  MMM.  No lesions of the oropharynx.  Lymph:  No palpable cervical, supraclavicular, or axillary LAD.  Chest:  CTA bilaterally.  No wheezes or crackles.  CV:  RRR.  Nl S1 and S2.  No m/r/g.  Breast:  Bilateral breasts are of increased fibroglandular density.  There are no dominant palpable masses in either breast.  Bilateral nipples are inverted.  Abd:  Soft/ND.   Ext:  No pitting edema of the bilateral lower extremities.   Musculo:  Full ROM of the bilateral upper extremities.  Neuro:  Cranial nerves grossly intact.  Psych:  Mood and affect appear normal.      Laboratory/Imaging Studies:  3/17/2021 Bilateral screening mammogram:  - Possible left breast subareolar focal asymmetry.  No significant change right breast.    3/24/2021 Left breast diagnostic mammogram:  The previously described focal asymmetry in the subareolar left breast largely effaces with spot compression.     3/24/2021 Left breast ultrasound:  No suspicious finding in the subareolar left breast.    4/20/2021 Labs:  Cholesterol 144  Triglycerides 55    Electrolytes and creatinine are wnl    ASSESSMENT/PLAN:  Ms. Velazquez is a 65-year-old female with a h/o stage IA, T1c N0 M0, grade 1, ER positive, LA positive, HER2 " non-amplified invasive mammary carcinoma of the right breast.  She is status post treatment with right breast lumpectomy and radiation.  On anastrozole since 9/24/18.    1.  Right breast cancer:   Ms. Velazquez is approximately 2 years, 8 months out from excision of a right breast cancer.  She continues on anastrozole.  We plan to continue anastrozole to complete a minimum 5 year course.      She is asymptomatic of disease recurrence on history taken today.  Due to the fact that her initial breast cancer was not detectable by mammogram, increased breast density and close DCIS margins, we are performing high-risk screening with both mammogram and breast MRI until she is 5 years out from diagnosis of her breast cancer.  We reviewed mammogram images from 03/2021 together in clinic today which ultimately showed no concerning findings.  Will plan to perform breast MRI in September.  I will see Ms. Velazquez back in clinic in 4 months.      2.  Bone Health:  DEXA in 01/2021 with a lowest T-score of -2.0 which is c/w osteopenia.  She began treatment with Zometa on 1/22/2021 both to prevent AI induced bone loss as well as prevent breast cancer bone metastases.  Next dose is due 7/21/2021.      I also recommend she continue calcium 1200 mg, vitamin D 1000 international units and walk 30 minutes daily.      3.  Routine health maintenance:  Colonoscopy in 2015 was without concerning finding, next due in 2025.  Annual cholesterol panel to monitor for hypertriglyceridemia on AI therapy.  She had cholesterol panel drawn at [facility] on 4/20/2021 and triglycerides were well wnl at 55.    4.  Follow Up:  Labs and Zometa infusion around 7/21/2021.  Breast MRI and in person visit with me around mid-September.    30 minutes spent on the date of the encounter doing chart review, review of outside records, review of test results, interpretation of tests, patient visit and documentation

## 2021-05-04 ENCOUNTER — ONCOLOGY VISIT (OUTPATIENT)
Dept: ONCOLOGY | Facility: CLINIC | Age: 66
End: 2021-05-04
Payer: MEDICARE

## 2021-05-04 VITALS
BODY MASS INDEX: 25.44 KG/M2 | DIASTOLIC BLOOD PRESSURE: 86 MMHG | WEIGHT: 162.1 LBS | HEART RATE: 72 BPM | RESPIRATION RATE: 16 BRPM | SYSTOLIC BLOOD PRESSURE: 133 MMHG | HEIGHT: 67 IN | TEMPERATURE: 97.9 F | OXYGEN SATURATION: 96 %

## 2021-05-04 DIAGNOSIS — Z85.3 PERSONAL HISTORY OF MALIGNANT NEOPLASM OF BREAST: ICD-10-CM

## 2021-05-04 DIAGNOSIS — R93.7 ABNORMAL BONE DENSITY SCREENING: ICD-10-CM

## 2021-05-04 DIAGNOSIS — Z17.0 MALIGNANT NEOPLASM OF UPPER-OUTER QUADRANT OF RIGHT BREAST IN FEMALE, ESTROGEN RECEPTOR POSITIVE (H): Primary | ICD-10-CM

## 2021-05-04 DIAGNOSIS — Z12.39 BREAST CANCER SCREENING, HIGH RISK PATIENT: ICD-10-CM

## 2021-05-04 DIAGNOSIS — C50.411 MALIGNANT NEOPLASM OF UPPER-OUTER QUADRANT OF RIGHT BREAST IN FEMALE, ESTROGEN RECEPTOR POSITIVE (H): Primary | ICD-10-CM

## 2021-05-04 DIAGNOSIS — Z79.811 LONG TERM (CURRENT) USE OF AROMATASE INHIBITORS: ICD-10-CM

## 2021-05-04 DIAGNOSIS — R92.30 DENSE BREAST TISSUE ON MAMMOGRAM: ICD-10-CM

## 2021-05-04 PROCEDURE — G0463 HOSPITAL OUTPT CLINIC VISIT: HCPCS

## 2021-05-04 PROCEDURE — 99214 OFFICE O/P EST MOD 30 MIN: CPT | Performed by: INTERNAL MEDICINE

## 2021-05-04 RX ORDER — CHLORAL HYDRATE 500 MG
3000 CAPSULE ORAL
COMMUNITY
Start: 2021-04-20 | End: 2021-12-29

## 2021-05-04 ASSESSMENT — PAIN SCALES - GENERAL: PAINLEVEL: NO PAIN (0)

## 2021-05-04 ASSESSMENT — MIFFLIN-ST. JEOR: SCORE: 1306.78

## 2021-05-04 NOTE — NURSING NOTE
"Oncology Rooming Note    May 4, 2021 9:16 AM   Keely Arana is a 65 year old female who presents for:    Chief Complaint   Patient presents with     Oncology Clinic Visit     4 MONTH FOLLOW UP; Malignant neoplasm of upper-outer quad of right breast      Initial Vitals: /86   Pulse 72   Temp 97.9  F (36.6  C) (Oral)   Resp 16   Ht 1.7 m (5' 6.93\")   Wt 73.5 kg (162 lb 1.6 oz)   SpO2 96%   BMI 25.44 kg/m   Estimated body mass index is 25.44 kg/m  as calculated from the following:    Height as of this encounter: 1.7 m (5' 6.93\").    Weight as of this encounter: 73.5 kg (162 lb 1.6 oz). Body surface area is 1.86 meters squared.  No Pain (0) Comment: Data Unavailable   No LMP recorded. Patient is postmenopausal.  Allergies reviewed: Yes  Medications reviewed: Yes    Medications: Medication refills not needed today.  Pharmacy name entered into [employer]:    [id number] [place] [facility] - [place], [place] - [address] [place] [address] [facility] - [place], [place] - [address] [facility]    Clinical concerns: None       Amairani Lima MA            "

## 2021-06-24 DIAGNOSIS — C50.411 MALIGNANT NEOPLASM OF UPPER-OUTER QUADRANT OF RIGHT BREAST IN FEMALE, ESTROGEN RECEPTOR POSITIVE (H): ICD-10-CM

## 2021-06-24 DIAGNOSIS — Z17.0 MALIGNANT NEOPLASM OF UPPER-OUTER QUADRANT OF RIGHT BREAST IN FEMALE, ESTROGEN RECEPTOR POSITIVE (H): ICD-10-CM

## 2021-06-24 RX ORDER — ANASTROZOLE 1 MG/1
TABLET ORAL
Qty: 90 TABLET | Refills: 3 | Status: SHIPPED | OUTPATIENT
Start: 2021-06-24 | End: 2022-06-15

## 2021-06-24 NOTE — TELEPHONE ENCOUNTER
PLAN from 5/4/21 visit:  Right breast cancer:   Ms. Velazquez is approximately 2 years, 8 months out from excision of a right breast cancer.  She continues on anastrozole.  We plan to continue anastrozole to complete a minimum 5 year course.      Next follow up 9/13/21

## 2021-07-22 ENCOUNTER — APPOINTMENT (OUTPATIENT)
Dept: LAB | Facility: CLINIC | Age: 66
End: 2021-07-22
Payer: MEDICARE

## 2021-07-22 ENCOUNTER — INFUSION THERAPY VISIT (OUTPATIENT)
Dept: ONCOLOGY | Facility: CLINIC | Age: 66
End: 2021-07-22
Attending: INTERNAL MEDICINE
Payer: MEDICARE

## 2021-07-22 DIAGNOSIS — C50.411 MALIGNANT NEOPLASM OF UPPER-OUTER QUADRANT OF RIGHT BREAST IN FEMALE, ESTROGEN RECEPTOR POSITIVE (H): ICD-10-CM

## 2021-07-22 DIAGNOSIS — Z17.0 MALIGNANT NEOPLASM OF UPPER-OUTER QUADRANT OF RIGHT BREAST IN FEMALE, ESTROGEN RECEPTOR POSITIVE (H): ICD-10-CM

## 2021-07-22 DIAGNOSIS — R93.7 ABNORMAL BONE DENSITY SCREENING: Primary | ICD-10-CM

## 2021-07-22 DIAGNOSIS — Z79.811 LONG TERM (CURRENT) USE OF AROMATASE INHIBITORS: ICD-10-CM

## 2021-07-22 LAB
ALBUMIN SERPL-MCNC: 3.7 G/DL (ref 3.4–5)
CALCIUM SERPL-MCNC: 9.2 MG/DL (ref 8.5–10.1)
CREAT SERPL-MCNC: 0.8 MG/DL (ref 0.52–1.04)
GFR SERPL CREATININE-BSD FRML MDRD: 77 ML/MIN/1.73M2

## 2021-07-22 PROCEDURE — 82040 ASSAY OF SERUM ALBUMIN: CPT | Performed by: INTERNAL MEDICINE

## 2021-07-22 PROCEDURE — 82565 ASSAY OF CREATININE: CPT | Performed by: INTERNAL MEDICINE

## 2021-07-22 PROCEDURE — 36415 COLL VENOUS BLD VENIPUNCTURE: CPT | Performed by: INTERNAL MEDICINE

## 2021-07-22 PROCEDURE — 258N000003 HC RX IP 258 OP 636: Performed by: INTERNAL MEDICINE

## 2021-07-22 PROCEDURE — 96365 THER/PROPH/DIAG IV INF INIT: CPT

## 2021-07-22 PROCEDURE — 250N000011 HC RX IP 250 OP 636: Performed by: INTERNAL MEDICINE

## 2021-07-22 PROCEDURE — 82310 ASSAY OF CALCIUM: CPT | Performed by: INTERNAL MEDICINE

## 2021-07-22 RX ORDER — ZOLEDRONIC ACID 0.04 MG/ML
4 INJECTION, SOLUTION INTRAVENOUS ONCE
Status: COMPLETED | OUTPATIENT
Start: 2021-07-22 | End: 2021-07-22

## 2021-07-22 RX ADMIN — SODIUM CHLORIDE 250 ML: 9 INJECTION, SOLUTION INTRAVENOUS at 08:24

## 2021-07-22 RX ADMIN — ZOLEDRONIC ACID 4 MG: 0.04 INJECTION, SOLUTION INTRAVENOUS at 08:24

## 2021-07-22 NOTE — PROGRESS NOTES
"Infusion Nursing Note:  Keely Arana presents today for Zometa.    Patient seen by provider today: No   present during visit today: Not Applicable.    Note: Patient states she has been \"feeling good.\"  Offers no new concerns at this time.    Intravenous Access:  Peripheral IV placed.    Treatment Conditions:  No results found for: NA                No results found for: POTASSIUM        No results found for: MAG         Lab Results   Component Value Date    CR 0.80 07/22/2021    CR 0.69 01/21/2021                   Lab Results   Component Value Date    PADDY 9.2 07/22/2021    PADDY 9.7 01/21/2021                No results found for: BILITOTAL        Lab Results   Component Value Date    ALBUMIN 3.7 07/22/2021    ALBUMIN 4.1 01/21/2021                    No results found for: ALT        No results found for: AST  Results reviewed, labs MET treatment parameters, ok to proceed with treatment.      Post Infusion Assessment:  Patient tolerated infusion without incident.  Blood return noted pre and post infusion.  Site patent and intact, free from redness, edema or discomfort.  No evidence of extravasations.  Access discontinued per protocol.       Discharge Plan:   Patient declined prescription refills.  Discharge instructions reviewed with: Patient.  Patient and/or family verbalized understanding of discharge instructions and all questions answered.  AVS to patient via [employer].  Patient will follow-up with Dr. Tafoya 9/13/21 for next appointment.   Patient discharged in stable condition accompanied by: self.  Departure Mode: Ambulatory.  Face to Face time: 0.      TIERA VASQUEZ RN                    "

## 2021-09-04 ENCOUNTER — HEALTH MAINTENANCE LETTER (OUTPATIENT)
Age: 66
End: 2021-09-04

## 2021-09-12 RX ORDER — HEPARIN SODIUM,PORCINE 10 UNIT/ML
5 VIAL (ML) INTRAVENOUS
Status: CANCELLED
Start: 2021-12-29

## 2021-09-12 RX ORDER — ZOLEDRONIC ACID 0.04 MG/ML
4 INJECTION, SOLUTION INTRAVENOUS ONCE
Status: CANCELLED | OUTPATIENT
Start: 2021-12-29

## 2021-09-12 RX ORDER — HEPARIN SODIUM (PORCINE) LOCK FLUSH IV SOLN 100 UNIT/ML 100 UNIT/ML
5 SOLUTION INTRAVENOUS
Status: CANCELLED
Start: 2021-12-29

## 2021-09-12 NOTE — PROGRESS NOTES
Oncology Follow Up:  Date on this visit: 9/13/2021    Diagnosis:  [id number], grade I, ER positive, CT positive, HER-2 nonamplified invasive ductal carcinoma of the right breast. Oncotype DX recurrence score = 7.    Primary Physician: Sammie Cerna     History Of Present Illness:  Ms. Velazquez is a 66 year old female with right breast cancer.  Routine screening mammogram in 06/2018 was without concerning findings. Due to high breast density, her gynecologist recommended a breast MRI.  Breast MRI on 08/16/2018 showed nodular to non-mass enhancement measuring 1.6 cm in the right breast at 5 o'clock.    Right breast ultrasound confirmed a mass at 5 o'clock.  Right breast biopsy demonstrated a grade 1 invasive ductal carcinoma.  Estrogen-receptor staining was moderate in 95%; progesterone-receptor staining was strong in 96%.  HER-2 was nonamplified by immunohistochemistry with a score of 1+.  Right breast lumpectomy and sentinel lymph node procedure on 8/31/2018 demonstrated a 1.2 cm, grade 1 invasive mammary carcinoma.  There was associated intermediate-grade DCIS.  Surgical margins were negative; however, DCIS was 1 mm from the anterior margin.  A single sentinel lymph node was negative for malignancy. Oncotype DX recurrence score was 7.  She completed adjuvant radiation (4240 cGy to the whole breast and additional 1250 cGy to the lumpectomy site) on 11/8/18.  She has been on anastrozole since 9/24/18.     Interval History:  Ms. Arana comes into clinic today for routine breast cancer follow-up.  She is currently on treatment with anastrozole and is tolerating the medication well.  Since last visit, she was diagnosed with atrial fibrillation.  Approximately 2 [place] felt extreme fatigue and shortness of breath with minimal activity.  She went in to the local urgent care and a Covid test was negative.  She went back home, however continued to feel poorly.  Fortunately she again presented to an  emergency department and ultimately was determined to have atrial fibrillation.  She has been started on a rate control medication as well as a blood thinner.  She is going to have cardioversion at the end of the month.    She denies current chest pain or shortness of breath.  She denies lumps, pain, or swelling of either breast.  She has no abdominal complaints.  She has no bone or joint aches or pains.  The remainder of a complete 12 point review of systems is reviewed with patient was negative with exception that mentioned above.    Past Medical/Surgical History:  Past Medical History:   Diagnosis Date     Hypertension      Iritis      Past Surgical History:   Procedure Laterality Date     CATARACT IOL, RT/LT Left 2006     HYSTERECTOMY  2016     LUMPECTOMY BREAST WITH SENTINEL NODE, COMBINED Right 8/31/2018    Procedure: COMBINED LUMPECTOMY BREAST WITH SENTINEL NODE;  Right Wire Localized Lumpectomy and Right [place] Lymph Node Biopsy;  Surgeon: Chilango Kruger MD;  [facility]     OOPHORECTOMY  2014     Allergies:  Allergies as of 09/13/2021 - Reviewed 07/22/2021   Allergen Reaction Noted     Corticosteroids Dermatitis 03/18/2019     Neomycin Dermatitis 03/18/2019     Codeine Nausea and Vomiting 08/06/2015     Macrobid [nitrofurantoin] Nausea and Vomiting 08/06/2015     Sulfa drugs Itching 08/06/2015     Silver Rash 12/03/2018     Current Medications:  Current Outpatient Medications   Medication Sig Dispense Refill     amLODIPine (NORVASC) 5 MG tablet Take 5 mg by mouth daily       anastrozole (ARIMIDEX) 1 MG tablet TAKE 1 TABLET BY MOUTH EVERY DAY 90 tablet 3     atorvastatin (LIPITOR) 10 MG tablet Take 10 mg by mouth daily       biotin 1000 MCG TABS tablet Every day 1 Tabl 30 tablet 1     calcium carbonate (OS-PADDY 500 MG [place]. [place]) 500 MG tablet Take 500 mg by mouth 2 times daily With Magnesium,Zinc       fish oil-omega-3 fatty acids (OMEGA-3 FISH OIL) 1000 MG capsule Take 3,000 mg by mouth       LYSINE PO         multivitamin, therapeutic with minerals (MULTI-VITAMIN) TABS Take 1 tablet by mouth daily       UNABLE TO FIND 1,660 mg daily MEDICATION NAME: [place] OMEGA 3       UNABLE TO FIND daily MEDICATION NAME: [employer]        VITAMIN D, CHOLECALCIFEROL, PO Take 1,000 Units by mouth daily         Family and Social History:  Reviewed and unchanged from prior.  Please see initial consultation dated 9/24/18 for further details.    Physical Exam:  /75   Pulse 99   Resp 16   Wt 70.8 kg (156 lb)   BMI 24.48 kg/m    General:  Well appearing adult female in NAD.  Alert and oriented.  HEENT:  Normocephalic.  Sclera anicteric.  MMM.  No lesions of the oropharynx.  Lymph:  No palpable cervical, supraclavicular, or axillary LAD.  Chest:  CTA bilaterally.  No wheezes or crackles.  CV:  RR.  No m/r/g.  Breast:  Bilateral breasts are of increased fibroglandular density.  There are no dominant palpable masses in either breast.  Bilateral nipples are inverted.  Abd:  Soft/ND.   Ext:  No pitting edema of the bilateral lower extremities.   Musculo:  Full ROM of the bilateral upper extremities.  Neuro:  Cranial nerves grossly intact.  Psych:  Mood and affect appear normal.    Laboratory/Imaging Studies:  9/13/2021 Breast MRI:  We reviewed the images together in clinic.  There is no suspicious enhancement in either breast and no lymphadenopathy.    ASSESSMENT/PLAN:  Ms. Velazquez is a 66-year-old female with a h/o stage IA, T1c N0 M0, grade 1, ER positive, NV positive, HER2 non-amplified invasive mammary carcinoma of the right breast.  She is status post treatment with right breast lumpectomy and radiation.  On anastrozole since 9/24/18.    1.  Right breast cancer:   Ms. Velazquez is >3 years out from excision of a right breast cancer.  She continues on anastrozole and is tolerating the medication well.  We plan to continue anastrozole to complete a 5 year course.      There is no evidence of disease recurrence on exam today.  Due to  the fact that her initial breast cancer was not detectable by mammogram, increased breast density and close DCIS margins, we are performing high-risk screening with both mammogram and breast MRI until she is 5 years out from diagnosis of her breast cancer.  Images from breast MRI performed today were reviewed and are without suspicious enhancement.  I will see her back in approximately 6 months with mammograms the same day.    2.  Atrial fibrillation:  She is currently on treatment with diltiazem.  She is also on Xarelto anticoagulation.  Plan is for cardioversion at the end of the month.    3.  Bone Health:  DEXA in 01/2021 with a lowest T-score of -2.0 which is c/w osteopenia.  She began treatment with Zometa on 1/22/2021 both to prevent AI induced bone loss as well as prevent breast cancer bone metastases.  Next dose is due around 1/17/2021.  We will help her to schedule this.    I also recommend she continue calcium 1200 mg, vitamin D 1000 international units and walk 30 minutes daily.      4.  Routine health maintenance:  Colonoscopy in 2015 was without concerning finding, next due in 2025.      5.  Follow Up:  Labs and Zometa infusion around 1/17/2021.  Bilateral screening mammograms and visit with me 3/17/2022 or later.     30 minutes spent on the date of the encounter doing chart review, review of test results, interpretation of tests, patient visit and documentation.

## 2021-09-13 ENCOUNTER — ANCILLARY PROCEDURE (OUTPATIENT)
Dept: MRI IMAGING | Facility: CLINIC | Age: 66
End: 2021-09-13
Attending: INTERNAL MEDICINE
Payer: MEDICARE

## 2021-09-13 ENCOUNTER — ONCOLOGY VISIT (OUTPATIENT)
Dept: ONCOLOGY | Facility: CLINIC | Age: 66
End: 2021-09-13
Attending: INTERNAL MEDICINE
Payer: MEDICARE

## 2021-09-13 VITALS
WEIGHT: 156 LBS | DIASTOLIC BLOOD PRESSURE: 75 MMHG | RESPIRATION RATE: 16 BRPM | SYSTOLIC BLOOD PRESSURE: 120 MMHG | BODY MASS INDEX: 24.48 KG/M2 | HEART RATE: 99 BPM

## 2021-09-13 DIAGNOSIS — I48.19 PERSISTENT ATRIAL FIBRILLATION (H): ICD-10-CM

## 2021-09-13 DIAGNOSIS — Z79.811 LONG TERM (CURRENT) USE OF AROMATASE INHIBITORS: ICD-10-CM

## 2021-09-13 DIAGNOSIS — R93.7 ABNORMAL BONE DENSITY SCREENING: ICD-10-CM

## 2021-09-13 DIAGNOSIS — Z12.39 BREAST CANCER SCREENING, HIGH RISK PATIENT: ICD-10-CM

## 2021-09-13 DIAGNOSIS — Z85.3 PERSONAL HISTORY OF MALIGNANT NEOPLASM OF BREAST: ICD-10-CM

## 2021-09-13 DIAGNOSIS — C50.411 MALIGNANT NEOPLASM OF UPPER-OUTER QUADRANT OF RIGHT BREAST IN FEMALE, ESTROGEN RECEPTOR POSITIVE (H): Primary | ICD-10-CM

## 2021-09-13 DIAGNOSIS — R92.30 DENSE BREAST TISSUE ON MAMMOGRAM: ICD-10-CM

## 2021-09-13 DIAGNOSIS — Z17.0 MALIGNANT NEOPLASM OF UPPER-OUTER QUADRANT OF RIGHT BREAST IN FEMALE, ESTROGEN RECEPTOR POSITIVE (H): Primary | ICD-10-CM

## 2021-09-13 PROCEDURE — 77049 MRI BREAST C-+ W/CAD BI: CPT | Mod: ME | Performed by: RADIOLOGY

## 2021-09-13 PROCEDURE — G0463 HOSPITAL OUTPT CLINIC VISIT: HCPCS | Performed by: INTERNAL MEDICINE

## 2021-09-13 PROCEDURE — G0463 HOSPITAL OUTPT CLINIC VISIT: HCPCS

## 2021-09-13 PROCEDURE — 99214 OFFICE O/P EST MOD 30 MIN: CPT | Performed by: INTERNAL MEDICINE

## 2021-09-13 PROCEDURE — G1004 CDSM NDSC: HCPCS | Performed by: RADIOLOGY

## 2021-09-13 PROCEDURE — A9585 GADOBUTROL INJECTION: HCPCS | Performed by: RADIOLOGY

## 2021-09-13 RX ORDER — FUROSEMIDE 40 MG
TABLET ORAL
COMMUNITY
Start: 2021-09-03 | End: 2021-12-29

## 2021-09-13 RX ORDER — DILTIAZEM HYDROCHLORIDE 300 MG/1
CAPSULE, COATED, EXTENDED RELEASE ORAL
COMMUNITY
Start: 2021-09-03 | End: 2022-12-21

## 2021-09-13 RX ORDER — GADOBUTROL 604.72 MG/ML
7.5 INJECTION INTRAVENOUS ONCE
Status: COMPLETED | OUTPATIENT
Start: 2021-09-13 | End: 2021-09-13

## 2021-09-13 RX ADMIN — GADOBUTROL 7.5 ML: 604.72 INJECTION INTRAVENOUS at 16:43

## 2021-09-13 NOTE — NURSING NOTE
"Oncology Rooming Note    September 13, 2021 5:00 PM   Keely Arana is a 66 year old female who presents for:    Chief Complaint   Patient presents with     Oncology Clinic Visit     Pt is here for a rtn for Breast Cancer     Initial Vitals: Blood Pressure 120/75   Pulse 99   Respiration 16   Weight 70.8 kg (156 lb)   Body Mass Index 24.48 kg/m   Estimated body mass index is 24.48 kg/m  as calculated from the following:    Height as of 5/4/21: 1.7 m (5' 6.93\").    Weight as of this encounter: 70.8 kg (156 lb). Body surface area is 1.83 meters squared.  Data Unavailable Comment: Data Unavailable   No LMP recorded. Patient is postmenopausal.  Allergies reviewed: Yes  Medications reviewed: Yes    Medications: Medication refills not needed today.  Pharmacy name entered into [employer]:    [id number] [place] [facility] - [place], [place] - [address] [place] [address] [facility] - [place], [place] - [address] [facility]    Clinical concerns: none       Lindsay Menjivar MA            "

## 2021-09-13 NOTE — LETTER
9/13/2021         RE: Keely Arana  [address]  [facility] [address]        Dear Colleague,    Thank you for referring your patient, Keely Arana, to the [facility]. Please see a copy of my visit note below.    Oncology Follow Up:  Date on this visit: 9/13/2021    Diagnosis:  [id number], grade I, ER positive, CT positive, HER-2 nonamplified invasive ductal carcinoma of the right breast. Oncotype DX recurrence score = 7.    Primary Physician: Sammie Cerna     History Of Present Illness:  Ms. Velazquez is a 66 year old female with right breast cancer.  Routine screening mammogram in 06/2018 was without concerning findings. Due to high breast density, her gynecologist recommended a breast MRI.  Breast MRI on 08/16/2018 showed nodular to non-mass enhancement measuring 1.6 cm in the right breast at 5 o'clock.    Right breast ultrasound confirmed a mass at 5 o'clock.  Right breast biopsy demonstrated a grade 1 invasive ductal carcinoma.  Estrogen-receptor staining was moderate in 95%; progesterone-receptor staining was strong in 96%.  HER-2 was nonamplified by immunohistochemistry with a score of 1+.  Right breast lumpectomy and sentinel lymph node procedure on 8/31/2018 demonstrated a 1.2 cm, grade 1 invasive mammary carcinoma.  There was associated intermediate-grade DCIS.  Surgical margins were negative; however, DCIS was 1 mm from the anterior margin.  A single sentinel lymph node was negative for malignancy. Oncotype DX recurrence score was 7.  She completed adjuvant radiation (4240 cGy to the whole breast and additional 1250 cGy to the lumpectomy site) on 11/8/18.  She has been on anastrozole since 9/24/18.     Interval History:  Ms. Arana comes into clinic today for routine breast cancer follow-up.  She is currently on treatment with anastrozole and is tolerating the medication well.  Since last visit, she was diagnosed with atrial fibrillation.  Approximately 2 [place] felt extreme fatigue and shortness of breath with minimal activity.  She went in to the local urgent care and a Covid test was negative.  She went back home, however continued to feel poorly.  Fortunately she again presented to an emergency department and ultimately was determined to have atrial fibrillation.  She has been started on a rate control medication as well as a blood thinner.  She is going to have cardioversion at the end of the month.    She denies current chest pain or shortness of breath.  She denies lumps, pain, or swelling of either breast.  She has no abdominal complaints.  She has no bone or joint aches or pains.  The remainder of a complete 12 point review of systems is reviewed with patient was negative with exception that mentioned above.    Past Medical/Surgical History:  Past Medical History:   Diagnosis Date     Hypertension      Iritis      Past Surgical History:   Procedure Laterality Date     CATARACT IOL, RT/LT Left 2006     HYSTERECTOMY  2016     LUMPECTOMY BREAST WITH SENTINEL NODE, COMBINED Right 8/31/2018    Procedure: COMBINED LUMPECTOMY BREAST WITH SENTINEL NODE;  Right Wire Localized Lumpectomy and Right [place] Lymph Node Biopsy;  Surgeon: Chilango Kruger MD;  [facility]     OOPHORECTOMY  2014     Allergies:  Allergies as of 09/13/2021 - Reviewed 07/22/2021   Allergen Reaction Noted     Corticosteroids Dermatitis 03/18/2019     Neomycin Dermatitis 03/18/2019     Codeine Nausea and Vomiting 08/06/2015     Macrobid [nitrofurantoin] Nausea and Vomiting 08/06/2015     Sulfa drugs Itching 08/06/2015     Silver Rash 12/03/2018     Current Medications:  Current Outpatient Medications   Medication Sig Dispense Refill     amLODIPine (NORVASC) 5 MG tablet Take 5 mg by mouth daily       anastrozole (ARIMIDEX) 1 MG tablet TAKE 1 TABLET BY MOUTH EVERY DAY 90 tablet 3     atorvastatin (LIPITOR) 10 MG tablet Take 10 mg by mouth daily       biotin 1000 MCG TABS  tablet Every day 1 Tabl 30 tablet 1     calcium carbonate (OS-PADDY 500 MG [place]. CA) 500 MG tablet Take 500 mg by mouth 2 times daily With Magnesium,Zinc       fish oil-omega-3 fatty acids (OMEGA-3 FISH OIL) 1000 MG capsule Take 3,000 mg by mouth       LYSINE PO        multivitamin, therapeutic with minerals (MULTI-VITAMIN) TABS Take 1 tablet by mouth daily       UNABLE TO FIND 1,660 mg daily MEDICATION NAME: DHAxtra OMEGA 3       UNABLE TO FIND daily MEDICATION NAME: Modelinia        VITAMIN D, CHOLECALCIFEROL, PO Take 1,000 Units by mouth daily         Family and Social History:  Reviewed and unchanged from prior.  Please see initial consultation dated 9/24/18 for further details.    Physical Exam:  /75   Pulse 99   Resp 16   Wt 70.8 kg (156 lb)   BMI 24.48 kg/m    General:  Well appearing adult female in NAD.  Alert and oriented.  HEENT:  Normocephalic.  Sclera anicteric.  MMM.  No lesions of the oropharynx.  Lymph:  No palpable cervical, supraclavicular, or axillary LAD.  Chest:  CTA bilaterally.  No wheezes or crackles.  CV:  RR.  No m/r/g.  Breast:  Bilateral breasts are of increased fibroglandular density.  There are no dominant palpable masses in either breast.  Bilateral nipples are inverted.  Abd:  Soft/ND.   Ext:  No pitting edema of the bilateral lower extremities.   Musculo:  Full ROM of the bilateral upper extremities.  Neuro:  Cranial nerves grossly intact.  Psych:  Mood and affect appear normal.    Laboratory/Imaging Studies:  9/13/2021 Breast MRI:  We reviewed the images together in clinic.  There is no suspicious enhancement in either breast and no lymphadenopathy.    ASSESSMENT/PLAN:  Ms. Velazquez is a 66-year-old female with a h/o stage IA, T1c N0 M0, grade 1, ER positive, NM positive, HER2 non-amplified invasive mammary carcinoma of the right breast.  She is status post treatment with right breast lumpectomy and radiation.  On anastrozole since 9/24/18.    1.  Right breast cancer:   Ms.  Marcus is >3 years out from excision of a right breast cancer.  She continues on anastrozole and is tolerating the medication well.  We plan to continue anastrozole to complete a 5 year course.      There is no evidence of disease recurrence on exam today.  Due to the fact that her initial breast cancer was not detectable by mammogram, increased breast density and close DCIS margins, we are performing high-risk screening with both mammogram and breast MRI until she is 5 years out from diagnosis of her breast cancer.  Images from breast MRI performed today were reviewed and are without suspicious enhancement.  I will see her back in approximately 6 months with mammograms the same day.    2.  Atrial fibrillation:  She is currently on treatment with diltiazem.  She is also on Xarelto anticoagulation.  Plan is for cardioversion at the end of the month.    3.  Bone Health:  DEXA in 01/2021 with a lowest T-score of -2.0 which is c/w osteopenia.  She began treatment with Zometa on 1/22/2021 both to prevent AI induced bone loss as well as prevent breast cancer bone metastases.  Next dose is due around 1/17/2021.  We will help her to schedule this.    I also recommend she continue calcium 1200 mg, vitamin D 1000 international units and walk 30 minutes daily.      4.  Routine health maintenance:  Colonoscopy in 2015 was without concerning finding, next due in 2025.      5.  Follow Up:  Labs and Zometa infusion around 1/17/2021.  Bilateral screening mammograms and visit with me 3/17/2022 or later.     30 minutes spent on the date of the encounter doing chart review, review of test results, interpretation of tests, patient visit and documentation.           Again, thank you for allowing me to participate in the care of your patient.        Sincerely,        Merari Tafoya MD

## 2021-11-15 ENCOUNTER — TELEPHONE (OUTPATIENT)
Dept: DERMATOLOGY | Facility: CLINIC | Age: 66
End: 2021-11-15
Payer: MEDICARE

## 2021-11-15 NOTE — TELEPHONE ENCOUNTER
Writer spoke with patient who [place] [facility] [place]    Rosette Tijerina LPN

## 2021-11-15 NOTE — TELEPHONE ENCOUNTER
Health Call Center    Phone Message    May a detailed message be left on voicemail: yes     Reason for Call: Other: Pt is self referred, but had a biopsy done at [facility] with Dr Maddie Aviles, who told her she needed a MOHS procedure done. She would like to know whether she needs a consult done first, and when she could get this procedure scheduled. Please call pt to discuss, thanks!     Action Taken: Message routed to:  [facility] ([facility] Derm [facility]urg    Travel Screening: Not Applicable

## 2021-11-15 NOTE — TELEPHONE ENCOUNTER
[facility]    Phone Message    May a detailed message be left on voicemail: yes     Reason for Call: Other: Other: Pt is self referred, but had a biopsy done at [facility] with Dr Maddie Aviles, who told her she needed a MOHS procedure done. She would like to know whether she needs a consult done first, and when she could get this procedure scheduled. Please call pt to discuss, thanks!      Action Taken: Other: [place] [facility]    Travel Screening: Not Applicable

## 2021-11-15 NOTE — TELEPHONE ENCOUNTER
Health Call Center    Phone Message    May a detailed message be left on voicemail: yes     Reason for Call: Other: Other: Pt is self referred, but had a biopsy done at [facility] with Dr Maddie Aviles, who told her she needed a MOHS procedure done. She would like to know whether she needs a consult done first, and when she could get this procedure scheduled. Please call pt to discuss, thanks!      Action Taken: Message routed to:  Adult Clinics: Dermatology p [id number]    Travel Screening: Not Applicable

## 2021-11-16 NOTE — TELEPHONE ENCOUNTER
Spoke to patient who has scheduled Mohs surgery already elsewhere.     (Looks like 3 separate phone calls in her chart for [facility] were placed).    Inez Pfeiffer RN

## 2021-11-16 NOTE — TELEPHONE ENCOUNTER
Called and spoke with patient.  She received a phone call yesterday from Pasha who had a cancellation for the end of the month and she is scheduled.  I did tell her that our procedures for BCC is out until the beginning of January, and we would have to do a consultation first.    Juan F Torres, Procedure

## 2021-12-29 ENCOUNTER — INFUSION THERAPY VISIT (OUTPATIENT)
Dept: ONCOLOGY | Facility: CLINIC | Age: 66
End: 2021-12-29
Attending: INTERNAL MEDICINE
Payer: MEDICARE

## 2021-12-29 VITALS
TEMPERATURE: 98.2 F | RESPIRATION RATE: 16 BRPM | BODY MASS INDEX: 24.85 KG/M2 | SYSTOLIC BLOOD PRESSURE: 130 MMHG | WEIGHT: 158.3 LBS | DIASTOLIC BLOOD PRESSURE: 79 MMHG | HEART RATE: 86 BPM | OXYGEN SATURATION: 99 %

## 2021-12-29 DIAGNOSIS — C50.411 MALIGNANT NEOPLASM OF UPPER-OUTER QUADRANT OF RIGHT BREAST IN FEMALE, ESTROGEN RECEPTOR POSITIVE (H): ICD-10-CM

## 2021-12-29 DIAGNOSIS — Z79.811 LONG TERM (CURRENT) USE OF AROMATASE INHIBITORS: ICD-10-CM

## 2021-12-29 DIAGNOSIS — Z17.0 MALIGNANT NEOPLASM OF UPPER-OUTER QUADRANT OF RIGHT BREAST IN FEMALE, ESTROGEN RECEPTOR POSITIVE (H): ICD-10-CM

## 2021-12-29 DIAGNOSIS — R93.7 ABNORMAL BONE DENSITY SCREENING: Primary | ICD-10-CM

## 2021-12-29 LAB
ALBUMIN SERPL-MCNC: 3.5 G/DL (ref 3.4–5)
CALCIUM SERPL-MCNC: 9.4 MG/DL (ref 8.5–10.1)
CREAT SERPL-MCNC: 0.72 MG/DL (ref 0.52–1.04)
GFR SERPL CREATININE-BSD FRML MDRD: >90 ML/MIN/1.73M2

## 2021-12-29 PROCEDURE — 96365 THER/PROPH/DIAG IV INF INIT: CPT

## 2021-12-29 PROCEDURE — 36415 COLL VENOUS BLD VENIPUNCTURE: CPT | Performed by: INTERNAL MEDICINE

## 2021-12-29 PROCEDURE — 82565 ASSAY OF CREATININE: CPT | Performed by: INTERNAL MEDICINE

## 2021-12-29 PROCEDURE — 82310 ASSAY OF CALCIUM: CPT | Performed by: INTERNAL MEDICINE

## 2021-12-29 PROCEDURE — 250N000011 HC RX IP 250 OP 636: Performed by: INTERNAL MEDICINE

## 2021-12-29 PROCEDURE — 258N000003 HC RX IP 258 OP 636: Performed by: INTERNAL MEDICINE

## 2021-12-29 PROCEDURE — 82040 ASSAY OF SERUM ALBUMIN: CPT | Performed by: INTERNAL MEDICINE

## 2021-12-29 RX ORDER — ZOLEDRONIC ACID 0.04 MG/ML
4 INJECTION, SOLUTION INTRAVENOUS ONCE
Status: COMPLETED | OUTPATIENT
Start: 2021-12-29 | End: 2021-12-29

## 2021-12-29 RX ORDER — FLECAINIDE ACETATE 100 MG/1
50 TABLET ORAL
COMMUNITY
Start: 2021-10-13 | End: 2022-06-29

## 2021-12-29 RX ORDER — PANTOPRAZOLE SODIUM 40 MG/1
40 TABLET, DELAYED RELEASE ORAL
COMMUNITY
Start: 2021-12-22 | End: 2022-06-29

## 2021-12-29 RX ADMIN — SODIUM CHLORIDE 250 ML: 9 INJECTION, SOLUTION INTRAVENOUS at 08:55

## 2021-12-29 RX ADMIN — ZOLEDRONIC ACID 4 MG: 0.04 INJECTION, SOLUTION INTRAVENOUS at 08:55

## 2021-12-29 NOTE — PATIENT INSTRUCTIONS
Dear Patients and Caregivers,    Each day we work diligently to eliminate any barriers to your care including working with your insurance coverage to preauthorize your facility administered medication treatment. Our goal is to be transparent with any anticipated concerns with coverage for your treatment. At the beginning of every year this goal is particularly challenging because of changes to insurance coverage.    How can you help?    Provide any new insurance card to the infusion nurse at your next appointment or enter the information into your BT[employer] account prior to January 1st 2022.     It is vital that if a  reaches out that you return their phone call in a timely manner. Due to regulations, the team is unable to leave detailed voicemails. When you return the finance teams call they will be able to inform you of the details so a decision about your appointment can be made.    Also please understand:    We go through this process each year and each year offers new challenges.    Insurance companies will not allow the reauthorization process to begin until 2022, not allowing us to work in advance on this process.    Even if you are not changing insurance plans, insurance companies often update their policies requiring all treatments to be reauthorized.    As institutions across the country work to reauthorize treatments, insurance companies sometimes have longer than usual wait times in their call centers and leads to delayed response to prior authorization requests.     Thank you for your patience as we work this process. We do strive to keep you updated throughout the process if there are any delays or if the process is taking longer than anticipated. Lastly please feel free to speak with one of our infusion finance specialists at your next appointment or via phone ([phone]) if you have any questions. Thank you for choosing [facility] for your care.    Contact Numbers  [facility]: [phone] (for symptom and scheduling needs)    Please call the [facility] Triage line if you experience a temperature greater than or equal to 100.4, shaking chills, have uncontrolled nausea, vomiting and/or diarrhea, dizziness, shortness of breath, chest pain, bleeding, unexplained bruising, or if you have any other new/concerning symptoms, questions or concerns.     If you are having any concerning symptoms or wish to speak to a provider before your next infusion visit, please call your care coordinator or triage to notify them so we can adequately serve you.     If you need a refill on a narcotic prescription or other medication, please call triage before your infusion appointment.          December 2021 Sunday Monday Tuesday Wednesday Thursday Friday Saturday                  1     2     3     4       5     6     7     8     9     10     11       12     13     14     15     16     17     18       19     20     21     22     23     24     25       26     27     28     29    LAB   7:45 AM   (15 min.)    LAB   [facility] [place]    ONC INFUSION 0.5 HR (30 MIN)   8:30 AM   (30 min.)    ONC INFUSION NURSE   [facility] 30 31 January 2022 Sunday Monday Tuesday Wednesday Thursday Friday Saturday                                 1       2     3     4     5     6     7     8       9     10     11     12     13     14     15       16     17     18     19     20     21     22       23     24     25     26     27     28     29       30     31                                              Lab Results:  Recent Results (from the past 12 hour(s))   Creatinine    Collection Time: 12/29/21  7:40 AM   Result Value Ref Range    Creatinine 0.72 0.52 - 1.04 mg/dL    GFR Estimate >90 >60 mL/min/1.73m2   Calcium    Collection Time: 12/29/21  7:40 AM   Result Value Ref Range    Calcium 9.4 8.5 - 10.1 mg/dL   Albumin level    Collection Time:  12/29/21  7:40 AM   Result Value Ref Range    Albumin 3.5 3.4 - 5.0 g/dL

## 2021-12-29 NOTE — PROGRESS NOTES
Infusion Nursing Note:  Keely Arana presents today for Cycle 3 Day 1 Zometa.    Patient seen by provider today: No   present during visit today: Not Applicable.    Note: Patient presents to infusion today doing well. Denies any fevers, chills, shortness of breath, chest pains or cough. Offers no new concerns or complaints today.     Patient confirms she is taking Calcium and Vitamin D supplements at home.    Intravenous Access:  Peripheral IV placed.    Treatment Conditions:  Lab Results   Component Value Date    CR 0.72 12/29/2021    PADDY 9.4 12/29/2021    ALBUMIN 3.5 12/29/2021     Results reviewed, labs MET treatment parameters, ok to proceed with treatment.    Post Infusion Assessment:  Patient tolerated infusion without incident.  Blood return noted pre and post infusion.  Site patent and intact, free from redness, edema or discomfort.  No evidence of extravasations.  Access discontinued per protocol.     Discharge Plan:   Patient declined prescription refills.  Discharge instructions reviewed with: Patient.  Patient and/or family verbalized understanding of discharge instructions and all questions answered.  AVS to patient via [employer].  Patient will return 3/21/2022 for next appointment with Dr. Tafoya.   Patient discharged in stable condition accompanied by: self.  Departure Mode: Ambulatory.      Carly Everett RN

## 2022-02-19 ENCOUNTER — HEALTH MAINTENANCE LETTER (OUTPATIENT)
Age: 67
End: 2022-02-19

## 2022-03-21 ENCOUNTER — ANCILLARY PROCEDURE (OUTPATIENT)
Dept: MAMMOGRAPHY | Facility: CLINIC | Age: 67
End: 2022-03-21
Payer: MEDICARE

## 2022-03-21 DIAGNOSIS — Z12.31 VISIT FOR SCREENING MAMMOGRAM: ICD-10-CM

## 2022-03-21 PROCEDURE — 77063 BREAST TOMOSYNTHESIS BI: CPT | Mod: GC | Performed by: RADIOLOGY

## 2022-03-21 PROCEDURE — 77067 SCR MAMMO BI INCL CAD: CPT | Mod: GC | Performed by: RADIOLOGY

## 2022-03-21 NOTE — PROGRESS NOTES
Oncology Follow Up:  Date on this visit: Mar 22, 2022    Diagnosis:  [id number], grade I, ER positive, AK positive, HER-2 nonamplified invasive ductal carcinoma of the right breast. Oncotype DX recurrence score = 7.    Primary Physician: Sammie Cerna     History Of Present Illness:  Ms. Velazquez is a 66 year old female with right breast cancer.  Routine screening mammogram in 06/2018 was without concerning findings. Due to high breast density, her gynecologist recommended a breast MRI.  Breast MRI on 08/16/2018 showed nodular to non-mass enhancement measuring 1.6 cm in the right breast at 5 o'clock.    Right breast ultrasound confirmed a mass at 5 o'clock.  Right breast biopsy demonstrated a grade 1 invasive ductal carcinoma.  Estrogen-receptor staining was moderate in 95%; progesterone-receptor staining was strong in 96%.  HER-2 was nonamplified by immunohistochemistry with a score of 1+.  Right breast lumpectomy and sentinel lymph node procedure on 8/31/2018 demonstrated a 1.2 cm, grade 1 invasive mammary carcinoma.  There was associated intermediate-grade DCIS.  Surgical margins were negative; however, DCIS was 1 mm from the anterior margin.  A single sentinel lymph node was negative for malignancy. Oncotype DX recurrence score was 7.  She completed adjuvant radiation (4240 cGy to the whole breast and additional 1250 cGy to the lumpectomy site) on 11/8/18.  She has been on anastrozole since 9/24/18.     Interval History: Keely has been feeling well. She does not have any concerns regarding her breast cancer. She ultimately underwent ablation after cardioversion for her atrial fibrillation. She has been able to stop a few of the cardiac medications. She has also been seeing ophthalmology for iritis. This is improving with eye drops. No lumps/bumps, breast concerns, new or different persistent pain. Her appetite is good. She is working on losing some weight. No fevers or recent infections. No cough or SOB.     Past  Medical/Surgical History:  Past Medical History:   Diagnosis Date     Hypertension      Iritis      Past Surgical History:   Procedure Laterality Date     CATARACT IOL, RT/LT Left 2006     HYSTERECTOMY  2016     LUMPECTOMY BREAST WITH SENTINEL NODE, COMBINED Right 8/31/2018    Procedure: COMBINED LUMPECTOMY BREAST WITH SENTINEL NODE;  Right Wire Localized Lumpectomy and Right [place] Lymph Node Biopsy;  Surgeon: Chilango Kruger MD;  [facility]     OOPHORECTOMY  2014     Allergies:  Allergies as of 03/22/2022 - Reviewed 03/22/2022   Allergen Reaction Noted     Corticosteroids Dermatitis 03/18/2019     Neomycin Dermatitis 03/18/2019     Codeine Nausea and Vomiting 08/06/2015     Macrobid [nitrofurantoin] Nausea and Vomiting 08/06/2015     Sulfa drugs Itching 08/06/2015     Silver Rash 12/03/2018     Current Medications:  Current Outpatient Medications   Medication Sig Dispense Refill     omega 3 1000 MG CAPS TG Omega 1100 mg DHA 1100 EPA 2 tablets twice daily       anastrozole (ARIMIDEX) 1 MG tablet TAKE 1 TABLET BY MOUTH EVERY DAY 90 tablet 3     atorvastatin (LIPITOR) 10 MG tablet Take 10 mg by mouth daily       calcium carbonate (OS-PADDY 500 MG [place]. CA) 500 MG tablet Take 500 mg by mouth 2 times daily With Magnesium,Zinc       diltiazem ER COATED BEADS (CARDIZEM CD/CARTIA XT) 300 MG 24 hr capsule TAKE 1 CAPSULE (300 MG) BY MOUTH ONCE DAILY.       flecainide (TAMBOCOR) 100 MG tablet Take 50 mg by mouth       LYSINE PO        multivitamin, therapeutic with minerals (MULTI-VITAMIN) TABS Take 1 tablet by mouth daily       pantoprazole (PROTONIX) 40 MG EC tablet Take 40 mg by mouth       rivaroxaban ANTICOAGULANT (XARELTO) 20 MG TABS tablet Take 20 mg by mouth       UNABLE TO FIND 1,660 mg daily MEDICATION NAME: DHAxtra OMEGA 3       UNABLE TO FIND daily MEDICATION NAME: [employer]        VITAMIN D, CHOLECALCIFEROL, PO Take 1,000 Units by mouth daily           Physical Exam:  /80   Pulse 83   Temp 98.5   F (36.9  C) (Oral)   Resp 16   Wt 72.3 kg (159 lb 8 oz)   SpO2 97%   BMI 25.03 kg/m     Wt Readings from Last 4 Encounters:   03/22/22 72.3 kg (159 lb 8 oz)   12/29/21 71.8 kg (158 lb 4.8 oz)   09/13/21 70.8 kg (156 lb)   05/04/21 73.5 kg (162 lb 1.6 oz)     General:  Well appearing adult female in NAD.  Alert and oriented.  HEENT:  Normocephalic.  Sclera anicteric.   Lymph:  No palpable cervical, supraclavicular, or axillary LAD.  Chest:  CTA bilaterally.  No wheezes or crackles.  CV:  RR.  No m/r/g.  Breast:  Bilateral breasts without any prominent mass or nodularity.  Bilateral nipples are inverted.  Abd:  Soft/ND/NT +BS   Ext:  No pitting edema of the bilateral lower extremities.   Neuro:  Cranial nerves grossly intact.  Psych:  Mood and affect appear normal.    Laboratory/Imaging Studies:  Mammogram BI-RADS 2 benign on 3/21     ASSESSMENT/PLAN:  Ms. Velazquez is a 66-year-old female with a h/o stage IA, T1c N0 M0, grade 1, ER positive, VT positive, HER2 non-amplified invasive mammary carcinoma of the right breast.  She is status post treatment with right breast lumpectomy and radiation.  On anastrozole since 9/24/18.    1.  Right breast cancer:   Ms. Velazquez is >3.5 years out from excision of a right breast cancer.  She continues on anastrozole and is tolerating the medication well.  We plan to continue anastrozole to complete a 5 year course.      There is no evidence of disease recurrence on exam today. Due to the fact that her initial breast cancer was not detectable by mammogram, increased breast density and close DCIS margins, we are performing high-risk screening with both mammogram and breast MRI until she is 5 years out from diagnosis of her breast cancer. Her mammogram yesterday was benign. Follow-up in 6 months with breast MRI.     2.  Atrial fibrillation:  Underwent ablation. Seeing cardiology.     3.  Bone Health:  DEXA in 01/2021 with a lowest T-score of -2.0 which is c/w osteopenia.  She began  treatment with Zometa on 1/22/2021 both to prevent AI induced bone loss as well as prevent breast cancer bone metastases.  Next dose is due around 6/29/22.      I also recommend she continue calcium 1200 mg, vitamin D 1000 international units and walk 30 minutes daily.      4.  Routine health maintenance:  Colonoscopy in 2015 was without concerning finding, next due in 2025.      Funmilayo Barrientos PA-C

## 2022-03-22 ENCOUNTER — ONCOLOGY VISIT (OUTPATIENT)
Dept: ONCOLOGY | Facility: CLINIC | Age: 67
End: 2022-03-22
Attending: INTERNAL MEDICINE
Payer: MEDICARE

## 2022-03-22 VITALS
WEIGHT: 159.5 LBS | DIASTOLIC BLOOD PRESSURE: 80 MMHG | TEMPERATURE: 98.5 F | HEART RATE: 83 BPM | SYSTOLIC BLOOD PRESSURE: 130 MMHG | OXYGEN SATURATION: 97 % | BODY MASS INDEX: 25.03 KG/M2 | RESPIRATION RATE: 16 BRPM

## 2022-03-22 DIAGNOSIS — C50.411 MALIGNANT NEOPLASM OF UPPER-OUTER QUADRANT OF RIGHT BREAST IN FEMALE, ESTROGEN RECEPTOR POSITIVE (H): Primary | ICD-10-CM

## 2022-03-22 DIAGNOSIS — Z17.0 MALIGNANT NEOPLASM OF UPPER-OUTER QUADRANT OF RIGHT BREAST IN FEMALE, ESTROGEN RECEPTOR POSITIVE (H): Primary | ICD-10-CM

## 2022-03-22 DIAGNOSIS — Z12.39 BREAST CANCER SCREENING, HIGH RISK PATIENT: ICD-10-CM

## 2022-03-22 DIAGNOSIS — Z12.31 ENCOUNTER FOR SCREENING MAMMOGRAM FOR BREAST CANCER: ICD-10-CM

## 2022-03-22 DIAGNOSIS — R93.7 ABNORMAL BONE DENSITY SCREENING: ICD-10-CM

## 2022-03-22 DIAGNOSIS — Z79.811 LONG TERM (CURRENT) USE OF AROMATASE INHIBITORS: ICD-10-CM

## 2022-03-22 PROCEDURE — G0463 HOSPITAL OUTPT CLINIC VISIT: HCPCS

## 2022-03-22 PROCEDURE — 99214 OFFICE O/P EST MOD 30 MIN: CPT | Performed by: PHYSICIAN ASSISTANT

## 2022-03-22 RX ORDER — OMEGA-3 FATTY ACIDS/FISH OIL 300-1000MG
CAPSULE ORAL
COMMUNITY
End: 2023-06-20

## 2022-03-22 RX ORDER — ZOLEDRONIC ACID 0.04 MG/ML
4 INJECTION, SOLUTION INTRAVENOUS ONCE
Status: CANCELLED | OUTPATIENT
Start: 2022-06-27 | End: 2022-06-27

## 2022-03-22 RX ORDER — HEPARIN SODIUM,PORCINE 10 UNIT/ML
5 VIAL (ML) INTRAVENOUS
Status: CANCELLED
Start: 2022-06-27

## 2022-03-22 RX ORDER — HEPARIN SODIUM (PORCINE) LOCK FLUSH IV SOLN 100 UNIT/ML 100 UNIT/ML
5 SOLUTION INTRAVENOUS
Status: CANCELLED
Start: 2022-06-27

## 2022-03-22 ASSESSMENT — PAIN SCALES - GENERAL: PAINLEVEL: NO PAIN (0)

## 2022-03-22 NOTE — NURSING NOTE
"Oncology Rooming Note    March 22, 2022 12:21 PM   Keely Arana is a 66 year old female who presents for:    Chief Complaint   Patient presents with     Oncology Clinic Visit     Pt is here for a rtn for Breast Cancer     Initial Vitals: Blood Pressure 130/80   Pulse 83   Temperature 98.5  F (36.9  C) (Oral)   Respiration 16   Weight 72.3 kg (159 lb 8 oz)   Oxygen Saturation 97%   Body Mass Index 25.03 kg/m   Estimated body mass index is 25.03 kg/m  as calculated from the following:    Height as of 5/4/21: 1.7 m (5' 6.93\").    Weight as of this encounter: 72.3 kg (159 lb 8 oz). Body surface area is 1.85 meters squared.  No Pain (0) Comment: Data Unavailable   No LMP recorded. Patient is postmenopausal.  Allergies reviewed: Yes  Medications reviewed: Yes    Medications: Medication refills not needed today.  Pharmacy name entered into [employer]:    [id number] [place] [facility] - [place], [place] - [address] [place] [address] [facility] - [place], [place] - [address] [facility]    Clinical concerns: none       Lindsay Menjivar MA            "
blood glucose testing

## 2022-03-22 NOTE — LETTER
3/22/2022         RE: Keely Arana  [address]  [facility] [id number]      Oncology Follow Up:  Date on this visit: Mar 22, 2022    Diagnosis:  [id number], grade I, ER positive, KS positive, HER-2 nonamplified invasive ductal carcinoma of the right breast. Oncotype DX recurrence score = 7.    Primary Physician: Sammie Cerna     History Of Present Illness:  Ms. Velazquez is a 66 year old female with right breast cancer.  Routine screening mammogram in 06/2018 was without concerning findings. Due to high breast density, her gynecologist recommended a breast MRI.  Breast MRI on 08/16/2018 showed nodular to non-mass enhancement measuring 1.6 cm in the right breast at 5 o'clock.    Right breast ultrasound confirmed a mass at 5 o'clock.  Right breast biopsy demonstrated a grade 1 invasive ductal carcinoma.  Estrogen-receptor staining was moderate in 95%; progesterone-receptor staining was strong in 96%.  HER-2 was nonamplified by immunohistochemistry with a score of 1+.  Right breast lumpectomy and sentinel lymph node procedure on 8/31/2018 demonstrated a 1.2 cm, grade 1 invasive mammary carcinoma.  There was associated intermediate-grade DCIS.  Surgical margins were negative; however, DCIS was 1 mm from the anterior margin.  A single sentinel lymph node was negative for malignancy. Oncotype DX recurrence score was 7.  She completed adjuvant radiation (4240 cGy to the whole breast and additional 1250 cGy to the lumpectomy site) on 11/8/18.  She has been on anastrozole since 9/24/18.     Interval History: Keely has been feeling well. She does not have any concerns regarding her breast cancer. She ultimately underwent ablation after cardioversion for her atrial fibrillation. She has been able to stop a few of the cardiac medications. She has also been seeing ophthalmology for iritis. This is improving with eye drops. No lumps/bumps, breast concerns, new or different persistent pain. Her appetite is good. She is  working on losing some weight. No fevers or recent infections. No cough or SOB.     Past Medical/Surgical History:  Past Medical History:   Diagnosis Date     Hypertension      Iritis      Past Surgical History:   Procedure Laterality Date     CATARACT IOL, RT/LT Left 2006     HYSTERECTOMY  2016     LUMPECTOMY BREAST WITH SENTINEL NODE, COMBINED Right 8/31/2018    Procedure: COMBINED LUMPECTOMY BREAST WITH SENTINEL NODE;  Right Wire Localized Lumpectomy and Right [place] Lymph Node Biopsy;  Surgeon: Chilango Kruger MD;  Location: [facility] OR     OOPHORECTOMY  2014     Allergies:  Allergies as of 03/22/2022 - Reviewed 03/22/2022   Allergen Reaction Noted     Corticosteroids Dermatitis 03/18/2019     Neomycin Dermatitis 03/18/2019     Codeine Nausea and Vomiting 08/06/2015     Macrobid [nitrofurantoin] Nausea and Vomiting 08/06/2015     Sulfa drugs Itching 08/06/2015     Silver Rash 12/03/2018     Current Medications:  Current Outpatient Medications   Medication Sig Dispense Refill     omega 3 1000 MG CAPS TG Omega 1100 mg DHA 1100 EPA 2 tablets twice daily       anastrozole (ARIMIDEX) 1 MG tablet TAKE 1 TABLET BY MOUTH EVERY DAY 90 tablet 3     atorvastatin (LIPITOR) 10 MG tablet Take 10 mg by mouth daily       calcium carbonate (OS-PADDY 500 MG [place]. CA) 500 MG tablet Take 500 mg by mouth 2 times daily With Magnesium,Zinc       diltiazem ER COATED BEADS (CARDIZEM CD/CARTIA XT) 300 MG 24 hr capsule TAKE 1 CAPSULE (300 MG) BY MOUTH ONCE DAILY.       flecainide (TAMBOCOR) 100 MG tablet Take 50 mg by mouth       LYSINE PO        multivitamin, therapeutic with minerals (MULTI-VITAMIN) TABS Take 1 tablet by mouth daily       pantoprazole (PROTONIX) 40 MG EC tablet Take 40 mg by mouth       rivaroxaban ANTICOAGULANT (XARELTO) 20 MG TABS tablet Take 20 mg by mouth       UNABLE TO FIND 1,660 mg daily MEDICATION NAME: DHAxtra OMEGA 3       UNABLE TO FIND daily MEDICATION NAME: Canva        VITAMIN D, CHOLECALCIFEROL, PO Take  1,000 Units by mouth daily           Physical Exam:  /80   Pulse 83   Temp 98.5  F (36.9  C) (Oral)   Resp 16   Wt 72.3 kg (159 lb 8 oz)   SpO2 97%   BMI 25.03 kg/m     Wt Readings from Last 4 Encounters:   03/22/22 72.3 kg (159 lb 8 oz)   12/29/21 71.8 kg (158 lb 4.8 oz)   09/13/21 70.8 kg (156 lb)   05/04/21 73.5 kg (162 lb 1.6 oz)     General:  Well appearing adult female in NAD.  Alert and oriented.  HEENT:  Normocephalic.  Sclera anicteric.   Lymph:  No palpable cervical, supraclavicular, or axillary LAD.  Chest:  CTA bilaterally.  No wheezes or crackles.  CV:  RR.  No m/r/g.  Breast:  Bilateral breasts without any prominent mass or nodularity.  Bilateral nipples are inverted.  Abd:  Soft/ND/NT +BS   Ext:  No pitting edema of the bilateral lower extremities.   Neuro:  Cranial nerves grossly intact.  Psych:  Mood and affect appear normal.    Laboratory/Imaging Studies:  Mammogram BI-RADS 2 benign on 3/21     ASSESSMENT/PLAN:  Ms. Velazquez is a 66-year-old female with a h/o stage IA, T1c N0 M0, grade 1, ER positive, MS positive, HER2 non-amplified invasive mammary carcinoma of the right breast.  She is status post treatment with right breast lumpectomy and radiation.  On anastrozole since 9/24/18.    1.  Right breast cancer:   Ms. Velazquez is >3.5 years out from excision of a right breast cancer.  She continues on anastrozole and is tolerating the medication well.  We plan to continue anastrozole to complete a 5 year course.      There is no evidence of disease recurrence on exam today. Due to the fact that her initial breast cancer was not detectable by mammogram, increased breast density and close DCIS margins, we are performing high-risk screening with both mammogram and breast MRI until she is 5 years out from diagnosis of her breast cancer. Her mammogram yesterday was benign. Follow-up in 6 months with breast MRI.     2.  Atrial fibrillation:  Underwent ablation. Seeing cardiology.     3.  Bone Health:   DEXA in 01/2021 with a lowest T-score of -2.0 which is c/w osteopenia.  She began treatment with Zometa on 1/22/2021 both to prevent AI induced bone loss as well as prevent breast cancer bone metastases.  Next dose is due around 6/29/22.      I also recommend she continue calcium 1200 mg, vitamin D 1000 international units and walk 30 minutes daily.      4.  Routine health maintenance:  Colonoscopy in 2015 was without concerning finding, next due in 2025.      CRISTELA Bautista PA-C

## 2022-06-15 DIAGNOSIS — C50.411 MALIGNANT NEOPLASM OF UPPER-OUTER QUADRANT OF RIGHT BREAST IN FEMALE, ESTROGEN RECEPTOR POSITIVE (H): ICD-10-CM

## 2022-06-15 DIAGNOSIS — Z17.0 MALIGNANT NEOPLASM OF UPPER-OUTER QUADRANT OF RIGHT BREAST IN FEMALE, ESTROGEN RECEPTOR POSITIVE (H): ICD-10-CM

## 2022-06-15 RX ORDER — ANASTROZOLE 1 MG/1
TABLET ORAL
Qty: 90 TABLET | Refills: 3 | Status: SHIPPED | OUTPATIENT
Start: 2022-06-15 | End: 2023-04-04

## 2022-06-15 NOTE — TELEPHONE ENCOUNTER
Refill Request: Anastrozole     Last prescribing provider Dr. Tafoya     Last clinic visit date 3/22/222    Any missed appointments/no show since last visit  No     Recommendations for requested medication breast cancer     Next clinic date  9/19/22    Any other pertinent information NA

## 2022-06-29 ENCOUNTER — APPOINTMENT (OUTPATIENT)
Dept: LAB | Facility: CLINIC | Age: 67
End: 2022-06-29
Attending: INTERNAL MEDICINE
Payer: MEDICARE

## 2022-06-29 ENCOUNTER — INFUSION THERAPY VISIT (OUTPATIENT)
Dept: ONCOLOGY | Facility: CLINIC | Age: 67
End: 2022-06-29
Attending: INTERNAL MEDICINE
Payer: MEDICARE

## 2022-06-29 VITALS
TEMPERATURE: 97.9 F | WEIGHT: 165 LBS | SYSTOLIC BLOOD PRESSURE: 127 MMHG | DIASTOLIC BLOOD PRESSURE: 81 MMHG | OXYGEN SATURATION: 97 % | HEART RATE: 86 BPM | BODY MASS INDEX: 25.9 KG/M2 | RESPIRATION RATE: 16 BRPM

## 2022-06-29 DIAGNOSIS — R93.7 ABNORMAL BONE DENSITY SCREENING: Primary | ICD-10-CM

## 2022-06-29 DIAGNOSIS — Z17.0 MALIGNANT NEOPLASM OF UPPER-OUTER QUADRANT OF RIGHT BREAST IN FEMALE, ESTROGEN RECEPTOR POSITIVE (H): ICD-10-CM

## 2022-06-29 DIAGNOSIS — C50.411 MALIGNANT NEOPLASM OF UPPER-OUTER QUADRANT OF RIGHT BREAST IN FEMALE, ESTROGEN RECEPTOR POSITIVE (H): ICD-10-CM

## 2022-06-29 DIAGNOSIS — Z79.811 LONG TERM (CURRENT) USE OF AROMATASE INHIBITORS: ICD-10-CM

## 2022-06-29 LAB
ALBUMIN SERPL-MCNC: 3.5 G/DL (ref 3.4–5)
CALCIUM SERPL-MCNC: 9.4 MG/DL (ref 8.5–10.1)
CREAT SERPL-MCNC: 0.65 MG/DL (ref 0.52–1.04)
GFR SERPL CREATININE-BSD FRML MDRD: >90 ML/MIN/1.73M2

## 2022-06-29 PROCEDURE — 82565 ASSAY OF CREATININE: CPT | Performed by: PHYSICIAN ASSISTANT

## 2022-06-29 PROCEDURE — 36415 COLL VENOUS BLD VENIPUNCTURE: CPT | Performed by: PHYSICIAN ASSISTANT

## 2022-06-29 PROCEDURE — 82310 ASSAY OF CALCIUM: CPT | Performed by: PHYSICIAN ASSISTANT

## 2022-06-29 PROCEDURE — 258N000003 HC RX IP 258 OP 636: Performed by: PHYSICIAN ASSISTANT

## 2022-06-29 PROCEDURE — 250N000011 HC RX IP 250 OP 636: Performed by: PHYSICIAN ASSISTANT

## 2022-06-29 PROCEDURE — 96365 THER/PROPH/DIAG IV INF INIT: CPT

## 2022-06-29 PROCEDURE — 82040 ASSAY OF SERUM ALBUMIN: CPT | Performed by: PHYSICIAN ASSISTANT

## 2022-06-29 RX ORDER — HEPARIN SODIUM (PORCINE) LOCK FLUSH IV SOLN 100 UNIT/ML 100 UNIT/ML
5 SOLUTION INTRAVENOUS
Status: CANCELLED
Start: 2022-12-24

## 2022-06-29 RX ORDER — ZOLEDRONIC ACID 0.04 MG/ML
4 INJECTION, SOLUTION INTRAVENOUS ONCE
Status: CANCELLED | OUTPATIENT
Start: 2022-12-24

## 2022-06-29 RX ORDER — ZOLEDRONIC ACID 0.04 MG/ML
4 INJECTION, SOLUTION INTRAVENOUS ONCE
Status: COMPLETED | OUTPATIENT
Start: 2022-06-29 | End: 2022-06-29

## 2022-06-29 RX ORDER — HEPARIN SODIUM,PORCINE 10 UNIT/ML
5 VIAL (ML) INTRAVENOUS
Status: CANCELLED
Start: 2022-12-24

## 2022-06-29 RX ADMIN — ZOLEDRONIC ACID 4 MG: 0.04 INJECTION, SOLUTION INTRAVENOUS at 10:15

## 2022-06-29 RX ADMIN — SODIUM CHLORIDE 250 ML: 9 INJECTION, SOLUTION INTRAVENOUS at 10:15

## 2022-06-29 ASSESSMENT — PAIN SCALES - GENERAL: PAINLEVEL: NO PAIN (0)

## 2022-06-29 NOTE — PROGRESS NOTES
Infusion Nursing Note:  Keely Arana presents today for Zometa Infusion.    Patient seen by provider today: No   present during visit today: Not Applicable.    Note: Keely denied fevers, chills, cough, SOB, and chest pain at home. Denied nausea/vomiting or constipation and diarrhea. Reported good appetite.     Denied any upcoming dental procedures or jaw pain. Confirms she is taking calcium and vitamin D at home as prescribed.    Intravenous Access:  Peripheral IV placed.    Treatment Conditions:   Latest Reference Range & Units 06/29/22 09:29   Creatinine 0.52 - 1.04 mg/dL 0.65   GFR Estimate >60 mL/min/1.73m2 >90   Calcium 8.5 - 10.1 mg/dL 9.4   Albumin 3.4 - 5.0 g/dL 3.5     Results reviewed, labs MET treatment parameters, ok to proceed with treatment.    Post Infusion Assessment:  Patient tolerated infusion without incident.  Blood return noted pre and post infusion.  Site patent and intact, free from redness, edema or discomfort.  No evidence of extravasations.  Access discontinued per protocol.     Discharge Plan:   Patient declined prescription refills.  Discharge instructions reviewed with: Patient.  Patient and/or family verbalized understanding of discharge instructions and all questions answered.  AVS to patient via [employer].  Patient not currently scheduled for future appointments. IB message sent to care team to follow up.  Patient discharged in stable condition accompanied by: self.  Departure Mode: Ambulatory.  Face to Face time: 0.      Madonna Clark RN

## 2022-06-29 NOTE — PATIENT INSTRUCTIONS
[facility] Triage and after hours / weekends / holidays:  [phone]    Please call the triage or after hours line if you experience a temperature greater than or equal to 100.4, shaking chills, have uncontrolled nausea, vomiting and/or diarrhea, dizziness, shortness of breath, chest pain, bleeding, unexplained bruising, or if you have any other new/concerning symptoms, questions or concerns.      If you are having any concerning symptoms or wish to speak to a provider before your next infusion visit, please call your care coordinator or triage to notify them so we can adequately serve you.     If you need a refill on a narcotic prescription or other medication, please call before your infusion appointment.

## 2022-06-29 NOTE — NURSING NOTE
Chief Complaint   Patient presents with     Blood Draw     Labs drawn via piv by RN in lab.  VS taken       Labs drawn from PIV placed by RN. Line flushed with saline. Vitals taken. Pt checked in for appointment(s).    Traci Anne RN

## 2022-09-18 NOTE — PROGRESS NOTES
Oncology Follow Up:  Date on this visit: 9/19/2022    Diagnosis:  [id number], grade I, ER positive, MI positive, HER-2 nonamplified invasive ductal carcinoma of the right breast. Oncotype DX recurrence score = 7.    Primary Physician: Sammie Cerna     History Of Present Illness:  Ms. Velazquez is a 67 year old female with right breast cancer.  Routine screening mammogram in 06/2018 was without concerning findings. Due to high breast density, her gynecologist recommended a breast MRI.  Breast MRI on 08/16/2018 showed nodular to non-mass enhancement measuring 1.6 cm in the right breast at 5 o'clock.    Right breast ultrasound confirmed a mass at 5 o'clock.  Right breast biopsy demonstrated a grade 1 invasive ductal carcinoma.  Estrogen-receptor staining was moderate in 95%; progesterone-receptor staining was strong in 96%.  HER-2 was nonamplified by immunohistochemistry with a score of 1+.  Right breast lumpectomy and sentinel lymph node procedure on 8/31/2018 demonstrated a 1.2 cm, grade 1 invasive mammary carcinoma.  There was associated intermediate-grade DCIS.  Surgical margins were negative; however, DCIS was 1 mm from the anterior margin.  A single sentinel lymph node was negative for malignancy. Oncotype DX recurrence score was 7.  She completed adjuvant radiation (4240 cGy to the whole breast and additional 1250 cGy to the lumpectomy site) on 11/8/18.  She has been on anastrozole since 9/24/18.     Interval History:  Ms. Arana comes in the clinic today for routine breast cancer follow-up.  She continues on anastrozole.  Since her last visit her health has been good.  She denies hospitalizations, surgeries, or fractures.  She is tolerating anastrozole well.  She denies any bone or joint aches or pains.  She has no hot flashes.  No mood disorder.  She continues to remain active performing routine cardiovascular exercise.  Despite this she is frustrated with recent weight gain.  She tries to adhere to a healthy diet  consisting mainly of fruits and vegetables.  She wonders if she has been eating too many fruits and this may be why her weight is up.  She denies current cough, shortness of breath, or chest pain.  She has no current abdominal complaints.  She denies lumps, pain, or swelling of either breast.  The remainder of a complete 12 point review of systems was reviewed with patient was negative with exception that mentioned above.    Past Medical/Surgical History:  Past Medical History:   Diagnosis Date     Hypertension      Iritis      Past Surgical History:   Procedure Laterality Date     CATARACT IOL, RT/LT Left 2006     HYSTERECTOMY  2016     LUMPECTOMY BREAST WITH SENTINEL NODE, COMBINED Right 8/31/2018    Procedure: COMBINED LUMPECTOMY BREAST WITH SENTINEL NODE;  Right Wire Localized Lumpectomy and Right [place] Lymph Node Biopsy;  Surgeon: Chilango Kruger MD;  Location: [facility] OR     OOPHORECTOMY  2014     Allergies:  Allergies as of 09/19/2022 - Reviewed 06/29/2022   Allergen Reaction Noted     Corticosteroids Dermatitis 03/18/2019     Neomycin Dermatitis 03/18/2019     Codeine Nausea and Vomiting 08/06/2015     Macrobid [nitrofurantoin] Nausea and Vomiting 08/06/2015     Sulfa drugs Itching 08/06/2015     Silver Rash 12/03/2018     Current Medications:  Current Outpatient Medications   Medication Sig Dispense Refill     anastrozole (ARIMIDEX) 1 MG tablet TAKE 1 TABLET BY MOUTH EVERY DAY 90 tablet 3     atorvastatin (LIPITOR) 10 MG tablet Take 10 mg by mouth daily       calcium carbonate (OS-PADDY 500 MG [place]. CA) 500 MG tablet Take 500 mg by mouth 2 times daily With Magnesium,Zinc       diltiazem ER COATED BEADS (CARDIZEM CD/CARTIA XT) 300 MG 24 hr capsule TAKE 1 CAPSULE (300 MG) BY MOUTH ONCE DAILY.       LYSINE PO        multivitamin w/minerals (THERA-VIT-M) tablet Take 1 tablet by mouth daily       omega 3 1000 MG CAPS TG Omega 1100 mg DHA 1100 EPA 2 tablets twice daily       rivaroxaban ANTICOAGULANT (XARELTO) 20 MG  TABS tablet Take 20 mg by mouth       UNABLE TO FIND daily MEDICATION NAME: Zhongjia MRO        VITAMIN D, CHOLECALCIFEROL, PO Take 1,000 Units by mouth daily         Family and Social History:  Reviewed and unchanged from prior.  Please see initial consultation dated 18 for further details.    Physical Exam:  /77   Pulse 80   Temp 97.9  F (36.6  C) (Oral)   Wt 76.7 kg (169 lb)   SpO2 98%   BMI 26.53 kg/m    General:  Well appearing adult female in NAD.  Alert and oriented x 3.  HEENT:  Normocephalic.  Sclera anicteric.  MMM.  No lesions of the oropharynx.  Lymph:  No palpable cervical, supraclavicular, or axillary LAD.  Chest:  CTA bilaterally.  No wheezes or crackles.  CV:  RRR.  Nl S1 and S2.  No m/r/g.  Breast:  Bilateral breasts are of increased fibroglandular density.  There are no dominant palpable masses in either breast.  Bilateral nipples are inverted (chronic and unchanged from prior)  Abd:  Soft/ND.   Ext:  No pitting edema of the bilateral lower extremities.   Musculo:  Full ROM of the bilateral upper extremities.  Neuro:  Cranial nerves grossly intact.  Gait is stable.  Psych:  Mood and affect appear normal.  Skin:  No visible or concerning skin rashes or lesions.    Laboratory/Imaging Studies:  I personally reviewed the below imagin2022 Breast MRI:  There are no concerning areas of enhancement in either breast.  No lymphadenopathy.    ASSESSMENT/PLAN:  Ms. Velazquez is a 67-year-old female with a h/o stage IA, T1c N0 M0, grade 1, ER positive, OR positive, HER2 non-amplified invasive mammary carcinoma of the right breast.  She is status post treatment with right breast lumpectomy and radiation.  On anastrozole since 18.    1.  Right breast cancer:   Ms. Velazquez is >4 years out from excision of a right breast cancer.  She continues on anastrozole and is tolerating the medication well.  We plan to continue anastrozole to complete a 5-7 year course.      There is no evidence of  disease recurrence on exam today.  Due to the fact that her initial breast cancer was not detectable by mammogram, increased breast density and close DCIS margins, we are performing high-risk screening with both mammogram and breast MRI until she is 5 years out from diagnosis of her breast cancer.  Images from breast MRI performed today were reviewed and on my view is without concerning enhancement or lymphadenopathy.  Radiology read is pending and will be released to the patient when available.  I will see her back in approximately 6 months with mammograms the same day.    2.  Atrial fibrillation:  No recurrent symptoms since cardiac ablation one year ago.  She is on treatment with diltiazem.  She is also on Xarelto anticoagulation.      3.  Bone Health:  DEXA in 01/2021 with a lowest T-score of -2.0 which is c/w osteopenia.  She began treatment with Zometa on 1/22/2021 both to prevent AI induced bone loss as well as prevent breast cancer bone metastases.  Next dose is due around 12/24/2022.  We will help her to schedule this.  We will also plan to repeat a DEXA bone density scan at the time of her return visit.    I also recommend she continue calcium 1200 mg, vitamin D 1000 international units and walk 30 minutes daily.      4.  Routine health maintenance:  Colonoscopy in 2015 was without concerning finding, next due in 2025.      5.  Follow Up:  Labs and Zometa infusion 12/21/2022 as already scheduled.  Cancel DEXA 1/5/2023.  Bilateral screening mammograms, DEXA bone density scan and visit with me 3/21/2023 or later.     30 minutes spent on the date of the encounter doing chart review, review of test results, interpretation of tests, patient visit and documentation.

## 2022-09-19 ENCOUNTER — ONCOLOGY VISIT (OUTPATIENT)
Dept: ONCOLOGY | Facility: CLINIC | Age: 67
End: 2022-09-19
Attending: INTERNAL MEDICINE
Payer: MEDICARE

## 2022-09-19 VITALS
WEIGHT: 169 LBS | DIASTOLIC BLOOD PRESSURE: 77 MMHG | BODY MASS INDEX: 26.53 KG/M2 | TEMPERATURE: 97.9 F | SYSTOLIC BLOOD PRESSURE: 135 MMHG | OXYGEN SATURATION: 98 % | HEART RATE: 80 BPM

## 2022-09-19 DIAGNOSIS — Z17.0 MALIGNANT NEOPLASM OF UPPER-OUTER QUADRANT OF RIGHT BREAST IN FEMALE, ESTROGEN RECEPTOR POSITIVE (H): Primary | ICD-10-CM

## 2022-09-19 DIAGNOSIS — Z79.811 LONG TERM (CURRENT) USE OF AROMATASE INHIBITORS: ICD-10-CM

## 2022-09-19 DIAGNOSIS — M89.9 DISORDER OF BONE AND CARTILAGE: ICD-10-CM

## 2022-09-19 DIAGNOSIS — C50.411 MALIGNANT NEOPLASM OF UPPER-OUTER QUADRANT OF RIGHT BREAST IN FEMALE, ESTROGEN RECEPTOR POSITIVE (H): Primary | ICD-10-CM

## 2022-09-19 DIAGNOSIS — M94.9 DISORDER OF BONE AND CARTILAGE: ICD-10-CM

## 2022-09-19 DIAGNOSIS — Z12.31 VISIT FOR SCREENING MAMMOGRAM: ICD-10-CM

## 2022-09-19 PROCEDURE — 99214 OFFICE O/P EST MOD 30 MIN: CPT | Performed by: INTERNAL MEDICINE

## 2022-09-19 PROCEDURE — G0463 HOSPITAL OUTPT CLINIC VISIT: HCPCS

## 2022-09-19 ASSESSMENT — PAIN SCALES - GENERAL: PAINLEVEL: NO PAIN (0)

## 2022-09-19 NOTE — NURSING NOTE
"Oncology Rooming Note    September 19, 2022 3:34 PM   Keely Arana is a 67 year old female who presents for:    Chief Complaint   Patient presents with     Oncology Clinic Visit     Rtn for breast cancer     Initial Vitals: /77   Pulse 80   Temp 97.9  F (36.6  C) (Oral)   Wt 76.7 kg (169 lb)   SpO2 98%   BMI 26.53 kg/m   Estimated body mass index is 26.53 kg/m  as calculated from the following:    Height as of 5/4/21: 1.7 m (5' 6.93\").    Weight as of this encounter: 76.7 kg (169 lb). Body surface area is 1.9 meters squared.  No Pain (0) Comment: Data Unavailable   No LMP recorded. Patient is postmenopausal.  Allergies reviewed: Yes  Medications reviewed: Yes    Medications: Medication refills not needed today.  Pharmacy name entered into [employer]:    [id number] [place] [facility] - [place], [place] - [address] [place] [address] [facility] - [place], [place] - [address] [facility]    Clinical concerns: Patient has no specific questions or clinical concerns outside of the reason for the visit.       Divya Fisher, EMT            "

## 2022-09-19 NOTE — LETTER
9/19/2022         RE: Keely Arana  [address]  [facility] [address]        Dear Colleague,    Thank you for referring your patient, Keely Arana, to the [facility]. Please see a copy of my visit note below.    Oncology Follow Up:  Date on this visit: 9/19/2022    Diagnosis:  [id number], grade I, ER positive, [place] positive, HER-2 nonamplified invasive ductal carcinoma of the right breast. Oncotype DX recurrence score = 7.    Primary Physician: Sammie Cerna     History Of Present Illness:  Ms. Velazquez is a 67 year old female with right breast cancer.  Routine screening mammogram in 06/2018 was without concerning findings. Due to high breast density, her gynecologist recommended a breast MRI.  Breast MRI on 08/16/2018 showed nodular to non-mass enhancement measuring 1.6 cm in the right breast at 5 o'clock.    Right breast ultrasound confirmed a mass at 5 o'clock.  Right breast biopsy demonstrated a grade 1 invasive ductal carcinoma.  Estrogen-receptor staining was moderate in 95%; progesterone-receptor staining was strong in 96%.  HER-2 was nonamplified by immunohistochemistry with a score of 1+.  Right breast lumpectomy and sentinel lymph node procedure on 8/31/2018 demonstrated a 1.2 cm, grade 1 invasive mammary carcinoma.  There was associated intermediate-grade DCIS.  Surgical margins were negative; however, DCIS was 1 mm from the anterior margin.  A single sentinel lymph node was negative for malignancy. Oncotype DX recurrence score was 7.  She completed adjuvant radiation (4240 cGy to the whole breast and additional 1250 cGy to the lumpectomy site) on 11/8/18.  She has been on anastrozole since 9/24/18.     Interval History:  Ms. Arana comes in the clinic today for routine breast cancer follow-up.  She continues on anastrozole.  Since her last visit her health has been good.  She denies hospitalizations, surgeries, or fractures.  She is tolerating anastrozole well.   She denies any bone or joint aches or pains.  She has no hot flashes.  No mood disorder.  She continues to remain active performing routine cardiovascular exercise.  Despite this she is frustrated with recent weight gain.  She tries to adhere to a healthy diet consisting mainly of fruits and vegetables.  She wonders if she has been eating too many fruits and this may be why her weight is up.  She denies current cough, shortness of breath, or chest pain.  She has no current abdominal complaints.  She denies lumps, pain, or swelling of either breast.  The remainder of a complete 12 point review of systems was reviewed with patient was negative with exception that mentioned above.    Past Medical/Surgical History:  Past Medical History:   Diagnosis Date     Hypertension      Iritis      Past Surgical History:   Procedure Laterality Date     CATARACT IOL, RT/LT Left 2006     HYSTERECTOMY  2016     LUMPECTOMY BREAST WITH SENTINEL NODE, COMBINED Right 8/31/2018    Procedure: COMBINED LUMPECTOMY BREAST WITH SENTINEL NODE;  Right Wire Localized Lumpectomy and Right [place] Lymph Node Biopsy;  Surgeon: Chilango Kruger MD;  [facility]     OOPHORECTOMY  2014     Allergies:  Allergies as of 09/19/2022 - Reviewed 06/29/2022   Allergen Reaction Noted     Corticosteroids Dermatitis 03/18/2019     Neomycin Dermatitis 03/18/2019     Codeine Nausea and Vomiting 08/06/2015     Macrobid [nitrofurantoin] Nausea and Vomiting 08/06/2015     Sulfa drugs Itching 08/06/2015     Silver Rash 12/03/2018     Current Medications:  Current Outpatient Medications   Medication Sig Dispense Refill     anastrozole (ARIMIDEX) 1 MG tablet TAKE 1 TABLET BY MOUTH EVERY DAY 90 tablet 3     atorvastatin (LIPITOR) 10 MG tablet Take 10 mg by mouth daily       calcium carbonate (OS-PADDY 500 MG [place]. [place]) 500 MG tablet Take 500 mg by mouth 2 times daily With Magnesium,Zinc       diltiazem ER COATED BEADS (CARDIZEM CD/CARTIA XT) 300 MG 24 hr capsule TAKE 1  CAPSULE (300 MG) BY MOUTH ONCE DAILY.       LYSINE PO        multivitamin w/minerals (THERA-VIT-M) tablet Take 1 tablet by mouth daily       omega 3 1000 MG CAPS TG Omega 1100 mg DHA 1100 EPA 2 tablets twice daily       rivaroxaban ANTICOAGULANT (XARELTO) 20 MG TABS tablet Take 20 mg by mouth       UNABLE TO FIND daily MEDICATION NAME: [employer]        VITAMIN D, CHOLECALCIFEROL, PO Take 1,000 Units by mouth daily         Family and Social History:  Reviewed and unchanged from prior.  Please see initial consultation dated 18 for further details.    Physical Exam:  /77   Pulse 80   Temp 97.9  F (36.6  C) (Oral)   Wt 76.7 kg (169 lb)   SpO2 98%   BMI 26.53 kg/m    General:  Well appearing adult female in NAD.  Alert and oriented x 3.  HEENT:  Normocephalic.  Sclera anicteric.  MMM.  No lesions of the oropharynx.  Lymph:  No palpable cervical, supraclavicular, or axillary LAD.  Chest:  CTA bilaterally.  No wheezes or crackles.  CV:  RRR.  Nl S1 and S2.  No m/r/g.  Breast:  Bilateral breasts are of increased fibroglandular density.  There are no dominant palpable masses in either breast.  Bilateral nipples are inverted (chronic and unchanged from prior)  Abd:  Soft/ND.   Ext:  No pitting edema of the bilateral lower extremities.   Musculo:  Full ROM of the bilateral upper extremities.  Neuro:  Cranial nerves grossly intact.  Gait is stable.  Psych:  Mood and affect appear normal.  Skin:  No visible or concerning skin rashes or lesions.    Laboratory/Imaging Studies:  I personally reviewed the below imagin2022 Breast MRI:  There are no concerning areas of enhancement in either breast.  No lymphadenopathy.    ASSESSMENT/PLAN:  Ms. Velazquez is a 67-year-old female with a h/o stage IA, T1c N0 M0, grade 1, ER positive, [place] positive, HER2 non-amplified invasive mammary carcinoma of the right breast.  She is status post treatment with right breast lumpectomy and radiation.  On anastrozole since  9/24/18.    1.  Right breast cancer:   Ms. Velazquez is >4 years out from excision of a right breast cancer.  She continues on anastrozole and is tolerating the medication well.  We plan to continue anastrozole to complete a 5-7 year course.      There is no evidence of disease recurrence on exam today.  Due to the fact that her initial breast cancer was not detectable by mammogram, increased breast density and close DCIS margins, we are performing high-risk screening with both mammogram and breast MRI until she is 5 years out from diagnosis of her breast cancer.  Images from breast MRI performed today were reviewed and on my view is without concerning enhancement or lymphadenopathy.  Radiology read is pending and will be released to the patient when available.  I will see her back in approximately 6 months with mammograms the same day.    2.  Atrial fibrillation:  No recurrent symptoms since cardiac ablation one year ago.  She is on treatment with diltiazem.  She is also on Xarelto anticoagulation.      3.  Bone Health:  DEXA in 01/2021 with a lowest T-score of -2.0 which is c/w osteopenia.  She began treatment with Zometa on 1/22/2021 both to prevent AI induced bone loss as well as prevent breast cancer bone metastases.  Next dose is due around 12/24/2022.  We will help her to schedule this.  We will also plan to repeat a DEXA bone density scan at the time of her return visit.    I also recommend she continue calcium 1200 mg, vitamin D 1000 international units and walk 30 minutes daily.      4.  Routine health maintenance:  Colonoscopy in 2015 was without concerning finding, next due in 2025.      5.  Follow Up:  Labs and Zometa infusion 12/21/2022 as already scheduled.  Cancel DEXA 1/5/2023.  Bilateral screening mammograms, DEXA bone density scan and visit with me 3/21/2023 or later.     30 minutes spent on the date of the encounter doing chart review, review of test results, interpretation of tests, patient visit and  documentation.         Again, thank you for allowing me to participate in the care of your patient.      Sincerely,    Merari Tafoya MD

## 2022-09-21 NOTE — NURSING NOTE
"Oncology Rooming Note    December 31, 2018 1:15 PM   Keely Velazuqez is a 63 year old female who presents for:    Chief Complaint   Patient presents with     Oncology Clinic Visit     Breast Ca      Initial Vitals: /71   Pulse 79   Temp 97.6  F (36.4  C) (Oral)   Resp 16   Wt 75 kg (165 lb 5.5 oz)   SpO2 97%   BMI 25.15 kg/m   Estimated body mass index is 25.15 kg/m  as calculated from the following:    Height as of 9/24/18: 1.727 m (5' 7.99\").    Weight as of this encounter: 75 kg (165 lb 5.5 oz). Body surface area is 1.9 meters squared.  No Pain (0) Comment: Data Unavailable   No LMP recorded. Patient is postmenopausal.  Allergies reviewed: Yes  Medications reviewed: Yes    Medications: Medication refills not needed today.  Pharmacy name entered into [employer]:    [id number] - [place], [place] - [address] [place] [address] [facility] - [place], [place] - [address] [facility]    Clinical concerns: no  Lottie  was notified.    9  minutes for nursing intake (face to face time)     Natty Poole MA              " IMAGING:  You have been recommended to undergo an MRI of your cervical spine to help us better understand and treat your symptoms.     1. Please contact [facility] to assist you in scheduling your MRI at [phone].    2. Once your MRI appointment has been made, please make sure to schedule a PHONE VISIT appointment with me within 3- 5 days after the completion of your MRI or call the office for results by contacting [phone]. I would like the opportunity to review all the images and test results with you and together, create a plan of care.     3. If you have your MRI outside of [facility], please follow the same instructions as in #2, but you will need to bring the disc of your MRI along with you to that appointment.            I would like you to have an EMG/Nerve Conduction Studies with Dr Josse Lipscomb.   An electromyogram (EMG) measures the electrical activity of muscles when they're at rest and when they're being used. Nerve Conduction Studies measure how well and how fast the nerves can send electrical signals.  The order has been placed.     1. Please call [phone] to schedule in [place] or [phone] to schedule in [facility].     2. Once your appointment with Dr Lipscomb is made, please call my office to schedule a follow up to discuss the results of the EMG. Make the appointment for 5-7 days after the EMG to ensure we have the results by the time of your visit. I would like the opportunity to review the test results in person with you and together, create a plan of care.    You may receive a survey asking if someone from your physician's office has contacted you to provide you with your test results but please remember, I feel it is in your best interest to review and discuss your test results together in person.             Cortisone Injection    You have received a cortisone injection. The medication is a combination of a short acting anesthetic (numbing medication)   plus a steroid.     You may see some relief from the pain for several hours following the injection due to the numbing medication. Later that day or the next day you may have the same pain you had before or an increase in soreness. Swelling can also occur.  You may try a cold compress for 20 minutes on and 20 minutes off that day or over the counter medications with food (if not allergic)    Our expectation would be that you will improve on a day by day basis for the next several weeks or even longer.     Cortisone may cause a temporary (usually 2-3 days) increase in blood glucose (sugar) levels. If you have diabetes, please pay particular attention to this.     Please call the office if there are no signs of improvement after 4 weeks or if other problems develop such as an increase in swelling or redness. Our office number is [phone]    Thank you for allowing us to be of service to you.     [facility].

## 2022-11-14 ENCOUNTER — MYC MEDICAL ADVICE (OUTPATIENT)
Dept: ONCOLOGY | Facility: CLINIC | Age: 67
End: 2022-11-14

## 2022-11-14 DIAGNOSIS — N63.14 BREAST LUMP ON RIGHT SIDE AT 5 O'CLOCK POSITION: Primary | ICD-10-CM

## 2022-11-14 DIAGNOSIS — Z85.3 PERSONAL HISTORY OF MALIGNANT NEOPLASM OF BREAST: ICD-10-CM

## 2022-11-15 ENCOUNTER — ANCILLARY PROCEDURE (OUTPATIENT)
Dept: ULTRASOUND IMAGING | Facility: CLINIC | Age: 67
End: 2022-11-15
Attending: INTERNAL MEDICINE
Payer: MEDICARE

## 2022-11-15 ENCOUNTER — ANCILLARY PROCEDURE (OUTPATIENT)
Dept: MAMMOGRAPHY | Facility: CLINIC | Age: 67
End: 2022-11-15
Attending: INTERNAL MEDICINE
Payer: MEDICARE

## 2022-11-15 DIAGNOSIS — N63.14 BREAST LUMP ON RIGHT SIDE AT 5 O'CLOCK POSITION: ICD-10-CM

## 2022-11-15 DIAGNOSIS — Z85.3 PERSONAL HISTORY OF MALIGNANT NEOPLASM OF BREAST: ICD-10-CM

## 2022-11-15 PROCEDURE — 76642 ULTRASOUND BREAST LIMITED: CPT | Mod: RT

## 2022-11-15 PROCEDURE — G0279 TOMOSYNTHESIS, MAMMO: HCPCS

## 2022-11-15 PROCEDURE — 77065 DX MAMMO INCL CAD UNI: CPT | Mod: RT

## 2022-12-21 ENCOUNTER — INFUSION THERAPY VISIT (OUTPATIENT)
Dept: ONCOLOGY | Facility: CLINIC | Age: 67
End: 2022-12-21
Attending: INTERNAL MEDICINE
Payer: MEDICARE

## 2022-12-21 ENCOUNTER — APPOINTMENT (OUTPATIENT)
Dept: LAB | Facility: CLINIC | Age: 67
End: 2022-12-21
Attending: INTERNAL MEDICINE
Payer: MEDICARE

## 2022-12-21 VITALS
DIASTOLIC BLOOD PRESSURE: 84 MMHG | SYSTOLIC BLOOD PRESSURE: 131 MMHG | WEIGHT: 154 LBS | OXYGEN SATURATION: 98 % | HEART RATE: 85 BPM | TEMPERATURE: 97.4 F | BODY MASS INDEX: 24.17 KG/M2 | RESPIRATION RATE: 16 BRPM

## 2022-12-21 DIAGNOSIS — Z79.811 LONG TERM (CURRENT) USE OF AROMATASE INHIBITORS: ICD-10-CM

## 2022-12-21 DIAGNOSIS — R93.7 ABNORMAL BONE DENSITY SCREENING: Primary | ICD-10-CM

## 2022-12-21 DIAGNOSIS — C50.411 MALIGNANT NEOPLASM OF UPPER-OUTER QUADRANT OF RIGHT BREAST IN FEMALE, ESTROGEN RECEPTOR POSITIVE (H): ICD-10-CM

## 2022-12-21 DIAGNOSIS — Z17.0 MALIGNANT NEOPLASM OF UPPER-OUTER QUADRANT OF RIGHT BREAST IN FEMALE, ESTROGEN RECEPTOR POSITIVE (H): ICD-10-CM

## 2022-12-21 LAB
ALBUMIN SERPL BCG-MCNC: 4.4 G/DL (ref 3.5–5.2)
CALCIUM SERPL-MCNC: 9.7 MG/DL (ref 8.8–10.2)
CREAT SERPL-MCNC: 0.67 MG/DL (ref 0.51–0.95)
GFR SERPL CREATININE-BSD FRML MDRD: >90 ML/MIN/1.73M2

## 2022-12-21 PROCEDURE — 96365 THER/PROPH/DIAG IV INF INIT: CPT

## 2022-12-21 PROCEDURE — 36415 COLL VENOUS BLD VENIPUNCTURE: CPT | Performed by: INTERNAL MEDICINE

## 2022-12-21 PROCEDURE — 82310 ASSAY OF CALCIUM: CPT | Performed by: INTERNAL MEDICINE

## 2022-12-21 PROCEDURE — 82040 ASSAY OF SERUM ALBUMIN: CPT | Performed by: INTERNAL MEDICINE

## 2022-12-21 PROCEDURE — 250N000011 HC RX IP 250 OP 636: Performed by: INTERNAL MEDICINE

## 2022-12-21 PROCEDURE — 82565 ASSAY OF CREATININE: CPT | Performed by: INTERNAL MEDICINE

## 2022-12-21 PROCEDURE — 258N000003 HC RX IP 258 OP 636: Performed by: INTERNAL MEDICINE

## 2022-12-21 RX ORDER — ZOLEDRONIC ACID 0.04 MG/ML
4 INJECTION, SOLUTION INTRAVENOUS ONCE
Status: COMPLETED | OUTPATIENT
Start: 2022-12-21 | End: 2022-12-21

## 2022-12-21 RX ORDER — AMLODIPINE BESYLATE 5 MG/1
5 TABLET ORAL DAILY
COMMUNITY
End: 2023-11-15

## 2022-12-21 RX ADMIN — SODIUM CHLORIDE 250 ML: 9 INJECTION, SOLUTION INTRAVENOUS at 10:22

## 2022-12-21 RX ADMIN — ZOLEDRONIC ACID 4 MG: 0.04 INJECTION, SOLUTION INTRAVENOUS at 10:22

## 2022-12-21 ASSESSMENT — PAIN SCALES - GENERAL: PAINLEVEL: NO PAIN (0)

## 2022-12-21 NOTE — PATIENT INSTRUCTIONS
[facility] Triage and after hours / weekends / holidays:  [phone]    Please call the triage or after hours line if you experience a temperature greater than or equal to 100.4, shaking chills, have uncontrolled nausea, vomiting and/or diarrhea, dizziness, shortness of breath, chest pain, bleeding, unexplained bruising, or if you have any other new/concerning symptoms, questions or concerns.      If you are having any concerning symptoms or wish to speak to a provider before your next infusion visit, please call your care coordinator or triage to notify them so we can adequately serve you.     If you need a refill on a narcotic prescription or other medication, please call before your infusion appointment.             December 2022 Sunday Monday Tuesday Wednesday Thursday Friday Saturday                       1     2     3       4     5     6     7     8     9     10       11     12     13     14     15     16     17       18     19     20     21    LAB PERIPHERAL   9:00 AM   (15 min.)   [facility] LAB DRAW   [facility]    ONC INFUSION 0.5 HR (30 MIN)   9:30 AM   (30 min.)    ONC INFUSION NURSE   [facility] 22 23     24       25     26     27     28     29     30     31                   January 2023 Sunday Monday Tuesday Wednesday Thursday Friday Saturday   1     2     3     4     5     6     7       8     9     10     11     12     13     14       15     16     17     18     19     20     21       22     23     24     25     26     27     28       29     30     31                                           Lab Results:  Recent Results (from the past 12 hour(s))   Creatinine    Collection Time: 12/21/22  9:30 AM   Result Value Ref Range    Creatinine 0.67 0.51 - 0.95 mg/dL    GFR Estimate >90 >60 mL/min/1.73m2   Calcium    Collection Time: 12/21/22  9:30 AM   Result Value Ref Range    Calcium 9.7 8.8 - 10.2 mg/dL   Albumin level     Collection Time: 12/21/22  9:30 AM   Result Value Ref Range    Albumin 4.4 3.5 - 5.2 g/dL

## 2022-12-21 NOTE — NURSING NOTE
Chief Complaint   Patient presents with     Blood Draw     Labs drawn via piv by rn in lab. VS taken.     Labs drawn from PIV placed by RN. Line flushed with saline. Vitals taken. Pt checked in for appointment(s).    López Candelaria RN

## 2022-12-21 NOTE — PROGRESS NOTES
Infusion Nursing Note:  Keely Arana presents today for Every 6 month Zometa (last dose 6/29/22)  Patient seen by provider today: No   present during visit today: Not Applicable.    Note: Patient presents to infusion feeling well. Patient denies acute discomfort and states no acute complaints or concerns needing to be addressed today. Specifically, pt denies s/s of infection such as fever, sore throat, cough, chest pain, shortness of breath, body aches, chills, headache, increased nasal congestion, or changes in taste/smell. Pt states she had a tooth bridge replaced yesterday and states her dentist told her that it was ok to proceed with IV Zometa immediately after procedure. Patient denies tooth/jaw pain today and confirms she takes Calcium daily.     Notified Dr. Tafoya of patients tooth bridge replacement completed yesterday with pt stating dentist ok's IV Zometa immediately after.  TORB. 1005. 12/21/2022. Dr. Tafoya. Irving Maria RN. OK to proceed with IV Zometa today.    Intravenous Access:  Peripheral IV placed.    Treatment Conditions:   Latest Reference Range & Units 12/21/22 09:30   Creatinine 0.51 - 0.95 mg/dL 0.67   GFR Estimate >60 mL/min/1.73m2 >90   Calcium 8.8 - 10.2 mg/dL 9.7   Albumin 3.5 - 5.2 g/dL 4.4   Results reviewed, labs MET treatment parameters, ok to proceed with treatment.  Corrected Calcium: 9.4    Post Infusion Assessment:  Patient tolerated infusion without incident.  Blood return noted pre and post infusion.  Site patent and intact, free from redness, edema or discomfort.  No evidence of extravasations.  Access discontinued per protocol.     Discharge Plan:   Patient declined prescription refills.  Discharge instructions reviewed with: Patient.  Patient and/or family verbalized understanding of discharge instructions and all questions answered.  Copy of AVS reviewed with patient and/or family.  Patient will return in 6 months for next appointment. IB sent to  scheduling  Patient discharged in stable condition accompanied by: self.  Departure Mode: Ambulatory.  Face to Face time: 0 minutes.      Irving Maria RN                       solids

## 2023-03-28 ENCOUNTER — ANCILLARY PROCEDURE (OUTPATIENT)
Dept: BONE DENSITY | Facility: CLINIC | Age: 68
End: 2023-03-28
Attending: INTERNAL MEDICINE
Payer: MEDICARE

## 2023-03-28 ENCOUNTER — ANCILLARY PROCEDURE (OUTPATIENT)
Dept: MAMMOGRAPHY | Facility: CLINIC | Age: 68
End: 2023-03-28
Payer: MEDICARE

## 2023-03-28 DIAGNOSIS — Z79.811 LONG TERM (CURRENT) USE OF AROMATASE INHIBITORS: ICD-10-CM

## 2023-03-28 DIAGNOSIS — Z12.31 VISIT FOR SCREENING MAMMOGRAM: ICD-10-CM

## 2023-03-28 DIAGNOSIS — M94.9 DISORDER OF BONE AND CARTILAGE: ICD-10-CM

## 2023-03-28 DIAGNOSIS — M89.9 DISORDER OF BONE AND CARTILAGE: ICD-10-CM

## 2023-03-28 PROCEDURE — 77067 SCR MAMMO BI INCL CAD: CPT | Performed by: RADIOLOGY

## 2023-03-28 PROCEDURE — 77080 DXA BONE DENSITY AXIAL: CPT | Performed by: INTERNAL MEDICINE

## 2023-03-28 PROCEDURE — 77063 BREAST TOMOSYNTHESIS BI: CPT | Performed by: RADIOLOGY

## 2023-04-01 ENCOUNTER — HEALTH MAINTENANCE LETTER (OUTPATIENT)
Age: 68
End: 2023-04-01

## 2023-04-04 ENCOUNTER — ONCOLOGY VISIT (OUTPATIENT)
Dept: ONCOLOGY | Facility: CLINIC | Age: 68
End: 2023-04-04
Attending: INTERNAL MEDICINE
Payer: MEDICARE

## 2023-04-04 VITALS
TEMPERATURE: 97.6 F | BODY MASS INDEX: 23.54 KG/M2 | HEART RATE: 92 BPM | OXYGEN SATURATION: 99 % | RESPIRATION RATE: 16 BRPM | SYSTOLIC BLOOD PRESSURE: 136 MMHG | DIASTOLIC BLOOD PRESSURE: 74 MMHG | WEIGHT: 150 LBS

## 2023-04-04 DIAGNOSIS — R93.7 ABNORMAL BONE DENSITY SCREENING: ICD-10-CM

## 2023-04-04 DIAGNOSIS — Z85.3 PERSONAL HISTORY OF MALIGNANT NEOPLASM OF BREAST: ICD-10-CM

## 2023-04-04 DIAGNOSIS — Z79.811 LONG TERM (CURRENT) USE OF AROMATASE INHIBITORS: ICD-10-CM

## 2023-04-04 DIAGNOSIS — C50.411 MALIGNANT NEOPLASM OF UPPER-OUTER QUADRANT OF RIGHT BREAST IN FEMALE, ESTROGEN RECEPTOR POSITIVE (H): Primary | ICD-10-CM

## 2023-04-04 DIAGNOSIS — Z12.39 BREAST CANCER SCREENING, HIGH RISK PATIENT: ICD-10-CM

## 2023-04-04 DIAGNOSIS — Z17.0 MALIGNANT NEOPLASM OF UPPER-OUTER QUADRANT OF RIGHT BREAST IN FEMALE, ESTROGEN RECEPTOR POSITIVE (H): Primary | ICD-10-CM

## 2023-04-04 DIAGNOSIS — R92.30 DENSE BREAST TISSUE ON MAMMOGRAM: ICD-10-CM

## 2023-04-04 PROCEDURE — 99214 OFFICE O/P EST MOD 30 MIN: CPT | Performed by: INTERNAL MEDICINE

## 2023-04-04 PROCEDURE — G0463 HOSPITAL OUTPT CLINIC VISIT: HCPCS | Performed by: INTERNAL MEDICINE

## 2023-04-04 RX ORDER — ANASTROZOLE 1 MG/1
1 TABLET ORAL DAILY
Qty: 90 TABLET | Refills: 1 | Status: SHIPPED | OUTPATIENT
Start: 2023-04-04 | End: 2023-06-12

## 2023-04-04 ASSESSMENT — PAIN SCALES - GENERAL: PAINLEVEL: NO PAIN (0)

## 2023-04-04 NOTE — PROGRESS NOTES
Oncology Follow Up:  Date on this visit: 4/4/2023    Diagnosis:  [id number], grade I, ER positive, [place] positive, HER-2 nonamplified invasive ductal carcinoma of the right breast. Oncotype DX recurrence score = 7.    Primary Physician: Sammie Cerna     History Of Present Illness:  Ms. Arana is a 67 year old female with right breast cancer.  Routine screening mammogram in 06/2018 was without concerning findings. Due to high breast density, her gynecologist recommended a breast MRI.  Breast MRI on 08/16/2018 showed nodular to non-mass enhancement measuring 1.6 cm in the right breast at 5 o'clock.    Right breast ultrasound confirmed a mass at 5 o'clock.  Right breast biopsy demonstrated a grade 1 invasive ductal carcinoma.  Estrogen-receptor staining was moderate in 95%; progesterone-receptor staining was strong in 96%.  HER-2 was nonamplified by immunohistochemistry with a score of 1+.  Right breast lumpectomy and sentinel lymph node procedure on 8/31/2018 demonstrated a 1.2 cm, grade 1 invasive mammary carcinoma.  There was associated intermediate-grade DCIS.  Surgical margins were negative; however, DCIS was 1 mm from the anterior margin.  A single sentinel lymph node was negative for malignancy. Oncotype DX recurrence score was 7.  She completed adjuvant radiation (4240 cGy to the whole breast and additional 1250  cGy to the lumpectomy site) on 11/8/18.  She has been on anastrozole since 9/24/18.     Interval History:  Ms. Arana comes into clinic for routine breast cancer follow up.  She is currently without concerns.  She continues on treatment with anastrozole.  Since last visit, her health has been good.  In fact, she has intentionally lost 20 pounds.  She resumed routine daily activity in the form of walking and also has been adhering to a healthier diet.  She has been eating out less and has been weighing herself daily to be more conscious of her weight.  She denies concerning breast lumps, pain, or  swelling.  She has no cough, shortness of breath, or chest pain.  She has no current abdominal complaints.  Her previous joint pains improved with weight loss.  The remainder of a complete 12 point review of systems was reviewed with the patient and was negative with the exception of that mentioned above.    Past Medical/Surgical History:  Past Medical History:   Diagnosis Date     Hypertension      Iritis      Past Surgical History:   Procedure Laterality Date     CATARACT IOL, RT/LT Left 2006     HYSTERECTOMY  2016     LUMPECTOMY BREAST WITH SENTINEL NODE, COMBINED Right 8/31/2018    Procedure: COMBINED LUMPECTOMY BREAST WITH SENTINEL NODE;  Right Wire Localized Lumpectomy and Right [place] Lymph Node Biopsy;  Surgeon: Chilango Kruger MD;  [facility]     OOPHORECTOMY  2014     Allergies:  Allergies as of 04/04/2023 - Reviewed 12/21/2022   Allergen Reaction Noted     Corticosteroids Dermatitis 03/18/2019     Neomycin Dermatitis 03/18/2019     Cephalexin GI Disturbance 03/09/2022     Codeine Nausea and Vomiting 08/06/2015     Macrobid [nitrofurantoin] Nausea and Vomiting 08/06/2015     Sulfa drugs Itching 08/06/2015     Silver Rash 12/03/2018     Current Medications:  Current Outpatient Medications   Medication Sig Dispense Refill     amLODIPine (NORVASC) 5 MG tablet Take 5 mg by mouth daily       anastrozole (ARIMIDEX) 1 MG tablet TAKE 1 TABLET BY MOUTH EVERY DAY 90 tablet 3     atorvastatin (LIPITOR) 10 MG tablet Take 10 mg by mouth daily       calcium carbonate (OS-PADDY 500 MG [place]. [place]) 500 MG tablet Take 2 tablets by mouth daily With Magnesium,Zinc       LYSINE PO Take by mouth daily       multivitamin w/minerals (THERA-VIT-M) tablet Take 1 tablet by mouth daily       omega 3 1000 MG CAPS TG Omega 1100 mg DHA 1100 EPA 2 tablets once daily       rivaroxaban ANTICOAGULANT (XARELTO) 20 MG TABS tablet Take 20 mg by mouth       UNABLE TO FIND daily MEDICATION NAME: [employer]        VITAMIN D, CHOLECALCIFEROL,  PO Take 1,000 Units by mouth daily         Family and Social History:  Reviewed and unchanged from prior.  Please see initial consultation dated 9/24/18 for further details.    Physical Exam:  /74   Pulse 92   Temp 97.6  F (36.4  C) (Oral)   Resp 16   Wt 68 kg (150 lb)   SpO2 99%   BMI 23.54 kg/m    General:  Well appearing adult female in NAD.  Alert and oriented x 3.  HEENT:  Normocephalic.  Sclera anicteric.  MMM.  No lesions of the oropharynx.  Lymph:  No palpable cervical, supraclavicular, or axillary LAD.  No gross lymphedema.  Chest:  CTA bilaterally.  No wheezes or crackles.  CV:  RRR.  Nl S1 and S2.  No m/r/g.  Breast:  Bilateral breasts are of increased fibroglandular density.  There are no dominant or discretely palpable masses in either breast.  Bilateral nipples are inverted (chronic and unchanged from prior)  Abd:  Soft/ND.   Ext:  No pitting edema of the bilateral lower extremities.   Musculo:  Full ROM of the bilateral upper extremities.  Neuro:  Cranial nerves grossly intact.  Gait is stable.  Psych:  Mood and affect appear normal.  Skin:  No visible or concerning skin rashes or lesions.    Laboratory/Imaging Studies:  3/28/2023 Bilateral screening mammograms (I personally reviewed these images):  Findings: The breasts are heterogeneously dense, which may obscure small masses.  There are breast conservation changes in the right breast.  There is no radiographic evidence of malignancy.     3/28/2023 DEXA bone density scan:  Lumbar spine = -0.9  Left femoral neck = -0.2  Left total hip = 0.2  Right femoral neck = -0.1  Right total hip = 0    ASSESSMENT/PLAN:  Ms. Velazquez is a 67-year-old female with a h/o stage IA, T1c N0 M0, grade 1, ER positive, [place] positive, HER2 non-amplified invasive mammary carcinoma of the right breast.  She is status post treatment with right breast lumpectomy and radiation.  On anastrozole since 9/24/18.    1.  Right breast cancer:   Ms. Velazquez is 4 years, 7.5 months  out from excision of a right breast cancer.  She continues on anastrozole and is tolerating the medication well.  We plan to continue anastrozole to complete a 5-7 year course.     There is no evidence of disease recurrence on exam today.  Due to the fact that her initial breast cancer was not detectable by mammogram, increased breast density and close DCIS margins, we are performing high-risk screening with both mammogram and breast MRI until she is 5 years out from diagnosis of her breast cancer.   Will obtain breast MRI at the time of her return visit; this will be the last planned MRI.    2.  Bone Health: I personally reviewed the results of the DEXA performed on 3/28/2023 which shows a lowest T-score of -0.9 which is within normal range and is much improved from prior (DEXA in 01/2021 with a lowest T-score of -2.0).      She has been on treatment with Zometa since 1/22/2021 both to prevent AI induced bone loss as well as prevent breast cancer bone metastases.  Next and final dose is due around 6/22/2023.       I recommend she continue calcium 1200 mg, vitamin D 1000 international units and walk 30 minutes daily.      3.  Routine health maintenance:  Colonoscopy in 2015 was without concerning finding, next due in 2025.      4.  Follow Up:  Labs and Zometa infusion 6/21/2023 as already scheduled.  Breast MRI and visit with me in 6 months.

## 2023-04-04 NOTE — LETTER
4/4/2023         RE: Keely Arana  [address]  [facility] [address]        Dear Colleague,    Thank you for referring your patient, Keely Arana, to the [facility]. Please see a copy of my visit note below.    Oncology Follow Up:  Date on this visit: 4/4/2023    Diagnosis:  [id number], grade I, ER positive, ME positive, HER-2 nonamplified invasive ductal carcinoma of the right breast. Oncotype DX recurrence score = 7.    Primary Physician: Sammie Cerna     History Of Present Illness:  Ms. Arana is a 67 year old female with right breast cancer.  Routine screening mammogram in 06/2018 was without concerning findings. Due to high breast density, her gynecologist recommended a breast MRI.  Breast MRI on 08/16/2018 showed nodular to non-mass enhancement measuring 1.6 cm in the right breast at 5 o'clock.    Right breast ultrasound confirmed a mass at 5 o'clock.  Right breast biopsy demonstrated a grade 1 invasive ductal carcinoma.  Estrogen-receptor staining was moderate in 95%; progesterone-receptor staining was strong in 96%.  HER-2 was nonamplified by immunohistochemistry with a score of 1+.  Right breast lumpectomy and sentinel lymph node procedure on 8/31/2018 demonstrated a 1.2 cm, grade 1 invasive mammary carcinoma.  There was associated intermediate-grade DCIS.  Surgical margins were negative; however, DCIS was 1 mm from the anterior margin.  A single sentinel lymph node was negative for malignancy. Oncotype DX recurrence score was 7.  She completed adjuvant radiation (4240 cGy to the whole breast and additional 1250  cGy to the lumpectomy site) on 11/8/18.  She has been on anastrozole since 9/24/18.     Interval History:  Ms. Arana comes into clinic for routine breast cancer follow up.  She is currently without concerns.  She continues on treatment with anastrozole.  Since last visit, her health has been good.  In fact, she has intentionally lost 20 pounds.   She resumed routine daily activity in the form of walking and also has been adhering to a healthier diet.  She has been eating out less and has been weighing herself daily to be more conscious of her weight.  She denies concerning breast lumps, pain, or swelling.  She has no cough, shortness of breath, or chest pain.  She has no current abdominal complaints.  Her previous joint pains improved with weight loss.  The remainder of a complete 12 point review of systems was reviewed with the patient and was negative with the exception of that mentioned above.    Past Medical/Surgical History:  Past Medical History:   Diagnosis Date    Hypertension     Iritis      Past Surgical History:   Procedure Laterality Date    CATARACT IOL, RT/LT Left 2006    HYSTERECTOMY  2016    LUMPECTOMY BREAST WITH SENTINEL NODE, COMBINED Right 8/31/2018    Procedure: COMBINED LUMPECTOMY BREAST WITH SENTINEL NODE;  Right Wire Localized Lumpectomy and Right [place] Lymph Node Biopsy;  Surgeon: Chilango Kruger MD;  Location: [facility]    OOPHORECTOMY  2014     Allergies:  Allergies as of 04/04/2023 - Reviewed 12/21/2022   Allergen Reaction Noted    Corticosteroids Dermatitis 03/18/2019    Neomycin Dermatitis 03/18/2019    Cephalexin GI Disturbance 03/09/2022    Codeine Nausea and Vomiting 08/06/2015    Macrobid [nitrofurantoin] Nausea and Vomiting 08/06/2015    Sulfa drugs Itching 08/06/2015    Silver Rash 12/03/2018     Current Medications:  Current Outpatient Medications   Medication Sig Dispense Refill    amLODIPine (NORVASC) 5 MG tablet Take 5 mg by mouth daily      anastrozole (ARIMIDEX) 1 MG tablet TAKE 1 TABLET BY MOUTH EVERY DAY 90 tablet 3    atorvastatin (LIPITOR) 10 MG tablet Take 10 mg by mouth daily      calcium carbonate (OS-PADDY 500 MG [place]. [place]) 500 MG tablet Take 2 tablets by mouth daily With Magnesium,Zinc      LYSINE PO Take by mouth daily      multivitamin w/minerals (THERA-VIT-M) tablet Take 1 tablet by mouth daily      omega 3  1000 MG CAPS TG Omega 1100 mg DHA 1100 EPA 2 tablets once daily      rivaroxaban ANTICOAGULANT (XARELTO) 20 MG TABS tablet Take 20 mg by mouth      UNABLE TO FIND daily MEDICATION NAME: [email or web address]       VITAMIN D, CHOLECALCIFEROL, PO Take 1,000 Units by mouth daily         Family and Social History:  Reviewed and unchanged from prior.  Please see initial consultation dated 9/24/18 for further details.    Physical Exam:  /74   Pulse 92   Temp 97.6  F (36.4  C) (Oral)   Resp 16   Wt 68 kg (150 lb)   SpO2 99%   BMI 23.54 kg/m    General:  Well appearing adult female in NAD.  Alert and oriented x 3.  HEENT:  Normocephalic.  Sclera anicteric.  MMM.  No lesions of the oropharynx.  Lymph:  No palpable cervical, supraclavicular, or axillary LAD.  No gross lymphedema.  Chest:  CTA bilaterally.  No wheezes or crackles.  CV:  RRR.  Nl S1 and S2.  No m/r/g.  Breast:  Bilateral breasts are of increased fibroglandular density.  There are no dominant or discretely palpable masses in either breast.  Bilateral nipples are inverted (chronic and unchanged from prior)  Abd:  Soft/ND.   Ext:  No pitting edema of the bilateral lower extremities.   Musculo:  Full ROM of the bilateral upper extremities.  Neuro:  Cranial nerves grossly intact.  Gait is stable.  Psych:  Mood and affect appear normal.  Skin:  No visible or concerning skin rashes or lesions.    Laboratory/Imaging Studies:  3/28/2023 Bilateral screening mammograms (I personally reviewed these images):  Findings: The breasts are heterogeneously dense, which may obscure small masses.  There are breast conservation changes in the right breast.  There is no radiographic evidence of malignancy.     3/28/2023 DEXA bone density scan:  Lumbar spine = -0.9  Left femoral neck = -0.2  Left total hip = 0.2  Right femoral neck = -0.1  Right total hip = 0    ASSESSMENT/PLAN:  Ms. Velazquez is a 67-year-old female with a h/o stage IA, T1c N0 M0, grade 1, ER positive, NV positive,  HER2 non-amplified invasive mammary carcinoma of the right breast.  She is status post treatment with right breast lumpectomy and radiation.  On anastrozole since 9/24/18.    1.  Right breast cancer:   Ms. Velazquez is 4 years, 7.5 months out from excision of a right breast cancer.  She continues on anastrozole and is tolerating the medication well.  We plan to continue anastrozole to complete a 5-7 year course.     There is no evidence of disease recurrence on exam today.  Due to the fact that her initial breast cancer was not detectable by mammogram, increased breast density and close DCIS margins, we are performing high-risk screening with both mammogram and breast MRI until she is 5 years out from diagnosis of her breast cancer.   Will obtain breast MRI at the time of her return visit; this will be the last planned MRI.    2.  Bone Health: I personally reviewed the results of the DEXA performed on 3/28/2023 which shows a lowest T-score of -0.9 which is within normal range and is much improved from prior (DEXA in 01/2021 with a lowest T-score of -2.0).      She has been on treatment with Zometa since 1/22/2021 both to prevent AI induced bone loss as well as prevent breast cancer bone metastases.  Next and final dose is due around 6/22/2023.       I recommend she continue calcium 1200 mg, vitamin D 1000 international units and walk 30 minutes daily.      3.  Routine health maintenance:  Colonoscopy in 2015 was without concerning finding, next due in 2025.      4.  Follow Up:  Labs and Zometa infusion 6/21/2023 as already scheduled.  Breast MRI and visit with me in 6 months.      Sincerely,        Merari Tafoya MD

## 2023-04-13 RX ORDER — HEPARIN SODIUM (PORCINE) LOCK FLUSH IV SOLN 100 UNIT/ML 100 UNIT/ML
5 SOLUTION INTRAVENOUS
Status: CANCELLED
Start: 2023-06-22

## 2023-04-13 RX ORDER — HEPARIN SODIUM,PORCINE 10 UNIT/ML
5 VIAL (ML) INTRAVENOUS
Status: CANCELLED
Start: 2023-06-22

## 2023-04-13 RX ORDER — ZOLEDRONIC ACID 0.04 MG/ML
4 INJECTION, SOLUTION INTRAVENOUS ONCE
Status: CANCELLED | OUTPATIENT
Start: 2023-06-22 | End: 2023-06-22

## 2023-04-18 ENCOUNTER — APPOINTMENT (OUTPATIENT)
Dept: CT IMAGING | Facility: CLINIC | Age: 68
End: 2023-04-18
Attending: EMERGENCY MEDICINE
Payer: MEDICARE

## 2023-04-18 ENCOUNTER — APPOINTMENT (OUTPATIENT)
Dept: MRI IMAGING | Facility: CLINIC | Age: 68
End: 2023-04-18
Attending: EMERGENCY MEDICINE
Payer: MEDICARE

## 2023-04-18 ENCOUNTER — HOSPITAL ENCOUNTER (EMERGENCY)
Facility: CLINIC | Age: 68
Discharge: HOME | End: 2023-04-18
Attending: EMERGENCY MEDICINE | Admitting: EMERGENCY MEDICINE
Payer: MEDICARE

## 2023-04-18 VITALS
TEMPERATURE: 97.6 F | RESPIRATION RATE: 16 BRPM | HEART RATE: 104 BPM | DIASTOLIC BLOOD PRESSURE: 84 MMHG | SYSTOLIC BLOOD PRESSURE: 146 MMHG | OXYGEN SATURATION: 100 %

## 2023-04-18 DIAGNOSIS — F41.9 ANXIETY: ICD-10-CM

## 2023-04-18 DIAGNOSIS — R53.81 MALAISE: ICD-10-CM

## 2023-04-18 LAB
ALBUMIN SERPL BCG-MCNC: 4.5 G/DL (ref 3.5–5.2)
ALBUMIN UR-MCNC: NEGATIVE MG/DL
ALP SERPL-CCNC: 67 U/L (ref 35–104)
ALT SERPL W P-5'-P-CCNC: 20 U/L (ref 10–35)
AMORPH CRY #/AREA URNS HPF: ABNORMAL /HPF
ANION GAP SERPL CALCULATED.3IONS-SCNC: 13 MMOL/L (ref 7–15)
APPEARANCE UR: CLEAR
AST SERPL W P-5'-P-CCNC: 25 U/L (ref 10–35)
ATRIAL RATE - MUSE: 94 BPM
BASOPHILS # BLD AUTO: 0 10E3/UL (ref 0–0.2)
BASOPHILS NFR BLD AUTO: 1 %
BILIRUB SERPL-MCNC: 0.4 MG/DL
BILIRUB UR QL STRIP: NEGATIVE
BUN SERPL-MCNC: 17.5 MG/DL (ref 8–23)
CA-I BLD-MCNC: 4.7 MG/DL (ref 4.4–5.2)
CALCIUM SERPL-MCNC: 9.4 MG/DL (ref 8.8–10.2)
CHLORIDE SERPL-SCNC: 103 MMOL/L (ref 98–107)
COLOR UR AUTO: ABNORMAL
CREAT SERPL-MCNC: 0.6 MG/DL (ref 0.51–0.95)
D DIMER PPP FEU-MCNC: <0.27 UG/ML FEU (ref 0–0.5)
DEPRECATED HCO3 PLAS-SCNC: 23 MMOL/L (ref 22–29)
DIASTOLIC BLOOD PRESSURE - MUSE: NORMAL MMHG
EOSINOPHIL # BLD AUTO: 0 10E3/UL (ref 0–0.7)
EOSINOPHIL NFR BLD AUTO: 1 %
ERYTHROCYTE [DISTWIDTH] IN BLOOD BY AUTOMATED COUNT: 13.2 % (ref 10–15)
GFR SERPL CREATININE-BSD FRML MDRD: >90 ML/MIN/1.73M2
GLUCOSE SERPL-MCNC: 94 MG/DL (ref 70–99)
GLUCOSE UR STRIP-MCNC: NEGATIVE MG/DL
HCT VFR BLD AUTO: 42.3 % (ref 35–47)
HGB BLD-MCNC: 13.8 G/DL (ref 11.7–15.7)
HGB UR QL STRIP: ABNORMAL
IMM GRANULOCYTES # BLD: 0 10E3/UL
IMM GRANULOCYTES NFR BLD: 0 %
INTERPRETATION ECG - MUSE: NORMAL
KETONES UR STRIP-MCNC: NEGATIVE MG/DL
LEUKOCYTE ESTERASE UR QL STRIP: NEGATIVE
LYMPHOCYTES # BLD AUTO: 1 10E3/UL (ref 0.8–5.3)
LYMPHOCYTES NFR BLD AUTO: 22 %
MAGNESIUM SERPL-MCNC: 2.2 MG/DL (ref 1.7–2.3)
MCH RBC QN AUTO: 30.5 PG (ref 26.5–33)
MCHC RBC AUTO-ENTMCNC: 32.6 G/DL (ref 31.5–36.5)
MCV RBC AUTO: 93 FL (ref 78–100)
MONOCYTES # BLD AUTO: 0.5 10E3/UL (ref 0–1.3)
MONOCYTES NFR BLD AUTO: 12 %
NEUTROPHILS # BLD AUTO: 2.8 10E3/UL (ref 1.6–8.3)
NEUTROPHILS NFR BLD AUTO: 64 %
NITRATE UR QL: NEGATIVE
NRBC # BLD AUTO: 0 10E3/UL
NRBC BLD AUTO-RTO: 0 /100
OSMOLALITY SERPL: 296 MMOL/KG (ref 280–301)
P AXIS - MUSE: 83 DEGREES
PH UR STRIP: 7 [PH] (ref 5–7)
PLATELET # BLD AUTO: 233 10E3/UL (ref 150–450)
POTASSIUM SERPL-SCNC: 3.4 MMOL/L (ref 3.4–5.3)
PR INTERVAL - MUSE: 150 MS
PROT SERPL-MCNC: 7.3 G/DL (ref 6.4–8.3)
QRS DURATION - MUSE: 86 MS
QT - MUSE: 374 MS
QTC - MUSE: 467 MS
R AXIS - MUSE: 74 DEGREES
RBC # BLD AUTO: 4.53 10E6/UL (ref 3.8–5.2)
RBC URINE: 2 /HPF
SODIUM SERPL-SCNC: 139 MMOL/L (ref 136–145)
SP GR UR STRIP: 1 (ref 1–1.03)
SQUAMOUS EPITHELIAL: <1 /HPF
SYSTOLIC BLOOD PRESSURE - MUSE: NORMAL MMHG
T AXIS - MUSE: 75 DEGREES
TRANSITIONAL EPI: <1 /HPF
TROPONIN T SERPL HS-MCNC: <6 NG/L
TSH SERPL DL<=0.005 MIU/L-ACNC: 1.03 UIU/ML (ref 0.3–4.2)
UROBILINOGEN UR STRIP-MCNC: NORMAL MG/DL
VENTRICULAR RATE- MUSE: 94 BPM
WBC # BLD AUTO: 4.4 10E3/UL (ref 4–11)
WBC URINE: <1 /HPF

## 2023-04-18 PROCEDURE — 84443 ASSAY THYROID STIM HORMONE: CPT | Performed by: EMERGENCY MEDICINE

## 2023-04-18 PROCEDURE — 80053 COMPREHEN METABOLIC PANEL: CPT | Performed by: EMERGENCY MEDICINE

## 2023-04-18 PROCEDURE — 85025 COMPLETE CBC W/AUTO DIFF WBC: CPT | Performed by: EMERGENCY MEDICINE

## 2023-04-18 PROCEDURE — 82330 ASSAY OF CALCIUM: CPT | Performed by: EMERGENCY MEDICINE

## 2023-04-18 PROCEDURE — G1010 CDSM STANSON: HCPCS

## 2023-04-18 PROCEDURE — 81001 URINALYSIS AUTO W/SCOPE: CPT | Performed by: EMERGENCY MEDICINE

## 2023-04-18 PROCEDURE — 70496 CT ANGIOGRAPHY HEAD: CPT | Mod: 26 | Performed by: RADIOLOGY

## 2023-04-18 PROCEDURE — 70498 CT ANGIOGRAPHY NECK: CPT | Mod: 26 | Performed by: RADIOLOGY

## 2023-04-18 PROCEDURE — 83930 ASSAY OF BLOOD OSMOLALITY: CPT | Performed by: EMERGENCY MEDICINE

## 2023-04-18 PROCEDURE — 83735 ASSAY OF MAGNESIUM: CPT | Performed by: EMERGENCY MEDICINE

## 2023-04-18 PROCEDURE — G1010 CDSM STANSON: HCPCS | Mod: GC | Performed by: RADIOLOGY

## 2023-04-18 PROCEDURE — 99285 EMERGENCY DEPT VISIT HI MDM: CPT | Performed by: EMERGENCY MEDICINE

## 2023-04-18 PROCEDURE — 99285 EMERGENCY DEPT VISIT HI MDM: CPT | Mod: 25

## 2023-04-18 PROCEDURE — 85379 FIBRIN DEGRADATION QUANT: CPT | Performed by: EMERGENCY MEDICINE

## 2023-04-18 PROCEDURE — 255N000002 HC RX 255 OP 636: Performed by: EMERGENCY MEDICINE

## 2023-04-18 PROCEDURE — 36415 COLL VENOUS BLD VENIPUNCTURE: CPT | Performed by: EMERGENCY MEDICINE

## 2023-04-18 PROCEDURE — 70553 MRI BRAIN STEM W/O & W/DYE: CPT | Mod: 26 | Performed by: RADIOLOGY

## 2023-04-18 PROCEDURE — 93010 ELECTROCARDIOGRAM REPORT: CPT | Performed by: EMERGENCY MEDICINE

## 2023-04-18 PROCEDURE — 250N000013 HC RX MED GY IP 250 OP 250 PS 637: Performed by: STUDENT IN AN ORGANIZED HEALTH CARE EDUCATION/TRAINING PROGRAM

## 2023-04-18 PROCEDURE — 93005 ELECTROCARDIOGRAM TRACING: CPT | Mod: RTG

## 2023-04-18 PROCEDURE — A9585 GADOBUTROL INJECTION: HCPCS | Performed by: EMERGENCY MEDICINE

## 2023-04-18 PROCEDURE — 84484 ASSAY OF TROPONIN QUANT: CPT | Performed by: EMERGENCY MEDICINE

## 2023-04-18 PROCEDURE — 250N000011 HC RX IP 250 OP 636: Performed by: EMERGENCY MEDICINE

## 2023-04-18 RX ORDER — IOPAMIDOL 755 MG/ML
75 INJECTION, SOLUTION INTRAVASCULAR ONCE
Status: COMPLETED | OUTPATIENT
Start: 2023-04-18 | End: 2023-04-18

## 2023-04-18 RX ORDER — GADOBUTROL 604.72 MG/ML
7.5 INJECTION INTRAVENOUS ONCE
Status: COMPLETED | OUTPATIENT
Start: 2023-04-18 | End: 2023-04-18

## 2023-04-18 RX ORDER — OLANZAPINE 2.5 MG/1
2.5 TABLET, FILM COATED ORAL AT BEDTIME
Qty: 30 TABLET | Refills: 0 | Status: SHIPPED | OUTPATIENT
Start: 2023-04-18 | End: 2023-04-21 | Stop reason: DRUGHIGH

## 2023-04-18 RX ADMIN — OLANZAPINE 2.5 MG: 5 TABLET, ORALLY DISINTEGRATING ORAL at 22:16

## 2023-04-18 RX ADMIN — IOPAMIDOL 75 ML: 755 INJECTION, SOLUTION INTRAVENOUS at 14:52

## 2023-04-18 RX ADMIN — GADOBUTROL 7 ML: 604.72 INJECTION INTRAVENOUS at 20:10

## 2023-04-18 ASSESSMENT — ACTIVITIES OF DAILY LIVING (ADL)
ADLS_ACUITY_SCORE: 35
ADLS_ACUITY_SCORE: 33

## 2023-04-18 NOTE — ED TRIAGE NOTES
"Pt comes in through triage for anxiety and a change in behavior over the last two weeks. Pt has been going through a lot of stress the last few weeks but daughter at bedside & pt report that her mood & behavior has been different from baseline. No medications have changed and they are just unsure what is going on. Both report she is \"just not herself\". Was seen at urgency room on Saturday gave her an Ativan. Daughter reports pt has only been sleeping \"so/so\"     Triage Assessment     Row Name 04/18/23 1224       Triage Assessment (Adult)    Airway WDL WDL       Respiratory WDL    Respiratory WDL WDL       Skin Circulation/Temperature WDL    Skin Circulation/Temperature WDL WDL       Cardiac WDL    Cardiac WDL WDL       Peripheral/Neurovascular WDL    Peripheral Neurovascular WDL WDL       Cognitive/Neuro/Behavioral WDL    Cognitive/Neuro/Behavioral WDL WDL              "

## 2023-04-18 NOTE — ED PROVIDER NOTES
"ED Provider Note  [facility]      History   No chief complaint on file.    The history is provided by the patient and medical records.     Keely Arana is a 67 year old female with a past medical history significant for breast cancer, atrial fibrillation status post ablation on Xarelto, HTN who presents to the Emergency Department for evaluation of anxiety. Patient is present with her daughter. Patient presented feeling dizzy, short of breath and unable to talk. Her daughter states she has been having these symptoms for several weeks and it has been at its worst today. She states she could hardly stand up when she got here and could not breathe when going up the stairs at home. She states recently patients behavior has been changing. Patient has been reorganizing her house, been talking very fast and gets upset about many different things. She also adds patient has been unable to sleep well. However, patient states she has been sleeping well for the last 5 days but has been waking up very thirsty. She states she usually drinks coffee every morning but has not recently due to this. She feels as if she is not herself. When asked if she is any physical pain she replies \"my whole body is off.\" She states her legs have been shaky, she has some chest pressure and some headaches. She states she has recently been taking a new multivitamin and believes she may be having a reaction to a dye. She states she currently feels very anxious and has a knot in her stomach and chest. She reports these feelings are similar to when her  had cancer for 2 years. She had been in a counseling sessions for a year for this and was on some anti anxiety medication for a little bit after her  passed away. She denies having any big recent stressors in her life. She states she has been on a 25 pound weight loss in the last year due to being on Zometa for her osteoporosis. She denies having any nausea, " vomiting, abdominal pain or unusual BM changes. She denies any weakness, numbness or tingling in extremities. She denies having any fevers. She does not use tobacco or alcohol.     Per Chart Review: Patient was seen at urgent care [facility] 4/15 for anxiety and stress. She reports chest heaviness and pain similar to past stressful episodes. She was given a dose of ativan and a prescription of vistaril on discharge.     Past Medical History  Past Medical History:   Diagnosis Date     Hypertension      Iritis      Past Surgical History:   Procedure Laterality Date     CATARACT IOL, RT/LT Left 2006     HYSTERECTOMY  2016     LUMPECTOMY BREAST WITH SENTINEL NODE, COMBINED Right 8/31/2018    Procedure: COMBINED LUMPECTOMY BREAST WITH SENTINEL NODE;  Right Wire Localized Lumpectomy and Right [place] Lymph Node Biopsy;  Surgeon: Chilango Kruger MD;  Location: [facility]     OOPHORECTOMY  2014     amLODIPine (NORVASC) 5 MG tablet  anastrozole (ARIMIDEX) 1 MG tablet  atorvastatin (LIPITOR) 10 MG tablet  calcium carbonate (OS-PADDY 500 MG [place]. [place]) 500 MG tablet  LYSINE PO  multivitamin w/minerals (THERA-VIT-M) tablet  omega 3 1000 MG CAPS  rivaroxaban ANTICOAGULANT (XARELTO) 20 MG TABS tablet  UNABLE TO FIND  VITAMIN D, CHOLECALCIFEROL, PO      Allergies   Allergen Reactions     Corticosteroids Dermatitis     Proven allergic contact dermatitis to corticosteroid (group A, B, D2)     CAN USE as ALTERNATIVE following steroids: Corticosteroids of group C (e.g.Betamethasone, Dexamethasone, Flumethasone pivalate, Halomethasone)   and group D1 (Betamethasone dipropionate, Betamethasone-17-valerate,Clobetasole-propionate, Fluticasone propionate, Mometasone furoate)     Neomycin Dermatitis     Patch tests positives to Neomycin and Framycetin     Cephalexin GI Disturbance     Other reaction(s): reflux     Codeine Nausea and Vomiting     Macrobid [Nitrofurantoin] Nausea and Vomiting     Sulfa Drugs Itching     Silver Rash     Family  History  History reviewed. No pertinent family history.  Social History   Social History     Tobacco Use     Smoking status: Never     Smokeless tobacco: Never   Substance Use Topics     Alcohol use: Yes     Comment: rare     Drug use: No      Past medical history, past surgical history, medications, allergies, family history, and social history were reviewed with the patient. No additional pertinent items.      A medically appropriate review of systems was performed with pertinent positives and negatives noted in the HPI, and all other systems negative.    Physical Exam   BP: (!) 146/84  Pulse: 104  Temp: 97.6  F (36.4  C)  Resp: 16  SpO2: 100 %  Physical Exam  General: patient is alert and oriented, initially quite anxious, hyperventilating and tremulous.  When talking with her she was able to calm and reports that her shortness of breath and shakiness did improve.  Head: atraumatic and normocephalic   EENT: moist mucus membranes, pupils 2mm round and reactive   Neck: supple without meningismus  Cardiovascular: regular rate and rhythm, extremities warm and well perfused, no lower extremity edema  Pulmonary: lungs clear to auscultation bilaterally   Abdomen: soft, non-tender   Musculoskeletal: normal range of motion   Neurological: alert and oriented, moving all extremities symmetrically, CN II-XII intact, strength 5/5 and symmetric in , elbow flexion/extension, hip flexion/extension, knee flexion/extension and ankle plantar/dorsiflexion, sensation to light touch in distal upper and lower extremities intact  Skin: warm, dry   Psych: Anxious affect, speech is rapid, linear thought process, does not appear to be responding to internal stimuli, denies SI      ED Course, Procedures, & Data      Procedures       ED Course Selections:        EKG Interpretation:      Interpreted by Tricia Sherwood MD  Time reviewed: 1312  Symptoms at time of EKG: shortness of breath    Rhythm: normal sinus   Rate: Normal  Axis:  Normal  Ectopy: none  Conduction: normal  ST Segments/ T Waves: Non-specific ST-T wave changes  Q Waves: none  Comparison to prior: No old EKG available    Clinical Impression: non-specific EKG                           No results found for any visits on 04/18/23.  Medications - No data to display  Labs Ordered and Resulted from Time of ED Arrival to Time of ED Departure - No data to display  No orders to display          Critical care was not performed.     Medical Decision Making  The patient's presentation was of high complexity (an acute health issue posing potential threat to life or bodily function).    The patient's evaluation involved:  review of external note(s) from 2 sources (PCP and ED notes)  ordering and/or review of 3+ test(s) in this encounter (see separate area of note for details)    The patient's management necessitated moderate risk (prescription drug management including medications given in the ED) and further care after sign-out to Dr. Iqbal (see their note for further management).      Assessment & Plan    This is medical decision making on Keely Arana. Ms. Arana is a 67-year-old female with a history of breast cancer currently in remission, atrial fibrillation status post ablation, on anticoagulation who presents to the emergency department due to increasing anxiety and changes in her behavior over the last 2 weeks.  She is noted to be mildly hypertensive and tachycardic, afebrile and in no respiratory distress.  Initially she did appear quite anxious on exam however did calm as we were talking.  She does report a significant increase in thirst as well as some nausea and intermittent headaches.  Differential diagnosis includes but is not limited to stress reaction, anxiety, electrolyte derangement, thyroid dysfunction, intracranial mass, less likely CVA.  ECG obtained which shows nonspecific ST T wave changes, no clear evidence of acute ischemic disease.  Laboratory evaluation  demonstrates a troponin less than 6.  She is low likelihood for PE and D-dimer is within normal limits.  No significant electrolyte abnormalities and thyroid function is within normal limits. Patient did go for a CT of the head which showed no evidence of metastatic disease or acute infarct.  I did review these findings with the patient and her daughter and they are concerned given the abrupt change in her personality that there may be something else neurologic at play.  Will plan to obtain an MRI given her history of breast cancer to rule out any underlying malignancy or frontal infarct.  Patient declined CXR, on CT no evidence of PTX or infiltrate.  She is now entirely asymptomatic from a respiratory standpoint.  Alternatively I suspect this may be secondary to a mental health component.  She is quite anxious but has not been feeling increasingly depressed and does not have any suicidal ideation.  She does have rapid speech but otherwise has a linear thought process, does not appear to be responding to internal stimuli.  She reports she is now sleeping well and has been not doing other unusual activities.  At this point does not have evidence of acute cong.  I do not feel she would have indication for inpatient mental health admission.  I did discuss with behavioral health providers and will give the patient information to follow-up with therapy and medication management as well as close follow-up with primary care.  We did discuss different medication options and will plan to start on a very low-dose of Zyprexa to help manage anxiety component.  Patient was signed out to evening provider pending MRI anticipate she will likely discharge to home if imaging is unremarkable.    This part of the medical record was transcribed by IBRAHIMA MERRITT Medical Scribe, from a dictation done by Tricia Vann MD.     I have reviewed the nursing notes. I have reviewed the findings, diagnosis, plan and need for follow up with  the patient.    New Prescriptions    OLANZAPINE (ZYPREXA) 2.5 MG TABLET    Take 1 tablet (2.5 mg) by mouth At Bedtime for 30 days       Final diagnoses:   Anxiety   Malaise   IIBRAHIMA, am serving as a trained medical scribe to document services personally performed by Tricia Sherwood MD, based on the provider's statements to me.      Tricia STEVE MD, was physically present and have reviewed and verified the accuracy of this note documented by IBRAHIMA MERRITT.     Tricia Sherwood MD  [facility] EMERGENCY DEPARTMENT  4/18/2023     Tricia Sherwood MD  04/18/23 2153       Tricia Sherwood MD  04/18/23 2154

## 2023-04-19 LAB
ATRIAL RATE - MUSE: 78 BPM
DIASTOLIC BLOOD PRESSURE - MUSE: NORMAL MMHG
INTERPRETATION ECG - MUSE: NORMAL
P AXIS - MUSE: -1 DEGREES
PR INTERVAL - MUSE: 156 MS
QRS DURATION - MUSE: 94 MS
QT - MUSE: 422 MS
QTC - MUSE: 481 MS
R AXIS - MUSE: 39 DEGREES
SYSTOLIC BLOOD PRESSURE - MUSE: NORMAL MMHG
T AXIS - MUSE: 40 DEGREES
VENTRICULAR RATE- MUSE: 78 BPM

## 2023-04-19 NOTE — ED PROVIDER NOTES
Emergency Department Patient Sign-out       Brief HPI and ED course:  Patient is a 67 year old female signed out to me by the previous physician.  See initial ED Provider note for details of the presentation. In brief, 67-year-old female with a remote history of breast cancer, A-fib, hypertension presents with a constellation of symptoms including anxiety, pressured speech, personality changes, increased thirst, tight sensation in her chest and belly, and increasing anxiety over the last while accompanied by her daughter who has similar concerns.    Vitals:   Patient Vitals for the past 24 hrs:   BP Temp Temp src Pulse Resp SpO2   04/18/23 1224 (!) 146/84 97.6  F (36.4  C) Oral 104 16 100 %       Received Sign-out Plan:    Pending:   -Reevaluation, MRI, and ultimate disposition       Events after assuming care:  After care was assumed, a focused history and physical was performed. Agree with findings relayed by previous provider.     On my reevaluation of the patient, she still has somewhat pressured speech and anxiety.  However she is feeling much better.  Denies any chest pain or shortness of breath at this time.  Repeat EKG that was performed here is within normal limits with no depressions, and patient denies any chest pain.    She is accompanied by her daughter who continues to have concerns about personality changes.  We did discuss following up with a primary care doctor and having discussion with them about involvement of psychiatry and possibly neurology.  However at this time, no acute etiology of her symptoms.  We will plan for discharge     --  Isabel Iqbal MD   Emergency Medicine       Isabel Iqbal MD  04/18/23 7352

## 2023-04-19 NOTE — DISCHARGE INSTRUCTIONS
Please make an appointment to follow up with [facility] (phone: [phone]) as soon as possible.    Call [phone] to schedule therapy and medication management.    If you have any worsening symptoms including increase chest pain, shortness of breath, severe headache or other concerns, return to the emergency department for re-evaluation.

## 2023-04-21 ENCOUNTER — OFFICE VISIT (OUTPATIENT)
Dept: FAMILY MEDICINE | Facility: CLINIC | Age: 68
End: 2023-04-21
Payer: MEDICARE

## 2023-04-21 VITALS
HEIGHT: 67 IN | TEMPERATURE: 98.4 F | HEART RATE: 95 BPM | DIASTOLIC BLOOD PRESSURE: 79 MMHG | SYSTOLIC BLOOD PRESSURE: 143 MMHG | BODY MASS INDEX: 23.13 KG/M2 | OXYGEN SATURATION: 96 % | WEIGHT: 147.4 LBS

## 2023-04-21 DIAGNOSIS — F68.8 PERSONALITY CHANGE: Primary | ICD-10-CM

## 2023-04-21 DIAGNOSIS — F30.10 MANIC BEHAVIOR (H): ICD-10-CM

## 2023-04-21 LAB
FOLATE SERPL-MCNC: 26.3 NG/ML (ref 4.6–34.8)
VIT B12 SERPL-MCNC: 689 PG/ML (ref 232–1245)

## 2023-04-21 PROCEDURE — 82746 ASSAY OF FOLIC ACID SERUM: CPT | Mod: ORL

## 2023-04-21 PROCEDURE — 82607 VITAMIN B-12: CPT | Mod: ORL

## 2023-04-21 RX ORDER — OLANZAPINE 5 MG/1
5 TABLET ORAL AT BEDTIME
Qty: 30 TABLET | Refills: 0 | Status: SHIPPED | OUTPATIENT
Start: 2023-04-21 | End: 2023-05-12

## 2023-04-21 ASSESSMENT — ANXIETY QUESTIONNAIRES
7. FEELING AFRAID AS IF SOMETHING AWFUL MIGHT HAPPEN: NOT AT ALL
1. FEELING NERVOUS, ANXIOUS, OR ON EDGE: SEVERAL DAYS
2. NOT BEING ABLE TO STOP OR CONTROL WORRYING: NOT AT ALL
3. WORRYING TOO MUCH ABOUT DIFFERENT THINGS: SEVERAL DAYS
GAD7 TOTAL SCORE: 5
GAD7 TOTAL SCORE: 5
5. BEING SO RESTLESS THAT IT IS HARD TO SIT STILL: SEVERAL DAYS
6. BECOMING EASILY ANNOYED OR IRRITABLE: SEVERAL DAYS

## 2023-04-21 ASSESSMENT — PATIENT HEALTH QUESTIONNAIRE - PHQ9
SUM OF ALL RESPONSES TO PHQ QUESTIONS 1-9: 4
5. POOR APPETITE OR OVEREATING: SEVERAL DAYS

## 2023-04-21 NOTE — NURSING NOTE
"ROOM:1  MARCIA MIR    Preferred Name: Keely     How did you hear about us?  Other - Daughter    67 year old  Chief Complaint   Patient presents with     Hospital F/U       Blood pressure (!) 143/79, pulse 95, temperature 98.4  F (36.9  C), temperature source Oral, height 1.702 m (5' 7\"), weight 66.9 kg (147 lb 6.4 oz), SpO2 96 %. Body mass index is 23.09 kg/m .  BP completed using cuff size:        Patient Active Problem List   Diagnosis     Symptomatic varicose veins of both lower extremities     Malignant neoplasm of upper-outer quadrant of right breast in female, estrogen receptor positive (H)     Abnormal bone density screening     Long term (current) use of aromatase inhibitors       Wt Readings from Last 2 Encounters:   04/21/23 66.9 kg (147 lb 6.4 oz)   04/04/23 68 kg (150 lb)     BP Readings from Last 3 Encounters:   04/21/23 (!) 143/79   04/18/23 (!) 146/84   04/04/23 136/74       Allergies   Allergen Reactions     Corticosteroids Dermatitis     Proven allergic contact dermatitis to corticosteroid (group A, B, D2)     CAN USE as ALTERNATIVE following steroids: Corticosteroids of group C (e.g.Betamethasone, Dexamethasone, Flumethasone pivalate, Halomethasone)   and group D1 (Betamethasone dipropionate, Betamethasone-17-valerate,Clobetasole-propionate, Fluticasone propionate, Mometasone furoate)     Neomycin Dermatitis     Patch tests positives to Neomycin and Framycetin     Cephalexin GI Disturbance     Other reaction(s): reflux     Codeine Nausea and Vomiting     Macrobid [Nitrofurantoin] Nausea and Vomiting     Sulfa Drugs Itching     Silver Rash       Current Outpatient Medications   Medication     amLODIPine (NORVASC) 5 MG tablet     anastrozole (ARIMIDEX) 1 MG tablet     atorvastatin (LIPITOR) 10 MG tablet     calcium carbonate (OS-PADDY 500 MG [place]. [place]) 500 MG tablet     LYSINE PO     multivitamin w/minerals (THERA-VIT-M) tablet     OLANZapine (ZYPREXA) 2.5 MG tablet     omega 3 1000 MG CAPS     " rivaroxaban ANTICOAGULANT (XARELTO) 20 MG TABS tablet     UNABLE TO FIND     VITAMIN D, CHOLECALCIFEROL, PO     No current facility-administered medications for this visit.       Social History     Tobacco Use     Smoking status: Never     Smokeless tobacco: Never   Substance Use Topics     Alcohol use: Yes     Comment: rare     Drug use: No       Honoring Choices - Health Care Directive Guide offered to patient at time of visit.    Health Maintenance Due   Topic Date Due     ADVANCE CARE PLANNING  Never done     HEPATITIS C SCREENING  Never done     LIPID  Never done     FALL RISK ASSESSMENT  09/14/2021       Immunization History   Administered Date(s) Administered     COVID-19 Vaccine 18+ (Moderna) 02/12/2021, 03/13/2021, 10/26/2021, 04/13/2022     COVID-19 Vaccine Bivalent Booster 12+ (Pfizer) 09/23/2022     DTaP, Unspecified 05/21/2007, 09/26/2017     FLU 6-35 months 09/27/2011     Flu 65+ Years 09/06/2019     Flu, Unspecified 08/01/2017, 09/26/2017     Hepatitis A (ADULT 19+) 08/06/1997, 03/18/1998     Hepatitis B, Adult 05/13/1997, 06/18/1997, 11/10/1997     Influenza (IIV3) PF 10/24/2002, 10/13/2003, 12/17/2004, 11/17/2005, 10/13/2006, 10/23/2007, 10/24/2008, 09/24/2009, 11/01/2010, 09/27/2011, 09/20/2012, 09/10/2013, 08/28/2016     Influenza Vaccine >6 months (Alfuria,Fluzone) 09/24/2014, 09/21/2015, 08/01/2017, 09/28/2018     Pneumo Conj 13-V (2010&after) 05/20/2015     Pneumococcal 23 valent 11/20/2014, 07/03/2020     Pneumococcal, Unspecified 01/01/2014     Tdap (Adult) Unspecified Formulation 05/13/1997     Zoster recombinant adjuvanted (SHINGRIX) 09/06/2019, 11/07/2019     Zoster vaccine, live 09/24/2014, 09/24/2014       No results found for: PAP    Recent Labs   Lab Test 04/18/23  1334 12/21/22  0930 07/22/21  0741 01/21/21  1137   ALT 20  --   --   --    CR 0.60 0.67   < > 0.69   GFRESTIMATED >90 >90   < > >90   GFRESTBLACK  --   --   --  >90   ALBUMIN 4.5 4.4   < > 4.1   POTASSIUM 3.4  --   --   --     TSH 1.03  --   --   --     < > = values in this interval not displayed.            View : No data to display.                    4/21/2023     2:14 PM   PHQ-9 SCORE   PHQ-9 Total Score 4           4/21/2023     2:14 PM   DAISY-7 SCORE   Total Score 5            View : No data to display.                Linda Duff, [facility]    April 21, 2023 2:23 PM

## 2023-04-21 NOTE — PROGRESS NOTES
"Assessment & Plan   Keely was seen today for hospital f/u.    Diagnoses and all orders for this visit:    Manic behavior (H)  Personality change  -     Vitamin B12; Future  -     Folate; Future  -     Adult Neuropsychology Referral; Future  -     OLANZapine (ZYPREXA) 5 MG tablet; Take 1 tablet (5 mg) by mouth At Bedtime  -     Adult Mental Health  Referral; Future    Etiology of the abrupt mood/manic behavior is not clear. No prior hx of mental health disorders, such as cong or bipolar disorder. While interacting with Keely and her daughter, Keely certainly demonstrates many symptoms of cong (very talkative, strong interest in organizing her house/purging, buying sprees). She also seems very defensive when her daughter describes the changes. She frequently reported she was \"fine\", feeling much more like herself, almost as if she did not want her new symptoms to end. She reports feeling \"much better\" on the zyprexa even though her daughter thinks she only took 1-2 doses. We discussed increasing the dose and she was very agreeable while waiting to see psychiatry. Her daughter did mention her mother getting lost, which was unusual for her. Keely's mother and maternal grandfather had alzheimer's. I think its reasonable to complete neuropsych changes as early dementia can lead to personality changes. Plan to follow-up with her PCP in 2 weeks and see psychiatry.      Sandra Batista, NP   ______________________________________    Subjective   Keely is a 67 year old patient here today for Hospital F/U for mental health/mood. Her daughter took her to the ER on 4/18 for an abrupt change in mood/ personality. Daughter describes her mother as usually very calm and rational. Keely has no significant past mental health history. Struggled with depression for a short time after her 's death due to cancer. No family hx of schizophrenia or mood disorders. She has an appt coming up to establish with a new PCP. Her " "previous PCP retired.    Update since ED visit  Getting back into her routine  Doesn't feel manic, like she can sit and work on a puzzle  Takes the zyprexa at night, helps her feel more calm  Feeling 98% like herself  First time she's ever felt that anxious or \"manic\" when she went to the ED  Never had a panic attack  Cant identify any triggers, life feels good. Virtually no stressors  Daughter said her mom is usually so \"even keeled\"  Keely's mother and maternal grandfather had alzeihmers  Not forgetful, still is \"sharp\"  Has never felt more organized in her life  Feels good about purging and organizing her whole house  Feels an anxiety in her chest, zyprexa is helping but still there    Daughter's account of her mother's condition  Prior to ED visit, there was two weeks of seeing changes and she could not put her finger on it  Daughter spends a lot of time with her, knows her baseline  This is very different than her normal  Purged a lot of her house in a short amount of time, stayed up all night cleaning  Spent $1000s of dollars in stores the last few weeks, not typical for her  Still feels like her mother is \"not her normal self\"  Keely got lost on her way home from [place] last week, usual for her. Keeyl said it was a different part of town    Do you need any refills on your Medications today? No    Medical, surgical, family and social histories reviewed and updated as indicated.   Medications and allergies reviewed and updated as indicated.     ROS  Review Of Systems  See HPI    General Physical Exam:  Vitals: BP (!) 143/79   Pulse 95   Temp 98.4  F (36.9  C) (Oral)   Ht 1.702 m (5' 7\")   Wt 66.9 kg (147 lb 6.4 oz)   SpO2 96%   BMI 23.09 kg/m    Physical Exam  Vitals and nursing note reviewed.   Constitutional:       General: She is not in acute distress.     Appearance: Normal appearance. She is not ill-appearing.   HENT:      Head: Normocephalic and atraumatic.   Eyes:      General: Lids are normal. "      Conjunctiva/sclera: Conjunctivae normal.   Pulmonary:      Effort: Pulmonary effort is normal.   Neurological:      General: No focal deficit present.      Mental Status: She is alert and oriented to person, place, and time.      Gait: Gait is intact.   Psychiatric:         Attention and Perception: She does not perceive auditory or visual hallucinations.         Mood and Affect: Mood is anxious.         Speech: Speech normal.         Behavior: Behavior is hyperactive.         Thought Content: Thought content is not paranoid or delusional. Thought content does not include suicidal ideation. Thought content does not include homicidal or suicidal plan.

## 2023-04-24 ENCOUNTER — TELEPHONE (OUTPATIENT)
Dept: NEUROPSYCHOLOGY | Facility: CLINIC | Age: 68
End: 2023-04-24
Payer: MEDICARE

## 2023-04-24 NOTE — TELEPHONE ENCOUNTER
[facility] Call Center    Phone Message    May a detailed message be left on voicemail: yes     Reason for Call: Other:    Referral for Neuropsych Evaluation  Please call DaughterYesy at  to schedule evaluation for patient.    Action Taken: Message routed to:  [facility]    Travel Screening: Not Applicable

## 2023-04-25 NOTE — TELEPHONE ENCOUNTER
Patient Contacted to schedule the following:    Appointment type: Neuropsych Eval  Provider: Any provider [facility] or [place]  Return date: First available  Specialty phone number: [phone]  Additional appointment(s) needed: N/A  Additonal Notes: N/A    Spoke with Yesy, scheduled NPE with Dr. Jones at [place] 5/1 at 12:30pm.     Tray Borrego on 4/25/2023 at 10:45 AM

## 2023-04-26 ENCOUNTER — TELEPHONE (OUTPATIENT)
Dept: INTERNAL MEDICINE | Facility: CLINIC | Age: 68
End: 2023-04-26
Payer: MEDICARE

## 2023-04-26 NOTE — TELEPHONE ENCOUNTER
[facility] Call Center    Phone Message    May a detailed message be left on voicemail: yes     Reason for Call: Other: Daughter calling regarding the patients new patient appointment for Tuesday. She is concnered and has some questions regarding the medication the ER had put hr on that she doesn't think can wait until Tuesday. She is requesting a call back asap.     Action Taken: Message routed to:  [facility] pcc    Travel Screening: Not Applicable

## 2023-04-27 NOTE — TELEPHONE ENCOUNTER
No consent to communicate with the patient's daughter on file. Unable to communicate with the daughter. Patient saw a Nurse Practitioner on 4/21/23 for hospital follow up. Patient can call clinic if they have concerns or go to ER for any urgent concerns.     Stephon MO, EMT at 7:22 AM on 4/27/2023.  Primary Care Clinic: [phone]

## 2023-05-01 ENCOUNTER — OFFICE VISIT (OUTPATIENT)
Dept: NEUROPSYCHOLOGY | Facility: CLINIC | Age: 68
End: 2023-05-01
Payer: MEDICARE

## 2023-05-01 DIAGNOSIS — R41.844 FRONTAL LOBE AND EXECUTIVE FUNCTION DEFICIT: ICD-10-CM

## 2023-05-01 DIAGNOSIS — F30.10 MANIC BEHAVIOR (H): ICD-10-CM

## 2023-05-01 DIAGNOSIS — R41.840 ATTENTION AND CONCENTRATION DEFICIT: Primary | ICD-10-CM

## 2023-05-01 DIAGNOSIS — F68.8 PERSONALITY CHANGE: ICD-10-CM

## 2023-05-01 PROCEDURE — 96139 PSYCL/NRPSYC TST TECH EA: CPT | Performed by: CLINICAL NEUROPSYCHOLOGIST

## 2023-05-01 PROCEDURE — 96132 NRPSYC TST EVAL PHYS/QHP 1ST: CPT | Performed by: CLINICAL NEUROPSYCHOLOGIST

## 2023-05-01 PROCEDURE — 96133 NRPSYC TST EVAL PHYS/QHP EA: CPT | Performed by: CLINICAL NEUROPSYCHOLOGIST

## 2023-05-01 PROCEDURE — 96138 PSYCL/NRPSYC TECH 1ST: CPT | Performed by: CLINICAL NEUROPSYCHOLOGIST

## 2023-05-01 PROCEDURE — 90791 PSYCH DIAGNOSTIC EVALUATION: CPT | Performed by: CLINICAL NEUROPSYCHOLOGIST

## 2023-05-01 NOTE — LETTER
5/1/2023      RE: Keely Santizo  [facility] [address]       CONFIDENTIAL NEUROPSYCHOLOGICAL EVALUATION  **This is a medical document intended to be read within the context of the larger medical chart and for communication with the referring provider.  It is written in medical language and may contain abbreviations that are unfamiliar.  Please consult the provider for further clarification.**    Name:  Keely Arana  (Keely)  YOB: 1955  Date of Assessment: May 1, 2023  Referring Provider: Sandra Batista NP  Occupation: Retired  Education: 14 years  Handedness: right handed    Reason for Referral: Keely Arana is a 67 year old female  who was referred for a neuropsychological evaluation for cognitive changes within the context of abrupt personality changes and cong-like presentation. .       SUMMARY AND CLINICAL IMPRESSIONS: See below for relevant background information and detailed test results.  See separate abstract for a list of neuropsychological measures and test scores.     The results of the neuropsychological evaluation are abnormal.  Within the context of estimated pre-morbid functioning, Jessica demonstrated weaknesses in aspects of attention/working memory and executive functioning - specifically metacognitive aspects as well as behavioral and emotional regulation.  Weaknesses in attention likely undermined her performance on other domains such as accuracy in drawing and visual learning and independent retrieval - as her recognition memory on this task was normal and not indicative of a forgetting processes.  Verbal learning and memory was above average. Visuospatial perception and reasoning were undermined by variability in attention but not indicative of impairment. Language abilities were within broad expectations, with mild word-finding difficulty noted on a task of confrontation naming.      Weaknesses in attention/working memory and executive functioning  reflect frontal-lobe dysfunction, and would be consistent with cong-like presentation. She currently does not meet diagnostic criteria for a cognitive diagnosis.  As noted during testing and the clinical interview, Jessica demonstrated pressured and intrusive speech, heightened energy, odd social behavior, elevated mood, and clinically relevant levels of anosognosia.  The results of cognitive testing are not consistent with Alzheimer's disease or PDD.  Effects from vascular findings on imaging (chronic white matter changes) could be contributing to her cognitive  presentation, but it's unlikely that this alone fully accounts for the cognitive, behavioral, and interpersonal changes and decreased need for sleep. I cannot fully rule out a behavioral variant of frontal-temporal lobe dementia, however, several considerations make this less likely: She is a bit older than typical age of onset and she is not demonstrating the typical cognitive executive dysfunction (e.g., problem-solving was good and rather she only demonstrated behavioral/emotional/metacognitive weaknesses). Additionally, Jessica's behavioral changes seems to be precipitous in onset with some improvement through psychotropics, rather than an insidious onset as could be expected in FTD. I did not get a good sense of grandiose thoughts or a sense of invulnerability - however it should be noted that grandiose/invulnerability is much less frequent in the behavioral variant FTD than cong and may warrant further investigation. It's unclear if Jessica had an undiagnosed psychiatric concern prior to this, and she may benefit from a psychological evaluation.  Regardless, Jessica should consider psychotherapy for mood management and to learn stress-coping techniques. The family may also consider psychotherapy for their own boundary setting and learning how to be a care-partner while maintaining their own balance.     At the time of my evaluation, she is still presents as  manic with pressured speech, impulsivity, poor problem-solving, and most notably incredibly poor insight.  She believes she has returned to her baseline, however, testing results as well as her daughter report continued deficits in executive functioning as well as manic-behavior.  It was helpful in my evaluation to spend some time listening to the daughter as Jessica could get defensive about some of the challenges she is continuing to face.  Per my technician, she was incredibly disinhibited during testing, could not be redirected to task directions, and did not integrate feedback into her understanding of herself or her performance.     Looking forward, Jessica should continue to receive psychiatry and/or neurology follow-up managing medications.  With regards to ADLs, she may require additional oversight until Jessica can demonstrate appropriate insight into her behaviors and well as adequate reasoning. POA, Living Will, health care directives should all be initiated if not already done so.  It is recommended Jessica present to medical appointments with a loved- or trusted person to ensure information is accurately retained and shared throughout the family.      A follow-up neuropsychological evaluation is recommended in 12-18 months time, or sooner should Jessica and her family notice a precipitous decline in cognition or functional abilities.      DIAGNOSES  Paz (per chart)  Attention Weaknesses  Executive Dysfunction    Thank you for allowing me to participate in Keely Arana's care.   I hope this report is helpful to all those involved.  I would be happy to discuss these results in detail or answer any other questions.      Nataliia Jones PsyD, RASHAUN  Clinical Neuropsychologist    RELEVANT BACKGROUND INFORMATION  Informed consent  was obtained after discussing the purpose and procedures of the evaluation, as well as aspects of confidentiality and effort/engagement.  Background information was obtained from a clinical  "interview with Jessica and her daughter (Yesy) as well as review of available medical records.     HISTORY OF PRESENTING ILLNESS: Jessica presented to the emergency department on 4/18/2023 for an evaluation of anxiety, however, further investigation revealed a constellation of anxiety, pressured speech, personality changes, increased thirds, tight sensation in her chest and stomach, and dizziness, SOB and difficulties speaking.  While these symptoms onset over the past couple of weeks,  They were at their worst upon admission.  Notes revealed she was talking quickly, and reported difficulties sleeping, increased goal-directed activity (e.g., purging and redecorating her house), and increased irritability. She was started on Zyprexa to help with anxiety.     Current Clinical Interview:  It was challenging to glean much information from Jessica due to limited insight and cong-like behavior.  She definitely demonstrated racing thoughts and non-linear thinking/speech, combined with limited insight into social presentation (e.g., loud voice, tending to talk over others).  The following information Jessica reflects her level of insight into her current challenges, and the reader is encouraged to review her other medical records and rely on objective data (vs. Self-report) regarding recent events and its implications.     Jessica reported that she didn't want to come in to this appointment today.  She believes she has returned to baseline.  However, it was challenging to get any sort of a timeline of events that happened.  She started by talking about how \"it's around dinner time, and I shut it all down by 9pm.\"  She indicated that she was referred by her Er doctor and nurse practioner, but was unable to state why.  When she tried to recall a timeline of events, she noted she was up at midnight and 3 am \"drinking coffee\" and was stressed about meeting extended family which resulted in a panic attack. She reported she had a lot going on " "including purging and buying things for her home to update it.  She reported that at that time \"her brakes felt broken\" but denied any lasting cognitive challenges and is not distractible now (despite significant challenges to obtain a timeline of events or accurate assessment of her current challenges).  She believes she is back to her baseline functioning.        Jessica attended the appointment with her daughter.  Yesy reported that Jessica was seen in the hospital due to a sudden onset of cong-like symptoms including decreased need for sleep, pressured speech, significant goal-directed activity (in this case, purging and redecorating her home at a rate that is out of character), and hyperfocusing combined with some forgetfulness of events.  While Jessica believes that she has returned to baseline, Yesy stated that she still notices some symptoms and behaviors.  Jessica is easily agitated and defensive as well.   At baseline, her daughter characterizes Jessica as even keel and not at all bliss, so this episode is a large departure from baseline.      There were no behavioral, personality, or cognitive changes prior to the emergency department visit noted above, and there were no noted cognitive changes over the past year or so.  There were no cncerns about Jessica's ability to complete iADLs, however, during this phase Yesy is increasingly concerned about finances given how much stuff Jessica is purchasing for her home. Her daughter has direct supervision of her medications.  Jessica continues to drive but stays close to home.     Of note, there is significant discrepancy between what Jessica reports and what her daughter reports, and healthcare professionals may benefit from getting a an informant's understanding of the situations as Jessica's insight and self-assessment may be inaccurate.     HEALTH HABITS, BEHAVIORS AND MOOD:   Description of current mood:  \"Good.\"  Jessica reported some bereavement when her  passed about 14 years " "ago.  She lost her mother and had breast cancer around the same time.  She denied any other psychiatric history, including no history of panic attacks.     Appetite: \"Perfect.\"  No changes in sense of smell or taste.       Sleep/Energy:  \"Perfect.\"  It's much better than before.  She reported she sleeps 9pm until 5am.  She does not nap during the day but does have periods of relative rest.  Yesy noted a decreased need for sleep.     Exercise: New bike, doing stuff around house. She was walking 3-5 miles a day.     Chronic Pain: Denied.     Tobacco use:  Denied.     Alcohol use:  Glass of wine a night, haven't in the past couple of weeks.     Other Drugs: Denied.     Hallucinations: Denied.     Suicidal ideation: Denied.    MEDICAL/PSYCHIATRIC/SURGICAL HISTORY:   Patient Active Problem List   Diagnosis     Symptomatic varicose veins of both lower extremities     Malignant neoplasm of upper-outer quadrant of right breast in female, estrogen receptor positive (H)     Abnormal bone density screening     Long term (current) use of aromatase inhibitors       Past Medical History:   Diagnosis Date     Hypertension      Iritis        Past Surgical History:   Procedure Laterality Date     CATARACT IOL, RT/LT Left 2006     HYSTERECTOMY  2016     LUMPECTOMY BREAST WITH SENTINEL NODE, COMBINED Right 8/31/2018    Procedure: COMBINED LUMPECTOMY BREAST WITH SENTINEL NODE;  Right Wire Localized Lumpectomy and Right [place] Lymph Node Biopsy;  Surgeon: Chilango Kruger MD;  Location: [facility] OR     OOPHORECTOMY  2014       Family History:  No family history on file. Father was alcohol, mom had Alzheimer's, older sister has bipolar disorder and OCD, one brother is \"withdrawn from everyone\"    IMAGING:  Brain MRI completed 4/18/2023:   \"Impression:  1. No acute or suspicious intracranial findings.  2. Scattered nonspecific T2 hyperintense foci in the periventricular  and cerebral white matter, likely sequela of chronic small " "vessel  ischemic disease.\"    MEDICATIONS: Keely has a current medication list which includes the following prescription(s): amlodipine, anastrozole, atorvastatin, calcium carbonate, lysine, multivitamin w/minerals, olanzapine, omega 3, rivaroxaban anticoagulant, UNABLE TO FIND, and cholecalciferol..    SOCIAL/DEVELOPMENTAL/OCCUPATIONAL HISTORY:  Jessica was born and raised in the [facility] area. She denied any pre- or bruno- complications. She reportedly met all developmental milestone on time.  In school, and described that she was always \"kind of on the artsy side\" and math wasn't as strong for her.  She graduated high school.  She completed almost 2 years of community college, but never obtained her associates.  She then got a job for the [employer] working as a  for over 19 years.  She retired at age 59 when she could take a 's pension.     BEHAVIORAL OBSERVATIONS:  Jessica attended the appointment with her daughter.  There appears to be a disconnect between Yesy and Jessica, and much of the time, Yesy would provide information or observation and Jessica would jump in or talk over Yesy to provide explanations or context.  Thoughts were goal-directed and linear when asked closed-ended questions, however, her thoughts were disjointed and rambling when asked open-ended questions.  She was challenging to redirect, which was required almost constantly.  She was garrulous in conversation, again, requiring almost constant redirection. Speech was normal for tone and prosody, but appeared pressured, quick, and loud at times. There were no gross or fine motor impairments noted.  She often started tasks impulsively, and required redirection to wait for the entire presentation of instructions to begin.     TEST RESULTS: Please use the following table for reference.   Percentile Ranks Descriptor   <2nd Percentile Extremely Low Range   3rd - 9th Percentile Borderline Range   10th - 16th Percentile Below Average Range " "  17th - 24th Percentile Low Average   25th - 75th Percentile Average   76th - 90th Percentile Above Average   91st - 97th Percentile Superior   >98th Percentile Very Superior      Performance Validity:  Jessica performed within expectations on embedded measures of performance validity.  Taken together with behavioral observations above, the following results are likely to be a mild underestimation of her true cognitive functioning given the behavioral challenges that could prevent full engagement in testing.  However, the results are considered an accurate assessment of her functional status.     Pre-Morbid Functioning: She performed within the average range on a measure of single-word reading.  Taken together with demographic factors, it is estimated that Jessica's pre-morbid functioning is at least within the broadly average range.     Language Skills: Verbal abstraction is below average. Confrontation naming is below average. Phonemic verbal fluency is low average, and semantic verbal fluency is average.     Visuospatial: Block construction is average. Clock drawing was normal for construction with some difficulties noted in planning (e.g., inappropriate spacing). Time setting was incorrect (e.g., abstraction), although it was \"close\" to a correct response, and likely indicative of a hasty and quick drawing, and likely some inattention mentioned throughout.     Attention/Working Memory/Processing Speed: Simple attention span was average.  Working memory was average. Processing speed ranged from below average to average.     Learning/Memory: New learning for a list of words was above average, characterized by a good learning curve.  She was able to recall all that she learned after a delay, which was considered normatively above average.  Recognition memory was average due to challenges with endorsing intrusive information (or executive interference). Visual memory was much weaker.  New learning was in the extremely " "low range with a shallow learning curve. She was able to recall what she learned initially, however, she was quite perseverative in her learning/memory and tended to recall her own intrusive errors more so than accurate details.  Recognition memory was normal, with an accurate number of hits and no false positive errors.     Executive Functioning: There are cognitive, metacognitive, behavioral, and emotional regulation components to executive functioning, and Jessica demonstrated variable abilities across all of these domains.     Cognitively, Jessica demonstrated poor rapid alternating attention:  Her response speed slowed considerably, and she made a large number errors.  Across two problem solving tasks, she demonstrated poor efficacy in problem solving well as reduced ability to learn from prior responses and integrate that into her performance (a metacognitive strategy). While she was eventually able to get to an accurate solution, her path there was ineffective and she tended to respond in a haphazard way. On an unstructured task of prioritizing and planning, Jessica made numerous errors, including not completing all tasks asked as well as not staying within the parameters of the tasks asked. She did not appear to take any time to plan, and did not show any insight into her poor performance on this measure. When asked to do the same tasks in a prescribed order (e.g., \"first, then...\") Jessica had difficulties picking up where she left off when switching between tasks. While she was able to complete the overall goal, she made numerous errors along the way    Clinically, it was observed that she struggled with social and behavioral norms, such as talking over people (an \"on my mind, out my mouth\" presentation), starting tasks before instructions were finished, rigidity in thinking combined with difficulties in redirection, and marked impulsivity.       Psychological Functioning: Jessica denied any clinically relevant symptoms " of depression, stress, or anxiety on self-report questionnaire.     Questionnaires: Both Jessica and her daughter completed the Frontal Systems Behavior Scale, which assesses aspects of executive functioning, apathy, and disinhibition prior to an event (such as a stroke or other major medical event) as well as current level of functioning.  The questions are the same across the patient and informant report, which can shed some light onto how certain behaviors are perceived by or affect others.     Nine denied experiencing any challenges in apathy, disinhibition, or executive functioning either pre-illness or currently. Her daughter denied observing any challenges in Jessica prior to the onset of this illness.  However, since the ED visit, Yesy has noticed increased disinhibition (93%ile) and executive dysfunction (84%ile). Items with significant changes (more than 3 points) include: perseveration, mixing up sequences of tasks or when doing multiple things, emotionally labile, anosognosia, interrupting others, and decreased awareness of her behaviors on others.      Procedures Administered by Psychologist: Clinical Interview with Jessica and her daughter, review of available medical records, test selection, interpretation, preparation of written report, and feedback. Please see nursing note for procedures administered by psychometrist.      Tests Administered:  Performance Validity Measures, NAB Orientation, ACS TOPF, WAIS-IV (Selected Subtests), HVLT-R:,  BVMT-R, BNT (Short), COWAT/ANimals, Clock Drawing, D-KEFS Color-Word Interference & [place] Test , WCST-128, BADS Zoo Maps, BEN-4, FrSBe      Activities included in this evaluation CPT Code Time Units   Psychological Diagnostic Interview 00641 - 1   Neuropsychology Professional Time [id number] 1   [place] on 53078  2   Neuropsychologist Admin/Scoring Tests 15974     Add On 18680     Psychometrician Time - Test [id number] 1   Admin/Scoring [id number]  7   DX:                                  The patient was seen for neuropsychological evaluation at the request of Sandra Batista NP for the purposes of diagnostic clarification and treatment planning. 246 minutes of test administration and scoring were provided by this writer. Please see Dr. Nataliia Jones's report for a full interpretation of the findings.    Leena Robert   Psychometrist      Nataliia Jones PsyD

## 2023-05-01 NOTE — PROGRESS NOTES
CONFIDENTIAL NEUROPSYCHOLOGICAL EVALUATION  **This is a medical document intended to be read within the context of the larger medical chart and for communication with the referring provider.  It is written in medical language and may contain abbreviations that are unfamiliar.  Please consult the provider for further clarification.**    Name:  Keely Aarna  (Keely)  YOB: 1955  Date of Assessment: May 1, 2023  Referring Provider: Sandra Batista NP  Occupation: Retired  Education: 14 years  Handedness: right handed    Reason for Referral: Keely Arana is a 67 year old female  who was referred for a neuropsychological evaluation for cognitive changes within the context of abrupt personality changes and cong-like presentation. .       SUMMARY AND CLINICAL IMPRESSIONS: See below for relevant background information and detailed test results.  See separate abstract for a list of neuropsychological measures and test scores.     The results of the neuropsychological evaluation are abnormal.  Within the context of estimated pre-morbid functioning, Jessica demonstrated weaknesses in aspects of attention/working memory and executive functioning - specifically metacognitive aspects as well as behavioral and emotional regulation.  Weaknesses in attention likely undermined her performance on other domains such as accuracy in drawing and visual learning and independent retrieval - as her recognition memory on this task was normal and not indicative of a forgetting processes.  Verbal learning and memory was above average. Visuospatial perception and reasoning were undermined by variability in attention but not indicative of impairment. Language abilities were within broad expectations, with mild word-finding difficulty noted on a task of confrontation naming.      Weaknesses in attention/working memory and executive functioning reflect frontal-lobe dysfunction, and would be consistent with cong-like presentation.  She currently does not meet diagnostic criteria for a cognitive diagnosis.  As noted during testing and the clinical interview, Jessica demonstrated pressured and intrusive speech, heightened energy, odd social behavior, elevated mood, and clinically relevant levels of anosognosia.  The results of cognitive testing are not consistent with Alzheimer's disease or PDD.  Effects from vascular findings on imaging (chronic white matter changes) could be contributing to her cognitive  presentation, but it's unlikely that this alone fully accounts for the cognitive, behavioral, and interpersonal changes and decreased need for sleep. I cannot fully rule out a behavioral variant of frontal-temporal lobe dementia, however, several considerations make this less likely: She is a bit older than typical age of onset and she is not demonstrating the typical cognitive executive dysfunction (e.g., problem-solving was good and rather she only demonstrated behavioral/emotional/metacognitive weaknesses). Additionally, Jessica's behavioral changes seems to be precipitous in onset with some improvement through psychotropics, rather than an insidious onset as could be expected in FTD. I did not get a good sense of grandiose thoughts or a sense of invulnerability - however it should be noted that grandiose/invulnerability is much less frequent in the behavioral variant FTD than cong and may warrant further investigation. It's unclear if Jessica had an undiagnosed psychiatric concern prior to this, and she may benefit from a psychological evaluation.  Regardless, Jessica should consider psychotherapy for mood management and to learn stress-coping techniques. The family may also consider psychotherapy for their own boundary setting and learning how to be a care-partner while maintaining their own balance.     At the time of my evaluation, she is still presents as manic with pressured speech, impulsivity, poor problem-solving, and most notably incredibly  poor insight.  She believes she has returned to her baseline, however, testing results as well as her daughter report continued deficits in executive functioning as well as manic-behavior.  It was helpful in my evaluation to spend some time listening to the daughter as Jessica could get defensive about some of the challenges she is continuing to face.  Per my technician, she was incredibly disinhibited during testing, could not be redirected to task directions, and did not integrate feedback into her understanding of herself or her performance.     Looking forward, Jessica should continue to receive psychiatry and/or neurology follow-up managing medications.  With regards to ADLs, she may require additional oversight until Jessica can demonstrate appropriate insight into her behaviors and well as adequate reasoning. POA, Living Will, health care directives should all be initiated if not already done so.  It is recommended Jessica present to medical appointments with a loved- or trusted person to ensure information is accurately retained and shared throughout the family.      A follow-up neuropsychological evaluation is recommended in 12-18 months time, or sooner should Jessica and her family notice a precipitous decline in cognition or functional abilities.      DIAGNOSES  Paz (per chart)  Attention Weaknesses  Executive Dysfunction    Thank you for allowing me to participate in Keely Arana's care.   I hope this report is helpful to all those involved.  I would be happy to discuss these results in detail or answer any other questions.      Nataliia Jones PsyD, RASHAUN  Clinical Neuropsychologist    RELEVANT BACKGROUND INFORMATION  Informed consent  was obtained after discussing the purpose and procedures of the evaluation, as well as aspects of confidentiality and effort/engagement.  Background information was obtained from a clinical interview with Jessica and her daughter (Yesy) as well as review of available medical records.  "    HISTORY OF PRESENTING ILLNESS: Jessica presented to the emergency department on 4/18/2023 for an evaluation of anxiety, however, further investigation revealed a constellation of anxiety, pressured speech, personality changes, increased thirds, tight sensation in her chest and stomach, and dizziness, SOB and difficulties speaking.  While these symptoms onset over the past couple of weeks,  They were at their worst upon admission.  Notes revealed she was talking quickly, and reported difficulties sleeping, increased goal-directed activity (e.g., purging and redecorating her house), and increased irritability. She was started on Zyprexa to help with anxiety.     Current Clinical Interview:  It was challenging to glean much information from Jessica due to limited insight and cong-like behavior.  She definitely demonstrated racing thoughts and non-linear thinking/speech, combined with limited insight into social presentation (e.g., loud voice, tending to talk over others).  The following information Jessica reflects her level of insight into her current challenges, and the reader is encouraged to review her other medical records and rely on objective data (vs. Self-report) regarding recent events and its implications.     Jessica reported that she didn't want to come in to this appointment today.  She believes she has returned to baseline.  However, it was challenging to get any sort of a timeline of events that happened.  She started by talking about how \"it's around dinner time, and I shut it all down by 9pm.\"  She indicated that she was referred by her Er doctor and nurse practioner, but was unable to state why.  When she tried to recall a timeline of events, she noted she was up at midnight and 3 am \"drinking coffee\" and was stressed about meeting extended family which resulted in a panic attack. She reported she had a lot going on including purging and buying things for her home to update it.  She reported that at that " "time \"her brakes felt broken\" but denied any lasting cognitive challenges and is not distractible now (despite significant challenges to obtain a timeline of events or accurate assessment of her current challenges).  She believes she is back to her baseline functioning.        Jessica attended the appointment with her daughter.  Yesy reported that Jessica was seen in the hospital due to a sudden onset of cong-like symptoms including decreased need for sleep, pressured speech, significant goal-directed activity (in this case, purging and redecorating her home at a rate that is out of character), and hyperfocusing combined with some forgetfulness of events.  While Jessica believes that she has returned to baseline, Yesy stated that she still notices some symptoms and behaviors.  Jessica is easily agitated and defensive as well.   At baseline, her daughter characterizes Jessica as even keel and not at all bliss, so this episode is a large departure from baseline.      There were no behavioral, personality, or cognitive changes prior to the emergency department visit noted above, and there were no noted cognitive changes over the past year or so.  There were no cncerns about Jessica's ability to complete iADLs, however, during this phase Yesy is increasingly concerned about finances given how much stuff Jessica is purchasing for her home. Her daughter has direct supervision of her medications.  Jessica continues to drive but stays close to home.     Of note, there is significant discrepancy between what Jessica reports and what her daughter reports, and healthcare professionals may benefit from getting a an informant's understanding of the situations as Jessica's insight and self-assessment may be inaccurate.     HEALTH HABITS, BEHAVIORS AND MOOD:   Description of current mood:  \"Good.\"  Jessica reported some bereavement when her  passed about 14 years ago.  She lost her mother and had breast cancer around the same time.  She denied any other " "psychiatric history, including no history of panic attacks.     Appetite: \"Perfect.\"  No changes in sense of smell or taste.       Sleep/Energy:  \"Perfect.\"  It's much better than before.  She reported she sleeps 9pm until 5am.  She does not nap during the day but does have periods of relative rest.  Yesy noted a decreased need for sleep.     Exercise: New bike, doing stuff around house. She was walking 3-5 miles a day.     Chronic Pain: Denied.     Tobacco use:  Denied.     Alcohol use:  Glass of wine a night, haven't in the past couple of weeks.     Other Drugs: Denied.     Hallucinations: Denied.     Suicidal ideation: Denied.    MEDICAL/PSYCHIATRIC/SURGICAL HISTORY:   Patient Active Problem List   Diagnosis     Symptomatic varicose veins of both lower extremities     Malignant neoplasm of upper-outer quadrant of right breast in female, estrogen receptor positive (H)     Abnormal bone density screening     Long term (current) use of aromatase inhibitors       Past Medical History:   Diagnosis Date     Hypertension      Iritis        Past Surgical History:   Procedure Laterality Date     CATARACT IOL, RT/LT Left 2006     HYSTERECTOMY  2016     LUMPECTOMY BREAST WITH SENTINEL NODE, COMBINED Right 8/31/2018    Procedure: COMBINED LUMPECTOMY BREAST WITH SENTINEL NODE;  Right Wire Localized Lumpectomy and Right Delia Lymph Node Biopsy;  Surgeon: Chilango Kruger MD;  Location: [facility] OR     OOPHORECTOMY  2014       Family History:  No family history on file. Father was alcohol, mom had Alzheimer's, older sister has bipolar disorder and OCD, one brother is \"withdrawn from everyone\"    IMAGING:  Brain MRI completed 4/18/2023:   \"Impression:  1. No acute or suspicious intracranial findings.  2. Scattered nonspecific T2 hyperintense foci in the periventricular  and cerebral white matter, likely sequela of chronic small vessel  ischemic disease.\"    MEDICATIONS: Keely has a current medication list which includes the " "following prescription(s): amlodipine, anastrozole, atorvastatin, calcium carbonate, lysine, multivitamin w/minerals, olanzapine, omega 3, rivaroxaban anticoagulant, UNABLE TO FIND, and cholecalciferol..    SOCIAL/DEVELOPMENTAL/OCCUPATIONAL HISTORY:  Jessica was born and raised in the [facility] area. She denied any pre- or bruno- complications. She reportedly met all developmental milestone on time.  In school, and described that she was always \"kind of on the artsy side\" and math wasn't as strong for her.  She graduated high school.  She completed almost 2 years of community college, but never obtained her associates.  She then got a job for the [employer] working as a  for over 19 years.  She retired at age 59 when she could take a 's pension.     BEHAVIORAL OBSERVATIONS:  Jessica attended the appointment with her daughter.  There appears to be a disconnect between Yesy and Jessica, and much of the time, Yesy would provide information or observation and Jessica would jump in or talk over Yesy to provide explanations or context.  Thoughts were goal-directed and linear when asked closed-ended questions, however, her thoughts were disjointed and rambling when asked open-ended questions.  She was challenging to redirect, which was required almost constantly.  She was garrulous in conversation, again, requiring almost constant redirection. Speech was normal for tone and prosody, but appeared pressured, quick, and loud at times. There were no gross or fine motor impairments noted.  She often started tasks impulsively, and required redirection to wait for the entire presentation of instructions to begin.     TEST RESULTS: Please use the following table for reference.   Percentile Ranks Descriptor   <2nd Percentile Extremely Low Range   3rd - 9th Percentile Borderline Range   10th - 16th Percentile Below Average Range   17th - 24th Percentile Low Average   25th - 75th Percentile Average   76th - 90th Percentile Above " "Average   91st - 97th Percentile Superior   >98th Percentile Very Superior      Performance Validity:  Jessica performed within expectations on embedded measures of performance validity.  Taken together with behavioral observations above, the following results are likely to be a mild underestimation of her true cognitive functioning given the behavioral challenges that could prevent full engagement in testing.  However, the results are considered an accurate assessment of her functional status.     Pre-Morbid Functioning: She performed within the average range on a measure of single-word reading.  Taken together with demographic factors, it is estimated that Jessica's pre-morbid functioning is at least within the broadly average range.     Language Skills: Verbal abstraction is below average. Confrontation naming is below average. Phonemic verbal fluency is low average, and semantic verbal fluency is average.     Visuospatial: Block construction is average. Clock drawing was normal for construction with some difficulties noted in planning (e.g., inappropriate spacing). Time setting was incorrect (e.g., abstraction), although it was \"close\" to a correct response, and likely indicative of a hasty and quick drawing, and likely some inattention mentioned throughout.     Attention/Working Memory/Processing Speed: Simple attention span was average.  Working memory was average. Processing speed ranged from below average to average.     Learning/Memory: New learning for a list of words was above average, characterized by a good learning curve.  She was able to recall all that she learned after a delay, which was considered normatively above average.  Recognition memory was average due to challenges with endorsing intrusive information (or executive interference). Visual memory was much weaker.  New learning was in the extremely low range with a shallow learning curve. She was able to recall what she learned initially, however, " "she was quite perseverative in her learning/memory and tended to recall her own intrusive errors more so than accurate details.  Recognition memory was normal, with an accurate number of hits and no false positive errors.     Executive Functioning: There are cognitive, metacognitive, behavioral, and emotional regulation components to executive functioning, and Jessica demonstrated variable abilities across all of these domains.     Cognitively, Jessica demonstrated poor rapid alternating attention:  Her response speed slowed considerably, and she made a large number errors.  Across two problem solving tasks, she demonstrated poor efficacy in problem solving well as reduced ability to learn from prior responses and integrate that into her performance (a metacognitive strategy). While she was eventually able to get to an accurate solution, her path there was ineffective and she tended to respond in a haphazard way. On an unstructured task of prioritizing and planning, Jessica made numerous errors, including not completing all tasks asked as well as not staying within the parameters of the tasks asked. She did not appear to take any time to plan, and did not show any insight into her poor performance on this measure. When asked to do the same tasks in a prescribed order (e.g., \"first, then...\") Jessica had difficulties picking up where she left off when switching between tasks. While she was able to complete the overall goal, she made numerous errors along the way    Clinically, it was observed that she struggled with social and behavioral norms, such as talking over people (an \"on my mind, out my mouth\" presentation), starting tasks before instructions were finished, rigidity in thinking combined with difficulties in redirection, and marked impulsivity.       Psychological Functioning: Jessica denied any clinically relevant symptoms of depression, stress, or anxiety on self-report questionnaire.     Questionnaires: Both Jessica and her " daughter completed the Frontal Systems Behavior Scale, which assesses aspects of executive functioning, apathy, and disinhibition prior to an event (such as a stroke or other major medical event) as well as current level of functioning.  The questions are the same across the patient and informant report, which can shed some light onto how certain behaviors are perceived by or affect others.     Nine denied experiencing any challenges in apathy, disinhibition, or executive functioning either pre-illness or currently. Her daughter denied observing any challenges in Jessica prior to the onset of this illness.  However, since the ED visit, Yesy has noticed increased disinhibition (93%ile) and executive dysfunction (84%ile). Items with significant changes (more than 3 points) include: perseveration, mixing up sequences of tasks or when doing multiple things, emotionally labile, anosognosia, interrupting others, and decreased awareness of her behaviors on others.      Procedures Administered by Psychologist: Clinical Interview with Jessica and her daughter, review of available medical records, test selection, interpretation, preparation of written report, and feedback. Please see nursing note for procedures administered by psychometrist.      Tests Administered:  Performance Validity Measures, NAB Orientation, ACS TOPF, WAIS-IV (Selected Subtests), HVLT-R:,  BVMT-R, BNT (Short), COWAT/ANimals, Clock Drawing, D-KEFS Color-Word Interference & Haviland Test , WCST-128, BADS Zoo Maps, BEN-4, FrSBe      Activities included in this evaluation CPT Code Time Units   Psychological Diagnostic Interview 86926 - 1   Neuropsychology Professional Time [id number] 173 1   [place] on [address]  2   Neuropsychologist Admin/Scoring Tests 19551     Add On 64124     Psychometrician Time - Test [id number] 1   Admin/Scoring [id number]  7   DX:

## 2023-05-02 ENCOUNTER — OFFICE VISIT (OUTPATIENT)
Dept: INTERNAL MEDICINE | Facility: CLINIC | Age: 68
End: 2023-05-02
Payer: MEDICARE

## 2023-05-02 ENCOUNTER — LAB (OUTPATIENT)
Dept: LAB | Facility: CLINIC | Age: 68
End: 2023-05-02
Payer: MEDICARE

## 2023-05-02 VITALS
HEIGHT: 67 IN | SYSTOLIC BLOOD PRESSURE: 138 MMHG | WEIGHT: 147 LBS | DIASTOLIC BLOOD PRESSURE: 81 MMHG | OXYGEN SATURATION: 97 % | BODY MASS INDEX: 23.07 KG/M2 | HEART RATE: 88 BPM

## 2023-05-02 DIAGNOSIS — Z00.00 ANNUAL VISIT FOR GENERAL ADULT MEDICAL EXAMINATION WITHOUT ABNORMAL FINDINGS: ICD-10-CM

## 2023-05-02 DIAGNOSIS — F30.9 MANIA (H): Primary | ICD-10-CM

## 2023-05-02 DIAGNOSIS — Z11.59 ENCOUNTER FOR HCV SCREENING TEST FOR LOW RISK PATIENT: ICD-10-CM

## 2023-05-02 LAB
CHOLEST SERPL-MCNC: 175 MG/DL
HCV AB SERPL QL IA: NONREACTIVE
HDLC SERPL-MCNC: 68 MG/DL
LDLC SERPL CALC-MCNC: 91 MG/DL
NONHDLC SERPL-MCNC: 107 MG/DL
TRIGL SERPL-MCNC: 78 MG/DL

## 2023-05-02 PROCEDURE — 36415 COLL VENOUS BLD VENIPUNCTURE: CPT | Performed by: PATHOLOGY

## 2023-05-02 PROCEDURE — 80061 LIPID PANEL: CPT | Performed by: PATHOLOGY

## 2023-05-02 PROCEDURE — 86803 HEPATITIS C AB TEST: CPT | Performed by: HOSPITALIST

## 2023-05-02 PROCEDURE — 99203 OFFICE O/P NEW LOW 30 MIN: CPT | Mod: GC

## 2023-05-02 RX ORDER — OLANZAPINE 2.5 MG/1
2.5 TABLET, FILM COATED ORAL DAILY PRN
Qty: 90 TABLET | Refills: 0 | Status: SHIPPED | OUTPATIENT
Start: 2023-05-02 | End: 2023-06-14 | Stop reason: DRUGHIGH

## 2023-05-02 ASSESSMENT — ANXIETY QUESTIONNAIRES
2. NOT BEING ABLE TO STOP OR CONTROL WORRYING: NOT AT ALL
1. FEELING NERVOUS, ANXIOUS, OR ON EDGE: NOT AT ALL
5. BEING SO RESTLESS THAT IT IS HARD TO SIT STILL: NOT AT ALL
GAD7 TOTAL SCORE: 0
GAD7 TOTAL SCORE: 0
6. BECOMING EASILY ANNOYED OR IRRITABLE: NOT AT ALL
3. WORRYING TOO MUCH ABOUT DIFFERENT THINGS: NOT AT ALL
7. FEELING AFRAID AS IF SOMETHING AWFUL MIGHT HAPPEN: NOT AT ALL

## 2023-05-02 ASSESSMENT — PATIENT HEALTH QUESTIONNAIRE - PHQ9
SUM OF ALL RESPONSES TO PHQ QUESTIONS 1-9: 0
5. POOR APPETITE OR OVEREATING: NOT AT ALL

## 2023-05-02 NOTE — PROGRESS NOTES
The patient was seen for neuropsychological evaluation at the request of Sandra Batista NP for the purposes of diagnostic clarification and treatment planning. 246 minutes of test administration and scoring were provided by this writer. Please see Dr. Nataliia Jones's report for a full interpretation of the findings.    Leena Robert   Psychometrist

## 2023-05-02 NOTE — PROGRESS NOTES
[facility]           History of Present Illness:  Ms. 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     She mentioned that she recently was seen in the ED due to personality changes, anxiety, cong and one episode of panic attack.   She mentioned that she never had episode of high or low mood in the past.  She said that she had grief but never felt depressed in the past. She said that she recently started rebuilding her house with her  and that brought up lot of past memories of her past  (  with cancer ) and feels that might have stressed her.   Based on her and her daughter, her sleep had decreased, she did a lot of purging that was unnecessary, her speech had increased. She made a lot of task and would not be able to complete one and would go to finish other one. She was then started with zyprexa 2.5 mg in the ED. She then was followed up with Nurse practitioner here and since she was still in the same state, her zyprexa was increased to 5 mg.   She said that she then started to have good sleep. She only wakes up once at night. She is trying to complete one task at a time. She feels much better.  Based on her daughter, she is not in her usual self. She still has pressured speech. She feels irritated and agigated easily.   She has normal bowel and bladder. She checks her Bps regularly at home,  ( 130s. )   She has good family support and [facility] [place]  [facility].     Her Phq9: 7  Has a history of hysterctomy, lumpectomy of her right breast with sentinel node in 2018, oophorectomy 2014    Family hx:  father alcohol, mom had right ear, sister : bipolar and OCD..    Uptodate with Covid, Pneumo, shingrix  Recent labs on 4/18 CBC, BMP ,TSH  normal  Screening : Hep C , lipid required   mammo in 2025, tdap in 2027, colonsopy 2025, dexa in 2038    A detailed Review of Systems was performed, verified and is negative except as documented in the HPI.  All health questionnaires were reviewed, verified and relevant information documented above.    Past Medical History:  Past Medical History:   Diagnosis Date     Hypertension      Iritis        Past Surgical History:  Past Surgical History:   Procedure Laterality Date     CATARACT IOL, RT/LT Left 2006     HYSTERECTOMY  2016     LUMPECTOMY BREAST WITH SENTINEL NODE, COMBINED Right 8/31/2018    Procedure: COMBINED LUMPECTOMY BREAST WITH SENTINEL NODE;  Right Wire Localized Lumpectomy and Right [place] Lymph Node Biopsy;  Surgeon: Chilango Kruger MD;  Location: [facility] OR     OOPHORECTOMY  2014       Active Meds:  Current Outpatient Medications   Medication     amLODIPine (NORVASC) 5 MG tablet     anastrozole (ARIMIDEX) 1 MG tablet     atorvastatin (LIPITOR) 10 MG tablet     calcium carbonate (OS-PADDY 500 MG [place]. [place]) 500 MG tablet     LYSINE PO     multivitamin w/minerals (THERA-VIT-M) tablet     OLANZapine (ZYPREXA) 2.5 MG tablet     OLANZapine (ZYPREXA) 5 MG tablet     omega 3 1000 MG CAPS     rivaroxaban ANTICOAGULANT (XARELTO) 20 MG TABS tablet     UNABLE TO FIND     VITAMIN D, CHOLECALCIFEROL, PO     No current facility-administered medications for this visit.        Allergies:  Corticosteroids, Neomycin, Cephalexin, Codeine, Macrobid [nitrofurantoin], Sulfa antibiotics, and Silver    Family History:  family history is not on file.    Social History:  Social History     Tobacco Use     Smoking status: Never      "Smokeless tobacco: Never   Substance Use Topics     Alcohol use: Not Currently     Alcohol/week: 3.0 standard drinks of alcohol     Types: 3 Standard drinks or equivalent per week     Comment: rarely. Patient drinks occasionally, but is currently abstaining due to new medication     Drug use: No     Few glasse of drink ,   Physical Exam:  Vitals: /81 (BP Location: Right arm, Patient Position: Sitting, Cuff Size: Adult Regular)   Pulse 88   Ht 1.702 m (5' 7\")   Wt 66.7 kg (147 lb)   SpO2 97%   BMI 23.02 kg/m    Constitutional: Alert, oriented, pleasant , pressured speech  Head: Normocephalic, atraumatic  Eyes: Extra-ocular movements intact, pupils equally round and reactive bilaterally, no scleral icterus  ENT: Oropharynx clear, moist mucus membranes, good dentition  Neck: Supple, no lymphadenopathy  Cardiovascular: Regular rate and rhythm, no murmurs, rubs or gallops, peripheral pulses full/symmetric  Respiratory: Good air movement bilaterally, lungs clear, no wheezes/rales/rhonchi  GI: Abdomen soft, bowel sounds present, nondistended, nontender, no organomegaly or masses, no rebound/guarding  Musculoskeletal: No edema, normal muscle tone, normal gait  Neurologic: Alert and oriented  Skin: No rashes/lesions  Psychiatric: normal mentation, affect and mood, pressured speech      Diagnostics:  Labs reviewed in [facility]      Assessment and Plan:  Keely was seen today for establish care.    Diagnoses and all orders for this visit:    Paz (H), Anxiety and one episode of panic attack:    Patient was seen in the ED for these episodes and was started on olanzapine. Neuropsychiatric evaluation was done yesterday. Awaiting result.  DD: Paz, anxiety , hypomania  She is currently on 5 mg at 8 pm every day. She still feels agitated and is trying to complete one task at a time.   She is going to cabin up [place]. I think that will help her with decreasing stressor and would also recommend 2.5 mg in the day to take as " needed, when she feels agitated or anxious. She has good family support. Has no history of depression or cong in the past. She has a sister with BPD and OCD.   She would benefit with psy referral for further evaluation and management with coping skills.   -     Adult Mental Health  Referral; Future  -     OLANZapine (ZYPREXA) 2.5 MG tablet; Take 1 tablet (2.5 mg) by mouth daily as needed (anxious)    Annual visit for general adult medical examination without abnormal findings  -     Hepatitis C Screen Reflex to HCV RNA Quant and Genotype; Future  -     Lipid Profile (Chol, Trig, HDL, LDL calc); Future    Encounter for HCV screening test for low risk patient  -     Hepatitis C Screen Reflex to HCV RNA Quant and Genotype; Future          Regina Lopez MD  Internal Medicine PGY1  [facility]  Patient was seen by Popeye Willingham MD    >30 minutes spent today performing chart review, history and exam, counseling, care coordination, documentation and further activities as noted above exclusive of any procedures or EKG interpretation

## 2023-05-02 NOTE — NURSING NOTE
Keely Arana is a 67 year old female that presents in clinic today for the following:     Chief Complaint   Patient presents with     Establish Care     Pt here to establish care       The patient's allergies and medications were reviewed. The patient's vitals were obtained without incident. The patient does not have any other questions for the provider.     Kelly Morocho, EMT at 8:57 AM on 5/2/2023.  Primary Care Clinic: [phone]

## 2023-05-12 ENCOUNTER — VIRTUAL VISIT (OUTPATIENT)
Dept: PSYCHIATRY | Facility: CLINIC | Age: 68
End: 2023-05-12
Payer: MEDICARE

## 2023-05-12 ENCOUNTER — VIRTUAL VISIT (OUTPATIENT)
Dept: BEHAVIORAL HEALTH | Facility: CLINIC | Age: 68
End: 2023-05-12
Payer: MEDICARE

## 2023-05-12 DIAGNOSIS — F39 MOOD DISORDER (H): Primary | ICD-10-CM

## 2023-05-12 DIAGNOSIS — F39 MOOD DISORDER (H): ICD-10-CM

## 2023-05-12 DIAGNOSIS — Z86.59 HISTORY OF MANIA: Primary | ICD-10-CM

## 2023-05-12 PROCEDURE — 90791 PSYCH DIAGNOSTIC EVALUATION: CPT | Mod: VID | Performed by: PSYCHOLOGIST

## 2023-05-12 PROCEDURE — 99204 OFFICE O/P NEW MOD 45 MIN: CPT | Mod: VID | Performed by: PSYCHIATRY & NEUROLOGY

## 2023-05-12 RX ORDER — OLANZAPINE 5 MG/1
5 TABLET ORAL AT BEDTIME
Qty: 90 TABLET | Refills: 1 | Status: SHIPPED | OUTPATIENT
Start: 2023-05-12 | End: 2023-06-14 | Stop reason: DRUGHIGH

## 2023-05-12 ASSESSMENT — PATIENT HEALTH QUESTIONNAIRE - PHQ9
SUM OF ALL RESPONSES TO PHQ QUESTIONS 1-9: 0
10. IF YOU CHECKED OFF ANY PROBLEMS, HOW DIFFICULT HAVE THESE PROBLEMS MADE IT FOR YOU TO DO YOUR WORK, TAKE CARE OF THINGS AT HOME, OR GET ALONG WITH OTHER PEOPLE: NOT DIFFICULT AT ALL
10. IF YOU CHECKED OFF ANY PROBLEMS, HOW DIFFICULT HAVE THESE PROBLEMS MADE IT FOR YOU TO DO YOUR WORK, TAKE CARE OF THINGS AT HOME, OR GET ALONG WITH OTHER PEOPLE: NOT DIFFICULT AT ALL
SUM OF ALL RESPONSES TO PHQ QUESTIONS 1-9: 0

## 2023-05-12 NOTE — PROGRESS NOTES
"Telemedicine Visit: The patient's condition can be safely assessed and treated via synchronous audio and visual telemedicine encounter.      Reason for Telemedicine Visit: Patient has requested telehealth visit    Originating Site (Patient Location): Patient's home    Distant Location (provider location):  Off-site    Consent:  The patient/guardian has verbally consented to: the potential risks and benefits of telemedicine (video visit) versus in person care; bill my insurance or make self-payment for services provided; and responsibility for payment of non-covered services.     Mode of Communication:  Video Conference via Energy    As the provider I attest to compliance with applicable laws and regulations related to telemedicine.                                              Outpatient Psychiatric Evaluation- Standard  Adult    Name:  Keely Arana  : 1955    Source of Referral:  Primary Care Provider: Dr. Regina Lopez   Current Psychotherapist: None    Identifying Data:  Patient is a 67 year old,   White Not  or  who presents for initial visit with me.  Patient is currently retired. Patient attended the phone/video session alone. Patient prefers to be called: \"Jessica\"    Chief Complaint:  Patient presents with:  Consult      HPI:  Keely Arana is a 67 year old with past history including cong who presents today for psychiatric assessment.     Patient presents after recent suspected manic episode.  In late April patient started some extreme spring cleaning in her home.  Started not sleeping some nights but still had quite a bit of energy.  Racing thoughts, irritability, talking faster than usual to her children.  Patient's children brought her to the emergency room and she was started on olanzapine.  Olanzapine has since helped quite a bit and patient is much less irritable and sleeping 8 hours a night.  Patient does have a sister with bipolar disorder.  Patient has not been " "diagnosed with bipolar disorder.  Patient does not remember any episode like this at all in her past.  No acute safety concerns.  No SI.  No problematic drug or alcohol use.  Tolerating olanzapine well with no negative side effects.    Psych Meds at Intake:  Olanzapine 5 mg at bedtime and 2.5 mg daily as needed    Past Psych Meds:  None    Per [facility], Dr. Jd Quiroz, during today's team-based visit:  The reason for seeking services at this time is: \"Follow up to previous care\".  The problem(s) began 04/03/23.  First appointment with patient in [facility] and was advised of the short-term, team based structure of the model including role of C and provider. Patient indicated understanding of the model and agreed to proceed with services as described. Care team has reviewed attendance agreement with patient. Patient advised that two failed appointments within 6 months may lead to termination of current episode of care.                 Patient reported that she remarried 3 years ago following the death of her . In late April, she started purging some things out of her home and her children became concerned. She started feeling \"manic\" and her daughter brought her to the ED. She had been sleeping odd times and was getting up at night. Now she thinks she is \"100% better.\" She is taking olanzapine before bed. She sleeps 7-8 hours a night. She was more irritable which was unusual for her as well.     Stressors: nothing significant     Goals: \"If I am on the right medication.\"     Patient has attempted to resolve these concerns in the past through medication.    Per recent neuropsychological observations noted in preliminary/unsigned note by Dr. Jones 5/1/2023:  BEHAVIORAL OBSERVATIONS:  She is still quite manic that presents with pressured speech, impulsivity, poor problem-solving, and most notably incredibly poor insight.  She believes she has returned to her baseline, however, testing results as well as her daughter " report continued deficits in executive functioning as well as manic-behavior.  It was helpful in my evaluation to spend some time listening to the daughter as Jessica could get defensive about some of the challenges she is continuing to face.  Per my technician, she was incredibly disinhibited during testing, could not be redirected to task directions, and did not integrate feedback into her understanding of herself or her performance.      Past diagnoses include: cong  Current medications include: has a current medication list which includes the following prescription(s): amlodipine, anastrozole, atorvastatin, calcium carbonate, lysine, multivitamin w/minerals, olanzapine, olanzapine, omega 3, rivaroxaban anticoagulant, UNABLE TO FIND, and cholecalciferol.   Medication side effects: Denies  Current stressors include: Symptoms and see HPI above  Coping mechanisms and supports include: Family, Hobbies and Friends    Psychiatric Review of Symptoms Per [facility], Dr. Jd Quiroz, during today's team-based visit:  Depression:     No symptoms  Cong:             Elevated mood, Irritability, Racing thoughts, Increased activity, Decreased need for sleep, Pressured speech and    Psychosis:       No Symptoms  Anxiety:           No Symptoms  Panic:              No symptoms  Post Traumatic Stress Disorder:  No Symptoms   Eating Disorder:          No Symptoms  ADD / ADHD:              No symptoms  Conduct Disorder:       No symptoms  Autism Spectrum Disorder:     No symptoms  Obsessive Compulsive Disorder:       No Symptoms     Patient reports the following compulsive behaviors and treatment history:  .       Diagnostic Criteria:     Mood disorder R/O bipolar disorder    All other ROS negative.     PHQ-9 scores:       4/21/2023     2:14 PM 5/2/2023    10:19 AM 5/12/2023     7:56 AM   PHQ-9 SCORE   PHQ-9 Total Score MyChart   0   PHQ-9 Total Score 4 0 0    0       DAISY-7 scores:        4/21/2023     2:14 PM 5/2/2023    10:19 AM   DAISY-7  SCORE   Total Score 5 0       Medical Review of Systems:  10 systems (general, cardiovascular, respiratory, eyes, ENT, endocrine, GI, , M/S, neurological) were reviewed. Most pertinent finding(s) is/are: denies fever, cough, persistent headaches, shortness of breath, chest pain, severe GI symptoms, trouble urinating, severe pain. The remaining systems are all unremarkable.    A 12-item WHODAS 2.0 assessment was not completed.    Psychiatric History:   Hospitalizations: None  History of Commitment? No   Past Treatment: medication(s) from physician / PCP  Suicide Attempts: No   Current Suicide Risk: Suicide Assessment Completed Today.  Self-injurious Behavior: Denies  Electroconvulsive Therapy (ECT) or Transcranial Magnetic Stimulation (TMS): No   GeneSight Genetic Testing: No     Past medication trials include but are not limited to:   Psych Meds at Intake:  Olanzapine 5 mg at bedtime and 2.5 mg daily as needed    Past Psych Meds:  None    Substance Use History:  Current Use of Drugs/Alcohol: Denies   Past Use of Drugs/Alcohol: Alcohol last in April - one glass of wine a week; cannabis in Jan; denies problematic use  Patient reports no problems as a result of their drinking / drug use.   Patient has not received chemical dependency treatment in the past  Recovery Programming Involvement: None    Tobacco use: No    Based on the clinical interview, there  are not indications of drug or alcohol abuse. Continue to monitor.   Discussed effect of substance use on overall health.     Past Medical History:  Past Medical History:   Diagnosis Date     Hypertension      Iritis       Surgery:   Past Surgical History:   Procedure Laterality Date     CATARACT IOL, RT/LT Left 2006     HYSTERECTOMY  2016     LUMPECTOMY BREAST WITH SENTINEL NODE, COMBINED Right 8/31/2018    Procedure: COMBINED LUMPECTOMY BREAST WITH SENTINEL NODE;  Right Wire Localized Lumpectomy and Right [place] Lymph Node Biopsy;  Surgeon: Chilango Kruger MD;   Location: [facility] OR     OOPHORECTOMY  2014     Food and Medicine Allergies:     Allergies   Allergen Reactions     Corticosteroids Dermatitis     Proven allergic contact dermatitis to corticosteroid (group A, B, D2)     CAN USE as ALTERNATIVE following steroids: Corticosteroids of group C (e.g.Betamethasone, Dexamethasone, Flumethasone pivalate, Halomethasone)   and group D1 (Betamethasone dipropionate, Betamethasone-17-valerate,Clobetasole-propionate, Fluticasone propionate, Mometasone furoate)     Neomycin Dermatitis     Patch tests positives to Neomycin and Framycetin     Cephalexin GI Disturbance     Other reaction(s): reflux     Codeine Nausea and Vomiting     Macrobid [Nitrofurantoin] Nausea and Vomiting     Sulfa Antibiotics Itching     Silver Rash     Seizures or Head Injury: No  Diet: No Restrictions  Exercise: not discussed  Supplements: Reviewed per Electronic Medical Record Today    Current Medications:    Current Outpatient Medications:      amLODIPine (NORVASC) 5 MG tablet, Take 5 mg by mouth daily, Disp: , Rfl:      anastrozole (ARIMIDEX) 1 MG tablet, Take 1 tablet (1 mg) by mouth daily, Disp: 90 tablet, Rfl: 1     atorvastatin (LIPITOR) 10 MG tablet, Take 10 mg by mouth daily, Disp: , Rfl:      calcium carbonate (OS-PADDY 500 MG [place]) 500 MG tablet, Take 2 tablets by mouth daily With Magnesium,Zinc, Disp: , Rfl:      LYSINE PO, Take 10,000 mg by mouth daily, Disp: , Rfl:      multivitamin w/minerals (THERA-VIT-M) tablet, Take 1 tablet by mouth daily, Disp: , Rfl:      OLANZapine (ZYPREXA) 2.5 MG tablet, Take 1 tablet (2.5 mg) by mouth daily as needed (anxious), Disp: 90 tablet, Rfl: 0     OLANZapine (ZYPREXA) 5 MG tablet, Take 1 tablet (5 mg) by mouth At Bedtime, Disp: 30 tablet, Rfl: 0     omega 3 1000 MG CAPS, TG Omega 1100 mg DHA 1100 EPA 2 tablets once daily, Disp: , Rfl:      rivaroxaban ANTICOAGULANT (XARELTO) 20 MG TABS tablet, Take 20 mg by mouth, Disp: , Rfl:      UNABLE TO FIND, daily  MEDICATION NAME: [employer] , Disp: , Rfl:      VITAMIN D, CHOLECALCIFEROL, PO, Take 1,000 Units by mouth daily , Disp: , Rfl:     The [place] Prescription Monitoring Program has been reviewed and there are no concerns about diversionary activity for controlled substances at this time.  No data for controlled substances over the last one year.     Vital Signs:  None since this is a phone/video visit.     Labs:  Most recent laboratory results reviewed and the pertinent results include:   Lab on 05/02/2023   Component Date Value Ref Range Status     Hepatitis C Antibody 05/02/2023 Nonreactive  Nonreactive Final     Cholesterol 05/02/2023 175  <200 mg/dL Final     Triglycerides 05/02/2023 78  <150 mg/dL Final     Direct Measure HDL 05/02/2023 68  >=50 mg/dL Final     LDL Cholesterol Calculated 05/02/2023 91  <=100 mg/dL Final     Non HDL Cholesterol 05/02/2023 107  <130 mg/dL Final   Office Visit on 04/21/2023   Component Date Value Ref Range Status     Vitamin B12 04/21/2023 689  232 - 1,245 pg/mL Final     Folic Acid 04/21/2023 26.3  4.6 - 34.8 ng/mL Final   Infusion Therapy Visit on 12/21/2022   Component Date Value Ref Range Status     Creatinine 12/21/2022 0.67  0.51 - 0.95 mg/dL Final     GFR Estimate 12/21/2022 >90  >60 mL/min/1.73m2 Final    Effective December 21, 2021 eGFRcr in adults is calculated using the 2021 CKD-EPI creatinine equation which includes age and gender (Sundeep et al., NEJM, DOI: 10.1056/VYJYwu7385627)     Calcium 12/21/2022 9.7  8.8 - 10.2 mg/dL Final     Albumin 12/21/2022 4.4  3.5 - 5.2 g/dL Final   Infusion Therapy Visit on 06/29/2022   Component Date Value Ref Range Status     Creatinine 06/29/2022 0.65  0.52 - 1.04 mg/dL Final     GFR Estimate 06/29/2022 >90  >60 mL/min/1.73m2 Final    Effective December 21, 2021 eGFRcr in adults is calculated using the 2021 CKD-EPI creatinine equation which includes age and gender ( et al., NEJM, DOI: 10.1056/ZDYDlk1549031)     Calcium  "2022 9.4  8.5 - 10.1 mg/dL Final     Albumin 2022 3.5  3.4 - 5.0 g/dL Final     Most recent EKG from 2023 reviewed. QTc interval 481.      Family History:   Patient reported family history includes: No family history on file.  Mental Illness History: sister with bipolar disorder and OCD; one brother is quite withdrawn from everyone  Substance Abuse History: Denies  Suicide History: Denies  Medications: Unknown     Social History Per [facility], Dr. Jd Quiroz, during today's team-based visit:  Patient reported they grew up in [place]. They were raised by biological parents. Parents  /  when she was 12yo. She had an older and younger brother, and an older and younger sister. She lived with her mother after the divorce and did not see her father very often. Father was an alcoholic. Patient reported that their childhood was \"pretty good.\" Patient described their current relationships with family of origin as good with her siblings. Her younger brother  when he was 12 years old. She gets along well with her remaining siblings. One sister has bipolar disorder and OCD. One brother is quite withdrawn from everyone.     The patient describes their cultural background as .  Cultural influences and impact on patient's life structure, values, norms, and healthcare: [place]..  Contextual influences on patient's health include: Contextual Factors: Family Factors family concerned about her health.    These factors will be addressed in the Preliminary Treatment plan. Patient identified their preferred language to be English. Patient reported they does not need the assistance of an  or other support involved in therapy.      Patient reported had no significant delays in developmental tasks.   Patient's highest education level was some college  .  Patient identified the following learning problems: none reported.  Modifications will not be used to " assist communication in therapy. Patient reports they are  able to understand written materials.     Patient reported the following relationship history;  [id number] before he passed. Patient's current relationship status is  for 3 years.   Patient identified their sexual orientation as heterosexual.  Patient reported having 3 child(jeimy). Patient identified partner; adult child as part of their support system.  Patient identified the quality of these relationships as good.     Patient's current living/housing situation involves staying in own home/apartment.  The immediate members of family and household include Terry Arana, 72, and they report that housing is stable.     Patient is currently retired.  Patient reports their finances are obtained through spouse; other. Patient does not identify finances as a current stressor.      Firearms/Weapons Access: Yes Locked up   Service: No    Legal History:  No: Patient denies any legal history    Significant Losses / Trauma / Abuse / Neglect Issues:  There are indications or report of significant loss, trauma, abuse or neglect issues related to: see any pertinent info above in HPI/social hx.   Issues of possible neglect are not present.       Comprehensive Examination (limited due to virtual visit format, phone/video):  Vital Signs:  Vitals: There were no vitals taken for this visit.  General/Constitutional:  Appearance: awake, alert, adequately groomed, appeared stated age and no apparent distress  Attitude:  cooperative   Eye Contact:  good  Musculoskeletal:  Muscle Strength and Tone: no gross abnormalities by observation  Psychomotor Behavior:  no evidence of tardive dyskinesia, dystonia, or tics  Gait and Station: normal, no gross abnormalities noted by observation  Psychiatric:  Speech:  hyperverbal but clear, coherent, rapid rate (but not pressured), normal rhythm, and volume  Associations:  no loose associations  Thought Process:   logical, linear and goal oriented  Thought Content:  no evidence of suicidal ideation or homicidal ideation, no evidence of psychotic thought, no auditory hallucinations present and no visual hallucinations present  Mood:  better  Affect:  mood congruent and intensity is heightened  Insight:  good  Judgment:  intact, adequate for safety  Impulse Control:  intact  Neurological:  Oriented to:  person, place, time, and situation  Attention Span and Concentration:  normal  Language: intact  Recent and Remote Memory:  Intact to interview. Not formally assessed. No amnesia.  Fund of Knowledge: appropriate    Strengths and Opportunities Per [facility], Dr. Jd Quiroz, during today's team-based visit:  Patient identified the following strengths or resources that will help them succeed in treatment: friends / good social support, family support, insight, intelligence, motivation, strong social skills and work ethic. Things that may interfere with the patient's success in treatment include: none identified.     Suicide Risk Assessment:  Today Keely Arana reports no suicidal ideation. Based on all available evidence including the factors cited above, Keely Arana does not appear to be at imminent risk for self-harm, does not meet criteria for a 72-hr hold, and therefore remains appropriate for ongoing outpatient level of care.  A thorough assessment of risk factors related to suicide and self-harm have been reviewed and are noted above. The patient convincingly denies acute suicidality on several occasions. Local community safety resources were provided for patient to use if needed. There was no deceit detected, and the patient presented in a manner that was believable.     DSM5  Diagnosis:  Hx of Paz, unspecified  Mood disorder, unspecified (R/O Bipolar Disorder)    Medical Comorbidities Include:   Patient Active Problem List    Diagnosis Date Noted     Abnormal bone density screening 01/15/2021     Priority: Medium      Long term (current) use of aromatase inhibitors 01/15/2021     Priority: Medium     Malignant neoplasm of upper-outer quadrant of right breast in female, estrogen receptor positive (H) 08/23/2018     Priority: Medium     Symptomatic varicose veins of both lower extremities 11/10/2015     Priority: Medium       Impression:  Keely Arana is a 67-year-old female with relatively benign past psychiatric history leading up to recent suspected manic episode who presents today for psychiatric evaluation.  Patient with what appears to meet criteria for manic episode starting earlier this spring.  Patient started to have more trouble with sleep and was becoming more irritable leading to racing thoughts, increased goal directed activity, disorganized thoughts, more talkative than usual, etc.  Patient's family concerns since she was acting out of character and brought to the ER.  Patient started on olanzapine in the emergency room which has helped significantly to regulate sleep and mood.  Patient perhaps with some heightened energy today but not overtly manic.  Patient is currently tolerating olanzapine well and reports sleeping about 8 hours a night.  Discussed risks and benefits of staying on olanzapine.  Unclear if patient has had underlying bipolar disorder that has gone fairly undetected over the years.  Patient with relatives with bipolar disorder.  Patient did have recent brain imaging (MRI) that would not explain recent new onset cong.  Patient also with otherwise relatively benign labs including thyroid, liver panel, CMP, CBC, etc.  No new medications were introduced.  Patient denies any drug use and no problematic alcohol use.  Patient stopped using her 1 glass of wine a week around the time this episode started to see if it would help to give up alcohol.  Patient does often have a few cups of coffee a day.  We will continue to monitor.  Recommend staying on olanzapine for at least 6 months to a year before  trying to taper off in order to prevent relapse/recurrent cong symptoms.  No acute safety concerns today.  No SI.  No problematic drug or alcohol use.    Medication side effects and alternatives reviewed. Health promotion activities recommended and reviewed today. All questions addressed. Education and counseling completed regarding risks and benefits of medications and psychotherapy options. Recommend therapy for additional support.     Treatment Plan:    Continue olanzapine/Zyprexa 5 mg every bedtime for recent manic episode, to prevent future manic episodes, and for sleep.    Continue olanzapine/Zyprexa 2.5 mg daily as needed for agitation, anxiety, racing thoughts.  It is also okay to add it to your bedtime dose (for a total bedtime dose of 7.5 mg) for 5 mg is not working quite well enough for good sleep.    Continue therapy as planned.    Continue all other cares per primary care provider.     Continue all other medications as reviewed per electronic medical record today.     Safety plan reviewed. To the Emergency Department as needed or call after hours crisis line at [phone] or [phone]. [place] Crisis Text Line: Text [place] to 650402  or  Suicide LifeLine Chat: suicidepreventionlifeline.org/chat    Schedule an appointment with me in 1 month or sooner as needed.  Call [facility] at [phone] to schedule.    Follow up with primary care provider as planned or sooner if needed for acute medical concerns.    Call the psychiatric nurse line with medication questions or concerns at [phone].    [employer] may be used to communicate with your provider, but this is not intended to be used for emergencies.    Patient Education:  Discussed need to monitor for movement disorder side effects and metabolic changes (particularly cholesterol and blood sugars) with olanzapine use.  Discussed possibility movement side effects can be lasting in some cases even when drug is stopped.    Care team has  reviewed attendance agreement with patient. Patient advised that two failed appointments within 6 months may lead to termination of current episode of care.     Community Resources:    National Suicide Prevention Lifeline: 562.590.5764 (TTY: 902.467.9666). Call anytime for help.  (www.suicidepreventionlifeline.org)  National [place] on Mental Illness (www.phyllis.org): 279.468.8040 or 175-492-2593.   Mental Health Association (www.mentalhealth.org): 153.155.8426 or 944-274-4514.  [place] Crisis Text Line: Text [place] to 954084  Suicide LifeLine Chat: suicideCiDRA.org/chat    Administrative Billing:   Phone Call/Video Duration: 30 Minutes  Start: 3:40p  Stop: 4:10p      Patient Status:  Patient will continue to be seen for ongoing consultation and stabilization.    Signed:   Reema Palomo DO  [facility]    Disclaimer: This note consists of symbols derived from keyboarding, dictation and/or voice recognition software. As a result, there may be errors in the script that have gone undetected. Please consider this when interpreting information found in this chart.

## 2023-05-12 NOTE — PROGRESS NOTES
"    [facility] [place]      PATIENT'S NAME: Keely Arana  PREFERRED NAME: Keely  PRONOUNS: she/her/hers     MRN: [id number]  : 1955  ADDRESS: [address]  [facility] [address]  ACCT. NUMBER:  [id number]  DATE OF SERVICE: 23  START TIME: 10:00 am  END TIME: 10:30 am  PREFERRED PHONE: [phone]  May we leave a program related message: Yes  SERVICE MODALITY:  Video Visit:      Provider verified identity through the following two step process.  Patient provided:  Patient photo, Patient  and Patient address    Telemedicine Visit: The patient's condition can be safely assessed and treated via synchronous audio and visual telemedicine encounter.      Reason for Telemedicine Visit: Services only offered telehealth    Originating Site (Patient Location): Patient's home    Distant Site (Provider Location): Provider Remote Setting- Home Office    Consent:  The patient/guardian has verbally consented to: the potential risks and benefits of telemedicine (video visit) versus in person care; bill my insurance or make self-payment for services provided; and responsibility for payment of non-covered services.     Patient would like the video invitation sent by:  My Chart    Mode of Communication:  Video Conference via Amwell    Distant Location (Provider):  Off-site    As the provider I attest to compliance with applicable laws and regulations related to telemedicine.    UNIVERSAL ADULT Mental Health DIAGNOSTIC ASSESSMENT    Identifying Information:  Patient is a 67 year old,    individual.  Patient was referred for an assessment by  primary care provider.  Patient attended the session alone.    Chief Complaint:   The reason for seeking services at this time is: \"Follow up to previous care\".  The problem(s) began 23.  First appointment with patient in [facility] and was advised of the short-term, team based structure of the model including role of " "[facility] and provider. Patient indicated understanding of the model and agreed to proceed with services as described. Care team has reviewed attendance agreement with patient. Patient advised that two failed appointments within 6 months may lead to termination of current episode of care.      Patient reported that she remarried 3 years ago following the death of her . In late April, she started purging some things out of her home and her children became concerned. She started feeling \"manic\" and her daughter brought her to the ED. She had been sleeping odd times and was getting up at night. Now she thinks she is \"100% better.\" She is taking olanzapine before bed. She sleeps 7-8 hours a night. She was more irritable which was unusual for her as well.    Stressors: nothing significant    Goals: \"If I am on the right medication.\"    Patient has attempted to resolve these concerns in the past through medication.    Social/Family History:  Patient reported they grew up in [facility] [place] [place] [facility]. They were raised by biological parents. Parents  /  when she was 10yo. She had an older and younger brother, and an older and younger sister. She lived with her mother after the divorce and did not see her father very often. Father was an alcoholic. Patient reported that their childhood was \"pretty good.\" Patient described their current relationships with family of origin as good with her siblings. Her younger brother  when he was 12 years old. She gets along well with her remaining siblings. One sister has bipolar disorder and OCD. One brother is quite withdrawn from everyone.    The patient describes their cultural background as .  Cultural influences and impact on patient's life structure, values, norms, and healthcare: [place]..  Contextual influences on patient's health include: Contextual Factors: Family Factors family concerned about her health.    These factors will be " addressed in the Preliminary Treatment plan. Patient identified their preferred language to be English. Patient reported they does not need the assistance of an  or other support involved in therapy.     Patient reported had no significant delays in developmental tasks.   Patient's highest education level was some college  .  Patient identified the following learning problems: none reported.  Modifications will not be used to assist communication in therapy. Patient reports they are  able to understand written materials.    Patient reported the following relationship history;  [id number] before he passed. Patient's current relationship status is  for 3 years.   Patient identified their sexual orientation as heterosexual.  Patient reported having 3 child(jeimy). Patient identified partner; adult child as part of their support system.  Patient identified the quality of these relationships as good.    Patient's current living/housing situation involves staying in own home/apartment.  The immediate members of family and household include Terry Arana, 72, and they report that housing is stable.    Patient is currently retired.  Patient reports their finances are obtained through spouse; other. Patient does not identify finances as a current stressor.      Patient reported that they have not been involved with the legal system. Patient does not report being under probation/ parole/ jurisdiction. They are not under any current court jurisdiction. .    Patient's Strengths and Limitations:  Patient identified the following strengths or resources that will help them succeed in treatment: friends / good social support, family support, insight, intelligence, motivation, strong social skills and work ethic. Things that may interfere with the patient's success in treatment include: none identified.     Assessments:  PHQ2:       5/2/2023     8:54 AM   PHQ-2 ( 1999 [employer])   Q1: Little interest or  pleasure in doing things 0   Q2: Feeling down, depressed or hopeless 0   PHQ-2 Score 0     PHQ9:       4/21/2023     2:14 PM 5/2/2023    10:19 AM 5/12/2023     7:56 AM   PHQ-9 SCORE   PHQ-9 Total Score MyChart   0   PHQ-9 Total Score 4 0 0    0     GAD2:       5/9/2023     2:59 PM   DAISY-2   Feeling nervous, anxious, or on edge 0    0   Not being able to stop or control worrying 0    0   DAISY-2 Total Score 0    0     GAD7:       4/21/2023     2:14 PM 5/2/2023    10:19 AM   DAISY-7 SCORE   Total Score 5 0     CAGE-AID:       5/9/2023     3:07 PM   CAGE-AID Total Score   Total Score 0    0   Total Score MyChart 0 (A total score of 2 or greater is considered clinically significant)     PROMIS 10-Global Health (all questions and answers displayed):       5/9/2023     3:06 PM   PROMIS 10   In general, would you say your health is: Very good   In general, would you say your quality of life is: Very good   In general, how would you rate your physical health? Very good   In general, how would you rate your mental health, including your mood and your ability to think? Very good   In general, how would you rate your satisfaction with your social activities and relationships? Very good   In general, please rate how well you carry out your usual social activities and roles Very good   To what extent are you able to carry out your everyday physical activities such as walking, climbing stairs, carrying groceries, or moving a chair? Completely   In the past 7 days, how often have you been bothered by emotional problems such as feeling anxious, depressed, or irritable? Sometimes   In the past 7 days, how would you rate your fatigue on average? Moderate   In the past 7 days, how would you rate your pain on average, where 0 means no pain, and 10 means worst imaginable pain? 0   In general, would you say your health is: 4    4   In general, would you say your quality of life is: 4    4   In general, how would you rate your physical  health? 4    4   In general, how would you rate your mental health, including your mood and your ability to think? 4    4   In general, how would you rate your satisfaction with your social activities and relationships? 4    4   In general, please rate how well you carry out your usual social activities and roles. (This includes activities at home, at work and in your community, and responsibilities as a parent, child, spouse, employee, friend, etc.) 4    4   To what extent are you able to carry out your everyday physical activities such as walking, climbing stairs, carrying groceries, or moving a chair? 5    5   In the past 7 days, how often have you been bothered by emotional problems such as feeling anxious, depressed, or irritable? 3    3   In the past 7 days, how would you rate your fatigue on average? 3    3   In the past 7 days, how would you rate your pain on average, where 0 means no pain, and 10 means worst imaginable pain? 0    0   Global Mental Health Score 15    15   Global Physical Health Score 17    17   PROMIS TOTAL - SUBSCORES 32    32     PROMIS 10-Global Health (only subscores and total score):       5/9/2023     3:06 PM   PROMIS-10 Scores Only   Global Mental Health Score 15    15   Global Physical Health Score 17    17   PROMIS TOTAL - SUBSCORES 32    32     [place] Suicide Severity Rating Scale (Lifetime/Recent)       View : No data to display.              [place] Suicide Severity Rating Scale (Short Version)      4/18/2023    12:27 PM   [place] Suicide Severity Rating (Short Version)   Over the past 2 weeks have you felt down, depressed, or hopeless? no   Over the past 2 weeks have you had thoughts of killing yourself? no   Have you ever attempted to kill yourself? no       Personal and Family Medical History:  Patient does not report a family history of mental health concerns.  Patient reports family history is not on file..     Patient does report Mental Health Diagnosis and/or  Treatment.  Patient Patient reported the following previous diagnoses which include(s):   .  Patient reported symptoms began 2 months ago.  Patient has not received mental health services in the past: none.  Psychiatric Hospitalizations: none.  Patient denies a history of civil commitment.  Currently, patient    receiving other mental health services.  These include none.        Patient has had a physical exam to rule out medical causes for current symptoms.  Date of last physical exam was within the past year. Symptoms have developed since last physical exam and client was encouraged to follow up with PCP.  . The patient has a [facility] Provider, who is named Sandra Batista.  Patient reports no current medical concerns.  Patient denies any issues with pain..   There are not significant appetite / nutritional concerns / weight changes.   Patient does not report a history of head injury / trauma / cognitive impairment.    Patient reports current meds as:   Outpatient Medications Marked as Taking for the 5/12/23 encounter (Virtual Visit) with Grayson Quiroz PsyD   Medication Sig     OLANZapine (ZYPREXA) 2.5 MG tablet Take 1 tablet (2.5 mg) by mouth daily as needed (anxious)     OLANZapine (ZYPREXA) 5 MG tablet Take 1 tablet (5 mg) by mouth At Bedtime       Medication Adherence:  Patient reports taking.  taking prescribed medications as prescribed.    Patient Allergies:    Allergies   Allergen Reactions     Corticosteroids Dermatitis     Proven allergic contact dermatitis to corticosteroid (group A, B, D2)     CAN USE as ALTERNATIVE following steroids: Corticosteroids of group C (e.g.Betamethasone, Dexamethasone, Flumethasone pivalate, Halomethasone)   and group D1 (Betamethasone dipropionate, Betamethasone-17-valerate,Clobetasole-propionate, Fluticasone propionate, Mometasone furoate)     Neomycin Dermatitis     Patch tests positives to Neomycin and Framycetin     Cephalexin GI Disturbance     Other  reaction(s): reflux     Codeine Nausea and Vomiting     Macrobid [Nitrofurantoin] Nausea and Vomiting     Sulfa Antibiotics Itching     Silver Rash       Medical History:    Past Medical History:   Diagnosis Date     Hypertension      Iritis          Current Mental Status Exam:   Appearance:  Appropriate    Eye Contact:  Good   Psychomotor:  Normal       Gait / station:  no problem  Attitude / Demeanor: Cooperative   Speech      Rate / Production: Pressured       Volume:  Normal  volume      Language:  no problems  Mood:   Euthymic  Affect:   Appropriate    Thought Content: Clear   Thought Process: Tangential  Circumstantial      Associations: No loosening of associations  Insight:   Fair   Judgment:  Intact   Orientation:  All  Attention/concentration: Good      Substance Use:  Patient did report a family history of substance use concerns; see medical history section for details.  Patient has not received chemical dependency treatment in the past.  Patient has not ever been to detox.      Patient is not currently receiving any chemical dependency treatment.           Substance History of use Age of first use Date of last use     Pattern and duration of use (include amounts and frequency)   Alcohol currently use   Adult 04/14/23 REPORTS SUBSTANCE USE: reports using substance 1 times per week and has 1 glass of wine at a time.   Patient reports heaviest use is current use.   Cannabis   used in the past Adult 01/01/23 REPORTS SUBSTANCE USE: N/A     Amphetamines   never used     REPORTS SUBSTANCE USE: N/A   Cocaine/crack    never used       REPORTS SUBSTANCE USE: N/A   Hallucinogens never used         REPORTS SUBSTANCE USE: N/A   Inhalants never used         REPORTS SUBSTANCE USE: N/A   Heroin never used         REPORTS SUBSTANCE USE: N/A   Other Opiates never used     REPORTS SUBSTANCE USE: N/A   Benzodiazepine   never used     REPORTS SUBSTANCE USE: N/A   Barbiturates never used     REPORTS SUBSTANCE USE: N/A   Over  the counter meds never used     REPORTS SUBSTANCE USE: N/A   Caffeine currently use 20   REPORTS SUBSTANCE USE: reports using substance 3 times per day and has 1 coffee at a time.   Patient reports heaviest use is current use.   Nicotine  never used     REPORTS SUBSTANCE USE: N/A   Other substances not listed above:  Identify:  never used     REPORTS SUBSTANCE USE: N/A     Patient reported the following problems as a result of their substance use: no problems, not applicable.    Substance Use: No symptoms    CAGE-AID:       5/9/2023     3:07 PM   CAGE-AID Total Score   Total Score 0    0   Total Score MyChart 0 (A total score of 2 or greater is considered clinically significant)     Based on the negative CAGE score and clinical interview there  are not indications of drug or alcohol abuse.      Significant Losses / Trauma / Abuse / Neglect Issues:   Patient did not serve in the .  There are indications or report of significant loss, trauma, abuse or neglect issues related to: are no indications and client denies any losses, trauma, abuse, or neglect concerns.  Concerns for possible neglect are not present.    Safety Assessment:   Patient denies current homicidal ideation and behaviors.  Patient denies current self-injurious ideation and behaviors.    Patient denied risk behaviors associated with substance use.  Patient denies any high risk behaviors associated with mental health symptoms.  Patient reports the following current concerns for their personal safety: None.  Patient reports there are firearms in the house.     yes, they are secured.  .    History of Safety Concerns:  Patient denied a history of homicidal ideation.     Patient denied a history of personal safety concerns.    Patient denied a history of assaultive behaviors.    Patient denied a history of sexual assault behaviors.     Patient denied a history of risk behaviors associated with substance use.  Patient denies any history of high risk  behaviors associated with mental health symptoms.  Patient reports the following protective factors: dedication to family or friends; safe and stable environment; regular sleep; sense of belonging; purpose; adherence with prescribed medication; living with other people; commitment to well being; financial stability; strong sense of self worth or esteem    Risk Plan:  See Recommendations for Safety and Risk Management Plan    Review of Symptoms per patient report:   Depression: No symptoms  Paz:  Elevated mood, Irritability, Racing thoughts, Increased activity, Decreased need for sleep, Pressured speech and    Psychosis: No Symptoms  Anxiety: No Symptoms  Panic:  No symptoms  Post Traumatic Stress Disorder:  No Symptoms   Eating Disorder: No Symptoms  ADD / ADHD:  No symptoms  Conduct Disorder: No symptoms  Autism Spectrum Disorder: No symptoms  Obsessive Compulsive Disorder: No Symptoms    Patient reports the following compulsive behaviors and treatment history:  .      Diagnostic Criteria:   Mood disorder R/O bipolar disorder    Functional Status:  Patient reports the following functional impairments:  self-care.     Nonprogrammatic care:  Patient is requesting basic services to address current mental health concerns.    Clinical Summary:  1. Reason for assessment: review of treatment options  .  2. Psychosocial, Cultural and Contextual Factors:  Recent exacerbation of mood symptoms.  3. Principal DSM5 Diagnoses  (Sustained by DSM5 Criteria Listed Above):   Other Unspecified and Specified Bipolar and Related Disorder 296.80 (F31.9) Unspecified Bipolar and Related Disorder.  4. Other Diagnoses that is relevant to services:   .  5. Provisional Diagnosis:   as evidenced by  .  6. Prognosis: Expect Improvement and Maintain Current Status / Prevent Deterioration.  7. Likely consequences of symptoms if not treated: deterioration of functioning.  8. Client strengths include:  caring, educated, goal-focused, good  listener, insightful, intelligent, open to learning, open to suggestions / feedback, support of family, friends and providers, wants to learn, willing to ask questions, willing to relate to others and work history .     Recommendations:     1. Plan for Safety and Risk Management:   Safety and Risk: Recommended that patient call 911 or go to the local ED should there be a change in any of these risk factors..          Report to child / adult protection services was NA.     2. Patient's identified mental health concerns with a cultural influence will be addressed by making reasonable accommodations at the request of the patient.     3. Initial Treatment will focus on:    Mood Instability -  .     4. Resources/Service Plan:    services are not indicated.   Modifications to assist communication are not indicated.   Additional disability accommodations are not indicated.      5. Collaboration:   Collaboration / coordination of treatment will be initiated with the following  support professionals: psychiatry.      6.  Referrals:   The following referral(s) will be initiated: pending collaboration with Dr. Palomo. Next Scheduled Appointment: TBD.      A Release of Information has been obtained for the following: n/a.     Emergency Contact  was obtained.      Clinical Substantiation/medical necessity for the above recommendations:  Recent ED visit from new onset of symptoms.    7. TREMAINE:    TREMAINE:  Discussed the general effects of drugs and alcohol on health and well-being. Provider gave patient printed information about the  effects of chemical use on their health and well being. Recommendations:  n/a .     8. Records:   These were reviewed at time of assessment.   Information in this assessment was obtained from the medical record and  provided by patient who is a fair historian.    Patient will have open access to their mental health medical record.    9.   Interactive Complexity: No      Provider Name/  Credentials:  Jd Quiroz PsyD, LP  May 12, 2023

## 2023-05-15 NOTE — PATIENT INSTRUCTIONS
Treatment Plan:  Continue olanzapine/Zyprexa 5 mg every bedtime for recent manic episode, to prevent future manic episodes, and for sleep.  Continue olanzapine/Zyprexa 2.5 mg daily as needed for agitation, anxiety, racing thoughts.  It is also okay to add it to your bedtime dose (for a total bedtime dose of 7.5 mg) for 5 mg is not working quite well enough for good sleep.  Continue therapy as planned.  Continue all other cares per primary care provider.   Continue all other medications as reviewed per electronic medical record today.   Safety plan reviewed. To the Emergency Department as needed or call after hours crisis line at [phone] or [phone]. [place] Crisis Text Line: Text [place] to 304274  or  Suicide LifeLine Chat: suicidePaomianba.com.org/chat  Schedule an appointment with me in 1 month or sooner as needed.  Call [facility] at [phone] to schedule.  Follow up with primary care provider as planned or sooner if needed for acute medical concerns.  Call the psychiatric nurse line with medication questions or concerns at [phone].  1000memories may be used to communicate with your provider, but this is not intended to be used for emergencies.    Patient Education:  Discussed need to monitor for movement disorder side effects and metabolic changes (particularly cholesterol and blood sugars) with olanzapine use.  Discussed possibility movement side effects can be lasting in some cases even when drug is stopped.    Care team has reviewed attendance agreement with patient. Patient advised that two failed appointments within 6 months may lead to termination of current episode of care.     Community Resources:    National Suicide Prevention Lifeline: 284.825.7091 (TTY: 184.340.5317). Call anytime for help.  (www.suicidepreventionlifeline.org)  National [place] on Mental Illness (www.phyllis.org): 525.214.2400 or 498-670-9700.   Mental Health Association (www.mentalhealth.org):  "673.396.8321 or 555-003-3738.  [place] Crisis Text [place]: Text [place] to 566236  Suicide LifeLine Chat: suicidepreventionlifeline.org/chat    Patient Education   Collaborative Care Psychiatry Service  What to Expect  Here's what to expect from your Collaborative Care Psychiatry Service (CCPS).   About CCPS  CCPS means 2 people work together to help you get better. You'll meet with a behavioral health clinician and a psychiatric doctor. A behavioral health clinician helps people with mental health problems by talking with them. A psychiatric doctor helps people by giving them medicine.  How it works  At every visit, you'll see the behavioral health clinician (BHC) first. They'll talk with you about how you're doing and teach you how to feel better.   Then you'll see the psychiatric doctor. This doctor can help you deal with troubling thoughts and feelings by giving you medicine. They'll make sure you know the plan for your care.   CCPS usually takes 3 to 6 visits. If you need more visits, we may have you start seeing a different psychiatric doctor for ongoing care.  If you have any questions or concerns, we'll be glad to talk with you.  About visits  Be open  At your visits, please talk openly about your problems. It may feel hard, but it's the best way for us to help you.  Cancelling visits  If you can't come to your visit, please call us right away at [phone]. If you don't cancel at least 24 hours (1 full day) before your visit, that's \"late cancellation.\"  Being late to visits  Being very late is the same as not showing up. You will be a \"no show\" if:  Your appointment starts with a BHC, and you're more than 15 minutes late for a 30-minute (half hour) visit. This will also cancel your appointment with the psychiatric doctor.  Your appointment is with a psychiatric doctor only, and you're more than 15 minutes late for a 30-minute (half hour) visit.  Your appointment is with a psychiatric doctor only, and you're " more than 30 minutes late for a 60-minute (full hour) visit.  If you cancel late or don't show up 2 times within 6 months, we may end your care.   Getting help between visits  If you need help between visits, you can call us Monday to Friday from 8 a.m. to 4:30 p.m. at [phone].  Emergency care  Call 911 or go to the nearest emergency department if your life or someone else's life is in danger.  Call 988 anytime to reach the national Suicide and Crisis hotline.  Medicine refills  To refill your medicine, call your pharmacy. You can also call [facility]'s Behavioral Access at [phone], Monday to Friday, 8 a.m. to 4:30 p.m. It can take 1 to 3 business days to get a refill.   Forms, letters, and tests  You may have papers to fill out, like FMLA, short-term disability, and workability. We can help you with these forms at your visits, but you must have an appointment. You may need more than 1 visit for this, to be in an intensive therapy program, or both.  Before we can give you medicine for ADHD, we may refer you to get tested for it or confirm it another way.  We may not be able to give you an emotional support animal letter.  We don't do mental health checks ordered by the court.   We don't do mental health testing, but we can refer you to get tested.   Thank you for choosing us for your care.  For informational purposes only. Not to replace the advice of your health care provider. Copyright   2022 [facility]. All rights reserved. [employer] 220162 - 12/22.

## 2023-05-18 NOTE — CONFIDENTIAL NOTE
Provider: AL      Tech: OK      Patient Name: Keely Arana      : 1955      MRN: [id number]      MEJIA: 2023      Age: 67      Education: 14      Ethnicity: C      Handedness: Right      Station: OP             NEUROPSYCHOLOGICAL TESTS RAW SCORE STANDARDIZED SCORE* %derick   COGNITIVE STATUS       NAB Orientation (/29) 28 --            INTELLECTUAL ABILITY RAW SCORE STANDARDIZED %derick   WAIS-IV        VCI -- SS= 0        Similarities 18 ss= 7 16%   GILL -- SS= 0    Block Design 30 ss= 9 37%   WMI -- SS= 0    Digit Span 21 ss= 8 25%   Aritmetic 0 ss= 0    PSI -- SS= 96 39%   Symbol Search 41 ss= 7 16%   Coding 54 ss= 10 50%          PREMORBID ESTIMATE RAW SCORE STANDARDIZED SCORE* %derick   Test of Premorbid Functioning (TOPF)  Actual SS= 89 23%    29 Pred SS= 100 --     E-FSIQ= 96 --          ATTENTION/CONC. & PROC SPEED RAW SCORE STANDARDIZED SCORE* %derick   WAIS-IV       Digit Span Total 21 ss= 8 25%   Digit Span Forward (LDSF=5) 8 ss= 8 25%   Digit Span Backward (LDSB=4) 7 ss= 9 37%   Digit Span Sequencing (LDSS=4) 6 ss= 8 25%   Reliable Digit Span (RDS) 8 --  --          MEMORY RAW SCORE STANDARDIZED SCORE* %derick   Verbal       Diaz Verbal Learning Test - Revised (HVLT-R; Form 1)      Trial 1 7 -  -   Trial 2 10 -  -   Trial 3 11 -  -   Total Trials 1-3 28 TS= 58 79%   Delayed Recall 11 TS= 60 84%   Recognition Hits 12 -  -   Recognition False Alarms 2 -  -   Recognition Discriminability 10 TS= 46 34%   Visual        Brief Visual Memory Test - Revised (BVMT-R; Form 1)      Trial 1 3 --     Trial 2 3 --     Trial 3 4 --     Trials 1-3 10 TS= 30 2%   Learning 1 TS= 35 7%   Delayed Recall 3 TS= 28 1%   Percent Retained 75% --  11-16   Recognition Hits 6 --  >16   Recognition False Alarms 0 --  >16   Discrimination Index 6   >16          LANGUAGE RAW SCORE STANDARDIZED SCORE* %derick   [place] Naming Test s,r,e Short  11 Z= -1 16%   Phonemic Cue (Correct; Administered) 3; 3      COWAT (FAS) 33 TS= 41 18%    Repetition Errors 4      Set Loss Errors 1      Animal Naming 17 TS= 46 34%   Repetition Errors 1      Set Loss Errors 0             VISUOSPATIAL RAW SCORE STANDARDIZED SCORE* %derick   Clock Drawing Test       Construction Normal --  --   Abstraction Mild Impairment  --  --          EXECUTIVE FUNCTIONS RAW SCORE STANDARDIZED SCORE* %derick   DKEFS Color-Word Interference Test       Color Naming 33 ss= 10 50%   Word Reading 27 ss= 9 37%   Inhibition 76 ss= 9 37%   Inhibition Total Errors 1 ss= 11 63%   Inhibition/Switching 104 ss= 6 9%   Inhibition/Switching Total Errors 8 ss= 5 5%   DKEFS [place] Test        Achievement Score 16 ss= 10 50%   Mean First Move Time  2.44 ss= 15 95%   Time Per Move Ratio 2.17 ss= 14 91%   Move Accuracy Ratio 2.33 ss= 5 5%   Rule Violations Per Item  0.11 ss= 11 63%   [place] Card Sorting Test (WCST-128)       Categories Completede 3 --  >16%   Trials to Complete 1st Category 11 --  >16%   Total Errorse 53 TS= 41 18%   Perseverative Responsese 28 TS= 45 32%   Failure To Maintain Sete 1 --  >16%   % Conceptual Level Responses 58 TS= 41 19%   NAB Executive Functioning Module       Judgement 12 TS= 38 12%   BADS: Zoo Maps       Version 1 Sequencing Score 5 --  --   Version 1 Planning Time (sec) 6 --  --   Version 1 Total Errors 10 --  --   Version 2 Sequencing Score 3 --  --   Version 2 Planning Time (sec) 4 --  --   Version 2 Total Errors 1 --  --          MOOD AND PERSONALITY RAW SCORE INTERPRETATION %derick   Depression, Anxiety, Stress Scale (BEN-42)       Depression 0 Normal  --   Anxiety 1 Normal  --   Stress 1 Normal  --          SELF-REPORT QUESTIONNAIRES RAW SCORE STANDARDIZED SCORE* %derick   Frontal Systems Behavior Scale (FrSBe)       Self-Rating (Before)-Total 46 TS= 35 7%   Apathy 14 TS= 37 10%   Disinhibition  15 TS= 35 7%   Executive Dysfunction  17 TS= 36 8%   Self-Rating (After)-Total 47 TS= 35 7%   Apathy 14 TS= 37 10%   Disinhibition  15 TS= 35 7%   Executive Dysfunction  18  TS= 38 12%   Family Rating Profile (Before)-Total 48 TS= 25 1%   Apathy 13 TS= Unable to Calculate  #N/A   Disinhibition  16 TS= 27 1%   Executive Dysfunction  19 TS= 31 3%   Family Rating Profile (After)-Total 96 TS= 55 69%   Apathy 17 TS= 36 8%   Disinhibition  36 TS= 65 93%   Executive Dysfunction  43 TS= 60 84%          * All standardized scores are adjusted for age. Superscripts denote additional adjustment for (s)ex, (r)ace/ethnicity, (l)anguage, and/or (e)ducation.   ** Descriptive ranges are based on American Academy of Clinical Neuropsychology (2020) consensus guidelines, or test manuals where appropriate.    WNL = within normal limits. DC = discontinued due to patient s inability to complete the test.     Standardized scores: TS = T-score; mean = 50, standard deviation =10; z = z-score; mean = 0, standard deviation = 1; ss = scaled score; mean = 10, standard deviation = 3; MAS = [place] Older Adult Normative Study age adjusted scaled score; mean = 10, standard deviation = 3; SS = standard score; mean = 100, standard deviation = 15.

## 2023-05-26 ENCOUNTER — TELEPHONE (OUTPATIENT)
Dept: NEUROPSYCHOLOGY | Facility: CLINIC | Age: 68
End: 2023-05-26
Payer: MEDICARE

## 2023-05-26 NOTE — TELEPHONE ENCOUNTER
Called and talked with patient regarding the results of the neuropsychological evaluation.  Please see the report on 5/1 for details.  Attempted to call her daughter (as agreed upon during initial interview), however, Yesy didn't answer.  Left VM.  Will re-attempt on Tuesday.

## 2023-06-12 DIAGNOSIS — C50.411 MALIGNANT NEOPLASM OF UPPER-OUTER QUADRANT OF RIGHT BREAST IN FEMALE, ESTROGEN RECEPTOR POSITIVE (H): ICD-10-CM

## 2023-06-12 DIAGNOSIS — Z17.0 MALIGNANT NEOPLASM OF UPPER-OUTER QUADRANT OF RIGHT BREAST IN FEMALE, ESTROGEN RECEPTOR POSITIVE (H): ICD-10-CM

## 2023-06-12 RX ORDER — ANASTROZOLE 1 MG/1
TABLET ORAL
Qty: 90 TABLET | Refills: 3 | Status: SHIPPED | OUTPATIENT
Start: 2023-06-12 | End: 2023-09-25

## 2023-06-12 NOTE — TELEPHONE ENCOUNTER
"Anastrozole 1mg tab  Last prescribing provider: 4/4/23 by Dr. Tafoya    Last clinic visit date: 9/9/22     Recommendations for requested medication (if none, N/A): 9/9/22, \"She continues on anastrozole and is tolerating the medication well.  We plan to continue anastrozole to complete a 5-7 year course.\"    Any other pertinent information (if none, N/A): NA    Refilled: Y/N, if NO, why?    Routed to Dr. Merari Tafoya  "

## 2023-06-14 ENCOUNTER — TRANSFERRED RECORDS (OUTPATIENT)
Dept: HEALTH INFORMATION MANAGEMENT | Facility: CLINIC | Age: 68
End: 2023-06-14
Payer: MEDICARE

## 2023-06-14 ENCOUNTER — VIRTUAL VISIT (OUTPATIENT)
Dept: PSYCHIATRY | Facility: CLINIC | Age: 68
End: 2023-06-14
Payer: MEDICARE

## 2023-06-14 DIAGNOSIS — Z86.59 HISTORY OF MANIA: Primary | ICD-10-CM

## 2023-06-14 DIAGNOSIS — F39 MOOD DISORDER (H): ICD-10-CM

## 2023-06-14 PROCEDURE — 99214 OFFICE O/P EST MOD 30 MIN: CPT | Mod: VID | Performed by: PSYCHIATRY & NEUROLOGY

## 2023-06-14 RX ORDER — OLANZAPINE 2.5 MG/1
2.5 TABLET, FILM COATED ORAL AT BEDTIME
COMMUNITY
End: 2023-08-30

## 2023-06-14 NOTE — PROGRESS NOTES
"Virtual Visit Details    Type of service:  Video Visit   Video Start Time: {video visit start/end time for provider to select:360103}  Video End Time:{video visit start/end time for provider to select:581804}    Originating Location (pt. Location): {video visit patient location:562508::\"Home\"}  {PROVIDER LOCATION On-site should be selected for visits conducted from your clinic location or adjoining [facility] hospital, academic office, or other nearby [facility] building. Off-site should be selected for all other provider locations, including home:587510}  Distant Location (provider location):  {virtual location provider:352064}  Platform used for Video Visit: {Virtual Visit Platforms:963680::\"yoone\"}  "

## 2023-06-14 NOTE — PATIENT INSTRUCTIONS
Treatment Plan:  Decrease olanzapine/Zyprexa to 2.5 mg at bedtime for history of cong, sleep.  Can take additional 2.5 mg during the day as needed for anxiety, agitation.  Continue all other cares per primary care provider.   Continue all other medications as reviewed per electronic medical record today.   Safety plan reviewed. To the Emergency Department as needed or call after hours crisis line at [phone] or [phone]. [place] Crisis Text Line. Text [place] to 798272 or Suicide LifeLine Chat: suicidepreventionlifeline.org/chat  Schedule an appointment with me in 3 months or sooner as needed. Call [facility] at [phone] to schedule.  Follow up with primary care provider as planned or for acute medical concerns.  Call the psychiatric nurse line with medication questions or concerns at [phone].  Gigaclearhart may be used to communicate with your provider, but this is not intended to be used for emergencies.    Previously discussed need to monitor for movement disorder side effects and metabolic changes (particularly cholesterol and blood sugars) with olanzapine use.  Discussed possibility movement side effects can be lasting in some cases even when drug is stopped.    Patient Education   Collaborative Care Psychiatry Service  What to Expect  Here's what to expect from your Collaborative Care Psychiatry Service (CCPS).   About CCPS  CCPS means 2 people work together to help you get better. You'll meet with a behavioral health clinician and a psychiatric doctor. A behavioral health clinician helps people with mental health problems by talking with them. A psychiatric doctor helps people by giving them medicine.  How it works  At every visit, you'll see the behavioral health clinician (BHC) first. They'll talk with you about how you're doing and teach you how to feel better.   Then you'll see the psychiatric doctor. This doctor can help you deal with troubling thoughts and feelings by giving  "you medicine. They'll make sure you know the plan for your care.   CCPS usually takes 3 to 6 visits. If you need more visits, we may have you start seeing a different psychiatric doctor for ongoing care.  If you have any questions or concerns, we'll be glad to talk with you.  About visits  Be open  At your visits, please talk openly about your problems. It may feel hard, but it's the best way for us to help you.  Cancelling visits  If you can't come to your visit, please call us right away at [phone]. If you don't cancel at least 24 hours (1 full day) before your visit, that's \"late cancellation.\"  Being late to visits  Being very late is the same as not showing up. You will be a \"no show\" if:  Your appointment starts with a C, and you're more than 15 minutes late for a 30-minute (half hour) visit. This will also cancel your appointment with the psychiatric doctor.  Your appointment is with a psychiatric doctor only, and you're more than 15 minutes late for a 30-minute (half hour) visit.  Your appointment is with a psychiatric doctor only, and you're more than 30 minutes late for a 60-minute (full hour) visit.  If you cancel late or don't show up 2 times within 6 months, we may end your care.   Getting help between visits  If you need help between visits, you can call us Monday to Friday from 8 a.m. to 4:30 p.m. at [phone].  Emergency care  Call 911 or go to the nearest emergency department if your life or someone else's life is in danger.  Call 988 anytime to reach the national Suicide and Crisis hotline.  Medicine refills  To refill your medicine, call your pharmacy. You can also call [facility]'s Behavioral Access at [phone], Monday to Friday, 8 a.m. to 4:30 p.m. It can take 1 to 3 business days to get a refill.   Forms, letters, and tests  You may have papers to fill out, like FMLA, short-term disability, and workability. We can help you with these forms at your visits, but you must " have an appointment. You may need more than 1 visit for this, to be in an intensive therapy program, or both.  Before we can give you medicine for ADHD, we may refer you to get tested for it or confirm it another way.  We may not be able to give you an emotional support animal letter.  We don't do mental health checks ordered by the court.   We don't do mental health testing, but we can refer you to get tested.   Thank you for choosing us for your care.  For informational purposes only. Not to replace the advice of your health care provider. Copyright   2022 [facility]. All rights reserved. [employer] [id number] - 12/22.

## 2023-06-14 NOTE — PROGRESS NOTES
"Telemedicine Visit: The patient's condition can be safely assessed and treated via synchronous audio and visual telemedicine encounter.      Reason for Telemedicine Visit: Patient has requested telehealth visit    Originating Site (Patient Location): Patient's home    Distant Location (provider location):  Off-site    Consent:  The patient/guardian has verbally consented to: the potential risks and benefits of telemedicine (video visit) versus in person care; bill my insurance or make self-payment for services provided; and responsibility for payment of non-covered services.     Mode of Communication:  Video Conference via [employer]    As the provider I attest to compliance with applicable laws and regulations related to telemedicine.         Outpatient Psychiatric Progress Note    Name: Keely Arana   : 1955                    Primary Care Provider: Maulik Nye MD   Therapist: None    PHQ-9 scores:      2023     2:14 PM 2023    10:19 AM 2023     7:56 AM   PHQ-9 SCORE   PHQ-9 Total Score MyChart   0   PHQ-9 Total Score 4 0 0    0       DAISY-7 scores:      2023     2:14 PM 2023    10:19 AM   DAISY-7 SCORE   Total Score 5 0       Patient Identification:  Patient is a 68 year old,   White Not  or  female  who presents for return visit with me.  Patient is currently retired. Patient attended the phone/video session with  Terry , who they agreed to have interview with. Patient prefers to be called: \"Jessica\".    Interim History:  I last saw Keely Arana for outpatient psychiatry Consultation on 2023. During that appointment, we:      Continue olanzapine/Zyprexa 5 mg every bedtime for recent manic episode, to prevent future manic episodes, and for sleep.    Continue olanzapine/Zyprexa 2.5 mg daily as needed for agitation, anxiety, racing thoughts.  It is also okay to add it to your bedtime dose (for a total bedtime dose of 7.5 mg) for 5 mg is not " working quite well enough for good sleep.    Continue therapy as planned.    Continue all other cares per primary care provider.     6/14: Patient overall doing quite well.  Mood has been stable.  Energy has been level and at baseline.  No pressured speech and not talking faster than usual.  Family has noted patient to be her usual self and at baseline.  Patient has been sleeping very well.  Waking up well rested.  No safety concerns.  No SI.  No problematic drug or alcohol use.  No side effects from olanzapine.  Taking 5 mg at bedtime and has not been using any as needed doses during the day.    No C     Past Psychiatric Med Trials:  Psych Meds at Intake:  Olanzapine 5 mg at bedtime and 2.5 mg daily as needed     Past Psych Meds:  None    Psychiatric ROS:  Keely rAana reports mood has been: Stable  Anxiety has been: Stable  Sleep has been: Stable  Paz sxs: None  Psychosis sxs: None  ADHD/ADD sxs: N/A  PTSD sxs: N/A  PHQ9 and GAD7 scores were reviewed today if completed.   Medication side effects: Denies  Current stressors include: And see HPI above  Coping mechanisms and supports include: Family, Hobbies and Friends    Current medications include:   Current Outpatient Medications   Medication Sig     amLODIPine (NORVASC) 5 MG tablet Take 5 mg by mouth daily     anastrozole (ARIMIDEX) 1 MG tablet TAKE 1 TABLET BY MOUTH EVERY DAY     atorvastatin (LIPITOR) 10 MG tablet Take 10 mg by mouth daily     calcium carbonate (OS-PADDY 500 MG [place]) 500 MG tablet Take 2 tablets by mouth daily With Magnesium,Zinc     LYSINE PO Take 10,000 mg by mouth daily     multivitamin w/minerals (THERA-VIT-M) tablet Take 1 tablet by mouth daily     OLANZapine (ZYPREXA) 2.5 MG tablet Take 1 tablet (2.5 mg) by mouth daily as needed (anxious)     OLANZapine (ZYPREXA) 5 MG tablet Take 1 tablet (5 mg) by mouth At Bedtime     omega 3 1000 MG CAPS TG Omega 1100 mg DHA 1100 EPA 2 tablets once daily     rivaroxaban ANTICOAGULANT (XARELTO)  20 MG TABS tablet Take 20 mg by mouth     VITAMIN D, CHOLECALCIFEROL, PO Take 1,000 Units by mouth daily      UNABLE TO FIND daily MEDICATION NAME: [facility]      No current facility-administered medications for this visit.       Past Medical/Surgical History:  Past Medical History:   Diagnosis Date     Hypertension      Iritis       has a past medical history of Hypertension and Iritis.    She has no past medical history of Complication of anesthesia or Sleep apnea.    Social History:  Reviewed. No changes to social history except as noted above in HPI.    Vital Signs:   None. This is phone/video visit.     Labs:  Most recent laboratory results reviewed and the pertinent results include:   Last Comprehensive Metabolic Panel:  Sodium   Date Value Ref Range Status   04/18/2023 139 136 - 145 mmol/L Final     Potassium   Date Value Ref Range Status   04/18/2023 3.4 3.4 - 5.3 mmol/L Final     Chloride   Date Value Ref Range Status   04/18/2023 103 98 - 107 mmol/L Final     Carbon Dioxide (CO2)   Date Value Ref Range Status   04/18/2023 23 22 - 29 mmol/L Final     Anion Gap   Date Value Ref Range Status   04/18/2023 13 7 - 15 mmol/L Final     Glucose   Date Value Ref Range Status   04/18/2023 94 70 - 99 mg/dL Final     Urea Nitrogen   Date Value Ref Range Status   04/18/2023 17.5 8.0 - 23.0 mg/dL Final     Creatinine   Date Value Ref Range Status   04/18/2023 0.60 0.51 - 0.95 mg/dL Final   01/21/2021 0.69 0.52 - 1.04 mg/dL Final     GFR Estimate   Date Value Ref Range Status   04/18/2023 >90 >60 mL/min/1.73m2 Final     Comment:     eGFR calculated using 2021 CKD-EPI equation.   01/21/2021 >90 >60 mL/min/[1.73_m2] Final     Comment:     Non  GFR Calc  Starting 12/18/2018, serum creatinine based estimated GFR (eGFR) will be   calculated using the Chronic Kidney Disease Epidemiology Collaboration   (CKD-EPI) equation.       Calcium   Date Value Ref Range Status   04/18/2023 9.4 8.8 - 10.2 mg/dL Final    01/21/2021 9.7 8.5 - 10.1 mg/dL Final     Bilirubin Total   Date Value Ref Range Status   04/18/2023 0.4 <=1.2 mg/dL Final     Alkaline Phosphatase   Date Value Ref Range Status   04/18/2023 67 35 - 104 U/L Final     ALT   Date Value Ref Range Status   04/18/2023 20 10 - 35 U/L Final     AST   Date Value Ref Range Status   04/18/2023 25 10 - 35 U/L Final         Recent Labs   Lab Test 05/02/23  1030   CHOL 175   HDL 68   LDL 91   TRIG 78       Review of Systems:  10 systems (general, cardiovascular, respiratory, eyes, ENT, endocrine, GI, , M/S, neurological) were reviewed. Most pertinent finding(s) is/are: denies fever, cough, persistent headaches, shortness of breath, chest pain, severe GI symptoms, trouble urinating, severe pain. The remaining systems are all unremarkable.    Mental Status Examination (limited as this is by phone/video):  Appearance: Awake, alert, appears stated age, no acute distress, well-groomed   Attitude:  cooperative, pleasant   Motor: No gross abnormalities observed via video, not formally tested   Oriented to:  person, place, time, and situation  Attention Span and Concentration:  normal  Speech:  clear, coherent, regular rate, rhythm, and volume  Language: intact  Mood:  good  Affect:  appropriate and in normal range and mood congruent  Associations:  no loose associations  Thought Process:  logical, linear and goal oriented  Thought Content:  no evidence of suicidal ideation or homicidal ideation, no evidence of psychotic thought, no auditory hallucinations present and no visual hallucinations present  Recent and Remote Memory:  Intact to interview. Not formally assessed. No amnesia.  Fund of Knowledge: appropriate  Insight:  good  Judgment:  intact, adequate for safety  Impulse Control:  intact    Suicide Risk Assessment:  Today Keely Arana reports no suicidal ideation. Based on all available evidence including the factors cited above, Keely Arana does not appear to be at  imminent risk for self-harm, does not meet criteria for a 72-hr hold, and therefore remains appropriate for ongoing outpatient level of care.  A thorough assessment of risk factors related to suicide and self-harm have been reviewed and are noted above. The patient convincingly denies suicidality on several occasions. Local community safety resources reviewed for patient to use if needed. There was no deceit detected, and the patient presented in a manner that was believable.     DSM5 Diagnosis:  Hx of Paz, unspecified  Mood disorder, unspecified (R/O Bipolar Disorder)    Medical comorbidities include:   Patient Active Problem List    Diagnosis Date Noted     Abnormal bone density screening 01/15/2021     Priority: Medium     Long term (current) use of aromatase inhibitors 01/15/2021     Priority: Medium     Malignant neoplasm of upper-outer quadrant of right breast in female, estrogen receptor positive (H) 08/23/2018     Priority: Medium     Symptomatic varicose veins of both lower extremities 11/10/2015     Priority: Medium       Psychosocial & Contextual Factors: see HPI above    Assessment:  From Intake, 5/12/2023:  Keely Arana is a 67-year-old female with relatively benign past psychiatric history leading up to recent suspected manic episode who presents today for psychiatric evaluation.  Patient with what appears to meet criteria for manic episode starting earlier this spring.  Patient started to have more trouble with sleep and was becoming more irritable leading to racing thoughts, increased goal directed activity, disorganized thoughts, more talkative than usual, etc.  Patient's family concerns since she was acting out of character and brought to the ER.  Patient started on olanzapine in the emergency room which has helped significantly to regulate sleep and mood.  Patient perhaps with some heightened energy today but not overtly manic.  Patient is currently tolerating olanzapine well and reports  sleeping about 8 hours a night.  Discussed risks and benefits of staying on olanzapine.  Unclear if patient has had underlying bipolar disorder that has gone fairly undetected over the years.  Patient with relatives with bipolar disorder.  Patient did have recent brain imaging (MRI) that would not explain recent new onset cong.  Patient also with otherwise relatively benign labs including thyroid, liver panel, CMP, CBC, etc.  No new medications were introduced.  Patient denies any drug use and no problematic alcohol use.  Patient stopped using her 1 glass of wine a week around the time this episode started to see if it would help to give up alcohol.  Patient does often have a few cups of coffee a day.  We will continue to monitor.  Recommend staying on olanzapine for at least 6 months to a year before trying to taper off in order to prevent relapse/recurrent cong symptoms.  No acute safety concerns today.  No SI.  No problematic drug or alcohol use.     6/14/2023:  Patient overall doing relatively well.  Mood has been stable.  No signs of cong.  We will decrease olanzapine to 2.5 mg at bedtime for the next few months to ensure ongoing stability of symptoms.  Could consider a trial off olanzapine at the end of the 3 months of decreased dose.  Patient and  will continue to remain vigilant while monitoring for any relapse of manic symptoms.  Patient had recent lipid panel and glucose completed.  Both within normal limits.  No acute safety concerns.  No SI.  No signs of tardive dyskinesia noted on video today.  No problematic drug or alcohol use.    Medication side effects and alternatives were reviewed. Health promotion activities recommended and reviewed today. All questions addressed. Education and counseling completed regarding risks and benefits of medications and psychotherapy options. Recommend therapy for additional support.     Treatment Plan:    Decrease olanzapine/Zyprexa to 2.5 mg at bedtime for  history of cong, sleep.  Can take additional 2.5 mg during the day as needed for anxiety, agitation.    Continue all other cares per primary care provider.     Continue all other medications as reviewed per electronic medical record today.     Safety plan reviewed. To the Emergency Department as needed or call after hours crisis line at [phone] or [phone]. [place] Crisis Text Line. Text [place] to 764514 or Suicide LifeLine Chat: suicidepreventionPaletteline.org/chat    Schedule an appointment with me in 3 months or sooner as needed. Call [facility] at [phone] to schedule.    Follow up with primary care provider as planned or for acute medical concerns.    Call the psychiatric nurse line with medication questions or concerns at [phone].    [employer] may be used to communicate with your provider, but this is not intended to be used for emergencies.    Previously discussed need to monitor for movement disorder side effects and metabolic changes (particularly cholesterol and blood sugars) with olanzapine use.  Discussed possibility movement side effects can be lasting in some cases even when drug is stopped.    Administrative Billing:   Phone Call/Video Duration: 20 Minutes  Start: 8:05a  Stop: 8:25a    Patient Status:  Patient will continue to be seen for ongoing consultation and stabilization.    Signed:   Reema Palomo DO  [facility]    Disclaimer: This note consists of symbols derived from keyboarding, dictation and/or voice recognition software. As a result, there may be errors in the script that have gone undetected. Please consider this when interpreting information found in this chart.

## 2023-06-14 NOTE — NURSING NOTE
Is the patient currently in the state of [place]? YES    Visit mode:VIDEO    If the visit is dropped, the patient can be reconnected by: VIDEO VISIT: Text to cell phone: [phone]    Will anyone else be joining the visit? NO      How would you like to obtain your AVS? MyChart    Are changes needed to the allergy or medication list? NO    Reason for visit: RECHECK

## 2023-06-20 ENCOUNTER — OFFICE VISIT (OUTPATIENT)
Dept: INTERNAL MEDICINE | Facility: CLINIC | Age: 68
End: 2023-06-20
Payer: MEDICARE

## 2023-06-20 VITALS
SYSTOLIC BLOOD PRESSURE: 131 MMHG | BODY MASS INDEX: 24.99 KG/M2 | DIASTOLIC BLOOD PRESSURE: 75 MMHG | HEIGHT: 67 IN | WEIGHT: 159.2 LBS | OXYGEN SATURATION: 97 % | HEART RATE: 87 BPM

## 2023-06-20 DIAGNOSIS — Z86.59 HISTORY OF MANIA: Primary | ICD-10-CM

## 2023-06-20 PROCEDURE — 99212 OFFICE O/P EST SF 10 MIN: CPT | Performed by: HOSPITALIST

## 2023-06-20 ASSESSMENT — ENCOUNTER SYMPTOMS
CONSTIPATION: 0
DYSURIA: 0
DIARRHEA: 0
SHORTNESS OF BREATH: 0
CHILLS: 0
ABDOMINAL PAIN: 0
FEVER: 0

## 2023-06-20 NOTE — PATIENT INSTRUCTIONS
- Continue zyprexa for 3 months.   - Continue current medications.     Follow up again in 7-9 months.

## 2023-06-20 NOTE — PROGRESS NOTES
Assessment/Plan  Problem List Items Addressed This Visit        Other    History of cong - Primary     Improved. Has had some weight gain likely from zyprexa.  - Patient following with psychiatry, plan to continue lower zyprexa 2.5mg dose for 3 months and then eventually off.             No results found for any visits on 06/20/23.    Health Maintenance Due   Topic Date Due     ADVANCE CARE PLANNING  Never done     FALL RISK ASSESSMENT  09/14/2021     COVID-19 Vaccine (6 - Moderna series) 01/23/2023         Subjective  Mentions she was reduced on zyprexa to 2.5mg at bedtime the past week. Has been doing well. Mentions her and her  had backed down on the house projects. Has gained some weight. No shortness of breath.       Review of Systems   Constitutional: Negative for chills and fever.   Respiratory: Negative for shortness of breath.    Cardiovascular: Negative for chest pain and peripheral edema.   Gastrointestinal: Negative for abdominal pain, constipation and diarrhea.   Genitourinary: Negative for dysuria.   Psychiatric/Behavioral: Negative for mood changes.       History  Past Medical History:   Diagnosis Date     Hypertension      Iritis        Past Surgical History:   Procedure Laterality Date     CATARACT IOL, RT/LT Left 2006     HYSTERECTOMY  2016     LUMPECTOMY BREAST WITH SENTINEL NODE, COMBINED Right 8/31/2018    Procedure: COMBINED LUMPECTOMY BREAST WITH SENTINEL NODE;  Right Wire Localized Lumpectomy and Right [place] Lymph Node Biopsy;  Surgeon: Chilango Kruger MD;  Location: [facility] OR     OOPHORECTOMY  2014       No family history on file.    Social History     Tobacco Use     Smoking status: Never     Smokeless tobacco: Never   Vaping Use     Vaping status: Not on file   Substance Use Topics     Alcohol use: Not Currently     Alcohol/week: 3.0 standard drinks of alcohol     Types: 3 Standard drinks or equivalent per week     Comment: rarely. Patient drinks occasionally, but is currently  "abstaining due to new medication        Objective  /75 (BP Location: Right arm, Patient Position: Sitting, Cuff Size: Adult Regular)   Pulse 87   Ht 1.702 m (5' 7\")   Wt 72.2 kg (159 lb 3.2 oz)   SpO2 97%   BMI 24.93 kg/m    Vitals taken by Maulik Nye MD    Physical Exam  Constitutional:       General: She is not in acute distress.     Appearance: She is not ill-appearing or toxic-appearing.   HENT:      Head: Normocephalic.   Eyes:      Conjunctiva/sclera: Conjunctivae normal.   Cardiovascular:      Rate and Rhythm: Regular rhythm.      Heart sounds: Normal heart sounds. No murmur heard.     No friction rub. No gallop.   Pulmonary:      Effort: Pulmonary effort is normal. No respiratory distress.      Breath sounds: No wheezing, rhonchi or rales.   Musculoskeletal:      Right lower leg: No edema.      Left lower leg: No edema.   Neurological:      Mental Status: She is alert.   Psychiatric:         Mood and Affect: Mood normal.         Thought Content: Thought content normal.         20 minutes spent on the date of the encounter doing chart review, history and exam, documentation and further activities per the note.      Return in about 8 months (around 2/20/2024).      Maulik Nye MD  [facility] INTERNAL MEDICINE [place]    "

## 2023-06-20 NOTE — ASSESSMENT & PLAN NOTE
Improved. Has had some weight gain likely from zyprexa.  - Patient following with psychiatry, plan to continue lower zyprexa 2.5mg dose for 3 months and then eventually off.

## 2023-06-20 NOTE — NURSING NOTE
"Keely Arana is a 68 year old female patient that presents today in clinic for the following:    Chief Complaint   Patient presents with     RECHECK     FOLLOW UP.       The patient's allergies and medications were reviewed as noted. A set of vitals were recorded as noted without incident: /75 (BP Location: Right arm, Patient Position: Sitting, Cuff Size: Adult Regular)   Pulse 87   Ht 1.702 m (5' 7\")   Wt 72.2 kg (159 lb 3.2 oz)   SpO2 97%   BMI 24.93 kg/m  . The patient does not have any other questions for the provider.    Margy Sims, EMT at 2:28 PM on 6/20/2023.  Primary care clinic: [phone]  "

## 2023-06-21 ENCOUNTER — INFUSION THERAPY VISIT (OUTPATIENT)
Dept: ONCOLOGY | Facility: CLINIC | Age: 68
End: 2023-06-21
Attending: INTERNAL MEDICINE
Payer: MEDICARE

## 2023-06-21 ENCOUNTER — APPOINTMENT (OUTPATIENT)
Dept: LAB | Facility: CLINIC | Age: 68
End: 2023-06-21
Attending: INTERNAL MEDICINE
Payer: MEDICARE

## 2023-06-21 VITALS
BODY MASS INDEX: 24.37 KG/M2 | DIASTOLIC BLOOD PRESSURE: 81 MMHG | OXYGEN SATURATION: 97 % | SYSTOLIC BLOOD PRESSURE: 130 MMHG | HEART RATE: 82 BPM | RESPIRATION RATE: 16 BRPM | WEIGHT: 155.6 LBS | TEMPERATURE: 97.9 F

## 2023-06-21 DIAGNOSIS — Z17.0 MALIGNANT NEOPLASM OF UPPER-OUTER QUADRANT OF RIGHT BREAST IN FEMALE, ESTROGEN RECEPTOR POSITIVE (H): ICD-10-CM

## 2023-06-21 DIAGNOSIS — R93.7 ABNORMAL BONE DENSITY SCREENING: Primary | ICD-10-CM

## 2023-06-21 DIAGNOSIS — C50.411 MALIGNANT NEOPLASM OF UPPER-OUTER QUADRANT OF RIGHT BREAST IN FEMALE, ESTROGEN RECEPTOR POSITIVE (H): ICD-10-CM

## 2023-06-21 DIAGNOSIS — Z79.811 LONG TERM (CURRENT) USE OF AROMATASE INHIBITORS: ICD-10-CM

## 2023-06-21 LAB
ALBUMIN SERPL BCG-MCNC: 4.5 G/DL (ref 3.5–5.2)
CALCIUM SERPL-MCNC: 9.8 MG/DL (ref 8.8–10.2)
CREAT SERPL-MCNC: 0.71 MG/DL (ref 0.51–0.95)
GFR SERPL CREATININE-BSD FRML MDRD: >90 ML/MIN/1.73M2

## 2023-06-21 PROCEDURE — 250N000011 HC RX IP 250 OP 636: Mod: JZ | Performed by: INTERNAL MEDICINE

## 2023-06-21 PROCEDURE — 258N000003 HC RX IP 258 OP 636: Performed by: INTERNAL MEDICINE

## 2023-06-21 PROCEDURE — 82565 ASSAY OF CREATININE: CPT | Performed by: INTERNAL MEDICINE

## 2023-06-21 PROCEDURE — 82310 ASSAY OF CALCIUM: CPT | Performed by: INTERNAL MEDICINE

## 2023-06-21 PROCEDURE — 96365 THER/PROPH/DIAG IV INF INIT: CPT | Performed by: INTERNAL MEDICINE

## 2023-06-21 PROCEDURE — 82040 ASSAY OF SERUM ALBUMIN: CPT | Performed by: INTERNAL MEDICINE

## 2023-06-21 PROCEDURE — 36415 COLL VENOUS BLD VENIPUNCTURE: CPT | Performed by: INTERNAL MEDICINE

## 2023-06-21 RX ORDER — ZOLEDRONIC ACID 0.04 MG/ML
4 INJECTION, SOLUTION INTRAVENOUS ONCE
Status: COMPLETED | OUTPATIENT
Start: 2023-06-21 | End: 2023-06-21

## 2023-06-21 RX ADMIN — SODIUM CHLORIDE 250 ML: 9 INJECTION, SOLUTION INTRAVENOUS at 10:09

## 2023-06-21 RX ADMIN — ZOLEDRONIC ACID 4 MG: 0.04 INJECTION, SOLUTION INTRAVENOUS at 10:09

## 2023-06-21 ASSESSMENT — PAIN SCALES - GENERAL: PAINLEVEL: NO PAIN (0)

## 2023-06-21 NOTE — PROGRESS NOTES
Infusion Nursing Note:  Keely Arana presents today for Zometa.    Patient seen by provider today: No   present during visit today: Not Applicable.    Note: Patient denies the following: fevers, body aches, chills, headaches, vision changes, dizziness, chest pain, shortness of breath, nausea, vomiting, constipation, abdominal pain, rashes, bruising/bleeding, mouth sores, swelling or pain in the legs. Ok for treatment.     Intravenous Access:  Peripheral IV placed.    Treatment Conditions:   Latest Reference Range & Units 06/21/23 09:24   Creatinine 0.51 - 0.95 mg/dL 0.71   GFR Estimate >60 mL/min/1.73m2 >90   Calcium 8.8 - 10.2 mg/dL 9.8   Albumin 3.5 - 5.2 g/dL 4.5   .      Post Infusion Assessment:  Patient tolerated infusion without incident.  Blood return noted pre and post infusion.  Site patent and intact, free from redness, edema or discomfort.  No evidence of extravasations.  Access discontinued per protocol.       Discharge Plan:   Patient declined prescription refills.  Discharge instructions reviewed with: Patient.  Patient and/or family verbalized understanding of discharge instructions and all questions answered.  AVS to patient via [employer]T.  Patient will return 9/25 for MRI/Dr Tafoya.   Patient discharged in stable condition accompanied by: self.  Departure Mode: Ambulatory.    Yaneth Khoury RN

## 2023-07-10 DIAGNOSIS — E78.5 HYPERLIPIDEMIA: Primary | ICD-10-CM

## 2023-07-10 RX ORDER — ATORVASTATIN CALCIUM 10 MG/1
10 TABLET, FILM COATED ORAL DAILY
Qty: 90 TABLET | Refills: 2 | Status: SHIPPED | OUTPATIENT
Start: 2023-07-10 | End: 2024-04-06

## 2023-07-10 NOTE — TELEPHONE ENCOUNTER
atorvastatin (LIPITOR) 10 MG tablet  Last Written Prescription Date:  ?  Last Fill Quantity: ?,   # refills: ?  Last Office Visit :  6/20/2023  Future Office visit:  None    Routing refill request to provider for review/approval because:  Medication is reported/historical    Martina Aguila RN  Central Triage Red Flags/Med Refills

## 2023-07-10 NOTE — TELEPHONE ENCOUNTER
Atorvastatin 10 mg tablet last filled 2/24/23 for #90 tablets, a 90-day supply. Last prescribed by Stefani Weiner CNP.    Patient established with Dr. Nye earlier this year. Will send refill request to provider for review.    Ivett Heath RN ([facility])

## 2023-07-10 NOTE — TELEPHONE ENCOUNTER
Covering for PCP/ordering provider (out of clinic today): Lipids checked in April.  9 months of med refilled.    Thanks,  Marcus Cartwright MD

## 2023-08-08 ENCOUNTER — OFFICE VISIT (OUTPATIENT)
Dept: FAMILY MEDICINE | Facility: CLINIC | Age: 68
End: 2023-08-08
Payer: MEDICARE

## 2023-08-08 VITALS
TEMPERATURE: 98 F | HEIGHT: 67 IN | BODY MASS INDEX: 23.42 KG/M2 | HEART RATE: 89 BPM | SYSTOLIC BLOOD PRESSURE: 128 MMHG | WEIGHT: 149.2 LBS | DIASTOLIC BLOOD PRESSURE: 86 MMHG | OXYGEN SATURATION: 98 %

## 2023-08-08 DIAGNOSIS — J32.9 SINUSITIS, UNSPECIFIED CHRONICITY, UNSPECIFIED LOCATION: Primary | ICD-10-CM

## 2023-08-08 PROCEDURE — 99213 OFFICE O/P EST LOW 20 MIN: CPT | Performed by: FAMILY MEDICINE

## 2023-08-08 RX ORDER — DOXYCYCLINE HYCLATE 100 MG
100 TABLET ORAL 2 TIMES DAILY
Qty: 20 TABLET | Refills: 0 | Status: SHIPPED | OUTPATIENT
Start: 2023-08-08 | End: 2023-08-18

## 2023-08-08 RX ORDER — AZITHROMYCIN 250 MG/1
TABLET, FILM COATED ORAL
Qty: 6 TABLET | Refills: 0 | Status: SHIPPED | OUTPATIENT
Start: 2023-08-08 | End: 2023-08-08 | Stop reason: ALTCHOICE

## 2023-08-08 NOTE — NURSING NOTE
"Keely Arana is a 68 year old female patient that presents today in clinic for the following:    Chief Complaint   Patient presents with    Sinus Problem     Pt has been \"swallowing\" excessively, fatigue, and headache starting last Monday, 7/31/2023     The patient's allergies and medications were reviewed as noted. A set of vitals were recorded as noted without incident: /86 (BP Location: Left arm, Patient Position: Sitting, Cuff Size: Adult Regular)   Pulse 89   Temp 98  F (36.7  C)   Ht 1.702 m (5' 7.01\")   Wt 67.7 kg (149 lb 3.2 oz)   SpO2 98%   BMI 23.36 kg/m  . The patient does not have any other questions for the provider.    Rachel Spain, EMT at 8:14 AM on 8/8/2023  "

## 2023-08-08 NOTE — PROGRESS NOTES
"  Assessment & Plan     Sinusitis, unspecified chronicity, unspecified location  Follow-up prn  - doxycycline hyclate (VIBRA-TABS) 100 MG tablet; Take 1 tablet (100 mg) by mouth 2 times daily for 10 days    Review of prior external note(s) from - CareEverywhere information from Martine reviewed  25 minutes spent by me on the date of the encounter doing chart review, history and exam, documentation and further activities per the note    No follow-ups on file.    Stephon Bliss MD  [facility] [place]    Lisa Lopez is a 68 year old, presenting for the following health issues:  Sinus Problem (Pt has been \"swallowing\" excessively, fatigue, and headache starting last Monday, 7/31/2023)    HPI Here w/ 9 days symptoms  Fatigue, sinus pain/pressure, drainage all post nasal, ear pressure pain right more than left, mild ST. No fever, no cough. No GI symptoms except assoc reduced apptite. No sick exposures known. Went to urgent care last week, ntoes rvwd. H/o sinus infection. No ENT procedures needed. No tobacco.   Past Medical History:   Diagnosis Date    Atrial fibrillation (H)     s/p ablasion.    Hypertension     Iritis      Past Surgical History:   Procedure Laterality Date    CATARACT IOL, RT/LT Left 2006    HYSTERECTOMY  2016    LUMPECTOMY BREAST WITH SENTINEL NODE, COMBINED Right 8/31/2018    Procedure: COMBINED LUMPECTOMY BREAST WITH SENTINEL NODE;  Right Wire Localized Lumpectomy and Right [place] Lymph Node Biopsy;  Surgeon: Chilango Kruger MD;  Location: [facility]    OOPHORECTOMY  2014     Current Outpatient Medications   Medication    amLODIPine (NORVASC) 5 MG tablet    anastrozole (ARIMIDEX) 1 MG tablet    atorvastatin (LIPITOR) 10 MG tablet    calcium carbonate (OS-PADDY 500 MG [employer] [place] [place]) 500 MG tablet    doxycycline hyclate (VIBRA-TABS) 100 MG tablet    LYSINE PO    Multiple Vitamins-Minerals (PRESERVISION AREDS 2 PO)    multivitamin w/minerals (THERA-VIT-M) tablet    " "OLANZapine (ZYPREXA) 2.5 MG tablet    rivaroxaban ANTICOAGULANT (XARELTO) 20 MG TABS tablet    VITAMIN D, CHOLECALCIFEROL, PO     No current facility-administered medications for this visit.     Allergies   Allergen Reactions    Corticosteroids Dermatitis     Proven allergic contact dermatitis to corticosteroid (group A, B, D2)     CAN USE as ALTERNATIVE following steroids: Corticosteroids of group C (e.g.Betamethasone, Dexamethasone, Flumethasone pivalate, Halomethasone)   and group D1 (Betamethasone dipropionate, Betamethasone-17-valerate,Clobetasole-propionate, Fluticasone propionate, Mometasone furoate)    Neomycin Dermatitis     Patch tests positives to Neomycin and Framycetin    Sulfa Antibiotics Itching and Rash    Cephalexin GI Disturbance     Other reaction(s): reflux    Codeine Nausea and Vomiting    Nitrofurantoin Nausea and Vomiting and Nausea    Silver Rash             Review of Systems         Objective    /86 (BP Location: Left arm, Patient Position: Sitting, Cuff Size: Adult Regular)   Pulse 89   Temp 98  F (36.7  C)   Ht 1.702 m (5' 7.01\")   Wt 67.7 kg (149 lb 3.2 oz)   SpO2 98%   BMI 23.36 kg/m    Body mass index is 23.36 kg/m .  Physical Exam   GENERAL: healthy, alert and no distress  EYES: Eyes grossly normal to inspection, PERRL and conjunctivae and sclerae normal  HENT: ear canals normal and right TM effusion L TM normal, nose and mouth without ulcers or lesions  Tender both maxillary sinuses to palpation  NECK: no adenopathy, no asymmetry, masses, or scars and thyroid normal to palpation  RESP: lungs clear to auscultation - no rales, rhonchi or wheezes  CV: regular rate and rhythm, normal S1 S2, no S3 or S4, no murmur, click or rub, no peripheral edema and peripheral pulses strong  MS: no gross musculoskeletal defects noted, no edema                  "

## 2023-08-08 NOTE — PROGRESS NOTES
Pharmacy called- they want us to switch to a different therapy from zithro as she has a history of ablation.     Josiah Leyva RN on 8/8/2023 at 10:14 AM

## 2023-08-11 ENCOUNTER — ANCILLARY PROCEDURE (OUTPATIENT)
Dept: GENERAL RADIOLOGY | Facility: CLINIC | Age: 68
End: 2023-08-11
Attending: INTERNAL MEDICINE
Payer: MEDICARE

## 2023-08-11 ENCOUNTER — OFFICE VISIT (OUTPATIENT)
Dept: INTERNAL MEDICINE | Facility: CLINIC | Age: 68
End: 2023-08-11
Payer: MEDICARE

## 2023-08-11 ENCOUNTER — TELEPHONE (OUTPATIENT)
Dept: INTERNAL MEDICINE | Facility: CLINIC | Age: 68
End: 2023-08-11

## 2023-08-11 ENCOUNTER — LAB (OUTPATIENT)
Dept: LAB | Facility: CLINIC | Age: 68
End: 2023-08-11
Payer: MEDICARE

## 2023-08-11 VITALS
BODY MASS INDEX: 23.35 KG/M2 | OXYGEN SATURATION: 99 % | SYSTOLIC BLOOD PRESSURE: 158 MMHG | HEART RATE: 101 BPM | DIASTOLIC BLOOD PRESSURE: 83 MMHG | WEIGHT: 149.1 LBS

## 2023-08-11 DIAGNOSIS — R07.89 CHEST DISCOMFORT: ICD-10-CM

## 2023-08-11 DIAGNOSIS — R06.02 SHORTNESS OF BREATH: ICD-10-CM

## 2023-08-11 DIAGNOSIS — R53.81 MALAISE: ICD-10-CM

## 2023-08-11 DIAGNOSIS — R07.89 CHEST DISCOMFORT: Primary | ICD-10-CM

## 2023-08-11 DIAGNOSIS — R91.8 PULMONARY NODULES: ICD-10-CM

## 2023-08-11 LAB
ALBUMIN SERPL BCG-MCNC: 4.6 G/DL (ref 3.5–5.2)
ALP SERPL-CCNC: 55 U/L (ref 35–104)
ALT SERPL W P-5'-P-CCNC: 20 U/L (ref 0–50)
ANION GAP SERPL CALCULATED.3IONS-SCNC: 11 MMOL/L (ref 7–15)
AST SERPL W P-5'-P-CCNC: 18 U/L (ref 0–45)
BILIRUB SERPL-MCNC: 0.4 MG/DL
BUN SERPL-MCNC: 19.5 MG/DL (ref 8–23)
CALCIUM SERPL-MCNC: 9.5 MG/DL (ref 8.8–10.2)
CHLORIDE SERPL-SCNC: 107 MMOL/L (ref 98–107)
CREAT SERPL-MCNC: 0.93 MG/DL (ref 0.51–0.95)
CRP SERPL-MCNC: <3 MG/L
D DIMER PPP FEU-MCNC: <0.27 UG/ML FEU (ref 0–0.5)
DEPRECATED HCO3 PLAS-SCNC: 25 MMOL/L (ref 22–29)
ERYTHROCYTE [DISTWIDTH] IN BLOOD BY AUTOMATED COUNT: 13 % (ref 10–15)
ERYTHROCYTE [SEDIMENTATION RATE] IN BLOOD BY WESTERGREN METHOD: 3 MM/HR (ref 0–30)
GFR SERPL CREATININE-BSD FRML MDRD: 66 ML/MIN/1.73M2
GLUCOSE SERPL-MCNC: 97 MG/DL (ref 70–99)
HCT VFR BLD AUTO: 41.6 % (ref 35–47)
HGB BLD-MCNC: 14.1 G/DL (ref 11.7–15.7)
MCH RBC QN AUTO: 30.1 PG (ref 26.5–33)
MCHC RBC AUTO-ENTMCNC: 33.9 G/DL (ref 31.5–36.5)
MCV RBC AUTO: 89 FL (ref 78–100)
PLATELET # BLD AUTO: 204 10E3/UL (ref 150–450)
POTASSIUM SERPL-SCNC: 4 MMOL/L (ref 3.4–5.3)
PROT SERPL-MCNC: 7 G/DL (ref 6.4–8.3)
RBC # BLD AUTO: 4.68 10E6/UL (ref 3.8–5.2)
SODIUM SERPL-SCNC: 143 MMOL/L (ref 136–145)
TSH SERPL DL<=0.005 MIU/L-ACNC: 1.26 UIU/ML (ref 0.3–4.2)
WBC # BLD AUTO: 4.6 10E3/UL (ref 4–11)

## 2023-08-11 PROCEDURE — 99214 OFFICE O/P EST MOD 30 MIN: CPT | Performed by: INTERNAL MEDICINE

## 2023-08-11 PROCEDURE — 86618 LYME DISEASE ANTIBODY: CPT | Performed by: INTERNAL MEDICINE

## 2023-08-11 PROCEDURE — 71046 X-RAY EXAM CHEST 2 VIEWS: CPT | Performed by: RADIOLOGY

## 2023-08-11 PROCEDURE — 99000 SPECIMEN HANDLING OFFICE-LAB: CPT | Performed by: PATHOLOGY

## 2023-08-11 PROCEDURE — 85379 FIBRIN DEGRADATION QUANT: CPT | Performed by: INTERNAL MEDICINE

## 2023-08-11 PROCEDURE — 85027 COMPLETE CBC AUTOMATED: CPT | Performed by: PATHOLOGY

## 2023-08-11 PROCEDURE — 80053 COMPREHEN METABOLIC PANEL: CPT | Performed by: PATHOLOGY

## 2023-08-11 PROCEDURE — 85652 RBC SED RATE AUTOMATED: CPT | Performed by: PATHOLOGY

## 2023-08-11 PROCEDURE — 83880 ASSAY OF NATRIURETIC PEPTIDE: CPT | Performed by: PATHOLOGY

## 2023-08-11 PROCEDURE — 36415 COLL VENOUS BLD VENIPUNCTURE: CPT | Performed by: PATHOLOGY

## 2023-08-11 PROCEDURE — 86140 C-REACTIVE PROTEIN: CPT | Performed by: PATHOLOGY

## 2023-08-11 PROCEDURE — 84443 ASSAY THYROID STIM HORMONE: CPT | Performed by: PATHOLOGY

## 2023-08-11 NOTE — NURSING NOTE
Keely Arana is a 68 year old female that presents in clinic today for the following:     Chief Complaint   Patient presents with    Follow Up     Pt here for follow up and wants to discuss ongoing symptoms     Fatigue     Pt reports fatigue associated with other symptoms       The patient's allergies and medications were reviewed. The patient's vitals were obtained without incident. The patient does not have any other questions for the provider.     Dwayne Velazquez, EMT at 3:55 PM on 8/11/2023.  Primary Care Clinic: [phone]

## 2023-08-11 NOTE — TELEPHONE ENCOUNTER
[facility] Call [facility]    Phone Message    May a detailed message be left on voicemail: yes     Reason for Call: Patient calling to talk to the drRea about her symptoms. She saw Dr. Bliss on Tuesday and was prescribed some antibiotics for her and she is still not feeling well and no relief. Patient feels there is something else going on besides the sinus infection as she has no relief yet. She is scheduled for Friday with Dr. Suarez hopefully she can get seen sooner than that. Please call her today to discuss further.     Action Taken: Message routed to:  [facility]    Travel Screening: Not Applicable

## 2023-08-11 NOTE — PROGRESS NOTES
Assessment & Plan     Malaise  and  Chest discomfort    Jessica presents with 1.5 weeks of fatigue, malaise, headache, chest discomfort, and may some mild shortness of breath.  She was seen in  on 8/3 with normal EKG and was started on a PPI for possible GI etiology without improvement.  She was seen on 8/8 and was started on doxycycline for sinusitis without improvement.  She's had a normal U/A, COVID, flu, and RSV testing.  She has not yet had a CXR, so we'll check this as well as labs below.  She wonders about Lyme disease as [place], so we'll check for this as well.  Continue current medication pending further work-up.    Addendum 8/12:  Labs so far are normal with Lyme still pending.  Awaiting radiologist read on CXR, but by my read there may be some fluid around the lungs - linear marking noted on the left.  I've added on a BNP.     - ESR: Erythrocyte sedimentation rate; Future  - CRP, inflammation; Future  - Lyme Disease Total Abs Bld with Reflex to Confirm CLIA; Future  - TSH with free T4 reflex; Future  - CBC with platelets; Future  - Comprehensive metabolic panel (BMP + Alb, Alk Phos, ALT, AST, Total. Bili, TP); Future  - D dimer, quantitative; Future  - XR Chest 2 Views; Future      LucianaChantelle Cartwright MD  [facility] INTERNAL [facility]IC[facility] [place]    Subjective   Jessica is a 68 year old, presenting for the following health issues:  Follow Up (Pt here for follow up and wants to discuss ongoing symptoms ) and Fatigue (Pt reports fatigue associated with other symptoms)    HPI     Jessica was seen on 8/8 and was prescribed doxycycline for sinusitis, but she has not had any improvement and she wonders if something else might be causing symptoms besides sinusitis.  Jessica reports ongoing fatigue since about 1.5 weeks ago.  She hasn't had much sinus pressure but has more headache.  She is having some R ear pain but improved now.  She is having a weird feeling in the chest, constant  sternal discomfort that fluctuates in intensity.  Maybe pressure, no nausea or vomiting.  No trouble with BM or blood in urine or stool.  No dysuria or change in frequency.  No coughing or wheezing.  Maybe some shortness of breath.  No edema or joint pain or rashes.  No fevers or chills.      She had normal U/A, COVID, flu, and RSV testing on 8/3.  EKG was normal.    visit that day suggested taking Prilosec but that hasn't been helping.      Her BP is high today at 158/83, but was good on 8/8.  She reports feeling stressed about the visit today- will continue to monitor.         Review of Systems   Constitutional, HEENT, cardiovascular, pulmonary, gi and gu systems are negative, except as otherwise noted.      Objective    BP (!) 158/83 (BP Location: Right arm, Patient Position: Sitting, Cuff Size: Adult Regular)   Pulse 101   Wt 67.6 kg (149 lb 1.6 oz)   SpO2 99%   BMI 23.35 kg/m    Body mass index is 23.35 kg/m .  Physical Exam     GENERAL: alert and no distress,  EYES: Eyes grossly normal to inspection, PERRL and conjunctivae and sclerae normal  HENT: ear canals and TMs normal, sinuses non tender to percussion, nose and mouth without ulcers or lesions, oropharynx normal  NECK: no adenopathy, no asymmetry, masses, or scars  RESP: lungs clear to auscultation - no rales, rhonchi or wheezes  CV: regular rate and rhythm, normal S1 S2, no S3 or S4, no murmur, click or rub   MSK: no pain to palpation of the sternum  AB: soft, non-tender, no masses noted

## 2023-08-12 LAB
B BURGDOR IGG+IGM SER QL: 0.05
NT-PROBNP SERPL-MCNC: 200 PG/ML (ref 0–900)

## 2023-08-14 ENCOUNTER — ANCILLARY PROCEDURE (OUTPATIENT)
Dept: CT IMAGING | Facility: CLINIC | Age: 68
End: 2023-08-14
Attending: INTERNAL MEDICINE
Payer: MEDICARE

## 2023-08-14 DIAGNOSIS — R06.02 SHORTNESS OF BREATH: ICD-10-CM

## 2023-08-14 DIAGNOSIS — R07.89 CHEST DISCOMFORT: ICD-10-CM

## 2023-08-14 DIAGNOSIS — R53.81 MALAISE: ICD-10-CM

## 2023-08-14 DIAGNOSIS — R91.8 PULMONARY NODULES: ICD-10-CM

## 2023-08-14 PROCEDURE — G1010 CDSM STANSON: HCPCS | Performed by: RADIOLOGY

## 2023-08-14 PROCEDURE — 71250 CT THORAX DX C-: CPT | Mod: TC | Performed by: RADIOLOGY

## 2023-08-16 ENCOUNTER — OFFICE VISIT (OUTPATIENT)
Dept: PULMONOLOGY | Facility: CLINIC | Age: 68
End: 2023-08-16
Payer: MEDICARE

## 2023-08-16 DIAGNOSIS — R06.02 SHORTNESS OF BREATH: ICD-10-CM

## 2023-08-16 LAB
DLCOCOR-%PRED-PRE: 133 %
DLCOCOR-PRE: 27.24 ML/MIN/MMHG
DLCOUNC-%PRED-PRE: 136 %
DLCOUNC-PRE: 27.8 ML/MIN/MMHG
DLCOUNC-PRED: 20.4 ML/MIN/MMHG
ERV-%PRED-PRE: 108 %
ERV-PRE: 0.9 L
ERV-PRED: 0.83 L
EXPTIME-PRE: 8.41 SEC
FEF2575-%PRED-PRE: 90 %
FEF2575-PRE: 1.74 L/SEC
FEF2575-PRED: 1.92 L/SEC
FEFMAX-%PRED-PRE: 112 %
FEFMAX-PRE: 6.9 L/SEC
FEFMAX-PRED: 6.15 L/SEC
FEV1-%PRED-PRE: 113 %
FEV1-PRE: 2.59 L
FEV1FEV6-PRE: 74 %
FEV1FEV6-PRED: 79 %
FEV1FVC-PRE: 72 %
FEV1FVC-PRED: 79 %
FEV1SVC-PRE: 73 %
FEV1SVC-PRED: 68 %
FIFMAX-PRE: 5.83 L/SEC
FRCPLETH-%PRED-PRE: 118 %
FRCPLETH-PRE: 3.66 L
FRCPLETH-PRED: 3.09 L
FVC-%PRED-PRE: 123 %
FVC-PRE: 3.59 L
FVC-PRED: 2.92 L
IC-%PRED-PRE: 107 %
IC-PRE: 2.64 L
IC-PRED: 2.45 L
RVPLETH-%PRED-PRE: 130 %
RVPLETH-PRE: 2.77 L
RVPLETH-PRED: 2.12 L
TLCPLETH-%PRED-PRE: 114 %
TLCPLETH-PRE: 6.3 L
TLCPLETH-PRED: 5.49 L
VA-%PRED-PRE: 113 %
VA-PRE: 5.7 L
VC-%PRED-PRE: 106 %
VC-PRE: 3.54 L
VC-PRED: 3.33 L

## 2023-08-16 PROCEDURE — 94726 PLETHYSMOGRAPHY LUNG VOLUMES: CPT | Performed by: INTERNAL MEDICINE

## 2023-08-16 PROCEDURE — 94729 DIFFUSING CAPACITY: CPT | Performed by: INTERNAL MEDICINE

## 2023-08-16 PROCEDURE — 94375 RESPIRATORY FLOW VOLUME LOOP: CPT | Performed by: INTERNAL MEDICINE

## 2023-09-04 ENCOUNTER — NURSE TRIAGE (OUTPATIENT)
Dept: NURSING | Facility: CLINIC | Age: 68
End: 2023-09-04
Payer: MEDICARE

## 2023-09-04 DIAGNOSIS — F41.1 GAD (GENERALIZED ANXIETY DISORDER): Primary | ICD-10-CM

## 2023-09-04 NOTE — TELEPHONE ENCOUNTER
"S-(situation): anxiety    B-(background):   Pt was taken off olanzapine 5 days ago, and as been experiencing side effects.    A-(assessment):   Woke up this morning with more anxiety  Labored breathing  Loss of appetite  Fatigue  Mild dizziness, feels stable to move on her own  \"Legs and chest feels off\"      R-(recommendations):   FNA attempted to page on call for Psych [phone] then [phone]. FNA spoke to behavioral intake and was told that on call psych resident is for [facility]. There is no psych on call for [facility] patients.    Per protocol, [facility] advised pt to go to ED.  Pt verbalized understanding but requests message sent to Dr. Palomo. To psych [facility], please call pt at [phone]    Marlyn Garber RN/[place] Nurse Advisor        Reason for Disposition   [1] Difficulty breathing AND [2] persists > 10 minutes AND [3] not relieved by reassurance provided by triager   [1] Lightheadedness or dizziness AND [2] persists > 10 minutes AND [3] not relieved by reassurance provided by triager    Additional Information   Negative: SEVERE difficulty breathing (e.g., struggling for each breath, speaks in single words)   Negative: Bluish (or gray) lips or face now   Negative: Difficult to awaken or acting confused (e.g., disoriented, slurred speech)   Negative: Violent behavior, or threatening to physically hurt or kill someone   Negative: Sounds like a life-threatening emergency to the triager   Negative: Chest pain   Negative: Palpitations, skipped heartbeat, or rapid heartbeat is main symptom   Negative: Cough is main symptom   Negative: Suicide thoughts, threats, attempts, or questions   Negative: Depression is main problem or symptom (e.g., feelings of sadness or hopelessness)    Protocols used: Anxiety and Panic Attack-A-AH    "

## 2023-09-05 NOTE — TELEPHONE ENCOUNTER
Called patient and she said she doesn't think it is a physical issue. She did not go to the ER yesterday. She had a full cardiac workup a few weeks ago. She said she thinks it's anxiety. She has chest and leg pain. She is shaky. Low appetite. She said it came on yesterday and she feels about the same today. She is not having panic attacks.   She is wondering if there is something that she can take for the anxiety to get her through this?   She has appt on 9/11/23.     Maria Elena Wills RN on 9/5/2023 at 11:03 AM

## 2023-09-07 ENCOUNTER — TELEPHONE (OUTPATIENT)
Dept: BEHAVIORAL HEALTH | Facility: CLINIC | Age: 68
End: 2023-09-07
Payer: MEDICARE

## 2023-09-07 RX ORDER — HYDROXYZINE HYDROCHLORIDE 25 MG/1
25-50 TABLET, FILM COATED ORAL 3 TIMES DAILY PRN
Qty: 90 TABLET | Refills: 0 | Status: SHIPPED | OUTPATIENT
Start: 2023-09-07 | End: 2023-09-11 | Stop reason: ALTCHOICE

## 2023-09-07 NOTE — TELEPHONE ENCOUNTER
Reason for call:  Other   Patient called regarding (reason for call): call back  Additional comments: Requesting a call back regarding the issue that was discussed on 9/5/23.    Phone number to reach patient:  Home number on file [phone] (home)    Best Time:  ASAP    Can we leave a detailed message on this number?  YES    Travel screening: Not Applicable

## 2023-09-07 NOTE — TELEPHONE ENCOUNTER
Hydroxyzine sent to use temporarily for anxiety. Can discuss further at upcoming appt. Pt can also try taking olanzapine 2.5 mg every other day or every two days for a1-2 weeks to see if that is also helpful for what she is experiencing. Thanks.     Can RN please call and update pt? Thanks!

## 2023-09-07 NOTE — TELEPHONE ENCOUNTER
"RN returned a call to this patient as instructed by Dr. Palomo.       RN reviewed Dr. Palomo's instructions with the patient verbatim. The patient asked numerous questions and demonstrated a lot of circular thinking.  She was pressured and needed frequent clarification of instructions.  She seemed anxious.        2.  The patient reported she went off the Zyprexa 1 week ago.  She stated \"I was put on this medication for cong.  I discontinued it because I feel stable.  About 3 days after stopping the Zyprexa I started feeling this anxiety.  My anxiety intensity averages 7/10 daily.\"    3.  RN reviewed the purpose and action for Hydroxyzine and the patient verbalized a understanding.  RN reminded her to monitor for sedation and to not drive if feeling sedated.      4.  The patient indicated (after lots of RN teaching and review of Dr. Palomo's instructions) that she would try the Hydroxyzine as prescribed.  She then would initiate Zyprexa 2.5 mg every other day if her anxiety was not managed by the Hydroxyzine until she meets with Dr. Palomo on Monday.    "

## 2023-09-07 NOTE — TELEPHONE ENCOUNTER
RN spoke to the patient.  This was addressed in 9/4/23 Nurse Triage encounter.    Vitaliy Duke RN on 9/7/2023 at 4:14 PM

## 2023-09-10 ASSESSMENT — ANXIETY QUESTIONNAIRES
6. BECOMING EASILY ANNOYED OR IRRITABLE: NOT AT ALL
2. NOT BEING ABLE TO STOP OR CONTROL WORRYING: NEARLY EVERY DAY
5. BEING SO RESTLESS THAT IT IS HARD TO SIT STILL: SEVERAL DAYS
3. WORRYING TOO MUCH ABOUT DIFFERENT THINGS: NEARLY EVERY DAY
7. FEELING AFRAID AS IF SOMETHING AWFUL MIGHT HAPPEN: NOT AT ALL
1. FEELING NERVOUS, ANXIOUS, OR ON EDGE: NEARLY EVERY DAY
IF YOU CHECKED OFF ANY PROBLEMS ON THIS QUESTIONNAIRE, HOW DIFFICULT HAVE THESE PROBLEMS MADE IT FOR YOU TO DO YOUR WORK, TAKE CARE OF THINGS AT HOME, OR GET ALONG WITH OTHER PEOPLE: SOMEWHAT DIFFICULT
GAD7 TOTAL SCORE: 12
4. TROUBLE RELAXING: MORE THAN HALF THE DAYS
GAD7 TOTAL SCORE: 12

## 2023-09-11 ENCOUNTER — VIRTUAL VISIT (OUTPATIENT)
Dept: PSYCHIATRY | Facility: CLINIC | Age: 68
End: 2023-09-11
Payer: MEDICARE

## 2023-09-11 DIAGNOSIS — Z86.59 HISTORY OF MANIA: ICD-10-CM

## 2023-09-11 DIAGNOSIS — F41.9 ANXIETY: Primary | ICD-10-CM

## 2023-09-11 DIAGNOSIS — F39 MOOD DISORDER (H): ICD-10-CM

## 2023-09-11 PROCEDURE — 99215 OFFICE O/P EST HI 40 MIN: CPT | Mod: 95 | Performed by: PSYCHIATRY & NEUROLOGY

## 2023-09-11 RX ORDER — PROPRANOLOL HYDROCHLORIDE 20 MG/1
10-20 TABLET ORAL 2 TIMES DAILY PRN
Qty: 180 TABLET | Refills: 0 | Status: SHIPPED | OUTPATIENT
Start: 2023-09-11 | End: 2024-04-15 | Stop reason: ALTCHOICE

## 2023-09-11 RX ORDER — OLANZAPINE 2.5 MG/1
2.5 TABLET, FILM COATED ORAL AT BEDTIME
Qty: 90 TABLET | Refills: 0 | Status: SHIPPED | OUTPATIENT
Start: 2023-09-11 | End: 2023-09-19 | Stop reason: DRUGHIGH

## 2023-09-11 NOTE — NURSING NOTE
Is the patient currently in the state of [place]? YES    Visit mode:VIDEO    If the visit is dropped, the patient can be reconnected by: TELEPHONE VISIT: Phone number: [phone]    Will anyone else be joining the visit? No  (If patient encounters technical issues they should call [phone])  may join video     How would you like to obtain your AVS? MyChart    Are changes needed to the allergy or medication list? No    Rooming Documentation: Patient will complete qnrs in mychart.    Reason for visit: ALETA Diana

## 2023-09-11 NOTE — PROGRESS NOTES
"Virtual Visit Details    Type of service:  Video Visit   Video Start Time: {video visit start/end time for provider to select:705849}  Video End Time:{video visit start/end time for provider to select:044483}    Originating Location (pt. Location): {video visit patient location:889911::\"Home\"}  {PROVIDER LOCATION On-site should be selected for visits conducted from your clinic location or adjoining [facility] hospital, academic office, or other nearby [facility] building. Off-site should be selected for all other provider locations, including home:722223}  Distant Location (provider location):  {virtual location provider:856989}  Platform used for Video Visit: {Virtual Visit Platforms:046998::\"Telematik\"}  "

## 2023-09-11 NOTE — PROGRESS NOTES
"Telemedicine Visit: The patient's condition can be safely assessed and treated via synchronous audio and visual telemedicine encounter.      Reason for Telemedicine Visit: Patient has requested telehealth visit    Originating Site (Patient Location): Patient's home    Distant Location (provider location):  Off-site    Consent:  The patient/guardian has verbally consented to: the potential risks and benefits of telemedicine (video visit) versus in person care; bill my insurance or make self-payment for services provided; and responsibility for payment of non-covered services.     Mode of Communication:  Video Conference via [email or web address]    As the provider I attest to compliance with applicable laws and regulations related to telemedicine.         Outpatient Psychiatric Progress Note    Name: Keely Arana   : 1955                    Primary Care Provider: Maulik Nye MD   Therapist: None    PHQ-9 scores:      2023     2:14 PM 2023    10:19 AM 2023     7:56 AM   PHQ-9 SCORE   PHQ-9 Total Score MyChart   0   PHQ-9 Total Score 4 0 0    0       DAISY-7 scores:      2023     2:14 PM 2023    10:19 AM 9/10/2023     9:53 AM   DAISY-7 SCORE   Total Score   12 (moderate anxiety)   Total Score 5 0 12       Patient Identification:  Patient is a 68 year old,   White Not  or  female  who presents for return visit with me.  Patient is currently retired. Patient attended the phone/video session with  Terry , who they agreed to have interview with. Patient prefers to be called: \"Jessica\".    Interim History:  I last saw Keely Arana for outpatient psychiatry return visit on 2023. During that appointment, we:    Decrease olanzapine/Zyprexa to 2.5 mg at bedtime for history of cong, sleep.  Can take additional 2.5 mg during the day as needed for anxiety, agitation.  Continue all other cares per primary care provider.     : Patient overall feeling more anxious " "since stopping olanzapine.  Patient not sleeping as well feels more stiff muscles lately.  Patient's  reports she had seemed more depressed before stopping olanzapine.  She almost appeared \"too drugged.\"  She seemed to not care much about what was being talked about or what was going on.  She seemed slower.  Patient now feels like her thoughts race a little bit more and sleep is more difficult.  No acute safety concerns.  No SI.  No problematic drug or alcohol use.  Hydroxyzine was started as needed but she does not feel like it has been helpful at all.    Past Psychiatric Med Trials:  Psych Meds at Intake:  Olanzapine 5 mg at bedtime and 2.5 mg daily as needed     Past Psych Meds:  None    Psychiatric ROS:  Keelyaliza Arana reports mood has been: Anxious  Anxiety has been: High  Sleep has been: Not sleeping as well off off olanzapine  Paz sxs: More racing thoughts, high anxiety, not sleeping as well  Psychosis sxs: None  ADHD/ADD sxs: N/A  PTSD sxs: N/A  PHQ9 and GAD7 scores were reviewed today if completed.   Medication side effects: Denies  Current stressors include: And see HPI above  Coping mechanisms and supports include: Family, Hobbies and Friends    Current medications include:   Current Outpatient Medications   Medication Sig    amLODIPine (NORVASC) 5 MG tablet Take 5 mg by mouth daily    anastrozole (ARIMIDEX) 1 MG tablet TAKE 1 TABLET BY MOUTH EVERY DAY    atorvastatin (LIPITOR) 10 MG tablet Take 1 tablet (10 mg) by mouth daily    calcium carbonate (OS-PADDY 500 MG [place]) 500 MG tablet Take 2 tablets by mouth daily With Magnesium,Zinc    hydrOXYzine (ATARAX) 25 MG tablet Take 1-2 tablets (25-50 mg) by mouth 3 times daily as needed for anxiety    LYSINE PO Take 10,000 mg by mouth daily    Multiple Vitamins-Minerals (PRESERVISION AREDS 2 PO) Take 2 tablets by mouth daily    multivitamin w/minerals (THERA-VIT-M) tablet Take 1 tablet by mouth daily    rivaroxaban ANTICOAGULANT (XARELTO) 20 MG TABS " tablet Take 20 mg by mouth    VITAMIN D, CHOLECALCIFEROL, PO Take 1,000 Units by mouth daily      No current facility-administered medications for this visit.       Past Medical/Surgical History:  Past Medical History:   Diagnosis Date    Atrial fibrillation (H)     s/p ablasion.    Hypertension     Iritis       has a past medical history of Atrial fibrillation (H), Hypertension, and Iritis.    She has no past medical history of Complication of anesthesia or Sleep apnea.    Social History:  Reviewed. No changes to social history except as noted above in HPI.    Vital Signs:   None. This is phone/video visit.     Labs:  Most recent laboratory results reviewed and the pertinent results include:   Last Comprehensive Metabolic Panel:  Sodium   Date Value Ref Range Status   08/11/2023 143 136 - 145 mmol/L Final     Potassium   Date Value Ref Range Status   08/11/2023 4.0 3.4 - 5.3 mmol/L Final     Chloride   Date Value Ref Range Status   08/11/2023 107 98 - 107 mmol/L Final     Carbon Dioxide (CO2)   Date Value Ref Range Status   08/11/2023 25 22 - 29 mmol/L Final     Anion Gap   Date Value Ref Range Status   08/11/2023 11 7 - 15 mmol/L Final     Glucose   Date Value Ref Range Status   08/11/2023 97 70 - 99 mg/dL Final     Urea Nitrogen   Date Value Ref Range Status   08/11/2023 19.5 8.0 - 23.0 mg/dL Final     Creatinine   Date Value Ref Range Status   08/11/2023 0.93 0.51 - 0.95 mg/dL Final   01/21/2021 0.69 0.52 - 1.04 mg/dL Final     GFR Estimate   Date Value Ref Range Status   08/11/2023 66 >60 mL/min/1.73m2 Final   01/21/2021 >90 >60 mL/min/[1.73_m2] Final     Comment:     Non  GFR Calc  Starting 12/18/2018, serum creatinine based estimated GFR (eGFR) will be   calculated using the Chronic Kidney Disease Epidemiology Collaboration   (CKD-EPI) equation.       Calcium   Date Value Ref Range Status   08/11/2023 9.5 8.8 - 10.2 mg/dL Final   01/21/2021 9.7 8.5 - 10.1 mg/dL Final     Bilirubin Total   Date  Value Ref Range Status   08/11/2023 0.4 <=1.2 mg/dL Final     Alkaline Phosphatase   Date Value Ref Range Status   08/11/2023 55 35 - 104 U/L Final     ALT   Date Value Ref Range Status   08/11/2023 20 0 - 50 U/L Final     Comment:     Reference intervals for this test were updated on 6/12/2023 to more accurately reflect our healthy population. There may be differences in the flagging of prior results with similar values performed with this method. Interpretation of those prior results can be made in the context of the updated reference intervals.       AST   Date Value Ref Range Status   08/11/2023 18 0 - 45 U/L Final     Comment:     Reference intervals for this test were updated on 6/12/2023 to more accurately reflect our healthy population. There may be differences in the flagging of prior results with similar values performed with this method. Interpretation of those prior results can be made in the context of the updated reference intervals.         Recent Labs   Lab Test 05/02/23  1030   CHOL 175   HDL 68   LDL 91   TRIG 78       Review of Systems:  10 systems (general, cardiovascular, respiratory, eyes, ENT, endocrine, GI, , M/S, neurological) were reviewed. Most pertinent finding(s) is/are: denies fever, cough, persistent headaches, shortness of breath, chest pain, severe GI symptoms, trouble urinating, severe pain. The remaining systems are all unremarkable.    Mental Status Examination (limited as this is by phone/video):  Appearance: Awake, alert, appears stated age, no acute distress, well-groomed   Attitude:  cooperative  Motor: Possible tongue thrusting/lip smacking versus frequent licking of lips due to a very dry mouth since using hydroxyzine  Oriented to:  person, place, time, and situation  Attention Span and Concentration:  normal  Speech:  clear, coherent, regular rate, rhythm, and volume, not pressured  Language: intact  Mood: Anxious  Affect: Mood congruent  Associations:  no loose  associations  Thought Process: Circumstantial  Thought Content:  no evidence of suicidal ideation or homicidal ideation, no evidence of psychotic thought, no auditory hallucinations present and no visual hallucinations present  Recent and Remote Memory:  Intact to interview. Not formally assessed.  Did seem to have difficulty following treatment plan instructions.  Fund of Knowledge: appropriate  Insight: Fair  Judgment:  intact, adequate for safety  Impulse Control:  intact    Suicide Risk Assessment:  Today Keely Arana reports no suicidal ideation. Based on all available evidence including the factors cited above, Keely Arana does not appear to be at imminent risk for self-harm, does not meet criteria for a 72-hr hold, and therefore remains appropriate for ongoing outpatient level of care.  A thorough assessment of risk factors related to suicide and self-harm have been reviewed and are noted above. The patient convincingly denies suicidality on several occasions. Local community safety resources reviewed for patient to use if needed. There was no deceit detected, and the patient presented in a manner that was believable.     DSM5 Diagnosis:  Hx of Paz, unspecified  Mood disorder, unspecified (R/O Bipolar Disorder)  Anxiety, unspecified    R/O Drug-Induced Perkinsism    R/O tardive dyskinesia    Medical comorbidities include:   Patient Active Problem List    Diagnosis Date Noted    History of paz 06/20/2023     Priority: Medium    Abnormal bone density screening 01/15/2021     Priority: Medium    Long term (current) use of aromatase inhibitors 01/15/2021     Priority: Medium    Malignant neoplasm of upper-outer quadrant of right breast in female, estrogen receptor positive (H) 08/23/2018     Priority: Medium    Symptomatic varicose veins of both lower extremities 11/10/2015     Priority: Medium       Psychosocial & Contextual Factors: see HPI above    Assessment:  From Intake, 5/12/2023:  Keely ARRIAGA  Yasmeen is a 67-year-old female with relatively benign past psychiatric history leading up to recent suspected manic episode who presents today for psychiatric evaluation.  Patient with what appears to meet criteria for manic episode starting earlier this spring.  Patient started to have more trouble with sleep and was becoming more irritable leading to racing thoughts, increased goal directed activity, disorganized thoughts, more talkative than usual, etc.  Patient's family concerns since she was acting out of character and brought to the ER.  Patient started on olanzapine in the emergency room which has helped significantly to regulate sleep and mood.  Patient perhaps with some heightened energy today but not overtly manic.  Patient is currently tolerating olanzapine well and reports sleeping about 8 hours a night.  Discussed risks and benefits of staying on olanzapine.  Unclear if patient has had underlying bipolar disorder that has gone fairly undetected over the years.  Patient with relatives with bipolar disorder.  Patient did have recent brain imaging (MRI) that would not explain recent new onset cong.  Patient also with otherwise relatively benign labs including thyroid, liver panel, CMP, CBC, etc.  No new medications were introduced.  Patient denies any drug use and no problematic alcohol use.  Patient stopped using her 1 glass of wine a week around the time this episode started to see if it would help to give up alcohol.  Patient does often have a few cups of coffee a day.  We will continue to monitor.  Recommend staying on olanzapine for at least 6 months to a year before trying to taper off in order to prevent relapse/recurrent cong symptoms.  No acute safety concerns today.  No SI.  No problematic drug or alcohol use.     6/14/2023:  Patient overall doing relatively well.  Mood has been stable.  No signs of cong.  We will decrease olanzapine to 2.5 mg at bedtime for the next few months to ensure  ongoing stability of symptoms.  Could consider a trial off olanzapine at the end of the 3 months of decreased dose.  Patient and  will continue to remain vigilant while monitoring for any relapse of manic symptoms.  Patient had recent lipid panel and glucose completed.  Both within normal limits.  No acute safety concerns.  No SI.  No signs of tardive dyskinesia noted on video today.  No problematic drug or alcohol use.    9/11/2023:  Patient with suspected movement related side effects due to olanzapine use.  Patient with extreme anxiety and some worsening symptoms when olanzapine was stopped after 3 months on 2.5 mg dose.  We will restart olanzapine at 2.5 mg every other day and continue with slow taper and also get recommendations from psychiatric PharmD and possibly neurology.  Patient with possible tongue thrusting today versus extreme dry mouth from hydroxyzine use.  There was no tongue thrusting noted at last appointment in June.  Patient's family had noted possible parkinsonism like symptoms.   and patient will continue to monitor.  Referral placed for [facility] for additional monitoring and talk therapy/support as well.  We will also start a trial with propranolol to see if that is helpful for some of the physical symptoms of anxiety and potentially some symptoms being caused by olanzapine.  No acute safety concerns.  No SI.  No problematic drug or alcohol use.    Medication side effects and alternatives were reviewed. Health promotion activities recommended and reviewed today. All questions addressed. Education and counseling completed regarding risks and benefits of medications and psychotherapy options. Recommend therapy for additional support.     Treatment Plan:  Re-start olanzapine/Zyprexa at 2.5 mg every other day (until follow-up) for history of cong, sleep.  Can take additional 2.5 mg dose now as discussed. Continue to monitor movement-related side effects as discussed today.  Discontinue  hydroxyzine since not helpful.   Start propranolol 10-20 mg twice daily for anxiety, movement-related side effects from olanzapine.   MTM (medication therapy management) referral placed with psychiatric PharmD.   BHC (behavioral health clinician) referral placed for counseling/therapy support.   Continue all other cares per primary care provider.   Continue all other medications as reviewed per electronic medical record today.   Safety plan reviewed. To the Emergency Department as needed or call after hours crisis line at [phone] or [phone]. [place] Crisis Text Line. Text [place] to 931170 or Suicide LifeLine Chat: suicidepreventionCell-A-Spotline.org/chat  Schedule an appointment with me in 2-3 weeks or sooner as needed. Call [facility] at [phone] to schedule.  Follow up with primary care provider as planned or for acute medical concerns.  Call the psychiatric nurse line with medication questions or concerns at [phone].  Online Prasadhart may be used to communicate with your provider, but this is not intended to be used for emergencies.    Previously discussed need to monitor for movement disorder side effects and metabolic changes (particularly cholesterol and blood sugars) with olanzapine use.  Discussed possibility movement side effects can be lasting in some cases even when drug is stopped.    Administrative Billing:   Phone Call/Video Duration: 40 Minutes  Start: 8:05a  Stop: 8:45a    Patient Status:  Patient will continue to be seen for ongoing consultation and stabilization.    Signed:   Reema Palomo DO  [facility]    Disclaimer: This note consists of symbols derived from keyboarding, dictation and/or voice recognition software. As a result, there may be errors in the script that have gone undetected. Please consider this when interpreting information found in this chart.

## 2023-09-11 NOTE — PATIENT INSTRUCTIONS
Treatment Plan:  Re-start olanzapine/Zyprexa at 2.5 mg every other day (until follow-up) for history of cong, sleep.  Can take additional 2.5 mg dose now as discussed. Continue to monitor movement-related side effects as discussed today.  Discontinue hydroxyzine since not helpful.   Start propranolol 10-20 mg twice daily for anxiety, movement-related side effects from olanzapine.   MTM (medication therapy management) referral placed with psychiatric PharmD.   BHC (behavioral health clinician) referral placed for counseling/therapy support.   Continue all other cares per primary care provider.   Continue all other medications as reviewed per electronic medical record today.   Safety plan reviewed. To the Emergency Department as needed or call after hours crisis line at [phone] or [phone]. [place] Crisis Text Line. Text [place] to 468379 or Suicide LifeLine Chat: suicidepreventionlifeline.org/chat  Schedule an appointment with me in 2-3 weeks or sooner as needed. Call [facility] at [phone] to schedule.  Follow up with primary care provider as planned or for acute medical concerns.  Call the psychiatric nurse line with medication questions or concerns at [phone].  OrdrItt may be used to communicate with your provider, but this is not intended to be used for emergencies.    Previously discussed need to monitor for movement disorder side effects and metabolic changes (particularly cholesterol and blood sugars) with olanzapine use.  Discussed possibility movement side effects can be lasting in some cases even when drug is stopped.

## 2023-09-17 ASSESSMENT — PATIENT HEALTH QUESTIONNAIRE - PHQ9
10. IF YOU CHECKED OFF ANY PROBLEMS, HOW DIFFICULT HAVE THESE PROBLEMS MADE IT FOR YOU TO DO YOUR WORK, TAKE CARE OF THINGS AT HOME, OR GET ALONG WITH OTHER PEOPLE: SOMEWHAT DIFFICULT
SUM OF ALL RESPONSES TO PHQ QUESTIONS 1-9: 7
SUM OF ALL RESPONSES TO PHQ QUESTIONS 1-9: 7

## 2023-09-18 ENCOUNTER — VIRTUAL VISIT (OUTPATIENT)
Dept: BEHAVIORAL HEALTH | Facility: CLINIC | Age: 68
End: 2023-09-18
Payer: MEDICARE

## 2023-09-18 DIAGNOSIS — F41.1 GENERALIZED ANXIETY DISORDER: Primary | ICD-10-CM

## 2023-09-18 PROCEDURE — 90832 PSYTX W PT 30 MINUTES: CPT | Mod: 95 | Performed by: SOCIAL WORKER

## 2023-09-18 NOTE — PROGRESS NOTES
[facility] Integrated Behavioral Health  2023      Behavioral Health Clinician Progress Note    Patient Name: Keely Arana           Service Type:  Individual      Service Location:   MyChart / Email (patient reached)     Session Start Time: 10:00 am   Session End Time: 10:29 am      Session Length: 16 - 37      Attendees: Patient     Service Modality:  Video Visit:      Provider verified identity through the following two step process.  Patient provided:  Patient photo and Patient     Telemedicine Visit: The patient's condition can be safely assessed and treated via synchronous audio and visual telemedicine encounter.      Reason for Telemedicine Visit: Patient has requested telehealth visit    Originating Site (Patient Location): Patient's home    Distant Site (Provider Location): [facility]    Consent:  The patient/guardian has verbally consented to: the potential risks and benefits of telemedicine (video visit) versus in person care; bill my insurance or make self-payment for services provided; and responsibility for payment of non-covered services.     Patient would like the video invitation sent by:  My Chart    Mode of Communication:  Video Conference via [facility]    Distant Location (Provider):  On-site    As the provider I attest to compliance with applicable laws and regulations related to telemedicine.    Visit Activities (Refresh list every visit): [facility] Only    Diagnostic Assessment Date: 23 by Jd Quiroz PsyD  Treatment Plan Review Date: 23  See Flowsheets for today's PHQ-9 and DAISY-7 results  Previous PHQ-9:       2023    10:19 AM 2023     7:56 AM 2023     2:18 PM   PHQ-9 SCORE   PHQ-9 Total Score MyChart  0 7 (Mild depression)   PHQ-9 Total Score 0 0    0 7     Previous DAISY-7:       2023     2:14 PM 2023    10:19 AM 9/10/2023     9:53 AM   DAISY-7 SCORE   Total Score   12 (moderate anxiety)  "  Total Score 5 0 12       RUMA LEVEL:       No data to display                DATA  Extended Session (60+ minutes): No  Interactive Complexity: No  Crisis: No  [facility] Patient: No    Treatment Objective(s) Addressed in This Session:  Target Behavior(s):  anxiety    Anxiety: will experience a reduction in anxiety, will develop more effective coping skills to manage anxiety symptoms, will develop healthy cognitive patterns and beliefs, and will increase ability to function adaptively    Current Stressors / Issues:  Reporting she is in need of coping skills with anxiety. Reports no previous MH prior to manic episode in the beginning of the year. Reports presently, anxiety is the biggest factor for her. Feels like her mind is a \"hamster wheel.\" She has also developed more physical sx of anxiety. Major stressors she has are dealing with decisions to sell a home and keep another. She often worries about how her mental health effects those around her. Finally she is strugging with self confidence. Discussed when high anxiety comes about writing down what she is thinking and reflecting.       5/12/23 (DA)   The reason for seeking services at this time is: \"Follow up to previous care\".  The problem(s) began 04/03/23.  First appointment with patient in [facility] and was advised of the short-term, team based structure of the model including role of [facility] and provider. Patient indicated understanding of the model and agreed to proceed with services as described. Care team has reviewed attendance agreement with patient. Patient advised that two failed appointments within 6 months may lead to termination of current episode of care.                 Patient reported that she remarried 3 years ago following the death of her . In late April, she started purging some things out of her home and her children became concerned. She started feeling \"manic\" and her daughter brought her to the ED. She had been sleeping odd times and was getting " "up at night. Now she thinks she is \"100% better.\" She is taking olanzapine before bed. She sleeps 7-8 hours a night. She was more irritable which was unusual for her as well.     Stressors: nothing significant     Goals: \"If I am on the right medication.\"     Patient has attempted to resolve these concerns in the past through medication.       Progress on Treatment Objective(s) / Homework:  Satisfactory progress - PRECONTEMPLATION (Not seeing need for change); Intervened by educating the patient about the effects of current behavior on health.  Evoked information about reasons to continue behavior, express concern / recommendations, and explored any change talk    Motivational Interviewing    MI Intervention: Expressed Empathy/Understanding, Supported Autonomy, Collaboration, Evocation, Permission to raise concern or advise, Open-ended questions, Reflections: simple and complex, Change talk (evoked), and Reframe     Change Talk Expressed by the Patient: Desire to change Ability to change Reasons to change Need to change    Provider Response to Change Talk: E - Evoked more info from patient about behavior change, A - Affirmed patient's thoughts, decisions, or attempts at behavior change, R - Reflected patient's change talk, and S - Summarized patient's change talk statements    Assessments completed prior to visit:  The following assessments were completed by patient for this visit:  PROMIS 10-Global Health (all questions and answers displayed):       5/9/2023     3:06 PM 9/10/2023    10:12 AM 9/17/2023     2:34 PM   PROMIS 10   In general, would you say your health is: Very good Very good Very good   In general, would you say your quality of life is: Very good Very good Good   In general, how would you rate your physical health? Very good Very good Very good   In general, how would you rate your mental health, including your mood and your ability to think? Very good Good Good   In general, how would you rate your " satisfaction with your social activities and relationships? Very good Very good Very good   In general, please rate how well you carry out your usual social activities and roles Very good Very good Good   To what extent are you able to carry out your everyday physical activities such as walking, climbing stairs, carrying groceries, or moving a chair? Completely Mostly Completely   In the past 7 days, how often have you been bothered by emotional problems such as feeling anxious, depressed, or irritable? Sometimes Always Always   In the past 7 days, how would you rate your fatigue on average? Moderate Mild Mild   In the past 7 days, how would you rate your pain on average, where 0 means no pain, and 10 means worst imaginable pain? 0 0 0   In general, would you say your health is: 4    4 4 4   In general, would you say your quality of life is: 4    4 4 3   In general, how would you rate your physical health? 4    4 4 4   In general, how would you rate your mental health, including your mood and your ability to think? 4    4 3 3   In general, how would you rate your satisfaction with your social activities and relationships? 4    4 4 4   In general, please rate how well you carry out your usual social activities and roles. (This includes activities at home, at work and in your community, and responsibilities as a parent, child, spouse, employee, friend, etc.) 4    4 4 3   To what extent are you able to carry out your everyday physical activities such as walking, climbing stairs, carrying groceries, or moving a chair? 5    5 4 5   In the past 7 days, how often have you been bothered by emotional problems such as feeling anxious, depressed, or irritable? 3    3 5 5   In the past 7 days, how would you rate your fatigue on average? 3    3 2 2   In the past 7 days, how would you rate your pain on average, where 0 means no pain, and 10 means worst imaginable pain? 0    0 0 0   Global Mental Health Score 15    15 12 11    Global Physical Health Score 17    17 17 18   PROMIS TOTAL - SUBSCORES 32    32 29 29     PROMIS 10-Global Health (only subscores and total score):       5/9/2023     3:06 PM 9/10/2023    10:12 AM 9/17/2023     2:34 PM   PROMIS-10 Scores Only   Global Mental Health Score 15    15 12 11   Global Physical Health Score 17    17 17 18   PROMIS TOTAL - SUBSCORES 32    32 29 29       Care Plan review completed: Yes    Medication Review:  No changes to current psychiatric medication(s)    Medication Compliance:  Yes    Changes in Health Issues:   None reported    Chemical Use Review:   Substance Use: Chemical use reviewed, no active concerns identified      Tobacco Use: No current tobacco use.      Assessment: Current Emotional / Mental Status (status of significant symptoms):  Risk status (Self / Other harm or suicidal ideation)  Patient denies a history of suicidal ideation, suicide attempts, self-injurious behavior, homicidal ideation, homicidal behavior, and and other safety concerns  Patient denies current fears or concerns for personal safety.  Patient denies current or recent suicidal ideation or behaviors.  Patient denies current or recent homicidal ideation or behaviors.  Patient denies current or recent self injurious behavior or ideation.  Patient denies other safety concerns.  A safety and risk management plan has not been developed at this time, however patient was encouraged to call [place] [phone] should there be a change in any of these risk factors.    Appearance:   Appropriate   Eye Contact:   Good   Psychomotor Behavior: Normal   Attitude:   Cooperative   Orientation:   All  Speech   Rate / Production: Normal    Volume:  Normal   Mood:    Anxious   Affect:    Appropriate   Thought Content:  Clear   Thought Form:  Coherent  Logical   Insight:    Fair     Diagnoses:  No diagnosis found.    Collateral Reports Completed:  Not Applicable    Plan: (Homework, other):  Patient was given information about  behavioral services and encouraged to schedule a follow up appointment with the clinic [facility] in 1 week.  She was also given information about mental health symptoms and treatment options .  CD Recommendations: No indications of CD issues. JAKOB Morel    ______________________________________________________________________    Integrated Primary Care Behavioral Health Treatment Plan    Patient's Name: Keely Arana  YOB: 1955    Date of Creation: 9/18/23  Date Treatment Plan Last Reviewed/Revised: 9/18/23    DSM5 Diagnoses: 300.02 (F41.1) Generalized Anxiety Disorder  Psychosocial / Contextual Factors: Family Factors family concerned about her health   PROMIS (reviewed every 90 days):     Referral / Collaboration:  Referral to another professional/service is not indicated at this time..    Anticipated number of session for this episode of care: 4-6 sessions  Anticipation frequency of session: Weekly  Anticipated Duration of each session: 38-52 minutes  Treatment plan will be reviewed in 90 days or when goals have been changed.       MeasurableTreatment Goal(s) related to diagnosis / functional impairment(s)  Goal 1: Patient will work with providers to manage symptoms     I will know I've met my goal when less anxious.      Objective #A (Patient Action)    Patient will  attend all appointments, take medication as prescribed .  Status: New - Date: 9/18/23      Intervention(s)  Therapist will   Monitor and assist in overcoming barriers to treatment adherence .    Objective #B  Patient will  consider all recommendations offered .  Status: New - Date: 9/18/23      Intervention(s)  Therapist will  educate patient on treatment options, clarify concerns, work with pt to overcome any resistance to compliance .    Patient has reviewed and agreed to the above plan.      JAKOB Morel  September 18, 2023

## 2023-09-19 ENCOUNTER — MYC MEDICAL ADVICE (OUTPATIENT)
Dept: PSYCHIATRY | Facility: CLINIC | Age: 68
End: 2023-09-19
Payer: MEDICARE

## 2023-09-19 ENCOUNTER — TELEPHONE (OUTPATIENT)
Dept: PSYCHIATRY | Facility: CLINIC | Age: 68
End: 2023-09-19
Payer: MEDICARE

## 2023-09-19 DIAGNOSIS — F39 MOOD DISORDER (H): ICD-10-CM

## 2023-09-19 DIAGNOSIS — Z86.59 HISTORY OF MANIA: ICD-10-CM

## 2023-09-19 RX ORDER — OLANZAPINE 2.5 MG/1
5 TABLET, FILM COATED ORAL AT BEDTIME
COMMUNITY
End: 2023-09-30

## 2023-09-19 NOTE — TELEPHONE ENCOUNTER
"RN reviewed patient's call form [facility] team and called the patient at 3:47 PM on 9/19/23.  Both she and her  were on the phone together.       Patient repors having obsessive thinking, no thoughts of harming herself.  She says she gets a thought in her head and just can't let it go, she ruminates about it over and over.      She is currently taking Olanzapine 2.5 mg every other day and is questioning if she should increase the dose or take it every day to see if it helps calm down the obsessive thinking.    She also shared that she had this issue prior to re-starting Olanzapine on 9/11/23, but is not seeing any improvement with the medication.   stated, \"its not worse, but its not better either.\"    The patient does have an appointment with Dr. Palomo on 9/22/, but is hoping to get some help prior to the visit.     Call back number on file, ok to leave a detailed message per patient.,    Dilcia Muhammad RN on 9/19/2023 at 4:02 PM    "

## 2023-09-19 NOTE — TELEPHONE ENCOUNTER
Reason for call:  Other   Patient called regarding (reason for call): appointment and call back  Additional comments: Pt wanted a sooner appt than 09/22/23, there are none available. Pt requesting to speak with a nurse or care team.     Phone number to reach patient:  Home number on file [phone] (home)    Best Time:  asap    Can we leave a detailed message on this number?  YES    Travel screening: Not Applicable

## 2023-09-20 NOTE — TELEPHONE ENCOUNTER
Provider sent My chart message to take Olanzapine 5 mg every night. Then they would reassess at patient's appt on 9/22/23.   Patient had not read the my chart message so placed a call to Jessica.   Explained Dr. Palomo's message to her. She understood and will talk more about this at the appt on Friday.     Maria Elena Wills RN on 9/20/2023 at 9:37 AM

## 2023-09-20 NOTE — CONFIDENTIAL NOTE
This provider sent MyChart with instructions. Please ensure read by pt and maybe call pt if the MyChart not reviewed by late morning? Thanks!

## 2023-09-22 ENCOUNTER — VIRTUAL VISIT (OUTPATIENT)
Dept: PSYCHIATRY | Facility: CLINIC | Age: 68
End: 2023-09-22
Payer: MEDICARE

## 2023-09-22 VITALS — BODY MASS INDEX: 22.7 KG/M2 | WEIGHT: 145 LBS

## 2023-09-22 DIAGNOSIS — G47.09 OTHER INSOMNIA: ICD-10-CM

## 2023-09-22 DIAGNOSIS — F39 MOOD DISORDER (H): Primary | ICD-10-CM

## 2023-09-22 DIAGNOSIS — F41.9 ANXIETY: ICD-10-CM

## 2023-09-22 PROCEDURE — 99214 OFFICE O/P EST MOD 30 MIN: CPT | Mod: 95 | Performed by: PSYCHIATRY & NEUROLOGY

## 2023-09-22 RX ORDER — MIRTAZAPINE 15 MG/1
7.5-15 TABLET, FILM COATED ORAL AT BEDTIME
Qty: 30 TABLET | Refills: 1 | Status: SHIPPED | OUTPATIENT
Start: 2023-09-22 | End: 2023-09-30

## 2023-09-22 ASSESSMENT — PAIN SCALES - GENERAL: PAINLEVEL: NO PAIN (0)

## 2023-09-22 NOTE — PROGRESS NOTES
[facility]  [facility]      Behavioral Health Clinician Progress Note    Patient Name: Keely Arana           Service Type:  Individual      Service Location:   MyChart / Email (patient reached)     Session Start Time: 2:57 pm   Session End Time: 3:37 pm      Session Length: 38 - 52      Attendees: Patient     Service Modality:  Video Visit:      Provider verified identity through the following two step process.  Patient provided:  Patient photo and Patient     Telemedicine Visit: The patient's condition can be safely assessed and treated via synchronous audio and visual telemedicine encounter.      Reason for Telemedicine Visit: Patient has requested telehealth visit    Originating Site (Patient Location): Patient's home    Distant Site (Provider Location): [facility]    Consent:  The patient/guardian has verbally consented to: the potential risks and benefits of telemedicine (video visit) versus in person care; bill my insurance or make self-payment for services provided; and responsibility for payment of non-covered services.     Patient would like the video invitation sent by:  My Chart    Mode of Communication:  Video Conference via [facility]    Distant Location (Provider):  On-site    As the provider I attest to compliance with applicable laws and regulations related to telemedicine.    Visit Activities (Refresh list every visit): [facility] Only    Diagnostic Assessment Date: 23 by Jd Quiroz PsyD  Treatment Plan Review Date: 23  See Flowsheets for today's PHQ-9 and DAISY-7 results  Previous PHQ-9:       2023    10:19 AM 2023     7:56 AM 2023     2:18 PM   PHQ-9 SCORE   PHQ-9 Total Score MyChart  0 7 (Mild depression)   PHQ-9 Total Score 0 0    0 7     Previous DAISY-7:       2023     2:14 PM 2023    10:19 AM 9/10/2023     9:53 AM   DAISY-7 SCORE   Total Score   12 (moderate anxiety)  "  Total Score 5 0 12       RUMA LEVEL:       No data to display                DATA  Extended Session (60+ minutes): No  Interactive Complexity: No  Crisis: No  [facility] Patient: No    Treatment Objective(s) Addressed in This Session:  Target Behavior(s):  anxiety    Anxiety: will experience a reduction in anxiety, will develop more effective coping skills to manage anxiety symptoms, will develop healthy cognitive patterns and beliefs, and will increase ability to function adaptively    Current Stressors / Issues:  Feeling about the same from last visit. Wondering if one of her medications is causing some side effects with poor sleep. Encouraged pt to reach out to Dr. Palomo. Discussed it being very difficult to make decisions. Discussed picking home projects in pieces rather than all at once. Discussed writing high anxiety down and identifying what she has control of and what she does not have control of.      9/18/23  Reporting she is in need of coping skills with anxiety. Reports no previous MH prior to manic episode in the beginning of the year. Reports presently, anxiety is the biggest factor for her. Feels like her mind is a \"hamster wheel.\" She has also developed more physical sx of anxiety. Major stressors she has are dealing with decisions to sell a home and keep another. She often worries about how her mental health effects those around her. Finally she is strugging with self confidence. Discussed when high anxiety comes about writing down what she is thinking and reflecting.       5/12/23 (DA)   The reason for seeking services at this time is: \"Follow up to previous care\".  The problem(s) began 04/03/23.  First appointment with patient in [facility] and was advised of the short-term, team based structure of the model including role of [facility] and provider. Patient indicated understanding of the model and agreed to proceed with services as described. Care team has reviewed attendance agreement with patient. Patient advised " "that two failed appointments within 6 months may lead to termination of current episode of care.                 Patient reported that she remarried 3 years ago following the death of her . In late April, she started purging some things out of her home and her children became concerned. She started feeling \"manic\" and her daughter brought her to the ED. She had been sleeping odd times and was getting up at night. Now she thinks she is \"100% better.\" She is taking olanzapine before bed. She sleeps 7-8 hours a night. She was more irritable which was unusual for her as well.     Stressors: nothing significant     Goals: \"If I am on the right medication.\"     Patient has attempted to resolve these concerns in the past through medication.       Progress on Treatment Objective(s) / Homework:  Satisfactory progress - PRECONTEMPLATION (Not seeing need for change); Intervened by educating the patient about the effects of current behavior on health.  Evoked information about reasons to continue behavior, express concern / recommendations, and explored any change talk    Motivational Interviewing    MI Intervention: Expressed Empathy/Understanding, Supported Autonomy, Collaboration, Evocation, Permission to raise concern or advise, Open-ended questions, Reflections: simple and complex, Change talk (evoked), and Reframe     Change Talk Expressed by the Patient: Desire to change Ability to change Reasons to change Need to change    Provider Response to Change Talk: E - Evoked more info from patient about behavior change, A - Affirmed patient's thoughts, decisions, or attempts at behavior change, R - Reflected patient's change talk, and S - Summarized patient's change talk statements    Assessments completed prior to visit:  The following assessments were completed by patient for this visit:  PROMIS 10-Global Health (all questions and answers displayed):       5/9/2023     3:06 PM 9/10/2023    10:12 AM 9/17/2023     2:34 " PM   PROMIS 10   In general, would you say your health is: Very good Very good Very good   In general, would you say your quality of life is: Very good Very good Good   In general, how would you rate your physical health? Very good Very good Very good   In general, how would you rate your mental health, including your mood and your ability to think? Very good Good Good   In general, how would you rate your satisfaction with your social activities and relationships? Very good Very good Very good   In general, please rate how well you carry out your usual social activities and roles Very good Very good Good   To what extent are you able to carry out your everyday physical activities such as walking, climbing stairs, carrying groceries, or moving a chair? Completely Mostly Completely   In the past 7 days, how often have you been bothered by emotional problems such as feeling anxious, depressed, or irritable? Sometimes Always Always   In the past 7 days, how would you rate your fatigue on average? Moderate Mild Mild   In the past 7 days, how would you rate your pain on average, where 0 means no pain, and 10 means worst imaginable pain? 0 0 0   In general, would you say your health is: 4    4 4 4   In general, would you say your quality of life is: 4    4 4 3   In general, how would you rate your physical health? 4    4 4 4   In general, how would you rate your mental health, including your mood and your ability to think? 4    4 3 3   In general, how would you rate your satisfaction with your social activities and relationships? 4    4 4 4   In general, please rate how well you carry out your usual social activities and roles. (This includes activities at home, at work and in your community, and responsibilities as a parent, child, spouse, employee, friend, etc.) 4    4 4 3   To what extent are you able to carry out your everyday physical activities such as walking, climbing stairs, carrying groceries, or moving a chair?  5    5 4 5   In the past 7 days, how often have you been bothered by emotional problems such as feeling anxious, depressed, or irritable? 3    3 5 5   In the past 7 days, how would you rate your fatigue on average? 3    3 2 2   In the past 7 days, how would you rate your pain on average, where 0 means no pain, and 10 means worst imaginable pain? 0    0 0 0   Global Mental Health Score 15    15 12 11   Global Physical Health Score 17    17 17 18   PROMIS TOTAL - SUBSCORES 32    32 29 29     PROMIS 10-Global Health (only subscores and total score):       5/9/2023     3:06 PM 9/10/2023    10:12 AM 9/17/2023     2:34 PM   PROMIS-10 Scores Only   Global Mental Health Score 15    15 12 11   Global Physical Health Score 17    17 17 18   PROMIS TOTAL - SUBSCORES 32    32 29 29       Care Plan review completed: Yes    Medication Review:  No changes to current psychiatric medication(s)    Medication Compliance:  Yes    Changes in Health Issues:   None reported    Chemical Use Review:   Substance Use: Chemical use reviewed, no active concerns identified      Tobacco Use: No current tobacco use.      Assessment: Current Emotional / Mental Status (status of significant symptoms):  Risk status (Self / Other harm or suicidal ideation)  Patient denies a history of suicidal ideation, suicide attempts, self-injurious behavior, homicidal ideation, homicidal behavior, and and other safety concerns  Patient denies current fears or concerns for personal safety.  Patient denies current or recent suicidal ideation or behaviors.  Patient denies current or recent homicidal ideation or behaviors.  Patient denies current or recent self injurious behavior or ideation.  Patient denies other safety concerns.  A safety and risk management plan has not been developed at this time, however patient was encouraged to call [facility] should there be a change in any of these risk factors.    Appearance:   Appropriate   Eye Contact:   Good    Psychomotor Behavior: Normal   Attitude:   Cooperative   Orientation:   All  Speech   Rate / Production: Normal    Volume:  Normal   Mood:    Anxious   Affect:    Appropriate   Thought Content:  Clear   Thought Form:  Coherent  Logical   Insight:    Fair     Diagnoses:  1. Generalized anxiety disorder    2. Mood disorder (H)        Collateral Reports Completed:  Not Applicable    Plan: (Homework, other):  Patient was given information about behavioral services and encouraged to schedule a follow up appointment with the clinic [facility] [place] in 1 week.  She was also given information about mental health symptoms and treatment options .  CD Recommendations: No indications of CD issues. DREW MorelSW    ______________________________________________________________________    Integrated Primary Care Behavioral Health Treatment Plan    Patient's Name: Keely Arana  YOB: 1955    Date of Creation: 9/18/23  Date Treatment Plan Last Reviewed/Revised: 9/18/23    DSM5 Diagnoses: 300.02 (F41.1) Generalized Anxiety Disorder  Psychosocial / Contextual Factors: Family Factors family concerned about her health   PROMIS (reviewed every 90 days):     Referral / Collaboration:  Referral to another professional/service is not indicated at this time..    Anticipated number of session for this episode of care: 4-6 sessions  Anticipation frequency of session: Weekly  Anticipated Duration of each session: 38-52 minutes  Treatment plan will be reviewed in 90 days or when goals have been changed.       MeasurableTreatment Goal(s) related to diagnosis / functional impairment(s)  Goal 1: Patient will work with providers to manage symptoms     I will know I've met my goal when less anxious.      Objective #A (Patient Action)    Patient will  attend all appointments, take medication as prescribed .  Status: New - Date: 9/18/23      Intervention(s)  Therapist will   Monitor and assist in overcoming barriers to treatment adherence  .    Objective #B  Patient will  consider all recommendations offered .  Status: New - Date: 9/18/23      Intervention(s)  Therapist will  educate patient on treatment options, clarify concerns, work with pt to overcome any resistance to compliance .    Patient has reviewed and agreed to the above plan.      JAKOB Morel  September 25, 2023

## 2023-09-22 NOTE — PROGRESS NOTES
"Virtual Visit Details    Type of service:  Video Visit   Video Start Time: {video visit start/end time for provider to select:955825}  Video End Time:{video visit start/end time for provider to select:014636}    Originating Location (pt. Location): {video visit patient location:672597::\"Home\"}  {PROVIDER LOCATION On-site should be selected for visits conducted from your clinic location or adjoining [facility] hospital, academic office, or other nearby [facility] building. Off-site should be selected for all other provider locations, including home:209897}  Distant Location (provider location):  {virtual location provider:297773}  Platform used for Video Visit: {Virtual Visit Platforms:821898::\"Tout\"}  "

## 2023-09-22 NOTE — PROGRESS NOTES
"Telemedicine Visit: The patient's condition can be safely assessed and treated via synchronous audio and visual telemedicine encounter.      Reason for Telemedicine Visit: Patient has requested telehealth visit    Originating Site (Patient Location): Patient's home    Distant Location (provider location):  Off-site    Consent:  The patient/guardian has verbally consented to: the potential risks and benefits of telemedicine (video visit) versus in person care; bill my insurance or make self-payment for services provided; and responsibility for payment of non-covered services.     Mode of Communication:  Video Conference via [employer]    As the provider I attest to compliance with applicable laws and regulations related to telemedicine.         Outpatient Psychiatric Progress Note    Name: Keely Arana   : 1955                    Primary Care Provider: Maulik Nye MD   Therapist: None    PHQ-9 scores:      2023    10:19 AM 2023     7:56 AM 2023     2:18 PM   PHQ-9 SCORE   PHQ-9 Total Score MyChart  0 7 (Mild depression)   PHQ-9 Total Score 0 0    0 7       DAISY-7 scores:      2023     2:14 PM 2023    10:19 AM 9/10/2023     9:53 AM   DAISY-7 SCORE   Total Score   12 (moderate anxiety)   Total Score 5 0 12       Patient Identification:  Patient is a 68 year old,   White Not  or  female  who presents for return visit with me.  Patient is currently retired. Patient attended the phone/video session with  Terry , who they agreed to have interview with. Patient prefers to be called: \"Jessica\".    Interim History:  I last saw Keely C Yasmeen for outpatient psychiatry return visit on 2023. During that appointment, we:    Re-start olanzapine/Zyprexa at 2.5 mg every other day (until follow-up) for history of cong, sleep.  Can take additional 2.5 mg dose now as discussed. Continue to monitor movement-related side effects as discussed today.  Discontinue " hydroxyzine since not helpful.   Start propranolol 10-20 mg twice daily for anxiety, movement-related side effects from olanzapine.   MTM (medication therapy management) referral placed with psychiatric PharmD.   BHC (behavioral health clinician) referral placed for counseling/therapy support.   Continue all other cares per primary care provider.     9/22: Since last visit we had restarted olanzapine at 2.5 mg every other night.  Patient has still been struggling with sleep.  There is no pressured speech.  Still some increased anxiety and feeling like thoughts are racing a little bit.  Patient has not yet increased dose to 5 mg nightly.  We have previously discussed likely seeing neurology and psychiatric PharmD for ongoing collaboration and recommendations due to possible movements around the mouth that could be related to tardive dyskinesia.  No acute safety concerns today.  No SI.  No problematic drug or alcohol use.    Past Psychiatric Med Trials:  Psych Meds at Intake:  Olanzapine 5 mg at bedtime and 2.5 mg daily as needed     Past Psych Meds:  None  Hydroxyzine - not helpful, dry mouth    Psychiatric ROS:  Keely Arana reports mood has been: Anxious  Anxiety has been: High  Sleep has been: Not sleeping as well off of olanzapine  Paz sxs: More racing thoughts, high anxiety, not sleeping as well  Psychosis sxs: None  ADHD/ADD sxs: N/A  PTSD sxs: N/A  PHQ9 and GAD7 scores were reviewed today if completed.   Medication side effects: Denies  Current stressors include: And see HPI above  Coping mechanisms and supports include: Family, Hobbies and Friends    Current medications include:   Current Outpatient Medications   Medication Sig    amLODIPine (NORVASC) 5 MG tablet Take 5 mg by mouth daily    anastrozole (ARIMIDEX) 1 MG tablet TAKE 1 TABLET BY MOUTH EVERY DAY    atorvastatin (LIPITOR) 10 MG tablet Take 1 tablet (10 mg) by mouth daily    calcium carbonate (OS-PADDY 500 MG [place]) 500 MG tablet Take 2  tablets by mouth daily With Magnesium,Zinc    LYSINE PO Take 10,000 mg by mouth daily    Multiple Vitamins-Minerals (PRESERVISION AREDS 2 PO) Take 2 tablets by mouth daily    multivitamin w/minerals (THERA-VIT-M) tablet Take 1 tablet by mouth daily    OLANZapine (ZYPREXA) 2.5 MG tablet Take 5 mg by mouth At Bedtime    propranolol (INDERAL) 20 MG tablet Take 0.5-1 tablets (10-20 mg) by mouth 2 times daily as needed (anxiety)    rivaroxaban ANTICOAGULANT (XARELTO) 20 MG TABS tablet Take 20 mg by mouth    VITAMIN D, CHOLECALCIFEROL, PO Take 1,000 Units by mouth daily      No current facility-administered medications for this visit.       Past Medical/Surgical History:  Past Medical History:   Diagnosis Date    Atrial fibrillation (H)     s/p ablasion.    Hypertension     Iritis       has a past medical history of Atrial fibrillation (H), Hypertension, and Iritis.    She has no past medical history of Complication of anesthesia or Sleep apnea.    Social History:  Reviewed. No changes to social history except as noted above in HPI.    Vital Signs:   None. This is phone/video visit.     Labs:  Most recent laboratory results reviewed and the pertinent results include:   Last Comprehensive Metabolic Panel:  Sodium   Date Value Ref Range Status   08/11/2023 143 136 - 145 mmol/L Final     Potassium   Date Value Ref Range Status   08/11/2023 4.0 3.4 - 5.3 mmol/L Final     Chloride   Date Value Ref Range Status   08/11/2023 107 98 - 107 mmol/L Final     Carbon Dioxide (CO2)   Date Value Ref Range Status   08/11/2023 25 22 - 29 mmol/L Final     Anion Gap   Date Value Ref Range Status   08/11/2023 11 7 - 15 mmol/L Final     Glucose   Date Value Ref Range Status   08/11/2023 97 70 - 99 mg/dL Final     Urea Nitrogen   Date Value Ref Range Status   08/11/2023 19.5 8.0 - 23.0 mg/dL Final     Creatinine   Date Value Ref Range Status   08/11/2023 0.93 0.51 - 0.95 mg/dL Final   01/21/2021 0.69 0.52 - 1.04 mg/dL Final     GFR Estimate    Date Value Ref Range Status   08/11/2023 66 >60 mL/min/1.73m2 Final   01/21/2021 >90 >60 mL/min/[1.73_m2] Final     Comment:     Non  GFR Calc  Starting 12/18/2018, serum creatinine based estimated GFR (eGFR) will be   calculated using the Chronic Kidney Disease Epidemiology Collaboration   (CKD-EPI) equation.       Calcium   Date Value Ref Range Status   08/11/2023 9.5 8.8 - 10.2 mg/dL Final   01/21/2021 9.7 8.5 - 10.1 mg/dL Final     Bilirubin Total   Date Value Ref Range Status   08/11/2023 0.4 <=1.2 mg/dL Final     Alkaline Phosphatase   Date Value Ref Range Status   08/11/2023 55 35 - 104 U/L Final     ALT   Date Value Ref Range Status   08/11/2023 20 0 - 50 U/L Final     Comment:     Reference intervals for this test were updated on 6/12/2023 to more accurately reflect our healthy population. There may be differences in the flagging of prior results with similar values performed with this method. Interpretation of those prior results can be made in the context of the updated reference intervals.       AST   Date Value Ref Range Status   08/11/2023 18 0 - 45 U/L Final     Comment:     Reference intervals for this test were updated on 6/12/2023 to more accurately reflect our healthy population. There may be differences in the flagging of prior results with similar values performed with this method. Interpretation of those prior results can be made in the context of the updated reference intervals.         Recent Labs   Lab Test 05/02/23  1030   CHOL 175   HDL 68   LDL 91   TRIG 78       Review of Systems:  10 systems (general, cardiovascular, respiratory, eyes, ENT, endocrine, GI, , M/S, neurological) were reviewed. Most pertinent finding(s) is/are: denies fever, cough, persistent headaches, shortness of breath, chest pain, severe GI symptoms, trouble urinating, severe pain. The remaining systems are all unremarkable.    Mental Status Examination (limited as this is by  phone/video):  Appearance: Awake, alert, appears stated age, no acute distress, well-groomed   Attitude:  cooperative  Motor: Possible tongue thrusting/lip smacking versus frequent licking of lips due to a very dry mouth since using hydroxyzine  Oriented to:  person, place, time, and situation  Attention Span and Concentration:  normal  Speech:  clear, coherent, regular rate, rhythm, and volume, not pressured  Language: intact  Mood: Anxious  Affect: Mood congruent  Associations:  no loose associations  Thought Process: Circumstantial  Thought Content:  no evidence of suicidal ideation or homicidal ideation, no evidence of psychotic thought, no auditory hallucinations present and no visual hallucinations present  Recent and Remote Memory:  Intact to interview. Not formally assessed.  Did seem to have difficulty following treatment plan instructions.  Fund of Knowledge: appropriate  Insight: Fair  Judgment:  intact, adequate for safety  Impulse Control:  intact    Suicide Risk Assessment:  Today Keely Arana reports no suicidal ideation. Based on all available evidence including the factors cited above, Keely Arana does not appear to be at imminent risk for self-harm, does not meet criteria for a 72-hr hold, and therefore remains appropriate for ongoing outpatient level of care.  A thorough assessment of risk factors related to suicide and self-harm have been reviewed and are noted above. The patient convincingly denies suicidality on several occasions. Local community safety resources reviewed for patient to use if needed. There was no deceit detected, and the patient presented in a manner that was believable.     DSM5 Diagnosis:  Hx of Paz, unspecified  Mood disorder, unspecified (R/O Bipolar Disorder)  Anxiety, unspecified    R/O Drug-Induced Parkinsonism     R/O Tardive Dyskinesia    Medical comorbidities include:   Patient Active Problem List    Diagnosis Date Noted    History of paz 06/20/2023      Priority: Medium    Abnormal bone density screening 01/15/2021     Priority: Medium    Long term (current) use of aromatase inhibitors 01/15/2021     Priority: Medium    Malignant neoplasm of upper-outer quadrant of right breast in female, estrogen receptor positive (H) 08/23/2018     Priority: Medium    Symptomatic varicose veins of both lower extremities 11/10/2015     Priority: Medium       Psychosocial & Contextual Factors: see HPI above    Assessment:  From Intake, 5/12/2023:  Keely Arana is a 67-year-old female with relatively benign past psychiatric history leading up to recent suspected manic episode who presents today for psychiatric evaluation.  Patient with what appears to meet criteria for manic episode starting earlier this spring.  Patient started to have more trouble with sleep and was becoming more irritable leading to racing thoughts, increased goal directed activity, disorganized thoughts, more talkative than usual, etc.  Patient's family concerns since she was acting out of character and brought to the ER.  Patient started on olanzapine in the emergency room which has helped significantly to regulate sleep and mood.  Patient perhaps with some heightened energy today but not overtly manic.  Patient is currently tolerating olanzapine well and reports sleeping about 8 hours a night.  Discussed risks and benefits of staying on olanzapine.  Unclear if patient has had underlying bipolar disorder that has gone fairly undetected over the years.  Patient with relatives with bipolar disorder.  Patient did have recent brain imaging (MRI) that would not explain recent new onset cong.  Patient also with otherwise relatively benign labs including thyroid, liver panel, CMP, CBC, etc.  No new medications were introduced.  Patient denies any drug use and no problematic alcohol use.  Patient stopped using her 1 glass of wine a week around the time this episode started to see if it would help to give up  alcohol.  Patient does often have a few cups of coffee a day.  We will continue to monitor.  Recommend staying on olanzapine for at least 6 months to a year before trying to taper off in order to prevent relapse/recurrent cong symptoms.  No acute safety concerns today.  No SI.  No problematic drug or alcohol use.     6/14/2023:  Patient overall doing relatively well.  Mood has been stable.  No signs of cong.  We will decrease olanzapine to 2.5 mg at bedtime for the next few months to ensure ongoing stability of symptoms.  Could consider a trial off olanzapine at the end of the 3 months of decreased dose.  Patient and  will continue to remain vigilant while monitoring for any relapse of manic symptoms.  Patient had recent lipid panel and glucose completed.  Both within normal limits.  No acute safety concerns.  No SI.  No signs of tardive dyskinesia noted on video today.  No problematic drug or alcohol use.    9/11/2023:  Patient with suspected movement related side effects due to olanzapine use.  Patient with extreme anxiety and some worsening symptoms when olanzapine was stopped after 3 months on 2.5 mg dose.  We will restart olanzapine at 2.5 mg every other day and continue with slow taper and also get recommendations from psychiatric PharmD and possibly neurology.  Patient with possible tongue thrusting today versus extreme dry mouth from hydroxyzine use.  There was no tongue thrusting noted at last appointment in June.  Patient's family had noted possible parkinsonism like symptoms.   and patient will continue to monitor.  Referral placed for [facility] for additional monitoring and talk therapy/support as well.  We will also start a trial with propranolol to see if that is helpful for some of the physical symptoms of anxiety and potentially some symptoms being caused by olanzapine.  No acute safety concerns.  No SI.  No problematic drug or alcohol use.    9/22/2023:  Patient still not sleeping well and  I would not want patient to get manic.  We did discuss possibility of something like Depakote at low-dose in order to avoid other agents like olanzapine and since we would like to start to taper off olanzapine (due to suspected parkinsonism).  Could also consider lithium.  Patient is not seeming manic at this time of interview - definitely seems anxious though. Pt sitting quite calmly and no pressured speech.  Difficult to assess if not sleeping because of anxiety versus onset of manic episode?  We will trial mirtazapine at bedtime to see if helpful for sleep without worsening any side effects. No acute safety concerns. No SI. No problematic drug or alcohol use. Pt also encouraged to utilize the propranolol.     Medication side effects and alternatives were reviewed. Health promotion activities recommended and reviewed today. All questions addressed. Education and counseling completed regarding risks and benefits of medications and psychotherapy options. Recommend therapy for additional support.     Treatment Plan:  Start mirtazapine 7.5-15 mg at bedtime for sleep and anxiety..   OK to take olanzapine 2.5 mg at bedtime as needed for sleep if mirtazapine not helpful (for history of cong, sleep).  Can take additional 2.5 mg daily as needed. Continue to monitor movement-related side effects as discussed today.  Continue propranolol 10-20 mg twice daily for anxiety, movement-related side effects from olanzapine.   MTM (medication therapy management) referral placed with psychiatric PharmD.   BHC (behavioral health clinician) referral placed for counseling/therapy support.   Continue all other cares per primary care provider.   Continue all other medications as reviewed per electronic medical record today.   Safety plan reviewed. To the Emergency Department as needed or call after hours crisis line at [phone] or [phone]. [place] Crisis Text Line. Text [place] to 588397 or Suicide LifeLine Chat:  suicidepreventionlifeline.org/chat  Schedule an appointment with me in 3 weeks or sooner as needed. Call [facility] at [phone] to schedule.  Follow up with primary care provider as planned or for acute medical concerns.  Call the psychiatric nurse line with medication questions or concerns at [phone].  MyChart may be used to communicate with your provider, but this is not intended to be used for emergencies.    Previously discussed need to monitor for movement disorder side effects and metabolic changes (particularly cholesterol and blood sugars) with olanzapine use.  Discussed possibility movement side effects can be lasting in some cases even when drug is stopped.    Administrative Billing:   Phone Call/Video Duration: 30 Minutes  Start: 8:30a  Stop: 9:00a    Patient Status:  Patient will continue to be seen for ongoing consultation and stabilization.    Signed:   Reema Palomo DO  [facility]    Disclaimer: This note consists of symbols derived from keyboarding, dictation and/or voice recognition software. As a result, there may be errors in the script that have gone undetected. Please consider this when interpreting information found in this chart.

## 2023-09-22 NOTE — NURSING NOTE
Is the patient currently in the state of [place]? YES    Visit mode:VIDEO    If the visit is dropped, the patient can be reconnected by: VIDEO VISIT: Text to cell phone:   Telephone Information:   Mobile [phone]       Will anyone else be joining the visit? No   (If patient encounters technical issues they should call [phone])    How would you like to obtain your AVS? MyChart    Are changes needed to the allergy or medication list? No    Rooming Documentation: Assigned questionnaire(s) completed . and Attendance Guidelines - Care team has reviewed attendance agreement with patient. Patient advised that two failed appointments within 6 months may lead to termination of current episode of care.      Reason for visit: ALETA Reyes

## 2023-09-24 NOTE — PROGRESS NOTES
Oncology Follow Up:  Date on this visit: 9/25/2023    Diagnosis:  [id number], grade I, ER positive, CT positive, HER-2 nonamplified invasive ductal carcinoma of the right breast. Oncotype DX recurrence score = 7.    Primary Physician: Sammie Cerna     History Of Present Illness:  Ms. Arana is a 68 year old female with right breast cancer.  Routine screening mammogram in 06/2018 was without concerning findings. Due to high breast density, her gynecologist recommended a breast MRI.  Breast MRI on 08/16/2018 showed nodular to non-mass enhancement measuring 1.6 cm in the right breast at 5 o'clock.    Right breast ultrasound confirmed a mass at 5 o'clock.  Right breast biopsy demonstrated a grade 1 invasive ductal carcinoma.  Estrogen-receptor staining was moderate in 95%; progesterone-receptor staining was strong in 96%.  HER-2 was nonamplified by immunohistochemistry with a score of 1+.  Right breast lumpectomy and sentinel lymph node procedure on 8/31/2018 demonstrated a 1.2 cm, grade 1 invasive mammary carcinoma.  There was associated intermediate-grade DCIS.  Surgical margins were negative; however, DCIS was 1 mm from the anterior margin.  A single sentinel lymph node was negative for malignancy. Oncotype DX recurrence score was 7.  She completed adjuvant radiation (4240 cGy to the whole breast and additional 1250  cGy to the lumpectomy site) on 11/8/18.  She has been on anastrozole since 9/24/18.  She completed 3 years of Zometa on 6/21/2023.     Interval History:  Ms. Arana presents to clinic today, alongside her , for routine breast cancer follow-up.  Since our last visit, she was diagnosed with cong.  In April, her  was away for 4 days, and during that time she completely cleaned out their garage.  When he returned, he noted that she was purging the house of many things and also binge shopping.  They were able to recognize this as unusual behavior and sought medical care.  With medication, she  has been able to avoid further episodes.  There was an attempt to wean her off of medication, however, with doing so she became very anxious.  She is now taking olanzapine 2-1/2 mg daily and last Friday started Remeron nightly.  Unfortunately, upon trying Remeron, she was up all night.  She states her care team does not know what triggered the cong.    She denies concerning breast lumps, pain, or swelling.  She has no new bone or joint aches or pains.  She has no cough, shortness of breath, or chest pain.  She denies abdominal complaints.  She continues to remain physically active.  She also continues to spend quality time with her family including her 10 grandchildren.  She has grandchild #11 on the way.    Past Medical/Surgical History:  Past Medical History:   Diagnosis Date    Atrial fibrillation (H)     s/p ablasion.    Hypertension     Iritis      Past Surgical History:   Procedure Laterality Date    CATARACT IOL, RT/LT Left 2006    HYSTERECTOMY  2016    LUMPECTOMY BREAST WITH SENTINEL NODE, COMBINED Right 8/31/2018    Procedure: COMBINED LUMPECTOMY BREAST WITH SENTINEL NODE;  Right Wire Localized Lumpectomy and Right [place] Lymph Node Biopsy;  Surgeon: Chilango Kruger MD;  [facility]    OOPHORECTOMY  2014     Allergies:  Allergies as of 09/25/2023 - Reviewed 09/22/2023   Allergen Reaction Noted    Corticosteroids Dermatitis 03/18/2019    Neomycin Dermatitis 03/18/2019    Sulfa antibiotics Itching and Rash 08/06/2015    Cephalexin GI Disturbance 03/09/2022    Codeine Nausea and Vomiting 08/06/2015    Nitrofurantoin Nausea and Vomiting and Nausea 08/06/2015    Silver Rash 12/03/2018     Current Medications:  Current Outpatient Medications   Medication Sig Dispense Refill    amLODIPine (NORVASC) 5 MG tablet Take 5 mg by mouth daily      anastrozole (ARIMIDEX) 1 MG tablet TAKE 1 TABLET BY MOUTH EVERY DAY 90 tablet 3    atorvastatin (LIPITOR) 10 MG tablet Take 1 tablet (10 mg) by mouth daily 90 tablet 2     calcium carbonate (OS-PADDY 500 MG [place]. [place]) 500 MG tablet Take 2 tablets by mouth daily With Magnesium,Zinc      LYSINE PO Take 10,000 mg by mouth daily      mirtazapine (REMERON) 15 MG tablet Take 0.5-1 tablets (7.5-15 mg) by mouth At Bedtime 30 tablet 1    Multiple Vitamins-Minerals (PRESERVISION AREDS 2 PO) Take 2 tablets by mouth daily      multivitamin w/minerals (THERA-VIT-M) tablet Take 1 tablet by mouth daily      OLANZapine (ZYPREXA) 2.5 MG tablet Take 5 mg by mouth At Bedtime      propranolol (INDERAL) 20 MG tablet Take 0.5-1 tablets (10-20 mg) by mouth 2 times daily as needed (anxiety) 180 tablet 0    rivaroxaban ANTICOAGULANT (XARELTO) 20 MG TABS tablet Take 20 mg by mouth      VITAMIN D, CHOLECALCIFEROL, PO Take 1,000 Units by mouth daily         Family and Social History:  Reviewed and unchanged from prior.  Please see initial consultation dated 9/24/18 for further details.    Physical Exam:  /88 (BP Location: Right arm, Patient Position: Sitting, Cuff Size: Adult Regular)   Pulse 118   Temp 97.9  F (36.6  C) (Oral)   Resp 16   Wt 66.8 kg (147 lb 3.2 oz)   SpO2 98%   BMI 23.05 kg/m    General:  Well appearing adult female in NAD.  Alert and oriented x 3.  Longer pauses before speaking than at prior visits.  HEENT:  Normocephalic.  Sclera anicteric.  MMM.    Lymph:  No palpable cervical, supraclavicular, or axillary LAD.  No gross lymphedema.  Chest:  CTA bilaterally.  No wheezes or crackles.  CV:  RRR.  Nl S1 and S2.  No audible m/r/g.  Breast:  Bilateral breasts are of increased fibroglandular density.  Right breast incision is without underlying fibrosis.  There are no dominant or discretely palpable masses in either breast.  Bilateral nipples are inverted (chronic and unchanged from prior)  Abd:  Soft/ND.   Ext:  No edema of the bilateral lower extremities.   Musculo:  Full ROM of the bilateral upper extremities.  Neuro:  Cranial nerves grossly intact.  Gait is stable.  Psych:  Mood and  affect appear flat.  She expresses frustration when talking about her new mental health diagnosis.  Skin:  No visible or concerning skin rashes or lesions.    Laboratory/Imaging Studies:  I personally reviewed the below images:    9/25/2023 Breast MRI:  We reviewed these images together in clinic today.  There is no suspicious enhancement in either breast.  No lymphadenopathy.  Radiology read was pending at the time of our visit and will be released to the patient via [employer] when available.    ASSESSMENT/PLAN:  Ms. Velazquez is a 68-year-old female with a h/o stage IA, T1c N0 M0, grade 1, ER positive, CA positive, HER2 non-amplified invasive mammary carcinoma of the right breast.  She is status post treatment with right breast lumpectomy and radiation.  On anastrozole since 9/24/18.    1.  Right breast cancer:   Ms. Arana is >5 years out from excision of a right breast cancer.  She continues on anastrozole and is tolerating the medication well.  We discussed that although anastrozole can be associated with mood disorder, given she has been on the medication for a number of years and paz is a new diagnosis for her, I suspect the paz was not caused by her endocrine therapy.  Therefore, I feel it is safe to continue treatment with anastrozole.  We reviewed plan to continue anastrozole to complete a 7 year course (thru 9/24/2025).     There is no evidence of disease recurrence on exam today.  Due to the fact that her initial breast cancer was not detectable by mammogram, increased breast density and close DCIS margins, we planned to perform high-risk screening with both mammogram and breast MRI until she is 5 years out from diagnosis of her breast cancer/age 69.     - I reviewed the images of breast MRI performed today which are without suspicious enhancement of lymphadenopathy.  - Return to clinic in 6 months.  - Bilateral screening mammograms due 3/28/2024 or later.    2.  Paz:  Unlikely to be related to her  breast cancer treatment.  I suspect this was triggered by other stressors.  - She is on treatment with olanzapine and mirtazapine.  - She has established with psychiatry and confirms they have been available and responsive to her questions.  I encouraged her to contact her psychiatrist today to discuss the insomnia she experienced on mirtazapin.    3.  Bone Health:  DEXA 3/28/2023 with a lowest T-score of -0.9 which is within normal range and improved from prior (DEXA in 01/2021 with a lowest T-score of -2.0).  She received 3 years of Zometa from 1/22/2021 - 6/21/2023.  Will hold further treatment with Zometa at this time.    I recommend she continue calcium 1200 mg, vitamin D 1000 international units and walk 30 minutes daily.      4.  Routine health maintenance:  No change since last visit.  Colonoscopy in 2015 was without concerning finding, next due in 2025.      5.  Follow Up:  Bilateral screening mammograms and visit with me 3/28/2024 or later.

## 2023-09-25 ENCOUNTER — ANCILLARY PROCEDURE (OUTPATIENT)
Dept: MRI IMAGING | Facility: CLINIC | Age: 68
End: 2023-09-25
Attending: INTERNAL MEDICINE
Payer: MEDICARE

## 2023-09-25 ENCOUNTER — VIRTUAL VISIT (OUTPATIENT)
Dept: BEHAVIORAL HEALTH | Facility: CLINIC | Age: 68
End: 2023-09-25
Payer: MEDICARE

## 2023-09-25 ENCOUNTER — ONCOLOGY VISIT (OUTPATIENT)
Dept: ONCOLOGY | Facility: CLINIC | Age: 68
End: 2023-09-25
Attending: INTERNAL MEDICINE
Payer: MEDICARE

## 2023-09-25 VITALS
WEIGHT: 147.2 LBS | OXYGEN SATURATION: 98 % | RESPIRATION RATE: 16 BRPM | SYSTOLIC BLOOD PRESSURE: 137 MMHG | BODY MASS INDEX: 23.05 KG/M2 | DIASTOLIC BLOOD PRESSURE: 88 MMHG | TEMPERATURE: 97.9 F | HEART RATE: 118 BPM

## 2023-09-25 DIAGNOSIS — Z79.811 LONG TERM (CURRENT) USE OF AROMATASE INHIBITORS: ICD-10-CM

## 2023-09-25 DIAGNOSIS — F39 MOOD DISORDER (H): ICD-10-CM

## 2023-09-25 DIAGNOSIS — Z86.59 HISTORY OF MANIA: ICD-10-CM

## 2023-09-25 DIAGNOSIS — R92.30 DENSE BREAST TISSUE ON MAMMOGRAM: ICD-10-CM

## 2023-09-25 DIAGNOSIS — C50.411 MALIGNANT NEOPLASM OF UPPER-OUTER QUADRANT OF RIGHT BREAST IN FEMALE, ESTROGEN RECEPTOR POSITIVE (H): Primary | ICD-10-CM

## 2023-09-25 DIAGNOSIS — Z12.31 ENCOUNTER FOR SCREENING MAMMOGRAM FOR BREAST CANCER: ICD-10-CM

## 2023-09-25 DIAGNOSIS — F41.1 GENERALIZED ANXIETY DISORDER: Primary | ICD-10-CM

## 2023-09-25 DIAGNOSIS — Z17.0 MALIGNANT NEOPLASM OF UPPER-OUTER QUADRANT OF RIGHT BREAST IN FEMALE, ESTROGEN RECEPTOR POSITIVE (H): Primary | ICD-10-CM

## 2023-09-25 DIAGNOSIS — Z85.3 PERSONAL HISTORY OF MALIGNANT NEOPLASM OF BREAST: ICD-10-CM

## 2023-09-25 DIAGNOSIS — Z12.39 BREAST CANCER SCREENING, HIGH RISK PATIENT: ICD-10-CM

## 2023-09-25 PROCEDURE — A9585 GADOBUTROL INJECTION: HCPCS | Mod: JZ | Performed by: RADIOLOGY

## 2023-09-25 PROCEDURE — 90834 PSYTX W PT 45 MINUTES: CPT | Mod: 95 | Performed by: SOCIAL WORKER

## 2023-09-25 PROCEDURE — G1010 CDSM STANSON: HCPCS | Performed by: RADIOLOGY

## 2023-09-25 PROCEDURE — 99214 OFFICE O/P EST MOD 30 MIN: CPT | Performed by: INTERNAL MEDICINE

## 2023-09-25 PROCEDURE — G0463 HOSPITAL OUTPT CLINIC VISIT: HCPCS | Performed by: INTERNAL MEDICINE

## 2023-09-25 PROCEDURE — 77049 MRI BREAST C-+ W/CAD BI: CPT | Mod: MG | Performed by: RADIOLOGY

## 2023-09-25 RX ORDER — ANASTROZOLE 1 MG/1
1 TABLET ORAL DAILY
Qty: 90 TABLET | Refills: 3 | Status: SHIPPED | OUTPATIENT
Start: 2023-09-25 | End: 2024-04-08

## 2023-09-25 RX ORDER — GADOBUTROL 604.72 MG/ML
7.5 INJECTION INTRAVENOUS ONCE
Status: COMPLETED | OUTPATIENT
Start: 2023-09-25 | End: 2023-09-25

## 2023-09-25 RX ADMIN — GADOBUTROL 6.5 ML: 604.72 INJECTION INTRAVENOUS at 11:06

## 2023-09-25 ASSESSMENT — PAIN SCALES - GENERAL: PAINLEVEL: NO PAIN (0)

## 2023-09-25 NOTE — NURSING NOTE
"Oncology Rooming Note    September 25, 2023 12:09 PM   Keely Arana is a 68 year old female who presents for:    Chief Complaint   Patient presents with    Oncology Clinic Visit     Malignant neoplasm of upper-outer quadrant of right breast in female, estrogen receptor positive      Initial Vitals: /88 (BP Location: Right arm, Patient Position: Sitting, Cuff Size: Adult Regular)   Pulse 118   Temp 97.9  F (36.6  C) (Oral)   Resp 16   Wt 66.8 kg (147 lb 3.2 oz)   SpO2 98%   BMI 23.05 kg/m   Estimated body mass index is 23.05 kg/m  as calculated from the following:    Height as of 8/8/23: 1.702 m (5' 7.01\").    Weight as of this encounter: 66.8 kg (147 lb 3.2 oz). Body surface area is 1.78 meters squared.  No Pain (0) Comment: Data Unavailable   No LMP recorded. Patient is postmenopausal.  Allergies reviewed: Yes  Medications reviewed: Yes    Medications: Medication refills not needed today.  Pharmacy name entered into [facility]:    [facility] - [place], [place] - [address] [id number] - [facility] [address] [facility] [address] [facility] [address] [place] [facility] [place] [address]    Clinical concerns: none       Preeti Whittington              "

## 2023-09-25 NOTE — LETTER
9/25/2023         RE: Keely Arana  [address]  [place] [id number]        Dear Colleague,    Thank you for referring your patient, Keely Arana, to the [facility]. Please see a copy of my visit note below.    Oncology Follow Up:  Date on this visit: 9/25/2023    Diagnosis:  [id number], grade I, ER positive, DE positive, HER-2 nonamplified invasive ductal carcinoma of the right breast. Oncotype DX recurrence score = 7.    Primary Physician: Sammie Cerna     History Of Present Illness:  Ms. Arana is a 68 year old female with right breast cancer.  Routine screening mammogram in 06/2018 was without concerning findings. Due to high breast density, her gynecologist recommended a breast MRI.  Breast MRI on 08/16/2018 showed nodular to non-mass enhancement measuring 1.6 cm in the right breast at 5 o'clock.    Right breast ultrasound confirmed a mass at 5 o'clock.  Right breast biopsy demonstrated a grade 1 invasive ductal carcinoma.  Estrogen-receptor staining was moderate in 95%; progesterone-receptor staining was strong in 96%.  HER-2 was nonamplified by immunohistochemistry with a score of 1+.  Right breast lumpectomy and sentinel lymph node procedure on 8/31/2018 demonstrated a 1.2 cm, grade 1 invasive mammary carcinoma.  There was associated intermediate-grade DCIS.  Surgical margins were negative; however, DCIS was 1 mm from the anterior margin.  A single sentinel lymph node was negative for malignancy. Oncotype DX recurrence score was 7.  She completed adjuvant radiation (4240 cGy to the whole breast and additional 1250  cGy to the lumpectomy site) on 11/8/18.  She has been on anastrozole since 9/24/18.  She completed 3 years of Zometa on 6/21/2023.     Interval History:  Ms. Arana presents to clinic today, alongside her , for routine breast cancer follow-up.  Since our last visit, she was diagnosed with cong.  In April, her  was away for 4 days,  and during that time she completely cleaned out their garage.  When he returned, he noted that she was purging the house of many things and also binge shopping.  They were able to recognize this as unusual behavior and sought medical care.  With medication, she has been able to avoid further episodes.  There was an attempt to wean her off of medication, however, with doing so she became very anxious.  She is now taking olanzapine 2-1/2 mg daily and last Friday started Remeron nightly.  Unfortunately, upon trying Remeron, she was up all night.  She states her care team does not know what triggered the cong.    She denies concerning breast lumps, pain, or swelling.  She has no new bone or joint aches or pains.  She has no cough, shortness of breath, or chest pain.  She denies abdominal complaints.  She continues to remain physically active.  She also continues to spend quality time with her family including her 10 grandchildren.  She has grandchild #11 on the way.    Past Medical/Surgical History:  Past Medical History:   Diagnosis Date    Atrial fibrillation (H)     s/p ablasion.    Hypertension     Iritis      Past Surgical History:   Procedure Laterality Date    CATARACT IOL, RT/LT Left 2006    HYSTERECTOMY  2016    LUMPECTOMY BREAST WITH SENTINEL NODE, COMBINED Right 8/31/2018    Procedure: COMBINED LUMPECTOMY BREAST WITH SENTINEL NODE;  Right Wire Localized Lumpectomy and Right [place] Lymph Node Biopsy;  Surgeon: Chilango Kruger MD;  [facility]    OOPHORECTOMY  2014     Allergies:  Allergies as of 09/25/2023 - Reviewed 09/22/2023   Allergen Reaction Noted    Corticosteroids Dermatitis 03/18/2019    Neomycin Dermatitis 03/18/2019    Sulfa antibiotics Itching and Rash 08/06/2015    Cephalexin GI Disturbance 03/09/2022    Codeine Nausea and Vomiting 08/06/2015    Nitrofurantoin Nausea and Vomiting and Nausea 08/06/2015    Silver Rash 12/03/2018     Current Medications:  Current Outpatient Medications    Medication Sig Dispense Refill    amLODIPine (NORVASC) 5 MG tablet Take 5 mg by mouth daily      anastrozole (ARIMIDEX) 1 MG tablet TAKE 1 TABLET BY MOUTH EVERY DAY 90 tablet 3    atorvastatin (LIPITOR) 10 MG tablet Take 1 tablet (10 mg) by mouth daily 90 tablet 2    calcium carbonate (OS-PADDY 500 MG [place]. [place]) 500 MG tablet Take 2 tablets by mouth daily With Magnesium,Zinc      LYSINE PO Take 10,000 mg by mouth daily      mirtazapine (REMERON) 15 MG tablet Take 0.5-1 tablets (7.5-15 mg) by mouth At Bedtime 30 tablet 1    Multiple Vitamins-Minerals (PRESERVISION AREDS 2 PO) Take 2 tablets by mouth daily      multivitamin w/minerals (THERA-VIT-M) tablet Take 1 tablet by mouth daily      OLANZapine (ZYPREXA) 2.5 MG tablet Take 5 mg by mouth At Bedtime      propranolol (INDERAL) 20 MG tablet Take 0.5-1 tablets (10-20 mg) by mouth 2 times daily as needed (anxiety) 180 tablet 0    rivaroxaban ANTICOAGULANT (XARELTO) 20 MG TABS tablet Take 20 mg by mouth      VITAMIN D, CHOLECALCIFEROL, PO Take 1,000 Units by mouth daily         Family and Social History:  Reviewed and unchanged from prior.  Please see initial consultation dated 9/24/18 for further details.    Physical Exam:  /88 (BP Location: Right arm, Patient Position: Sitting, Cuff Size: Adult Regular)   Pulse 118   Temp 97.9  F (36.6  C) (Oral)   Resp 16   Wt 66.8 kg (147 lb 3.2 oz)   SpO2 98%   BMI 23.05 kg/m    General:  Well appearing adult female in NAD.  Alert and oriented x 3.  Longer pauses before speaking than at prior visits.  HEENT:  Normocephalic.  Sclera anicteric.  MMM.    Lymph:  No palpable cervical, supraclavicular, or axillary LAD.  No gross lymphedema.  Chest:  CTA bilaterally.  No wheezes or crackles.  CV:  RRR.  Nl S1 and S2.  No audible m/r/g.  Breast:  Bilateral breasts are of increased fibroglandular density.  Right breast incision is without underlying fibrosis.  There are no dominant or discretely palpable masses in either breast.   Bilateral nipples are inverted (chronic and unchanged from prior)  Abd:  Soft/ND.   Ext:  No edema of the bilateral lower extremities.   Musculo:  Full ROM of the bilateral upper extremities.  Neuro:  Cranial nerves grossly intact.  Gait is stable.  Psych:  Mood and affect appear flat.  She expresses frustration when talking about her new mental health diagnosis.  Skin:  No visible or concerning skin rashes or lesions.    Laboratory/Imaging Studies:  I personally reviewed the below images:    9/25/2023 Breast MRI:  We reviewed these images together in clinic today.  There is no suspicious enhancement in either breast.  No lymphadenopathy.  Radiology read was pending at the time of our visit and will be released to the patient via [employer] when available.    ASSESSMENT/PLAN:  Ms. Velazquez is a 68-year-old female with a h/o stage IA, T1c N0 M0, grade 1, ER positive, [place] positive, HER2 non-amplified invasive mammary carcinoma of the right breast.  She is status post treatment with right breast lumpectomy and radiation.  On anastrozole since 9/24/18.    1.  Right breast cancer:   Ms. Arana is >5 years out from excision of a right breast cancer.  She continues on anastrozole and is tolerating the medication well.  We discussed that although anastrozole can be associated with mood disorder, given she has been on the medication for a number of years and cong is a new diagnosis for her, I suspect the cong was not caused by her endocrine therapy.  Therefore, I feel it is safe to continue treatment with anastrozole.  We reviewed plan to continue anastrozole to complete a 7 year course (thru 9/24/2025).     There is no evidence of disease recurrence on exam today.  Due to the fact that her initial breast cancer was not detectable by mammogram, increased breast density and close DCIS margins, we planned to perform high-risk screening with both mammogram and breast MRI until she is 5 years out from diagnosis of her breast  cancer/age 69.     - I reviewed the images of breast MRI performed today which are without suspicious enhancement of lymphadenopathy.  - Return to clinic in 6 months.  - Bilateral screening mammograms due 3/28/2024 or later.    2.  Paz:  Unlikely to be related to her breast cancer treatment.  I suspect this was triggered by other stressors.  - She is on treatment with olanzapine and mirtazapine.  - She has established with psychiatry and confirms they have been available and responsive to her questions.  I encouraged her to contact her psychiatrist today to discuss the insomnia she experienced on mirtazapin.    3.  Bone Health:  DEXA 3/28/2023 with a lowest T-score of -0.9 which is within normal range and improved from prior (DEXA in 01/2021 with a lowest T-score of -2.0).  She received 3 years of Zometa from 1/22/2021 - 6/21/2023.  Will hold further treatment with Zometa at this time.    I recommend she continue calcium 1200 mg, vitamin D 1000 international units and walk 30 minutes daily.      4.  Routine health maintenance:  No change since last visit.  Colonoscopy in 2015 was without concerning finding, next due in 2025.      5.  Follow Up:  Bilateral screening mammograms and visit with me 3/28/2024 or later.    Merari Tafoya MD

## 2023-09-28 ENCOUNTER — HOSPITAL ENCOUNTER (OUTPATIENT)
Facility: CLINIC | Age: 68
Setting detail: OBSERVATION
Discharge: HOME | End: 2023-10-01
Attending: EMERGENCY MEDICINE | Admitting: STUDENT IN AN ORGANIZED HEALTH CARE EDUCATION/TRAINING PROGRAM
Payer: MEDICARE

## 2023-09-28 ENCOUNTER — TELEPHONE (OUTPATIENT)
Dept: PSYCHIATRY | Facility: CLINIC | Age: 68
End: 2023-09-28
Payer: MEDICARE

## 2023-09-28 ENCOUNTER — MYC MEDICAL ADVICE (OUTPATIENT)
Dept: PSYCHIATRY | Facility: CLINIC | Age: 68
End: 2023-09-28
Payer: MEDICARE

## 2023-09-28 DIAGNOSIS — G47.09 OTHER INSOMNIA: ICD-10-CM

## 2023-09-28 DIAGNOSIS — G47.00 INSOMNIA, UNSPECIFIED TYPE: ICD-10-CM

## 2023-09-28 DIAGNOSIS — Z86.59 HISTORY OF MANIA: ICD-10-CM

## 2023-09-28 DIAGNOSIS — F41.9 ANXIETY: Primary | ICD-10-CM

## 2023-09-28 DIAGNOSIS — F41.9 ANXIETY: ICD-10-CM

## 2023-09-28 DIAGNOSIS — F39 UNSPECIFIED MOOD (AFFECTIVE) DISORDER (H): ICD-10-CM

## 2023-09-28 PROCEDURE — 250N000013 HC RX MED GY IP 250 OP 250 PS 637: Performed by: EMERGENCY MEDICINE

## 2023-09-28 PROCEDURE — 99285 EMERGENCY DEPT VISIT HI MDM: CPT | Mod: 25

## 2023-09-28 RX ORDER — TRAZODONE HYDROCHLORIDE 50 MG/1
50 TABLET, FILM COATED ORAL AT BEDTIME
Status: DISCONTINUED | OUTPATIENT
Start: 2023-09-28 | End: 2023-09-30

## 2023-09-28 RX ORDER — LORAZEPAM 1 MG/1
1 TABLET ORAL ONCE
Status: COMPLETED | OUTPATIENT
Start: 2023-09-28 | End: 2023-09-28

## 2023-09-28 RX ORDER — LORAZEPAM 1 MG/1
1 TABLET ORAL EVERY 4 HOURS PRN
Status: DISCONTINUED | OUTPATIENT
Start: 2023-09-28 | End: 2023-09-30

## 2023-09-28 RX ADMIN — LORAZEPAM 1 MG: 1 TABLET ORAL at 23:22

## 2023-09-28 RX ADMIN — LORAZEPAM 1 MG: 1 TABLET ORAL at 20:59

## 2023-09-28 RX ADMIN — TRAZODONE HYDROCHLORIDE 50 MG: 50 TABLET ORAL at 23:22

## 2023-09-28 ASSESSMENT — ACTIVITIES OF DAILY LIVING (ADL)
ADLS_ACUITY_SCORE: 35
ADLS_ACUITY_SCORE: 35

## 2023-09-28 NOTE — ED NOTES
[facility]  ED to EMPATH Checklist:      Goal for EMPATH: Medication management    Current Behavior: Quiet and Calm    Safety Concerns: None    Legal Hold Status: Voluntary    Medically Cleared by ED provider: Yes    Patient Therapeutically Searched: Not searched - Currently in triage    Belongings: Remain with patient    Independent Ambulation at Baseline: Yes/No: Yes    Participates in Care/Conversation: Yes/No: Yes    Patient Informed about EMPATH: Yes/No: Yes    DEC: Ordered and pending    Patient Ready to be Transferred to EMPATH? Yes/No: Yes

## 2023-09-28 NOTE — TELEPHONE ENCOUNTER
Reason for call:  Symptom   Symptom or request: [facility] call from clients  (RICK)   Reporting that client has not slept in 2 days.      Duration (how long have symptoms been present): 2 days   Have you been treated for this before? UNK     Additional comments: they are asking for call back. Looking for some recommendations.  If med's are needed they are utilizing:   [id number] [place], [place] - [address] AT [facility]       Phone number to reach patient:  Other phone number:  Rick @ [phone].    Best Time:  ASAP     Can we leave a detailed message on this number?  YES    Travel screening: Not Applicable

## 2023-09-28 NOTE — CONFIDENTIAL NOTE
Addressed further in 9/28/23 [facility] Medical Advice encounter.     Linda Mccoy RN on 9/28/2023 at 12:27 PM

## 2023-09-28 NOTE — ED PROVIDER NOTES
History   Chief Complaint:  Medication Reaction and Insomnia       HPI   Keely Arana is a 68 year old female who presents with medication reaction and insomnia. The patient was taking olanzapine and quit suddenly because she was trying to stop taking the medication. She had anxiety after about 5 days, and went back on it every other day. She switched to mirtazapine. She has been unable to sleep since Tuesday. She denies homicidal ideation, suicidal ideation, or hallucinations. She denies chest pain, shortness of breath, nausea, vomiting, falls, or head trauma. She denies cough or cold symptoms.     Independent Historian:    Spouse/partner - They report part of the HPI    Review of External Notes:   None    Medications:    amlodipine   anastrozole  atorvastatin  mirtazapine  Olanzapine  propranolol   rivaroxaban     Past Medical History:    Atrial fibrillation  Hypertension  Iritis     Past Surgical History:    Cataract surgery  Hysterectomy  Oophorectomy   Breast lumpectomy     Physical Exam   Patient Vitals for the past 24 hrs:   BP Temp Pulse Resp SpO2   09/28/23 1744 (!) 142/93 98.2  F (36.8  C) 113 16 96 %        Physical Exam  General: Patient is weak but withdrawn and catatonic appearing answering minimal questions and appearing anxious.  HEENT: Head atraumatic    Eyes: pupils equal and reactive. Conjunctiva clear   Nares: patent   Oropharynx: no lesions, uvula midline, no palatal draping, normal voice, no trismus  Neck: Supple without lymphadenopathy, no meningismus  Chest: Heart regular rate and rhythm.   Lungs: Equal clear to auscultation with no wheeze or rales  Abdomen: Soft, non tender, nondistended, normal bowel sounds  Back: No costovertebral angle tenderness, no midline C, T or L spine tenderness  Neuro: Grossly nonfocal, normal speech, strength equal bilaterally, CN 2-12 intact  Extremities: No deformities, equal radial and DP pulses. No clubbing, cyanosis.  No edema  Skin: Warm and dry with  no rash.       Emergency Department Course     Emergency Department Course & Assessments:    Interventions:  Medications   LORazepam (ATIVAN) tablet 1 mg (1 mg Oral $Given 9/28/23 2059)        Assessments:  1750 I obtained history and examined patient.   2024 I rechecked the patient. She initially changed her mind with regards to [facility], but now wants to stay following recheck.  [facility].  She will be placed into an ED room.  2245 patient per the nurse is having improvement following Ativan and is more talkative and answering questions appropriately    Independent Interpretation (X-rays, CTs, rhythm strip):  None    Consultations/Discussion of Management or Tests:  2245 discussed with the DEC  who recommended transfer to [facility] and patient is now agreeable    Social Determinants of Health affecting care:  None     Disposition:  The patient was transferred to [facility].     Impression & Plan    Medical Decision Making:  Patient is a 68-year-old female who presents the emergency department with insomnia, depression and anxiety and signs to suggest catatonia in her responses and minimal responses.  Patient waffled back-and-forth on going to [facility] every time they came to get her she would refuse.  Then she would not want to leave from triage and then stated she wanted to go to [facility].  Eventually she was placed into room 16 and seen by the DEC .  He felt she would benefit [facility] and she is finally agreeable to go to [facility].  She was medically screened by me and is cleared for further evaluation in [facility].  At the point that the providers had seen her about going to [facility] earlier in the night they requested Ativan for possible catatonia.  Patient did have some indicators of catatonia and she was given a dose of Ativan with some improvement of her symptoms.  Patient contracts for safety here in the emergency department and is voluntary.  She is ultimately agreeable to go to  empath tonight and see psychiatry tomorrow.  As needed Ativan orders been provided as well as a one-time dose of trazodone for sleep.  Suspect her insomnia is contributing to her symptoms.  Patient and  were agreeable with the plan for evaluation in [facility].      Diagnosis:    ICD-10-CM    1. Depression with anxiety  F41.8       2. Insomnia, unspecified type  G47.00       3. Catatonia  F06.1            Discharge Medications:  New Prescriptions    No medications on file        Scribe Disclosure:  Karen STEVE Hired, am serving as a scribe at 5:57 PM on 9/28/2023 to document services personally performed by Virginia Mejia MD based on my observations and the provider's statements to me.  9/28/2023   Virginia Mejia MD Neuner, Maria Bea, MD  09/28/23 4029

## 2023-09-28 NOTE — PATIENT INSTRUCTIONS
Treatment Plan:  Start mirtazapine 7.5-15 mg at bedtime for sleep and anxiety..   OK to take olanzapine 2.5 mg at bedtime as needed for sleep if mirtazapine not helpful (for history of cong, sleep).  Can take additional 2.5 mg daily as needed. Continue to monitor movement-related side effects as discussed today.  Continue propranolol 10-20 mg twice daily for anxiety, movement-related side effects from olanzapine.   MTM (medication therapy management) referral placed with psychiatric PharmD.   BHC (behavioral health clinician) referral placed for counseling/therapy support.   Continue all other cares per primary care provider.   Continue all other medications as reviewed per electronic medical record today.   Safety plan reviewed. To the Emergency Department as needed or call after hours crisis line at [phone] or [phone]. [place] Crisis Text Line. Text [place] to 616801 or Suicide LifeLine Chat: suicidepreventionlifeline.org/chat  Schedule an appointment with me in 2-3 weeks or sooner as needed. Call [facility] at [phone] to schedule.  Follow up with primary care provider as planned or for acute medical concerns.  Call the psychiatric nurse line with medication questions or concerns at [phone].  [employer] may be used to communicate with your provider, but this is not intended to be used for emergencies.    Previously discussed need to monitor for movement disorder side effects and metabolic changes (particularly cholesterol and blood sugars) with olanzapine use.  Discussed possibility movement side effects can be lasting in some cases even when drug is stopped.

## 2023-09-28 NOTE — ED TRIAGE NOTES
Pt presents with  for [facility]path. Patient states that she started taking Mirtazipine on Friday as she is trying to wean off olanzapine. She has now been awake since Tuesday. Pts  called primary and told to come in for med changes. Patient denies SI/HI. Wants to sleep.      Triage Assessment       Row Name 09/28/23 5940       Respiratory WDL    Respiratory WDL WDL       Cardiac WDL    Cardiac WDL WDL       Cognitive/Neuro/Behavioral WDL    Cognitive/Neuro/Behavioral WDL WDL

## 2023-09-29 PROBLEM — F41.9 ANXIETY: Status: ACTIVE | Noted: 2023-09-29

## 2023-09-29 PROBLEM — F32.9 MAJOR DEPRESSION: Status: ACTIVE | Noted: 2023-09-29

## 2023-09-29 PROBLEM — G47.00 INSOMNIA, UNSPECIFIED TYPE: Status: ACTIVE | Noted: 2023-09-29

## 2023-09-29 PROCEDURE — G0378 HOSPITAL OBSERVATION PER HR: HCPCS

## 2023-09-29 PROCEDURE — 99223 1ST HOSP IP/OBS HIGH 75: CPT | Mod: AI | Performed by: PSYCHIATRY & NEUROLOGY

## 2023-09-29 PROCEDURE — 250N000013 HC RX MED GY IP 250 OP 250 PS 637: Performed by: EMERGENCY MEDICINE

## 2023-09-29 PROCEDURE — 250N000013 HC RX MED GY IP 250 OP 250 PS 637: Performed by: PSYCHIATRY & NEUROLOGY

## 2023-09-29 RX ORDER — ATORVASTATIN CALCIUM 10 MG/1
10 TABLET, FILM COATED ORAL DAILY
Status: DISCONTINUED | OUTPATIENT
Start: 2023-09-29 | End: 2023-10-01 | Stop reason: HOSPADM

## 2023-09-29 RX ORDER — OLANZAPINE 5 MG/1
5 TABLET ORAL AT BEDTIME
Status: DISCONTINUED | OUTPATIENT
Start: 2023-09-29 | End: 2023-09-30

## 2023-09-29 RX ORDER — CALCIUM CARBONATE 500(1250)
2 TABLET ORAL DAILY
Status: DISCONTINUED | OUTPATIENT
Start: 2023-09-29 | End: 2023-10-01 | Stop reason: HOSPADM

## 2023-09-29 RX ORDER — ANASTROZOLE 1 MG/1
1 TABLET ORAL DAILY
Status: DISCONTINUED | OUTPATIENT
Start: 2023-09-29 | End: 2023-10-01 | Stop reason: HOSPADM

## 2023-09-29 RX ORDER — VITAMIN B COMPLEX
25 TABLET ORAL DAILY
Status: DISCONTINUED | OUTPATIENT
Start: 2023-09-29 | End: 2023-10-01 | Stop reason: HOSPADM

## 2023-09-29 RX ORDER — AMLODIPINE BESYLATE 5 MG/1
5 TABLET ORAL DAILY
Status: DISCONTINUED | OUTPATIENT
Start: 2023-09-29 | End: 2023-10-01 | Stop reason: HOSPADM

## 2023-09-29 RX ADMIN — LORAZEPAM 1 MG: 1 TABLET ORAL at 08:06

## 2023-09-29 RX ADMIN — TRAZODONE HYDROCHLORIDE 50 MG: 50 TABLET ORAL at 21:04

## 2023-09-29 RX ADMIN — LORAZEPAM 1 MG: 1 TABLET ORAL at 17:14

## 2023-09-29 RX ADMIN — RIVAROXABAN 20 MG: 20 TABLET, FILM COATED ORAL at 17:13

## 2023-09-29 RX ADMIN — OLANZAPINE 5 MG: 5 TABLET, FILM COATED ORAL at 21:04

## 2023-09-29 RX ADMIN — ANASTROZOLE 1 MG: 1 TABLET, COATED ORAL at 20:23

## 2023-09-29 RX ADMIN — LORAZEPAM 1 MG: 1 TABLET ORAL at 13:10

## 2023-09-29 RX ADMIN — CALCIUM 1000 MG: 500 TABLET ORAL at 20:23

## 2023-09-29 RX ADMIN — LORAZEPAM 1 MG: 1 TABLET ORAL at 21:04

## 2023-09-29 ASSESSMENT — ACTIVITIES OF DAILY LIVING (ADL)
ADLS_ACUITY_SCORE: 35

## 2023-09-29 NOTE — PROGRESS NOTES
Plan for patient to remain on observation status tonight. Pt with somewhat disorganized thought process this afternoon, she repeats questions several times and can be forgetful. She is redirectable and pleasant. Pt requested and received Ativan for anxiety (see MAR) and reports this was helpful in slowing down her thoughts. She is currently reading a book in [employer].

## 2023-09-29 NOTE — ED PROVIDER NOTES
Em[facility] Unit - Initial Psychiatric Observation Note  [facility] Emergency Department  Observation Initiation Date: Sep 28, 2023    Keely Arana MRN: [id number]   Age: 68 year old YOB: 1955     History     Chief Complaint   Patient presents with    Medication Reaction    Insomnia     HPI  Keely Arana is a 68 year old female with a past history notable for a history of a manic episode who presented to the emergency department for evaluation of emerging insomnia and a heightened state of anxiety.  She was determined to be medically stable and transferred to the [facility] unit for psychiatric assessment.  She is now approaching 19 hours in the emergency department.  On examination, the patient reviews with me a history of a manic episode despite not having a bipolar diagnosis.  This resulted in treatment with Zyprexa at a dose of 5 mg nightly which resulted in resolution of the manic episode.  The dose of Zyprexa was later lowered to 2.5 mg and symptom stability was maintained.  After maintaining symptom remission, she had desired discontinuing the medication.  After doing so, anxiety gradually reemerged, describing a ruminating thought pattern that gradually led to insomnia.  An attempt was made to utilize mirtazapine to help address her insomnia and anxiety.  Unfortunately, she found mirtazapine to be stimulating at both the 15 mg and 7.5 mg doses, leading to worsening racing thoughts and inability to sleep.  Today, she spent much time discussing with me concern that since her manic episode, she has not gained her baseline desires.  She explains that she used to love cooking, socialized with her peers on the topic, looked forward to making meals and sharing baked items with her neighbors.  She struggles with understanding why she has lost this motivation.  She did not characterize symptoms of a depressive disorder.  She denied suicidal and homicidal thoughts.  There was no indication of  psychosis.    Past Medical History  Past Medical History:   Diagnosis Date    Atrial fibrillation (H)     s/p ablasion.    Hypertension     Iritis      Past Surgical History:   Procedure Laterality Date    CATARACT IOL, RT/LT Left 2006    HYSTERECTOMY  2016    LUMPECTOMY BREAST WITH SENTINEL NODE, COMBINED Right 8/31/2018    Procedure: COMBINED LUMPECTOMY BREAST WITH SENTINEL NODE;  Right Wire Localized Lumpectomy and Right [place] Lymph Node Biopsy;  Surgeon: Chilango Kruger MD;  [facility]: [facility]    OOPHORECTOMY  2014     amLODIPine (NORVASC) 5 MG tablet  anastrozole (ARIMIDEX) 1 MG tablet  atorvastatin (LIPITOR) 10 MG tablet  calcium carbonate (OS-PADDY 500 MG [place] IRA. CA) 500 MG tablet  LYSINE PO  Multiple Vitamins-Minerals (PRESERVISION AREDS 2 PO)  multivitamin w/minerals (THERA-VIT-M) tablet  OLANZapine (ZYPREXA) 2.5 MG tablet  propranolol (INDERAL) 20 MG tablet  rivaroxaban ANTICOAGULANT (XARELTO) 20 MG TABS tablet  VITAMIN D, CHOLECALCIFEROL, PO  mirtazapine (REMERON) 15 MG tablet      Allergies   Allergen Reactions    Corticosteroids Dermatitis     Proven allergic contact dermatitis to corticosteroid (group A, B, D2)     CAN USE as ALTERNATIVE following steroids: Corticosteroids of group C (e.g.Betamethasone, Dexamethasone, Flumethasone pivalate, Halomethasone)   and group D1 (Betamethasone dipropionate, Betamethasone-17-valerate,Clobetasole-propionate, Fluticasone propionate, Mometasone furoate)    Neomycin Dermatitis     Patch tests positives to Neomycin and Framycetin    Sulfa Antibiotics Itching and Rash    Cephalexin GI Disturbance     Other reaction(s): reflux    Codeine Nausea and Vomiting    Nitrofurantoin Nausea and Vomiting and Nausea    Silver Rash     Family History  No family history on file.  Social History   Social History     Tobacco Use    Smoking status: Never    Smokeless tobacco: Never   Substance Use Topics    Alcohol use: Not Currently     Alcohol/week: 3.0 standard drinks of alcohol      "Types: 3 Standard drinks or equivalent per week     Comment: rarely. Patient drinks occasionally, but is currently abstaining due to new medication    Drug use: No          Review of Systems  A medically appropriate review of systems was performed with pertinent positives and negatives noted in the HPI, and all other systems negative.    Physical Examination   BP: (!) 142/93  Pulse: 113  Temp: 98.2  F (36.8  C)  Resp: 16  Height: 172.7 cm (5' 8\")  Weight: 67.6 kg (149 lb)  SpO2: 96 %    Physical Exam  General: Appears stated age.   Neuro: Alert and fully oriented. Extremities appear to demonstrate normal strength on visual inspection.   Integumentary/Skin: no rash visualized, normal color    Psychiatric Examination   Appearance: awake, alert  Attitude:  cooperative  Eye Contact:  fair  Mood:  anxious  Affect:  appropriate and in normal range  Speech:  pressured speech  Psychomotor Behavior:  no evidence of tardive dyskinesia, dystonia, or tics  Thought Process:  tangental  Associations:  no loose associations  Thought Content:  no evidence of suicidal ideation or homicidal ideation and no evidence of psychotic thought  Insight:  fair  Judgement:  fair  Oriented to:  time, person, and place  Attention Span and Concentration:  fair  Recent and Remote Memory:  fair  Language: able to name/identify objects without impairment  Fund of Knowledge: intact with awareness of current and past events    ED Course        Labs Ordered and Resulted from Time of ED Arrival to Time of ED Departure - No data to display    Assessments & Plan (with Medical Decision Making)   Patient presenting with concern for emerging mood instability with prominent anxiety, racing thoughts, and insomnia since discontinuing Zyprexa, which may have become more notable after a recent trial of mirtazapine.  Noting that she may be experiencing an activating side effect to mirtazapine/antidepressant medication in addition to a differential diagnosis that " includes bipolar disorder, we decided to resume treatment with a mood stabilizing medication.  Nursing notes reviewed noting no acute issues.     I have reviewed the assessment completed by the [facility].     During the observation period, the patient did not require medications for agitation, and did not require restraints/seclusion for patient and/or provider safety.     The patient was found to have a psychiatric condition that would benefit from an observation stay in the emergency department for further psychiatric stabilization and/or coordination of a safe disposition. The observation plan includes serial assessments of psychiatric condition, potential administration of medications if indicated, further disposition pending the patient's psychiatric course during the monitoring period.     Preliminary diagnosis:    ICD-10-CM    1. Insomnia, unspecified type  G47.00       2. History of cong  Z86.59       3. Unspecified mood (affective) disorder (H)  F39       4. Anxiety  F41.9            Treatment Plan:  -Restart Zyprexa 5 mg nightly for mood stabilization.  After a period of stability, her outpatient treatment team may consider reducing the dose to 2.5 mg to determine if a lower maintenance dose could be maintained.  -Mirtazapine has been discontinued as it may have been too activating for the patient to tolerate  -Continue to work with her outpatient providers on ruling out the possibility of a bipolar diagnosis  -The patient was encouraged to resume outpatient psychotherapy  -Enter to observation status and reassess tomorrow.    --  Jaylen Chow MD   [facility] EMERGENCY DEPT  Em[facility] Unit       Jaylen Chow MD  09/29/23 5342

## 2023-09-29 NOTE — CONSULTS
Diagnostic Evaluation Consultation  Crisis Assessment    Patient Name: Keely Arana  Age:  68 year old  Legal Sex: female  Gender Identity: female  Pronouns: she/her/hers  Race: White  Ethnicity: Not  or   Language: English      Patient was assessed: In person      Patient location: [facility] EMERGENCY DEPT                             [id number]    Referral Data and Chief Complaint  Keely Arana presents to the ED with family/friends. Patient is presenting to the ED for the following concerns: Anxiety, Other (see comment) (insomnia).   Factors that make the mental health crisis life threatening or complex are:  Pt reports she has not been getting sleep since Tuesday (9/26/23) which has increased mental health symptoms. Per chart review, Pt's medications have been recently altered and there is concern Pt may be having a reaction to them. Pt denies any SI, HI, AH/VH, substance use. Pt reports she was advised to come to the ED by provider and encouraged to seek [facility] services via family members.      Informed Consent and Assessment Methods  Explained the crisis assessment process, including applicable information disclosures and limits to confidentiality, assessed understanding of the process, and obtained consent to proceed with the assessment.  Assessment methods included conducting a formal interview with patient, review of medical records, collaboration with medical staff, and obtaining relevant collateral information from family and community providers when available.  : done     Patient response to interventions: eager to participate, acceptance expressed, verbalizes understanding  Coping skills were attempted to reduce the crisis:  Voluntarily presented to the ED for [facility].     History of the Crisis   Pt reports her insomnia has been going on since Tuesday, September 26th.    Brief Psychosocial History  Family:  , Children yes  Support System:  ,  Children  Employment Status:  retired  Source of Income:  unknown  Financial Environmental Concerns:  No concerns identified  Current Hobbies:  group/social activities  Barriers in Personal Life:       Significant Clinical History  Current Anxiety Symptoms:     Current Depression/Trauma:     Current Somatic Symptoms:   (insomnia)  Current Psychosis/Thought Disturbance:     Current Eating Symptoms:     Chemical Use History:  Alcohol: None  Benzodiazepines: None  Opiates: None  Cocaine: None  Marijuana: None  Other Use: None   Past diagnosis:  Depression, Anxiety Disorder  Family history:  No known history of mental health or chemical health concerns  Past treatment:  Psychiatric Medication Management, Primary Care  Details of most recent treatment:  Currently working with a psychiatric provider.  Other relevant history:          Collateral Information  Is there collateral information:  (Pt's , Terry, was present for the assessment.)        Risk Assessment  [place] Suicide Severity Rating Scale Full Clinical Version:  Suicidal Ideation  Q1 Wish to be Dead (Lifetime): No  Q2 Non-Specific Active Suicidal Thoughts (Lifetime): No     Suicidal Behavior (Lifetime)  Actual Attempt (Lifetime): No  Has subject engaged in non-suicidal self-injurious behavior? (Lifetime): No  Interrupted Attempts (Lifetime): No  Aborted or Self-Interrupted Attempt (Lifetime): No  Preparatory Acts or Behavior (Lifetime): No    [place] Suicide Severity Rating Scale Recent:   Suicidal Ideation (Recent)  Q1 Wished to be Dead (Past Month): no  Q2 Suicidal Thoughts (Past Month): no  Q3 Suicidal Thought Method: no  Q4 Suicidal Intent without Specific Plan: no  Q5 Suicide Intent with Specific Plan: no  Level of Risk per Screen: low risk     Suicidal Behavior (Recent)  Actual Attempt (Past 3 Months): No  Has subject engaged in non-suicidal self-injurious behavior? (Past 3 Months): No  Interrupted Attempts (Past 3 Months): No  Aborted or  Self-Interrupted Attempt (Past 3 Months): No  Preparatory Acts or Behavior (Past 3 Months): No    Environmental or Psychosocial Events:    Protective Factors: Protective Factors: strong bond to family unit, community support, or employment, intact marriage or domestic partnership, responsibilities and duties to others, including pets and children, lives in a responsibly safe and stable environment, good treatment engagement, sense of importance of health and wellness, able to access care without barriers, supportive ongoing medical and mental health care relationships, help seeking, optimistic outlook - identification of future goals    Does the patient have thoughts of harming others? Feels Like Hurting Others: no  Previous Attempt to Hurt Others: no  Is the patient engaging in sexually inappropriate behavior?: no    Is the patient engaging in sexually inappropriate behavior?  no        Mental Status Exam   Affect: Appropriate  Appearance: Appropriate  Attention Span/Concentration: Attentive  Eye Contact: Engaged    Fund of Knowledge: Appropriate   Language /Speech Content: Fluent  Language /Speech Volume: Normal  Language /Speech Rate/Productions: Normal  Recent Memory: Intact  Remote Memory: Intact  Mood: Anxious  Orientation to Person: Yes   Orientation to Place: Yes  Orientation to Time of Day: Yes  Orientation to Date: Yes     Situation (Do they understand why they are here?): Yes  Psychomotor Behavior: Normal  Thought Content: Clear  Thought Form: Intact, Other (please comment) (Pt was repetitive at times with her information sharing. Repeating the same story several times during assessment. Asking for clarification and re-explaining answers.)       Medication  Psychotropic medications:   Medication Orders - Psychiatric (From admission, onward)      Start     Dose/Rate Route Frequency Ordered Stop    09/28/23 2250  traZODone (DESYREL) tablet 50 mg         50 mg Oral AT BEDTIME 09/28/23 2247 09/28/23 2247   LORazepam (ATIVAN) tablet 1 mg         1 mg Oral EVERY 4 HOURS PRN 09/28/23 8008               Current Care Team  Patient Care Team:  Maulik Nye MD as PCP - General (Internal Medicine)  Esvin Schwartz MD as MD (Radiology)  Merari Tafoya MD as MD (Breast Oncology)  Merari Tafoya MD as Assigned Cancer Care Provider  Maulik Nye MD as Assigned PCP  Reema Palomo DO as Assigned Behavioral Health Provider    Diagnosis  Patient Active Problem List   Diagnosis Code    Symptomatic varicose veins of both lower extremities I83.893    Malignant neoplasm of upper-outer quadrant of right breast in female, estrogen receptor positive (H) C50.411, Z17.0    Abnormal bone density screening R93.7    Long term (current) use of aromatase inhibitors Z79.811    History of cong Z86.59    Insomnia, unspecified type G47.00    Anxiety F41.9    Major depression F32.9       Primary Problem This Admission  Active Hospital Problems    *Insomnia, unspecified type      Anxiety      Major depression        Clinical Summary and Substantiation of Recommendations   Pt reports she has not been getting sleep since Tuesday (9/26/23) which has increased mental health symptoms. Per chart review, Pt's medications have been recently altered and there is concern Pt may be having a reaction to them. Pt denies any SI, HI, AH/VH, substance use. Pt reports she was advised to come to the ED by provider and encouraged to seek [facility] services via family members.    A lower level of care has been unsuccessful in treating and stabilizing patient s mental health symptoms. Pt will transfer to [facility] under observation for continued monitoring, treatment and therapeutic intervention of mental health symptoms.     Recommendation of therapeutic check-in on 09/29/2023 after meeting with the psychiatric provider to discuss ability to get restful sleep and possible discharge into community if symptoms  alleviate.       Patient coping skills attempted to reduce the crisis:  Voluntarily presented to the ED for [facility].    Disposition  Recommended disposition: Medication Management, Observation        Reviewed case and recommendations with attending provider. Attending Name: Dr. Mejia       Attending concurs with disposition: yes       Patient and/or validated legal guardian concurs with disposition:   yes       Final disposition:  observation    Legal status on admission: Voluntary/Patient has signed consent for treatment    Assessment Details   Total duration spent on the patient case in minutes: 70 min     CPT code(s) utilized: 05121 - Psychotherapy for Crisis - 60 (30-74*) min    Car Singh [facility], Psychotherapist  DEC - Triage & Transition Services  Callback: [phone]

## 2023-09-29 NOTE — PROGRESS NOTES
RN checked in with patient. She is sitting in recliner and acknowledges writer on approach. VSS. Pt reports sleeping well overnight and feels rested this morning.  Endorses anxiety but less intense than yesterday. PRN Ativan given. Pt informed of plan to meet with provider today and is agreeable. Pt would like to return home today but agrees to meet with provider to discuss disposition. Denies any other current needs or concerns.

## 2023-09-29 NOTE — PROGRESS NOTES
"Pt's main concern is getting a good night's rest tonight. She states the Ativan given in ED \"did nothing really\" for anxiety. Additional 1 mg dose of Ativan given. Pt also accepts trazodone to help with sleep. Pt settled into recliner. Given warm blankets and pillows. Provided with glass of water and snack.   "

## 2023-09-29 NOTE — PROGRESS NOTES
"Triage & Transition Services [facility]     Progress Note    Patient: Jessica goes by \"Jessica,\" uses she/her pronouns  Date of Service: September 29, 2023  Site of Service: [facility] ED  Patient was seen in-person.     Presenting problem:  Please see initial DEC/LM Crisis Assessment completed by Car Singh [facility] on 9/28/23 for complete assessment information. Notable concerns include medication reaction, issues with sleep, anxiety depression.     Individuals Present: Jessica & BRIAN Abreu    Session start: 0900  Session end: 0935  Session duration in minutes: 35  CPT utilized: 20478 - Psychotherapy (with patient) - 30 (16-37*) min    Current Presentation:      Pt presented with a calm and at time anxious affect. Pts mood was congruent with this presentation. Pt was oriented x4. Pt was engaged and cooperative during assessment. Pt endorsed having reactions to a medication and not being able to sleep. Pt endorsed having once manic episode in the past, in March of this year. Pt endorsed that she sees a psychiatrist and recently there was a medication change. Pt stated that her family was concerned about her and wanted her to come into the ED. Pt endorsed being able to sleep last night and feeling better today. Pt did not endorse any SI/SIB/HI or AH/VH.  At this time it does appear that Pt would benefit from further observation and psychiatric stabilization via medication management and ED level therapeutic interventions, due to recent medication changes and disruption in sleep and increased anxiety/ depressive sx. Pt was not able to fully safety plan with writer. If Pt is able to effectively safety plan and/or Pts sx improve it would be beneficial to pursue a less restrictive alternative.      Additional Collateral Information:  None at time of assessment.      Mental Status Exam     Affect: Appropriate   Appearance: Appropriate    Attention Span/Concentration: Attentive  Eye Contact: Engaged   Fund " of Knowledge: Appropriate    Language /Speech Content: Fluent   Language /Speech Volume: Normal    Language /Speech Rate/Productions: Normal    Recent Memory: Intact   Remote Memory: Intact   Mood: Anxious and Depressed    Orientation to Person: Yes    Orientation to Place: Yes   Orientation to Time of Day: Yes    Orientation to Date: Yes    Situation (Do they understand why they are here?): Yes    Psychomotor Behavior: Normal    Thought Content: Clear   Thought Form: Intact     Diagnosis:   Insomnia, unspecified type G47.00   Anxiety F41.9  Major depression F32.9    Therapeutic Intervention(s):   Provided active listening, unconditional positive regard, and validation. Reviewed healthy living that supports positive mental health, including looking at sleep hygiene, regular movement, nutrition, and regular socialization. Identified and practiced coping skills. Explored strategies for self-soothing.     Treatment Objective(s) Addressed:   The focus of this session was on rapport building, assessing safety, identifying additional supports, and exploring obstacles to safety in the community.     Progress Towards Goals:   Patient reports stable symptoms. Patient is making progress towards treatment goals as evidenced by Pt has been receptive to ED interventions.     Case Management:   None at time of assessment.      Plan and Clinical Summary and Substantiation of Recommendations:   Observation: At this time it does appear that Pt would benefit from further observation and psychiatric stabilization via medication management and ED level therapeutic interventions, due to recent medication changes and disruption in sleep and increased anxiety/ depressive sx. Pt was not able to fully safety plan with writer. If Pt is able to effectively safety plan and/or Pts sx improve it would be beneficial to pursue a less restrictive alternative.      Attending concurs with disposition: Yes         Nita Salinas formerly [facility]

## 2023-09-29 NOTE — PROGRESS NOTES
68 year old female received from ED due to insomnia and anxiety. Reports she hasn't slept since Tuesday (over 2 days). Per ED note, the patient was taking olanzapine and quit suddenly because she was trying to stop taking the medication. She had anxiety after about 5 days, and went back on it every other day. She switched to mirtazapine and now unable to sleep since Tuesday. She denies homicidal ideation, suicidal ideation, or hallucinations.     Pt cooperative during intake process. Appears very anxious. Speaking quickly, sometimes rambling. Tremulous and leg tapping. Pt is indecisive and needs brief, direct instructions. VSS.      Nursing and risk assessments completed. Assessments reviewed with LMHP and physician. Admission information reviewed with patient. Patient given a tour of [facility] and instructions on using the facility. Questions regarding [facility] addressed. Pt safety search completed.

## 2023-09-29 NOTE — ED NOTES
[facility] staff attempted 7 times to bring patient over. She has a blank stare and appears to have extreme anxiety. Family said pt previously had a manic episode and was put on Zyprexa which was helpful and then she was being weaned off and given Remeron which had a paradoxical effect and she has not slept for 60 hours. She has been indecisive as to stay or go home. [facility] and [facility] not move in either direction. She appeared to be going into catatonia. This writer went to the ED Nurse and she notified the MD. W put her in the wheelchair and brought her back to ED

## 2023-09-29 NOTE — PLAN OF CARE
Keely Arana  September 29, 2023  Plan of Care Hand-off Note     Patient Care Path: observation    Plan for Care:   Pt reports she has not been getting sleep since Tuesday (9/26/23) which has increased mental health symptoms. Per chart review, Pt's medications have been recently altered and there is concern Pt may be having a reaction to them. Pt denies any SI, HI, AH/VH, substance use. Pt reports she was advised to come to the ED by provider and [facility]     A lower level of care has been unsuccessful in treating and stabilizing patient s mental health symptoms. Pt will transfer to [facility] under observation for continued monitoring, treatment and therapeutic intervention of mental health symptoms.      Recommendation of therapeutic check-in on 09/29/2023 after meeting with the psychiatric provider to discuss ability to get restful sleep and possible discharge into community if symptoms alleviate.    Identified Goals and Safety Issues: insomnia, possible adverse medication reaction    Overview:  Rick Arana,  -   [phone]     Legal Status: Legal Status at Admission: Voluntary/Patient has signed consent for treatment    Psychiatry Consult: will have access at [facility].       Updated RN regarding plan of care.           Car Singh, [facility]

## 2023-09-29 NOTE — DISCHARGE INSTRUCTIONS
Mental health recommendations    Please follow-up with your outpatient team about your visit today.    2.  One of our care coordinators will reach out to you after your discharge.  If you have any questions about additional resources please call our coordinators at # [phone]    3.  Please use aftercare plan and already established coping skills and safety planning as needed.     4.  Please call 911 and/or return to the emergency department if her symptoms worsen or your safety becomes compromised.       Aftercare Plan    If I am feeling unsafe or I am in a crisis, I will:   Contact my established care providers   Call the National Suicide Prevention Lifeline: 389.173.4763   Go to the nearest emergency room   Call 911   Your [facility] has a mental health crisis team you can call 24/7: [facility], [phone]    Warning signs that I or other people might notice when a crisis is developing for me: Not sleeping, increased anxiety, changed in mood, family concern    Things I am able to do on my own to cope or help me feel better:   -Practice square breathing when I begin to feel anxious - in breath through the nose for the count   of 4 and the first line on the square. Out breath through the mouth for the count of 4 for the second line   of the square. Repeat to complete the square. Repeat the square as many times as needed.    Things that I am able to do with others to cope or help me feel better: -Use community resources, including hotline numbers, [facility] crisis and support meetings    Things I can use or do for distraction: -Distraction skills of: going for walks, watching TV, spending time outside, calling a friend or family member  -Download a meditation tete and spend 15-20 minutes per day mediating/relaxing. Some apps to   download include: Calm, Headspace and Insight Timer. All 3 of these apps have free version    Changes I can make to support my mental health and wellness:   -Attend scheduled mental  "health therapy and psychiatric appointments and follow all recommendations  -Maintain a daily schedule/routine  -Practice deep breathing skills  -Abstain from all mood altering chemicals not currently prescribed to me     People in my life that I can ask for help: National [place] on Mental Illness (NOLVIA)  051.102.7413 or 1.888.NOLVIA.HELPS    Other things that are important when I m in crisis: -Commit to 30 minutes of self care daily - this can be as simple as taking a shower, going for a walk, cooking a meal, read, writing, etc        Crisis Lines  Crisis Text Line  Text 192514  You will be connected with a trained live crisis counselor to provide support.    Por espanol, texto  DAHLIA a 640761 o texto a 442-AYUDAME en WhatsApp    National Hope Line  1.800.SUICIDE [4979546]      Community Resources  Fast Tracker  Linking people to mental health and substance use disorder resources  fasttraFriendCoden.org     [place] [facility] Warm Line  Peer to peer support  Monday thru Saturday, 12 pm to 10 pm  557.114.9183 or 6.332.617.8677  Text \"Support\" to 02325    National [place] on Mental Illness (NOLVIA)  075.142.0183 or 1.888.NOLVIA.HELPS        Mental Health Apps  My3  https://The Motley Fool.org/    VirtualHopeBox  https://Neptune.org/apps/virtual-hope-box/      Additional Information  Today you were seen by a licensed mental health professional through Triage and Transition services, [facility] (P)  for a crisis assessment in the Emergency Department at [facility].  It is recommended that you follow up with your established providers (psychiatrist, mental health therapist, and/or primary care doctor - as relevant) as soon as possible. Coordinators from [facility] will be calling you in the next 24-48 hours to ensure that you have the resources you need.  You can also contact [facility] coordinators directly at [phone]. You may have been scheduled for or offered an appointment with a mental " health provider. [facility] maintains an extensive network of licensed behavioral health providers to connect patients with the services they need.  We do not charge providers a fee to participate in our referral network.  We match patients with providers based on a patient's specific needs, insurance coverage, and location.  Our first effort will be to refer you to a provider within your care system, and will utilize providers outside your care system as needed.

## 2023-09-29 NOTE — PROGRESS NOTES
DEC order was placed for Pt, yet Pt is still in the waiting room when the DEC order was placed. A psychotherapist will engage in crisis assessment once [facility] ED [facility] After several attempts by [facility] staff to transfer, Pt continued to decline. Per chart review, Pt was roomed in the ED due to presentation. Psychotherapist will attempt to complete crisis assessment once Pt is awake, alert, and able to participate.

## 2023-09-30 PROCEDURE — 250N000013 HC RX MED GY IP 250 OP 250 PS 637: Performed by: PSYCHIATRY & NEUROLOGY

## 2023-09-30 PROCEDURE — 250N000013 HC RX MED GY IP 250 OP 250 PS 637: Performed by: EMERGENCY MEDICINE

## 2023-09-30 PROCEDURE — G0378 HOSPITAL OBSERVATION PER HR: HCPCS

## 2023-09-30 PROCEDURE — 250N000013 HC RX MED GY IP 250 OP 250 PS 637: Performed by: NURSE PRACTITIONER

## 2023-09-30 PROCEDURE — 99232 SBSQ HOSP IP/OBS MODERATE 35: CPT | Performed by: NURSE PRACTITIONER

## 2023-09-30 RX ORDER — LORAZEPAM 1 MG/1
.5-1 TABLET ORAL 3 TIMES DAILY PRN
Qty: 6 TABLET | Refills: 0 | Status: SHIPPED | OUTPATIENT
Start: 2023-09-30 | End: 2023-10-01 | Stop reason: DRUGHIGH

## 2023-09-30 RX ORDER — OLANZAPINE 2.5 MG/1
2.5 TABLET, FILM COATED ORAL AT BEDTIME
Status: DISCONTINUED | OUTPATIENT
Start: 2023-09-30 | End: 2023-09-30

## 2023-09-30 RX ORDER — ESZOPICLONE 1 MG/1
1 TABLET, FILM COATED ORAL
Status: DISCONTINUED | OUTPATIENT
Start: 2023-09-30 | End: 2023-10-01 | Stop reason: HOSPADM

## 2023-09-30 RX ORDER — LORAZEPAM 1 MG/1
1 TABLET ORAL 3 TIMES DAILY
Status: DISCONTINUED | OUTPATIENT
Start: 2023-09-30 | End: 2023-10-01 | Stop reason: HOSPADM

## 2023-09-30 RX ORDER — LORAZEPAM 0.5 MG/1
.5-1 TABLET ORAL EVERY 4 HOURS PRN
Status: DISCONTINUED | OUTPATIENT
Start: 2023-09-30 | End: 2023-09-30

## 2023-09-30 RX ADMIN — ANASTROZOLE 1 MG: 1 TABLET, COATED ORAL at 09:43

## 2023-09-30 RX ADMIN — ATORVASTATIN CALCIUM 10 MG: 10 TABLET, FILM COATED ORAL at 09:41

## 2023-09-30 RX ADMIN — LORAZEPAM 1 MG: 1 TABLET ORAL at 20:06

## 2023-09-30 RX ADMIN — Medication 25 MCG: at 09:41

## 2023-09-30 RX ADMIN — AMLODIPINE BESYLATE 5 MG: 5 TABLET ORAL at 09:40

## 2023-09-30 RX ADMIN — LORAZEPAM 1 MG: 1 TABLET ORAL at 14:12

## 2023-09-30 RX ADMIN — RIVAROXABAN 20 MG: 20 TABLET, FILM COATED ORAL at 17:14

## 2023-09-30 RX ADMIN — LORAZEPAM 1 MG: 1 TABLET ORAL at 09:39

## 2023-09-30 RX ADMIN — CALCIUM 1000 MG: 500 TABLET ORAL at 09:42

## 2023-09-30 ASSESSMENT — ACTIVITIES OF DAILY LIVING (ADL)
ADLS_ACUITY_SCORE: 35

## 2023-09-30 NOTE — ED NOTES
Pt appears to have slept/rested during noc shift in her assigned recliner, respirations even and unlabored during 15 minute safety checks.

## 2023-09-30 NOTE — PROGRESS NOTES
Pt was calm, pleasant and cooperative this shift. Pt spent most of the shift resting in her recliner. Pt took naps periodically. Pt was visible on the unit and she socialized with staff and peer. Pt reports feeling much better today. No acute changes noted on this shift. Plan to stay obs and reassess in the morning.

## 2023-09-30 NOTE — ED NOTES
[facility] Progress Note    Client Name: Keely Arana  Date: September 30, 2023  Service Type:  Group Therapy  Session Start Time:  11:15am  Session End Time: 11:50am  Session Length: 30 minutes   Attendees: Patient and other group members  Facilitator:ALEXIS Queen     Topic:   Morning Goals Group     Intervention:    Group process: support, challenge, affirm, psycho-education.     Response:  Patient did participate in group. Behavior in group was appropriate. Patient shared thoughts/feelings related to anxiety and sleep.  She shared that she feels more rested today, she spoke about how increased anxiety causes ruminating thoughts and subsequently further difficulties with sleep.  She said she wants to make sure medications are working and then work with a therapist in person and continue with Dr. Palomo.         ALEXIS Queen

## 2023-09-30 NOTE — PROGRESS NOTES
"Triage & Transition Services [facility]     Progress Note    Patient: Jessica goes by \"Jessica,\" uses she/her pronouns  Date of Service: September 30, 2023  Site of Service: [facility] Unit  Patient was seen in-person.     Presenting problem:  Please see initial DEC/[facility] Crisis Assessment completed by Car Singh on 9/28/23 for complete assessment information. Notable concerns include medication reaction, issues with sleep, anxiety depression. .     Individuals Present: Jessica & Linda Huang    Session start: 15:44; again at 16:29  Session end: 15:55; again at 16:45  Session duration in minutes: 27 total  CPT utilized: 98463 - Psychotherapy (with patient) - 30 (16-37*) min    Current Presentation:   Writer met with patient several times throughout the day. She struggles with engaging in full conversation as she appears quite anxious. Her affect is ranges throughout the conversations; her speech is delayed. Her thought process appears to be racing at times and she seems to struggle articulating her responses. Patient presents with mild catatonia symptoms throughout reassessment as evidenced by long pauses before answering questions, flat affect, delayed reactions. She perseverates on not knowing \"whether I should leave tonight... I think I should\". Writer relays that it is the recommendation from both the [facility] provider and writer that she remain in empath overnight for one more night of observation prior to discharge tomorrow, as well as her families hope for her. Writer explains she can discharge tonight if she wishes, however it will be AMA per provider. She ruminates on what medications she would take tonight vs what she may discharge with. Writer redirects to conversation with provider that she has had, as writer is unable to answer specific medication questions. Ultimately, patient initially agreed to remain one more night \"as long as I get to go home tomorrow\" however then asks to call her  \"because maybe " "he wants me home tonight\". Writer reminds patient her daughter is hopeful she'll stay one more night, however she can call her  to discuss plans with him, which she agrees with. She states several times she is worried she \"will be sent inpatient soon\" which she does not wish to do.     Patient denies suicidal ideation or homicidal ideation throughout conversation. She states she \"feels fine\" now compared to when she initially presented. She does appear that she would benefit from one more night of observation prior to discharging home with continued follow up with her outpatient providers.     Additional Collateral Information:  Patient signed JET for her daughter, who then spoke with the provider regarding plan of care.      Mental Status Exam     Affect: Blunted and Flat   Appearance: Appropriate    Attention Span/Concentration: Inattentive  Eye Contact: Variable   Fund of Knowledge: Delayed    Language /Speech Content: Fluent   Language /Speech Volume: Normal    Language /Speech Rate/Productions: Slow, delayed  Recent Memory: Variable   Remote Memory: Variable   Mood: Anxious    Orientation to Person: Yes    Orientation to Place: Yes   Orientation to Time of Day: Yes    Orientation to Date: Yes    Situation (Do they understand why they are here?): Yes    Psychomotor Behavior: Underactive    Thought Content: Clear   Thought Form: Other: Blocking      Diagnosis:    Insomnia, unspecified type G47.00    Anxiety F41.9    Major depression F32.9       Therapeutic Intervention(s):   Provided active listening, unconditional positive regard, and validation.    Treatment Objective(s) Addressed:   The focus of this session was on assessing safety and identifying treatment goals.     Progress Towards Goals:   Patient reports improving symptoms. Patient is making progress towards treatment goals as evidenced by reporting sleeping better past two nights.     Case Management:   Ongoing collaboration throughout the day " with psychiatry provider.        Plan and Clinical Summary and Substantiation of Recommendations:   Observation: Writer recommends one more night of observation on empath to monitor medications at the recommendation of the psychiatry provider, LIEN Paz. She presents with mild catatonia and anxiety symptoms throughout the reassessment with writer. Patient was agreeable with writer to remain in observation one more night with a plan to discharge home tomorrow following reassessment, however, if she were to change her mind tonight, provider reports she can discharge AMA. If patient requests to do so, she will need a reassessment and safety plan.     Attending concurs with disposition: Yes         Linda Huang

## 2023-09-30 NOTE — PROGRESS NOTES
Day Shift:  Pt was calm pleasant and cooperative this shift. She was compliant with medications and cares. She was visible on the unit. Pt participated in groups and socialized with staff and peer. No acute changes noted.  Evening shift:  Pt became disorganized and more confused. Pt was requesting to discharge and was fixated on going home. Pt was also fixated on taking her 2.5 mg of Zyprexa. Pt at times would have delayed responses on often stared into space before answering questions. Pt would stand at nursing station without saying anything. Pt was compliant with medications on this shift but was hesitant about taking some of them. Pt was encouraged to stay on [facility] another day and see how she does without Zyprexa and Trazodone. Pt was agreeable to stay at this time. Per provider pt is not moldable and if she wishes to discharge pt may do so AMA.

## 2023-09-30 NOTE — PROGRESS NOTES
[facility] Group Progress Note    Client Name: Keely Arana  Date: September 29, 2023  Service Type:  Group Therapy  Facilitator:me@        Response:  Patient did not participate in group.      Priscilla Craven LPCC

## 2023-10-01 VITALS
DIASTOLIC BLOOD PRESSURE: 90 MMHG | WEIGHT: 149 LBS | HEIGHT: 68 IN | BODY MASS INDEX: 22.58 KG/M2 | TEMPERATURE: 99 F | OXYGEN SATURATION: 96 % | SYSTOLIC BLOOD PRESSURE: 151 MMHG | HEART RATE: 105 BPM | RESPIRATION RATE: 16 BRPM

## 2023-10-01 PROCEDURE — G0378 HOSPITAL OBSERVATION PER HR: HCPCS

## 2023-10-01 PROCEDURE — 250N000013 HC RX MED GY IP 250 OP 250 PS 637: Performed by: PSYCHIATRY & NEUROLOGY

## 2023-10-01 PROCEDURE — 250N000013 HC RX MED GY IP 250 OP 250 PS 637: Performed by: NURSE PRACTITIONER

## 2023-10-01 PROCEDURE — 99239 HOSP IP/OBS DSCHRG MGMT >30: CPT | Performed by: NURSE PRACTITIONER

## 2023-10-01 RX ORDER — LORAZEPAM 0.5 MG/1
0.5 TABLET ORAL EVERY 6 HOURS PRN
Qty: 14 TABLET | Refills: 0 | Status: SHIPPED | OUTPATIENT
Start: 2023-10-01 | End: 2023-10-05

## 2023-10-01 RX ORDER — OLANZAPINE 2.5 MG/1
2.5-5 TABLET, FILM COATED ORAL AT BEDTIME
Qty: 30 TABLET | Refills: 0 | Status: SHIPPED | OUTPATIENT
Start: 2023-10-01 | End: 2023-10-07

## 2023-10-01 RX ADMIN — ESZOPICLONE 1 MG: 1 TABLET, FILM COATED ORAL at 00:43

## 2023-10-01 RX ADMIN — LORAZEPAM 1 MG: 1 TABLET ORAL at 14:03

## 2023-10-01 RX ADMIN — LORAZEPAM 1 MG: 1 TABLET ORAL at 09:17

## 2023-10-01 RX ADMIN — RIVAROXABAN 20 MG: 20 TABLET, FILM COATED ORAL at 17:08

## 2023-10-01 RX ADMIN — AMLODIPINE BESYLATE 5 MG: 5 TABLET ORAL at 09:17

## 2023-10-01 RX ADMIN — CALCIUM 1000 MG: 500 TABLET ORAL at 09:17

## 2023-10-01 RX ADMIN — ANASTROZOLE 1 MG: 1 TABLET, COATED ORAL at 09:17

## 2023-10-01 RX ADMIN — ATORVASTATIN CALCIUM 10 MG: 10 TABLET, FILM COATED ORAL at 09:17

## 2023-10-01 RX ADMIN — Medication 25 MCG: at 09:17

## 2023-10-01 ASSESSMENT — COLUMBIA-SUICIDE SEVERITY RATING SCALE - C-SSRS
TOTAL  NUMBER OF ABORTED OR SELF INTERRUPTED ATTEMPTS SINCE LAST CONTACT: NO
SUICIDE, SINCE LAST CONTACT: NO
TOTAL  NUMBER OF INTERRUPTED ATTEMPTS SINCE LAST CONTACT: NO
6. HAVE YOU EVER DONE ANYTHING, STARTED TO DO ANYTHING, OR PREPARED TO DO ANYTHING TO END YOUR LIFE?: NO
ATTEMPT SINCE LAST CONTACT: NO
1. SINCE LAST CONTACT, HAVE YOU WISHED YOU WERE DEAD OR WISHED YOU COULD GO TO SLEEP AND NOT WAKE UP?: NO
2. HAVE YOU ACTUALLY HAD ANY THOUGHTS OF KILLING YOURSELF?: NO

## 2023-10-01 ASSESSMENT — ACTIVITIES OF DAILY LIVING (ADL)
ADLS_ACUITY_SCORE: 35

## 2023-10-01 NOTE — ED PROVIDER NOTES
"[facility] - Psychiatric Observation Discharge Summary  [facility] Emergency Department  Discharge Date: 10/1/2023    Keely Arana MRN: [id number]   Age: 68 year old YOB: 1955     Brief HPI & Initial ED Course     Chief Complaint   Patient presents with    Medication Reaction    Insomnia     HPI  Keely Arana is a 68 year old female with history notable for history of manic episode who presented to the emergency department for evaluation of emerging insomnia and heightened state of anxiety, racing thoughts that were more noteable after starting mirtazapine. She was determined to be medically stable and transferred to [facility] for psychiatric assessment and treatment planning. On 9/29/23, she was psychiatrically assessed by Dr. Chow who placed patient on observation status while restarting Zyprexa.  Trazodone was given to help with insomnia.  On reassessment yesterday by LIEN Paz, patient was noted to be very delayed, stuck in thoughts, and without affect.  There was concern for possible emerging catatonia, thus zyprexa was put on hold and scheduled ativan started to target catatonic symptoms. Lunesta was made available to help with sleep. No acute issues overnight. Night shift describes her as sleeping the majority of night.      Patient is being reassessed today for insomnia, heightened anxiety and possible catatonia.  On exam, she states she does not feel she slept soundly last night, taking a Lunesta at 0043. She states she slept soundly the night before when she took the zyprexa and trazodone, however has insight she was not thinking clearly and somewhat confused.  She states although she did not sleep as soundly, she does feel better and would like to discharge home. She states she spoke to her daughter on the phone today who commented how patient sounded \"much better\" and more coherent then previous day.  She is not experiencing any signs of cong. She denies any psychosis or " "suicidal thoughts. She feels the ativan is helpful at keeping the anxiety at a tolerable level. She is experiencing some challenge with memory and recall and not at baseline, however family feels comfortable taking her home knowing the symptoms are likely related to medication side effects and should improve as they did after stopping the trazodone and holding zyprexa.        Physical Examination   BP: (!) 151/90  Pulse: 105  Temp: 99  F (37.2  C)  Resp: 16  Height: 172.7 cm (5' 8\")  Weight: 67.6 kg (149 lb)  SpO2: 96 %    Physical Exam  General: Appears stated age.   Neuro: Awake, alert with some difficulty in recall and memory. Extremities appear to demonstrate normal strength on visual inspection.   Integumentary/Skin: no rash visualized, normal color    Psychiatric Examination   Appearance: adequately groomed and no apparent distress  Attitude:  cooperative  Eye Contact:  good  Mood:  anxious and improved, less with ativan and obtaining some sleep  Affect:  appropriate and in normal range and intensity is normal  Speech:  clear, coherent and repetitive questions  Psychomotor Behavior:  no evidence of tardive dyskinesia, dystonia, or tics and intact station, gait and muscle tone  Thought Process:  logical and goal oriented  Associations:  no loose associations  Thought Content:  no evidence of suicidal ideation or homicidal ideation and no evidence of psychotic thought  Insight:  good  Judgement:  intact  Oriented to:  time, person, and place  Attention Span and Concentration:  fair  Recent and Remote Memory:  fair  Language: able to name/identify objects without impairment  Fund of Knowledge: intact with awareness of current and past events    Results        Labs Ordered and Resulted from Time of ED Arrival to Time of ED Departure - No data to display    Observation Course   The patient was found to have a psychiatric condition that would benefit from an observation stay in the emergency department for further " psychiatric stabilization and/or coordination of a safe disposition. The plan upon observation admission included serial assessments of psychiatric condition, potential administration of medications if indicated, further disposition pending the patient's psychiatric course during the monitoring period.     Serial assessments of the patient's psychiatric condition were performed. Nursing notes were reviewed. During the observation period, the patient did not require medications for agitation, and did not require restraints/seclusion for patient and/or provider safety.     After a period of working with the treatment team on the EmPATH unit, the patient's mental state improved to allow a safe transition to outpatient care. After counseling on the diagnosis, work-up, and treatment plan, the patient was discharged. Close follow-up with a psychiatrist and/or therapist was recommended and community psychiatric resources were provided. Patient is to return to the ED if any urgent or potentially life-threatening concerns.     Discharge Diagnoses:   Final diagnoses:   Insomnia, unspecified type   History of cong   Unspecified mood (affective) disorder (H)   Anxiety       Treatment Plan:  -Discharge home with safety plan in place  -Instructed to restart Zyprexa tonight at home. Patient worried 5 mg may have been too high of a dose, thus gave direction to start with 2.5 mg, however if this dose is not strong enough, to increase to 5 mg.  -Instructed to utilize small dose of ativan to target anxiety while waiting to discuss a more long term plan with outpatient psychiatric provider, which most likely will involve exploring mood stabilizers.  -Medication education provided this visit includes, rationale for medication, importance of compliance, medication interactions, and common side effects. Patient agreeable.    -Attend appointment 10/11/23 with pharmacist, which was already arranged.  -Education provided on her diagnosis,  symptoms , prognosis and treatment.        At the time of discharge, the patient's acute suicide risk was determined to be low due to the following factors: Reduction in the intensity of mood/anxiety symptoms that preceded the admission, denial of suicidal thoughts, denies feeling helpless or helpless, not currently under the influence of alcohol or illicit substances, denies experiencing command hallucinations, no immediate access to firearms. The patient's acute risk could be higher if noncompliant with their treatment plan, medications, follow-up appointments or using illicit substances or alcohol. Protective factors include: social supports, children, stable housing, employment, Mormonism beliefs, school, expectant mother.    I spent more than 30 minutes on discharge day activities.    --  LIEN Vance CNP [facility] EMERGENCY DEPT  EmPATH Unit    ISyeda APRN, CNP have personally performed an examination of this patient.  I have edited the note to reflect all relevant changes.  I have discussed this patient with the care team on 10/1/23.  I have reviewed all vitals and laboratory findings.          Syeda Ohara APRN CNP  10/01/23 [id number]

## 2023-10-01 NOTE — PROGRESS NOTES
Pt has been resting in recliner and watching football game. Pleasant on approach. Somewhat forgetful at times and has been using a notepad to keep track of her conversations with staff today. She is conversant this afternoon and quicker to respond to questions than this morning. RN spoke with , Terry ([phone]) and provided update. He would like a call to be involved with discharge planning if pt leaves this afternoon. Pt met with [facility] and is awaiting reassessment from provider.

## 2023-10-01 NOTE — PROGRESS NOTES
"Triage & Transition Services EmPATH     Progress Note    Patient: Jessica goes by \"Jessica,\" uses she/her pronouns  Date of Service: October 1, 2023  [facility]  Patient was seen in-person.     Presenting problem:  Please see initial [facility] Crisis Assessment completed by Car Singh [facility] on 9/28/2023 for complete assessment information. Notable concerns include anxiety and insomnia.     Individuals Present: Jessica & JAKOB Pavon    Session start: 1:30PM  Session end: 1:55PM  Session duration in minutes: 25 minutes  CPT utilized: 53711 - Psychotherapy (with patient) - 30 (16-37*) min    Current Presentation:   Writer met with patient in consult room for reassessment. Patient came to reassessment willingly and was cooperative. She is alert & oriented. Patient reports she's feeling better since her arrival into the ED/EmPATH a few days ago. Patient explained that a recent medication change caused her to have significant insomnia which then led to increased symptoms of depression, anxiety, and catatonia. Patient does not show symptoms of any delay or catatonia during visit with writer. Patient reports she's feeling much better. Her thought process is clear, coherent, and logical, and thought content is future oriented. Patient was also observed to be engaging appropriately in social interaction with staff. Patient is looking forward to speaking with the psychiatric provider today to talk about medications. Patient would like to discharge home today. She denies any safety concerns and is eager to resume outpatient psychiatry and therapy. Patient has a therapy appointment on 10/13/2023 and a follow-up psychiatry appointment on 10/23/2023. She plans to call her outpatient psychiatrist to schedule an earlier appointment.    Additional Collateral Information:  No additional collateral information obtained today.      Mental Status Exam   Affect: Appropriate   Appearance: Appropriate    Attention " Span/Concentration: Attentive  Eye Contact: Engaged   Fund of Knowledge: Appropriate    Language /Speech Content: Fluent   Language /Speech Volume: Normal    Language /Speech Rate/Productions: Normal    Recent Memory: Intact   Remote Memory: Intact   Mood: Normal    Orientation to Person: Yes    Orientation to Place: Yes   Orientation to Time of Day: Yes    Orientation to Date: Yes    Situation (Do they understand why they are here?): Yes    Psychomotor Behavior: Normal    Thought Content: Clear   Thought Form: Goal Directed and Intact     [place] Suicide Severity Rating Scale Since Last Contact:   Suicidal Ideation (Since Last Contact)  1. Wish to be Dead (Since Last Contact): No  2. Non-Specific Active Suicidal Thoughts (Since Last Contact): No  Suicidal Behavior (Since Last Contact)  Actual Attempt (Since Last Contact): No  Has subject engaged in non-suicidal self-injurious behavior? (Since Last Contact): No  Interrupted Attempts (Since Last Contact): No  Aborted or Self-Interrupted Attempt (Since Last Contact): No  Preparatory Acts or Behavior (Since Last Contact): No  Suicide (Since Last Contact): No     C-SSRS Risk (Since Last Contact)  Calculated C-SSRS Risk Score (Since Last Contact): No Risk Indicated    Diagnosis:    Anxiety F41.9    Major depression F32.9     Therapeutic Intervention(s):   Provided active listening, unconditional positive regard, and validation. Engaged in safety planning.  Engaged in guided discovery, explored patient's perspectives and helped expand them through socratic dialogue. Reviewed healthy living that supports positive mental health, including looking at sleep hygiene, regular movement, nutrition, and regular socialization. Provided positive reinforcement for progress towards goals, gains in knowledge, and application of skills previously taught.  Worked on relapse prevention planning (review of stressors, early warning signs, written plan to respond to signs, and rehearse  plan). Identified and practiced coping skills.    Treatment Objective(s) Addressed:   The focus of this session was on rapport building, orienting the patient to therapy, identifying and practicing coping strategies, safety planning, identifying an appropriate aftercare plan, assessing safety, identifying additional supports, and exploring obstacles to safety in the community.     Progress Towards Goals:   Patient reports improving symptoms. Patient is making progress towards treatment goals as evidenced by verbal report and observation of patient's current functioning.     Case Management:   N/A     Plan and Clinical Summary and Substantiation of Recommendations:   Discharge: After the period in observation care, therapeutic treatment, intervention and aftercare planning by EmPATH care team and consultation with attending provider, the patient's circumstances and mental state were safe for outpatient management. Patient denies suicidal ideation, non-suicidal self-injurious behavior, homicidal ideation, hallucinations, delusions, or other symptoms of mental distress. Close follow-up with a psychiatrist and/or therapist was recommended and community psychiatric resources were provided. Patient is to return to the ED if any urgent or potentially life-threatening concerns arise.       At the time of discharge, the patient's acute suicide risk was determined to be low due to the following factors: reduction in the intensity of mood/anxiety symptoms that preceded the admission, denial of suicidal thoughts, denies feeling helpless or hopeless, not currently under the influence of alcohol or illicit substances, denies experiencing command hallucinations, and no immediate access to firearms. Protective factors include: social support, voluntarily seeking mental health support, displays resiliency , established relationship community mental health provider(s), future focused thinking, displays insight, expresses desire to  engage in treatment, sense of obligation to people/pets, and safe/stable housing.       Attending concurs with disposition: Yes         Lissa Crews [facility]         Mental health recommendations    Please follow-up with your outpatient team about your visit today.    2.  One of our care coordinators will reach out to you after your discharge.  If you have any questions about additional resources please call our coordinators at # [phone]    3.  Please use aftercare plan and already established coping skills and safety planning as needed.     4.  Please call 911 and/or return to the emergency department if her symptoms worsen or your safety becomes compromised.       Aftercare Plan    If I am feeling unsafe or I am in a crisis, I will:   Contact my established care providers   Call the National Suicide Prevention Lifeline: 890.131.1865   Go to the nearest emergency room   Call 911   Your [facility] has a mental health crisis team you can call 24/7: [facility] Adult, [phone]    Warning signs that I or other people might notice when a crisis is developing for me: Not sleeping, increased anxiety, changed in mood, family concern    Things I am able to do on my own to cope or help me feel better:   -Practice square breathing when I begin to feel anxious - in breath through the nose for the count   of 4 and the first line on the square. Out breath through the mouth for the count of 4 for the second line   of the square. Repeat to complete the square. Repeat the square as many times as needed.    Things that I am able to do with others to cope or help me feel better: -Use community resources, including hotline numbers, [facility] crisis and support meetings    Things I can use or do for distraction: -Distraction skills of: going for walks, watching TV, spending time outside, calling a friend or family member  -Download a meditation tete and spend 15-20 minutes per day mediating/relaxing. Some apps to   download  "include: Calm, Headspace and Insight Timer. All 3 of these apps have free version    Changes I can make to support my mental health and wellness:   -Attend scheduled mental health therapy and psychiatric appointments and follow all recommendations  -Maintain a daily schedule/routine  -Practice deep breathing skills  -Abstain from all mood altering chemicals not currently prescribed to me     People in my life that I can ask for help: National [place] [employer]ness (NOLVIA)  549.652.5431 or 1.888.NOLVIA.HELPS    Other things that are important when I m in crisis: -Commit to 30 minutes of self care daily - this can be as simple as taking a shower, going for a walk, cooking a meal, read, writing, etc      Crisis Lines  Crisis Text Line  Text 616421  You will be connected with a trained live crisis counselor to provide support.    Por kunalanol, texto  DAHLIA a 165869 o texto a 442-AYUDAME en WhatsApp    National Hope Line  1.800.SUICIDE [0456802]    Community Resources  Fast Tracker  Linking people to mental health and substance use disorder resources  [employer]SGB     [place] Mental [facility] Warm Line  Peer to peer support  Monday thru Saturday, 12 pm to 10 pm  792.005.8404 or 1.211.130.5766  Text \"Support\" to 16081    National [place] [employer]ness (NOLVIA)  192.503.4034 or 1.888.NOLVIA.HELPS      Mental Health Apps  My3  https://mySmartRxpp.org/    VirtualHopeBox  https://[employer].org/apps/virtual-hope-box/    Additional Information  Today you were seen by a licensed mental health professional through Triage and Transition services, [facility]  for a crisis assessment in the Emergency Department at [facility].  It is recommended that you follow up with your established providers (psychiatrist, mental health therapist, and/or primary care doctor - as relevant) as soon as possible. Coordinators from P will be calling you in the next 24-48 hours to ensure that you have " the resources you need.  You can also contact [facility] coordinators directly at [phone]. You may have been scheduled for or offered an appointment with a mental health provider. [facility] maintains an extensive network of licensed behavioral health providers to connect patients with the services they need.  We do not charge providers a fee to participate in our referral network.  We match patients with providers based on a patient's specific needs, insurance coverage, and location.  Our first effort will be to refer you to a provider within your care system, and will utilize providers outside your care system as needed.

## 2023-10-01 NOTE — ED NOTES
Pt appears to have slept/rested in her assigned recliner during the noc shift, respirations even and unlabored during 15 minute safety checks.

## 2023-10-01 NOTE — PROGRESS NOTES
[facility] Progress Note    Client Name: Keely Arana  Date: September 30, 2023  Service Type:  Group Therapy  Facilitator:me@        Response:  Pt was invited but did not participate in evening group.      Virginia Mcgill

## 2023-10-01 NOTE — PROGRESS NOTES
"Pt states she did not sleep well overnight and feels the Lunesta she was given was \"too light\". Pt asks about recent medication changes and informed about reasoning for stopping zyprexa and trazodone. Pt is agreeable to continuing Ativan. Compliant with all scheduled morning medication. Pt is slow to respond to questions but answers questions appropriately. Flat affect. RN ordered breakfast for pt. She was provided with hygiene supplies. Awaiting reassessment today.   "

## 2023-10-01 NOTE — ED PROVIDER NOTES
"[facility] Unit - Psychiatric Consultation  [facility] Emergency Department    Keely Arana MRN: [id number]   Age: 68 year old YOB: 1955     History     Chief Complaint   Patient presents with    Medication Reaction    Insomnia     HPI  Keely \"Jessica\" BART Arana is a 68 year old female with history notable for a history of manic episode who presented to the emergency department for evaluation of emerging insomnia and heightened state of anxiety, racing thoughts  that were more noteable after starting mirtazapine. She was determined to be medically stable and transferred to [facility] for psychiatric assessment and treatment planning.  She is now approaching 48 hours in the ED.    She was seen by Dr. Chow yesterday and was able to engage in an interview in which he noted pressured speech and anxiety. In consultation with the patient, he restarted Zyprexa. She was also given trazodone at bedtime that was ordered as an as needed medication. She also received four doses of Ativan yesterday for what appeared to be catatonic mannerisms. Today, nursing has noted a significant decline in functioning with an increase in speech delay, difficulty making decisions and following directions. She requires significant redirection and guidance.    Jessica agreed to meet with me for an interview today, 9/30/2023.. Her speech is very delayed; she seems to be stuck in a thought pattern, but then will emerge with an answer that relates to the context of the conversation. Her gait is stiff, and facial expression is flat. Discussed with Dr. Chow via phone consultation. She has asked to discharge home, which would be against medical advice at this point. She denies SI/HI; however, her level of functioning is such that an inpatient stay may be helpful. I spoke with her daughter who states that her mother has been barely functioning for weeks. Jessica has been on the phone much of the day talking with her daughter and . She " "stated that she is \"afraid I'll get locked up\". I discussed the recommended current plan of care with Jessica and her daughter, Yesy which is to stop all medications that could be contributing to what appears to be catatonia, and provide scheduled Ativan 3 x/ day with Lunesta if needed for insomnia, and then to reassess tomorrow.    Past Medical History  Past Medical History:   Diagnosis Date    Atrial fibrillation (H)     s/p ablasion.    Hypertension     Iritis      Past Surgical History:   Procedure Laterality Date    CATARACT IOL, RT/LT Left 2006    HYSTERECTOMY  2016    LUMPECTOMY BREAST WITH SENTINEL NODE, COMBINED Right 8/31/2018    Procedure: COMBINED LUMPECTOMY BREAST WITH SENTINEL NODE;  Right Wire Localized Lumpectomy and Right Apex Lymph Node Biopsy;  Surgeon: Chilango Kruger MD;  [facility]    OOPHORECTOMY  2014     amLODIPine (NORVASC) 5 MG tablet  anastrozole (ARIMIDEX) 1 MG tablet  atorvastatin (LIPITOR) 10 MG tablet  calcium carbonate (OS-PADDY 500 MG [place]. CA) 500 MG tablet  LYSINE PO  Multiple Vitamins-Minerals (PRESERVISION AREDS 2 PO)  multivitamin w/minerals (THERA-VIT-M) tablet  OLANZapine (ZYPREXA) 2.5 MG tablet  propranolol (INDERAL) 20 MG tablet  rivaroxaban ANTICOAGULANT (XARELTO) 20 MG TABS tablet  VITAMIN D, CHOLECALCIFEROL, PO  mirtazapine (REMERON) 15 MG tablet      Allergies   Allergen Reactions    Corticosteroids Dermatitis     Proven allergic contact dermatitis to corticosteroid (group A, B, D2)     CAN USE as ALTERNATIVE following steroids: Corticosteroids of group C (e.g.Betamethasone, Dexamethasone, Flumethasone pivalate, Halomethasone)   and group D1 (Betamethasone dipropionate, Betamethasone-17-valerate,Clobetasole-propionate, Fluticasone propionate, Mometasone furoate)    Neomycin Dermatitis     Patch tests positives to Neomycin and Framycetin    Sulfa Antibiotics Itching and Rash    Cephalexin GI Disturbance     Other reaction(s): reflux    Codeine Nausea and Vomiting    " "Nitrofurantoin Nausea and Vomiting and Nausea    Silver Rash     Family History  No family history on file.  Social History   Social History     Tobacco Use    Smoking status: Never    Smokeless tobacco: Never   Substance Use Topics    Alcohol use: Not Currently     Alcohol/week: 3.0 standard drinks of alcohol     Types: 3 Standard drinks or equivalent per week     Comment: rarely. Patient drinks occasionally, but is currently abstaining due to new medication    Drug use: No          Review of Systems  A medically appropriate review of systems was performed with pertinent positives and negatives noted in the HPI, and all other systems negative.    Physical Examination   BP: (!) 142/93  Pulse: 113  Temp: 98.2  F (36.8  C)  Resp: 16  Height: 172.7 cm (5' 8\")  Weight: 67.6 kg (149 lb)  SpO2: 96 %    Physical Exam  General: Appears stated age.   Neuro: Alert and fully oriented. Extremities appear to demonstrate normal strength on visual inspection.   Integumentary/Skin: no rash visualized, normal color    Psychiatric Examination   Appearance: awake, alert and mild distress  Attitude:  somewhat cooperative  Eye Contact:  fair  Mood:  anxious  Affect:  intensity is flat and constricted mobility  Speech:  decreased prosody and paucity of speech  Psychomotor Behavior:  physical retardation  Thought Process:   Unable to fully assess given the paucity of speech  Associations:  no loose associations  Thought Content:  no evidence of suicidal ideation or homicidal ideation and no evidence of psychotic thought  Insight:  limited  Judgement:  limited  Oriented to:  time, person, and place  Attention Span and Concentration:   unable to fully assess  Recent and Remote Memory:  limited  Language: able to name/identify objects without impairment  Fund of Knowledge: intact with awareness of current and past events    ED Course        Labs Ordered and Resulted from Time of ED Arrival to Time of ED Departure - No data to " display    Assessments & Plan (with Medical Decision Making)   Patient presenting with increasing anxiety, cong and insomnia and catatonia like mannerisms. She was better yesterday after receiving Ativan, and has declined today with paucity of speech, speech delays, flat affect, ruminating thoughts, indecision, constricted movements. Stopped antipsychotic medications and trazodone. Will continue Ativan to see if this is a true catatonia and revisit the plan of care tomorrow.  While I believe she would benefit from an inpatient stay for additional medication management, she has strong family support and is not suicidal or homicidal, and therefore I do not think she is holdable nor would it be in the patient's best interest to place a hold. There is some concern that she will try to leave this evening, as such it would be against medical advice.  Nursing notes reviewed noting no acute issues.     I have reviewed the assessment completed by the [facility].     Preliminary diagnosis:    ICD-10-CM    1. Insomnia, unspecified type  G47.00       2. History of cong  Z86.59       3. Unspecified mood (affective) disorder (H)  F39       4. Anxiety  F41.9            Treatment Plan:  - Discontinue Zyprexa and trazodone.  - Ativan 1 mg tid (two days supply sent to [facility] in the event that she decides to leave AMA this evening)  - Lunesta 1 mg, while staying in [facility]; this would not be safe if she decides to leave AMA  - Continue to assess and recommend inpatient admission to the [facility].  - Remain on observation and reassess tomorrow morning.  --  LIEN Paz CNP   [facility] EMERGENCY DEPT  [facility] Unit      Skye Whitley APRN CNP  09/30/23 2022       Skye Whitley APRN CNP  10/01/23 1306       Skye Whitley APRN CNP  10/13/23 1928

## 2023-10-02 ENCOUNTER — TELEPHONE (OUTPATIENT)
Dept: BEHAVIORAL HEALTH | Facility: CLINIC | Age: 68
End: 2023-10-02
Payer: MEDICARE

## 2023-10-02 NOTE — TELEPHONE ENCOUNTER
Writer has fwd medication management referral only to TC RN pool as next level of care set. Will wait to hear if referral is appropriate or inappropriate.    Natalee Donaldson  10/02/2023  725

## 2023-10-02 NOTE — ED NOTES
Patient agreeable to discharge plan. Discharge instructions reviewed with patient including follow-up care plan. Medications: Ativan and Zyprexa was sent to patient's home pharmacy. Reviewed safety plan and outpatient resources. Denies SI and HI. All belongings that were brought into the hospital have been returned to patient. Escorted off the unit at 1930 accompanied by [facility] staff. Discharged to home via .

## 2023-10-02 NOTE — TELEPHONE ENCOUNTER
----- Message from Silvia Yonis sent at 10/1/2023  5:04 PM CDT -----  Regarding: Medication Management Referral  Transition Clinic Referral   [place]         Please Check Type of Referral Requested:       ____THERAPY: The Transition clinic is able to schedule patients without current medical insurance; these patient will be referred to our Social Work Care Coordinator for Medical Insurance              Assistance. We are open for referral for psychotherapy.      __X__MEDICATION only:  Referrals for Medication are ONLY accepted from the following areas (select): [facility] - HIGH PRIORITY                                       Suboxone and Opioid Management Referrals are automatically denied. TC Psychiatry cannot see patient without active medical insurance.      Next level of psychiatry care must be within 12 months.  TC Psychiatry cannot accept patient with next level of care scheduled with PCP.  The transition clinic cannot follow patients who are on a restricted recipient program.    GUARDIAN: If your patient is not their own Guardian, please provide the following:    Guardian Name:  Guardian Contact Information (Phone & Email) :  Guardian Address:     FOSTER CARE PROVIDER: If your patient lives at a Licensed Foster Care, please provide the following:    Foster Provider:  Foster Provider Contact Information (Phone & Email):  Foster Provider Address:       Referring Provider Contact Name: Abdullahi; Phone Number: [phone]    Reason for Transition Clinic Referral: Medication management appointments until appointment scheduled for 10/23/2023    Next Level of Care Patient Will Be Transitioned To: Outpatient Psychiatry  Provider(s): Reema Palomo DO  Location: [facility]: [address] [place], [place] [address]  Date/Time: 10/23/2023 at 10:30 AM    What Would Be Helpful from the Transition Clinic: Medication management appts with a Medicare provider     Needs: NO    Does Patient Have  Access to Technology: Yes    Patient E-mail Address: [email or web address]    Current Patient Phone Number: [phone]    Clinician Gender Preference (if applicable): NO    Patient location preference: In person    Silvia Somers

## 2023-10-03 NOTE — TELEPHONE ENCOUNTER
Mental Health &Addiction (MH&A)Transition Clinic (TC):     Provides Patient Support While Waiting to Access Programmatic and Outpatient MH&A Care and Provides Select Crisis Assessment Services     NURSING Referral Review  _________________________________________    This RN has reviewed this Medication Management referral to the Transition Clinic and deemed the referral   [] Appropriate   [] Inappropriate   [x]Consulting x (Tier 1) next level of care is with [facility]. Referral forwarded to  and Transition Clinic provider Ivett Farris CNP for referral review consultation.    Based on the following criteria:    Pt has a psychiatric provider (or pending plan) in place for future prescribing: Yes:   Future Appointments 10/3/2023 - 3/31/2024        Date Visit Type Length Department Provider     10/23/2023 10:30 AM [facility] ADULT PSYCHIATRY RETURN 30 min [facility] Reema Palomo DO           Timeframe until pt's scheduled psychiatry appointment is less than 6 months: Yes: 21 days     Pt takes psychiatric medications: Yes:     amLODIPine (NORVASC) 5 MG tablet  LORazepam (ATIVAN) 0.5 MG tablet  OLANZapine (ZYPREXA) 2.5 MG tablet  propranolol (INDERAL) 20 MG tablet    Pt's goals seem to align with this temporary service: Yes: Transition Clinic to bridge psychiatric care and psychiatric medication management until next Level of Care.         Any additional pertinent information regarding this referral: Patient was seen in the ED on 9/28/23 to 10/1/23. Per ED HPI.    HPI. Keely Arana is a 68 year old female with history notable for history of manic episode who presented to the emergency department for evaluation of emerging insomnia and heightened state of anxiety, racing thoughts that were more noteable after starting mirtazapine. She was determined to be medically stable and transferred to [facility] for psychiatric assessment and treatment planning. On 9/29/23, she was psychiatrically assessed by Dr. Chow who  "placed patient on observation status while restarting Zyprexa.  Trazodone was given to help with insomnia.  On reassessment yesterday by LIEN Paz, patient was noted to be very delayed, stuck in thoughts, and without affect.  There was concern for possible emerging catatonia, thus zyprexa was put on hold and scheduled ativan started to target catatonic symptoms. Lunesta was made available to help with sleep. No acute issues overnight. Night shift describes her as sleeping the majority of night.       Patient is being reassessed today for insomnia, heightened anxiety and possible catatonia.  On exam, she states she does not feel she slept soundly last night, taking a Lunesta at 0043. She states she slept soundly the night before when she took the zyprexa and trazodone, however has insight she was not thinking clearly and somewhat confused.  She states although she did not sleep as soundly, she does feel better and would like to discharge home. She states she spoke to her daughter on the phone today who commented how patient sounded \"much better\" and more coherent then previous day.  She is not experiencing any signs of cong. She denies any psychosis or suicidal thoughts. She feels the ativan is helpful at keeping the anxiety at a tolerable level. She is experiencing some challenge with memory and recall and not at baseline, however family feels comfortable taking her home knowing the symptoms are likely related to medication side effects and should improve as they did after stopping the trazodone and holding zyprexa.    Initial contact w/ patient/parent: TC Coordinator to contact this patient/patients guardian to schedule a New Person Visit with TC Provider Ivett Farris CNP       Additional Scheduling Instructions for Transition Clinic Coordinator:   TC Coordinators:  This is a Medication Management only Referral.        Please call the patient at [phone] (home). Please schedule a NewPerson " appointment with TC Provider Ivett Farris as soon as possible or as indicated by the patient.       TC Coordinator, please inform this (patient/ parent/guardian/facility staff member) as to the purpose and benefit of the TC.      The Transition Clinic is a Temporary Service that helps to bridge the time to your next appointment.  It is not intended to be a long-term service and you are expected to attend your scheduled appointment with your next provider.      Patient/Parent/ Facility Staff Member verbalized understanding     If you need support between appointments, please call [phone] and let them know you're seen by Transition Clinic Psychiatry.  You may also send a [employer] message to reach us.        RN Signature Grant Wallace RN on 10/3/2023 at 11:39 AM

## 2023-10-03 NOTE — TELEPHONE ENCOUNTER
Mental Health &Addiction (MH&A)Transition Clinic (TC):     NURSING Post-Consultation Referral Review  _________________________________________    This RN has consulted with provider & this Medication Management referral to the Transition Clinic is deemed   [] Appropriate  [x] Inappropriate x (Tier 1)    Based on the following additional information gathered: Following consultation with Transition Clinic provider Ivett Farris CNP and [facility] provider Reema Palomo DO. Transition Clinic deemed not needed at this time. Patient established with [facility] and can continue with [facility].     Contact w/ patient/parent: Transition Clinic RN called Jessica at [phone] (home)  and spoke with patient and her  keerthi who acknowledged understanding that Transition Clinic referral is not needed at this time and that they will keep future scheduled appointments with Maurice FranksD and Dr. Stacia ROBERTS.    Grant Wallace RN on October 3, 2023 at 2:04 PM

## 2023-10-05 ENCOUNTER — TELEPHONE (OUTPATIENT)
Dept: INTERNAL MEDICINE | Facility: CLINIC | Age: 68
End: 2023-10-05
Payer: MEDICARE

## 2023-10-05 RX ORDER — LORAZEPAM 0.5 MG/1
0.5 TABLET ORAL EVERY 6 HOURS PRN
Qty: 30 TABLET | Refills: 0 | Status: SHIPPED | OUTPATIENT
Start: 2023-10-05 | End: 2023-10-23 | Stop reason: DRUGHIGH

## 2023-10-05 NOTE — TELEPHONE ENCOUNTER
See My chart message in response to your message    Maria Elena Wills RN on 10/5/2023 at 8:36 AM

## 2023-10-05 NOTE — TELEPHONE ENCOUNTER
Pt needs an appointment for this.  Looks like pt is schedule to see NP clinic for this today at 4:00 PM        Yousif Solano CMA ([facility]) at 2:51 PM on 10/5/2023

## 2023-10-05 NOTE — TELEPHONE ENCOUNTER
[facility] Call Center    Phone Message    May a detailed message be left on voicemail: yes     Reason for Call: Other: Patient has not slept in 3-4 days and would like a sleep aid, please call ASAP.  Patient says urgent.     Action Taken: Message routed to:  [facility]    Travel Screening: Not Applicable

## 2023-10-06 NOTE — TELEPHONE ENCOUNTER
RN reviewed [employer] message from patent asking to take 1 mg of Ativan last night instead of 0.5 mg dose as that is what they gave her at [facility].      Per [facility] notes from 9/28/23 - 10/1/23 stay   Treatment Plan:  -Discharge home with safety plan in place  -Instructed to restart Zyprexa tonight at home. Patient worried 5 mg may have been too high of a dose, thus gave direction to start with 2.5 mg, however if this dose is not strong enough, to increase to 5 mg.  -Instructed to utilize small dose of ativan to target anxiety while waiting to discuss a more long term plan with outpatient psychiatric provider, which most likely will involve exploring mood stabilizers.  -Medication education provided this visit includes, rationale for medication, importance of compliance, medication interactions, and common side effects. Patient agreeable.    -Attend appointment 10/11/23 with pharmacist, which was already arranged.  -Education provided on her diagnosis, symptoms , prognosis and treatment.    Per the MAR from [facility], the patient was getting 1 mg dose.    Please review and advise.     Dilcia Muhammad RN on 10/6/2023 at 8:50 AM

## 2023-10-07 ENCOUNTER — HOSPITAL ENCOUNTER (OUTPATIENT)
Facility: CLINIC | Age: 68
Setting detail: OBSERVATION
Discharge: HOME | End: 2023-10-10
Attending: EMERGENCY MEDICINE | Admitting: NURSE PRACTITIONER
Payer: MEDICARE

## 2023-10-07 DIAGNOSIS — F39 UNSPECIFIED MOOD (AFFECTIVE) DISORDER (H): ICD-10-CM

## 2023-10-07 DIAGNOSIS — G47.00 INSOMNIA, UNSPECIFIED TYPE: ICD-10-CM

## 2023-10-07 DIAGNOSIS — R41.3 MEMORY IMPAIRMENT: ICD-10-CM

## 2023-10-07 DIAGNOSIS — F41.9 ANXIETY: ICD-10-CM

## 2023-10-07 PROBLEM — G31.84 MILD COGNITIVE IMPAIRMENT: Status: ACTIVE | Noted: 2023-10-07

## 2023-10-07 PROCEDURE — G0378 HOSPITAL OBSERVATION PER HR: HCPCS

## 2023-10-07 PROCEDURE — 250N000013 HC RX MED GY IP 250 OP 250 PS 637: Performed by: NURSE PRACTITIONER

## 2023-10-07 RX ORDER — OLANZAPINE 2.5 MG/1
2.5-5 TABLET, FILM COATED ORAL AT BEDTIME
Status: DISCONTINUED | OUTPATIENT
Start: 2023-10-07 | End: 2023-10-07

## 2023-10-07 RX ORDER — LORAZEPAM 0.5 MG/1
0.5 TABLET ORAL EVERY 6 HOURS PRN
Status: DISCONTINUED | OUTPATIENT
Start: 2023-10-07 | End: 2023-10-10 | Stop reason: HOSPADM

## 2023-10-07 RX ORDER — CALCIUM CARBONATE 500(1250)
2 TABLET ORAL DAILY
Status: DISCONTINUED | OUTPATIENT
Start: 2023-10-07 | End: 2023-10-10 | Stop reason: HOSPADM

## 2023-10-07 RX ORDER — VITAMIN B COMPLEX
25 TABLET ORAL DAILY
Status: DISCONTINUED | OUTPATIENT
Start: 2023-10-07 | End: 2023-10-10 | Stop reason: HOSPADM

## 2023-10-07 RX ORDER — ATORVASTATIN CALCIUM 10 MG/1
10 TABLET, FILM COATED ORAL DAILY
Status: DISCONTINUED | OUTPATIENT
Start: 2023-10-07 | End: 2023-10-10 | Stop reason: HOSPADM

## 2023-10-07 RX ORDER — AMLODIPINE BESYLATE 5 MG/1
5 TABLET ORAL DAILY
Status: DISCONTINUED | OUTPATIENT
Start: 2023-10-08 | End: 2023-10-10 | Stop reason: HOSPADM

## 2023-10-07 RX ORDER — QUETIAPINE FUMARATE 25 MG/1
25 TABLET, FILM COATED ORAL
Status: DISCONTINUED | OUTPATIENT
Start: 2023-10-07 | End: 2023-10-08

## 2023-10-07 RX ORDER — ANASTROZOLE 1 MG/1
1 TABLET ORAL DAILY
Status: DISCONTINUED | OUTPATIENT
Start: 2023-10-07 | End: 2023-10-10 | Stop reason: HOSPADM

## 2023-10-07 RX ORDER — PROPRANOLOL HYDROCHLORIDE 10 MG/1
10-20 TABLET ORAL 2 TIMES DAILY PRN
Status: DISCONTINUED | OUTPATIENT
Start: 2023-10-07 | End: 2023-10-10 | Stop reason: HOSPADM

## 2023-10-07 RX ORDER — QUETIAPINE FUMARATE 25 MG/1
25 TABLET, FILM COATED ORAL AT BEDTIME
Status: DISCONTINUED | OUTPATIENT
Start: 2023-10-07 | End: 2023-10-09

## 2023-10-07 RX ORDER — VIT C/E/ZN/COPPR/LUTEIN/ZEAXAN 60 MG-6 MG
2 CAPSULE ORAL DAILY
Status: DISCONTINUED | OUTPATIENT
Start: 2023-10-07 | End: 2023-10-10 | Stop reason: HOSPADM

## 2023-10-07 RX ORDER — QUETIAPINE FUMARATE 25 MG/1
TABLET, FILM COATED ORAL
Qty: 60 TABLET | Refills: 0 | Status: SHIPPED | OUTPATIENT
Start: 2023-10-07 | End: 2023-10-10

## 2023-10-07 RX ADMIN — ATORVASTATIN CALCIUM 10 MG: 10 TABLET, FILM COATED ORAL at 22:14

## 2023-10-07 RX ADMIN — Medication 1 CAPSULE: at 22:11

## 2023-10-07 RX ADMIN — Medication 25 MCG: at 22:14

## 2023-10-07 RX ADMIN — QUETIAPINE FUMARATE 25 MG: 25 TABLET ORAL at 22:14

## 2023-10-07 RX ADMIN — CALCIUM 1000 MG: 500 TABLET ORAL at 22:10

## 2023-10-07 RX ADMIN — RIVAROXABAN 20 MG: 20 TABLET, FILM COATED ORAL at 19:56

## 2023-10-07 RX ADMIN — ANASTROZOLE 1 MG: 1 TABLET, COATED ORAL at 22:10

## 2023-10-07 ASSESSMENT — ACTIVITIES OF DAILY LIVING (ADL)
ADLS_ACUITY_SCORE: 35

## 2023-10-07 NOTE — PROGRESS NOTES
68 year old female with history of Anxiety, Manic episodes received from ED with spouse  due to worsening insomnia. Reports that after being discharged from [facility] in the past week, that she again went back to having difficulty falling asleep and staying asleep. Denies SI/HI and hallucination. Nursing and risk assessments completed. Assessments reviewed with [facility] and physician. Admission information reviewed with patient. Patient given a tour of [facility] and instructions on using the facility. Questions regarding [facility] addressed. Pt safety search completed.   During the interview, patient showed signs of having difficulty to focus and needs frequent redirection.

## 2023-10-07 NOTE — ED NOTES
[facility]  ED to [facility] Checklist:      Goal for [facility]: Medication management    Current Behavior: Anxious    Safety Concerns: None    Legal Hold Status: Voluntary    Medically Cleared by ED provider: Yes    Patient Therapeutically Searched: Therapeutic search by ED staff (strings, belts, shoes, pockets, electronics, etc.)    Belongings: Remain with patient    Independent Ambulation at Baseline: Yes/No: Yes    Participates in Care/Conversation: Yes/No: Yes    Patient Informed about [facility]: Yes/No: Yes    DEC: Not ordered    Patient Ready to be Transferred to [facility]? Yes/No: Yes

## 2023-10-07 NOTE — ED PROVIDER NOTES
[facility] Unit - Initial Psychiatric Observation Note  [facility] Emergency Department  Observation Initiation Date: Oct 7, 2023    Keely Arana MRN: [id number]   Age: 68 year old YOB: 1955     History     Chief Complaint   Patient presents with    Insomnia     HPI  Keely Arana is a 68 year old female with a past history notable for insomnia, mood disorder, anxiety, HTN. Patient presented to the emergency department with complaint of ongoing insomnia. Patient was recently evaluated at [facility] with instructions to take olanzapine and lorazepam at home. She is followed by Dr. Palomo in the outpatient setting. On her current presentation, patient was medically evaluated in the emergency department and determined to be medically stable for transfer to [facility] for further assessment.     Here at [facility], patient presents as calm and cooperative. She reports that since arriving home, her sleep has been inconsistent. Some nights, she is sleeping quite well, and other nights, she is waking up after a couple hours, unable to return to sleep. She reports that on Thursday, she took olanzapine 5 mg, lorazepam 0.5 mg, and propranolol at bedtime and her  noted that she slept through the night. She tried this again last night, and notes that she was awake for much of the night. She is seeking further guidance on medications. During interview, patient appeared to demonstrate difficulty with attention and concentration. We discuss changing her medication from olanzapine to quetiapine. She asked writer to clarify multiple times whether quetiapine would replace olanzapine. She also asked writer several times who would provide her with refills after discharge from [facility]. It appeared as though she was experiencing difficulty retaining information discussed during the interview. She is not currently demonstrating evidence of cong or psychosis. She denies feelings of depression, but does endorse recent anxiety,  which she reports had become more of an issue when she initially tried to discontinue olanzapine. She plans to speak with her  regarding whether she wants to stay on observation or discharge home with medication.     Past Medical History  Past Medical History:   Diagnosis Date    Atrial fibrillation (H)     s/p ablasion.    Hypertension     Iritis      Past Surgical History:   Procedure Laterality Date    CATARACT IOL, RT/LT Left 2006    HYSTERECTOMY  2016    LUMPECTOMY BREAST WITH SENTINEL NODE, COMBINED Right 8/31/2018    Procedure: COMBINED LUMPECTOMY BREAST WITH SENTINEL NODE;  Right Wire Localized Lumpectomy and Right [place] Lymph Node Biopsy;  Surgeon: Chilango Kruger MD;  Location: [facility] OR    OOPHORECTOMY  2014     amLODIPine (NORVASC) 5 MG tablet  anastrozole (ARIMIDEX) 1 MG tablet  atorvastatin (LIPITOR) 10 MG tablet  calcium carbonate (OS-PADDY 500 MG [place]. CA) 500 MG tablet  LORazepam (ATIVAN) 0.5 MG tablet  LYSINE PO  Multiple Vitamins-Minerals (PRESERVISION AREDS 2 PO)  multivitamin w/minerals (THERA-VIT-M) tablet  OLANZapine (ZYPREXA) 2.5 MG tablet  propranolol (INDERAL) 20 MG tablet  rivaroxaban ANTICOAGULANT (XARELTO) 20 MG TABS tablet  VITAMIN D, CHOLECALCIFEROL, PO      Allergies   Allergen Reactions    Corticosteroids Dermatitis     Proven allergic contact dermatitis to corticosteroid (group A, B, D2)     CAN USE as ALTERNATIVE following steroids: Corticosteroids of group C (e.g.Betamethasone, Dexamethasone, Flumethasone pivalate, Halomethasone)   and group D1 (Betamethasone dipropionate, Betamethasone-17-valerate,Clobetasole-propionate, Fluticasone propionate, Mometasone furoate)    Neomycin Dermatitis     Patch tests positives to Neomycin and Framycetin    Sulfa Antibiotics Itching and Rash    Cephalexin GI Disturbance     Other reaction(s): reflux    Codeine Nausea and Vomiting    Nitrofurantoin Nausea and Vomiting and Nausea    Silver Rash     Family History  History reviewed. No  "pertinent family history.  Social History   Social History     Tobacco Use    Smoking status: Never    Smokeless tobacco: Never   Substance Use Topics    Alcohol use: Not Currently     Alcohol/week: 3.0 standard drinks of alcohol     Types: 3 Standard drinks or equivalent per week     Comment: rarely. Patient drinks occasionally, but is currently abstaining due to new medication    Drug use: No      Past medical history, past surgical history, medications, allergies, family history, and social history were reviewed with the patient. No additional pertinent items.       Review of Systems  A medically appropriate review of systems was performed with pertinent positives and negatives noted in the HPI, and all other systems negative.    Physical Examination   BP: (!) 149/72  Pulse: 79  Temp: 97.8  F (36.6  C)  Resp: 18  Height: 170.2 cm (5' 7\")  Weight: 66.7 kg (147 lb)  SpO2: 97 %    Physical Exam  General: Appears stated age.   Neuro: Alert and fully oriented. Extremities appear to demonstrate normal strength on visual inspection.   Integumentary/Skin: no rash visualized, normal color    Psychiatric Examination   Appearance: awake, alert, adequately groomed, appeared as age stated, and casually dressed  Attitude:  cooperative  Eye Contact:  intense  Mood:  anxious  Affect:  mood congruent and intensity is blunted  Speech:  clear, coherent  Psychomotor Behavior:  no evidence of tardive dyskinesia, dystonia, or tics  Thought Process:   perseverative  Associations:  no loose associations  Thought Content:  no evidence of suicidal ideation or homicidal ideation and no evidence of psychotic thought  Insight:  fair  Judgement:  fair  Oriented to:  time, person, and place  Attention Span and Concentration:  poor  Recent and Remote Memory:  fair  Language: able to name/identify objects without impairment  Fund of Knowledge: intact with awareness of current and past events    ED Course        Labs Ordered and Resulted from " Time of ED Arrival to Time of ED Departure - No data to display    Assessments & Plan (with Medical Decision Making)   Patient presenting with ongoing concern for insomnia. She was recently evaluated at the [facility] unit, where she was discharged with lorazepam, propranolol, and olanzapine. She slept poorly last night and is seeking further guidance for medications. Nursing notes reviewed noting no acute issues.     I have reviewed the assessment completed by the [facility].     During the observation period, the patient did not require medications for agitation, and did not require restraints/seclusion for patient and/or provider safety.     The patient was found to have a psychiatric condition that would benefit from an observation stay in the emergency department for further psychiatric stabilization and/or coordination of a safe disposition. The observation plan includes serial assessments of psychiatric condition, potential administration of medications if indicated, further disposition pending the patient's psychiatric course during the monitoring period.     Preliminary diagnosis:    ICD-10-CM    1. Insomnia, unspecified type  G47.00       2. Anxiety  F41.9       3. Unspecified mood (affective) disorder (H24)  F39       4. Mild cognitive impairment  G31.84     r/o neurocognitive disorder           Treatment Plan:  - Will trial quetiapine in place of olanzapine for insomnia and mood stabilization. Stop olanzapine and start quetiapine 25 mg at bedtime. In addition, will order quetiapine 25 mg at bedtime PRN for insomnia, if the initial 25 mg dose is ineffective after about 1 hour.   - Continue lorazepam 0.5 mg q6h prn for severe anxiety.   - Continue propranolol 10-20 mg bid prn for anxiety  - Pt to follow-up with outpatient psychiatrist and individual therapist  - Pt will remain on observation overnight with plan to discharge home in the morning if she obtained adequate sleep. If she does not sleep well tonight, she  may stay to meet with tomorrow's provider.         After a period of working with the treatment team on the [facility] unit, the patient's mental state improved to allow a safe transition to outpatient care. After counseling on the diagnosis, work-up, and treatment plan, the patient was discharged. Close follow-up with a psychiatrist and/or therapist was recommended and community psychiatric resources were provided. Patient is to return to the ED if any urgent or potentially life-threatening concerns.     At the time of discharge, the patient's acute suicide risk was determined to be low due to the following factors: Reduction in the intensity of mood/anxiety symptoms that preceded the admission, denial of suicidal thoughts, denies feeling helpless or helpless, not currently under the influence of alcohol or illicit substances, denies experiencing command hallucinations, no immediate access to firearms. The patient's acute risk could be higher if noncompliant with their treatment plan, medications, follow-up appointments or using illicit substances or alcohol. Protective factors include: social supports, stable housing      --  Ronal Ruffin CNP   [facility] EMERGENCY DEPT  [facility] Unit      Ronal Ruffin CNP  10/07/23 1936

## 2023-10-07 NOTE — ED PROVIDER NOTES
"  History     Chief Complaint:  Insomnia       The history is provided by the patient and the spouse.      Keely Arana is a 68 year old female who presents to the ED with Insomnia. Patient reports she has had trouble sleeping and has not slept last night. Patient's partner reports her insomnia has been present since last Tuesday (09/26) and Thursday of that week the patient stayed in [facility] for 4 days. During [facility], patient was given Ativan and Trazodone. Patient reports she was able to sleep while in [facility] last week. She reports that the medication Mirtazapine that her psychiatrist gave her did not help her insomnia, but instead worsened it and was awake for 60+ hours before presenting to the ED last week.  reports patient would use Zyprexa prior to all her recent insomnia problems. Patient reports normal appetite. Denies history of bipolar disorder. The patient denies any physical symptoms.     Independent Historian:    gave supplemental history    Review of External Notes:      Allergies:  Corticosteroids  Neomycin  Sulfa Antibiotics  Cephalexin  Codeine  Nitrofurantoin  Silver     Medications:    Norvasc  Arimidex  Lipitor  Ativan  Zyprexa  Inderal  Xarelto    Past Medical and Surgical History:    Afib  Hypertension  Iritis    Past Surgical History:  Cataract  Hysterectomy  Lumpectomy breast  Oophorectomy    Social History:  She presents to ED with her . She arrived via private vehicle. Denies tobacco use and recent alcohol use. Denies using drugs  PCP: Maulik Nye     Physical Exam   Patient Vitals for the past 24 hrs:   BP Temp Temp src Pulse Resp SpO2 Height Weight   10/07/23 1152 (!) 142/89 -- -- -- -- -- -- 68 kg (150 lb)   10/07/23 1133 (!) 160/90 98.3  F (36.8  C) -- 79 18 96 % -- --   10/07/23 1014 (!) 149/72 97.8  F (36.6  C) Temporal 79 18 97 % 1.702 m (5' 7\") 66.7 kg (147 lb)        General: The patient is resting comfortably on the gurney.  Head:  The scalp, " face, and head appear normal  Eyes:  The pupils are equal, round, and reactive to light    There is no nystagmus    Extraocular muscles are intact    Conjunctivae and sclerae are normal  ENT:    The nose is normal    Pinnae are normal    The oropharynx is normal  Neck:  Normal range of motion    There is no rigidity noted  CV:  Regular rate  Normal underlying rhythm     Normal S1/S2    No pathological murmur detected  Resp:  Lungs are clear    There is no tachypnea    Non-labored    No rales    No wheezing   GI:  Abdomen is soft, there is no rigidity    No distension/tympani    No rebound tenderness     Non-surgical without peritoneal features at this time  MS:  Normal muscular tone    Symmetric motor strength    No major joint effusions  Skin:  No rash or acute skin lesions noted  Neuro:  Normal and fluent speech    No motor deficits  Psych: Awake. Alert.  Flat affect.  Appears tired.  No suicidal or homicidal ideation.  No acute psychotic features.      Emergency Department Course      Emergency Department Course & Assessments:         Independent Interpretation of Radiology Studies:  None    Assessments/Consultations/Discussion of Management:  1054 I obtained history and examined the patient as noted above. I explained findings to the patient and we discussed plan for transfer to [facility]. The patient is comfortable with this plan.    Disposition:  The patient was transferred to [facility].    Impression & Plan        Medical Decision Making:  This patient presents to the emergency department with ongoing difficulties with insomnia.  She is having trouble falling asleep and staying asleep.  [facility] medication titration and management.  Patient feels like things have not really improved so she came back [facility] and management.  There are no new medical problems.  Physical examination is within  normal limits.  She is clinically cleared from a medical standpoint and is eligible for return to the [facility] unit.      Diagnosis:    ICD-10-CM    1. Insomnia, unspecified type  G47.00          Scribe Disclosure:  I, Radha Govea, am serving as a scribe at 11:16 AM on 10/7/2023 to document services personally performed by Esvin Quinones MD based on my observations and the provider's statements to me.     I, Janet Gutierrez, am serving as a scribe at 12:07 PM on 10/7/2023 to document services personally performed by Esvin Quinones MD based on my observations and the provider's statements to me.    10/7/2023   Esvin Quinones MD Rock, Michael P, MD  10/07/23 1542

## 2023-10-07 NOTE — PROGRESS NOTES
RICK HAMMER (Spouse [phone] -- [phone]       The following information was received from Rick whose relationship to the patient is spouse. Information was obtained via phone. Their phone number is  and they last had contact with patient on today.    What happened today: She is back to not thinking straight and not able to make decisions.  She again is not sleeping and needs something else for support due to her lack of sleep.    What is different about patient's functioning:   Picked her up on Sunday after 3 nights.   Were the medications upon discharge: Zyprexa, Ativan, Propanlol  Sunday night she slept  Monday-Wednesday not slept well  Increased Zyprexa from 2.5 to 5 Thursday in combo with Ativan and Propanolol given Thursday, Friday, Saturday; only slept well Thursday.  (She notes that she didn't sleep well even though  reports snoring the whole night)  Didn't sleep well again Friday and Saturday     reports that medications in the AM for anxiety seems to help through out the day.    No current concerns of cong sx.    Concern about alcohol/drug use: No    What do you think the patient needs: Sleep medications, reduction of anxiety     Has patient made comments about wanting to kill themselves/others:  No    If d/c is recommended, can they take part in safety/aftercare planning: Yes .    Other information: n/a

## 2023-10-07 NOTE — ED TRIAGE NOTES
Pt comes in with  with c/o insomnia. Had a recent stay in the [facility] unit and was discharged on Sunday evening. Since then pt has gotten little to no sleep with the prescribed meds she was sent home with. Pt states she is regretful that she left [facility] and now thinks that it was too early. Pt is A&O x4, answering questions appropriately.

## 2023-10-08 PROCEDURE — G0378 HOSPITAL OBSERVATION PER HR: HCPCS

## 2023-10-08 PROCEDURE — 99232 SBSQ HOSP IP/OBS MODERATE 35: CPT

## 2023-10-08 PROCEDURE — 250N000013 HC RX MED GY IP 250 OP 250 PS 637

## 2023-10-08 PROCEDURE — 250N000013 HC RX MED GY IP 250 OP 250 PS 637: Performed by: NURSE PRACTITIONER

## 2023-10-08 RX ORDER — RAMELTEON 8 MG/1
8 TABLET ORAL AT BEDTIME
Status: DISCONTINUED | OUTPATIENT
Start: 2023-10-08 | End: 2023-10-10 | Stop reason: HOSPADM

## 2023-10-08 RX ADMIN — PROPRANOLOL HYDROCHLORIDE 10 MG: 10 TABLET ORAL at 11:37

## 2023-10-08 RX ADMIN — RIVAROXABAN 20 MG: 20 TABLET, FILM COATED ORAL at 18:40

## 2023-10-08 RX ADMIN — CALCIUM 1000 MG: 500 TABLET ORAL at 18:40

## 2023-10-08 RX ADMIN — QUETIAPINE FUMARATE 25 MG: 25 TABLET ORAL at 00:10

## 2023-10-08 RX ADMIN — Medication 25 MCG: at 18:40

## 2023-10-08 RX ADMIN — ANASTROZOLE 1 MG: 1 TABLET, COATED ORAL at 18:41

## 2023-10-08 RX ADMIN — ATORVASTATIN CALCIUM 10 MG: 10 TABLET, FILM COATED ORAL at 18:40

## 2023-10-08 RX ADMIN — AMLODIPINE BESYLATE 5 MG: 5 TABLET ORAL at 08:55

## 2023-10-08 RX ADMIN — Medication 2 CAPSULE: at 18:41

## 2023-10-08 RX ADMIN — LORAZEPAM 0.5 MG: 0.5 TABLET ORAL at 03:51

## 2023-10-08 RX ADMIN — Medication 0.5 MG: at 20:00

## 2023-10-08 RX ADMIN — RAMELTEON 8 MG: 8 TABLET ORAL at 21:07

## 2023-10-08 RX ADMIN — QUETIAPINE FUMARATE 25 MG: 25 TABLET ORAL at 21:07

## 2023-10-08 ASSESSMENT — ACTIVITIES OF DAILY LIVING (ADL)
ADLS_ACUITY_SCORE: 35

## 2023-10-08 NOTE — PROGRESS NOTES
"Pt approached writer in the common area and asked to speak about information she'd neglected to disclose during initial clinical interview/crisis assessment. Pt disclosed information primarily pertaining to her initial first and only manic episode in March, and the subsequent consequences/outcomes of that experience both practically, emotionally, and medically (with the addition of new medications).  Therapist has added relevant content to the crisis assessment consult note.    Additionally, as they were leaving the meeting room, nurse, writer and patient reviewed plan for pt to stay on obs tonight and try Seroquel. RN informed pt the psych provider had written up discharge paperwork for her so she could leave in the morning if the medication was effective.    Pt became very anxious and worried about this plan. She asked to review with writer several times the implications of the plan. Pt said she was \"concerned\" repeatedly. She requested to not be discharged tonight, even if she asks, saying she' hardly slept and she'll worry she'll ask for something that should not happen (e.g. to go home). She does not want to be discharged without the effectiveness of the medication being assessed in the morning. She is very [facility] without her insomnia issues being resolved. Having discharge paperwork ready is resulting in significant anxiety for her.  Writer and RN assured patient she would be monitored throughout the night, that she would not be discharged if medication was not helpful, and that she could be reassessed in the morning if she requests that.  Pt is worried she is not in her right mind because of so much insomnia and that she might ask for or demand to leave when she should not.    Writer relayed these concerns to psych provider, Ray EMMANUEL and RN.  "

## 2023-10-08 NOTE — ED NOTES
Pt remains calm and awake. Pt was given propranolol for high BP and heart rate that was helful to lower it.HR decrease from 119 to 90 and BP decrease from 144/78. Pt and  agreed for her to stay one more night to see the effectiveness of her sleep medication.

## 2023-10-08 NOTE — ED NOTES
Reports being unable to sleep and asking about her plan of care for sleep.  Offered patient second dose of Seroquel.

## 2023-10-08 NOTE — ED NOTES
Patient requesting dose of Ativan for sleep.  When she found out the dose was 0.5 mg she verbalized concern she was only getting a half dose.  Instructed 0.5 mg was the full dose ordered.

## 2023-10-08 NOTE — ED NOTES
Patient reports being wide awake and is concerned about not sleeping.  Discussed with patient she appeared to sleep after getting the second dose of Seroquel.  Patient asking similar questions repeatedly regarding her plan of care for sleep.  Discussed PRNs available with current plan and patient indecisive what to do.  Instructed patient to inform staff if she want to try one of the PRNs available.  Patient verbalized understanding.

## 2023-10-08 NOTE — ED NOTES
Pt is awake and continue to express concerns of anxiety and not able to sleep last night. Pt has been calm, pleasant and medication compliant. BP and HR has been elevated. Pt requested her Her heart rate be taking again at about 1130 and they were still elevated. Pt would likely benfit another night in [facility] to see if her sleeping medications can kick in.

## 2023-10-08 NOTE — PLAN OF CARE
Keely Arana  October 7, 2023  Plan of Care Hand-off Note     Patient Care Path: observation    Plan for Care:   Pt reports she was able to sleep two nights since being discharged from [facility] last Sunday (October 1st). She was able to sleep Sunday night (10/1/2023) and Thursday night (10/12/2023). Pt reports that, at last visit, she seemed to have been having a reaction to Mirtazapine, which she said Dr. Lissa Palomo had prescribed for help with sleep and mood. Pt denied SI/HI/AH/VH, substances. Pt reports that she decided with her  this morning to come back to [facility] b/c insomnia continues as a serious concern and they did not want to continue like this without medication adjustments and supports.  [facility], and says she should not be discharged until after assessing whether medication changes are actually effective (in the morning 10/8/2023 at earliest).    Identified Goals and Safety Issues:  pt seeks to have sleep schedule re-established, relief from insomnia     Overview: pt is seeking medication changes to support sleep routine after experiencing a multi-day episode of insomnia, following medication changes in the past month. These medications were originally started in the spring, following a manic episode. Olanzapine was effective in clearing the manic symptoms. When Mirtazapine was added for sleep and mood support, she had a paradoxical reaction and came to [facility] (last week). Insomnia symptoms continue.  Additionally, pt appears to have difficulty with attention, concentration, anxiety as well as insomnia.     Pt would like to stay on obs and get medication support for symptoms.      She reports she needs therapy and psychiatry recommendations for moving from Dr. Palomo's.  Geriatric Psychiatry recommendations at discharge would be helpful.           Legal Status: Legal Status at Admission: Voluntary/Patient has signed  consent for treatment    Psychiatry Consult: LIEN Argueta       Updated  RN regarding plan of care.           CLYDE SORIANO, DREWSW

## 2023-10-08 NOTE — PROGRESS NOTES
[facility] Progress Note    Client Name: Keely Arana  Date: October 8, 2023  Service Type:  Group Therapy  Session Start Time:  11:30  Session End Time: Noon  Session Length: 30 minutes  Attendees: Patient and other group members  Facilitator:me@     Topic:   Goal setting and gratitude.    Intervention:    Group process: support, challenge, affirm, psycho-education.     Response:  Patient did not participate in group.        DREW DukeSW

## 2023-10-08 NOTE — PROGRESS NOTES
"Keely Arana  October 8, 2023  Plan of Care Hand-off Note     Patient Care Path: observation    Plan for Care:   Pt reports she was able to sleep two nights since being discharged from [facility] last Sunday (October 1st). She was able to sleep Sunday night (10/1/2023) and Thursday night (10/12/2023). Pt reports that, at last visit, she seemed to have been having a reaction to Mirtazapine, which she said Dr. Lissa Palomo had prescribed for help with sleep and mood. Pt denied SI/HI/AH/VH, substances. Pt reports that she decided with her  this morning to come back to [facility] b/c insomnia continues as a serious concern and they did not want to continue like this without medication adjustments and supports.  Pt said she is worried she'll ask to be discharged tonight b/c there is D/C paperwork, and says she should not be discharged until after assessing whether medication changes are actually effective (in the morning 10/8/2023 at earliest).    Identified Goals and Safety Issues:      Overview:  Observation: A lower level of care has been unsuccessful in treating and stabilizing patient s mental health symptoms (insomnia). However, with brief observation, monitored therapeutic treatment, and intervention of mental health symptoms at [facility], symptoms may be mitigated with potential for disposition to a less restrictive level of care than an inpatient setting. Patient is not currently on the inpatient worklist. Next steps in care include: plan is for pt to sleep in Sensory Room B, with new medication supports.   If pt is not able to sleep tonight, provider recommends pt be reassessed tomorrow and considered for admission to .  Pt denies any MH symptoms, but if asked directly if she experiences anxiety, racing thoughts or low mood pt may respond \"no, I get along with everybody!\" Or \"yeah, that\" (with quick and sharp response as though she does not wish to give it much space or room for exploration). Pt seems to have " a lot of stigma attached to MH symptoms and does not wish to disclose or discuss.                Legal Status: Legal Status at Admission: Voluntary/Patient has signed consent for treatment    Psychiatry Consult: Hilda EMMANUEL        Updated RN  regarding plan of care.           CLYDE SORIANO, DREWSW

## 2023-10-08 NOTE — PROGRESS NOTES
"Triage & Transition Services [facility]     Progress Note    Patient: Jessica goes by \"Jessica,\" uses she/her pronouns  Date of Service: October 8, 2023  Site of Service:   Patient was seen in-person.     Presenting problem:  Please see initial [facility] Crisis Assessment completed by JAKOB Bradshaw on 10/7/2023 for complete assessment information. Notable concerns include insomnia.     Individuals Present: Jessica & JAKOB BRADSHAW    Session start: 1:23 pm  Session end: 1:40 pm  Session duration in minutes: 17  CPT utilized: 31302 - Psychotherapy (with patient) - 30 (16-37*) min    Current Presentation:   Pt presented similar to yesterday. She reported she had still not slept, that she was very tired, having trouble thinking and with memory because she is so tired. She also asked the same questions repeatedly and came back to concerns shared yesterday as well, even after writer had addressed the concerns numerous times (as though she was not holding on to the information). Pt is concerned we will discharge her without resolving her insomnia; pt is concerned to stay on medication that will ensure her cong does not return; pt is very concerned to know why this medication impacted her this way, why the insomnia started; pt is very concerned to know she is not exceptional, that she is not receiving different care or treatment protocols than other patients.       Additional Collateral Information:  None gathered today     Mental Status Exam     Affect: Appropriate and Flat   Appearance: Appropriate  (in hospital scrubs)  Attention Span/Concentration: Attentive and Other: forgetful   Eye Contact: Engaged   Fund of Knowledge: Appropriate    Language /Speech Content: Fluent   Language /Speech Volume: Normal    Language /Speech Rate/Productions: Articulate    Recent Memory: Variable   Remote Memory: Variable   Mood: Anxious    Orientation to Person: Yes    Orientation to Place: Yes   Orientation to Time of Day: Yes  "   Orientation to Date: Yes    Situation (Do they understand why they are here?): Yes    Psychomotor Behavior: Normal    Thought Content: Clear   Thought Form: Goal Directed and Tangential     Diagnosis:   Patient Active Problem List   Diagnosis Code    Symptomatic varicose veins of both lower extremities I83.893    Malignant neoplasm of upper-outer quadrant of right breast in female, estrogen receptor positive (H) C50.411, Z17.0    Abnormal bone density screening R93.7    Long term (current) use of aromatase inhibitors Z79.811    History of cong Z86.59    Insomnia, unspecified type G47.00    Anxiety F41.9    Major depression F32.9    Mild cognitive impairment G31.84    Unspecified mood (affective) disorder (H24) F39         Primary Problem This Admission  Active Hospital Problems    *Mild cognitive impairment       Unspecified mood (affective) disorder (H24)       Insomnia, unspecified type       Anxiety    Therapeutic Intervention(s):   Provided active listening, unconditional positive regard, and validation. Engaged in guided discovery, explored patient's perspectives and helped expand them through socratic dialogue.    Treatment Objective(s) Addressed:   The focus of this session was on processing feelings related to insomnia and current symptoms, experience with medical changes, identifying an appropriate aftercare plan, identifying treatment goals, and identifying additional supports, psychoeducation.     Progress Towards Goals:   Patient reports worsening symptoms. Patient is not making progress towards treatment goals as evidenced by continued insomnia.     Case Management:   N/A    Plan and Clinical Summary and Substantiation of Recommendations:   Observation: A lower level of care has been unsuccessful in treating and stabilizing patient s mental health symptoms (insomnia). However, with brief observation, monitored therapeutic treatment, and intervention of mental health symptoms at [facility], symptoms may  "be mitigated with potential for disposition to a less restrictive level of care than an inpatient setting. Patient is not currently on the inpatient worklist. Next steps in care include pt will sleep in Sensory Room B, with new medication supports.   If pt is not able to sleep tonight, provider recommends pt be reassessed tomorrow and considered for admission to IP.  Pt denies any MH symptoms, but if asked directly if she experiences anxiety, racing thoughts or low mood pt may respond \"no, I get along with everybody!\" Or \"yeah, that\" (with quick and sharp response as though she does not wish to give it much space or room for exploration). Pt seems to have a lot of stigma attached to MH symptoms and does not wish to disclose or discuss.       Attending concurs with disposition: Yes  Hilda SORIANO, JAKOB           "

## 2023-10-08 NOTE — CONSULTS
"Diagnostic Evaluation Consultation  Crisis Assessment    Patient Name: Keely Arana \"Jessica\"  Age:  68 year old  Legal Sex: female  Gender Identity: female  Pronouns: she/her/hers  Race: White  Ethnicity: Not  or   Language: English      Patient was assessed: In person      Patient location: [facility] EMERGENCY DEPT                             [id number]    Referral Data and Chief Complaint  Keely Arana presents to the ED with family/friends. Patient is presenting to the ED for the following concerns: Anxiety, Health stressors, Memory concerns, Other (Insomnia).   Factors that make the mental health crisis life threatening or complex are:  Pt reports she was able to sleep two nights since being discharged from [facility] last Sunday (October 1st). She was able to sleep Sunday night (10/1/2023) and Thursday night (10/5/2023).  Pt reports that, at last visit, she seemed to have been having a reaction to Mirtazapine, which she said Dr. Lissa Palomo had prescribed for help with sleep and mood.  Pt denied SI/HI/AH/VH, substances. Pt reports that she decided with her  this morning to come back to [facility] b/c insomnia continues as a serious concern and they did not want to continue like this without medication adjustments and supports.      Informed Consent and Assessment Methods  Explained the crisis assessment process, including applicable information disclosures and limits to confidentiality, assessed understanding of the process, and obtained consent to proceed with the assessment.  Assessment methods included conducting a formal interview with patient, review of medical records, collaboration with medical staff, and obtaining relevant collateral information from family and community providers when available.  : done     Patient response to interventions: eager to participate, verbalizes understanding  Coping skills were attempted to reduce the crisis:  Voluntarily presented to the " "ED for [facility]     History of the Crisis   Pt reports she experienced a significant and sudden change/onset of insomnia after starting Mirtazapine just prior to last [facility] visit at the end of September 2023.  Pt reports no previous history of similar insomnia episodes.  Pt is  to her current  Terry for the past few years. They met after the deaths of their spouses to cancer over a decade ago. Pt reports she had some counseling after her 's death, and just a couple of visits with a current provider that Dr. Palomo referred her to. Pt does request a referral for an in-person therapist (she provided more details on what she is seeking).  It is very clear in clinical interview that pt is \"overwhelemed\" and intimidated by medical systems' and their response norms. She'd felt last week that [facility] was \"too much,\" worried that it was \"too much\" this time as well.  However, she and her family agree she needs support with symptoms.  Pt relies signifcantly on her daughter Mandy (mid 40s) and her opinions about meds since they 'have the same responses, side effects, and impacts' of medcations. Pt asked repeatedly for an explanation why Mirtazapine would cause insomnia for her when it was supposed to have the oppisite effect. She is looking for a med adjustment and med changes to support her resuming a regular sleep pattern.  She reported that in March of this year she had a manic episode that involved not sleeping, binging and purging belongings from her house, odd behaviors for her. Pt was prescribed Olanzapine, started at 2.5 mg and increased to 5 mg. This was effective in clearing her manic symptoms and she returned to baseline. Pt reports regrets for her behaviors while manic and states she does not want to return to a manic episode ever again. She denies history of depression episodes (though her chart reveals otherwise).  She denies any bipolar disorder in her family. Pt says she had psych testing " "after her manic episode and it did not indicate bipolar disorder.     Brief Psychosocial History  Family:  , Children yes  Support System:  , Children  Employment Status:  retired (retired )  Source of Income:  unknown  Financial Environmental Concerns:  No concerns identified  Current Hobbies:  group/social activities, family functions  Barriers in Personal Life:       Significant Clinical History  Current Anxiety Symptoms:  racing thoughts, anxious  Current Depression/Trauma:     Current Somatic Symptoms:  racing thoughts (insomnia)  Current Psychosis/Thought Disturbance:  forgetful  Current Eating Symptoms:     Chemical Use History:      Past diagnosis:  Anxiety Disorder, Depression  Family history:  No known history of mental health or chemical health concerns  Past treatment:  Psychiatric Medication Management, Individual therapy, Primary Care  Details of most recent treatment:  Pt reports she is currently seeing Dr. Lissa Palomo for short-term psychiatry, she will need a new provider as Dr. Palomo will only see her a few times.  Also, pt has seen a therapist, [place], through Dr. Palomo a few times. She reports the therapist is \"great, no complaints, she's really good at her job\" but also that it does not feel like a good fit, she's somewhat difficult for pt to talk to, partly because of the telehealth platform, partially just because of fit. Pt reports prior to this she has only had \"a little bit of counseling\" after her   of cancer over a decade ago.  Other relevant history:  chart review shows history of anxiety and manic episode in the past       Collateral Information  Is there collateral information: Yes (Taken by Priscilla Craven [facility] (see chart notes))     Collateral information name, relationship, phone number:  LISA HAMMER (Spouse [phone] -- [phone]    What happened today: She is back to not thinking straight and not able to make decisions. She again is " not sleeping and needs something else for support due to her lack of sleep.     What is different about patient's functioning: Picked her up on Sunday after 3 nights.   Were the medications upon discharge: Zyprexa, Ativan, Propanlol  Sunday night she slept  Monday-Wednesday not slept well  Increased Zyprexa from 2.5 to 5 Thursday in combo with Ativan and Propanolol given Thursday, Friday, Saturday; only slept well Thursday.  (She notes that she didn't sleep well even though  reports snoring the whole night)  Didn't sleep well again Friday and Saturday      reports that medications in the AM for anxiety seems to help through out the day.     No current concerns of cong sx.     Concern about alcohol/drug use:  No    What do you think the patient needs: Sleep medications, reduction of anxiety    Has patient made comments about wanting to kill themselves/others: no    If d/c is recommended, can they take part in safety/aftercare planning:  yes    Additional collateral information:       Risk Assessment  [place] Suicide Severity Rating Scale Full Clinical Version:             [place] Suicide Severity Rating Scale Recent:   Suicidal Ideation (Recent)  Q1 Wished to be Dead (Past Month): no  Q2 Suicidal Thoughts (Past Month): no  Q3 Suicidal Thought Method: no  Q4 Suicidal Intent without Specific Plan: no  Q5 Suicide Intent with Specific Plan: no  Level of Risk per Screen: low risk     Suicidal Behavior (Recent)  Actual Attempt (Past 3 Months): No  Has subject engaged in non-suicidal self-injurious behavior? (Past 3 Months): No  Interrupted Attempts (Past 3 Months): No  Aborted or Self-Interrupted Attempt (Past 3 Months): No  Preparatory Acts or Behavior (Past 3 Months): No    Environmental or Psychosocial Events:    Protective Factors: Protective Factors: strong bond to family unit, community support, or employment, intact marriage or domestic partnership, lives in a responsibly safe and stable  "environment, good treatment engagement, responsibilities and duties to others, including pets and children, sense of importance of health and wellness, supportive ongoing medical and mental health care relationships, help seeking, sense of belonging, optimistic outlook - identification of future goals    Does the patient have thoughts of harming others? Feels Like Hurting Others: no  Previous Attempt to Hurt Others: no  Is the patient engaging in sexually inappropriate behavior?: no    Is the patient engaging in sexually inappropriate behavior?  no        Mental Status Exam   Affect: Appropriate  Appearance: Appropriate  Attention Span/Concentration: Attentive, Other (please comment) (at times it appeared pt was \"lost\" in the conversation)  Eye Contact: Engaged    Fund of Knowledge: Appropriate   Language /Speech Content: Fluent  Language /Speech Volume: Normal  Language /Speech Rate/Productions: Normal  Recent Memory: Variable (Pt reported difficulty remmebering things b/c she is so tired.)  Remote Memory: Variable (pt could not remember what year she and her  were . She said it was difficult to rember things because she was so tired.)  Mood: Anxious, Normal  Orientation to Person: Yes   Orientation to Place: Yes  Orientation to Time of Day: Yes  Orientation to Date: Yes     Situation (Do they understand why they are here?): Yes  Psychomotor Behavior: Normal  Thought Content: Clear  Thought Form: Intact, Tangential     Mini-Cog Assessment  Number of Words Recalled:    Clock-Drawing Test:     Three Item Recall:    Mini-Cog Total Score:       Medication  Psychotropic medications:   Medication Orders - Psychiatric (From admission, onward)      Start     Dose/Rate Route Frequency Ordered Stop    10/07/23 2200  QUEtiapine (SEROquel) tablet 25 mg         25 mg Oral AT BEDTIME 10/07/23 1814      10/07/23 1814  QUEtiapine (SEROquel) tablet 25 mg         25 mg Oral AT BEDTIME PRN 10/07/23 1814      10/07/23 " 1721  LORazepam (ATIVAN) tablet 0.5 mg         0.5 mg Oral EVERY 6 HOURS PRN 10/07/23 1723      10/07/23 0000  QUEtiapine (SEROQUEL) 25 MG tablet            10/07/23 1947               Current Care Team  Patient Care Team:  Maulik Nye MD as PCP - General (Internal Medicine)  Esvin Schwartz MD as MD (Radiology)  Merari Tafoya MD as MD (Breast Oncology)  Merari Tafoya MD as Assigned Cancer Care Provider  Maulik Nye MD as Assigned PCP  Reema Palomo DO as Assigned Behavioral Health Provider  Dr. Nye as PCP  [place] as Therapist    Diagnosis  Patient Active Problem List   Diagnosis Code    Symptomatic varicose veins of both lower extremities I83.893    Malignant neoplasm of upper-outer quadrant of right breast in female, estrogen receptor positive (H) C50.411, Z17.0    Abnormal bone density screening R93.7    Long term (current) use of aromatase inhibitors Z79.811    History of cong Z86.59    Insomnia, unspecified type G47.00    Anxiety F41.9    Major depression F32.9    Mild cognitive impairment G31.84    Unspecified mood (affective) disorder (H24) F39       Primary Problem This Admission  Active Hospital Problems    *Mild cognitive impairment      Unspecified mood (affective) disorder (H24)      Insomnia, unspecified type      Anxiety        Clinical Summary and Substantiation of Recommendations   Pt reports she was able to sleep two nights since being discharged from [facility] last Sunday (October 1st). She was able to sleep Sunday night (10/1/2023) and Thursday night (10/12/2023). Pt reports that, at last visit, she seemed to have been having a reaction to Mirtazapine, which she said Dr. Lissa Palomo had prescribed for help with sleep and mood. Pt denied SI/HI/AH/VH, substances. Pt reports that she decided with her  this morning to come back to [facility] b/c insomnia continues as a serious concern and they did not want to continue like this  without medication adjustments and supports.  Pt said she is worried she'll ask to be discharged tonight b/c there is D/C paperwork, and says she should not be discharged until after assessing whether medication changes are actually effective (in the morning 10/8/2023 at earliest). She is very nervous about leaving [facility] too early and feels she left too early last time.                          Patient coping skills attempted to reduce the crisis:  Voluntarily presented to the ED for [facility]    Disposition  Recommended disposition: Medication Management, Individual Therapy, Observation        Reviewed case and recommendations with attending provider. Attending Name: Ray EMMANUEL       Attending concurs with disposition: yes       Patient and/or validated legal guardian concurs with disposition:   yes       Final disposition:  observation    Legal status on admission: Voluntary/Patient has signed consent for treatment    Assessment Details   Total duration spent with the patient: 105 min (pt engaged writer in the common area and asked to meet for additional clinical information, met for 45 minutes with new information and reviewing provider's recommendations)     CPT code(s) utilized: 81026 - Psychotherapy for Crisis - 60 (30-74*) min, 27670 - Psychotherapy for Crisis (Each additional 30 minutes) - 30 min    JAKOB BRADSHAW, Psychotherapist  [facility] - Triage & Transition Services  Callback: [phone]

## 2023-10-08 NOTE — ED PROVIDER NOTES
[facility] - Psychiatric Consultation  [facility] Emergency Department    Keely Arana MRN: [id number]   Age: 68 year old YOB: 1955     History     Chief Complaint   Patient presents with    Insomnia     HPI  Keely Arana is a 68 year old female with history notable for insomnia, mood disorder, anxiety, and HTN who presented to the emergency department with complaint of ongoing insomnia. Patient was recently evaluated at [facility] with instructions to take olanzapine and lorazepam at home yet results have been inconsistent for sleep. She is followed by Dr. Palomo in the outpatient setting. On her current presentation, patient was medically evaluated in the emergency department and determined to be medically stable for transfer to [facility] for further assessment. She was initially seen by Ray Ruffin CNP and seroquel was utilized instead of zyprexa. Patient recalls getting approximately 3 hours of sleep last night, nursing notes indicate that patient was offered 2nd dose of seroquel and prn ativan in order to sleep.    Today, Jessica reports that she's unsure if medications were helpful last night. She initially denies all mental health concerns, aside from insomnia, yet upon further discussion she does endorse anxiety and racing thoughts. These are more prominent at night when she's unable to sleep. She recalls that olanzapine and ativan have been helpful in the past. She repeatedly asked about what medications she would discharge home with, I discussed that we would wait to see what medications were most helpful before determining discharge meds. She asks to clarify medications numerous times yet requests for this to be repeated several minutes later.     Past Medical History  Past Medical History:   Diagnosis Date    Atrial fibrillation (H)     s/p ablasion.    Hypertension     Iritis      Past Surgical History:   Procedure Laterality Date    CATARACT IOL, RT/LT Left 2006    HYSTERECTOMY  2016     LUMPECTOMY BREAST WITH SENTINEL NODE, COMBINED Right 8/31/2018    Procedure: COMBINED LUMPECTOMY BREAST WITH SENTINEL NODE;  Right Wire Localized Lumpectomy and Right [place] Lymph Node Biopsy;  Surgeon: Chilango Kruger MD;  [facility]    OOPHORECTOMY  2014     amLODIPine (NORVASC) 5 MG tablet  anastrozole (ARIMIDEX) 1 MG tablet  atorvastatin (LIPITOR) 10 MG tablet  calcium carbonate (OS-PADDY 500 MG [place]. [place]) 500 MG tablet  LORazepam (ATIVAN) 0.5 MG tablet  LYSINE PO  Multiple Vitamins-Minerals (PRESERVISION AREDS 2 PO)  multivitamin w/minerals (THERA-VIT-M) tablet  propranolol (INDERAL) 20 MG tablet  QUEtiapine (SEROQUEL) 25 MG tablet  rivaroxaban ANTICOAGULANT (XARELTO) 20 MG TABS tablet  VITAMIN D, CHOLECALCIFEROL, PO      Allergies   Allergen Reactions    Corticosteroids Dermatitis     Proven allergic contact dermatitis to corticosteroid (group A, B, D2)     CAN USE as ALTERNATIVE following steroids: Corticosteroids of group C (e.g.Betamethasone, Dexamethasone, Flumethasone pivalate, Halomethasone)   and group D1 (Betamethasone dipropionate, Betamethasone-17-valerate,Clobetasole-propionate, Fluticasone propionate, Mometasone furoate)    Neomycin Dermatitis     Patch tests positives to Neomycin and Framycetin    Sulfa Antibiotics Itching and Rash    Cephalexin GI Disturbance     Other reaction(s): reflux    Codeine Nausea and Vomiting    Nitrofurantoin Nausea and Vomiting and Nausea    Silver Rash     Family History  History reviewed. No pertinent family history.  Social History   Social History     Tobacco Use    Smoking status: Never    Smokeless tobacco: Never   Substance Use Topics    Alcohol use: Not Currently     Alcohol/week: 3.0 standard drinks of alcohol     Types: 3 Standard drinks or equivalent per week     Comment: rarely. Patient drinks occasionally, but is currently abstaining due to new medication    Drug use: No          Review of Systems  A medically appropriate review of systems was  "performed with pertinent positives and negatives noted in the HPI, and all other systems negative.    Physical Examination   BP: (!) 149/72  Pulse: 79  Temp: 97.8  F (36.6  C)  Resp: 18  Height: 170.2 cm (5' 7\")  Weight: 66.7 kg (147 lb)  SpO2: 97 %    Physical Exam  General: Appears stated age.   Neuro: Alert and fully oriented. Extremities appear to demonstrate normal strength on visual inspection.   Integumentary/Skin: no rash visualized, normal color    Psychiatric Examination   Appearance: awake, alert, adequately groomed, appeared as age stated, and casually dressed  Attitude:  cooperative  Eye Contact: good  Mood:  anxious  Affect:  mood congruent, blunted   Speech:  clear, coherent  Psychomotor Behavior:  no evidence of tardive dyskinesia, dystonia, or tics  Thought Process:   perseverative  Associations:  no loose associations  Thought Content:  no evidence of suicidal ideation or homicidal ideation and no evidence of psychotic thought  Insight:  fair  Judgement:  fair  Oriented to:  time, person, and place  Attention Span and Concentration:  poor  Recent and Remote Memory:  fair  Language: able to name/identify objects without impairment  Fund of Knowledge: intact with awareness of current and past events    ED Course        Labs Ordered and Resulted from Time of ED Arrival to Time of ED Departure - No data to display    Assessments & Plan (with Medical Decision Making)   Patient presenting with continued insomnia. She has been at [facility] several times over the past month with similar presentations. Past medication combinations include lorazepam, mirtazapine, propranolol, olanzapine, and seroquel. Following seroquel dosing last night, she continues to report poor sleep with difficulty falling to sleep and staying asleep. Patient appears to have difficulty grasping and retaining information, unclear if neurocognitive testing has been completed; patient would benefit from this following discharge. She's " requesting to try additional medication targeting sleep and remain at [facility] under observation. Nursing notes reviewed noting no acute issues.     I have reviewed the assessment completed by the [facility].     Preliminary diagnosis:    ICD-10-CM    1. Insomnia, unspecified type  G47.00       2. Anxiety  F41.9       3. Unspecified mood (affective) disorder (H24)  F39       4. Mild cognitive impairment  G31.84     r/o neurocognitive disorder           Treatment Plan:  -Continue seroquel 25mg at bedtime further targeting insomnia and mood stabilization   -Start Rozarem 8mg at bedtime targeting sleep onset insomnia    -Add melatonin 0.5 mg at 8PM for circadian rhythm maintenance  -Continue propranolol 10-20 mg bid prn for anxiety  -Pt to follow-up with outpatient psychiatrist and individual therapist  -Pt will remain on observation overnight with plan to be reassessed tomorrow   --  LIEN Crystal CNP   [facility] EMERGENCY DEPT  [facility] Unit       Hilda Rodriguez APRN CNP  10/08/23 [id number]

## 2023-10-09 PROCEDURE — G0378 HOSPITAL OBSERVATION PER HR: HCPCS

## 2023-10-09 PROCEDURE — 250N000013 HC RX MED GY IP 250 OP 250 PS 637: Performed by: NURSE PRACTITIONER

## 2023-10-09 PROCEDURE — 99232 SBSQ HOSP IP/OBS MODERATE 35: CPT | Performed by: NURSE PRACTITIONER

## 2023-10-09 PROCEDURE — 250N000013 HC RX MED GY IP 250 OP 250 PS 637

## 2023-10-09 RX ORDER — QUETIAPINE FUMARATE 50 MG/1
50 TABLET, FILM COATED ORAL AT BEDTIME
Status: DISCONTINUED | OUTPATIENT
Start: 2023-10-09 | End: 2023-10-10 | Stop reason: HOSPADM

## 2023-10-09 RX ADMIN — CALCIUM 1000 MG: 500 TABLET ORAL at 18:59

## 2023-10-09 RX ADMIN — RAMELTEON 8 MG: 8 TABLET ORAL at 22:11

## 2023-10-09 RX ADMIN — Medication 0.5 MG: at 19:59

## 2023-10-09 RX ADMIN — AMLODIPINE BESYLATE 5 MG: 5 TABLET ORAL at 08:22

## 2023-10-09 RX ADMIN — Medication 2 CAPSULE: at 18:58

## 2023-10-09 RX ADMIN — Medication 25 MCG: at 18:59

## 2023-10-09 RX ADMIN — ATORVASTATIN CALCIUM 10 MG: 10 TABLET, FILM COATED ORAL at 18:59

## 2023-10-09 RX ADMIN — QUETIAPINE FUMARATE 50 MG: 50 TABLET ORAL at 22:11

## 2023-10-09 RX ADMIN — RIVAROXABAN 20 MG: 20 TABLET, FILM COATED ORAL at 18:59

## 2023-10-09 RX ADMIN — ANASTROZOLE 1 MG: 1 TABLET, COATED ORAL at 18:59

## 2023-10-09 RX ADMIN — LORAZEPAM 0.5 MG: 0.5 TABLET ORAL at 22:14

## 2023-10-09 ASSESSMENT — ACTIVITIES OF DAILY LIVING (ADL)
ADLS_ACUITY_SCORE: 35

## 2023-10-09 NOTE — PROGRESS NOTES
Pt met with a LMHP, and the Provider. She will remain here overnight and plan for discharge tomorrow. Plan is to receive 50 mg of the Seroquel and the Rozerem at HS to get a good night sleep. Pt agrees with plan.

## 2023-10-09 NOTE — ED PROVIDER NOTES
[facility] Unit - Psychiatric Consultation  [facility] Emergency Department    Keely Arana MRN: [id number]   Age: 68 year old YOB: 1955     History     Chief Complaint   Patient presents with    Insomnia     HPI  Keely Arana is a 68 year old female with a past history notable for insomnia, mood disorder, anxiety, HTN. Patient presented to the emergency department with complaint of ongoing insomnia. Patient was recently evaluated at [facility] with instructions to take olanzapine and lorazepam at home. She is followed by Dr. Palomo in the outpatient setting. On her current presentation, patient was medically evaluated in the emergency department and determined to be medically stable for transfer to [facility] for further assessment. Patient has now been in the emergency department for 50 hours.     Patient seen for follow-up today. She endorses slightly improved sleep overnight last night. She reports the medications were helpful for the first half of the night, where she slept in the sensory room. She woke up feeling rather cold and came out to the recliner. Sleep was a bit restless in the recliner. She does feel more well-rested today. She continues to demonstrate slightly delayed responses with a blunted affect and constricted range. She continues to demonstrate memory deficits and has difficulty concentrating. She agrees to trial an increased dose of quetiapine in conjunction with ramelteon.     Past Medical History  Past Medical History:   Diagnosis Date    Atrial fibrillation (H)     s/p ablasion.    Hypertension     Iritis      Past Surgical History:   Procedure Laterality Date    CATARACT IOL, RT/LT Left 2006    HYSTERECTOMY  2016    LUMPECTOMY BREAST WITH SENTINEL NODE, COMBINED Right 8/31/2018    Procedure: COMBINED LUMPECTOMY BREAST WITH SENTINEL NODE;  Right Wire Localized Lumpectomy and Right [place] Lymph Node Biopsy;  Surgeon: Chilango Kruger MD;  [facility]    OOPHORECTOMY  2014      amLODIPine (NORVASC) 5 MG tablet  anastrozole (ARIMIDEX) 1 MG tablet  atorvastatin (LIPITOR) 10 MG tablet  calcium carbonate (OS-PADDY 500 MG [place]. [place]) 500 MG tablet  LORazepam (ATIVAN) 0.5 MG tablet  LYSINE PO  Multiple Vitamins-Minerals (PRESERVISION AREDS 2 PO)  multivitamin w/minerals (THERA-VIT-M) tablet  propranolol (INDERAL) 20 MG tablet  QUEtiapine (SEROQUEL) 25 MG tablet  rivaroxaban ANTICOAGULANT (XARELTO) 20 MG TABS tablet  VITAMIN D, CHOLECALCIFEROL, PO      Allergies   Allergen Reactions    Corticosteroids Dermatitis     Proven allergic contact dermatitis to corticosteroid (group A, B, D2)     CAN USE as ALTERNATIVE following steroids: Corticosteroids of group C (e.g.Betamethasone, Dexamethasone, Flumethasone pivalate, Halomethasone)   and group D1 (Betamethasone dipropionate, Betamethasone-17-valerate,Clobetasole-propionate, Fluticasone propionate, Mometasone furoate)    Neomycin Dermatitis     Patch tests positives to Neomycin and Framycetin    Sulfa Antibiotics Itching and Rash    Cephalexin GI Disturbance     Other reaction(s): reflux    Codeine Nausea and Vomiting    Nitrofurantoin Nausea and Vomiting and Nausea    Silver Rash     Family History  History reviewed. No pertinent family history.  Social History   Social History     Tobacco Use    Smoking status: Never    Smokeless tobacco: Never   Substance Use Topics    Alcohol use: Not Currently     Alcohol/week: 3.0 standard drinks of alcohol     Types: 3 Standard drinks or equivalent per week     Comment: rarely. Patient drinks occasionally, but is currently abstaining due to new medication    Drug use: No      Past medical history, past surgical history, medications, allergies, family history, and social history were reviewed with the patient. No additional pertinent items.       Review of Systems  A medically appropriate review of systems was performed with pertinent positives and negatives noted in the HPI, and all other systems  "negative.    Physical Examination   BP: (!) 149/72  Pulse: 79  Temp: 97.8  F (36.6  C)  Resp: 18  Height: 170.2 cm (5' 7\")  Weight: 66.7 kg (147 lb)  SpO2: 97 %    Physical Exam  General: Appears stated age.   Neuro: Alert and fully oriented. Extremities appear to demonstrate normal strength on visual inspection.   Integumentary/Skin: no rash visualized, normal color    Psychiatric Examination   Appearance: awake, alert, adequately groomed, dressed in hospital scrubs, and appeared as age stated  Attitude:  cooperative  Eye Contact:  fair  Mood:   okay  Affect:  mood congruent and intensity is blunted, range is constricted  Speech:  clear, coherent  Psychomotor Behavior:  no evidence of tardive dyskinesia, dystonia, or tics  Thought Process:   perseverative  Associations:  no loose associations  Thought Content:  no evidence of suicidal ideation or homicidal ideation and no evidence of psychotic thought  Insight:  fair  Judgement:  fair  Oriented to:  time, person, and place  Attention Span and Concentration:  poor  Recent and Remote Memory:  fair  Language: able to name/identify objects without impairment  Fund of Knowledge: intact with awareness of current and past events    ED Course        Labs Ordered and Resulted from Time of ED Arrival to Time of ED Departure - No data to display    Assessments & Plan (with Medical Decision Making)   Patient presenting with severe insomnia. Last night, she trialed ramelteon, which appeared to have been partially effective. She has been maintained on an antipsychotic at bedtime due to history of a manic episode earlier this year due to unknown etiology. There is concern for an evolving neurocognitive disorder. Nursing notes reviewed noting no acute issues.     I have reviewed the assessment completed by the [facility].     Preliminary diagnosis:    ICD-10-CM    1. Insomnia, unspecified type  G47.00       2. Anxiety  F41.9       3. Unspecified mood (affective) disorder (H24)  F39     "   4. Mild cognitive impairment  G31.84     r/o neurocognitive disorder           Treatment Plan:  - Will increase quetiapine from 25 mg to 50 mg at bedtime for insomnia and mood stabilization  - Continue ramelteon 8 mg nightly for insomnia  - Continue melatonin 0.5 mg at 2000 for circadian rhythm maintenance  - Continue propranolol 10-20 mg bid prn for anxiety  - Continue lorazepam 0.5 mg q6h prn for anxiety  - Recommend repeat outpatient neuropsychological testing for further evaluation of cognition. It appears that patient was demonstrating symptoms of cong and behavioral disinhibition during neuropsych testing earlier this year.   - Patient will remain on observation for an additional night while identifying an effective medication regimen her severe insomnia. Plan for discharge tomorrow.     --  Ronal Ruffin CNP   [facility] EMERGENCY DEPT  [facility] Unit      Ronal Ruffin CNP  10/09/23 [id number]

## 2023-10-09 NOTE — PROGRESS NOTES
Pt resting comfortably in recliner with TV on, sleeps off and on. She has been calm with no complaints.

## 2023-10-09 NOTE — PROGRESS NOTES
[facility] Progress Note    Client Name: Keely Arana  Date: October 9, 2023  Service Type:  Group Therapy  Session Start Time:  1130  Session End Time: 1210  Session Length: 40  Attendees: Patient and other group members  Facilitator: KOTA Parry LICSW     Topic:   Identify goals for the day, share a positive and challenging experience in the past 24 hours. Complete [employer] activity for a personality/world view assessment    Intervention:    Group process: support, challenge, affirm, psycho-education.     Response:  Patient sat in group but minimally engaged, asking often if it was okay if she passed her turn to share.  Patient would occasionally add something to the conversation in support of a peer.  Behavior in group was pleasant when chose to engage.     JAKOB Parry

## 2023-10-09 NOTE — PROGRESS NOTES
Pt has been resting off and on and is visible on the unit. She reports getting up in the NOC due to the Sensory room being cold, but feels she did get a little better nights sleep last night. Pt is calm and cooperative, insomnia has been her only concern, no SI/HI, or hallucinations She is repetitive with questions and forgetful at times. She has had breakfast, seems to be tolerating medications, she is hoping to go home today.

## 2023-10-09 NOTE — ED NOTES
Noted to be awake during rounding.  Is asking what else she can have for sleep.  Discussed PRNs available.  Patient moved out of sensory room as she reports not liking being in there.

## 2023-10-09 NOTE — ED NOTES
Pt has been calm and cooperative this shift. Pt has been mostly resting and watching tv. Pt denies any mental health symptoms, only reporting being here due to trouble sleeping. Pt had many repetitive questions regarding which medications and why. Pt was moved into a sensory room at bedtime and was given headphones and an iPad for white noise. Pt remains on observation.

## 2023-10-10 ENCOUNTER — TELEPHONE (OUTPATIENT)
Facility: CLINIC | Age: 68
End: 2023-10-10
Payer: MEDICARE

## 2023-10-10 VITALS
HEART RATE: 103 BPM | WEIGHT: 150 LBS | HEIGHT: 67 IN | DIASTOLIC BLOOD PRESSURE: 84 MMHG | TEMPERATURE: 98.4 F | OXYGEN SATURATION: 95 % | SYSTOLIC BLOOD PRESSURE: 143 MMHG | RESPIRATION RATE: 16 BRPM | BODY MASS INDEX: 23.54 KG/M2

## 2023-10-10 PROCEDURE — 99239 HOSP IP/OBS DSCHRG MGMT >30: CPT | Performed by: PSYCHIATRY & NEUROLOGY

## 2023-10-10 PROCEDURE — G0378 HOSPITAL OBSERVATION PER HR: HCPCS

## 2023-10-10 PROCEDURE — 250N000013 HC RX MED GY IP 250 OP 250 PS 637: Performed by: NURSE PRACTITIONER

## 2023-10-10 PROCEDURE — 99285 EMERGENCY DEPT VISIT HI MDM: CPT

## 2023-10-10 RX ORDER — RAMELTEON 8 MG/1
8 TABLET ORAL AT BEDTIME
Qty: 15 TABLET | Refills: 0 | Status: SHIPPED | OUTPATIENT
Start: 2023-10-10 | End: 2023-10-23

## 2023-10-10 RX ORDER — QUETIAPINE FUMARATE 50 MG/1
50 TABLET, FILM COATED ORAL AT BEDTIME
Qty: 15 TABLET | Refills: 0 | Status: SHIPPED | OUTPATIENT
Start: 2023-10-10 | End: 2023-10-19

## 2023-10-10 RX ADMIN — AMLODIPINE BESYLATE 5 MG: 5 TABLET ORAL at 08:51

## 2023-10-10 ASSESSMENT — ACTIVITIES OF DAILY LIVING (ADL)
ADLS_ACUITY_SCORE: 35

## 2023-10-10 NOTE — ED NOTES
Pt has been resting in her chair through most of the shift. Pt a was able to eat some dinner and use the bathroom but has been keeping to herself. Pt is tense and anxious at times has trouble making decision can be a confused about that is happening. Pt is A/O x 4 but does have some forgetfulness. Staff talked with her about the plan to address her sleep and dicussed how she can utilize the sensory room if she needs to. Pt is currently resting comfortably in her chair with headphones. Pt was given HS meds with additional 0.5 mg of lorazepam to help with sedation. Plan to reassess in the morning and likely discharge.

## 2023-10-10 NOTE — PROGRESS NOTES
"Medication Therapy Management (MTM) Encounter    ASSESSMENT:                            Medication Adherence/Access: No issues identified    Mood Disorder/Insomnia/EPS:   Both anxiety [facility]. She is still having some anxiety/perseveration, as indicated by the worry about the eye drops. Discussed that her eyes may be irritated from an inactive ingredient in the drops and she should watch for improvement over the next few days.  Certainly blurry vision could be possible with quetiapine, though the timeline of symptoms seems quite specific with using the drops.  Mouth movements and tremor are not seen at visit today--seem resolved since stopping olanzapine/starting quetiapine.    Hypertension: Elevated in clinic today, though forgot to take meds this morning and was quite anxious at this visit. Continue home blood pressure monitoring and follow up with PCP.    Supplements:   Stable. Continue current medication.    Breast cancer: Stable. Continue current medication.    Hyperlipidemia: Stable. Continue current medication.    Afib: Stable. Continue current medication.      PLAN:                            Continue current medication.    Follow-up: Dr. Palomo, psychiatry on 10/23    SUBJECTIVE/OBJECTIVE:                          Jessiac Arana is a 68 year old female seen for an initial visit. She was referred to me from Dr. Palomo, psychiatry. Patient was accompanied by her .  She was also just discharged from [facility] yesterday afternoon.    Reason for visit:   Per referral: \"Trying to taper off olanzapine with possible parkinsinism vs other movement-related side effects? Help with recommended taper schedule due to high anxiety when stopped after 3 months on 2.5 mg and whether there is need for other agent like Ingrezza, etc? \"    Allergies/ADRs: Reviewed in chart  Past Medical History: Reviewed in chart  Tobacco: She reports that she has never " "smoked. She has never used smokeless tobacco.  Alcohol: 1-3 beverages / week    Medication Adherence/Access: no issues reported    Mood Disorder/Insomnia/EPS:   -quetiapine 50mg at bedtime (new)  -propranolol 10-20mg BID prn (started 9/11/23)  -lorazepam 0.5mg Q6h prn  -ramelteon 8mg nightly (new)    Patient was recently at [facility] for about 3 days due to high anxiety and insomnia, during which time olanzapine was switched to quetiapine for symptom improvement and to reduce risk of impact on involuntary movements.     She had been working on reducing olanzapine with psychiatry due to mouth movements and tremor, though had significantly increased anxiety and insomnia (no sleep for multiple days) soon after it was stopped.    Anxiety: states much improved with at [facility], though feeling high today. Denied any panic. She had difficulty finding the appointment, forgot to take her blood pressure medication this morning. She expressed concern for having used lubricant eye drops last night that caused some blurry vision that seemed to persist this morning. She does see ok with her glasses on, but notes that her vision \"just isn't right.\" Pt expressed feeling that it is not related to med changes, as she didn't have any issues while at [facility] and the blurry vision occurred immediately after placing the drops.  She is quite perseverative on this topic and brings it up multiple times, wondering if she made a big mistake and should not have used them.     -She was given lorazepam upon discharge (remembers taking one at [facility]) and wonders if she should be using it. Recalls that propranolol was helpful for anxiety.    Sleep: much improved at [facility] with med changes and she slept very well last night. Feels a bit tired this morning    Movements: psychiatry had noticed some mouth movements on video visit, which triggered the olanzapine dose reduction. Pt was less aware of these, but did acknowledge noticing them sometimes.  " had also noticed some hand tremor at baseline.   Today, there is no evidence of any mouth movements or resting tremor.    Hypertension:   -Amlodipine 5 mg tablet daily      Patient reports no current medication side effects.  Patient self-monitors blood pressure. Home BP monitoring in the 120/80 mmHg range.  Did not take amlodipine this morning  BP Readings from Last 3 Encounters:   10/11/23 (!) 159/85   10/10/23 (!) 143/84   10/01/23 (!) 151/90     Pulse Readings from Last 3 Encounters:   10/11/23 (!) 121   10/10/23 103   10/01/23 105     Supplements:   -Vitamin D 1000 mg daily   -Calcium carbonate 500 mg BID  -Preservision AREDS 2 daily  -Multivitamin daily  -Lysine 10,000mg daily  No reported issues at this time.     Breast Cancer:    -anastrozole 1mg daily  No issues with this medication, seems to be working just fine.     Hyperlipidemia:   -atorvastatin 10mg daily  Patient reports no current medication side effects.  The 10-year ASCVD risk score (Saira DK, et al., 2019) is: 11.6%    Values used to calculate the score:      Age: 68 years      Sex: Female      Is Non- : No      Diabetic: No      Tobacco smoker: No      Systolic Blood Pressure: 159 mmHg      Is BP treated: No      HDL Cholesterol: 68 mg/dL      Total Cholesterol: 175 mg/dL  Recent Labs   Lab Test 05/02/23  1030   CHOL 175   HDL 68   LDL 91   TRIG 78     Afib:   -Rivaroxaban 20 mg tablet daily   S/p ablation. No concern for any bruising or bleeding.     Today's Vitals: BP (!) 159/85   Pulse (!) 121   ----------------  Post Discharge Medication Reconciliation Status: discharge medications reconciled, continue medications without change.    I spent 50 minutes with this patient today. A copy of the visit note was provided to the patient's provider(s).    A summary of these recommendations was given to the patient.    Pérez Browning, PD4 pharmacy student    Shruti Soriano, PharmD  Medication Therapy Management  Pharmacist  [facility]       Medication Therapy Recommendations  No medication therapy recommendations to display

## 2023-10-10 NOTE — TELEPHONE ENCOUNTER
Prior Authorization Approval    Medication: RAMELTEON 8 MG PO TABS  Authorization Effective Date: 1/1/2023  Authorization Expiration Date: 10/9/2024  Approved Dose/Quantity: 30 for 30  Reference #: [id number]   Insurance Company: [employer] Part D - Phone [phone] Fax [phone]  Expected CoPay: $    CoPay Card Available:      Financial Assistance Needed:   Which Pharmacy is filling the prescription:    Pharmacy Notified:   Patient Notified:

## 2023-10-10 NOTE — PROGRESS NOTES
Discharge instructions reviewed with patient including follow-up care plan. Educated on medication regimen and advised not to stop prescribed medication without consulting their physician. Reviewed safety plan and outpatient resources. Denies SI. All belongings which were brought into the hospital have been returned to patient. Escorted off the unit at 3:23 PM accompanied by HOMAR Locke.  Discharged to home in stable condition via private car accompanied by .

## 2023-10-10 NOTE — ED PROVIDER NOTES
"EmPATH Unit - Psychiatric Observation Discharge Summary  [facility] Emergency Department  Discharge Date: 10/10/2023    Keely Arana MRN: [id number]   Age: 68 year old YOB: 1955     Brief HPI & Initial ED Course     Chief Complaint   Patient presents with    Insomnia     HPI  Keely Arana is a 68 year old female with history notable for insomnia and anxiety who is currently under observation status on the EmPATH unit, now approaching 76 hours in the emergency department.  Overnight, nursing staff note that the patient slept very well, estimating 8 to 10 hours overnight.  Throughout the day, she has been awake and alert.  Staff note that she continues to be forgetful regarding conversations that have previously occurred.  On examination, the patient was noted to be sitting comfortably in her chair and watching television.  She accompanied me to the interview room.  She confirms that she slept very well last night.  She notes mild sedation from her medications today although not bothersome or intolerable.  She reports no difficulty ambulating or dizziness when attempting to do so.  Anxiety is well managed.  Her mood is described as being euthymic.  There is no indication of suicidal or homicidal thoughts.  There was no indication of psychosis.  She interprets readiness to discharge home today.        Physical Examination   BP: (!) 143/84  Pulse: 103  Temp: 98.4  F (36.9  C)  Resp: 16  Height: 170.2 cm (5' 7\")  Weight: 68 kg (150 lb)  SpO2: 95 %    Physical Exam  General: Appears stated age.   Neuro: Alert and fully oriented. Extremities appear to demonstrate normal strength on visual inspection.   Integumentary/Skin: no rash visualized, normal color    Psychiatric Examination   Appearance: awake, alert  Attitude:  cooperative  Eye Contact:  fair  Mood:  anxious and better  Affect:  appropriate and in normal range  Speech:  clear, coherent  Psychomotor Behavior:  no evidence of tardive dyskinesia, " dystonia, or tics  Thought Process:  logical and linear  Associations:  no loose associations  Thought Content:  no evidence of suicidal ideation or homicidal ideation and no evidence of psychotic thought  Insight:  fair  Judgement:  fair  Oriented to:   Person and place  Attention Span and Concentration:  fair  Recent and Remote Memory:  fair  Language: able to name/identify objects without impairment  Fund of Knowledge: intact with awareness of current and past events    Results        Labs Ordered and Resulted from Time of ED Arrival to Time of ED Departure - No data to display    Observation Course   The patient was found to have a psychiatric condition that would benefit from an observation stay in the emergency department for further psychiatric stabilization and/or coordination of a safe disposition. The plan upon observation admission included serial assessments of psychiatric condition, potential administration of medications if indicated, further disposition pending the patient's psychiatric course during the monitoring period.     Serial assessments of the patient's psychiatric condition were performed. Nursing notes were reviewed. During the observation period, the patient did not require medications for agitation, and did not require restraints/seclusion for patient and/or provider safety.     After a period of working with the treatment team on the EmPATH unit, the patient's mental state improved to allow a safe transition to outpatient care. After counseling on the diagnosis, work-up, and treatment plan, the patient was discharged. Close follow-up with a psychiatrist and/or therapist was recommended and community psychiatric resources were provided. Patient is to return to the ED if any urgent or potentially life-threatening concerns.     Discharge Diagnoses:     ICD-10-CM    1. Insomnia, unspecified type  G47.00       2. Anxiety  F41.9       3. Unspecified mood (affective) disorder  F39       4.  Memory impairment  R41.3     rule out a neurocognitive disorder             Treatment Plan:  -Continue the higher dose of Seroquel at 50 mg nightly for insomnia.  She notes mild daytime sedation however has been sleeping very well overnight.  We discussed continuing to monitor her response on an outpatient basis to determine if this dose will be her maintenance dose or if a lower dose would be better tolerated and just as effective.  -Continue Rozerem 8 mg nightly for insomnia.  -Resources for outpatient neuropsychological testing to evaluate for the possibility of an underlying neurocognitive disorder  -Resume outpatient medication management appointments  -Discharge home today.      At the time of discharge, the patient's acute suicide risk was determined to be low due to the following factors: Reduction in the intensity of mood/anxiety symptoms that preceded the admission, denial of suicidal thoughts, denies feeling helpless or helpless, not currently under the influence of alcohol or illicit substances, denies experiencing command hallucinations, no immediate access to firearms. The patient's acute risk could be higher if noncompliant with their treatment plan, medications, follow-up appointments or using illicit substances or alcohol. Protective factors include: social supports, children, stable housing, employment, Jewish beliefs, school, expectant mother.    I spent more than 30 minutes on discharge day activities.    --  Jyalen Chow MD  [facility] EMERGENCY DEPT  EmPATH Unit       Jaylen Chow MD  10/10/23 [id number]

## 2023-10-10 NOTE — PROGRESS NOTES
"Pt calm and cooperative. Slept soundly overnight and reports feeling \"wonderful\" this morning. Breakfast ordered and finished 100%. Pt made phone call to family which appeared to go well. Pt reports she is undecided on whether she would like to return home today and plans to discuss disposition with provider.   "

## 2023-10-10 NOTE — PROGRESS NOTES
"Triage & Transition Services EmPATH     Progress Note    Patient: Jessica goes by \"Jessica,\" uses she/her pronouns  Date of Service: October 10, 2023  Site of Service:   Patient was seen in-person.     Presenting problem:  Please see initial [facility] Crisis Assessment completed by Funmilayo Zafar [facility] on 10/07/2023 for complete assessment information. Notable concerns include anxiety, health stressors, memory concerns, insomnia.     Individuals Present: Jessica & Car Singh [facility]    Session start: 0825 / 0930   Session end: 0835 / 0940    Session duration in minutes: 20 therapeutic minutes. Additional amount of time being stopped while in the milieu.  CPT utilized: 04937 - Psychotherapy (with patient) - 30 (16-37*) min with addition of various amounts of time after being stopped in the milieu by Pt asking several questions.    Current Presentation:   Writer and Pt met for therapeutic check in. Pt was agreeable and cooperative during the check in. Pt reports she was able to sleep from around 10p - 830a. Pt reports the medication she was given must have been helpful and is delighted she was able to get the amount of sleep she did. Discussed with Pt how plan of discharging with new medication regiment and to follow up with established and scheduled appointments. Pt denies any mental health concerns, denying SI, HI, AH/VH or substance use.    During the therapeutic check-in, Pt appeared to be experiencing memory concerns. Pt repeated herself several times during the check-in, telling the same story. Pt would also be slow to respond. During several of the interactions on the unit, Pt would ask the same questions that had already been answered by Writer, such as where her medications would be sent, if she was going home today, and what the plan was. Writer relayed interactions to RN and psychiatric provider.    Additional Collateral Information:  NA     Mental Status Exam     Affect: Blunted   Appearance: Appropriate  "   Attention Span/Concentration: Attentive and Other: slow to respond at times.  Eye Contact: Engaged   Fund of Knowledge: Appropriate    Language /Speech Content: Fluent   Language /Speech Volume: Normal    Language /Speech Rate/Productions: Normal    Recent Memory: Variable   Remote Memory: Intact   Mood: Anxious    Orientation to Person: Yes    Orientation to Place: Yes   Orientation to Time of Day: Yes    Orientation to Date: Yes    Situation (Do they understand why they are here?): Yes    Psychomotor Behavior: Normal    Thought Content: Clear   Thought Form: Intact     Diagnosis:   F39 Unspecified mood (affective) disorder (H24)  F41.9 Anxiety  G31.84 Mild cognitive impairment    Therapeutic Intervention(s):   Provided active listening, unconditional positive regard, and validation. Engaged in safety planning.  Taught the link between thoughts, feelings, and behaviors.    Treatment Objective(s) Addressed:   The focus of this session was on safety planning, identifying an appropriate aftercare plan, and assessing safety.     Progress Towards Goals:   Patient reports improving symptoms. Patient is making progress towards treatment goals as evidenced by getting restful sleep while at [facility] by medication assistance.     Case Management:   NA     Plan and Clinical Summary and Substantiation of Recommendations:   Discharge:     Pt reports she was able to sleep from around 10p - 830a. Pt reports the medication she was given must have been helpful and is delighted she was able to get the amount of sleep she did. Discussed with Pt how plan of discharging with new medication regiment and to follow up with established and scheduled appointments. Pt denies any mental health concerns, denying SI, HI, AH/VH or substance use.    After mental health crisis assessment, therapeutic check-in, and aftercare planning by EmPATH care team and consultation with attending provider, Pt's circumstances and mental state were safe for  outpatient management. Pt denies any mental health or safety concerns at this time. Close follow-up with established psychiatrist and therapist was recommended. Pt is to return to the ED if any urgent or potentially life-threatening concerns arise.        Recommendation of discharge with established outpatient resources. Pt's acute suicide risk was determined to be low due to the following factors: reduction in the intensity of mood/anxiety symptoms that preceded the admission, denial of suicidal thoughts, denies feeling helpless or hopeless, not currently under the influence of alcohol or illicit substances, denies experiencing command hallucinations and no immediate access to firearms. Protective factors include: social support, displays resiliency, future focused thinking, displays insight, help-seeking behavior, and safe/stable housing.      Attending concurs with disposition: Yes         BRIAN Gonzalez

## 2023-10-10 NOTE — PROGRESS NOTES
[facility] Progress Note    Client Name: Keely Arana  Date: October 10, 2023  Service Type:  Group Therapy  Session Start Time:  1100  Session End Time: 1130  Session Length: 30  Attendees: Patient and other group members  Facilitator:KOTA Velez, [facility]     Topic:   Morning check in, goal for the day    Intervention:    Group process: support, challenge, affirm, psycho-education.     Response:  Patient did participate in group. Behavior in group was pleasant, engaged. Patient shared that she slept better last night than she has in quite some time and is looking forward to returning home today.       JAKOB Butt

## 2023-10-10 NOTE — PROGRESS NOTES
Plan for patient to discharge home today. RN updated Terry with discharge instructions with patient permission. Awaiting medications from pharmacy.

## 2023-10-10 NOTE — DISCHARGE INSTRUCTIONS
Aftercare Plan    Appointment information and/or additional resources available to me:     Follow up with established outpatient providers.      If I am feeling unsafe or I am in a crisis, I will:   Contact my established care providers   Call the National Suicide Prevention Lifeline: 782.708.9994   Go to the nearest emergency room   Call 911     Your [facility] has a mental health crisis team you can call 24/7: [facility] Adult, [phone]    Warning signs that I or other people might notice when a crisis is developing for me: lack of sleep, family concern.    Things I am able to do on my own to cope or help me feel better:   -Practice square breathing when I begin to feel anxious - in breath through the nose for the count   of 4 and the first line on the square. Out breath through the mouth for the count of 4 for the second line   of the square. Repeat to complete the square. Repeat the square as many times as needed.    Things that I am able to do with others to cope or help me feel better:   -Use community resources, including hotline numbers, [facility] crisis and support meetings    Things I can use or do for distraction:   -Distraction skills of: going for walks, watching TV, spending time outside, calling a friend or family member  -Download a meditation tete and spend 15-20 minutes per day mediating/relaxing. Some apps to   download include: Calm, Headspace and Insight Timer. All 3 of these apps have free version    Changes I can make to support my mental health and wellness:   -Attend scheduled mental health therapy and psychiatric appointments and follow all recommendations  -Maintain a daily schedule/routine  -Practice deep breathing skills  -Abstain from all mood altering chemicals not currently prescribed to me     People in my life that I can ask for help:   National [place] on Mental Illness (NOLVIA)  799.102.4195 or 1.888.NOLVIA.HELPS    Other things that are important when I m in crisis:   -Commit to  "30 minutes of self care daily - this can be as simple as taking a shower, going for a walk, cooking a meal, read, writing, etc        Crisis Lines  Crisis Text Line  Text 578785  You will be connected with a trained live crisis counselor to provide support.    Por luis enrique, texto  DAHLIA a 153668 o texto a 442-AYUDAME en WhatsApp    National Hope Line  1.800.SUICIDE [1804620]      Community Resources  Fast Tracker  Linking people to mental health and substance use disorder resources  [employer].Pain Doctor     [place] Mental Health Warm Line  Peer to peer support  Monday thru Saturday, 12 pm to 10 pm  508.113.9004 or 5.297.628.8730  Text \"Support\" to 17474    National [place] on Mental Illness (NOLVIA)  035.551.7340 or 1.888.NOLVIA.HELPS        Mental Health Apps  My3  https://Guguchu.org/    VirtualHopeBox  https://[employer]/apps/virtual-hope-box/      Additional Information  Today you were seen by a licensed mental health professional through Triage and Transition services, Behavioral Healthcare Providers ([facility])  for a crisis assessment in the Emergency Department at [facility].  It is recommended that you follow up with your established providers (psychiatrist, mental health therapist, and/or primary care doctor - as relevant) as soon as possible. Coordinators from [facility] will be calling you in the next 24-48 hours to ensure that you have the resources you need.  You can also contact [facility] coordinators directly at [phone]. You may have been scheduled for or offered an appointment with a mental health provider. [facility] maintains an extensive network of licensed behavioral health providers to connect patients with the services they need.  We do not charge providers a fee to participate in our referral network.  We match patients with providers based on a patient's specific needs, insurance coverage, and location.  Our first effort will be to refer you to a provider within your care system, and will " utilize providers outside your care system as needed.

## 2023-10-11 ENCOUNTER — OFFICE VISIT (OUTPATIENT)
Dept: PHARMACY | Facility: CLINIC | Age: 68
End: 2023-10-11
Attending: PSYCHIATRY & NEUROLOGY
Payer: COMMERCIAL

## 2023-10-11 ENCOUNTER — PATIENT OUTREACH (OUTPATIENT)
Dept: CARE COORDINATION | Facility: CLINIC | Age: 68
End: 2023-10-11
Payer: MEDICARE

## 2023-10-11 VITALS — DIASTOLIC BLOOD PRESSURE: 85 MMHG | HEART RATE: 121 BPM | SYSTOLIC BLOOD PRESSURE: 159 MMHG

## 2023-10-11 DIAGNOSIS — F39 MOOD DISORDER (H): Primary | ICD-10-CM

## 2023-10-11 DIAGNOSIS — F51.04 PSYCHOPHYSIOLOGICAL INSOMNIA: ICD-10-CM

## 2023-10-11 DIAGNOSIS — E78.5 HYPERLIPIDEMIA, UNSPECIFIED HYPERLIPIDEMIA TYPE: ICD-10-CM

## 2023-10-11 DIAGNOSIS — I10 BENIGN ESSENTIAL HYPERTENSION: ICD-10-CM

## 2023-10-11 DIAGNOSIS — I48.20 CHRONIC ATRIAL FIBRILLATION (H): ICD-10-CM

## 2023-10-11 DIAGNOSIS — C50.411 MALIGNANT NEOPLASM OF UPPER-OUTER QUADRANT OF RIGHT BREAST IN FEMALE, ESTROGEN RECEPTOR POSITIVE (H): ICD-10-CM

## 2023-10-11 DIAGNOSIS — Z17.0 MALIGNANT NEOPLASM OF UPPER-OUTER QUADRANT OF RIGHT BREAST IN FEMALE, ESTROGEN RECEPTOR POSITIVE (H): ICD-10-CM

## 2023-10-11 DIAGNOSIS — Z78.9 TAKES DIETARY SUPPLEMENTS: ICD-10-CM

## 2023-10-11 DIAGNOSIS — R29.818 EXTRAPYRAMIDAL SYMPTOM: ICD-10-CM

## 2023-10-11 PROCEDURE — 99207 PR NO CHARGE LOS: CPT | Performed by: PHARMACIST

## 2023-10-11 NOTE — PATIENT INSTRUCTIONS
"Recommendations from today's MTM visit:                                                      MTM (medication therapy management) is a service provided by a clinical pharmacist designed to help you get the most of out of your medicines.   Today we reviewed what your medicines are for, how to know if they are working, that your medicines are safe and how to make your medicine regimen as easy as possible.        I'm glad things are feeling better! Continue taking your current medications.  If you are feeling very anxious, it's ok to take propranolol 10-20mg. I would try to avoid taking the lorazepam.  -Notice if your eyes are feeling less blurry over the course of the rest of the week.     Follow-up: Dr. Palomo on 10/23/23    It was great speaking with you today.  I value your experience and would be very thankful for your time in providing feedback in our clinic survey. In the next few days, you may receive an email or text message from [employer] with a link to a survey related to your  clinical pharmacist.\"     To schedule another MTM appointment, please call the clinic directly or you may call the MTM scheduling line at [phone] or toll-free at [phone].     My Clinical Pharmacist's contact information:                                                      Please feel free to contact me with any questions or concerns you have.      Shruti Soriano, Katty  Medication Therapy Management Pharmacist  [facility]    "

## 2023-10-11 NOTE — Clinical Note
Estefani! Met with Jessica in person today. It seems that mouth movements and tremor are resolved since off of olanzapine/starting quetiapine, or at least not seen at all today. She is doing much better. Still a bit anxious and perseverative today about eye drops causing some blurry vision. She will monitor. You will see her on 10/23. Let me know if you have questions! Shruti

## 2023-10-11 NOTE — PROGRESS NOTES
[facility]    Background: Transitional Care Management program identified per system criteria and reviewed by [facility] team for possible outreach.    Assessment: Upon chart review, [facility] Team member will not proceed with patient outreach related to this episode of Transitional Care Management program due to reason below:    Patient has a follow up appointment with an appropriate provider today for hospital discharge    Plan: Transitional Care Management episode addressed appropriately per reason noted above.      LITTLE Snow  [facility], [facility]    *Connected Care Resource Team does NOT follow patient ongoing. Referrals are identified based on internal discharge reports and the outreach is to ensure patient has an understanding of their discharge instructions.

## 2023-10-12 ENCOUNTER — MEDICAL CORRESPONDENCE (OUTPATIENT)
Dept: HEALTH INFORMATION MANAGEMENT | Facility: CLINIC | Age: 68
End: 2023-10-12
Payer: MEDICARE

## 2023-10-13 ENCOUNTER — TRANSCRIBE ORDERS (OUTPATIENT)
Dept: OTHER | Age: 68
End: 2023-10-13

## 2023-10-13 ENCOUNTER — MYC MEDICAL ADVICE (OUTPATIENT)
Dept: PSYCHIATRY | Facility: CLINIC | Age: 68
End: 2023-10-13
Payer: MEDICARE

## 2023-10-13 DIAGNOSIS — H54.7 FUNCTIONAL VISION PROBLEM: Primary | ICD-10-CM

## 2023-10-13 NOTE — TELEPHONE ENCOUNTER
"Reviewed patient's judge.mehart message. Patient had an appointment with shruti Cotton PharmD on 10/11/23. Reviewed mychart message from 10/12 - Shruti Soriano PharmD.     Patient reports blurry vision that started after using eye drops upon leaving the EmPATH unit. The patient questions whether \"one of the new drugs could cause a problem with vision.\" Patient reports that after seeing Dr. Soriano on 10/11/23, they went to the ER for blurry vision. Pt reports no significant findings in the ER or \"eye doctor.\" Patient is wondering if Dr. Palomo has any further thoughts or insights.     MADHAVI KONG RN on 10/13/2023 at 2:41 PM    "

## 2023-10-18 ENCOUNTER — TELEPHONE (OUTPATIENT)
Dept: PSYCHIATRY | Facility: CLINIC | Age: 68
End: 2023-10-18

## 2023-10-18 DIAGNOSIS — F41.9 ANXIETY: Primary | ICD-10-CM

## 2023-10-18 NOTE — TELEPHONE ENCOUNTER
Reason for call:  Other   Patient called regarding (reason for call): call back  Additional comments: daughter Owen called with concerns for pt sine admit to [facility] 1 week ago.pt's meds were changed and since then she has been seeing a lot more new and concerning symptoms. What can they do in the mean time until her next appt with you? Owen's number is [phone]. She stated she signed an JET at [facility] last week and was wondering if that would work for her to talk to you? Pt is also having trouble talking so calling her could be difficult. Daughter said to try her  Terry, [phone]    Phone number to reach patient:  daughterowen  Telephone Information:   Mobile [phone]       Best Time:  asap    Can we leave a detailed message on this number?  YES    Travel screening: Not Applicable

## 2023-10-18 NOTE — TELEPHONE ENCOUNTER
"    1) RN spoke with daughter, Yesy, at [phone].     2) Yesy states that pt and pt's , Terry, are currently up at their cabin. They were supposed to return today, 10/18/23, but pt is refusing to leave. Pt verbalized being worried that if she left she would never end up coming back. Yesy states this is not a significant problem in and of itself as they are both retired. But pt's additional symptoms are concerning.     3) Yesy states that pt was recently at the [facility] unit. When there they switched her from OLANZapine (zyPREXA) 2.5 MG tablet (Take 1-2 tablets (2.5-5 mg) by mouth At Bedtime - Oral) to BOTH QUEtiapine (SEROQUEL) 50 MG tablet and ramelteon (ROZEREM) 8 MG tablet at bedtime for sleep.     4) Yesy reports that at one point prior to hospitalization, pt had not slept for 4 days and \"has been sleeping better with the Rozerem.     5) Yesy states that \"since starting the Seroquel\" pt has not been getting out of bed in the AM. Usually wakes up, gets ready, and starts her day, so this is not like her at all.     6) Pt has not been answering her two other children's calls regularly, which is unusual, and if she does it's not until around 8 PM or so when she may finally feel up for it.     7) Yesy reports that pt has been having such high anxiety that she gets out of breath.      8) Pt is also having some perception changes; possibly delusions. For a while pt thought she lost her eyesight. Then thought other things were wrong with her. Yesy states that pt will hyper-focus on one perceived problem and then switch to another.     9) Pt has been prescribed LORazepam (ATIVAN) 0.5 MG tablet (Take 1 tablet (0.5 mg) by mouth every 6 hours as needed for anxiety - Oral) but is paranoid about taking it.     10) Yesy states that pt seems paralyzed, being indecisive and not able to make decisions. States that pt was diagnosed with catatonia at [facility]. Yesy states that pt's ability to care for herself independently " "has been declining, as she is somewhat \"zombie-like.\"     11) Family are all concerned and don't feel comfortable leaving her alone. Terry is there most of the time, but they have been trying to make sure that someone is with her at all times.     12) RN advised that these symptoms warrant a return to the hospital, especially because two new medications were started at the same time, and it would be hard to figure out which one to adjust on an outpatient basis.     13)  Yesy did not know if they would be able to convince pt to go back to the hospital. Stated that pt almost left the [facility] unit but Yesy and , Terry, were able to convince her to stay one more night.     13) RN advised attempting to have pt return to the [facility] via car if possible vs ambulance as it would be more comfortable for her and less traumatizing.     14) RN advised that they [facility] if they believe pt won't agree to be assessed. RN explained that an inability to care for oneself qualifies as a \"safety risk\" and may result in a 72-hour hold.     15) RN recommended making a list of the symptoms the family members have seen and their concerns to share with ED, or EMS (if pt will not come home).     16) Yesy states that she will talk to Terry and see if they can get pt home without making it about going to the hospital, so she won't refuse to leave the cabin.     17) RN also mentioned to Yesy that there is a Consent to Communicate form on file for her, but no Consent to Communicate or JTE for , Terry. RN explained that this is needed for the clinic to communicate with him.     18) RN emailed a blank Consent to Communicate form to Yesy at [email or web address] per request. Yesy states they will try to have pt complete it but are not sure if they will be successful.      19) RN informed Yesy that Dr. Palomo isn't back in the clinic until Monday, 10/23/23, on which date the patient has an appointment. RN " stated that RN will send this information to Dr. Palomo as well as the covering provider pool, in case they have any immediate feedback.     20) RN encouraged Yesy to reach out via [employer] or [phone] with any additional needs. Yesy thanked RN for the help.     Linda Mccoy RN on 10/18/2023 at 4:37 PM

## 2023-10-19 RX ORDER — QUETIAPINE FUMARATE 25 MG/1
25 TABLET, FILM COATED ORAL AT BEDTIME
Qty: 30 TABLET | Refills: 0 | Status: SHIPPED | OUTPATIENT
Start: 2023-10-19 | End: 2023-10-23 | Stop reason: DRUGHIGH

## 2023-10-19 RX ORDER — OLANZAPINE 2.5 MG/1
TABLET, FILM COATED ORAL
Qty: 55 TABLET | Refills: 0 | Status: SHIPPED | OUTPATIENT
Start: 2023-10-19 | End: 2023-10-23 | Stop reason: ALTCHOICE

## 2023-10-19 NOTE — TELEPHONE ENCOUNTER
Called Yesy. They did bring pt back home from the cabin. Patient is paranoid, anxious and obsessive about bills (that were previously paid off). Patient has not been making food; whereas she usually a food buff and cooks a lot. She is eating. She does seem to be grooming but has never seen her like this - usually up and ready go; where in afternoon was still in robe. Has not seen her mom without wake up; has not worn makeup in a week or more.     Daughter feels she has been off with mental health for a few weeks. Has been more or less sleeping recently. Patient talking more slowly with odd long pauses (this has been going on for weeks). Looking depressed, talking of regrets.     This is opposite of experience of amazing ideas, talking fast and cong sx in April. Since being on olanzapine - it kept at her at bay. When olanzapine was removed all of a sudden seemed to worsen.     -Restart olanzapine 2.5 mg tablet - take 1/2 tab every AM and 1 tab nightly, 2.5  - general risks and benefits discussed.  -reduce quetiapine to 25 mg one tab nightly - general risks and benefits discussed  - continue lorazepam 0.5 mg po q6h prn anxiety - general risks and benefits discussed, disinhibition    Provided [facility] Crisis number to daughter Yesy. Advised to call for worsened sx. Advised to call back tomorrow if sleeping worse on lower dose of quetiapine.

## 2023-10-19 NOTE — TELEPHONE ENCOUNTER
"    1) RN reviewed covering provider recommendations.     2) RN spoke with daughter, Yesy, at [phone] and relayed provider's strong agreement with recommendation for face-to-face eval at [facility] for face to face eval, given it's superiority to provider backup via chart/phone in context of severe symptoms.     3) Yesy reports that she went up to the cabin and they've been able to get pt home. She reports that usually pt would be very busy, bustling around doing daily activities upon return, but is currently on the couch under a blanket.     4) Daughter reports that all family members have spoken to pt over the last 2 hours trying to convince her to go back to [facility] for her own good and she is refusing.     5) Pt is paranoid, doesn't trust them. Is afraid she will get locked up and thinks that family already has someone on the way to get her. Stating that family members don't care about her.     6) Pt is perseverating on cost of the hospitalizations in context of historical bills that were paid over 5 years ago; \"Have you even considered this?\"     7) RN informed Yesy that RN will consult covering provider for consultation regarding if the provider thinks pt is holdable and next steps. Yesy thanked RN for the call.     Linda Mccoy RN on 10/19/2023 at 11:55 AM    "

## 2023-10-19 NOTE — TELEPHONE ENCOUNTER
Notes reviewed including Dr. Chow's evaluation amongst others.  Noted mirtazapine had led to symptoms of insomnia being worse unexpectedly.  Was having considerable anxiety and insomnia and med changes from olanzapine to quetiapine/ramelteon were efficacious and tolerated at the time.  ADRIANNE Ruffin  in consultation noted concern for neurocognitive disorder and recommended repeat neuropsych testing.  Quetiapine specifically prescribed for insomnia, this along with ramelteon and patient was monitored for effects.  Has been seeing Dr. Palomo for mood disorder, history of cong, and anxiety.  Significant concern for potential for underlying bipolar disorder noted.  Noted extreme anxiety and worry symptoms with discontinuation of olanzapine approximately 4 to 5 weeks ago.  Depakote was discussed, lithium considered as a possibility.  Neuropsych testing reviewed and results were not consistent with cognitive process and more of a concern for cong presentation.    Strongly agree with RN assessment for recommendation for patient to go to [facility] -given sudden change in symptoms and drowsiness.    Given difficulty waking up, executive dysfunction and concern for cong consideration is for reducing quetiapine to 25 mg nightly, potentially starting Depakote initially to 50 mg nightly with titration of 500 and adjustment according to symptoms/tolerability.  Provider called patient's number, no response; left confidential VM to call back.  When they call back please advise provider agreement with going to [facility] as primary recommendation. Med options can be discussed but again [facility] is primary recommendation as face to face eval is superior to provider backup via chart/phone in context of severe symptoms.

## 2023-10-23 ENCOUNTER — VIRTUAL VISIT (OUTPATIENT)
Dept: PSYCHIATRY | Facility: CLINIC | Age: 68
End: 2023-10-23
Payer: MEDICARE

## 2023-10-23 ENCOUNTER — TELEPHONE (OUTPATIENT)
Dept: PSYCHIATRY | Facility: CLINIC | Age: 68
End: 2023-10-23
Payer: MEDICARE

## 2023-10-23 DIAGNOSIS — Z86.59 HISTORY OF MANIA: ICD-10-CM

## 2023-10-23 DIAGNOSIS — F06.1 CATATONIA: Primary | ICD-10-CM

## 2023-10-23 DIAGNOSIS — G47.09 OTHER INSOMNIA: ICD-10-CM

## 2023-10-23 DIAGNOSIS — R41.89 COGNITIVE CHANGES: ICD-10-CM

## 2023-10-23 PROCEDURE — 99215 OFFICE O/P EST HI 40 MIN: CPT | Mod: 95 | Performed by: PSYCHIATRY & NEUROLOGY

## 2023-10-23 RX ORDER — LORAZEPAM 1 MG/1
1 TABLET ORAL 3 TIMES DAILY
Qty: 90 TABLET | Refills: 0 | Status: SHIPPED | OUTPATIENT
Start: 2023-10-23 | End: 2023-11-14

## 2023-10-23 RX ORDER — RAMELTEON 8 MG/1
8 TABLET ORAL AT BEDTIME
Qty: 15 TABLET | Refills: 0 | Status: SHIPPED | OUTPATIENT
Start: 2023-10-23 | End: 2023-11-14

## 2023-10-23 NOTE — NURSING NOTE
Is the patient currently in the state of [place]? YES    Visit mode:VIDEO    If the visit is dropped, the patient can be reconnected by: VIDEO VISIT: Text to cell phone:   Telephone Information:   Mobile [phone]       Will anyone else be joining the visit? NO  (If patient encounters technical issues they should call [phone])    How would you like to obtain your AVS? MyChart    Are changes needed to the allergy or medication list? Pt stated no changes to allergies and Pt stated no med changes    Reason for visit: ISIDRO ZARAGOZAF

## 2023-10-23 NOTE — PATIENT INSTRUCTIONS
Treatment Plan:  Present to the emergency room for further evaluation if symptoms do not improve, or start to worsen, with this new treatment plan.  Discontinue olanzapine  Discontinue quetiapine  Increase Ativan/lorazepam to 1 mg 3 times daily for suspected catatonia.  Discussed holding the dose, or halving (0.5 mg) doses due to sedation or unsteady gait/fall risk.  Continue propranolol 10-20 mg twice daily for anxiety, movement-related side effects from olanzapine.   Continue working with MTM (medication therapy management) psychiatric PharmD.   C (behavioral health clinician) referral previously placed for counseling/therapy support.   Referral placed for neurology movement disorder clinic.  Continue all other cares per primary care provider.   Continue all other medications as reviewed per electronic medical record today.   Safety plan reviewed. To the Emergency Department as needed or call after hours crisis line at [phone] or [phone]. [place] Crisis Text Line. Text [place] to 678759 or Suicide LifeLine Chat: [email or web address].org/chat  Schedule an appointment with me in 1 weeks or sooner as needed. Call [facility] at [phone] to schedule.  Follow up with primary care provider as planned or for acute medical concerns.  Call the psychiatric nurse line with medication questions or concerns at [phone].  [facility] may be used to communicate with your provider, but this is not intended to be used for emergencies.    Risks of benzodiazepine (Ativan, Xanax, Klonopin, Valium, etc) use including, but not limited to, sedation, tolerance, risk for addiction/dependence. Do not drink alcohol while taking benzodiazepines due to risk of trouble breathing and potential death. Do not drive or operate heavy machinery until it is known how the drug affects you. Discuss with physician or pharmacist before ever taking a benzodiazepine with a narcotic/opioid pain medication.  Watch for  sedation and falls.

## 2023-10-23 NOTE — PROGRESS NOTES
"Telemedicine Visit: The patient's condition can be safely assessed and treated via synchronous audio and visual telemedicine encounter.      Reason for Telemedicine Visit: Patient has requested telehealth visit    Originating Site (Patient Location): Patient's home    Distant Location (provider location):  Off-site    Consent:  The patient/guardian has verbally consented to: the potential risks and benefits of telemedicine (video visit) versus in person care; bill my insurance or make self-payment for services provided; and responsibility for payment of non-covered services.     Mode of Communication:  Video Conference via [employer]    As the provider I attest to compliance with applicable laws and regulations related to telemedicine.         Outpatient Psychiatric Progress Note    Name: Keely Arana   : 1955                    Primary Care Provider: Maulik Nye MD   Therapist: None    PHQ-9 scores:      2023    10:19 AM 2023     7:56 AM 2023     2:18 PM   PHQ-9 SCORE   PHQ-9 Total Score MyChart  0 7 (Mild depression)   PHQ-9 Total Score 0 0    0 7       ADISY-7 scores:      2023     2:14 PM 2023    10:19 AM 9/10/2023     9:53 AM   DAISY-7 SCORE   Total Score   12 (moderate anxiety)   Total Score 5 0 12       Patient Identification:  Patient is a 68 year old,   White Not  or  female  who presents for return visit with me.  Patient is currently retired. Patient attended the phone/video session with  Terry and daughter Yesy , who they agreed to have interview with. Patient prefers to be called: \"Jessica\".    Interim History:  I last saw Keely Arana for outpatient psychiatry return visit on 2023. During that appointment, we:    Re-start olanzapine/Zyprexa at 2.5 mg every other day (until follow-up) for history of cong, sleep.  Can take additional 2.5 mg dose now as discussed. Continue to monitor movement-related side effects as discussed " "today.  Discontinue hydroxyzine since not helpful.   Start propranolol 10-20 mg twice daily for anxiety, movement-related side effects from olanzapine.   MTM (medication therapy management) referral placed with psychiatric PharmD.   BHC (behavioral health clinician) referral placed for counseling/therapy support.   Continue all other cares per primary care provider.     ER visits 10/7 and 10/11     10/23: Patient overall with ongoing struggles.  Patient and family tried to go up to the cabin this past weekend but they had to bring patient home because she was not able to function appropriately.  Patient with very repetitive/obsessive ruminations and thoughts.  Patient is not getting out of bed in the morning and needs assistance to get up and dressed.  She has not been putting on her make-up.  One of her daughters reports she has not seen her mom without make-up before in the past.  Patient has been talking more slowly with odd long pauses.  This has been going on for weeks.  Patient has been looking depressed.  Family feels like this has been the opposite of patient talking about amazing ideas/talking fast/cong symptoms patient experienced in April.  They denied patient is rigid appearing but is \"fragile.\"  Recently they went down on quetiapine and back on olanzapine.  Patient has not been wanting to take medication at times.  Anxiety has been paralyzing.  Sometimes will take Ativan.  With Ativan patient will be more talkative and calmer.  Patient typically loves to cook and is not cooking.  Patient is also not driving at this time.  No acute safety concerns.  Patient is eating and drinking when prompted.  No suicidal ideation endorsed.  Patient has been seen making repetitive movements with her hands.    Past Psychiatric Med Trials:  Psych Meds at Intake:  Olanzapine 5 mg at bedtime and 2.5 mg daily as needed     Past Psych Meds:  None  Hydroxyzine - not helpful, dry mouth    Psychiatric ROS:  Keely Arana " reports mood has been: Anxious, more depressed  Anxiety has been: High  Sleep has been: Sleeping a little bit better on ramelteon  Paz sxs: See HPI above  Psychosis sxs: Increased paranoia?  ADHD/ADD sxs: N/A  PTSD sxs: N/A  PHQ9 and GAD7 scores were reviewed today if completed.   Medication side effects: See HPI above  Current stressors include: Sxs and see HPI above  Coping mechanisms and supports include: Family, Hobbies and Friends    Current medications include:   Current Outpatient Medications   Medication Sig    amLODIPine (NORVASC) 5 MG tablet Take 5 mg by mouth daily    anastrozole (ARIMIDEX) 1 MG tablet Take 1 tablet (1 mg) by mouth daily    atorvastatin (LIPITOR) 10 MG tablet Take 1 tablet (10 mg) by mouth daily    calcium carbonate (OS-PADDY 500 MG [place]. CA) 500 MG tablet Take 2 tablets by mouth daily With Magnesium,Zinc    LORazepam (ATIVAN) 0.5 MG tablet Take 1 tablet (0.5 mg) by mouth every 6 hours as needed for anxiety    LYSINE PO Take 10,000 mg by mouth daily    Multiple Vitamins-Minerals (PRESERVISION AREDS 2 PO) Take 2 tablets by mouth daily    multivitamin w/minerals (THERA-VIT-M) tablet Take 1 tablet by mouth daily    OLANZapine (ZYPREXA) 2.5 MG tablet Take 1/2 tablet every morning and 1 tablet every night. Take one tablet daily as needed for agitation    propranolol (INDERAL) 20 MG tablet Take 0.5-1 tablets (10-20 mg) by mouth 2 times daily as needed (anxiety)    QUEtiapine (SEROQUEL) 25 MG tablet Take 1 tablet (25 mg) by mouth at bedtime    ramelteon (ROZEREM) 8 MG tablet Take 1 tablet (8 mg) by mouth at bedtime    rivaroxaban ANTICOAGULANT (XARELTO) 20 MG TABS tablet Take 20 mg by mouth    VITAMIN D, CHOLECALCIFEROL, PO Take 1,000 Units by mouth daily      No current facility-administered medications for this visit.       Past Medical/Surgical History:  Past Medical History:   Diagnosis Date    Atrial fibrillation (H)     s/p ablasion.    Hypertension     Iritis       has a past medical  history of Atrial fibrillation (H), Hypertension, and Iritis.    She has no past medical history of Complication of anesthesia or Sleep apnea.    Social History:  Reviewed. No changes to social history except as noted above in HPI.    Vital Signs:   None. This is phone/video visit.     Labs:  Most recent laboratory results reviewed and the pertinent results include:   Last Comprehensive Metabolic Panel:  Sodium   Date Value Ref Range Status   08/11/2023 143 136 - 145 mmol/L Final     Potassium   Date Value Ref Range Status   08/11/2023 4.0 3.4 - 5.3 mmol/L Final     Chloride   Date Value Ref Range Status   08/11/2023 107 98 - 107 mmol/L Final     Carbon Dioxide (CO2)   Date Value Ref Range Status   08/11/2023 25 22 - 29 mmol/L Final     Anion Gap   Date Value Ref Range Status   08/11/2023 11 7 - 15 mmol/L Final     Glucose   Date Value Ref Range Status   08/11/2023 97 70 - 99 mg/dL Final     Urea Nitrogen   Date Value Ref Range Status   08/11/2023 19.5 8.0 - 23.0 mg/dL Final     Creatinine   Date Value Ref Range Status   08/11/2023 0.93 0.51 - 0.95 mg/dL Final   01/21/2021 0.69 0.52 - 1.04 mg/dL Final     GFR Estimate   Date Value Ref Range Status   08/11/2023 66 >60 mL/min/1.73m2 Final   01/21/2021 >90 >60 mL/min/[1.73_m2] Final     Comment:     Non  GFR Calc  Starting 12/18/2018, serum creatinine based estimated GFR (eGFR) will be   calculated using the Chronic Kidney Disease Epidemiology Collaboration   (CKD-EPI) equation.       Calcium   Date Value Ref Range Status   08/11/2023 9.5 8.8 - 10.2 mg/dL Final   01/21/2021 9.7 8.5 - 10.1 mg/dL Final     Bilirubin Total   Date Value Ref Range Status   08/11/2023 0.4 <=1.2 mg/dL Final     Alkaline Phosphatase   Date Value Ref Range Status   08/11/2023 55 35 - 104 U/L Final     ALT   Date Value Ref Range Status   08/11/2023 20 0 - 50 U/L Final     Comment:     Reference intervals for this test were updated on 6/12/2023 to more accurately reflect our  healthy population. There may be differences in the flagging of prior results with similar values performed with this method. Interpretation of those prior results can be made in the context of the updated reference intervals.       AST   Date Value Ref Range Status   08/11/2023 18 0 - 45 U/L Final     Comment:     Reference intervals for this test were updated on 6/12/2023 to more accurately reflect our healthy population. There may be differences in the flagging of prior results with similar values performed with this method. Interpretation of those prior results can be made in the context of the updated reference intervals.         Recent Labs   Lab Test 05/02/23  1030   CHOL 175   HDL 68   LDL 91   TRIG 78       Review of Systems:  10 systems (general, cardiovascular, respiratory, eyes, ENT, endocrine, GI, , M/S, neurological) were reviewed. Most pertinent finding(s) is/are: denies fever, cough, persistent headaches, shortness of breath, chest pain, severe GI symptoms, trouble urinating, severe pain. The remaining systems are all unremarkable.    Mental Status Examination (limited as this is by phone/video):  Appearance: Awake, alert, appears stated age, no acute distress, slightly unkempt  Attitude:  cooperative  Motor: Possible tongue thrusting/lip smacking versus frequent licking of lips again noted today  Oriented to:  person, place, knew month and year but not exact date (new current president and upcoming holiday of Halloween)  Attention Span and Concentration:  normal  Speech: Often delayed, mumbling at times  Language: intact  Mood: Anxious  Affect: Mood congruent  Associations:  no loose associations  Thought Process: Brief  Thought Content:  no evidence of suicidal ideation or homicidal ideation, no evidence of psychotic thought, no auditory hallucinations present and no visual hallucinations present  Recent and Remote Memory: Difficult to assess  Fund of Knowledge: appropriate  Insight:  Impaired?  Judgment: Impaired due to mental illness  Impulse Control: Impaired?    Suicide Risk Assessment:  Today Keely Arana reports no suicidal ideation. Based on all available evidence including the factors cited above, Keely Arana does not appear to be at imminent risk for self-harm, does not meet criteria for a 72-hr hold, and therefore remains appropriate for ongoing outpatient level of care.  A thorough assessment of risk factors related to suicide and self-harm have been reviewed and are noted above. The patient convincingly denies suicidality on several occasions. Local community safety resources reviewed for patient to use if needed. There was no deceit detected, and the patient presented in a manner that was believable.     DSM5 Diagnosis:  Hx of Paz, unspecified  Mood disorder, unspecified (R/O Bipolar Disorder)  Anxiety, unspecified  R/O CATATONIA    R/O Drug-Induced Parkinsonism     R/O Tardive Dyskinesia    Medical comorbidities include:   Patient Active Problem List    Diagnosis Date Noted    Mild cognitive impairment 10/07/2023     Priority: Medium    Unspecified mood (affective) disorder (H24) 10/07/2023     Priority: Medium    Insomnia, unspecified type 09/29/2023     Priority: Medium    Anxiety 09/29/2023     Priority: Medium    Major depression 09/29/2023     Priority: Medium    History of paz 06/20/2023     Priority: Medium    Abnormal bone density screening 01/15/2021     Priority: Medium    Long term (current) use of aromatase inhibitors 01/15/2021     Priority: Medium    Malignant neoplasm of upper-outer quadrant of right breast in female, estrogen receptor positive (H) 08/23/2018     Priority: Medium    Symptomatic varicose veins of both lower extremities 11/10/2015     Priority: Medium       Psychosocial & Contextual Factors: see HPI above    Assessment:  From Intake, 5/12/2023:  Keely Arana is a 67-year-old female with relatively benign past psychiatric history leading  up to recent suspected manic episode who presents today for psychiatric evaluation.  Patient with what appears to meet criteria for manic episode starting earlier this spring.  Patient started to have more trouble with sleep and was becoming more irritable leading to racing thoughts, increased goal directed activity, disorganized thoughts, more talkative than usual, etc.  Patient's family concerns since she was acting out of character and brought to the ER.  Patient started on olanzapine in the emergency room which has helped significantly to regulate sleep and mood.  Patient perhaps with some heightened energy today but not overtly manic.  Patient is currently tolerating olanzapine well and reports sleeping about 8 hours a night.  Discussed risks and benefits of staying on olanzapine.  Unclear if patient has had underlying bipolar disorder that has gone fairly undetected over the years.  Patient with relatives with bipolar disorder.  Patient did have recent brain imaging (MRI) that would not explain recent new onset cong.  Patient also with otherwise relatively benign labs including thyroid, liver panel, CMP, CBC, etc.  No new medications were introduced.  Patient denies any drug use and no problematic alcohol use.  Patient stopped using her 1 glass of wine a week around the time this episode started to see if it would help to give up alcohol.  Patient does often have a few cups of coffee a day.  We will continue to monitor.  Recommend staying on olanzapine for at least 6 months to a year before trying to taper off in order to prevent relapse/recurrent cong symptoms.  No acute safety concerns today.  No SI.  No problematic drug or alcohol use.     6/14/2023:  Patient overall doing relatively well.  Mood has been stable.  No signs of cong.  We will decrease olanzapine to 2.5 mg at bedtime for the next few months to ensure ongoing stability of symptoms.  Could consider a trial off olanzapine at the end of the 3  months of decreased dose.  Patient and  will continue to remain vigilant while monitoring for any relapse of manic symptoms.  Patient had recent lipid panel and glucose completed.  Both within normal limits.  No acute safety concerns.  No SI.  No signs of tardive dyskinesia noted on video today.  No problematic drug or alcohol use.    9/11/2023:  Patient with suspected movement related side effects due to olanzapine use.  Patient with extreme anxiety and some worsening symptoms when olanzapine was stopped after 3 months on 2.5 mg dose.  We will restart olanzapine at 2.5 mg every other day and continue with slow taper and also get recommendations from psychiatric PharmD and possibly neurology.  Patient with possible tongue thrusting today versus extreme dry mouth from hydroxyzine use.  There was no tongue thrusting noted at last appointment in June.  Patient's family had noted possible parkinsonism like symptoms.   and patient will continue to monitor.  Referral placed for [facility] for additional monitoring and talk therapy/support as well.  We will also start a trial with propranolol to see if that is helpful for some of the physical symptoms of anxiety and potentially some symptoms being caused by olanzapine.  No acute safety concerns.  No SI.  No problematic drug or alcohol use.    9/22/2023:  Patient still not sleeping well and I would not want patient to get manic.  We did discuss possibility of something like Depakote at low-dose in order to avoid other agents like olanzapine and since we would like to start to taper off olanzapine (due to suspected parkinsonism).  Could also consider lithium.  Patient is not seeming manic at this time of interview - definitely seems anxious though. Pt sitting quite calmly and no pressured speech.  Difficult to assess if not sleeping because of anxiety versus onset of manic episode?  We will trial mirtazapine at bedtime to see if helpful for sleep without worsening any  side effects. No acute safety concerns. No SI. No problematic drug or alcohol use. Pt also encouraged to utilize the propranolol.     10/23/2023:  Patient exhibiting multiple signs of suspected catatonia.  Patient also with symptoms that seem to improve with Ativan administration by family.  Recommended emergency room visit for evaluation for possible admission for treatment of her mental health condition since I suspect pt has catatonia and discussed limitation of my ability to examine pt via video.  Discussed risks of catatonia with family, including up to death. Discussed malignant catatonia and that catatonia can affect blood pressure and heart rate. Pt's vitals (BP and HR) were elevated at last check on record 10/11.  Patient and family declined emergency room visit at this time and prefer to try to manage patient at home with Ativan up to 1 mg 3 times daily.  Advised family and patient they are to bring patient to the emergency room if symptoms do not start to improve, or start to worsen, with Ativan administration.  Family instructed to hold doses of Ativan, or break doses in half, if patient starts to become too sedated or if gait becomes too unsteady on full dose.  Discussed what to look for if Ativan is going to be helpful for symptoms -starting to do more around the house, increased energy, talking more, etc.  Patient has follow-up visit with me in 1 week.  Neurology referral also placed to help evaluate for parkinsonism versus catatonia versus other.  Patient continues to have frequent lip smacking movements possibly related to current condition versus TD?  Patient continues to eat and drink.  Patient was alert and oriented today during interview.  No hold will be placed for emergent medical transport to the hospital, but I do have a low threshold if patient and family continue to refuse emergency room evaluation and if patient's symptoms continue to worsen.  This was discussed with the patient and  family.  Both olanzapine and quetiapine discontinued due to suspicions for catatonia.  Labs also ordered to assess for infection, metabolic status, ammonia level, TSH, etc.    Medication side effects and alternatives were reviewed. Health promotion activities recommended and reviewed today. All questions addressed. Education and counseling completed regarding risks and benefits of medications and psychotherapy options. Recommend therapy for additional support.     Treatment Plan:  Present to the emergency room for further evaluation if symptoms do not improve, or start to worsen, with this new treatment plan.  Discontinue olanzapine  Discontinue quetiapine  Increase Ativan/lorazepam to 1 mg 3 times daily for suspected catatonia.  Discussed holding the dose, or halving (0.5 mg) doses due to sedation or unsteady gait/fall risk.  Continue propranolol 10-20 mg twice daily for anxiety, movement-related side effects from olanzapine.   Continue working with MTM (medication therapy management) psychiatric PharmD.   BHC (behavioral health clinician) referral previously placed for counseling/therapy support.   Referral placed for neurology movement disorder clinic.  Continue all other cares per primary care provider.   Continue all other medications as reviewed per electronic medical record today.   Safety plan reviewed. To the Emergency Department as needed or call after hours crisis line at [phone] or [phone]. [place] Crisis Text Line. Text [place] to 589881 or Suicide LifeLine Chat: suicidepreventionlifeline.org/chat  Schedule an appointment with me in 1 weeks or sooner as needed. Call [facility] at [phone] to schedule.  Follow up with primary care provider as planned or for acute medical concerns.  Call the psychiatric nurse line with medication questions or concerns at [phone].  [email or web address] may be used to communicate with your provider, but this is not intended to be used for  emergencies.    Risks of benzodiazepine (Ativan, Xanax, Klonopin, Valium, etc) use including, but not limited to, sedation, tolerance, risk for addiction/dependence. Do not drink alcohol while taking benzodiazepines due to risk of trouble breathing and potential death. Do not drive or operate heavy machinery until it is known how the drug affects you. Discuss with physician or pharmacist before ever taking a benzodiazepine with a narcotic/opioid pain medication.  Watch for sedation and falls.    Administrative Billing:   Phone Call/Video Duration: 41 Minutes  Start: 10:20a  Stop: 11:01a    Patient Status:  Patient will continue to be seen for ongoing consultation and stabilization.    Signed:   Reema Palomo DO  [facility]    Disclaimer: This note consists of symbols derived from keyboarding, dictation and/or voice recognition software. As a result, there may be errors in the script that have gone undetected. Please consider this when interpreting information found in this chart.

## 2023-10-23 NOTE — CONFIDENTIAL NOTE
Can RN please call pt and family for a check-in mid to late afternoon 10/24 to see how pt is doing and then let me know. Thanks so much! Pt is scheduled for Monday and I have been in touch with MTM as well. Can RN also make sure pt is scheduled for labs? Thanks!!

## 2023-10-24 NOTE — CONFIDENTIAL NOTE
"Appreciate RN getting in touch with pt's daughter. This provider spoke also with pt's  since had a moment and pt's  stated \"it was almost like a miracle\" within one hour of first Ativan dose pt was \"like a different person.\" Pt ate well, was more energetic, talking more, and slept well. Pt tolerating current doses of Ativan well today as well, not too sedated or tired at all, and no falls.  will continue to monitor closely. Discussed this is indeed catatonia we are treating and to stay the course with 1 mg three times daily of the Ativan for now. Rule out causes of catatonia. Ongoing high suspicions for bipolar disorder, but given age, will complete lab work, referral to neurology already placed, and will need imaging. Will continue to discuss ongoing work-up with pt/family, MTM, and neurology.   "

## 2023-10-24 NOTE — TELEPHONE ENCOUNTER
RN called daughter Yesy and she reports the following of her mother:    She seems really good since stopping the other medications.  She has been taking the lorazepam and it has helped.  Her mother went out of the house yesterday and seems to have more energy.  She came over to her daughters house for dinner and ate well.  Yesy had texted her mothers  this morning and he reported that she slept well last night and got out of bed early and took her lorazepam right away,   Yesy feels there is a big change since yesterday.  She reports that patient last took olanzapine 10/22/23 before bed.     Yesy states they were expecting a call from a nurse on Wednesday and that today she was feeling well enough that [place] have [place].     RN reviewed request to get labs done per Dr Palomo. Yesy asked when these needed to be done and RN requested they complete these asap to help better understand the clinical picture.     RN gave daughter numbers for both the [facility] and [facility] locations.     RN attempted to call patient but there was no answer and the call went to a generic VM. RN left a message to call the clinic of Dr. Palomo back at [phone] and to check her [employer] message RN sent.     Routing update to provider for review.    Cami Esparza RN on 10/24/2023 at 2:34 PM

## 2023-10-24 NOTE — TELEPHONE ENCOUNTER
RN reviewed chart. Will call patient and family 10/24/23 afternoon.    Daughter Yesy (JET on file for care coordination)    Closest labs     [facility] [place] Location  [address] [place] [id number] [phone] to schedule a lab appointment.     [facility]  [address]  [phone]

## 2023-10-27 ENCOUNTER — LAB (OUTPATIENT)
Dept: LAB | Facility: CLINIC | Age: 68
End: 2023-10-27
Payer: MEDICARE

## 2023-10-27 DIAGNOSIS — F06.1 CATATONIA: ICD-10-CM

## 2023-10-27 DIAGNOSIS — R41.89 COGNITIVE CHANGES: ICD-10-CM

## 2023-10-27 LAB
ALBUMIN SERPL BCG-MCNC: 4.1 G/DL (ref 3.5–5.2)
ALBUMIN UR-MCNC: NEGATIVE MG/DL
ALP SERPL-CCNC: 58 U/L (ref 35–104)
ALT SERPL W P-5'-P-CCNC: 15 U/L (ref 0–50)
AMMONIA PLAS-SCNC: 17 UMOL/L (ref 11–51)
ANION GAP SERPL CALCULATED.3IONS-SCNC: 10 MMOL/L (ref 7–15)
APPEARANCE UR: CLEAR
AST SERPL W P-5'-P-CCNC: 15 U/L (ref 0–45)
BACTERIA #/AREA URNS HPF: ABNORMAL /HPF
BASOPHILS # BLD AUTO: 0 10E3/UL (ref 0–0.2)
BASOPHILS NFR BLD AUTO: 0 %
BILIRUB SERPL-MCNC: 0.5 MG/DL
BILIRUB UR QL STRIP: NEGATIVE
BUN SERPL-MCNC: 20 MG/DL (ref 8–23)
CALCIUM SERPL-MCNC: 9.5 MG/DL (ref 8.8–10.2)
CHLORIDE SERPL-SCNC: 104 MMOL/L (ref 98–107)
CK SERPL-CCNC: 28 U/L (ref 26–192)
COLOR UR AUTO: YELLOW
CREAT SERPL-MCNC: 0.74 MG/DL (ref 0.51–0.95)
DEPRECATED HCO3 PLAS-SCNC: 28 MMOL/L (ref 22–29)
EGFRCR SERPLBLD CKD-EPI 2021: 87 ML/MIN/1.73M2
EOSINOPHIL # BLD AUTO: 0 10E3/UL (ref 0–0.7)
EOSINOPHIL NFR BLD AUTO: 0 %
ERYTHROCYTE [DISTWIDTH] IN BLOOD BY AUTOMATED COUNT: 12.9 % (ref 10–15)
GLUCOSE SERPL-MCNC: 134 MG/DL (ref 70–99)
GLUCOSE UR STRIP-MCNC: NEGATIVE MG/DL
HCT VFR BLD AUTO: 42.9 % (ref 35–47)
HGB BLD-MCNC: 13.9 G/DL (ref 11.7–15.7)
HGB UR QL STRIP: ABNORMAL
IMM GRANULOCYTES # BLD: 0 10E3/UL
IMM GRANULOCYTES NFR BLD: 0 %
KETONES UR STRIP-MCNC: NEGATIVE MG/DL
LEUKOCYTE ESTERASE UR QL STRIP: NEGATIVE
LYMPHOCYTES # BLD AUTO: 0.9 10E3/UL (ref 0.8–5.3)
LYMPHOCYTES NFR BLD AUTO: 18 %
MCH RBC QN AUTO: 30.3 PG (ref 26.5–33)
MCHC RBC AUTO-ENTMCNC: 32.4 G/DL (ref 31.5–36.5)
MCV RBC AUTO: 94 FL (ref 78–100)
MONOCYTES # BLD AUTO: 0.4 10E3/UL (ref 0–1.3)
MONOCYTES NFR BLD AUTO: 7 %
NEUTROPHILS # BLD AUTO: 3.9 10E3/UL (ref 1.6–8.3)
NEUTROPHILS NFR BLD AUTO: 74 %
NITRATE UR QL: NEGATIVE
PH UR STRIP: 6.5 [PH] (ref 5–8)
PLATELET # BLD AUTO: 239 10E3/UL (ref 150–450)
POTASSIUM SERPL-SCNC: 4.2 MMOL/L (ref 3.4–5.3)
PROT SERPL-MCNC: 6.8 G/DL (ref 6.4–8.3)
RBC # BLD AUTO: 4.59 10E6/UL (ref 3.8–5.2)
RBC #/AREA URNS AUTO: ABNORMAL /HPF
SODIUM SERPL-SCNC: 142 MMOL/L (ref 135–145)
SP GR UR STRIP: <=1.005 (ref 1–1.03)
SQUAMOUS #/AREA URNS AUTO: ABNORMAL /LPF
TSH SERPL DL<=0.005 MIU/L-ACNC: 0.56 UIU/ML (ref 0.3–4.2)
UROBILINOGEN UR STRIP-ACNC: 0.2 E.U./DL
WBC # BLD AUTO: 5.3 10E3/UL (ref 4–11)
WBC #/AREA URNS AUTO: ABNORMAL /HPF

## 2023-10-27 PROCEDURE — 82550 ASSAY OF CK (CPK): CPT

## 2023-10-27 PROCEDURE — 80053 COMPREHEN METABOLIC PANEL: CPT

## 2023-10-27 PROCEDURE — 81001 URINALYSIS AUTO W/SCOPE: CPT

## 2023-10-27 PROCEDURE — 85025 COMPLETE CBC W/AUTO DIFF WBC: CPT

## 2023-10-27 PROCEDURE — 84443 ASSAY THYROID STIM HORMONE: CPT

## 2023-10-27 PROCEDURE — 82140 ASSAY OF AMMONIA: CPT

## 2023-10-27 PROCEDURE — 36415 COLL VENOUS BLD VENIPUNCTURE: CPT

## 2023-10-29 ASSESSMENT — ANXIETY QUESTIONNAIRES
7. FEELING AFRAID AS IF SOMETHING AWFUL MIGHT HAPPEN: NEARLY EVERY DAY
GAD7 TOTAL SCORE: 13
6. BECOMING EASILY ANNOYED OR IRRITABLE: NOT AT ALL
5. BEING SO RESTLESS THAT IT IS HARD TO SIT STILL: NOT AT ALL
4. TROUBLE RELAXING: SEVERAL DAYS
3. WORRYING TOO MUCH ABOUT DIFFERENT THINGS: NEARLY EVERY DAY
1. FEELING NERVOUS, ANXIOUS, OR ON EDGE: NEARLY EVERY DAY
GAD7 TOTAL SCORE: 13
2. NOT BEING ABLE TO STOP OR CONTROL WORRYING: NEARLY EVERY DAY
IF YOU CHECKED OFF ANY PROBLEMS ON THIS QUESTIONNAIRE, HOW DIFFICULT HAVE THESE PROBLEMS MADE IT FOR YOU TO DO YOUR WORK, TAKE CARE OF THINGS AT HOME, OR GET ALONG WITH OTHER PEOPLE: SOMEWHAT DIFFICULT

## 2023-10-30 ENCOUNTER — VIRTUAL VISIT (OUTPATIENT)
Dept: PSYCHIATRY | Facility: CLINIC | Age: 68
End: 2023-10-30
Payer: MEDICARE

## 2023-10-30 DIAGNOSIS — Z79.899 ENCOUNTER FOR LONG-TERM (CURRENT) USE OF MEDICATIONS: ICD-10-CM

## 2023-10-30 DIAGNOSIS — F31.9 BIPOLAR AFFECTIVE DISORDER, REMISSION STATUS UNSPECIFIED (H): ICD-10-CM

## 2023-10-30 DIAGNOSIS — F06.1 CATATONIA: Primary | ICD-10-CM

## 2023-10-30 PROCEDURE — 99215 OFFICE O/P EST HI 40 MIN: CPT | Mod: 95 | Performed by: PSYCHIATRY & NEUROLOGY

## 2023-10-30 RX ORDER — DIVALPROEX SODIUM 250 MG/1
250 TABLET, DELAYED RELEASE ORAL AT BEDTIME
Qty: 30 TABLET | Refills: 1 | Status: SHIPPED | OUTPATIENT
Start: 2023-10-30 | End: 2023-11-14

## 2023-10-30 NOTE — PATIENT INSTRUCTIONS
Treatment Plan:  Present to the emergency room for further evaluation if symptoms do not improve, or start to worsen.  Decrease Ativan/lorazepam (taking for catatonia): take 1 mg in AM, 0.5 mg in afternoon, and 1 mg at bedtime for on eweek then decrease to 1 mg in AM, 0.5 mg in afternoon, and 0.5 mg in evening. After another week or two (as long as symptoms continuing to improve and not worsen), can decrease to 0.5 mg three times daily.  Previously discussed holding the dose, or halving (0.5 mg) doses due to sedation or unsteady gait/fall risk. Please reach out right away if symptoms start to worsen again on decreased doses of Ativan.   Continue propranolol 10-20 mg twice daily as needed for anxiety.  Start Depkaote  mg daily at bedtime for bipolar disorder.   Have labs completed at any [facility] lab in about two weeks after starting Depakote. Need to call your clinic ahead of time to schedule an appointment for lab due to limited hours and no walk-ins due to Covid-19 restrictions/changes.    Continue working with [facility] (medication therapy management) psychiatric PharmD.   [facility] (behavioral health clinician) referral previously placed for counseling/therapy support.   Referral placed for neurology movement disorder clinic. Please call to get scheduled. Number provided at today's visit.   Continue all other cares per primary care provider.   Continue all other medications as reviewed per electronic medical record today.   Safety plan reviewed. To the Emergency Department as needed or call after hours crisis line at [phone] or [phone]. [place] Crisis Text Line. Text [place] to 771603 or Suicide LifeLine Chat: suicidepreventionlifeline.org/chat  Schedule an appointment with me (next availabble) or sooner as needed. Call [facility] at [phone] to schedule.  Follow up with primary care provider as planned or for acute medical concerns.  Call the psychiatric nurse line with medication  questions or concerns at [phone].  MyChart may be used to communicate with your provider, but this is not intended to be used for emergencies.    Previously discussed risks of benzodiazepine (Ativan, Xanax, Klonopin, Valium, etc) use including, but not limited to, sedation, tolerance, risk for addiction/dependence. Do not drink alcohol while taking benzodiazepines due to risk of trouble breathing and potential death. Do not drive or operate heavy machinery until it is known how the drug affects you. Discuss with physician or pharmacist before ever taking a benzodiazepine with a narcotic/opioid pain medication.  Watch for sedation and falls.

## 2023-10-30 NOTE — NURSING NOTE
Is the patient currently in the state of [place]? YES    Visit mode:VIDEO    If the visit is dropped, the patient can be reconnected by: VIDEO VISIT: Text to cell phone:   Telephone Information:   Mobile [phone]       Will anyone else be joining the visit? No  (If patient encounters technical issues they should call [phone])    How would you like to obtain your AVS? MyChart    Are changes needed to the allergy or medication list? Pt stated no changes to allergies and Pt stated no med changes    Rooming Documentation: Assigned questionnaire(s) completed .    Reason for visit: ALETA Reyes

## 2023-10-30 NOTE — PROGRESS NOTES
"Telemedicine Visit: The patient's condition can be safely assessed and treated via synchronous audio and visual telemedicine encounter.      Reason for Telemedicine Visit: Patient has requested telehealth visit    Originating Site (Patient Location): Patient's home    Distant Location (provider location):  Off-site    Consent:  The patient/guardian has verbally consented to: the potential risks and benefits of telemedicine (video visit) versus in person care; bill my insurance or make self-payment for services provided; and responsibility for payment of non-covered services.     Mode of Communication:  Video Conference via Loc[employer] (ML4D)    As the provider I attest to compliance with applicable laws and regulations related to telemedicine.         Outpatient Psychiatric Progress Note    Name: Keely Arana   : 1955                    Primary Care Provider: Maulik Nye MD   Therapist: None    PHQ-9 scores:      2023    10:19 AM 2023     7:56 AM 2023     2:18 PM   PHQ-9 SCORE   PHQ-9 Total Score MyChart  0 7 (Mild depression)   PHQ-9 Total Score 0 0    0 7       DAISY-7 scores:      2023    10:19 AM 9/10/2023     9:53 AM 10/29/2023     6:33 PM   DAISY-7 SCORE   Total Score  12 (moderate anxiety) 13 (moderate anxiety)   Total Score 0 12 13       Patient Identification:  Patient is a 68 year old,   White Not  or  female  who presents for return visit with me.  Patient is currently retired. Patient attended the phone/video session with  Terry and daughter Yesy , who they agreed to have interview with. Patient prefers to be called: \"Jessica\".    Interim History:  I last saw Keely BART Arana for outpatient psychiatry return visit on 10/23/2023. During that appointment, we:    Present to the emergency room for further evaluation if symptoms do not improve, or start to worsen, with this new treatment plan.  Discontinue olanzapine  Discontinue quetiapine  Increase " Ativan/lorazepam to 1 mg 3 times daily for suspected catatonia.  Discussed holding the dose, or halving (0.5 mg) doses due to sedation or unsteady gait/fall risk.  Continue propranolol 10-20 mg twice daily for anxiety, movement-related side effects from olanzapine.   Continue working with MTM (medication therapy management) psychiatric PharmD.   [facility] (behavioral health clinician) referral previously placed for counseling/therapy support.   Referral placed for neurology movement disorder clinic.    10/30: Patient overall doing much better this visit compared to last week.  Ativan has been incredibly helpful for the patient and symptoms.  Patient able to get out of bed much easier.  Patient is talking a lot more.  Appetite is good, sleeping well.  Anxiety still high at times.  Patient's family notes patient will get very perseverative and ruminative on topics that do not seem the most logical.  Overall, doing significantly better on Ativan.  Tolerating current dose well.  No falls, no noted confusion.  Thinking more clearly in fact.  No drug or alcohol use.  Patient very worried about hospitalization.  Does not want to be put in the hospital.   very open to the possibility of ECT if it is needed.  Patient is not interested in ECT.    ER visits 10/7 and 10/11     10/23: Patient overall with ongoing struggles.  Patient and family tried to go up to the cabin this past weekend but they had to bring patient home because she was not able to function appropriately.  Patient with very repetitive/obsessive ruminations and thoughts.  Patient is not getting out of bed in the morning and needs assistance to get up and dressed.  She has not been putting on her make-up.  One of her daughters reports she has not seen her mom without make-up before in the past.  Patient has been talking more slowly with odd long pauses.  This has been going on for weeks.  Patient has been looking depressed.  Family feels like this has been the  "opposite of patient talking about amazing ideas/talking fast/paz symptoms patient experienced in April.  They denied patient is rigid appearing but is \"fragile.\"  Recently they went down on quetiapine and back on olanzapine.  Patient has not been wanting to take medication at times.  Anxiety has been paralyzing.  Sometimes will take Ativan.  With Ativan patient will be more talkative and calmer.  Patient typically loves to cook and is not cooking.  Patient is also not driving at this time.  No acute safety concerns.  Patient is eating and drinking when prompted.  No suicidal ideation endorsed.  Patient has been seen making repetitive movements with her hands.    Past Psychiatric Med Trials:  Psych Meds at Intake:  Olanzapine 5 mg at bedtime and 2.5 mg daily as needed     Past Psych Meds:  None  Hydroxyzine - not helpful, dry mouth  Quetiapine -stopped due to worsening catatonia  Olanzapine-stopped due to worsening catatonia  Mirtazapine-paradoxical effect?    Psychiatric ROS:  Keely Arana reports mood has been: A little better  Anxiety has been: Still quite high at times  Sleep has been: Improved with Ativan and was also improved with ramelteon  Paz sxs: See HPI above  Psychosis sxs: Increased paranoia?  ADHD/ADD sxs: N/A  PTSD sxs: N/A  PHQ9 and GAD7 scores were reviewed today if completed.   Medication side effects: See HPI above  Current stressors include: Sxs and see HPI above  Coping mechanisms and supports include: Family, Hobbies and Friends    Current medications include:   Current Outpatient Medications   Medication Sig    amLODIPine (NORVASC) 5 MG tablet Take 5 mg by mouth daily    anastrozole (ARIMIDEX) 1 MG tablet Take 1 tablet (1 mg) by mouth daily    atorvastatin (LIPITOR) 10 MG tablet Take 1 tablet (10 mg) by mouth daily    calcium carbonate (OS-PADDY 500 MG [place]. [place]) 500 MG tablet Take 2 tablets by mouth daily With Magnesium,Zinc    LORazepam (ATIVAN) 1 MG tablet Take 1 tablet (1 mg) by mouth " 3 times daily Hold dose if sedation or falls.    LYSINE PO Take 10,000 mg by mouth daily    Multiple Vitamins-Minerals (PRESERVISION AREDS 2 PO) Take 2 tablets by mouth daily    multivitamin w/minerals (THERA-VIT-M) tablet Take 1 tablet by mouth daily    propranolol (INDERAL) 20 MG tablet Take 0.5-1 tablets (10-20 mg) by mouth 2 times daily as needed (anxiety)    ramelteon (ROZEREM) 8 MG tablet Take 1 tablet (8 mg) by mouth at bedtime    rivaroxaban ANTICOAGULANT (XARELTO) 20 MG TABS tablet Take 20 mg by mouth    VITAMIN D, CHOLECALCIFEROL, PO Take 1,000 Units by mouth daily      No current facility-administered medications for this visit.       Past Medical/Surgical History:  Past Medical History:   Diagnosis Date    Atrial fibrillation (H)     s/p ablasion.    Hypertension     Iritis       has a past medical history of Atrial fibrillation (H), Hypertension, and Iritis.    She has no past medical history of Complication of anesthesia or Sleep apnea.    Social History:  Reviewed. No changes to social history except as noted above in HPI.    Vital Signs:   None. This is phone/video visit.     Labs:  Most recent laboratory results reviewed and the pertinent results include:   Last Comprehensive Metabolic Panel:  Sodium   Date Value Ref Range Status   10/27/2023 142 135 - 145 mmol/L Final     Comment:     Reference intervals for this test were updated on 09/26/2023 to more accurately reflect our healthy population. There may be differences in the flagging of prior results with similar values performed with this method. Interpretation of those prior results can be made in the context of the updated reference intervals.      Potassium   Date Value Ref Range Status   10/27/2023 4.2 3.4 - 5.3 mmol/L Final     Chloride   Date Value Ref Range Status   10/27/2023 104 98 - 107 mmol/L Final     Carbon Dioxide (CO2)   Date Value Ref Range Status   10/27/2023 28 22 - 29 mmol/L Final     Anion Gap   Date Value Ref Range Status    10/27/2023 10 7 - 15 mmol/L Final     Glucose   Date Value Ref Range Status   10/27/2023 134 (H) 70 - 99 mg/dL Final     Urea Nitrogen   Date Value Ref Range Status   10/27/2023 20.0 8.0 - 23.0 mg/dL Final     Creatinine   Date Value Ref Range Status   10/27/2023 0.74 0.51 - 0.95 mg/dL Final   01/21/2021 0.69 0.52 - 1.04 mg/dL Final     GFR Estimate   Date Value Ref Range Status   10/27/2023 87 >60 mL/min/1.73m2 Final   01/21/2021 >90 >60 mL/min/[1.73_m2] Final     Comment:     Non  GFR Calc  Starting 12/18/2018, serum creatinine based estimated GFR (eGFR) will be   calculated using the Chronic Kidney Disease Epidemiology Collaboration   (CKD-EPI) equation.       Calcium   Date Value Ref Range Status   10/27/2023 9.5 8.8 - 10.2 mg/dL Final   01/21/2021 9.7 8.5 - 10.1 mg/dL Final     Bilirubin Total   Date Value Ref Range Status   10/27/2023 0.5 <=1.2 mg/dL Final     Alkaline Phosphatase   Date Value Ref Range Status   10/27/2023 58 35 - 104 U/L Final     ALT   Date Value Ref Range Status   10/27/2023 15 0 - 50 U/L Final     Comment:     Reference intervals for this test were updated on 6/12/2023 to more accurately reflect our healthy population. There may be differences in the flagging of prior results with similar values performed with this method. Interpretation of those prior results can be made in the context of the updated reference intervals.       AST   Date Value Ref Range Status   10/27/2023 15 0 - 45 U/L Final     Comment:     Reference intervals for this test were updated on 6/12/2023 to more accurately reflect our healthy population. There may be differences in the flagging of prior results with similar values performed with this method. Interpretation of those prior results can be made in the context of the updated reference intervals.     Lab Results   Component Value Date    WBC 5.3 10/27/2023     Lab Results   Component Value Date    RBC 4.59 10/27/2023     Lab Results   Component  "Value Date    HGB 13.9 10/27/2023     Lab Results   Component Value Date    HCT 42.9 10/27/2023     No components found for: \"MCT\"  Lab Results   Component Value Date    MCV 94 10/27/2023     Lab Results   Component Value Date    MCH 30.3 10/27/2023     Lab Results   Component Value Date    MCHC 32.4 10/27/2023     Lab Results   Component Value Date    RDW 12.9 10/27/2023     Lab Results   Component Value Date     10/27/2023      TSH   Date Value Ref Range Status   10/27/2023 0.56 0.30 - 4.20 uIU/mL Final      Ammonia within normal limits on 10/27/2023    CK within normal limits on 10/27/2023    Review of Systems:  10 systems (general, cardiovascular, respiratory, eyes, ENT, endocrine, GI, , M/S, neurological) were reviewed. Most pertinent finding(s) is/are: denies fever, cough, persistent headaches, shortness of breath, chest pain, severe GI symptoms, trouble urinating, severe pain. The remaining systems are all unremarkable.    Mental Status Examination (limited as this is by phone/video):  Appearance: Awake, alert, appears stated age, no acute distress, adequately groomed  Attitude:  cooperative  Motor: Possible tongue thrusting/lip smacking versus frequent licking of lips again noted today -much improved compared to past visits  Oriented to:  person, place, month/year  Attention Span and Concentration:  normal  Speech: Still delayed in response, speech clearer compared to last visit, normal volume, normal rate  Language: intact  Mood: Anxious  Affect: Mood congruent  Associations:  no loose associations  Thought Process: Brief  Thought Content:  no evidence of suicidal ideation or homicidal ideation, no evidence of psychotic thought, no auditory hallucinations present and no visual hallucinations present  Recent and Remote Memory: Difficult to assess  Fund of Knowledge: appropriate  Insight: Fair  Judgment: Fair  Impulse Control: Fair    Suicide Risk Assessment:  Today Keely Arana reports no " suicidal ideation. Based on all available evidence including the factors cited above, Keely Arana does not appear to be at imminent risk for self-harm, does not meet criteria for a 72-hr hold, and therefore remains appropriate for ongoing outpatient level of care.  A thorough assessment of risk factors related to suicide and self-harm have been reviewed and are noted above. The patient convincingly denies suicidality on several occasions. Local community safety resources reviewed for patient to use if needed. There was no deceit detected, and the patient presented in a manner that was believable.     DSM5 Diagnosis:  Bipolar Disorder  Anxiety, unspecified  CATATONIA    R/O Drug-Induced Parkinsonism     R/O Tardive Dyskinesia    Medical comorbidities include:   Patient Active Problem List    Diagnosis Date Noted    Mild cognitive impairment 10/07/2023     Priority: Medium    Unspecified mood (affective) disorder (H24) 10/07/2023     Priority: Medium    Insomnia, unspecified type 09/29/2023     Priority: Medium    Anxiety 09/29/2023     Priority: Medium    Major depression 09/29/2023     Priority: Medium    History of cong 06/20/2023     Priority: Medium    Abnormal bone density screening 01/15/2021     Priority: Medium    Long term (current) use of aromatase inhibitors 01/15/2021     Priority: Medium    Malignant neoplasm of upper-outer quadrant of right breast in female, estrogen receptor positive (H) 08/23/2018     Priority: Medium    Symptomatic varicose veins of both lower extremities 11/10/2015     Priority: Medium       Psychosocial & Contextual Factors: see HPI above    Assessment:  From Intake, 5/12/2023:  Keely Arana is a 67-year-old female with relatively benign past psychiatric history leading up to recent suspected manic episode who presents today for psychiatric evaluation.  Patient with what appears to meet criteria for manic episode starting earlier this spring.  Patient started to have more  trouble with sleep and was becoming more irritable leading to racing thoughts, increased goal directed activity, disorganized thoughts, more talkative than usual, etc.  Patient's family concerns since she was acting out of character and brought to the ER.  Patient started on olanzapine in the emergency room which has helped significantly to regulate sleep and mood.  Patient perhaps with some heightened energy today but not overtly manic.  Patient is currently tolerating olanzapine well and reports sleeping about 8 hours a night.  Discussed risks and benefits of staying on olanzapine.  Unclear if patient has had underlying bipolar disorder that has gone fairly undetected over the years.  Patient with relatives with bipolar disorder.  Patient did have recent brain imaging (MRI) that would not explain recent new onset cong.  Patient also with otherwise relatively benign labs including thyroid, liver panel, CMP, CBC, etc.  No new medications were introduced.  Patient denies any drug use and no problematic alcohol use.  Patient stopped using her 1 glass of wine a week around the time this episode started to see if it would help to give up alcohol.  Patient does often have a few cups of coffee a day.  We will continue to monitor.  Recommend staying on olanzapine for at least 6 months to a year before trying to taper off in order to prevent relapse/recurrent cong symptoms.  No acute safety concerns today.  No SI.  No problematic drug or alcohol use.     6/14/2023:  Patient overall doing relatively well.  Mood has been stable.  No signs of cong.  We will decrease olanzapine to 2.5 mg at bedtime for the next few months to ensure ongoing stability of symptoms.  Could consider a trial off olanzapine at the end of the 3 months of decreased dose.  Patient and  will continue to remain vigilant while monitoring for any relapse of manic symptoms.  Patient had recent lipid panel and glucose completed.  Both within normal  limits.  No acute safety concerns.  No SI.  No signs of tardive dyskinesia noted on video today.  No problematic drug or alcohol use.    9/11/2023:  Patient with suspected movement related side effects due to olanzapine use.  Patient with extreme anxiety and some worsening symptoms when olanzapine was stopped after 3 months on 2.5 mg dose.  We will restart olanzapine at 2.5 mg every other day and continue with slow taper and also get recommendations from psychiatric PharmD and possibly neurology.  Patient with possible tongue thrusting today versus extreme dry mouth from hydroxyzine use.  There was no tongue thrusting noted at last appointment in June.  Patient's family had noted possible parkinsonism like symptoms.   and patient will continue to monitor.  Referral placed for [facility] for additional monitoring and talk therapy/support as well.  We will also start a trial with propranolol to see if that is helpful for some of the physical symptoms of anxiety and potentially some symptoms being caused by olanzapine.  No acute safety concerns.  No SI.  No problematic drug or alcohol use.    9/22/2023:  Patient still not sleeping well and I would not want patient to get manic.  We did discuss possibility of something like Depakote at low-dose in order to avoid other agents like olanzapine and since we would like to start to taper off olanzapine (due to suspected parkinsonism).  Could also consider lithium.  Patient is not seeming manic at this time of interview - definitely seems anxious though. Pt sitting quite calmly and no pressured speech.  Difficult to assess if not sleeping because of anxiety versus onset of manic episode?  We will trial mirtazapine at bedtime to see if helpful for sleep without worsening any side effects. No acute safety concerns. No SI. No problematic drug or alcohol use. Pt also encouraged to utilize the propranolol.     10/23/2023:  Patient exhibiting multiple signs of suspected catatonia.   Patient also with symptoms that seem to improve with Ativan administration by family.  Recommended emergency room visit for evaluation for possible admission for treatment of her mental health condition since I suspect pt has catatonia and discussed limitation of my ability to examine pt via video.  Discussed risks of catatonia with family, including up to death. Discussed malignant catatonia and that catatonia can affect blood pressure and heart rate. Pt's vitals (BP and HR) were elevated at last check on record 10/11.  Patient and family declined emergency room visit at this time and prefer to try to manage patient at home with Ativan up to 1 mg 3 times daily.  Advised family and patient they are to bring patient to the emergency room if symptoms do not start to improve, or start to worsen, with Ativan administration.  Family instructed to hold doses of Ativan, or break doses in half, if patient starts to become too sedated or if gait becomes too unsteady on full dose.  Discussed what to look for if Ativan is going to be helpful for symptoms -starting to do more around the house, increased energy, talking more, etc.  Patient has follow-up visit with me in 1 week.  Neurology referral also placed to help evaluate for parkinsonism versus catatonia versus other.  Patient continues to have frequent lip smacking movements possibly related to current condition versus TD?  Patient continues to eat and drink.  Patient was alert and oriented today during interview.  No hold will be placed for emergent medical transport to the hospital, but I do have a low threshold if patient and family continue to refuse emergency room evaluation and if patient's symptoms continue to worsen.  This was discussed with the patient and family.  Both olanzapine and quetiapine discontinued due to suspicions for catatonia.  Labs also ordered to assess for infection, metabolic status, ammonia level, TSH, etc.    10/30/2023:  Patient with drastic  improvement since starting Ativan/lorazepam for catatonia.  Still some ongoing ruminations, paranoia, restlessness.  Discussed possible causes of catatonia including suspected bipolar disorder, possible neurocognitive disorder, infection, others.  Patient's recent laboratory work unremarkable for any signs of infection or obvious causes of catatonia at this time.  Strongly suspicious of bipolar disorder causing cong.  Rule out neurocognitive disorder.  Neurology referral has been placed and pending scheduling.  We will also consider imaging again.  Patient had imaging completed this past spring but could consider again due to catatonia and change in mental status.  No acute safety concerns at this time.  Patient eating and sleeping well.  No suicidal thoughts.  No problematic drug or alcohol use.  Family continues to be incredibly supportive.  Patient very fearful of the hospitalization.  Patient is agreeable to starting Depakote to help with some of her symptoms, bipolar disorder.  Patient's  open to ongoing discussions about possibility of ECT.  Patient is not as open to ECT at this time.  Patient/family will reach out if they would like an ECT referral.    Medication side effects and alternatives were reviewed. Health promotion activities recommended and reviewed today. All questions addressed. Education and counseling completed regarding risks and benefits of medications and psychotherapy options. Recommend therapy for additional support.     Treatment Plan:  Present to the emergency room for further evaluation if symptoms do not improve, or start to worsen.  Decrease Ativan/lorazepam (taking for catatonia): take 1 mg in AM, 0.5 mg in afternoon, and 1 mg at bedtime for on eweek then decrease to 1 mg in AM, 0.5 mg in afternoon, and 0.5 mg in evening. After another week or two (as long as symptoms continuing to improve and not worsen), can decrease to 0.5 mg three times daily.  Previously discussed holding  the dose, or halving (0.5 mg) doses due to sedation or unsteady gait/fall risk. Please reach out right away if symptoms start to worsen again on decreased doses of Ativan.   Continue propranolol 10-20 mg twice daily as needed for anxiety.  Start Depkaote  mg daily at bedtime for bipolar disorder.   Have labs completed at any [facility] lab in about two weeks after starting Depakote. Need to call your clinic ahead of time to schedule an appointment for lab due to limited hours and no walk-ins due to Covid-19 restrictions/changes.    Continue working with MTM (medication therapy management) psychiatric PharmD.   C (behavioral health clinician) referral previously placed for counseling/therapy support.   Referral placed for neurology movement disorder clinic. Please call to get scheduled. Number provided at today's visit.   Continue all other cares per primary care provider.   Continue all other medications as reviewed per electronic medical record today.   Safety plan reviewed. To the Emergency Department as needed or call after hours crisis line at [phone] or [phone]. [place] Crisis Text Line. Text [place] to 836679 or Suicide LifeLine Chat: suicidepreventionlifeline.org/chat  Schedule an appointment with me (next availabble) or sooner as needed. Call [facility] at [phone] to schedule.  Follow up with primary care provider as planned or for acute medical concerns.  Call the psychiatric nurse line with medication questions or concerns at [phone].  MyChart may be used to communicate with your provider, but this is not intended to be used for emergencies.    Previously discussed risks of benzodiazepine (Ativan, Xanax, Klonopin, Valium, etc) use including, but not limited to, sedation, tolerance, risk for addiction/dependence. Do not drink alcohol while taking benzodiazepines due to risk of trouble breathing and potential death. Do not drive or operate heavy machinery until  it is known how the drug affects you. Discuss with physician or pharmacist before ever taking a benzodiazepine with a narcotic/opioid pain medication.  Watch for sedation and falls.    Administrative Billing:   Phone Call/Video Duration: 42 Minutes  Start: 10:55a  Stop: 11:37a    Patient Status:  Patient will continue to be seen for ongoing consultation and stabilization.    Signed:   Reema Palomo DO  [facility]    Disclaimer: This note consists of symbols derived from keyboarding, dictation and/or voice recognition software. As a result, there may be errors in the script that have gone undetected. Please consider this when interpreting information found in this chart.

## 2023-10-30 NOTE — Clinical Note
Hi there! Hoping you might be able to bridge this patient with me. My next opening is not until mid-Dec. They are re-working my template to address the access issue. Pt with catatonia and doing well on Ativan. Starting low-dose depakote at bedtime and slowly cutting back some on the Ativan.  very willing to consider ECT if necessary (had a daughter in previous relationship with very good response and exp with ECT) but pt resistant. Also VERY fearful of hospitalization. I suspect Bipolar as cause but R/O neurocognitive d/o contributions. Baseline+ labs unremarkable. Hopefully can get scheduled with neurology. I placed referral last week and gave family number this week to get scheduled. Could schedule imaging (was scanned in spring and I don't want neuro to have to repeat, thoughts?). Overall though, BIG improvements from last week to this week with benzo and stopping neuroleptics. THANKS! -Reema

## 2023-10-30 NOTE — PROGRESS NOTES
"Virtual Visit Details    Type of service:  Video Visit   Video Start Time: {video visit start/end time for provider to select:773611}  Video End Time:{video visit start/end time for provider to select:726939}    Originating Location (pt. Location): {video visit patient location:531379::\"Home\"}  {PROVIDER LOCATION On-site should be selected for visits conducted from your clinic location or adjoining [facility] hospital, academic office, or other nearby [facility] building. Off-site should be selected for all other provider locations, including home:072114}  Distant Location (provider location):  {virtual location provider:165052}  Platform used for Video Visit: {Virtual Visit Platforms:906831::\"[employer]\"}  "

## 2023-11-09 ENCOUNTER — TELEPHONE (OUTPATIENT)
Dept: NEUROLOGY | Facility: CLINIC | Age: 68
End: 2023-11-09
Payer: MEDICARE

## 2023-11-10 ENCOUNTER — OFFICE VISIT (OUTPATIENT)
Dept: NEUROLOGY | Facility: CLINIC | Age: 68
End: 2023-11-10
Attending: PSYCHIATRY & NEUROLOGY
Payer: MEDICARE

## 2023-11-10 ENCOUNTER — LAB (OUTPATIENT)
Dept: LAB | Facility: CLINIC | Age: 68
End: 2023-11-10
Payer: MEDICARE

## 2023-11-10 VITALS — HEART RATE: 106 BPM | DIASTOLIC BLOOD PRESSURE: 69 MMHG | OXYGEN SATURATION: 97 % | SYSTOLIC BLOOD PRESSURE: 129 MMHG

## 2023-11-10 DIAGNOSIS — F09 COGNITIVE DYSFUNCTION: ICD-10-CM

## 2023-11-10 DIAGNOSIS — F99 PSYCHIATRIC SYMPTOMS: ICD-10-CM

## 2023-11-10 DIAGNOSIS — F06.1 CATATONIA: ICD-10-CM

## 2023-11-10 DIAGNOSIS — Z79.899 ENCOUNTER FOR LONG-TERM (CURRENT) USE OF MEDICATIONS: ICD-10-CM

## 2023-11-10 DIAGNOSIS — F09 COGNITIVE DYSFUNCTION: Primary | ICD-10-CM

## 2023-11-10 LAB
ALBUMIN SERPL BCG-MCNC: 4.5 G/DL (ref 3.5–5.2)
ALP SERPL-CCNC: 60 U/L (ref 35–104)
ALT SERPL W P-5'-P-CCNC: 12 U/L (ref 0–50)
AST SERPL W P-5'-P-CCNC: 11 U/L (ref 0–45)
BILIRUB DIRECT SERPL-MCNC: <0.2 MG/DL (ref 0–0.3)
BILIRUB SERPL-MCNC: 0.3 MG/DL
Lab: NORMAL
PERFORMING LABORATORY: NORMAL
PROT SERPL-MCNC: 7.2 G/DL (ref 6.4–8.3)
SPECIMEN STATUS: NORMAL
TEST NAME: NORMAL
VALPROATE SERPL-MCNC: 18.7 UG/ML

## 2023-11-10 PROCEDURE — 80076 HEPATIC FUNCTION PANEL: CPT

## 2023-11-10 PROCEDURE — 84999 UNLISTED CHEMISTRY PROCEDURE: CPT

## 2023-11-10 PROCEDURE — 86255 FLUORESCENT ANTIBODY SCREEN: CPT

## 2023-11-10 PROCEDURE — 99205 OFFICE O/P NEW HI 60 MIN: CPT | Performed by: PSYCHIATRY & NEUROLOGY

## 2023-11-10 PROCEDURE — 99417 PROLNG OP E/M EACH 15 MIN: CPT | Performed by: PSYCHIATRY & NEUROLOGY

## 2023-11-10 PROCEDURE — 36415 COLL VENOUS BLD VENIPUNCTURE: CPT

## 2023-11-10 PROCEDURE — 86235 NUCLEAR ANTIGEN ANTIBODY: CPT

## 2023-11-10 PROCEDURE — 86038 ANTINUCLEAR ANTIBODIES: CPT

## 2023-11-10 PROCEDURE — 86341 ISLET CELL ANTIBODY: CPT

## 2023-11-10 PROCEDURE — 80164 ASSAY DIPROPYLACETIC ACD TOT: CPT

## 2023-11-10 PROCEDURE — 86431 RHEUMATOID FACTOR QUANT: CPT

## 2023-11-10 NOTE — LETTER
"    11/10/2023         RE: Keely Arana  [address]  [facility] [address]        Dear Colleague,    Thank you for referring your patient, Keely Arana, to the [facility]. Please see a copy of my visit note below.    Department of Neurology  Movement Disorders Division   New Patient Visit    Patient: Keely Arana   MRN: [id number]   : 1955   Date of Visit: November 10, 2023  PCP: Maulik Nye MD   Referring provider: Stacia    CC:  Evaluation of psychiatric symptoms and cognitive dysfunction    HPI:  Ms. Arana is a 68 year old right-handed female with history significant for breast cancer, status posttreatment, currently under remission since 2018, who presents to movement disorder clinic for a variety of neuropsychiatric symptoms of recent onset. She is accompanied by her , who assists with collateral history.    They report history of her doing fairly well until a very specific day in 2023.  She has been  to her current  for the past 12 years, and there have been no prior instances in which she had any cognitive changes or any behavioral changes, he says that in April, he had to go out of town for a few days, and when he came back she was completely different person.  She was \"purging the whole house\", with excessive cleaning everywhere which she had never done before.  She seems to be very obsessed with cleaning at that time, and in addition to that, she was, according to , been shopping, particularly by going to [employer] and purchasing several random items.    Given those concerns about such as sudden behavior change, around that time she was brought to one of the [place] emergency departments here in the [facility], she underwent a number of different tests including a brain MRI, and those were all unremarkable.  Reportedly, they were told that the working diagnosis at that time was a manic episode, " concerning for primary psychiatric cause since then she is establish care with Dr. Palomo, one of the psychiatrist, who is managing her since.  She was initially put on olanzapine, which gave her significant amount of cognitive slowing, tremors affecting her hands, and orofacial movements.  There was eventually discontinued, and per  the movements in the mouth and hands have completely resolved.  She has been somewhat anxious and is gradually concerned regarding her inability to keep up with certain life events.  She seems to be anxious and confused about certain things, the  cites example of her always circling back around the fact that she does not remember if she has insurance or not, which should not even be a concern according to her , and she is afraid of all the medical bills piling on and then not being able to afford, even though he frequently reminds her that they have insurance and they are covering pretty much all other medical bills and that should not be a concern.    There have been no concerns regarding focal weaknesses changes in sensation, changes in balance.  She did have an episode that she had blurry vision for about a day or so, which she attributed to new eyedrops he was prescribed, however she has been to an ophthalmologic evaluation that did not seem to be the case.  There was a few months ago and has not happened again since.  No issues with gait or balance.    Otherwise has been no unexplained weight change, no rashes, no arthritic changes that she is experienced are new to her since everything else started.  No issues with transient alteration of consciousness, loss of bladder or bowel function.  There is also no alteration of sense of smell or taste, no constipation or any other autonomic changes, no RBD symptoms.    She is otherwise been healthy, denies any new medications besides the psychiatric medications that have been attempted over the past few months.  Besides  the Abilify, no other antipsychotics that have been on board now or in the past.  No chronic exposures to antiemetics or antivertigo medication.  There is no intake of alcohol, no history of smoking, no recreational drugs.  She is a retired  and denies any chronic occupational exposures.  She does have a family history of Alzheimer's disease, her mom passed away after a year suffering from that condition.    Given all the above, there was a clear concern from her psychiatrist regarding her case being very unusual, which prompted a suggestion for neurologic evaluation which prompted evaluation today for opinion on the case      ROS:  All others negative except as listed above. Pertinent movement disorders-specific ROS listed above.    Past Medical History:   Diagnosis Date     Atrial fibrillation (H)     s/p ablasion.     Hypertension      Iritis         Past Surgical History:   Procedure Laterality Date     CATARACT IOL, RT/LT Left 2006     HYSTERECTOMY  2016     LUMPECTOMY BREAST WITH SENTINEL NODE, COMBINED Right 8/31/2018    Procedure: COMBINED LUMPECTOMY BREAST WITH SENTINEL NODE;  Right Wire Localized Lumpectomy and Right [place] Lymph Node Biopsy;  Surgeon: Chilango Kruger MD;  Location: [facility] OR     OOPHORECTOMY  2014          Current Outpatient Medications:      amLODIPine (NORVASC) 5 MG tablet, Take 5 mg by mouth daily, Disp: , Rfl:      anastrozole (ARIMIDEX) 1 MG tablet, Take 1 tablet (1 mg) by mouth daily, Disp: 90 tablet, Rfl: 3     atorvastatin (LIPITOR) 10 MG tablet, Take 1 tablet (10 mg) by mouth daily, Disp: 90 tablet, Rfl: 2     calcium carbonate (OS-PADDY 500 MG [place]. [place]) 500 MG tablet, Take 2 tablets by mouth daily With Magnesium,Zinc, Disp: , Rfl:      divalproex sodium delayed-release (DEPAKOTE) 250 MG DR tablet, Take 1 tablet (250 mg) by mouth at bedtime, Disp: 30 tablet, Rfl: 1     LORazepam (ATIVAN) 1 MG tablet, Take 1 tablet (1 mg) by mouth 3 times daily Hold dose if sedation or  falls., Disp: 90 tablet, Rfl: 0     LYSINE PO, Take 10,000 mg by mouth daily, Disp: , Rfl:      Multiple Vitamins-Minerals (PRESERVISION AREDS 2 PO), Take 2 tablets by mouth daily, Disp: , Rfl:      multivitamin w/minerals (THERA-VIT-M) tablet, Take 1 tablet by mouth daily, Disp: , Rfl:      rivaroxaban ANTICOAGULANT (XARELTO) 20 MG TABS tablet, Take 20 mg by mouth, Disp: , Rfl:      VITAMIN D, CHOLECALCIFEROL, PO, Take 1,000 Units by mouth daily , Disp: , Rfl:      propranolol (INDERAL) 20 MG tablet, Take 0.5-1 tablets (10-20 mg) by mouth 2 times daily as needed (anxiety) (Patient not taking: Reported on 11/10/2023), Disp: 180 tablet, Rfl: 0     ramelteon (ROZEREM) 8 MG tablet, Take 1 tablet (8 mg) by mouth at bedtime (Patient not taking: Reported on 11/10/2023), Disp: 15 tablet, Rfl: 0     Allergies   Allergen Reactions     Corticosteroids Dermatitis     Proven allergic contact dermatitis to corticosteroid (group A, B, D2)     CAN USE as ALTERNATIVE following steroids: Corticosteroids of group C (e.g.Betamethasone, Dexamethasone, Flumethasone pivalate, Halomethasone)   and group D1 (Betamethasone dipropionate, Betamethasone-17-valerate,Clobetasole-propionate, Fluticasone propionate, Mometasone furoate)     Neomycin Dermatitis     Patch tests positives to Neomycin and Framycetin     Sulfa Antibiotics Itching and Rash     Cephalexin GI Disturbance     Other reaction(s): reflux     Codeine Nausea and Vomiting     Nitrofurantoin Nausea and Vomiting and Nausea     Silver Rash        No family history on file.     Social History:  She  reports that she has never smoked. She has never used smokeless tobacco. She reports that she does not currently use alcohol after a past usage of about 3.0 standard drinks of alcohol per week. She reports that she does not use drugs.     PHYSICAL EXAM:  /69   Pulse 106   SpO2 97%      Gen: She appears to be in no acute distress, respirations appear nonlabored on room  air    NEURO:    MS:  Alert, oriented to place and time, as well to person, however she keeps referring to her  for some of the information, she repeatedly asked me the same questions regarding what I was suspecting she had, and she struggled to understand the importance of the work-up that was suggested, which per her , seems to be a new development since everything started this past April.    CN:  PERRLA, EOMI, face symmetric, normal strength and sensation, tongue and palate are midline, SCM 5/5 throughout    Motor:    Normal tone, no fasciculations, strength is 5/5 throughout    Sensation:  Preserved to all modalities in all extremities    Coordination:  Normal finger-nose    Reflexes: 2+ throughout, downgoing toes bilaterally     Gait: Gait is a little slow, with bilateral reduced arm swing, however she had a good pull test    Movements are as observation: No tremors were appreciated, muscle tone is normal.  She does have some diffuse bradykinesia, which appears very symmetric throughout.    DATA REVIEWED:  Reviewed all the pertinent data available in epic.  Reviewed MRIs of the brain obtained back in April 2023 independently, she does seem to have some periventricular white matter disease, which appears to be vascular in origin, no areas of enhancement or potential indication of neurodegenerative process.    ASSESSMENT:  This a 68-year-old right-handed female with prior history of breast cancer, currently in remission for 5 years, presenting with a rather abrupt onset of what appears to be a manic/psychotic event, with excessive cleaning and excessive shopping, which are clearly behaviors that she has never exhibited before, as well as somewhat of a progressive cognitive dysfunction that has taken place since that initial onset.    On the way she seems to have developed a movement disorder that included hand tremors and perioral movements, in the context of being on Zyprexa, this seems to have  resolved for the most part in the setting of discontinuation of the drug.  She is still somewhat bradykinetic on exam which could be residual from that exposure to Zyprexa causes a drug-induced parkinsonism.    The most concerning piece of information here is the fact that she has no prior history of any psychiatric problems and she has a very abrupt onset of behavioral and cognitive changes, which raises the possibility for, since that is been ongoing now for a few months, potential inflammatory/immune mediated process.    PLAN:  -Reviewed impression and plan with patient and     - We recommended an evaluation for what was felt into a category of rapidly progressive cognitive dysfunction.  We will repeat another MRI of the brain looking for changes in the potential vascular burden, also will hopefully have gradient echo images they will allow us to look for microhemorrhages which could be potentially supportive of a primary CNS microangiopathic process.  Further, will also look for cortical remaining, even though she had no other features suggestive of prion disease on exam today.  We will also look for areas of potential enhancement.  We will also go ahead and obtain a CT angiogram looking for potential signs of CNS vasculitis.    - Since rheumatological labs today as well as the [facility] paraneoplastic/autoimmune serum encephalitis panel.    - We will get a second opinion from our colleagues at the memory clinic regarding this being a primary neurocognitive degenerative process, however given the fast presentation, I think the work-up above should be pursued regardless.    - Should this look more like a primary immune mediated process, we may try to get the opinion from neuro immunology team.    - Movement disorders perspective, I do suspect the most will resolve today in terms of bradykinesia could be still residual from a drug-induced parkinsonism secondary to exposure to olanzapine.  We will plan on  rechecking for that in the next 2 to 4 months, sooner if needed.  They are encouraged to contact us back in the meantime should there be any questions, concerns, any other issues.    There are no Patient Instructions on file for this visit.      Kaiden Padilla MD (Leo) (he/him/his)   of Neurology  [facility]  Department of Neurology      Thank you for referring Keely Arana to our [facility] Movement Disorders Program, we appreciate the opportunity of being your partner in healthcare. Please feel free to reach out to us at any point if any questions or concerns about this visit.    Attending attestation: Today I spent a total 90 minutes on this case, with greater than 50% of the time spent in face-to-face, examining, obtaining history, providing education and coordination of care. Additional time was spent in chart review, ancillary data, and documentation as delineated above.      Again, thank you for allowing me to participate in the care of your patient.        Sincerely,        Kaiden Craven MD

## 2023-11-10 NOTE — PROGRESS NOTES
"Department of Neurology  Movement Disorders Division   New Patient Visit    Patient: Keely Arana   MRN: [id number]   : 1955   Date of Visit: November 10, 2023  PCP: Maulik Nye MD   Referring provider: Stacia    CC:  Evaluation of psychiatric symptoms and cognitive dysfunction    HPI:  Ms. Arana is a 68 year old right-handed female with history significant for breast cancer, status posttreatment, currently under remission since 2018, who presents to movement disorder clinic for a variety of neuropsychiatric symptoms of recent onset. She is accompanied by her , who assists with collateral history.    They report history of her doing fairly well until a very specific day in 2023.  She has been  to her current  for the past 12 years, and there have been no prior instances in which she had any cognitive changes or any behavioral changes, he says that in April, he had to go out of town for a few days, and when he came back she was completely different person.  She was \"purging the whole house\", with excessive cleaning everywhere which she had never done before.  She seems to be very obsessed with cleaning at that time, and in addition to that, she was, according to , been shopping, particularly by going to [employer] and purchasing several random items.    Given those concerns about such as sudden behavior change, around that time she was brought to one of the [facility] emergency departments here in the [facility], she underwent a number of different tests including a brain MRI, and those were all unremarkable.  Reportedly, they were told that the working diagnosis at that time was a manic episode, concerning for primary psychiatric cause since then she is establish care with Dr. Palomo, one of the psychiatrist, who is managing her since.  She was initially put on olanzapine, which gave her significant amount of cognitive slowing, tremors affecting her hands, and " orofacial movements.  There was eventually discontinued, and per  the movements in the mouth and hands have completely resolved.  She has been somewhat anxious and is gradually concerned regarding her inability to keep up with certain life events.  She seems to be anxious and confused about certain things, the  cites example of her always circling back around the fact that she does not remember if she has insurance or not, which should not even be a concern according to her , and she is afraid of all the medical bills piling on and then not being able to afford, even though he frequently reminds her that they have insurance and they are covering pretty much all other medical bills and that should not be a concern.    There have been no concerns regarding focal weaknesses changes in sensation, changes in balance.  She did have an episode that she had blurry vision for about a day or so, which she attributed to new eyedrops he was prescribed, however she has been to an ophthalmologic evaluation that did not seem to be the case.  There was a few months ago and has not happened again since.  No issues with gait or balance.    Otherwise has been no unexplained weight change, no rashes, no arthritic changes that she is experienced are new to her since everything else started.  No issues with transient alteration of consciousness, loss of bladder or bowel function.  There is also no alteration of sense of smell or taste, no constipation or any other autonomic changes, no RBD symptoms.    She is otherwise been healthy, denies any new medications besides the psychiatric medications that have been attempted over the past few months.  Besides the Abilify, no other antipsychotics that have been on board now or in the past.  No chronic exposures to antiemetics or antivertigo medication.  There is no intake of alcohol, no history of smoking, no recreational drugs.  She is a retired  and denies  any chronic occupational exposures.  She does have a family history of Alzheimer's disease, her mom passed away after a year suffering from that condition.    Given all the above, there was a clear concern from her psychiatrist regarding her case being very unusual, which prompted a suggestion for neurologic evaluation which prompted evaluation today for opinion on the case      ROS:  All others negative except as listed above. Pertinent movement disorders-specific ROS listed above.    Past Medical History:   Diagnosis Date    Atrial fibrillation (H)     s/p ablasion.    Hypertension     Iritis         Past Surgical History:   Procedure Laterality Date    CATARACT IOL, RT/LT Left 2006    HYSTERECTOMY  2016    LUMPECTOMY BREAST WITH SENTINEL NODE, COMBINED Right 8/31/2018    Procedure: COMBINED LUMPECTOMY BREAST WITH SENTINEL NODE;  Right Wire Localized Lumpectomy and Right [place] Lymph Node Biopsy;  Surgeon: Chilango Kruger MD;  Location: [facility] OR    OOPHORECTOMY  2014          Current Outpatient Medications:     amLODIPine (NORVASC) 5 MG tablet, Take 5 mg by mouth daily, Disp: , Rfl:     anastrozole (ARIMIDEX) 1 MG tablet, Take 1 tablet (1 mg) by mouth daily, Disp: 90 tablet, Rfl: 3    atorvastatin (LIPITOR) 10 MG tablet, Take 1 tablet (10 mg) by mouth daily, Disp: 90 tablet, Rfl: 2    calcium carbonate (OS-PDADY 500 MG [place]. [place]) 500 MG tablet, Take 2 tablets by mouth daily With Magnesium,Zinc, Disp: , Rfl:     divalproex sodium delayed-release (DEPAKOTE) 250 MG DR tablet, Take 1 tablet (250 mg) by mouth at bedtime, Disp: 30 tablet, Rfl: 1    LORazepam (ATIVAN) 1 MG tablet, Take 1 tablet (1 mg) by mouth 3 times daily Hold dose if sedation or falls., Disp: 90 tablet, Rfl: 0    LYSINE PO, Take 10,000 mg by mouth daily, Disp: , Rfl:     Multiple Vitamins-Minerals (PRESERVISION AREDS 2 PO), Take 2 tablets by mouth daily, Disp: , Rfl:     multivitamin w/minerals (THERA-VIT-M) tablet, Take 1 tablet by mouth daily, Disp: ,  Rfl:     rivaroxaban ANTICOAGULANT (XARELTO) 20 MG TABS tablet, Take 20 mg by mouth, Disp: , Rfl:     VITAMIN D, CHOLECALCIFEROL, PO, Take 1,000 Units by mouth daily , Disp: , Rfl:     propranolol (INDERAL) 20 MG tablet, Take 0.5-1 tablets (10-20 mg) by mouth 2 times daily as needed (anxiety) (Patient not taking: Reported on 11/10/2023), Disp: 180 tablet, Rfl: 0    ramelteon (ROZEREM) 8 MG tablet, Take 1 tablet (8 mg) by mouth at bedtime (Patient not taking: Reported on 11/10/2023), Disp: 15 tablet, Rfl: 0     Allergies   Allergen Reactions    Corticosteroids Dermatitis     Proven allergic contact dermatitis to corticosteroid (group A, B, D2)     CAN USE as ALTERNATIVE following steroids: Corticosteroids of group C (e.g.Betamethasone, Dexamethasone, Flumethasone pivalate, Halomethasone)   and group D1 (Betamethasone dipropionate, Betamethasone-17-valerate,Clobetasole-propionate, Fluticasone propionate, Mometasone furoate)    Neomycin Dermatitis     Patch tests positives to Neomycin and Framycetin    Sulfa Antibiotics Itching and Rash    Cephalexin GI Disturbance     Other reaction(s): reflux    Codeine Nausea and Vomiting    Nitrofurantoin Nausea and Vomiting and Nausea    Silver Rash        No family history on file.     Social History:  She  reports that she has never smoked. She has never used smokeless tobacco. She reports that she does not currently use alcohol after a past usage of about 3.0 standard drinks of alcohol per week. She reports that she does not use drugs.     PHYSICAL EXAM:  /69   Pulse 106   SpO2 97%      Gen: She appears to be in no acute distress, respirations appear nonlabored on room air    NEURO:    MS:  Alert, oriented to place and time, as well to person, however she keeps referring to her  for some of the information, she repeatedly asked me the same questions regarding what I was suspecting she had, and she struggled to understand the importance of the work-up that was  suggested, which per her , seems to be a new development since everything started this past April.    CN:  PERRLA, EOMI, face symmetric, normal strength and sensation, tongue and palate are midline, SCM 5/5 throughout    Motor:    Normal tone, no fasciculations, strength is 5/5 throughout    Sensation:  Preserved to all modalities in all extremities    Coordination:  Normal finger-nose    Reflexes: 2+ throughout, downgoing toes bilaterally     Gait: Gait is a little slow, with bilateral reduced arm swing, however she had a good pull test    Movements are as observation: No tremors were appreciated, muscle tone is normal.  She does have some diffuse bradykinesia, which appears very symmetric throughout.    DATA REVIEWED:  Reviewed all the pertinent data available in epic.  Reviewed MRIs of the brain obtained back in April 2023 independently, she does seem to have some periventricular white matter disease, which appears to be vascular in origin, no areas of enhancement or potential indication of neurodegenerative process.    ASSESSMENT:  This a 68-year-old right-handed female with prior history of breast cancer, currently in remission for 5 years, presenting with a rather abrupt onset of what appears to be a manic/psychotic event, with excessive cleaning and excessive shopping, which are clearly behaviors that she has never exhibited before, as well as somewhat of a progressive cognitive dysfunction that has taken place since that initial onset.    On the way she seems to have developed a movement disorder that included hand tremors and perioral movements, in the context of being on Zyprexa, this seems to have resolved for the most part in the setting of discontinuation of the drug.  She is still somewhat bradykinetic on exam which could be residual from that exposure to Zyprexa causes a drug-induced parkinsonism.    The most concerning piece of information here is the fact that she has no prior history of any  psychiatric problems and she has a very abrupt onset of behavioral and cognitive changes, which raises the possibility for, since that is been ongoing now for a few months, potential inflammatory/immune mediated process.    PLAN:  -Reviewed impression and plan with patient and     - We recommended an evaluation for what was felt into a category of rapidly progressive cognitive dysfunction.  We will repeat another MRI of the brain looking for changes in the potential vascular burden, also will hopefully have gradient echo images they will allow us to look for microhemorrhages which could be potentially supportive of a primary CNS microangiopathic process.  Further, will also look for cortical remaining, even though she had no other features suggestive of prion disease on exam today.  We will also look for areas of potential enhancement.  We will also go ahead and obtain a CT angiogram looking for potential signs of CNS vasculitis.    - Since rheumatological labs today as well as the [facility] paraneoplastic/autoimmune serum encephalitis panel.    - We will get a second opinion from our colleagues at the memory clinic regarding this being a primary neurocognitive degenerative process, however given the fast presentation, I think the work-up above should be pursued regardless.    - Should this look more like a primary immune mediated process, we may try to get the opinion from neuro immunology team.    - Movement disorders perspective, I do suspect the most will resolve today in terms of bradykinesia could be still residual from a drug-induced parkinsonism secondary to exposure to olanzapine.  We will plan on rechecking for that in the next 2 to 4 months, sooner if needed.  They are encouraged to contact us back in the meantime should there be any questions, concerns, any other issues.    There are no Patient Instructions on file for this visit.      Kaiden Padilla MD (Leo) (he/him/his)    of Neurology  [facility]  Department of Neurology      Thank you for referring Keely Arana to our [facility] Movement Disorders [facility], we appreciate the opportunity of being your partner in healthcare. Please feel free to reach out to us at any point if any questions or concerns about this visit.    Attending attestation: Today I spent a total 90 minutes on this case, with greater than 50% of the time spent in face-to-face, examining, obtaining history, providing education and coordination of care. Additional time was spent in chart review, ancillary data, and documentation as delineated above.

## 2023-11-10 NOTE — NURSING NOTE
"Keely Arana is a 68 year old female who presents for:  Chief Complaint   Patient presents with    Parkinson     Catatonia- Recs in epic - Scheduled per pt/daughter - Referred by JULITA CHAUDHARY          Initial Vitals:  /69   Pulse 106   SpO2 97%  Estimated body mass index is 23.49 kg/m  as calculated from the following:    Height as of 10/7/23: 1.702 m (5' 7\").    Weight as of 10/7/23: 68 kg (150 lb).. There is no height or weight on file to calculate BSA. BP completed using cuff size: regular  Data Unavailable    Nursing Comments:     Alejandra See MA   "

## 2023-11-13 LAB
ANA SER QL IF: NEGATIVE
ENA SS-A AB SER IA-ACNC: <0.5 U/ML
ENA SS-A AB SER IA-ACNC: NEGATIVE
ENA SS-B IGG SER IA-ACNC: <0.6 U/ML
ENA SS-B IGG SER IA-ACNC: NEGATIVE
RHEUMATOID FACT SER NEPH-ACNC: <6 IU/ML

## 2023-11-14 ENCOUNTER — OFFICE VISIT (OUTPATIENT)
Dept: PSYCHIATRY | Facility: CLINIC | Age: 68
End: 2023-11-14
Payer: MEDICARE

## 2023-11-14 VITALS — DIASTOLIC BLOOD PRESSURE: 70 MMHG | SYSTOLIC BLOOD PRESSURE: 131 MMHG | HEART RATE: 101 BPM

## 2023-11-14 DIAGNOSIS — G47.09 OTHER INSOMNIA: ICD-10-CM

## 2023-11-14 DIAGNOSIS — Z86.59 HISTORY OF MANIA: ICD-10-CM

## 2023-11-14 DIAGNOSIS — F41.9 ANXIETY: ICD-10-CM

## 2023-11-14 DIAGNOSIS — F39 MOOD DISORDER (H): ICD-10-CM

## 2023-11-14 DIAGNOSIS — F06.1 CATATONIA: Primary | ICD-10-CM

## 2023-11-14 DIAGNOSIS — R41.89 COGNITIVE CHANGES: ICD-10-CM

## 2023-11-14 DIAGNOSIS — Z79.899 ENCOUNTER FOR LONG-TERM (CURRENT) USE OF MEDICATIONS: ICD-10-CM

## 2023-11-14 PROCEDURE — 99215 OFFICE O/P EST HI 40 MIN: CPT | Performed by: PSYCHIATRY & NEUROLOGY

## 2023-11-14 RX ORDER — RAMELTEON 8 MG/1
8 TABLET ORAL
Qty: 30 TABLET | Refills: 1 | Status: SHIPPED | OUTPATIENT
Start: 2023-11-14 | End: 2024-04-04

## 2023-11-14 RX ORDER — LORAZEPAM 1 MG/1
1 TABLET ORAL 3 TIMES DAILY
Qty: 90 TABLET | Refills: 2 | Status: SHIPPED | OUTPATIENT
Start: 2023-11-23 | End: 2024-01-15 | Stop reason: DRUGHIGH

## 2023-11-14 RX ORDER — DIVALPROEX SODIUM 250 MG/1
250 TABLET, DELAYED RELEASE ORAL AT BEDTIME
Qty: 90 TABLET | Refills: 1 | Status: SHIPPED | OUTPATIENT
Start: 2023-11-14 | End: 2024-04-15

## 2023-11-14 ASSESSMENT — PAIN SCALES - GENERAL: PAINLEVEL: NO PAIN (0)

## 2023-11-14 NOTE — PROGRESS NOTES
Mental Health and Collaborative Care Psychiatry Service Rooming Note      Most pressing mental health concern at this time: Follow up, pt started new medication and would like to check in       Any new physical health conditions or diagnoses affecting you that we should be aware of: none      Side effects related to medications patient would like to discuss with the provider:  No      Are you taking your medications as prescribed?  yes  If not, why?       Do you need refills of any of the medications?  yes  If so, which ones? Depakote and Ativan      Are you taking any recreational substances? Pt denies      Is there any chance you are pregnant? Postmenopausals   Do you use birth control?       Provider notified      Care team has reviewed attendance agreement with patient. Patient advised that two failed appointments within 6 months may lead to termination of current episode of care.      Veena Jones MA  November 14, 2023  1:29 PM

## 2023-11-14 NOTE — PROGRESS NOTES
"In-Person Visit         Outpatient Psychiatric Progress Note    Name: Keely Arana   : 1955                    Primary Care Provider: Maulik Nye MD   Therapist: None    PHQ-9 scores:      2023    10:19 AM 2023     7:56 AM 2023     2:18 PM   PHQ-9 SCORE   PHQ-9 Total Score MyChart  0 7 (Mild depression)   PHQ-9 Total Score 0 0    0 7       DAISY-7 scores:      2023    10:19 AM 9/10/2023     9:53 AM 10/29/2023     6:33 PM   DAISY-7 SCORE   Total Score  12 (moderate anxiety) 13 (moderate anxiety)   Total Score 0 12 13       Patient Identification:  Patient is a 68 year old,   White Not  or  female  who presents for return visit with me.  Patient is currently retired. Patient attended the session with  Terry , who they agreed to have interview with. Patient prefers to be called: \"Jessica\".    Interim History:  I last saw Keely Arana for outpatient psychiatry return visit on 10/30/2023. During that appointment, we:    Present to the emergency room for further evaluation if symptoms do not improve, or start to worsen.  Decrease Ativan/lorazepam (taking for catatonia): take 1 mg in AM, 0.5 mg in afternoon, and 1 mg at bedtime for on k then decrease to 1 mg in AM, 0.5 mg in afternoon, and 0.5 mg in evening. After another week or two (as long as symptoms continuing to improve and not worsen), can decrease to 0.5 mg three times daily.  Previously discussed holding the dose, or halving (0.5 mg) doses due to sedation or unsteady gait/fall risk. Please reach out right away if symptoms start to worsen again on decreased doses of Ativan.   Continue propranolol 10-20 mg twice daily as needed for anxiety.  Start Depkaote  mg daily at bedtime for bipolar disorder.   Have labs completed at any [facility] lab in about two weeks after starting Depakote. Need to call your clinic ahead of time to schedule an appointment for lab due to limited hours and no " walk-ins due to Covid-19 restrictions/changes.    Continue working with MTM (medication therapy management) psychiatric PharmD.   C (behavioral health clinician) referral previously placed for counseling/therapy support.   Referral placed for neurology movement disorder clinic. Please call to get scheduled. Number provided at today's visit.     11/14: Patient is overall doing much better.  Patient presents with  today.  Patient's mood is much improved.  Anxiety is also much improved and much more manageable.  Patient is starting to feel much closer to her baseline.  She looks forward to hanging out with her grandchildren and is sleeping well.  Currently taking Ativan 1 mg TID still and has not yet tapered down the dose.  Does not need ramelteon often but finds it very helpful when needed for sleep.  Patient had intake with neurology.  Some tests and imaging still pending.  They feel like it was a good and thorough visit.  Looking forward to the holidays.  No acute safety concerns.  No SI.  No problematic drug or alcohol use.  No falls or acute confusion on Ativan.  No psychosis noted.    Per Dr. Cleveland Padilla (neurology) visit 11/10/2023:  ASSESSMENT:  This a 68-year-old right-handed female with prior history of breast cancer, currently in remission for 5 years, presenting with a rather abrupt onset of what appears to be a manic/psychotic event, with excessive cleaning and excessive shopping, which are clearly behaviors that she has never exhibited before, as well as somewhat of a progressive cognitive dysfunction that has taken place since that initial onset.     On the way she seems to have developed a movement disorder that included hand tremors and perioral movements, in the context of being on Zyprexa, this seems to have resolved for the most part in the setting of discontinuation of the drug.  She is still somewhat bradykinetic on exam which could be residual from that exposure to Zyprexa causes a  drug-induced parkinsonism.     The most concerning piece of information here is the fact that she has no prior history of any psychiatric problems and she has a very abrupt onset of behavioral and cognitive changes, which raises the possibility for, since that is been ongoing now for a few months, potential inflammatory/immune mediated process.     PLAN:  -Reviewed impression and plan with patient and      - We recommended an evaluation for what was felt into a category of rapidly progressive cognitive dysfunction.  We will repeat another MRI of the brain looking for changes in the potential vascular burden, also will hopefully have gradient echo images they will allow us to look for microhemorrhages which could be potentially supportive of a primary CNS microangiopathic process.  Further, will also look for cortical remaining, even though she had no other features suggestive of prion disease on exam today.  We will also look for areas of potential enhancement.  We will also go ahead and obtain a CT angiogram looking for potential signs of CNS vasculitis.     - Since rheumatological labs today as well as the [facility] paraneoplastic/autoimmune serum encephalitis panel.     - We will get a second opinion from our colleagues at the memory clinic regarding this being a primary neurocognitive degenerative process, however given the fast presentation, I think the work-up above should be pursued regardless.     - Should this look more like a primary immune mediated process, we may try to get the opinion from neuro immunology team.     - Movement disorders perspective, I do suspect the most will resolve today in terms of bradykinesia could be still residual from a drug-induced parkinsonism secondary to exposure to olanzapine.  We will plan on rechecking for that in the next 2 to 4 months, sooner if needed.  They are encouraged to contact us back in the meantime should there be any questions, concerns, any other  issues.     Past Psychiatric Med Trials:  Psych Meds at Intake:  Olanzapine 5 mg at bedtime and 2.5 mg daily as needed     Past Psych Meds:  None  Hydroxyzine - not helpful, dry mouth  Quetiapine -stopped due to worsening catatonia  Olanzapine-stopped due to worsening catatonia  Mirtazapine-paradoxical effect?    Psychiatric ROS:  Keely Arana reports mood has been: Improving  Anxiety has been: Improving  Sleep has been: Improved with Ativan and was also improved with ramelteon  Paz sxs: See HPI above  Psychosis sxs: None today  ADHD/ADD sxs: N/A  PTSD sxs: N/A  PHQ9 and GAD7 scores were reviewed today if completed.   Medication side effects: See HPI above  Current stressors include: Sxs and see HPI above  Coping mechanisms and supports include: Family, Hobbies and Friends    Current medications include:   Current Outpatient Medications   Medication Sig    amLODIPine (NORVASC) 5 MG tablet Take 5 mg by mouth daily    anastrozole (ARIMIDEX) 1 MG tablet Take 1 tablet (1 mg) by mouth daily    atorvastatin (LIPITOR) 10 MG tablet Take 1 tablet (10 mg) by mouth daily    calcium carbonate (OS-PADDY 500 MG [place]) 500 MG tablet Take 2 tablets by mouth daily With Magnesium,Zinc    divalproex sodium delayed-release (DEPAKOTE) 250 MG DR tablet Take 1 tablet (250 mg) by mouth at bedtime    LORazepam (ATIVAN) 1 MG tablet Take 1 tablet (1 mg) by mouth 3 times daily Hold dose if sedation or falls.    LYSINE PO Take 10,000 mg by mouth daily    Multiple Vitamins-Minerals (PRESERVISION AREDS 2 PO) Take 2 tablets by mouth daily    multivitamin w/minerals (THERA-VIT-M) tablet Take 1 tablet by mouth daily    propranolol (INDERAL) 20 MG tablet Take 0.5-1 tablets (10-20 mg) by mouth 2 times daily as needed (anxiety) (Patient not taking: Reported on 11/10/2023)    ramelteon (ROZEREM) 8 MG tablet Take 1 tablet (8 mg) by mouth at bedtime (Patient not taking: Reported on 11/10/2023)    rivaroxaban ANTICOAGULANT (XARELTO) 20 MG TABS  tablet Take 20 mg by mouth    VITAMIN D, CHOLECALCIFEROL, PO Take 1,000 Units by mouth daily      No current facility-administered medications for this visit.       Past Medical/Surgical History:  Past Medical History:   Diagnosis Date    Atrial fibrillation (H)     s/p ablasion.    Hypertension     Iritis       has a past medical history of Atrial fibrillation (H), Hypertension, and Iritis.    She has no past medical history of Complication of anesthesia or Sleep apnea.    Social History:  Reviewed. No changes to social history except as noted above in HPI.    Vital Signs:   B/P: 131/70, T: Data Unavailable, P: 101, R: Data Unavailable     Labs:  Most recent laboratory results reviewed and the pertinent results include:   Last Comprehensive Metabolic Panel:  Sodium   Date Value Ref Range Status   10/27/2023 142 135 - 145 mmol/L Final     Comment:     Reference intervals for this test were updated on 09/26/2023 to more accurately reflect our healthy population. There may be differences in the flagging of prior results with similar values performed with this method. Interpretation of those prior results can be made in the context of the updated reference intervals.      Potassium   Date Value Ref Range Status   10/27/2023 4.2 3.4 - 5.3 mmol/L Final     Chloride   Date Value Ref Range Status   10/27/2023 104 98 - 107 mmol/L Final     Carbon Dioxide (CO2)   Date Value Ref Range Status   10/27/2023 28 22 - 29 mmol/L Final     Anion Gap   Date Value Ref Range Status   10/27/2023 10 7 - 15 mmol/L Final     Glucose   Date Value Ref Range Status   10/27/2023 134 (H) 70 - 99 mg/dL Final     Urea Nitrogen   Date Value Ref Range Status   10/27/2023 20.0 8.0 - 23.0 mg/dL Final     Creatinine   Date Value Ref Range Status   10/27/2023 0.74 0.51 - 0.95 mg/dL Final   01/21/2021 0.69 0.52 - 1.04 mg/dL Final     GFR Estimate   Date Value Ref Range Status   10/27/2023 87 >60 mL/min/1.73m2 Final   01/21/2021 >90 >60 mL/min/[1.73_m2]  "Final     Comment:     Non  GFR Calc  Starting 12/18/2018, serum creatinine based estimated GFR (eGFR) will be   calculated using the Chronic Kidney Disease Epidemiology Collaboration   (CKD-EPI) equation.       Calcium   Date Value Ref Range Status   10/27/2023 9.5 8.8 - 10.2 mg/dL Final   01/21/2021 9.7 8.5 - 10.1 mg/dL Final     Bilirubin Total   Date Value Ref Range Status   11/10/2023 0.3 <=1.2 mg/dL Final     Alkaline Phosphatase   Date Value Ref Range Status   11/10/2023 60 35 - 104 U/L Final     ALT   Date Value Ref Range Status   11/10/2023 12 0 - 50 U/L Final     Comment:     Reference intervals for this test were updated on 6/12/2023 to more accurately reflect our healthy population. There may be differences in the flagging of prior results with similar values performed with this method. Interpretation of those prior results can be made in the context of the updated reference intervals.       AST   Date Value Ref Range Status   11/10/2023 11 0 - 45 U/L Final     Comment:     Reference intervals for this test were updated on 6/12/2023 to more accurately reflect our healthy population. There may be differences in the flagging of prior results with similar values performed with this method. Interpretation of those prior results can be made in the context of the updated reference intervals.     Lab Results   Component Value Date    WBC 5.3 10/27/2023     Lab Results   Component Value Date    RBC 4.59 10/27/2023     Lab Results   Component Value Date    HGB 13.9 10/27/2023     Lab Results   Component Value Date    HCT 42.9 10/27/2023     No components found for: \"MCT\"  Lab Results   Component Value Date    MCV 94 10/27/2023     Lab Results   Component Value Date    MCH 30.3 10/27/2023     Lab Results   Component Value Date    MCHC 32.4 10/27/2023     Lab Results   Component Value Date    RDW 12.9 10/27/2023     Lab Results   Component Value Date     10/27/2023      TSH   Date Value Ref " "Range Status   10/27/2023 0.56 0.30 - 4.20 uIU/mL Final      Ammonia within normal limits on 10/27/2023    CK within normal limits on 10/27/2023    Additional labs drawn 11/10/23:   RF wnl  Valproic acid 18.7  LFTs: wnl  JANETH: neg  SSB La LUZ MARINA Ab IgG: neg  SSA RO LUZ MARINA Ab IgG: neg    Review of Systems:  10 systems (general, cardiovascular, respiratory, eyes, ENT, endocrine, GI, , M/S, neurological) were reviewed. Most pertinent finding(s) is/are: denies fever, cough, persistent headaches, shortness of breath, chest pain, severe GI symptoms, trouble urinating, severe pain. The remaining systems are all unremarkable.    Mental Status Examination:  Appearance: Awake, alert, appears stated age, no acute distress, adequately groomed  Attitude:  cooperative, pleasant  Motor: No tongue thrusting/lip smacking  or perioral movements noted today, no tremor noted, no repetitive movements of any type, no rigidity by observation, no psychomotor slowing, gait appeared normal - maybe slightly slowed but not shuffling  Oriented to:  person, place, date, situation  Attention Span and Concentration:  normal  Speech: No delay in response today, speech clear, normal volume, normal rate  Language: intact  Mood: \"much better\"  Affect: Mood congruent, bright  Associations:  no loose associations  Thought Process: linear, logical, goal-directed  Thought Content:  no evidence of suicidal ideation or homicidal ideation, no evidence of psychotic thought, no auditory hallucinations present and no visual hallucinations present  Recent and Remote Memory: has some struggles with more recent memories around time of being quite catatonic but improved/improving - not formally tested today  Fund of Knowledge: appropriate  Insight: Fair - much improved  Judgment: good  Impulse Control: good    Suicide Risk Assessment:  Today Keely Arana reports no suicidal ideation. Based on all available evidence including the factors cited above, Keely Arana " does not appear to be at imminent risk for self-harm, does not meet criteria for a 72-hr hold, and therefore remains appropriate for ongoing outpatient level of care.  A thorough assessment of risk factors related to suicide and self-harm have been reviewed and are noted above. The patient convincingly denies suicidality on several occasions. Local community safety resources reviewed for patient to use if needed. There was no deceit detected, and the patient presented in a manner that was believable.     DSM5 Diagnosis:  R/O Neurocognitive Disorder/paraneoplastic/autoimmune process (pt seeing neurology)  R/O Bipolar Disorder vs other affective disorder   Anxiety, unspecified  CATATONIA - resolving     R/O Drug-Induced Parkinsonism     R/O Tardive Dyskinesia    Medical comorbidities include:   Patient Active Problem List    Diagnosis Date Noted    Mild cognitive impairment 10/07/2023     Priority: Medium    Unspecified mood (affective) disorder (H24) 10/07/2023     Priority: Medium    Insomnia, unspecified type 09/29/2023     Priority: Medium    Anxiety 09/29/2023     Priority: Medium    Major depression 09/29/2023     Priority: Medium    History of cong 06/20/2023     Priority: Medium    Abnormal bone density screening 01/15/2021     Priority: Medium    Long term (current) use of aromatase inhibitors 01/15/2021     Priority: Medium    Malignant neoplasm of upper-outer quadrant of right breast in female, estrogen receptor positive (H) 08/23/2018     Priority: Medium    Symptomatic varicose veins of both lower extremities 11/10/2015     Priority: Medium       Psychosocial & Contextual Factors: see HPI above    Assessment:  From Intake, 5/12/2023:  Keely Arana is a 67-year-old female with relatively benign past psychiatric history leading up to recent suspected manic episode who presents today for psychiatric evaluation.  Patient with what appears to meet criteria for manic episode starting earlier this spring.   Patient started to have more trouble with sleep and was becoming more irritable leading to racing thoughts, increased goal directed activity, disorganized thoughts, more talkative than usual, etc.  Patient's family concerns since she was acting out of character and brought to the ER.  Patient started on olanzapine in the emergency room which has helped significantly to regulate sleep and mood.  Patient perhaps with some heightened energy today but not overtly manic.  Patient is currently tolerating olanzapine well and reports sleeping about 8 hours a night.  Discussed risks and benefits of staying on olanzapine.  Unclear if patient has had underlying bipolar disorder that has gone fairly undetected over the years.  Patient with relatives with bipolar disorder.  Patient did have recent brain imaging (MRI) that would not explain recent new onset cong.  Patient also with otherwise relatively benign labs including thyroid, liver panel, CMP, CBC, etc.  No new medications were introduced.  Patient denies any drug use and no problematic alcohol use.  Patient stopped using her 1 glass of wine a week around the time this episode started to see if it would help to give up alcohol.  Patient does often have a few cups of coffee a day.  We will continue to monitor.  Recommend staying on olanzapine for at least 6 months to a year before trying to taper off in order to prevent relapse/recurrent cong symptoms.  No acute safety concerns today.  No SI.  No problematic drug or alcohol use.     6/14/2023:  Patient overall doing relatively well.  Mood has been stable.  No signs of cong.  We will decrease olanzapine to 2.5 mg at bedtime for the next few months to ensure ongoing stability of symptoms.  Could consider a trial off olanzapine at the end of the 3 months of decreased dose.  Patient and  will continue to remain vigilant while monitoring for any relapse of manic symptoms.  Patient had recent lipid panel and glucose  completed.  Both within normal limits.  No acute safety concerns.  No SI.  No signs of tardive dyskinesia noted on video today.  No problematic drug or alcohol use.    9/11/2023:  Patient with suspected movement related side effects due to olanzapine use.  Patient with extreme anxiety and some worsening symptoms when olanzapine was stopped after 3 months on 2.5 mg dose.  We will restart olanzapine at 2.5 mg every other day and continue with slow taper and also get recommendations from psychiatric PharmD and possibly neurology.  Patient with possible tongue thrusting today versus extreme dry mouth from hydroxyzine use.  There was no tongue thrusting noted at last appointment in June.  Patient's family had noted possible parkinsonism like symptoms.   and patient will continue to monitor.  Referral placed for [facility] for additional monitoring and talk therapy/support as well.  We will also start a trial with propranolol to see if that is helpful for some of the physical symptoms of anxiety and potentially some symptoms being caused by olanzapine.  No acute safety concerns.  No SI.  No problematic drug or alcohol use.    9/22/2023:  Patient still not sleeping well and I would not want patient to get manic.  We did discuss possibility of something like Depakote at low-dose in order to avoid other agents like olanzapine and since we would like to start to taper off olanzapine (due to suspected parkinsonism).  Could also consider lithium.  Patient is not seeming manic at this time of interview - definitely seems anxious though. Pt sitting quite calmly and no pressured speech.  Difficult to assess if not sleeping because of anxiety versus onset of manic episode?  We will trial mirtazapine at bedtime to see if helpful for sleep without worsening any side effects. No acute safety concerns. No SI. No problematic drug or alcohol use. Pt also encouraged to utilize the propranolol.     10/23/2023:  Patient exhibiting multiple  signs of suspected catatonia.  Patient also with symptoms that seem to improve with Ativan administration by family.  Recommended emergency room visit for evaluation for possible admission for treatment of her mental health condition since I suspect pt has catatonia and discussed limitation of my ability to examine pt via video.  Discussed risks of catatonia with family, including up to death. Discussed malignant catatonia and that catatonia can affect blood pressure and heart rate. Pt's vitals (BP and HR) were elevated at last check on record 10/11.  Patient and family declined emergency room visit at this time and prefer to try to manage patient at home with Ativan up to 1 mg 3 times daily.  Advised family and patient they are to bring patient to the emergency room if symptoms do not start to improve, or start to worsen, with Ativan administration.  Family instructed to hold doses of Ativan, or break doses in half, if patient starts to become too sedated or if gait becomes too unsteady on full dose.  Discussed what to look for if Ativan is going to be helpful for symptoms -starting to do more around the house, increased energy, talking more, etc.  Patient has follow-up visit with me in 1 week.  Neurology referral also placed to help evaluate for parkinsonism versus catatonia versus other.  Patient continues to have frequent lip smacking movements possibly related to current condition versus TD?  Patient continues to eat and drink.  Patient was alert and oriented today during interview.  No hold will be placed for emergent medical transport to the hospital, but I do have a low threshold if patient and family continue to refuse emergency room evaluation and if patient's symptoms continue to worsen.  This was discussed with the patient and family.  Both olanzapine and quetiapine discontinued due to suspicions for catatonia.  Labs also ordered to assess for infection, metabolic status, ammonia level, TSH,  etc.    10/30/2023:  Patient with drastic improvement since starting Ativan/lorazepam for catatonia.  Still some ongoing ruminations, paranoia, restlessness.  Discussed possible causes of catatonia including suspected bipolar disorder, possible neurocognitive disorder, infection, others.  Patient's recent laboratory work unremarkable for any signs of infection or obvious causes of catatonia at this time.  Strongly suspicious of bipolar disorder causing cong.  Rule out neurocognitive disorder.  Neurology referral has been placed and pending scheduling.  We will also consider imaging again.  Patient had imaging completed this past spring but could consider again due to catatonia and change in mental status.  No acute safety concerns at this time.  Patient eating and sleeping well.  No suicidal thoughts.  No problematic drug or alcohol use.  Family continues to be incredibly supportive.  Patient very fearful of the hospitalization.  Patient is agreeable to starting Depakote to help with some of her symptoms, bipolar disorder.  Patient's  open to ongoing discussions about possibility of ECT.  Patient is not as open to ECT at this time.  Patient/family will reach out if they would like an ECT referral.    11/14/2023:  Patient overall presenting significantly better today compared to most recent visit even.  No signs of catatonia today.  No perioral movements noted.  No tremor noted.  No delayed speech.  Attention and concentration intact for interview.  Alert and oriented x 4.  Patient did see neurology and there are tests still pending.  Patient needs to complete neuroimaging as ordered by neurology.  There are also paraneoplastic/autoimmune send out labs and process through [facility].  Multiple differentials to consider per neurology as well to explain patient's symptoms.  Patient will continue to follow-up with neurology to further rule out underlying medical causes for symptoms.  We will very slowly taper  down lorazepam to a lower dose.  I still do not want to completely discontinue at this time.  We will continue Depakote due to ongoing improvements of symptoms and good tolerability.  Given improvements, and based on laboratory and imaging testing results, may need to consider repeat neuropsychological testing.  Would confer with neurology.  No acute safety concerns.  No SI.  No problematic drug or alcohol use.    Medication side effects and alternatives were reviewed. Health promotion activities recommended and reviewed today. All questions addressed. Education and counseling completed regarding risks and benefits of medications and psychotherapy options. Recommend therapy for additional support.     Treatment Plan:  Present to the emergency room for further evaluation if symptoms significantly worsen, especially if abruptly.  Decrease Ativan/lorazepam (taking for catatonia): take 1 mg in AM, 0.5 mg in afternoon, and 1 mg at bedtime for two weeks, then decrease to 0.5 mg in AM, 0.5 mg in afternoon, and 1 mg mg in evening for 2-3 weeks. As long as symptoms continuing to improve and not worsen, can decrease to 0.5 mg three times daily.  Previously discussed holding the dose, or halving (0.5 mg) doses due to sedation or unsteady gait/fall risk. Please reach out right away if symptoms start to worsen again on decreased doses of Ativan.   Continue propranolol 10-20 mg twice daily as needed for anxiety.  Continue Depkaote  mg daily at bedtime for possible affective disorder, hx of cong.   Continue ramelteon/Rozerem 8 mg at bedtime as needed for sleep.   C (behavioral health clinician) referral previously placed for counseling/therapy support.   Continue working with neurology clinic and follow plan per neurology. Currently ongoing medical work-up with neurology (imaging that needs to be scheduled and pending labs).   Continue all other cares per primary care provider.   Continue all other medications as reviewed  per electronic medical record today.   Safety plan reviewed. To the Emergency Department as needed or call after hours crisis line at [phone] or [phone]. [place] Crisis Text Line. Text [place] to 925246 or Suicide LifeLine Chat: suicidepreventionlifeline.org/chat  Schedule an appointment with me in 6-8 weeks or sooner as needed. Call [facility] at [phone] to schedule.  Follow up with primary care provider as planned or for acute medical concerns.  Call the psychiatric nurse line with medication questions or concerns at [phone].  [employer] may be used to communicate with your provider, but this is not intended to be used for emergencies.    Previously discussed risks of benzodiazepine (Ativan, Xanax, Klonopin, Valium, etc) use including, but not limited to, sedation, tolerance, risk for addiction/dependence. Do not drink alcohol while taking benzodiazepines due to risk of trouble breathing and potential death. Do not drive or operate heavy machinery until it is known how the drug affects you. Discuss with physician or pharmacist before ever taking a benzodiazepine with a narcotic/opioid pain medication.  Watch for sedation and falls.    Administrative Billing:   Session Duration: 41 Minutes   Start: 1:36p  Stop: 2:17p    Patient Status:  Patient will continue to be seen for ongoing consultation and stabilization.    Signed:   Reema Palomo DO  [facility]    Disclaimer: This note consists of symbols derived from keyboarding, dictation and/or voice recognition software. As a result, there may be errors in the script that have gone undetected. Please consider this when interpreting information found in this chart.

## 2023-11-14 NOTE — PATIENT INSTRUCTIONS
Treatment Plan:  Present to the emergency room for further evaluation if symptoms significantly worsen, especially if abruptly.  Decrease Ativan/lorazepam (taking for catatonia): take 1 mg in AM, 0.5 mg in afternoon, and 1 mg at bedtime for two weeks, then decrease to 0.5 mg in AM, 0.5 mg in afternoon, and 1 mg mg in evening for 2-3 weeks. As long as symptoms continuing to improve and not worsen, can decrease to 0.5 mg three times daily.  Previously discussed holding the dose, or halving (0.5 mg) doses due to sedation or unsteady gait/fall risk. Please reach out right away if symptoms start to worsen again on decreased doses of Ativan.   Continue propranolol 10-20 mg twice daily as needed for anxiety.  Continue Depkaote  mg daily at bedtime for possible affective disorder, hx of cong.   Continue ramelteon/Rozerem 8 mg at bedtime as needed for sleep.   [facility] (behavioral health clinician) referral previously placed for counseling/therapy support.   Continue working with neurology clinic and follow plan per neurology. Currently ongoing medical work-up with neurology (imaging that needs to be scheduled and pending labs).   Continue all other cares per primary care provider.   Continue all other medications as reviewed per electronic medical record today.   Safety plan reviewed. To the Emergency Department as needed or call after hours crisis line at [phone] or [phone]. [place] Crisis Text Line. Text [place] to 450318 or Suicide LifeLine Chat: suicidepreventionlifeline.org/chat  Schedule an appointment with me in 6-8 weeks or sooner as needed. Call [facility] at [phone] to schedule.  Follow up with primary care provider as planned or for acute medical concerns.  Call the psychiatric nurse line with medication questions or concerns at [phone].  MyChart may be used to communicate with your provider, but this is not intended to be used for emergencies.    Previously discussed risks of  "benzodiazepine (Ativan, Xanax, Klonopin, Valium, etc) use including, but not limited to, sedation, tolerance, risk for addiction/dependence. Do not drink alcohol while taking benzodiazepines due to risk of trouble breathing and potential death. Do not drive or operate heavy machinery until it is known how the drug affects you. Discuss with physician or pharmacist before ever taking a benzodiazepine with a narcotic/opioid pain medication.  Watch for sedation and falls.    Patient Education   Collaborative Care Psychiatry Service  What to Expect  Here's what to expect from your Collaborative Care Psychiatry Service (CCPS).   About CCPS  CCPS means 2 people work together to help you get better. You'll meet with a behavioral health clinician and a psychiatric doctor. A behavioral health clinician helps people with mental health problems by talking with them. A psychiatric doctor helps people by giving them medicine.  How it works  At every visit, you'll see the behavioral health clinician ([facility]) first. They'll talk with you about how you're doing and teach you how to feel better.   Then you'll see the psychiatric doctor. This doctor can help you deal with troubling thoughts and feelings by giving you medicine. They'll make sure you know the plan for your care.   CCPS usually takes 3 to 6 visits. If you need more visits, we may have you start seeing a different psychiatric doctor for ongoing care.  If you have any questions or concerns, we'll be glad to talk with you.  About visits  Be open  At your visits, please talk openly about your problems. It may feel hard, but it's the best way for us to help you.  Cancelling visits  If you can't come to your visit, please call us right away at [phone]. If you don't cancel at least 24 hours (1 full day) before your visit, that's \"late cancellation.\"  Being late to visits  Being very late is the same as not showing up. You will be a \"no show\" if:  Your appointment starts " with a [facility], and you're more than 15 minutes late for a 30-minute (half hour) visit. This will also cancel your appointment with the psychiatric doctor.  Your appointment is with a psychiatric doctor only, and you're more than 15 minutes late for a 30-minute (half hour) visit.  Your appointment is with a psychiatric doctor only, and you're more than 30 minutes late for a 60-minute (full hour) visit.  If you cancel late or don't show up 2 times within 6 months, we may end your care.   Getting help between visits  If you need help between visits, you can call us Monday to Friday from 8 a.m. to 4:30 p.m. at [phone].  Emergency care  Call 911 or go to the nearest emergency department if your life or someone else's life is in danger.  Call 188 anytime to reach the national Suicide and Crisis hotline.  Medicine refills  To refill your medicine, call your pharmacy. You can also call [facility] at [phone], Monday to Friday, 8 a.m. to 4:30 p.m. It can take 1 to 3 business days to get a refill.   Forms, letters, and tests  You may have papers to fill out, like FMLA, short-term disability, and workability. We can help you with these forms at your visits, but you must have an appointment. You may need more than 1 visit for this, to be in an intensive therapy program, or both.  Before we can give you medicine for ADHD, we may refer you to get tested for it or confirm it another way.  We may not be able to give you an emotional support animal letter.  We don't do mental health checks ordered by the court.   We don't do mental health testing, but we can refer you to get tested.   Thank you for choosing us for your care.  For informational purposes only. Not to replace the advice of your health care provider. Copyright   2022 [facility]. All rights reserved. [employer] 790768 - 12/22.

## 2023-11-15 DIAGNOSIS — I10 HTN (HYPERTENSION): Primary | ICD-10-CM

## 2023-11-15 LAB — MAYO MISC RESULT: NORMAL

## 2023-11-15 RX ORDER — AMLODIPINE BESYLATE 5 MG/1
5 TABLET ORAL DAILY
Qty: 90 TABLET | Refills: 0 | Status: SHIPPED | OUTPATIENT
Start: 2023-11-15 | End: 2024-02-21

## 2023-11-15 NOTE — TELEPHONE ENCOUNTER
amLODIPine (NORVASC) 5 MG tablet       Last Written Prescription Date:  historical  Last Fill Quantity: ,   # refills:   Last Office Visit : 8-11-23  Future Office visit:  4-4-24    Routing refill request to provider for review/approval because:  Medication is reported/historical    Date medication is needed: 11/15/23              Pt requesting call back when script has been sent

## 2023-11-15 NOTE — TELEPHONE ENCOUNTER
[facility] Call Center    Phone Message    May a detailed message be left on voicemail: yes     Reason for Call: Medication Refill Request    Has the patient contacted the pharmacy for the refill? Yes   Name of medication being requested: amLODIPine (NORVASC) 5 MG tablet   Provider who prescribed the medication: Unsure but pt is needing Dr. Nye to fill it  Pharmacy:   [id number] [address] [facility]     Date medication is needed: 11/15/23   Pt requesting call back when script has been sent

## 2023-11-16 ENCOUNTER — HOSPITAL ENCOUNTER (OUTPATIENT)
Dept: CT IMAGING | Facility: HOSPITAL | Age: 68
Discharge: HOME | End: 2023-11-16
Attending: PSYCHIATRY & NEUROLOGY | Admitting: PSYCHIATRY & NEUROLOGY
Payer: MEDICARE

## 2023-11-16 DIAGNOSIS — F09 COGNITIVE DYSFUNCTION: ICD-10-CM

## 2023-11-16 DIAGNOSIS — F99 PSYCHIATRIC SYMPTOMS: ICD-10-CM

## 2023-11-16 PROCEDURE — 250N000011 HC RX IP 250 OP 636: Performed by: PSYCHIATRY & NEUROLOGY

## 2023-11-16 PROCEDURE — 70498 CT ANGIOGRAPHY NECK: CPT | Mod: MG

## 2023-11-16 PROCEDURE — 70496 CT ANGIOGRAPHY HEAD: CPT | Mod: MG

## 2023-11-16 RX ORDER — IOPAMIDOL 755 MG/ML
67 INJECTION, SOLUTION INTRAVASCULAR ONCE
Status: COMPLETED | OUTPATIENT
Start: 2023-11-16 | End: 2023-11-16

## 2023-11-16 RX ADMIN — IOPAMIDOL 67 ML: 755 INJECTION, SOLUTION INTRAVENOUS at 17:09

## 2023-11-16 NOTE — LETTER
10/19/2018       RE: Keely Velazquez  [address]  [place] [address]     Dear Colleague,    Thank you for referring your patient, Keely Velazquez, to the RADIATION ONCOLOGY CLINIC. Please see a copy of my visit note below.    [facility]  SPECIALIZING IN BREAKTHROUGHS  Radiation Oncology    On Treatment Visit Note      Keely Velazquez      Date: 10/19/2018   MRN: [id number]   : 1955  Diagnosis: R breast cancer      Reason for Visit:  On Radiation Treatment Visit     Treatment Summary to Date  Treatment Site: R breast Current Dose: 1855/5940cGy cGy Fractions:         ED Visit/Hosiptal Admission: None    Treatment Breaks: None      Subjective:   Keely is doing well.  She noticed very faint erythema of the right breast.  No fatigue -- she's been busy with her grandchildren who have KAREEM this week.  She has a skin cancer on her nose and is awaiting Mohs surgery.  She wonders if she can have it done while on radiation.    Nursing ROS:   Nutrition Alteration  Diet Type: Patient's Preference  Skin  Skin Reaction: 1 - Faint erythema or dry desquamation  Skin Note: using aquaphor        Psychosocial  Pyschosocial Note: good energy, has not noticed fatigue  Pain Assessment  0-10 Pain Scale: 0      Objective:   Wt 73.9 kg (162 lb 14.4 oz)  BMI 24.77 kg/m2  Gen: Appears well, in no acute distress  Skin: Mild diffuse erythema over treatment field    Labs:  CBC RESULTS: No results for input(s): WBC, RBC, HGB, HCT, MCV, MCH, MCHC, RDW, PLT in the last 19524 hours.  ELECTROLYTES:  No results for input(s): NA, POTASSIUM, CHLORIDE, PADDY, CO2, BUN, CR, GLC in the last 06923 hours.    Assessment:    Tolerating radiation therapy well.  All questions and concerns addressed.    Toxicities:  Dermatitis: Grade 1: Faint erythema or dry desquamation    Plan:   1. Continue current therapy.    2. I told her that Mohs surgery will not interfere with her breast radiation.  She can get her Mohs done when it is  available.       Mosaiq chart and setup information reviewed  Ports checked         Educational Topic Discussed  Education Instructions: reviewed        Ruthann Still MD/PhD  [phone] clinic  Pager [phone]    Please do not send letter to referring physician.      Again, thank you for allowing me to participate in the care of your patient.      Sincerely,    Ruthann Still MD       None

## 2023-12-06 ENCOUNTER — HOSPITAL ENCOUNTER (OUTPATIENT)
Dept: MRI IMAGING | Facility: HOSPITAL | Age: 68
Discharge: HOME | End: 2023-12-06
Attending: PSYCHIATRY & NEUROLOGY | Admitting: PSYCHIATRY & NEUROLOGY
Payer: MEDICARE

## 2023-12-06 DIAGNOSIS — F99 PSYCHIATRIC SYMPTOMS: ICD-10-CM

## 2023-12-06 DIAGNOSIS — F09 COGNITIVE DYSFUNCTION: ICD-10-CM

## 2023-12-06 PROCEDURE — 255N000002 HC RX 255 OP 636: Mod: JZ | Performed by: PSYCHIATRY & NEUROLOGY

## 2023-12-06 PROCEDURE — 70553 MRI BRAIN STEM W/O & W/DYE: CPT | Mod: MG

## 2023-12-06 PROCEDURE — G1010 CDSM STANSON: HCPCS

## 2023-12-06 PROCEDURE — A9585 GADOBUTROL INJECTION: HCPCS | Mod: JZ | Performed by: PSYCHIATRY & NEUROLOGY

## 2023-12-06 RX ORDER — GADOBUTROL 604.72 MG/ML
0.1 INJECTION INTRAVENOUS ONCE
Status: COMPLETED | OUTPATIENT
Start: 2023-12-06 | End: 2023-12-06

## 2023-12-06 RX ADMIN — GADOBUTROL 7 ML: 604.72 INJECTION INTRAVENOUS at 15:35

## 2024-01-15 ENCOUNTER — VIRTUAL VISIT (OUTPATIENT)
Dept: PSYCHIATRY | Facility: CLINIC | Age: 69
End: 2024-01-15
Payer: MEDICARE

## 2024-01-15 DIAGNOSIS — F06.1 CATATONIA: ICD-10-CM

## 2024-01-15 DIAGNOSIS — Z79.899 ENCOUNTER FOR LONG-TERM (CURRENT) USE OF MEDICATIONS: Primary | ICD-10-CM

## 2024-01-15 PROCEDURE — 99214 OFFICE O/P EST MOD 30 MIN: CPT | Mod: 95 | Performed by: PSYCHIATRY & NEUROLOGY

## 2024-01-15 RX ORDER — LORAZEPAM 0.5 MG/1
0.5 TABLET ORAL 3 TIMES DAILY
Qty: 90 TABLET | Refills: 2 | Status: SHIPPED | OUTPATIENT
Start: 2024-01-31 | End: 2024-01-26

## 2024-01-15 NOTE — PROGRESS NOTES
"Telemedicine Visit: The patient's condition can be safely assessed and treated via synchronous audio and visual telemedicine encounter.      Reason for Telemedicine Visit: Patient has requested telehealth visit    Originating Site (Patient Location): Patient's home    Distant Location (provider location):  On-site    Consent:  The patient/guardian has verbally consented to: the potential risks and benefits of telemedicine (video visit) versus in person care; bill my insurance or make self-payment for services provided; and responsibility for payment of non-covered services.     Mode of Communication:  Video Conference via [email or web address]    As the provider I attest to compliance with applicable laws and regulations related to telemedicine.          Outpatient Psychiatric Progress Note    Name: Keely Arana   : 1955                    Primary Care Provider: Maulik Nye MD   Therapist: None    PHQ-9 scores:      2023    10:19 AM 2023     7:56 AM 2023     2:18 PM   PHQ-9 SCORE   PHQ-9 Total Score MyChart  0 7 (Mild depression)   PHQ-9 Total Score 0 0    0 7       DAISY-7 scores:      2023    10:19 AM 9/10/2023     9:53 AM 10/29/2023     6:33 PM   DAISY-7 SCORE   Total Score  12 (moderate anxiety) 13 (moderate anxiety)   Total Score 0 12 13       Patient Identification:  Patient is a 68 year old,   White Not  or  female  who presents for return visit with me.  Patient is currently retired. Patient attended the video session with  Terry , who they agreed to have interview with. Patient prefers to be called: \"Jessica\".    Interim History:  I last saw Keely BART Arana for outpatient psychiatry return visit on 2023. During that appointment, we:    Present to the emergency room for further evaluation if symptoms significantly worsen, especially if abruptly.  Decrease Ativan/lorazepam (taking for catatonia): take 1 mg in AM, 0.5 mg in afternoon, and 1 mg at bedtime " for two weeks, then decrease to 0.5 mg in AM, 0.5 mg in afternoon, and 1 mg mg in evening for 2-3 weeks. As long as symptoms continuing to improve and not worsen, can decrease to 0.5 mg three times daily.  Previously discussed holding the dose, or halving (0.5 mg) doses due to sedation or unsteady gait/fall risk. Please reach out right away if symptoms start to worsen again on decreased doses of Ativan.   Continue propranolol 10-20 mg twice daily as needed for anxiety.  Continue Depkaote  mg daily at bedtime for possible affective disorder, hx of cong.   Continue ramelteon/Rozerem 8 mg at bedtime as needed for sleep.   [facility] (behavioral health clinician) referral previously placed for counseling/therapy support.   Continue working with neurology clinic and follow plan per neurology. Currently ongoing medical work-up with neurology (imaging that needs to be scheduled and pending labs).     1/15: Patient reports doing very well.  Patient feels back to baseline and as she felt prior to any of the emerging mental health symptoms over the past year.  Currently taking lorazepam 0.5 mg 3 times daily-decreased from 1 mg 3 times daily.  No worsening or regression of any catatonia related symptoms.  Mood has been stable.  No complaints regarding sleep.  Neurological workup to rule out medical conditions that could have caused her previous symptoms related to cong and catatonia overall unremarkable.  Patient with thorough autoimmune and paraneoplastic panel reviewed at [place] and negative.  No acute safety concerns.  No SI.  No problematic drug or alcohol use.  No falls or confusion on lorazepam.  Energy did seem to improve some once on decreased dose.  Patient is baking again and involved socially.  Enjoying her hobbies again.  Denies any changes in gait.  No tremor.  No movement related concerns at all.    Past Psychiatric Med Trials:  Psych Meds at Intake:  Olanzapine 5 mg at bedtime and 2.5 mg daily as needed     Past  Psych Meds:  None  Hydroxyzine - not helpful, dry mouth  Quetiapine -stopped due to worsening catatonia  Olanzapine-stopped due to worsening catatonia  Mirtazapine-paradoxical effect?    Psychiatric ROS:  Keely BART Arana reports mood has been: Stable  Anxiety has been: Stable  Sleep has been: Stable  Paz sxs: None  Psychosis sxs: None today  ADHD/ADD sxs: N/A  PTSD sxs: N/A  PHQ9 and GAD7 scores were reviewed today if completed.   Medication side effects: Denies  Current stressors include: Sxs and see HPI above  Coping mechanisms and supports include: Family, Hobbies and Friends    Current medications include:   Current Outpatient Medications   Medication Sig    amLODIPine (NORVASC) 5 MG tablet Take 1 tablet (5 mg) by mouth daily    anastrozole (ARIMIDEX) 1 MG tablet Take 1 tablet (1 mg) by mouth daily    atorvastatin (LIPITOR) 10 MG tablet Take 1 tablet (10 mg) by mouth daily    calcium carbonate (OS-PADDY 500 MG [place]. [place]) 500 MG tablet Take 2 tablets by mouth daily With Magnesium,Zinc    divalproex sodium delayed-release (DEPAKOTE) 250 MG DR tablet Take 1 tablet (250 mg) by mouth at bedtime    LORazepam (ATIVAN) 1 MG tablet Take 1 tablet (1 mg) by mouth 3 times daily Hold dose if sedation or falls.    LYSINE PO Take 10,000 mg by mouth daily    Multiple Vitamins-Minerals (PRESERVISION AREDS 2 PO) Take 2 tablets by mouth daily    multivitamin w/minerals (THERA-VIT-M) tablet Take 1 tablet by mouth daily    propranolol (INDERAL) 20 MG tablet Take 0.5-1 tablets (10-20 mg) by mouth 2 times daily as needed (anxiety)    ramelteon (ROZEREM) 8 MG tablet Take 1 tablet (8 mg) by mouth nightly as needed for sleep (sleep)    rivaroxaban ANTICOAGULANT (XARELTO) 20 MG TABS tablet Take 20 mg by mouth    VITAMIN D, CHOLECALCIFEROL, PO Take 1,000 Units by mouth daily      No current facility-administered medications for this visit.       Past Medical/Surgical History:  Past Medical History:   Diagnosis Date    Atrial fibrillation  (H)     s/p ablasion.    Hypertension     Iritis       has a past medical history of Atrial fibrillation (H), Hypertension, and Iritis.    She has no past medical history of Complication of anesthesia or Sleep apnea.    Social History:  Reviewed. No changes to social history except as noted above in HPI.    Vital Signs:   None taken since telemedicine visit    Labs:  Most recent laboratory results reviewed and the pertinent results include:     11/10/2023: Serum encephalopathy, autoimmune/paraneoplastic evaluation (sent out to [facility]): NEGATIVE    11/10/2023: Rheumatoid factor unremarkable/negative    11/10/2023: Depakote level 18.7    Component  Ref Range & Units 2 mo ago     SSA Deb IgG Instrument Value  <7.0 U/mL <0.5    SSA (Ro) Antibody IgG  Negative Negative   Resulting Agency SCORE              Specimen Collected: 11/10/23  1:10 PM Last Resulted: 11/13/23  6:05 PM               Component  Ref Range & Units 2 mo ago     SSB Deb IgG Instrument Value  <7.0 U/mL <0.6    SSB (La) Antibody IgG  Negative Negative   Resulting Agency SCORE              Specimen Collected: 11/10/23  1:10 PM Last Resulted: 11/13/23  6:05 PM               Component  Ref Range & Units 2 mo ago     JANETH interpretation  Negative Negative   Comment:  Negative:              <1:40  Borderline Positive:   1:40 - 1:80  Positive:              >1:80   Resulting Agency SCORE              Specimen Collected: 11/10/23  1:10 PM Last Resulted: 11/13/23  1:28 PM                 Last Comprehensive Metabolic Panel:  Sodium   Date Value Ref Range Status   10/27/2023 142 135 - 145 mmol/L Final     Comment:     Reference intervals for this test were updated on 09/26/2023 to more accurately reflect our healthy population. There may be differences in the flagging of prior results with similar values performed with this method. Interpretation of those prior results can be made in the context of the updated reference intervals.      Potassium   Date Value Ref Range  Status   10/27/2023 4.2 3.4 - 5.3 mmol/L Final     Chloride   Date Value Ref Range Status   10/27/2023 104 98 - 107 mmol/L Final     Carbon Dioxide (CO2)   Date Value Ref Range Status   10/27/2023 28 22 - 29 mmol/L Final     Anion Gap   Date Value Ref Range Status   10/27/2023 10 7 - 15 mmol/L Final     Glucose   Date Value Ref Range Status   10/27/2023 134 (H) 70 - 99 mg/dL Final     Urea Nitrogen   Date Value Ref Range Status   10/27/2023 20.0 8.0 - 23.0 mg/dL Final     Creatinine   Date Value Ref Range Status   10/27/2023 0.74 0.51 - 0.95 mg/dL Final   01/21/2021 0.69 0.52 - 1.04 mg/dL Final     GFR Estimate   Date Value Ref Range Status   10/27/2023 87 >60 mL/min/1.73m2 Final   01/21/2021 >90 >60 mL/min/[1.73_m2] Final     Comment:     Non  GFR Calc  Starting 12/18/2018, serum creatinine based estimated GFR (eGFR) will be   calculated using the Chronic Kidney Disease Epidemiology Collaboration   (CKD-EPI) equation.       Calcium   Date Value Ref Range Status   10/27/2023 9.5 8.8 - 10.2 mg/dL Final   01/21/2021 9.7 8.5 - 10.1 mg/dL Final     Bilirubin Total   Date Value Ref Range Status   11/10/2023 0.3 <=1.2 mg/dL Final     Alkaline Phosphatase   Date Value Ref Range Status   11/10/2023 60 35 - 104 U/L Final     ALT   Date Value Ref Range Status   11/10/2023 12 0 - 50 U/L Final     Comment:     Reference intervals for this test were updated on 6/12/2023 to more accurately reflect our healthy population. There may be differences in the flagging of prior results with similar values performed with this method. Interpretation of those prior results can be made in the context of the updated reference intervals.       AST   Date Value Ref Range Status   11/10/2023 11 0 - 45 U/L Final     Comment:     Reference intervals for this test were updated on 6/12/2023 to more accurately reflect our healthy population. There may be differences in the flagging of prior results with similar values performed with  "this method. Interpretation of those prior results can be made in the context of the updated reference intervals.     Lab Results   Component Value Date    WBC 5.3 10/27/2023     Lab Results   Component Value Date    RBC 4.59 10/27/2023     Lab Results   Component Value Date    HGB 13.9 10/27/2023     Lab Results   Component Value Date    HCT 42.9 10/27/2023     No components found for: \"MCT\"  Lab Results   Component Value Date    MCV 94 10/27/2023     Lab Results   Component Value Date    MCH 30.3 10/27/2023     Lab Results   Component Value Date    MCHC 32.4 10/27/2023     Lab Results   Component Value Date    RDW 12.9 10/27/2023     Lab Results   Component Value Date     10/27/2023      TSH   Date Value Ref Range Status   10/27/2023 0.56 0.30 - 4.20 uIU/mL Final      Ammonia within normal limits on 10/27/2023    CK within normal limits on 10/27/2023    Review of Systems:  10 systems (general, cardiovascular, respiratory, eyes, ENT, endocrine, GI, , M/S, neurological) were reviewed. Most pertinent finding(s) is/are: denies fever, cough, persistent headaches, shortness of breath, chest pain, severe GI symptoms, trouble urinating, severe pain. The remaining systems are all unremarkable.    Mental Status Examination (via video visit today):  Appearance: Awake, alert, appears stated age, no acute distress, adequately groomed  Attitude:  cooperative, pleasant  Motor: No tongue thrusting/lip smacking  or perioral movements noted today, no tremor noted, no repetitive movements of any type  Oriented to:  person, place, date, situation  Attention Span and Concentration:  normal  Speech: Still no delay in response today, speech clear, normal volume, normal rate  Language: intact  Mood: pretty good  Affect: Mood congruent, bright  Associations:  no loose associations  Thought Process: linear, logical, goal-directed  Thought Content:  no evidence of suicidal ideation or homicidal ideation, no evidence of psychotic " thought, no auditory hallucinations present and no visual hallucinations present  Recent and Remote Memory: adequate for interview; not formally tested today  Fund of Knowledge: appropriate  Insight: good  Judgment: good  Impulse Control: good    Suicide Risk Assessment:  Today Keely Arana reports no suicidal ideation. Based on all available evidence including the factors cited above, Keely Arana does not appear to be at imminent risk for self-harm, does not meet criteria for a 72-hr hold, and therefore remains appropriate for ongoing outpatient level of care.  A thorough assessment of risk factors related to suicide and self-harm have been reviewed and are noted above. The patient convincingly denies suicidality on several occasions. Local community safety resources reviewed for patient to use if needed. There was no deceit detected, and the patient presented in a manner that was believable.     DSM5 Diagnosis:  R/O Neurocognitive Disorder  R/O Bipolar Disorder vs other affective disorder   Anxiety, unspecified  CATATONIA - resolved on Ativan     R/O Drug-Induced Parkinsonism     R/O Tardive Dyskinesia (low suspicions)  RULED OUT paraneoplastic/autoimmune process (pt with work-up by neurology)    Medical comorbidities include:   Patient Active Problem List    Diagnosis Date Noted    Mild cognitive impairment 10/07/2023     Priority: Medium    Unspecified mood (affective) disorder (H24) 10/07/2023     Priority: Medium    Insomnia, unspecified type 09/29/2023     Priority: Medium    Anxiety 09/29/2023     Priority: Medium    Major depression 09/29/2023     Priority: Medium    History of cong 06/20/2023     Priority: Medium    Abnormal bone density screening 01/15/2021     Priority: Medium    Long term (current) use of aromatase inhibitors 01/15/2021     Priority: Medium    Malignant neoplasm of upper-outer quadrant of right breast in female, estrogen receptor positive (H) 08/23/2018     Priority: Medium     Symptomatic varicose veins of both lower extremities 11/10/2015     Priority: Medium       Psychosocial & Contextual Factors: see HPI above    Assessment:  From Intake, 5/12/2023:  Keely Arana is a 67-year-old female with relatively benign past psychiatric history leading up to recent suspected manic episode who presents today for psychiatric evaluation.  Patient with what appears to meet criteria for manic episode starting earlier this spring.  Patient started to have more trouble with sleep and was becoming more irritable leading to racing thoughts, increased goal directed activity, disorganized thoughts, more talkative than usual, etc.  Patient's family concerns since she was acting out of character and brought to the ER.  Patient started on olanzapine in the emergency room which has helped significantly to regulate sleep and mood.  Patient perhaps with some heightened energy today but not overtly manic.  Patient is currently tolerating olanzapine well and reports sleeping about 8 hours a night.  Discussed risks and benefits of staying on olanzapine.  Unclear if patient has had underlying bipolar disorder that has gone fairly undetected over the years.  Patient with relatives with bipolar disorder.  Patient did have recent brain imaging (MRI) that would not explain recent new onset cong.  Patient also with otherwise relatively benign labs including thyroid, liver panel, CMP, CBC, etc.  No new medications were introduced.  Patient denies any drug use and no problematic alcohol use.  Patient stopped using her 1 glass of wine a week around the time this episode started to see if it would help to give up alcohol.  Patient does often have a few cups of coffee a day.  We will continue to monitor.  Recommend staying on olanzapine for at least 6 months to a year before trying to taper off in order to prevent relapse/recurrent cong symptoms.  No acute safety concerns today.  No SI.  No problematic drug or alcohol  use.     6/14/2023:  Patient overall doing relatively well.  Mood has been stable.  No signs of cong.  We will decrease olanzapine to 2.5 mg at bedtime for the next few months to ensure ongoing stability of symptoms.  Could consider a trial off olanzapine at the end of the 3 months of decreased dose.  Patient and  will continue to remain vigilant while monitoring for any relapse of manic symptoms.  Patient had recent lipid panel and glucose completed.  Both within normal limits.  No acute safety concerns.  No SI.  No signs of tardive dyskinesia noted on video today.  No problematic drug or alcohol use.    9/11/2023:  Patient with suspected movement related side effects due to olanzapine use.  Patient with extreme anxiety and some worsening symptoms when olanzapine was stopped after 3 months on 2.5 mg dose.  We will restart olanzapine at 2.5 mg every other day and continue with slow taper and also get recommendations from psychiatric PharmD and possibly neurology.  Patient with possible tongue thrusting today versus extreme dry mouth from hydroxyzine use.  There was no tongue thrusting noted at last appointment in June.  Patient's family had noted possible parkinsonism like symptoms.   and patient will continue to monitor.  Referral placed for [facility] for additional monitoring and talk therapy/support as well.  We will also start a trial with propranolol to see if that is helpful for some of the physical symptoms of anxiety and potentially some symptoms being caused by olanzapine.  No acute safety concerns.  No SI.  No problematic drug or alcohol use.    9/22/2023:  Patient still not sleeping well and I would not want patient to get manic.  We did discuss possibility of something like Depakote at low-dose in order to avoid other agents like olanzapine and since we would like to start to taper off olanzapine (due to suspected parkinsonism).  Could also consider lithium.  Patient is not seeming manic at this  time of interview - definitely seems anxious though. Pt sitting quite calmly and no pressured speech.  Difficult to assess if not sleeping because of anxiety versus onset of manic episode?  We will trial mirtazapine at bedtime to see if helpful for sleep without worsening any side effects. No acute safety concerns. No SI. No problematic drug or alcohol use. Pt also encouraged to utilize the propranolol.     10/23/2023:  Patient exhibiting multiple signs of suspected catatonia.  Patient also with symptoms that seem to improve with Ativan administration by family.  Recommended emergency room visit for evaluation for possible admission for treatment of her mental health condition since I suspect pt has catatonia and discussed limitation of my ability to examine pt via video.  Discussed risks of catatonia with family, including up to death. Discussed malignant catatonia and that catatonia can affect blood pressure and heart rate. Pt's vitals (BP and HR) were elevated at last check on record 10/11.  Patient and family declined emergency room visit at this time and prefer to try to manage patient at home with Ativan up to 1 mg 3 times daily.  Advised family and patient they are to bring patient to the emergency room if symptoms do not start to improve, or start to worsen, with Ativan administration.  Family instructed to hold doses of Ativan, or break doses in half, if patient starts to become too sedated or if gait becomes too unsteady on full dose.  Discussed what to look for if Ativan is going to be helpful for symptoms -starting to do more around the house, increased energy, talking more, etc.  Patient has follow-up visit with me in 1 week.  Neurology referral also placed to help evaluate for parkinsonism versus catatonia versus other.  Patient continues to have frequent lip smacking movements possibly related to current condition versus TD?  Patient continues to eat and drink.  Patient was alert and oriented today  during interview.  No hold will be placed for emergent medical transport to the hospital, but I do have a low threshold if patient and family continue to refuse emergency room evaluation and if patient's symptoms continue to worsen.  This was discussed with the patient and family.  Both olanzapine and quetiapine discontinued due to suspicions for catatonia.  Labs also ordered to assess for infection, metabolic status, ammonia level, TSH, etc.    10/30/2023:  Patient with drastic improvement since starting Ativan/lorazepam for catatonia.  Still some ongoing ruminations, paranoia, restlessness.  Discussed possible causes of catatonia including suspected bipolar disorder, possible neurocognitive disorder, infection, others.  Patient's recent laboratory work unremarkable for any signs of infection or obvious causes of catatonia at this time.  Strongly suspicious of bipolar disorder causing cong.  Rule out neurocognitive disorder.  Neurology referral has been placed and pending scheduling.  We will also consider imaging again.  Patient had imaging completed this past spring but could consider again due to catatonia and change in mental status.  No acute safety concerns at this time.  Patient eating and sleeping well.  No suicidal thoughts.  No problematic drug or alcohol use.  Family continues to be incredibly supportive.  Patient very fearful of the hospitalization.  Patient is agreeable to starting Depakote to help with some of her symptoms, bipolar disorder.  Patient's  open to ongoing discussions about possibility of ECT.  Patient is not as open to ECT at this time.  Patient/family will reach out if they would like an ECT referral.    11/14/2023:  Patient overall presenting significantly better today compared to most recent visit even.  No signs of catatonia today.  No perioral movements noted.  No tremor noted.  No delayed speech.  Attention and concentration intact for interview.  Alert and oriented x 4.   Patient did see neurology and there are tests still pending.  Patient needs to complete neuroimaging as ordered by neurology.  There are also paraneoplastic/autoimmune send out labs and process through [facility].  Multiple differentials to consider per neurology as well to explain patient's symptoms.  Patient will continue to follow-up with neurology to further rule out underlying medical causes for symptoms.  We will very slowly taper down lorazepam to a lower dose.  I still do not want to completely discontinue at this time.  We will continue Depakote due to ongoing improvements of symptoms and good tolerability.  Given improvements, and based on laboratory and imaging testing results, may need to consider repeat neuropsychological testing.  Would confer with neurology.  No acute safety concerns.  No SI.  No problematic drug or alcohol use.    1/15/2024:  Patient overall doing well.  Down to 0.5 mg 3 times daily of Ativan with no regression of catatonia symptoms.  Tolerating Depakote well with no negative side effects.  Updated labs ordered to check CBC and liver function.  We will continue to very slowly taper lorazepam.  No other medication changes today.  Recent serum encephalopathy, autoimmune/paraneoplastic panel sent to [facility] unremarkable.  No other remarkable exam/test results.  No acute safety concerns.  No SI.  No problematic drug or alcohol use.  Could still consider repeat neuropsychological testing in the future if relevant.    Medication side effects and alternatives were reviewed. Health promotion activities recommended and reviewed today. All questions addressed. Education and counseling completed regarding risks and benefits of medications and psychotherapy options. Recommend therapy for additional support.     Treatment Plan:  Present to the emergency room for further evaluation if symptoms significantly worsen, especially if abruptly.  Decrease Ativan/lorazepam (taking for catatonia): take 0.5  mg in AM, 0.25 mg in afternoon, and 0.5 mg at bedtime for one month, then decrease to 0.25 mg in AM, 0.25 mg in afternoon, and 0.5 mg mg in evening for one month. Then decrease to 0.25 mg three times daily until follow-up. Please reach out right away if symptoms start to worsen on decreased doses of Ativan.   Continue propranolol 10-20 mg twice daily as needed for anxiety.  Continue Depakote  mg daily at bedtime for possible affective disorder, hx of cong.   Continue ramelteon/Rozerem 8 mg at bedtime as needed for sleep.   Have non-fasting labs completed at any [facility] [facility] Need to call your clinic ahead of time to schedule an appointment for lab due to limited hours and no walk-ins due to Covid-19 restrictions/changes.   BHC (behavioral health clinician) referral previously placed for counseling/therapy support if needed.   Continue working with neurology clinic as needed and recommended by neurology.   Continue all other cares per primary care provider.   Continue all other medications as reviewed per electronic medical record today.   Safety plan reviewed. To the Emergency Department as needed or call after hours crisis line at [phone] or [phone]. [place] Crisis Text Line. Text [place] to 718805 or Suicide LifeLine Chat: suicidepreventionlifeline.org/chat  Schedule an appointment with me in 3 months or sooner as needed. Call [facility] at [phone] to schedule.  Follow up with primary care provider as planned or for acute medical concerns.  Call the psychiatric nurse line with medication questions or concerns at [phone].  [employer] [facility] [employer] may be used to communicate with your provider, but this is not intended to be used for emergencies.    Previously discussed risks of benzodiazepine (Ativan, Xanax, Klonopin, Valium, etc) use including, but not limited to, sedation, tolerance, risk for addiction/dependence. Do not drink alcohol while taking benzodiazepines due to risk of  trouble breathing and potential death. Do not drive or operate heavy machinery until it is known how the drug affects you. Discuss with physician or pharmacist before ever taking a benzodiazepine with a narcotic/opioid pain medication.  Watch for sedation and falls.    Administrative Billing:   Session Duration: 16 Minutes   Start: 11:30a  Stop: 11:46a    Patient Status:  Patient will continue to be seen for ongoing consultation and stabilization.    Signed:   Reema Palomo DO  [facility]    Disclaimer: This note consists of symbols derived from keyboarding, dictation and/or voice recognition software. As a result, there may be errors in the script that have gone undetected. Please consider this when interpreting information found in this chart.

## 2024-01-15 NOTE — PROGRESS NOTES
"Virtual Visit Details    Type of service:  Video Visit   Video Start Time: {video visit start/end time for provider to select:820759}  Video End Time:{video visit start/end time for provider to select:603919}    Originating Location (pt. Location): {video visit patient location:051041::\"Home\"}  {PROVIDER LOCATION On-site should be selected for visits conducted from your clinic location or adjoining [facility] hospital, academic office, or other nearby [facility] building. Off-site should be selected for all other provider locations, including home:318342}  Distant Location (provider location):  {virtual location provider:233808}  Platform used for Video Visit: {Virtual Visit Platforms:033649::\"Open-Xchange\"}  "

## 2024-01-15 NOTE — NURSING NOTE
Is the patient currently in the state of [place]? YES    Visit mode:VIDEO    If the visit is dropped, the patient can be reconnected by: VIDEO VISIT: Send to e-mail at: [email or web address]    Will anyone else be joining the visit? Spouse in video with patient.  (If patient encounters technical issues they should call [phone] :[phone])    How would you like to obtain your AVS? MyChart    Are changes needed to the allergy or medication list? No    Reason for visit: RECHECK    Gianna RIVER

## 2024-01-15 NOTE — PATIENT INSTRUCTIONS
[facility] info for labs:  [facility], [address] [place], [place], [address]  Phone: [phone]    Treatment Plan:  Present to the emergency room for further evaluation if symptoms significantly worsen, especially if abruptly.  Decrease Ativan/lorazepam (taking for catatonia): take 0.5 mg in AM, 0.25 mg in afternoon, and 0.5 mg at bedtime for one month, then decrease to 0.25 mg in AM, 0.25 mg in afternoon, and 0.5 mg mg in evening for one month. Then decrease to 0.25 mg three times daily until follow-up. Please reach out right away if symptoms start to worsen on decreased doses of Ativan.   Continue propranolol 10-20 mg twice daily as needed for anxiety.  Continue Depakote  mg daily at bedtime for possible affective disorder, hx of cong.   Continue ramelteon/Rozerem 8 mg at bedtime as needed for sleep.   Have non-fasting labs completed at any [facility] lab. Need to call your clinic ahead of time to schedule an appointment for lab due to limited hours and no walk-ins due to Covid-19 restrictions/changes.   BHC (behavioral health clinician) referral previously placed for counseling/therapy support if needed.   Continue working with neurology clinic as needed and recommended by neurology.   Continue all other cares per primary care provider.   Continue all other medications as reviewed per electronic medical record today.   Safety plan reviewed. To the Emergency Department as needed or call after hours crisis line at [phone] or [phone]. [place] Crisis Text Line. Text [place] to 724008 or Suicide LifeLine Chat: suicidepreventionlifeline.org/chat  Schedule an appointment with me in 3 months or sooner as needed. Call [facility] at [phone] to schedule.  Follow up with primary care provider as planned or for acute medical concerns.  Call the psychiatric nurse line with medication questions or concerns at [phone].  MyChart may be used to  communicate with your provider, but this is not intended to be used for emergencies.    Previously discussed risks of benzodiazepine (Ativan, Xanax, Klonopin, Valium, etc) use including, but not limited to, sedation, tolerance, risk for addiction/dependence. Do not drink alcohol while taking benzodiazepines due to risk of trouble breathing and potential death. Do not drive or operate heavy machinery until it is known how the drug affects you. Discuss with physician or pharmacist before ever taking a benzodiazepine with a narcotic/opioid pain medication.  Watch for sedation and falls.

## 2024-01-25 ENCOUNTER — MYC MEDICAL ADVICE (OUTPATIENT)
Dept: PSYCHIATRY | Facility: CLINIC | Age: 69
End: 2024-01-25
Payer: MEDICARE

## 2024-01-25 DIAGNOSIS — F06.1 CATATONIA: ICD-10-CM

## 2024-01-26 RX ORDER — LORAZEPAM 0.5 MG/1
.25-.5 TABLET ORAL 3 TIMES DAILY PRN
Qty: 90 TABLET | Refills: 2 | Status: SHIPPED | OUTPATIENT
Start: 2024-01-26

## 2024-01-26 NOTE — TELEPHONE ENCOUNTER
"1) RN reviewed MyC message -   \"Estefani Palomo,  I tried to see if I could get the new .5 mg prescription for Ativan. I figured I d start making adjustments in the dosage at the beginning of February. The pharmacist said I couldn t get the new prescription until later in February because I already had a 1 mg prescription. I explained that I needed the .5 mg dose so I could cut that in half to start taking a .25 mg dose. He said that there was nothing in the prescription order that indicated the pills would be cut in half. Is it possible for you to let the pharmacist know we need a .5 mg dose to create the .25 mg dose? That way I could start adjusting the dose in February.      Thanks, Jessica Arana\"    2) RN reviewed the [place]- #90  of Lorazepam 1 Mg Tablet sold on 12/29/2023    3) RN reviewed refill history -   lorazepam (ATIVAN) 0.5 MG tablet 90 tablet 2 1/31/2024 -- No   Sig - Route: Take 1 tablet (0.5 mg) by mouth 3 times daily     4) RN reviewed 1/15/2024 treatment plan  1/15/2024:  Patient overall doing well.  Down to 0.5 mg 3 times daily of Ativan with no regression of catatonia symptoms.  Tolerating Depakote well with no negative side effects.  Updated labs ordered to check CBC and liver function.  We will continue to very slowly taper lorazepam.  No other medication changes today.  Recent serum encephalopathy, autoimmune/paraneoplastic panel sent to [facility] unremarkable.  No other remarkable exam/test results.  No acute safety concerns.  No SI.  No problematic drug or alcohol use.  Could still consider repeat neuropsychological testing in the future if relevant.     Medication side effects and alternatives were reviewed. Health promotion activities recommended and reviewed today. All questions addressed. Education and counseling completed regarding risks and benefits of medications and psychotherapy options. Recommend therapy for additional support.      Treatment Plan:  Present to the emergency room for " further evaluation if symptoms significantly worsen, especially if abruptly.  Decrease Ativan/lorazepam (taking for catatonia): take 0.5 mg in AM, 0.25 mg in afternoon, and 0.5 mg at bedtime for one month, then decrease to 0.25 mg in AM, 0.25 mg in afternoon, and 0.5 mg mg in evening for one month. Then decrease to 0.25 mg three times daily until follow-up. Please reach out right away if symptoms start to worsen on decreased doses of Ativan.   Continue propranolol 10-20 mg twice daily as needed for anxiety.  Continue Depakote  mg daily at bedtime for possible affective disorder, hx of cong.   Continue ramelteon/Rozerem 8 mg at bedtime as needed for sleep.   Have non-fasting labs completed at any [facility] lab. Need to call your clinic ahead of time to schedule an appointment for lab due to limited hours and no walk-ins due to Covid-19 restrictions/changes.   BHC (behavioral health clinician) referral previously placed for counseling/therapy support if needed.   Continue working with neurology clinic as needed and recommended by neurology.   Continue all other cares per primary care provider.   Continue all other medications as reviewed per electronic medical record today.   Safety plan reviewed. To the Emergency Department as needed or call after hours crisis line at [phone] or [phone]. [place] Crisis Text Line. Text [place] to 832731 or Suicide LifeLine Chat: suicidepreventionlifeline.org/chat  Schedule an appointment with me in 3 months or sooner as needed. Call [facility] at [phone] to schedule.  Follow up with primary care provider as planned or for acute medical concerns.  Call the psychiatric nurse line with medication questions or concerns at [phone].  MyChart may be used to communicate with your provider, but this is not intended to be used for emergencies.    5) RN forwarding to Dr. Palomo for review of prescription that was sent to the pharmacy with a start  date of 1/31/2024 and for further clarification on the prescription.     Dilcia Muhammad RN on 1/26/2024 at 9:37 AM

## 2024-02-18 DIAGNOSIS — I10 HTN (HYPERTENSION): ICD-10-CM

## 2024-02-21 ENCOUNTER — TELEPHONE (OUTPATIENT)
Dept: INTERNAL MEDICINE | Facility: CLINIC | Age: 69
End: 2024-02-21
Payer: MEDICARE

## 2024-02-21 DIAGNOSIS — I10 HTN (HYPERTENSION): ICD-10-CM

## 2024-02-21 RX ORDER — AMLODIPINE BESYLATE 5 MG/1
5 TABLET ORAL DAILY
Start: 2024-02-21

## 2024-02-21 RX ORDER — AMLODIPINE BESYLATE 5 MG/1
5 TABLET ORAL DAILY
Qty: 90 TABLET | Refills: 3 | Status: SHIPPED | OUTPATIENT
Start: 2024-02-21

## 2024-02-21 NOTE — TELEPHONE ENCOUNTER
amLODIPine (NORVASC) 5 MG tablet   90 tablet 0 11/15/2023     8/11/2023  [facility]    Marcus Cartwright MD  Internal Medicine    Nv: 4/4/24

## 2024-02-21 NOTE — TELEPHONE ENCOUNTER
[facility] Call Center    Phone Message    May a detailed message be left on voicemail: yes     Reason for Call: Medication Refill Request    Has the patient contacted the pharmacy for the refill? Yes   Name of medication being requested:   amLODIPine (NORVASC) 5 MG tablet   Provider who prescribed the medication: Maulik Nye  Pharmacy:  [id number] [place], [place] - [address] AT [facility]    Date medication is needed: asap       Action Taken: Message routed to:  [facility] pcc    Travel Screening: Not Applicable

## 2024-04-03 ENCOUNTER — MYC MEDICAL ADVICE (OUTPATIENT)
Dept: PSYCHIATRY | Facility: CLINIC | Age: 69
End: 2024-04-03
Payer: MEDICARE

## 2024-04-03 ASSESSMENT — PATIENT HEALTH QUESTIONNAIRE - PHQ9
SUM OF ALL RESPONSES TO PHQ QUESTIONS 1-9: 0
SUM OF ALL RESPONSES TO PHQ QUESTIONS 1-9: 0

## 2024-04-04 ENCOUNTER — OFFICE VISIT (OUTPATIENT)
Dept: INTERNAL MEDICINE | Facility: CLINIC | Age: 69
End: 2024-04-04
Payer: MEDICARE

## 2024-04-04 VITALS
HEART RATE: 83 BPM | HEIGHT: 67 IN | WEIGHT: 171 LBS | OXYGEN SATURATION: 98 % | BODY MASS INDEX: 26.84 KG/M2 | TEMPERATURE: 97.8 F | DIASTOLIC BLOOD PRESSURE: 80 MMHG | SYSTOLIC BLOOD PRESSURE: 144 MMHG

## 2024-04-04 DIAGNOSIS — Z23 NEED FOR COVID-19 VACCINE: ICD-10-CM

## 2024-04-04 DIAGNOSIS — F39 UNSPECIFIED MOOD (AFFECTIVE) DISORDER (H): ICD-10-CM

## 2024-04-04 DIAGNOSIS — E78.5 HYPERLIPIDEMIA, UNSPECIFIED HYPERLIPIDEMIA TYPE: Primary | ICD-10-CM

## 2024-04-04 DIAGNOSIS — S46.211A STRAIN OF MUSCLE, FASCIA AND TENDON OF OTHER PARTS OF BICEPS, RIGHT ARM, INITIAL ENCOUNTER: ICD-10-CM

## 2024-04-04 DIAGNOSIS — I10 PRIMARY HYPERTENSION: ICD-10-CM

## 2024-04-04 PROCEDURE — 90480 ADMN SARSCOV2 VAC 1/ONLY CMP: CPT | Performed by: HOSPITALIST

## 2024-04-04 PROCEDURE — 91320 SARSCV2 VAC 30MCG TRS-SUC IM: CPT | Performed by: HOSPITALIST

## 2024-04-04 PROCEDURE — 99213 OFFICE O/P EST LOW 20 MIN: CPT | Mod: 25 | Performed by: HOSPITALIST

## 2024-04-04 NOTE — ASSESSMENT & PLAN NOTE
- Recommended no heavy lifting until better.   - Encouraged arm stretches.   - May use heating pad with increased pains to arm.

## 2024-04-04 NOTE — ASSESSMENT & PLAN NOTE
- Follows with Dr. Palomo working on weaning off atorvastatin.   - Continued on depakote 250mg at night. Patient to repeat labs tomorrow for valproic acid, CBC and LFT.

## 2024-04-04 NOTE — ASSESSMENT & PLAN NOTE
First BP was 139/79, second check was 144/80. Patient does deal with some anxiety.   - Encouraged continuing to walk for exercise. Walks about 2-3 miles at time with .   - Continue amlodipine 5mg daily.

## 2024-04-04 NOTE — PATIENT INSTRUCTIONS
- Monitor Blood pressure at home about 1-2 times a week. Goal is to be under 140/90. If trending higher, follow up.   - Continue walking.   - Continue amlodipine 5mg daily.   - Check lipid lab while fasting tomorrow. Will send refill of cholesterol again after lipids.     - Obtain COVID19 booster today.     Follow up again in 1 year unless as needed.

## 2024-04-04 NOTE — PROGRESS NOTES
"  Assessment & Plan   Problem List Items Addressed This Visit       Unspecified mood (affective) disorder (H24)     - Follows with Dr. Palomo working on weaning off atorvastatin.   - Continued on depakote 250mg at night. Patient to repeat labs tomorrow for valproic acid, CBC and LFT.          Strain of muscle, fascia and tendon of other parts of biceps, right arm, initial encounter     - Recommended no heavy lifting until better.   - Encouraged arm stretches.   - May use heating pad with increased pains to arm.          Hyperlipidemia - Primary     - Recheck Lipids while fasting. Continued on atorvastatin 10mg daily for now. Will refill atorvastatin after labs are collected.          Relevant Orders    Lipid panel reflex to direct LDL Fasting    HTN (hypertension)     First BP was 139/79, second check was 144/80. Patient does deal with some anxiety.   - Encouraged continuing to walk for exercise. Walks about 2-3 miles at time with .   - Continue amlodipine 5mg daily.           Other Visit Diagnoses       Need for COVID-19 vaccine        Relevant Orders    COVID-19 12+ (2023-24) (PFIZER) (Completed)               20 minutes spent by me on the date of the encounter doing chart review, history and exam, documentation and further activities per the note     BMI  Estimated body mass index is 26.78 kg/m  as calculated from the following:    Height as of this encounter: 1.702 m (5' 7\").    Weight as of this encounter: 77.6 kg (171 lb).           Return in about 1 year (around 4/4/2025).      Lisa Lopez is a 68 year old, presenting for the following health issues:  Follow Up (Pt here for follow up; would like lab tests (fasting), med refills)      4/4/2024    11:44 AM   Additional Questions   Roomed by Divya CHASE     History of Present Illness       Hyperlipidemia:  She presents for follow up of hyperlipidemia.   She is taking medication to lower cholesterol. She is not having myalgia or other side effects to " "statin medications.    Hypertension: She presents for follow up of hypertension.  She does not check blood pressure  regularly outside of the clinic. Outpatient blood pressures have not been over 140/90. She does not follow a low salt diet.     She eats 4 or more servings of fruits and vegetables daily.She consumes 0 sweetened beverage(s) daily.She exercises with enough effort to increase her heart rate 30 to 60 minutes per day.  She exercises with enough effort to increase her heart rate 5 days per week.   She is taking medications regularly.     Mentions she is doing well. Here with .     Sleeping typically at 9pm until about 7am. Does get up for restroom and drinking water at times at night.     Weaning off ativan every 3 weeks, currently at 1/4mg TID. Has visit with Dr. Palomo for weaning med.      Mentions right upper arm. Mentions she was preparing food for [place] and has had some pain from then. No neck pains. Outer rotation tenderness.       Review of Systems  Constitutional, neuro, ENT, endocrine, pulmonary, cardiac, gastrointestinal, genitourinary, musculoskeletal, integument and psychiatric systems are negative, except as otherwise noted.      Objective    BP (!) 144/80 (BP Location: Left arm, Patient Position: Sitting, Cuff Size: Adult Regular)   Pulse 83   Temp 97.8  F (36.6  C) (Oral)   Ht 1.702 m (5' 7\")   Wt 77.6 kg (171 lb)   SpO2 98%   BMI 26.78 kg/m    Body mass index is 26.78 kg/m .  Physical Exam   GENERAL: alert and no distress  EYES: Eyes grossly normal to inspection, PERRL and conjunctivae and sclerae normal  RESP: lungs clear to auscultation - no rales, rhonchi or wheezes  CV: regular rate and rhythm, normal S1 S2, no S3 or S4, no murmur, click or rub, no peripheral edema  ABDOMEN: soft, nontender, no hepatosplenomegaly, no masses and bowel sounds normal  MS: no gross musculoskeletal defects noted, no edema. No tenderness over upper biceps tendon. Mild tenderness along lower " biceps tendon. Mild tenderness to upper arm with resistance to outer rotation of her arm. No tenderness to right shoulder with internal or external passive rotation.   SKIN: no suspicious lesions or rashes  NEURO: Normal strength and tone, mentation intact and speech normal  PSYCH: mentation appears normal, affect normal/bright          Signed Electronically by: Maulik Nye MD

## 2024-04-04 NOTE — ASSESSMENT & PLAN NOTE
- Recheck Lipids while fasting. Continued on atorvastatin 10mg daily for now. Will refill atorvastatin after labs are collected.    Medical clearance done today. No outstanding concerns that would preclude surgery. Patient is cleared to proceed with scheduled surgery.

## 2024-04-05 ENCOUNTER — LAB (OUTPATIENT)
Dept: LAB | Facility: CLINIC | Age: 69
End: 2024-04-05
Payer: MEDICARE

## 2024-04-05 DIAGNOSIS — E78.5 HYPERLIPIDEMIA, UNSPECIFIED HYPERLIPIDEMIA TYPE: ICD-10-CM

## 2024-04-05 DIAGNOSIS — Z79.899 ENCOUNTER FOR LONG-TERM (CURRENT) USE OF MEDICATIONS: ICD-10-CM

## 2024-04-05 LAB
BASOPHILS # BLD AUTO: 0 10E3/UL (ref 0–0.2)
BASOPHILS NFR BLD AUTO: 1 %
EOSINOPHIL # BLD AUTO: 0 10E3/UL (ref 0–0.7)
EOSINOPHIL NFR BLD AUTO: 1 %
ERYTHROCYTE [DISTWIDTH] IN BLOOD BY AUTOMATED COUNT: 12.3 % (ref 10–15)
HCT VFR BLD AUTO: 41.7 % (ref 35–47)
HGB BLD-MCNC: 14 G/DL (ref 11.7–15.7)
IMM GRANULOCYTES # BLD: 0 10E3/UL
IMM GRANULOCYTES NFR BLD: 0 %
LYMPHOCYTES # BLD AUTO: 1.2 10E3/UL (ref 0.8–5.3)
LYMPHOCYTES NFR BLD AUTO: 28 %
MCH RBC QN AUTO: 30.2 PG (ref 26.5–33)
MCHC RBC AUTO-ENTMCNC: 33.6 G/DL (ref 31.5–36.5)
MCV RBC AUTO: 90 FL (ref 78–100)
MONOCYTES # BLD AUTO: 0.5 10E3/UL (ref 0–1.3)
MONOCYTES NFR BLD AUTO: 10 %
NEUTROPHILS # BLD AUTO: 2.6 10E3/UL (ref 1.6–8.3)
NEUTROPHILS NFR BLD AUTO: 60 %
PLATELET # BLD AUTO: 202 10E3/UL (ref 150–450)
RBC # BLD AUTO: 4.64 10E6/UL (ref 3.8–5.2)
WBC # BLD AUTO: 4.4 10E3/UL (ref 4–11)

## 2024-04-05 PROCEDURE — 80061 LIPID PANEL: CPT

## 2024-04-05 PROCEDURE — 80076 HEPATIC FUNCTION PANEL: CPT

## 2024-04-05 PROCEDURE — 80164 ASSAY DIPROPYLACETIC ACD TOT: CPT

## 2024-04-05 PROCEDURE — 36415 COLL VENOUS BLD VENIPUNCTURE: CPT

## 2024-04-05 PROCEDURE — 85025 COMPLETE CBC W/AUTO DIFF WBC: CPT

## 2024-04-06 DIAGNOSIS — E78.00 PURE HYPERCHOLESTEROLEMIA: ICD-10-CM

## 2024-04-06 LAB
ALBUMIN SERPL BCG-MCNC: 4.4 G/DL (ref 3.5–5.2)
ALP SERPL-CCNC: 66 U/L (ref 40–150)
ALT SERPL W P-5'-P-CCNC: 20 U/L (ref 0–50)
AST SERPL W P-5'-P-CCNC: 18 U/L (ref 0–45)
BILIRUB DIRECT SERPL-MCNC: <0.2 MG/DL (ref 0–0.3)
BILIRUB SERPL-MCNC: 0.6 MG/DL
CHOLEST SERPL-MCNC: 198 MG/DL
FASTING STATUS PATIENT QL REPORTED: YES
HDLC SERPL-MCNC: 59 MG/DL
LDLC SERPL CALC-MCNC: 112 MG/DL
NONHDLC SERPL-MCNC: 139 MG/DL
PROT SERPL-MCNC: 6.9 G/DL (ref 6.4–8.3)
TRIGL SERPL-MCNC: 134 MG/DL
VALPROATE SERPL-MCNC: 13.1 UG/ML

## 2024-04-06 RX ORDER — ATORVASTATIN CALCIUM 10 MG/1
10 TABLET, FILM COATED ORAL DAILY
Qty: 90 TABLET | Refills: 3 | Status: SHIPPED | OUTPATIENT
Start: 2024-04-06

## 2024-04-07 NOTE — PROGRESS NOTES
Oncology Follow Up:  Date on this visit: 4/8/2024    Diagnosis:  A1kL1T6, grade I, ER positive, AR positive, HER-2 nonamplified invasive ductal carcinoma of the right breast. Oncotype DX recurrence score = 7.    Primary Physician: Sammie Cerna     History Of Present Illness:  Ms. Arana is a 68 year old female with right breast cancer.  Routine screening mammogram in 06/2018 was without concerning findings. Due to high breast density, her gynecologist recommended a breast MRI.  Breast MRI on 08/16/2018 showed nodular to non-mass enhancement measuring 1.6 cm in the right breast at 5 o'clock.    Right breast ultrasound confirmed a mass at 5 o'clock.  Right breast biopsy demonstrated a grade 1 invasive ductal carcinoma.  Estrogen-receptor staining was moderate in 95%; progesterone-receptor staining was strong in 96%.  HER-2 was nonamplified by immunohistochemistry with a score of 1+.  Right breast lumpectomy and sentinel lymph node procedure on 8/31/2018 demonstrated a 1.2 cm, grade 1 invasive mammary carcinoma.  There was associated intermediate-grade DCIS.  Surgical margins were negative; however, DCIS was 1 mm from the anterior margin.  A single sentinel lymph node was negative for malignancy. Oncotype DX recurrence score was 7.  She completed adjuvant radiation (4240 cGy to the whole breast and additional 1250  cGy to the lumpectomy site) on 11/8/18.  She has been on anastrozole since 9/24/18.  She completed 3 years of Zometa on 6/21/2023.     Interval History:  Jessica is here today with her  Terry for routine breast cancer follow-up. They state that she is doing well and her mood is back to normal.  Manic episodes have resolved. She is on Ativan and Depakote and has weaned down on the Ativan to 0.75mg. She has an appointment to see her psychiatrist Dr Palomo next Monday. She has right arm discomfort and thought she might have slept on it. She saw Dr Nye from Medicine last week and was told it was  probably due to muscle tightness.  She does feel it is improving.  It is not bothersome enough for her to take pain medication.  She goes for a walk 5 times/week and denies any fatigue, joint stiffness, hot flashes, vaginal dryness as well as cough, shortness of breath or abdominal pain.  She denies concerning breast lumps, pain or swelling.    Past Medical/Surgical History:  Past Medical History:   Diagnosis Date    Anxiety     Atrial fibrillation (H)     s/p ablasion.    Depression     Hypertension     Insomnia     Iritis     Personal history of malignant neoplasm of breast 2018    s/p radiation, on maintenance chemo     Past Surgical History:   Procedure Laterality Date    CATARACT IOL, RT/LT Left 2006    HYSTERECTOMY  2016    LUMPECTOMY BREAST WITH SENTINEL NODE, COMBINED Right 8/31/2018    Procedure: COMBINED LUMPECTOMY BREAST WITH SENTINEL NODE;  Right Wire Localized Lumpectomy and Right [place] Lymph Node Biopsy;  Surgeon: Chilango Kruger MD;  [facility]    OOPHORECTOMY  2014     Allergies:  Allergies as of 04/08/2024 - Reviewed 04/04/2024   Allergen Reaction Noted    Corticosteroids Dermatitis 03/18/2019    Neomycin Dermatitis 03/18/2019    Sulfa antibiotics Itching and Rash 08/06/2015    Cephalexin GI Disturbance 03/09/2022    Codeine Nausea and Vomiting 08/06/2015    Nitrofurantoin Nausea and Vomiting and Nausea 08/06/2015    Silver Rash 12/03/2018     Current Medications:  Current Outpatient Medications   Medication Sig Dispense Refill    amLODIPine (NORVASC) 5 MG tablet Take 1 tablet (5 mg) by mouth daily 90 tablet 3    anastrozole (ARIMIDEX) 1 MG tablet Take 1 tablet (1 mg) by mouth daily 90 tablet 3    atorvastatin (LIPITOR) 10 MG tablet Take 1 tablet (10 mg) by mouth daily 90 tablet 3    calcium carbonate (OS-PADDY 500 MG [place]. CA) 500 MG tablet Take 2 tablets by mouth daily With Magnesium,Zinc      divalproex sodium delayed-release (DEPAKOTE) 250 MG DR tablet Take 1 tablet (250 mg) by mouth at  bedtime 90 tablet 1    LORazepam (ATIVAN) 0.5 MG tablet Take 0.5-1 tablets (0.25-0.5 mg) by mouth 3 times daily as needed (catatonia, anxiety) 90 tablet 2    LYSINE PO Take 10,000 mg by mouth daily      Multiple Vitamins-Minerals (PRESERVISION AREDS 2 PO) Take 2 tablets by mouth daily      multivitamin w/minerals (THERA-VIT-M) tablet Take 1 tablet by mouth daily      propranolol (INDERAL) 20 MG tablet Take 0.5-1 tablets (10-20 mg) by mouth 2 times daily as needed (anxiety) (Patient not taking: Reported on 4/4/2024) 180 tablet 0    rivaroxaban ANTICOAGULANT (XARELTO) 20 MG TABS tablet Take 20 mg by mouth      VITAMIN D, CHOLECALCIFEROL, PO Take 1,000 Units by mouth daily         Family and Social History:  Reviewed and unchanged from prior.  Please see initial consultation dated 9/24/18 for further details.    Physical Exam:  BP (!) 149/89 (BP Location: Right arm, Patient Position: Sitting, Cuff Size: Adult Regular)   Pulse 92   Temp 98.5  F (36.9  C) (Oral)   Resp 16   Wt 78.5 kg (173 lb 1.6 oz)   SpO2 97%   BMI 27.11 kg/m    General:  Well appearing adult female in NAD.  Alert and oriented x 3.    HEENT:  Normocephalic.  Sclera anicteric.  MMM.    Lymph:  No palpable cervical, supraclavicular, or axillary LAD.  No gross lymphedema.  Chest:  CTA bilaterally.  No wheezes or crackles.  CV:  RRR.  Nl S1 and S2.  No audible m/r/g.  Breast:  Bilateral breasts are of increased fibroglandular density.  Right lower breast incision is without underlying fibrosis.  There are no dominant or discretely palpable masses in either breast.  Bilateral nipples are inverted (chronic and unchanged from prior)  Abd:  Soft/ND.   Ext:  No edema of the bilateral lower extremities.   Musculo:  Full ROM of the bilateral upper extremities.  Neuro:  Cranial nerves grossly intact.  Gait is stable.  No tremor or dyskinetic movements.  Psych:  Mood and affect appear normal.  Skin:  No visible or concerning skin rashes or  lesions.    Laboratory/Imaging Studies:  I personally reviewed the below images and laboratories in clinic today:    4/5/2024 Labs:  Liver tests are wnl.  Blood counts are wnl.    4/8/2024 Bilateral screening mammograms:  Findings: The breasts are heterogeneously dense, which may obscure small masses.  There are breast conservation changes in the right breast.  There is no radiographic evidence of malignancy.                                                                    IMPRESSION: ACR BI-RADS Category 2: Benign    ASSESSMENT/PLAN:  Ms. Velazquez is a 68-year-old female with a h/o stage IA, T1c N0 M0, grade 1, ER positive, RI positive, HER2 non-amplified invasive mammary carcinoma of the right breast.  She is status post treatment with right breast lumpectomy and radiation.  On adjuvant curative intent treatment with anastrozole since 9/24/18.    1.  Right breast cancer:   Ms. Arana is 5 years, 7 months out from excision of a right breast cancer.  She continues on adjuvant curative intent treatment with anastrozole and is tolerating the medication well.  Plan to continue anastrozole to complete a 7 year course (thru 9/24/2025).     There is no evidence of disease recurrence on exam today.  Due to the fact that her initial breast cancer was not detectable by mammogram, increased breast density and close DCIS margins, we are performing high-risk screening with both mammogram and breast MRI until she is 5 years out from diagnosis of her breast cancer/age 69.     - I personally reviewed the images of bilateral screening mammograms performed today which shows heterogeneously dense breasts without change from prior.  - Return to clinic in 6 months, with breast MRI prior.  We discussed today that this would be last planned mammogram.    2.  Paz/Bipolar disorder:  Unlikely to be related to her breast cancer treatment as onset years after starting adjuvant therapy.    - Previously on mirtazapine, however did not  tolerate due to insomnia.  - She is on treatment with Depakote and has lorazepam to take prn.  Her symptoms are much improved on this treatment regimen. She has been weaned down on the lorazepam to 0.75 mg/day. She follows up with psychiatry and has an appointment with Dr Palomo on Monday 4/15.    3.  Bone Health:  DEXA 3/28/2023 with a lowest T-score of -0.9, improved from prior (DEXA in 01/2021 with a lowest T-score of -2.0).  She received 3 years of Zometa from 1/22/2021 - 6/21/2023.      Continue calcium 1200 mg, vitamin D 1000 international units and walk 30 minutes daily.      4.  Routine health maintenance:  No change since last visit.  Colonoscopy in 2015 was without concerning finding, next due in 2025.      5.  Right upper arm discomfort:  Agree this is likely musculoskeletal and unlikely to be related to history of malignancy.  Follow up with PCP if does not resolve within 2 weeks.    6.  Follow Up:  Breast MRI and visit with me in 6 months.    Patient was seen and discussed with 4th year medical student, Esvin Ramos.  The medical student was personally supervised by me during the patient examination. I personally verified the medical history, performed a physical exam and the medical decision-making. I made appropriate changes to the documentation and the assessment and plan based on my verification, exam, and medical decision making.     Merari Tafoya MD

## 2024-04-08 ENCOUNTER — ANCILLARY PROCEDURE (OUTPATIENT)
Dept: MAMMOGRAPHY | Facility: CLINIC | Age: 69
End: 2024-04-08
Attending: INTERNAL MEDICINE
Payer: MEDICARE

## 2024-04-08 ENCOUNTER — ONCOLOGY VISIT (OUTPATIENT)
Dept: ONCOLOGY | Facility: CLINIC | Age: 69
End: 2024-04-08
Attending: INTERNAL MEDICINE
Payer: MEDICARE

## 2024-04-08 VITALS
OXYGEN SATURATION: 97 % | DIASTOLIC BLOOD PRESSURE: 89 MMHG | BODY MASS INDEX: 27.11 KG/M2 | TEMPERATURE: 98.5 F | SYSTOLIC BLOOD PRESSURE: 149 MMHG | HEART RATE: 92 BPM | RESPIRATION RATE: 16 BRPM | WEIGHT: 173.1 LBS

## 2024-04-08 DIAGNOSIS — Z12.31 ENCOUNTER FOR SCREENING MAMMOGRAM FOR BREAST CANCER: ICD-10-CM

## 2024-04-08 DIAGNOSIS — Z17.0 MALIGNANT NEOPLASM OF UPPER-OUTER QUADRANT OF RIGHT BREAST IN FEMALE, ESTROGEN RECEPTOR POSITIVE (H): Primary | ICD-10-CM

## 2024-04-08 DIAGNOSIS — M79.621 ARM PAIN, SUPERIOR, RIGHT: ICD-10-CM

## 2024-04-08 DIAGNOSIS — Z85.3 PERSONAL HISTORY OF MALIGNANT NEOPLASM OF BREAST: ICD-10-CM

## 2024-04-08 DIAGNOSIS — C50.411 MALIGNANT NEOPLASM OF UPPER-OUTER QUADRANT OF RIGHT BREAST IN FEMALE, ESTROGEN RECEPTOR POSITIVE (H): Primary | ICD-10-CM

## 2024-04-08 DIAGNOSIS — R92.333 HETEROGENEOUSLY DENSE TISSUE OF BOTH BREASTS ON MAMMOGRAPHY: ICD-10-CM

## 2024-04-08 DIAGNOSIS — Z12.39 BREAST CANCER SCREENING, HIGH RISK PATIENT: ICD-10-CM

## 2024-04-08 PROCEDURE — 77063 BREAST TOMOSYNTHESIS BI: CPT | Mod: GC | Performed by: STUDENT IN AN ORGANIZED HEALTH CARE EDUCATION/TRAINING PROGRAM

## 2024-04-08 PROCEDURE — G0463 HOSPITAL OUTPT CLINIC VISIT: HCPCS | Performed by: INTERNAL MEDICINE

## 2024-04-08 PROCEDURE — 77067 SCR MAMMO BI INCL CAD: CPT | Mod: GC | Performed by: STUDENT IN AN ORGANIZED HEALTH CARE EDUCATION/TRAINING PROGRAM

## 2024-04-08 PROCEDURE — 99214 OFFICE O/P EST MOD 30 MIN: CPT | Mod: GC | Performed by: INTERNAL MEDICINE

## 2024-04-08 RX ORDER — ANASTROZOLE 1 MG/1
1 TABLET ORAL DAILY
Qty: 90 TABLET | Refills: 3 | Status: SHIPPED | OUTPATIENT
Start: 2024-04-08

## 2024-04-08 ASSESSMENT — PAIN SCALES - GENERAL: PAINLEVEL: NO PAIN (1)

## 2024-04-08 NOTE — NURSING NOTE
"Oncology Rooming Note    April 8, 2024 12:24 PM   Keely Arana is a 68 year old female who presents for:    Chief Complaint   Patient presents with    Oncology Clinic Visit     Malignant neoplasm of upper-outer quadrant of right breast in female, estrogen receptor positive     Initial Vitals: BP (!) 149/89 (BP Location: Right arm, Patient Position: Sitting, Cuff Size: Adult Regular)   Pulse 92   Temp 98.5  F (36.9  C) (Oral)   Resp 16   Wt 78.5 kg (173 lb 1.6 oz)   SpO2 97%   BMI 27.11 kg/m   Estimated body mass index is 27.11 kg/m  as calculated from the following:    Height as of 4/4/24: 1.702 m (5' 7\").    Weight as of this encounter: 78.5 kg (173 lb 1.6 oz). Body surface area is 1.93 meters squared.  No Pain (1) Comment: mild right arm pain   No LMP recorded. Patient is postmenopausal.  Allergies reviewed: Yes  Medications reviewed: Yes    Medications: MEDICATION REFILLS NEEDED TODAY. Provider was notified.  Pharmacy name entered into [facility]:    [facility] - [place], [place] - [address] [id number] [address] [facility] [address] [facility]  [facility] [id number] [place] [facility] - [address]    Frailty Screening:   Is the patient here for a new oncology consult visit in cancer care? 2. No      Clinical concerns: Refill: Anastrozole      Sharmila Howard, EMT  4/8/2024            "

## 2024-04-08 NOTE — LETTER
4/8/2024         RE: Keely Arana  [address]  [place] [facility] [address]        Dear Colleague,    Thank you for referring your patient, Keely Arana, to the [facility]. Please see a copy of my visit note below.    Oncology Follow Up:  Date on this visit: 4/8/2024    Diagnosis:  G2yB6P5, grade I, ER positive, WV positive, HER-2 nonamplified invasive ductal carcinoma of the right breast. Oncotype DX recurrence score = 7.    Primary Physician: Sammie Cerna     History Of Present Illness:  Ms. Arana is a 68 year old female with right breast cancer.  Routine screening mammogram in 06/2018 was without concerning findings. Due to high breast density, her gynecologist recommended a breast MRI.  Breast MRI on 08/16/2018 showed nodular to non-mass enhancement measuring 1.6 cm in the right breast at 5 o'clock.    Right breast ultrasound confirmed a mass at 5 o'clock.  Right breast biopsy demonstrated a grade 1 invasive ductal carcinoma.  Estrogen-receptor staining was moderate in 95%; progesterone-receptor staining was strong in 96%.  HER-2 was nonamplified by immunohistochemistry with a score of 1+.  Right breast lumpectomy and sentinel lymph node procedure on 8/31/2018 demonstrated a 1.2 cm, grade 1 invasive mammary carcinoma.  There was associated intermediate-grade DCIS.  Surgical margins were negative; however, DCIS was 1 mm from the anterior margin.  A single sentinel lymph node was negative for malignancy. Oncotype DX recurrence score was 7.  She completed adjuvant radiation (4240 cGy to the whole breast and additional 1250  cGy to the lumpectomy site) on 11/8/18.  She has been on anastrozole since 9/24/18.  She completed 3 years of Zometa on 6/21/2023.     Interval History:  Jessica is here today with her  Terry for routine breast cancer follow-up. They state that she is doing well and her mood is back to normal.  Manic episodes have resolved. She is on Ativan and  Depakote and has weaned down on the Ativan to 0.75mg. She has an appointment to see her psychiatrist Dr Palomo next Monday. She has right arm discomfort and thought she might have slept on it. She saw Dr Nye from Medicine last week and was told it was probably due to muscle tightness.  She does feel it is improving.  It is not bothersome enough for her to take pain medication.  She goes for a walk 5 times/week and denies any fatigue, joint stiffness, hot flashes, vaginal dryness as well as cough, shortness of breath or abdominal pain.  She denies concerning breast lumps, pain or swelling.    Past Medical/Surgical History:  Past Medical History:   Diagnosis Date    Anxiety     Atrial fibrillation (H)     s/p ablasion.    Depression     Hypertension     Insomnia     Iritis     Personal history of malignant neoplasm of breast 2018    s/p radiation, on maintenance chemo     Past Surgical History:   Procedure Laterality Date    CATARACT IOL, RT/LT Left 2006    HYSTERECTOMY  2016    LUMPECTOMY BREAST WITH SENTINEL NODE, COMBINED Right 8/31/2018    Procedure: COMBINED LUMPECTOMY BREAST WITH SENTINEL NODE;  Right Wire Localized Lumpectomy and Right [place] Lymph Node Biopsy;  Surgeon: Chilango Kruger MD;  [facility]: [facility]    OOPHORECTOMY  2014     Allergies:  Allergies as of 04/08/2024 - Reviewed 04/04/2024   Allergen Reaction Noted    Corticosteroids Dermatitis 03/18/2019    Neomycin Dermatitis 03/18/2019    Sulfa antibiotics Itching and Rash 08/06/2015    Cephalexin GI Disturbance 03/09/2022    Codeine Nausea and Vomiting 08/06/2015    Nitrofurantoin Nausea and Vomiting and Nausea 08/06/2015    Silver Rash 12/03/2018     Current Medications:  Current Outpatient Medications   Medication Sig Dispense Refill    amLODIPine (NORVASC) 5 MG tablet Take 1 tablet (5 mg) by mouth daily 90 tablet 3    anastrozole (ARIMIDEX) 1 MG tablet Take 1 tablet (1 mg) by mouth daily 90 tablet 3    atorvastatin (LIPITOR) 10 MG tablet Take 1  tablet (10 mg) by mouth daily 90 tablet 3    calcium carbonate (OS-PADDY 500 MG [place]. [place]) 500 MG tablet Take 2 tablets by mouth daily With Magnesium,Zinc      divalproex sodium delayed-release (DEPAKOTE) 250 MG DR tablet Take 1 tablet (250 mg) by mouth at bedtime 90 tablet 1    LORazepam (ATIVAN) 0.5 MG tablet Take 0.5-1 tablets (0.25-0.5 mg) by mouth 3 times daily as needed (catatonia, anxiety) 90 tablet 2    LYSINE PO Take 10,000 mg by mouth daily      Multiple Vitamins-Minerals (PRESERVISION AREDS 2 PO) Take 2 tablets by mouth daily      multivitamin w/minerals (THERA-VIT-M) tablet Take 1 tablet by mouth daily      propranolol (INDERAL) 20 MG tablet Take 0.5-1 tablets (10-20 mg) by mouth 2 times daily as needed (anxiety) (Patient not taking: Reported on 4/4/2024) 180 tablet 0    rivaroxaban ANTICOAGULANT (XARELTO) 20 MG TABS tablet Take 20 mg by mouth      VITAMIN D, CHOLECALCIFEROL, PO Take 1,000 Units by mouth daily         Family and Social History:  Reviewed and unchanged from prior.  Please see initial consultation dated 9/24/18 for further details.    Physical Exam:  BP (!) 149/89 (BP Location: Right arm, Patient Position: Sitting, Cuff Size: Adult Regular)   Pulse 92   Temp 98.5  F (36.9  C) (Oral)   Resp 16   Wt 78.5 kg (173 lb 1.6 oz)   SpO2 97%   BMI 27.11 kg/m    General:  Well appearing adult female in NAD.  Alert and oriented x 3.    HEENT:  Normocephalic.  Sclera anicteric.  MMM.    Lymph:  No palpable cervical, supraclavicular, or axillary LAD.  No gross lymphedema.  Chest:  CTA bilaterally.  No wheezes or crackles.  CV:  RRR.  Nl S1 and S2.  No audible m/r/g.  Breast:  Bilateral breasts are of increased fibroglandular density.  Right lower breast incision is without underlying fibrosis.  There are no dominant or discretely palpable masses in either breast.  Bilateral nipples are inverted (chronic and unchanged from prior)  Abd:  Soft/ND.   Ext:  No edema of the bilateral lower extremities.    Musculo:  Full ROM of the bilateral upper extremities.  Neuro:  Cranial nerves grossly intact.  Gait is stable.  No tremor or dyskinetic movements.  Psych:  Mood and affect appear normal.  Skin:  No visible or concerning skin rashes or lesions.    Laboratory/Imaging Studies:  I personally reviewed the below images and laboratories in clinic today:    4/5/2024 Labs:  Liver tests are wnl.  Blood counts are wnl.    4/8/2024 Bilateral screening mammograms:  Findings: The breasts are heterogeneously dense, which may obscure small masses.  There are breast conservation changes in the right breast.  There is no radiographic evidence of malignancy.                                                                    IMPRESSION: ACR BI-RADS Category 2: Benign    ASSESSMENT/PLAN:  Ms. Velazquez is a 68-year-old female with a h/o stage IA, T1c N0 M0, grade 1, ER positive, DC positive, HER2 non-amplified invasive mammary carcinoma of the right breast.  She is status post treatment with right breast lumpectomy and radiation.  On adjuvant curative intent treatment with anastrozole since 9/24/18.    1.  Right breast cancer:   Ms. Arana is 5 years, 7 months out from excision of a right breast cancer.  She continues on adjuvant curative intent treatment with anastrozole and is tolerating the medication well.  Plan to continue anastrozole to complete a 7 year course (thru 9/24/2025).     There is no evidence of disease recurrence on exam today.  Due to the fact that her initial breast cancer was not detectable by mammogram, increased breast density and close DCIS margins, we are performing high-risk screening with both mammogram and breast MRI until she is 5 years out from diagnosis of her breast cancer/age 69.     - I personally reviewed the images of bilateral screening mammograms performed today which shows heterogeneously dense breasts without change from prior.  - Return to clinic in 6 months, with breast MRI prior.  We discussed  today that this would be last planned mammogram.    2.  Paz/Bipolar disorder:  Unlikely to be related to her breast cancer treatment as onset years after starting adjuvant therapy.    - Previously on mirtazapine, however did not tolerate due to insomnia.  - She is on treatment with Depakote and has lorazepam to take prn.  Her symptoms are much improved on this treatment regimen. She has been weaned down on the lorazepam to 0.75 mg/day. She follows up with psychiatry and has an appointment with Dr Palomo on Monday 4/15.    3.  Bone Health:  DEXA 3/28/2023 with a lowest T-score of -0.9, improved from prior (DEXA in 01/2021 with a lowest T-score of -2.0).  She received 3 years of Zometa from 1/22/2021 - 6/21/2023.      Continue calcium 1200 mg, vitamin D 1000 international units and walk 30 minutes daily.      4.  Routine health maintenance:  No change since last visit.  Colonoscopy in 2015 was without concerning finding, next due in 2025.      5.  Right upper arm discomfort:  Agree this is likely musculoskeletal and unlikely to be related to history of malignancy.  Follow up with PCP if does not resolve within 2 weeks.    6.  Follow Up:  Breast MRI and visit with me in 6 months.    Patient was seen and discussed with 4th year medical student, Esvin Ramos.  The medical student was personally supervised by me during the patient examination. I personally verified the medical history, performed a physical exam and the medical decision-making. I made appropriate changes to the documentation and the assessment and plan based on my verification, exam, and medical decision making.     Merari Tafoya MD

## 2024-04-15 ENCOUNTER — VIRTUAL VISIT (OUTPATIENT)
Dept: PSYCHIATRY | Facility: CLINIC | Age: 69
End: 2024-04-15
Payer: MEDICARE

## 2024-04-15 DIAGNOSIS — F31.74 BIPOLAR DISORDER, IN FULL REMISSION, MOST RECENT EPISODE MANIC (H): ICD-10-CM

## 2024-04-15 DIAGNOSIS — Z79.899 ENCOUNTER FOR LONG-TERM (CURRENT) USE OF MEDICATIONS: ICD-10-CM

## 2024-04-15 DIAGNOSIS — F06.1 CATATONIA: Primary | ICD-10-CM

## 2024-04-15 PROCEDURE — G2211 COMPLEX E/M VISIT ADD ON: HCPCS | Mod: 95 | Performed by: PSYCHIATRY & NEUROLOGY

## 2024-04-15 PROCEDURE — 99214 OFFICE O/P EST MOD 30 MIN: CPT | Mod: 95 | Performed by: PSYCHIATRY & NEUROLOGY

## 2024-04-15 RX ORDER — DIVALPROEX SODIUM 250 MG/1
250 TABLET, DELAYED RELEASE ORAL AT BEDTIME
Qty: 90 TABLET | Refills: 1 | Status: SHIPPED | OUTPATIENT
Start: 2024-04-15

## 2024-04-15 ASSESSMENT — PAIN SCALES - GENERAL: PAINLEVEL: NO PAIN (0)

## 2024-04-15 NOTE — NURSING NOTE
Is the patient currently in the state of [place]? YES    Visit mode:VIDEO    If the visit is dropped, the patient can be reconnected by: VIDEO VISIT: Text to cell phone:   Telephone Information:   Mobile [phone]       Will anyone else be joining the visit? NO  (If patient encounters technical issues they should call [phone])    How would you like to obtain your AVS? MyChart    Are changes needed to the allergy or medication list? No    Are refills needed on medications prescribed by this physician? YES    Reason for visit: RECHECK    [place]

## 2024-04-15 NOTE — PROGRESS NOTES
"Virtual Visit Details    Type of service:  Video Visit   Video Start Time: {video visit start/end time for provider to select:373588}  Video End Time:{video visit start/end time for provider to select:697665}    Originating Location (pt. Location): {video visit patient location:554205::\"Home\"}  {PROVIDER LOCATION On-site should be selected for visits conducted from your clinic location or adjoining [facility] hospital, academic office, or other nearby [facility] building. Off-site should be selected for all other provider locations, including home:553466}  Distant Location (provider location):  {virtual location provider:409884}  Platform used for Video Visit: {Virtual Visit Platforms:240599::\"[employer]\"}  "

## 2024-04-15 NOTE — PROGRESS NOTES
"Telemedicine Visit: The patient's condition can be safely assessed and treated via synchronous audio and visual telemedicine encounter.      Reason for Telemedicine Visit: Patient has requested telehealth visit    Originating Site (Patient Location): Patient's home    Distant Location (provider location):  On-site    Consent:  The patient/guardian has verbally consented to: the potential risks and benefits of telemedicine (video visit) versus in person care; bill my insurance or make self-payment for services provided; and responsibility for payment of non-covered services.     Mode of Communication:  Video Conference via [employer]    As the provider I attest to compliance with applicable laws and regulations related to telemedicine.          Outpatient Psychiatric Progress Note    Name: Keely Arana   : 1955                    Primary Care Provider: Maulik Nye MD   Therapist: None    PHQ-9 scores:      2023     7:56 AM 2023     2:18 PM 4/3/2024     8:42 AM   PHQ-9 SCORE   PHQ-9 Total Score MyChart 0 7 (Mild depression) 0   PHQ-9 Total Score 0    0 7 0       DAISY-7 scores:      2023    10:19 AM 9/10/2023     9:53 AM 10/29/2023     6:33 PM   DAISY-7 SCORE   Total Score  12 (moderate anxiety) 13 (moderate anxiety)   Total Score 0 12 13       Patient Identification:  Patient is a 68 year old,   White Not  or  female  who presents for return visit with me.  Patient is currently retired. Patient attended the video session with  Terry , who they agreed to have interview with. Patient prefers to be called: \"Jessica\".    Interim History:  I last saw Keely BART Arana for outpatient psychiatry return visit on 1/15/2024. During that appointment, we:   Present to the emergency room for further evaluation if symptoms significantly worsen, especially if abruptly.  Decrease Ativan/lorazepam (taking for catatonia): take 0.5 mg in AM, 0.25 mg in afternoon, and 0.5 mg at " bedtime for one month, then decrease to 0.25 mg in AM, 0.25 mg in afternoon, and 0.5 mg mg in evening for one month. Then decrease to 0.25 mg three times daily until follow-up. Please reach out right away if symptoms start to worsen on decreased doses of Ativan.   Continue propranolol 10-20 mg twice daily as needed for anxiety.  Continue Depakote  mg daily at bedtime for possible affective disorder, hx of paz.   Continue ramelteon/Rozerem 8 mg at bedtime as needed for sleep.   Have non-fasting labs completed at any [facility] lab. Need to call your clinic ahead of time to schedule an appointment for lab due to limited hours and no walk-ins due to Covid-19 restrictions/changes.   C (behavioral health clinician) referral previously placed for counseling/therapy support if needed.   Continue working with neurology clinic as needed and recommended by neurology.     4/15: Patient has been doing well.  Mood has remained stable.  Anxiety manageable.  No return of catatonia or manic symptoms.  Sleeping well.  Down to taking just 0.25 mg Ativan 3 times daily and her Depakote.  Started Ativan taper about 9 weeks ago.  No concerns from partner or children.  No acute safety concerns.  No SI.  No problematic drug or alcohol use.    Past Psychiatric Med Trials:  Psych Meds at Intake:  Olanzapine 5 mg at bedtime and 2.5 mg daily as needed     Past Psych Meds:  None  Hydroxyzine - not helpful, dry mouth  Quetiapine -stopped due to worsening catatonia  Olanzapine-stopped due to worsening catatonia  Mirtazapine-paradoxical effect?    Psychiatric ROS:  Keely Arana reports mood has been: Stable  Anxiety has been: Stable  Sleep has been: Stable  Paz sxs: None  Psychosis sxs: None today  ADHD/ADD sxs: N/A  PTSD sxs: N/A  PHQ9 and GAD7 scores were reviewed today if completed.   Medication side effects: Denies  Current stressors include: Sxs and see HPI above  Coping mechanisms and supports include: Family, Hobbies  and Friends    Current medications include:   Current Outpatient Medications   Medication Sig Dispense Refill    amLODIPine (NORVASC) 5 MG tablet Take 1 tablet (5 mg) by mouth daily 90 tablet 3    anastrozole (ARIMIDEX) 1 MG tablet Take 1 tablet (1 mg) by mouth daily 90 tablet 3    atorvastatin (LIPITOR) 10 MG tablet Take 1 tablet (10 mg) by mouth daily 90 tablet 3    calcium carbonate (OS-PADDY 500 MG [place]. CA) 500 MG tablet Take 2 tablets by mouth daily With Magnesium,Zinc      divalproex sodium delayed-release (DEPAKOTE) 250 MG DR tablet Take 1 tablet (250 mg) by mouth at bedtime 90 tablet 1    LORazepam (ATIVAN) 0.5 MG tablet Take 0.5-1 tablets (0.25-0.5 mg) by mouth 3 times daily as needed (catatonia, anxiety) 90 tablet 2    LYSINE PO Take 10,000 mg by mouth daily      Multiple Vitamins-Minerals (PRESERVISION AREDS 2 PO) Take 2 tablets by mouth daily      multivitamin w/minerals (THERA-VIT-M) tablet Take 1 tablet by mouth daily      propranolol (INDERAL) 20 MG tablet Take 0.5-1 tablets (10-20 mg) by mouth 2 times daily as needed (anxiety) (Patient not taking: Reported on 4/4/2024) 180 tablet 0    rivaroxaban ANTICOAGULANT (XARELTO) 20 MG TABS tablet Take 20 mg by mouth      VITAMIN D, CHOLECALCIFEROL, PO Take 1,000 Units by mouth daily        No current facility-administered medications for this visit.       Past Medical/Surgical History:  Past Medical History:   Diagnosis Date    Anxiety     Atrial fibrillation (H)     s/p ablasion.    Depression     Hypertension     Insomnia     Iritis     Personal history of malignant neoplasm of breast 2018    s/p radiation, on maintenance chemo      has a past medical history of Anxiety, Atrial fibrillation (H), Depression, Hypertension, Insomnia, Iritis, and Personal history of malignant neoplasm of breast (2018).    She has no past medical history of Complication of anesthesia or Sleep apnea.    Social History:  Reviewed. No changes to social history except as noted above  "in HPI.    Vital Signs:   None taken since telemedicine visit    Labs:  Most recent laboratory results reviewed and the pertinent results include:     11/10/2023: Serum encephalopathy, autoimmune/paraneoplastic evaluation (sent out to [facility]): NEGATIVE    11/10/2023: Rheumatoid factor unremarkable/negative    11/10/2023: Depakote level 18.7  4/5/2024: Depakote lvl 13.1     Component  Ref Range & Units 2 mo ago     SSA Deb IgG Instrument Value  <7.0 U/mL <0.5    SSA (Ro) Antibody IgG  Negative Negative   Resulting Agency SCORE              Specimen Collected: 11/10/23  1:10 PM Last Resulted: 11/13/23  6:05 PM               Component  Ref Range & Units 2 mo ago     SSB Deb IgG Instrument Value  <7.0 U/mL <0.6    SSB (La) Antibody IgG  Negative Negative   Resulting Agency SCORE              Specimen Collected: 11/10/23  1:10 PM Last Resulted: 11/13/23  6:05 PM               Component  Ref Range & Units 2 mo ago     JANETH interpretation  Negative Negative   Comment:  Negative:              <1:40  Borderline Positive:   1:40 - 1:80  Positive:              >1:80   Resulting Agency SCORE              Specimen Collected: 11/10/23  1:10 PM Last Resulted: 11/13/23  1:28 PM           Liver Function Studies -   Recent Labs   Lab Test 04/05/24  1113   PROTTOTAL 6.9   ALBUMIN 4.4   BILITOTAL 0.6   ALKPHOS 66   AST 18   ALT 20      Lab Results   Component Value Date    WBC 4.4 04/05/2024     Lab Results   Component Value Date    RBC 4.64 04/05/2024     Lab Results   Component Value Date    HGB 14.0 04/05/2024     Lab Results   Component Value Date    HCT 41.7 04/05/2024     No components found for: \"MCT\"  Lab Results   Component Value Date    MCV 90 04/05/2024     Lab Results   Component Value Date    MCH 30.2 04/05/2024     Lab Results   Component Value Date    MCHC 33.6 04/05/2024     Lab Results   Component Value Date    RDW 12.3 04/05/2024     Lab Results   Component Value Date     04/05/2024     Recent Labs   Lab Test " 04/05/24  1113 05/02/23  1030   CHOL 198 175   HDL 59 68   * 91   TRIG 134 78     Review of Systems:  10 systems (general, cardiovascular, respiratory, eyes, ENT, endocrine, GI, , M/S, neurological) were reviewed. Most pertinent finding(s) is/are: denies fever, cough, persistent headaches, shortness of breath, chest pain, severe GI symptoms, trouble urinating, severe pain. The remaining systems are all unremarkable.    Mental Status Examination (via video visit today):  Appearance: Awake, alert, appears stated age, no acute distress, adequately groomed  Attitude:  cooperative, pleasant  Motor: No tongue thrusting/lip smacking  or perioral movements noted today, no tremor noted, no repetitive movements of any type  Oriented to:  person, place, date, situation  Attention Span and Concentration:  normal  Speech: Still no delay in response today, speech clear, normal volume, normal rate  Language: intact  Mood:  good  Affect: Mood congruent, bright  Associations:  no loose associations  Thought Process: linear, logical, goal-directed  Thought Content:  no evidence of suicidal ideation or homicidal ideation, no evidence of psychotic thought, no auditory hallucinations present and no visual hallucinations present  Recent and Remote Memory: adequate for interview; not formally tested today  Fund of Knowledge: appropriate  Insight: good  Judgment: good  Impulse Control: good    Suicide Risk Assessment:  Today Keely Arana reports no suicidal ideation. Based on all available evidence including the factors cited above, Keely Arana does not appear to be at imminent risk for self-harm, does not meet criteria for a 72-hr hold, and therefore remains appropriate for ongoing outpatient level of care.  A thorough assessment of risk factors related to suicide and self-harm have been reviewed and are noted above. The patient convincingly denies suicidality on several occasions. Local community safety resources  reviewed for patient to use if needed. There was no deceit detected, and the patient presented in a manner that was believable.     DSM5 Diagnosis:  Bipolar I Disorder, most recent episode manic in full remission  Anxiety, unspecified  CATATONIA - resolved on Ativan     RULED OUT paraneoplastic/autoimmune process (pt with work-up by neurology)    Medical comorbidities include:   Patient Active Problem List    Diagnosis Date Noted    Hyperlipidemia 04/04/2024     Priority: Medium    HTN (hypertension) 04/04/2024     Priority: Medium    Strain of muscle, fascia and tendon of other parts of biceps, right arm, initial encounter 04/04/2024     Priority: Medium    Mild cognitive impairment 10/07/2023     Priority: Medium    Unspecified mood (affective) disorder (H24) 10/07/2023     Priority: Medium    Insomnia, unspecified type 09/29/2023     Priority: Medium    Anxiety 09/29/2023     Priority: Medium    Major depression 09/29/2023     Priority: Medium    History of cong 06/20/2023     Priority: Medium    Abnormal bone density screening 01/15/2021     Priority: Medium    Long term (current) use of aromatase inhibitors 01/15/2021     Priority: Medium    Malignant neoplasm of upper-outer quadrant of right breast in female, estrogen receptor positive (H) 08/23/2018     Priority: Medium    Symptomatic varicose veins of both lower extremities 11/10/2015     Priority: Medium       Psychosocial & Contextual Factors: see HPI above    Assessment:  From Intake, 5/12/2023:  Keely Arana is a 67-year-old female with relatively benign past psychiatric history leading up to recent suspected manic episode who presents today for psychiatric evaluation.  Patient with what appears to meet criteria for manic episode starting earlier this spring.  Patient started to have more trouble with sleep and was becoming more irritable leading to racing thoughts, increased goal directed activity, disorganized thoughts, more talkative than usual,  etc.  Patient's family concerns since she was acting out of character and brought to the ER.  Patient started on olanzapine in the emergency room which has helped significantly to regulate sleep and mood.  Patient perhaps with some heightened energy today but not overtly manic.  Patient is currently tolerating olanzapine well and reports sleeping about 8 hours a night.  Discussed risks and benefits of staying on olanzapine.  Unclear if patient has had underlying bipolar disorder that has gone fairly undetected over the years.  Patient with relatives with bipolar disorder.  Patient did have recent brain imaging (MRI) that would not explain recent new onset cong.  Patient also with otherwise relatively benign labs including thyroid, liver panel, CMP, CBC, etc.  No new medications were introduced.  Patient denies any drug use and no problematic alcohol use.  Patient stopped using her 1 glass of wine a week around the time this episode started to see if it would help to give up alcohol.  Patient does often have a few cups of coffee a day.  We will continue to monitor.  Recommend staying on olanzapine for at least 6 months to a year before trying to taper off in order to prevent relapse/recurrent cong symptoms.  No acute safety concerns today.  No SI.  No problematic drug or alcohol use.     6/14/2023:  Patient overall doing relatively well.  Mood has been stable.  No signs of cong.  We will decrease olanzapine to 2.5 mg at bedtime for the next few months to ensure ongoing stability of symptoms.  Could consider a trial off olanzapine at the end of the 3 months of decreased dose.  Patient and  will continue to remain vigilant while monitoring for any relapse of manic symptoms.  Patient had recent lipid panel and glucose completed.  Both within normal limits.  No acute safety concerns.  No SI.  No signs of tardive dyskinesia noted on video today.  No problematic drug or alcohol use.    9/11/2023:  Patient with  suspected movement related side effects due to olanzapine use.  Patient with extreme anxiety and some worsening symptoms when olanzapine was stopped after 3 months on 2.5 mg dose.  We will restart olanzapine at 2.5 mg every other day and continue with slow taper and also get recommendations from psychiatric PharmD and possibly neurology.  Patient with possible tongue thrusting today versus extreme dry mouth from hydroxyzine use.  There was no tongue thrusting noted at last appointment in June.  Patient's family had noted possible parkinsonism like symptoms.   and patient will continue to monitor.  Referral placed for [facility] for additional monitoring and talk therapy/support as well.  We will also start a trial with propranolol to see if that is helpful for some of the physical symptoms of anxiety and potentially some symptoms being caused by olanzapine.  No acute safety concerns.  No SI.  No problematic drug or alcohol use.    9/22/2023:  Patient still not sleeping well and I would not want patient to get manic.  We did discuss possibility of something like Depakote at low-dose in order to avoid other agents like olanzapine and since we would like to start to taper off olanzapine (due to suspected parkinsonism).  Could also consider lithium.  Patient is not seeming manic at this time of interview - definitely seems anxious though. Pt sitting quite calmly and no pressured speech.  Difficult to assess if not sleeping because of anxiety versus onset of manic episode?  We will trial mirtazapine at bedtime to see if helpful for sleep without worsening any side effects. No acute safety concerns. No SI. No problematic drug or alcohol use. Pt also encouraged to utilize the propranolol.     10/23/2023:  Patient exhibiting multiple signs of suspected catatonia.  Patient also with symptoms that seem to improve with Ativan administration by family.  Recommended emergency room visit for evaluation for possible admission for  treatment of her mental health condition since I suspect pt has catatonia and discussed limitation of my ability to examine pt via video.  Discussed risks of catatonia with family, including up to death. Discussed malignant catatonia and that catatonia can affect blood pressure and heart rate. Pt's vitals (BP and HR) were elevated at last check on record 10/11.  Patient and family declined emergency room visit at this time and prefer to try to manage patient at home with Ativan up to 1 mg 3 times daily.  Advised family and patient they are to bring patient to the emergency room if symptoms do not start to improve, or start to worsen, with Ativan administration.  Family instructed to hold doses of Ativan, or break doses in half, if patient starts to become too sedated or if gait becomes too unsteady on full dose.  Discussed what to look for if Ativan is going to be helpful for symptoms -starting to do more around the house, increased energy, talking more, etc.  Patient has follow-up visit with me in 1 week.  Neurology referral also placed to help evaluate for parkinsonism versus catatonia versus other.  Patient continues to have frequent lip smacking movements possibly related to current condition versus TD?  Patient continues to eat and drink.  Patient was alert and oriented today during interview.  No hold will be placed for emergent medical transport to the hospital, but I do have a low threshold if patient and family continue to refuse emergency room evaluation and if patient's symptoms continue to worsen.  This was discussed with the patient and family.  Both olanzapine and quetiapine discontinued due to suspicions for catatonia.  Labs also ordered to assess for infection, metabolic status, ammonia level, TSH, etc.    10/30/2023:  Patient with drastic improvement since starting Ativan/lorazepam for catatonia.  Still some ongoing ruminations, paranoia, restlessness.  Discussed possible causes of catatonia  including suspected bipolar disorder, possible neurocognitive disorder, infection, others.  Patient's recent laboratory work unremarkable for any signs of infection or obvious causes of catatonia at this time.  Strongly suspicious of bipolar disorder causing cong.  Rule out neurocognitive disorder.  Neurology referral has been placed and pending scheduling.  We will also consider imaging again.  Patient had imaging completed this past spring but could consider again due to catatonia and change in mental status.  No acute safety concerns at this time.  Patient eating and sleeping well.  No suicidal thoughts.  No problematic drug or alcohol use.  Family continues to be incredibly supportive.  Patient very fearful of the hospitalization.  Patient is agreeable to starting Depakote to help with some of her symptoms, bipolar disorder.  Patient's  open to ongoing discussions about possibility of ECT.  Patient is not as open to ECT at this time.  Patient/family will reach out if they would like an ECT referral.    11/14/2023:  Patient overall presenting significantly better today compared to most recent visit even.  No signs of catatonia today.  No perioral movements noted.  No tremor noted.  No delayed speech.  Attention and concentration intact for interview.  Alert and oriented x 4.  Patient did see neurology and there are tests still pending.  Patient needs to complete neuroimaging as ordered by neurology.  There are also paraneoplastic/autoimmune send out labs and process through [facility].  Multiple differentials to consider per neurology as well to explain patient's symptoms.  Patient will continue to follow-up with neurology to further rule out underlying medical causes for symptoms.  We will very slowly taper down lorazepam to a lower dose.  I still do not want to completely discontinue at this time.  We will continue Depakote due to ongoing improvements of symptoms and good tolerability.  Given  improvements, and based on laboratory and imaging testing results, may need to consider repeat neuropsychological testing.  Would confer with neurology.  No acute safety concerns.  No SI.  No problematic drug or alcohol use.    1/15/2024:  Patient overall doing well.  Down to 0.5 mg 3 times daily of Ativan with no regression of catatonia symptoms.  Tolerating Depakote well with no negative side effects.  Updated labs ordered to check CBC and liver function.  We will continue to very slowly taper lorazepam.  No other medication changes today.  Recent serum encephalopathy, autoimmune/paraneoplastic panel sent to [place] unremarkable.  No other remarkable exam/test results.  No acute safety concerns.  No SI.  No problematic drug or alcohol use.  Could still consider repeat neuropsychological testing in the future if relevant.    4/15/2024:  Patient overall continuing to do very well.  Remains at baseline while continuing to taper down lorazepam.  Monitoring weight gain and appetite from Depakote.  We will continue to taper down lorazepam until off daily dosing.  Patient can then hang on to some to take as needed.  Family and patient monitoring for symptoms closely.  Tolerating Depakote well.  Recent labs unremarkable.  No acute safety concerns.  No SI.  No problematic drug or alcohol use.  Patient will be seen back in 6 months.    Medication side effects and alternatives were reviewed. Health promotion activities recommended and reviewed today. All questions addressed. Education and counseling completed regarding risks and benefits of medications and psychotherapy options. Recommend therapy for additional support.     Treatment Plan:  Present to the emergency room for further evaluation if symptoms significantly worsen, especially if abruptly.  Decrease Ativan/lorazepam (taking for catatonia) by 0.25 mg daily every 3 weeks until no longer taking daily.  Please reach out right away if symptoms start to worsen on decreased  doses of Ativan.   Continue Depakote  mg daily at bedtime for possible affective disorder, hx of cong.   Continue working with neurology clinic as needed and recommended by neurology.   Continue all other cares per primary care provider.   Continue all other medications as reviewed per electronic medical record today.   Safety plan reviewed. To the Emergency Department as needed or call after hours crisis line at [phone] or [phone]. [place] Crisis Text Line. Text [place] to 370187 or Suicide LifeLine Chat: suicidepreventionSummizeline.org/chat  Schedule an appointment with me in 6 months or sooner as needed. Call [facility] at [phone] to schedule.  Follow up with primary care provider as planned or for acute medical concerns.  Call the psychiatric nurse line with medication questions or concerns at [phone].  [employer] may be used to communicate with your provider, but this is not intended to be used for emergencies.    Previously discussed risks of benzodiazepine (Ativan, Xanax, Klonopin, Valium, etc) use including, but not limited to, sedation, tolerance, risk for addiction/dependence. Do not drink alcohol while taking benzodiazepines due to risk of trouble breathing and potential death. Do not drive or operate heavy machinery until it is known how the drug affects you. Discuss with physician or pharmacist before ever taking a benzodiazepine with a narcotic/opioid pain medication.  Watch for sedation and falls.    Administrative Billing:   Session Duration: 21 Minutes   Start: 10:56a  Stop: 11:17a    Patient Status:  Patient will continue to be seen for ongoing consultation and stabilization.    Signed:   Reema Palomo DO  [facility]    Disclaimer: This note consists of symbols derived from keyboarding, dictation and/or voice recognition software. As a result, there may be errors in the script that have gone undetected. Please consider this when interpreting information  found in this chart.

## 2024-04-15 NOTE — PATIENT INSTRUCTIONS
Treatment Plan:  Present to the emergency room for further evaluation if symptoms significantly worsen, especially if abruptly.  Decrease Ativan/lorazepam (taking for catatonia) by 0.25 mg daily every 3 weeks until no longer taking daily.  Please reach out right away if symptoms start to worsen on decreased doses of Ativan.   Continue Depakote  mg daily at bedtime for possible affective disorder, hx of cong.   Continue working with neurology clinic as needed and recommended by neurology.   Continue all other cares per primary care provider.   Continue all other medications as reviewed per electronic medical record today.   Safety plan reviewed. To the Emergency Department as needed or call after hours crisis line at [phone] or [phone]. [place] Crisis Text Line. Text [place] to 782430 or Suicide LifeLine Chat: suicidepreventionCasmulline.org/chat  Schedule an appointment with me in 6 months or sooner as needed. Call [facility] at [phone] to schedule.  Follow up with primary care provider as planned or for acute medical concerns.  Call the psychiatric nurse line with medication questions or concerns at [phone].  Marvint may be used to communicate with your provider, but this is not intended to be used for emergencies.    Book on intermittent fasting:  Delay, Don't Deny by Nasrin Whitmore    Previously discussed risks of benzodiazepine (Ativan, Xanax, Klonopin, Valium, etc) use including, but not limited to, sedation, tolerance, risk for addiction/dependence. Do not drink alcohol while taking benzodiazepines due to risk of trouble breathing and potential death. Do not drive or operate heavy machinery until it is known how the drug affects you. Discuss with physician or pharmacist before ever taking a benzodiazepine with a narcotic/opioid pain medication.  Watch for sedation and falls.    Patient Education   Collaborative Care Psychiatry Service  What to Expect  Here's what to expect  "from your Collaborative Care Psychiatry Service (CCPS).   About CCPS  CCPS means 2 people work together to help you get better. You'll meet with a behavioral health clinician and a psychiatric doctor. A behavioral health clinician helps people with mental health problems by talking with them. A psychiatric doctor helps people by giving them medicine.  How it works  At every visit, you'll see the behavioral health clinician (BHC) first. They'll talk with you about how you're doing and teach you how to feel better.   Then you'll see the psychiatric doctor. This doctor can help you deal with troubling thoughts and feelings by giving you medicine. They'll make sure you know the plan for your care.   CCPS usually takes 3 to 6 visits. If you need more visits, we may have you start seeing a different psychiatric doctor for ongoing care.  If you have any questions or concerns, we'll be glad to talk with you.  About visits  Be open  At your visits, please talk openly about your problems. It may feel hard, but it's the best way for us to help you.  Cancelling visits  If you can't come to your visit, please call us right away at [phone]. If you don't cancel at least 24 hours (1 full day) before your visit, that's \"late cancellation.\"  Being late to visits  Being very late is the same as not showing up. You will be a \"no show\" if:  Your appointment starts with a BHC, and you're more than 15 minutes late for a 30-minute (half hour) visit. This will also cancel your appointment with the psychiatric doctor.  Your appointment is with a psychiatric doctor only, and you're more than 15 minutes late for a 30-minute (half hour) visit.  Your appointment is with a psychiatric doctor only, and you're more than 30 minutes late for a 60-minute (full hour) visit.  If you cancel late or don't show up 2 times within 6 months, we may end your care.   Getting help between visits  If you need help between visits, you can call us Monday to " Friday from 8 a.m. to 4:30 p.m. at [phone].  Emergency care  Call 911 or go to the nearest emergency department if your life or someone else's life is in danger.  Call 988 anytime to reach the national Suicide and Crisis hotline.  Medicine refills  To refill your medicine, call your pharmacy. You can also call [facility]'s Behavioral Access at [phone], Monday to Friday, 8 a.m. to 4:30 p.m. It can take 1 to 3 business days to get a refill.   Forms, letters, and tests  You may have papers to fill out, like FMLA, short-term disability, and workability. We can help you with these forms at your visits, but you must have an appointment. You may need more than 1 visit for this, to be in an intensive therapy program, or both.  Before we can give you medicine for ADHD, we may refer you to get tested for it or confirm it another way.  We may not be able to give you an emotional support animal letter.  We don't do mental health checks ordered by the court.   We don't do mental health testing, but we can refer you to get tested.   Thank you for choosing us for your care.  For informational purposes only. Not to replace the advice of your health care provider. Copyright   2022 [facility]. All rights reserved. [employer] [id number] - 12/22.

## 2024-06-02 ENCOUNTER — HEALTH MAINTENANCE LETTER (OUTPATIENT)
Age: 69
End: 2024-06-02

## 2024-08-19 ENCOUNTER — PATIENT OUTREACH (OUTPATIENT)
Dept: ONCOLOGY | Facility: CLINIC | Age: 69
End: 2024-08-19
Payer: MEDICARE

## 2024-08-19 NOTE — PROGRESS NOTES
[facility]: Cancer Care                                                                                         Completed chart audit to assign Oncology Care Coordination enrollment status.      Mackenzie Billings MSN, RN, OCN   RN Care Coordinator   [facility]

## 2024-10-04 ENCOUNTER — TELEPHONE (OUTPATIENT)
Dept: INTERNAL MEDICINE | Facility: CLINIC | Age: 69
End: 2024-10-04
Payer: MEDICARE

## 2024-10-04 NOTE — TELEPHONE ENCOUNTER
Patient confirmed scheduled appointment:     Date: 4/7/25  Time: 9:30 AM  Visit type: [facility] Physical  Provider: Dr. Maulik Nye  Location: [facility]

## 2024-10-10 ENCOUNTER — HOSPITAL ENCOUNTER (OUTPATIENT)
Dept: MRI IMAGING | Facility: HOSPITAL | Age: 69
Discharge: HOME | End: 2024-10-10
Attending: INTERNAL MEDICINE | Admitting: INTERNAL MEDICINE
Payer: MEDICARE

## 2024-10-10 DIAGNOSIS — Z85.3 PERSONAL HISTORY OF MALIGNANT NEOPLASM OF BREAST: ICD-10-CM

## 2024-10-10 DIAGNOSIS — R92.333 HETEROGENEOUSLY DENSE TISSUE OF BOTH BREASTS ON MAMMOGRAPHY: ICD-10-CM

## 2024-10-10 DIAGNOSIS — Z12.39 BREAST CANCER SCREENING, HIGH RISK PATIENT: ICD-10-CM

## 2024-10-10 PROCEDURE — G1010 CDSM STANSON: HCPCS

## 2024-10-10 PROCEDURE — 255N000002 HC RX 255 OP 636: Performed by: INTERNAL MEDICINE

## 2024-10-10 PROCEDURE — A9585 GADOBUTROL INJECTION: HCPCS | Performed by: INTERNAL MEDICINE

## 2024-10-10 RX ORDER — GADOBUTROL 604.72 MG/ML
0.1 INJECTION INTRAVENOUS ONCE
Status: COMPLETED | OUTPATIENT
Start: 2024-10-10 | End: 2024-10-10

## 2024-10-10 RX ADMIN — GADOBUTROL 8 ML: 604.72 INJECTION INTRAVENOUS at 11:20

## 2024-10-13 NOTE — PROGRESS NOTES
Oncology Follow Up:  Date on this visit: 10/14/2024    Diagnosis:  [id number], grade I, ER positive, ID positive, HER-2 nonamplified invasive ductal carcinoma of the right breast. Oncotype DX recurrence score = 7.    Primary Physician: Sammie Cerna     History Of Present Illness:  Ms. Arana is a 69 year old female with right breast cancer.  Routine screening mammogram in 06/2018 was without concerning findings. Due to high breast density, her gynecologist recommended a breast MRI.  Breast MRI on 08/16/2018 showed nodular to non-mass enhancement measuring 1.6 cm in the right breast at 5 o'clock.    Right breast ultrasound confirmed a mass at 5 o'clock.  Right breast biopsy demonstrated a grade 1 invasive ductal carcinoma.  Estrogen-receptor staining was moderate in 95%; progesterone-receptor staining was strong in 96%.  HER-2 was nonamplified by immunohistochemistry with a score of 1+.  Right breast lumpectomy and sentinel lymph node procedure on 8/31/2018 demonstrated a 1.2 cm, grade 1 invasive mammary carcinoma.  There was associated intermediate-grade DCIS.  Surgical margins were negative; however, DCIS was 1 mm from the anterior margin.  A single sentinel lymph node was negative for malignancy. Oncotype DX recurrence score was 7.  She completed adjuvant radiation (4240 cGy to the whole breast and additional 1250  cGy to the lumpectomy site) on 11/8/18.  She has been on anastrozole since 9/24/18.  She completed 3 years of Zometa on 6/21/2023.     Interval History:  Ms. Arana comes into clinic today for an on treatment visit.  She is on curative intent treatment with anastrozole.  In general, she tolerates the medication well.  She denies hot flashes.  She has vaginal dryness for which she uses a lubricant as needed.  She states this is sufficient to treat the symptom.  Mood has been stable.  She denies new bone or joint aches or pains.  She denies concerning lumps, pain, or swelling of either breast.  She has  had no cough, shortness of breath, or chest pain.  She states that her blood pressure has been somewhat labile when she measures it at home.  At times it is normal, but at other times it is elevated.  She plans to discuss this with her primary care physician via MyChart.  She is recording the blood pressure measurements routinely.  She has no current abdominal complaints.  She and her spouse are likely going to take a trip to [place], [place] this winter to visit her spouse's brother.  They note that they routinely exercise.  In the winter they will walk in the mall.    Past Medical/Surgical History:  Past Medical History:   Diagnosis Date    Anxiety     Atrial fibrillation (H)     s/p ablasion.    Depression     Hypertension     Insomnia     Iritis     Personal history of malignant neoplasm of breast 2018    s/p radiation, on maintenance chemo     Past Surgical History:   Procedure Laterality Date    CATARACT IOL, RT/LT Left 2006    HYSTERECTOMY  2016    LUMPECTOMY BREAST WITH SENTINEL NODE, COMBINED Right 8/31/2018    Procedure: COMBINED LUMPECTOMY BREAST WITH SENTINEL NODE;  Right Wire Localized Lumpectomy and Right [place] Lymph Node Biopsy;  Surgeon: Chilango Kruger MD;  [facility]: [facility] OR    OOPHORECTOMY  2014     Allergies:  Allergies as of 10/14/2024 - Reviewed 04/15/2024   Allergen Reaction Noted    Corticosteroids Dermatitis 03/18/2019    Neomycin Dermatitis 03/18/2019    Sulfa antibiotics Itching and Rash 08/06/2015    Cephalexin GI Disturbance 03/09/2022    Codeine Nausea and Vomiting 08/06/2015    Nitrofurantoin Nausea and Vomiting and Nausea 08/06/2015    Silver Rash 12/03/2018     Current Medications:  Current Outpatient Medications   Medication Sig Dispense Refill    amLODIPine (NORVASC) 5 MG tablet Take 1 tablet (5 mg) by mouth daily 90 tablet 3    anastrozole (ARIMIDEX) 1 MG tablet Take 1 tablet (1 mg) by mouth daily 90 tablet 3    atorvastatin (LIPITOR) 10 MG tablet Take 1 tablet (10 mg) by mouth  daily 90 tablet 3    calcium carbonate (OS-PADDY 500 MG [place]. [place]) 500 MG tablet Take 2 tablets by mouth daily With Magnesium,Zinc      divalproex sodium delayed-release (DEPAKOTE) 250 MG DR tablet Take 1 tablet (250 mg) by mouth at bedtime 90 tablet 1    LORazepam (ATIVAN) 0.5 MG tablet Take 0.5-1 tablets (0.25-0.5 mg) by mouth 3 times daily as needed (catatonia, anxiety) 90 tablet 2    LYSINE PO Take 10,000 mg by mouth daily      Multiple Vitamins-Minerals (PRESERVISION AREDS 2 PO) Take 2 tablets by mouth daily      multivitamin w/minerals (THERA-VIT-M) tablet Take 1 tablet by mouth daily      rivaroxaban ANTICOAGULANT (XARELTO) 20 MG TABS tablet Take 20 mg by mouth      VITAMIN D, CHOLECALCIFEROL, PO Take 1,000 Units by mouth daily         Family and Social History:  Reviewed and unchanged from prior.  Please see initial consultation dated 9/24/18 for further details.    Physical Exam:  General:  Well appearing adult female in NAD.  Alert and oriented x 3.    HEENT:  Normocephalic.  Sclera anicteric.  MMM.    Lymph:  No palpable cervical, supraclavicular, or axillary LAD.    Chest:  CTA bilaterally.  No wheezes or crackles.  CV:  RRR.  Nl S1 and S2.  No audible m/r/g.  Breast:  Bilateral breasts are of increased fibroglandular density.  Right lower inner breast incision is with small area of fibrosis palpable just inferior to the medial edge of the incision.  There are no dominant or discretely palpable masses in either breast.  Bilateral nipples are inverted (chronic and unchanged from prior)  Abd:  Soft/NT/ND.   Ext:  No edema of the bilateral lower extremities.   Musculo:  Full ROM of the bilateral upper extremities.  Neuro:  Cranial nerves grossly intact.  Gait is stable.  No tremor or dyskinetic movements.  Psych:  Mood and affect appear normal.  Skin:  No visible or concerning skin rashes or lesions.    Laboratory/Imaging Studies:  I personally reviewed the below images and laboratories in clinic  today:    10/10/2024 MRI breast:  FINDINGS:  Right Breast:  Breast composition: Heterogeneous fibroglandular tissue  Background parenchymal enhancement: Mild  No concerning areas of enhancement.  Postsurgical changes of breast conservation therapy.     Right Axilla: No lymphadenopathy.   Right Internal Mammary Chain: No lymphadenopathy.      Left Breast:   Breast composition: Heterogeneous fibroglandular tissue  Background parenchymal enhancement: Mild  No concerning areas of enhancement.       Left Axilla: No lymphadenopathy.   Left Internal Mammary Chain: No lymphadenopathy.                                                                    IMPRESSION:   No MRI evidence of malignancy in either breast.    ASSESSMENT/PLAN:  Ms. Velazquez is a 69-year-old female with a h/o stage IA, T1c N0 M0, grade 1, ER positive, NE positive, HER2 non-amplified invasive mammary carcinoma of the right breast.  She is status post treatment with right breast lumpectomy and radiation.  On adjuvant curative intent treatment with anastrozole since 9/24/18.    1.  Right breast cancer:   Ms. Arana is 6 years, 1.5 months out from excision of a right breast cancer.  She continues on adjuvant curative intent treatment with anastrozole and is tolerating the medication well.  Plan is to continue anastrozole to complete a 7 year course (thru 9/24/2025).     There is no evidence of disease recurrence on exam today.    - I personally reviewed the images of the breast MRI performed on 10/10/2024 which is without suspicious enhancement in either breast and is without evidence of lymphadenopathy.  - Return to clinic in 6 months with bilateral mammograms the same day as the return visit.    2.  Paz/Bipolar disorder:  No change since last visit.  Unlikely to be related to her breast cancer treatment as onset years after starting adjuvant therapy.    - She is on treatment with Depakote.  She follows with psychiatry, Dr Palomo.    3.  Bone Health:  DEXA  3/28/2023 with a lowest T-score of -0.9, improved from prior (DEXA in 01/2021 with a lowest T-score of -2.0).  She received 3 years of Zometa from 1/22/2021 - 6/21/2023.      Continue calcium 1200 mg, vitamin D 1000 international units and walk 30 minutes daily.    - will repeat a DEXA at the time of her return visit.    4.  Routine health maintenance:  No change since last visit.  Colonoscopy in 2015 was without concerning finding, next due in 09/2025.     5.  Hypertension:  She is taking amlodipine 5 mg PO daily.  Is measuring and recording blood pressures at home.  She expressed at times it is elevated.  I advised her to send the measurements to her PCP via Massdropt.     6.  Follow Up:  DEXA bone density scan, bilateral screening mammograms and visit with me 4/8/2025 or later.    I spent 30 minutes on the date of the encounter doing chart review, review of test results, interpretation of tests, patient visit, and documentation

## 2024-10-14 ENCOUNTER — ONCOLOGY VISIT (OUTPATIENT)
Dept: ONCOLOGY | Facility: CLINIC | Age: 69
End: 2024-10-14
Attending: INTERNAL MEDICINE
Payer: MEDICARE

## 2024-10-14 DIAGNOSIS — Z79.811 LONG TERM (CURRENT) USE OF AROMATASE INHIBITORS: ICD-10-CM

## 2024-10-14 DIAGNOSIS — C50.411 MALIGNANT NEOPLASM OF UPPER-OUTER QUADRANT OF RIGHT BREAST IN FEMALE, ESTROGEN RECEPTOR POSITIVE (H): Primary | ICD-10-CM

## 2024-10-14 DIAGNOSIS — I10 ESSENTIAL HYPERTENSION: ICD-10-CM

## 2024-10-14 DIAGNOSIS — Z12.11 SPECIAL SCREENING FOR MALIGNANT NEOPLASMS, COLON: ICD-10-CM

## 2024-10-14 DIAGNOSIS — Z17.0 MALIGNANT NEOPLASM OF UPPER-OUTER QUADRANT OF RIGHT BREAST IN FEMALE, ESTROGEN RECEPTOR POSITIVE (H): Primary | ICD-10-CM

## 2024-10-14 DIAGNOSIS — Z12.31 VISIT FOR SCREENING MAMMOGRAM: ICD-10-CM

## 2024-10-14 PROCEDURE — G0463 HOSPITAL OUTPT CLINIC VISIT: HCPCS | Performed by: INTERNAL MEDICINE

## 2024-10-14 PROCEDURE — 99214 OFFICE O/P EST MOD 30 MIN: CPT | Performed by: INTERNAL MEDICINE

## 2024-10-14 NOTE — LETTER
10/14/2024      Keely Arana  [facility] [place] [facility] [id number]      Dear Colleague,    Thank you for referring your patient, Keely Arana, to the [facility]. Please see a copy of my visit note below.    Oncology Follow Up:  Date on this visit: 10/14/2024    Diagnosis:  [id number], grade I, ER positive, RI positive, HER-2 nonamplified invasive ductal carcinoma of the right breast. Oncotype DX recurrence score = 7.    Primary Physician: Sammie Cerna     History Of Present Illness:  Ms. Arana is a 69 year old female with right breast cancer.  Routine screening mammogram in 06/2018 was without concerning findings. Due to high breast density, her gynecologist recommended a breast MRI.  Breast MRI on 08/16/2018 showed nodular to non-mass enhancement measuring 1.6 cm in the right breast at 5 o'clock.    Right breast ultrasound confirmed a mass at 5 o'clock.  Right breast biopsy demonstrated a grade 1 invasive ductal carcinoma.  Estrogen-receptor staining was moderate in 95%; progesterone-receptor staining was strong in 96%.  HER-2 was nonamplified by immunohistochemistry with a score of 1+.  Right breast lumpectomy and sentinel lymph node procedure on 8/31/2018 demonstrated a 1.2 cm, grade 1 invasive mammary carcinoma.  There was associated intermediate-grade DCIS.  Surgical margins were negative; however, DCIS was 1 mm from the anterior margin.  A single sentinel lymph node was negative for malignancy. Oncotype DX recurrence score was 7.  She completed adjuvant radiation (4240 cGy to the whole breast and additional 1250  cGy to the lumpectomy site) on 11/8/18.  She has been on anastrozole since 9/24/18.  She completed 3 years of Zometa on 6/21/2023.     Interval History:  Ms. Arana comes into clinic today for an on treatment visit.  She is on curative intent treatment with anastrozole.  In general, she tolerates the medication well.  She denies hot flashes.  She has vaginal  dryness for which she uses a lubricant as needed.  She states this is sufficient to treat the symptom.  Mood has been stable.  She denies new bone or joint aches or pains.  She denies concerning lumps, pain, or swelling of either breast.  She has had no cough, shortness of breath, or chest pain.  She states that her blood pressure has been somewhat labile when she measures it at home.  At times it is normal, but at other times it is elevated.  She plans to discuss this with her primary care physician via MyChart.  She is recording the blood pressure measurements routinely.  She has no current abdominal complaints.  She and her spouse are likely going to take a trip to [facility] this winter to visit her spouse's brother.  They note that they routinely exercise.  In the winter they will walk in the mall.    Past Medical/Surgical History:  Past Medical History:   Diagnosis Date     Anxiety      Atrial fibrillation (H)     s/p ablasion.     Depression      Hypertension      Insomnia      Iritis      Personal history of malignant neoplasm of breast 2018    s/p radiation, on maintenance chemo     Past Surgical History:   Procedure Laterality Date     CATARACT IOL, RT/LT Left 2006     HYSTERECTOMY  2016     LUMPECTOMY BREAST WITH SENTINEL NODE, COMBINED Right 8/31/2018    Procedure: COMBINED LUMPECTOMY BREAST WITH SENTINEL NODE;  Right Wire Localized Lumpectomy and Right [place] Lymph Node Biopsy;  Surgeon: Chilango Kruger MD;  Location: [facility] OR     OOPHORECTOMY  2014     Allergies:  Allergies as of 10/14/2024 - Reviewed 04/15/2024   Allergen Reaction Noted     Corticosteroids Dermatitis 03/18/2019     Neomycin Dermatitis 03/18/2019     Sulfa antibiotics Itching and Rash 08/06/2015     Cephalexin GI Disturbance 03/09/2022     Codeine Nausea and Vomiting 08/06/2015     Nitrofurantoin Nausea and Vomiting and Nausea 08/06/2015     Silver Rash 12/03/2018     Current Medications:  Current Outpatient Medications    Medication Sig Dispense Refill     amLODIPine (NORVASC) 5 MG tablet Take 1 tablet (5 mg) by mouth daily 90 tablet 3     anastrozole (ARIMIDEX) 1 MG tablet Take 1 tablet (1 mg) by mouth daily 90 tablet 3     atorvastatin (LIPITOR) 10 MG tablet Take 1 tablet (10 mg) by mouth daily 90 tablet 3     calcium carbonate (OS-PADDY 500 MG [place]. [place]) 500 MG tablet Take 2 tablets by mouth daily With Magnesium,Zinc       divalproex sodium delayed-release (DEPAKOTE) 250 MG DR tablet Take 1 tablet (250 mg) by mouth at bedtime 90 tablet 1     LORazepam (ATIVAN) 0.5 MG tablet Take 0.5-1 tablets (0.25-0.5 mg) by mouth 3 times daily as needed (catatonia, anxiety) 90 tablet 2     LYSINE PO Take 10,000 mg by mouth daily       Multiple Vitamins-Minerals (PRESERVISION AREDS 2 PO) Take 2 tablets by mouth daily       multivitamin w/minerals (THERA-VIT-M) tablet Take 1 tablet by mouth daily       rivaroxaban ANTICOAGULANT (XARELTO) 20 MG TABS tablet Take 20 mg by mouth       VITAMIN D, CHOLECALCIFEROL, PO Take 1,000 Units by mouth daily         Family and Social History:  Reviewed and unchanged from prior.  Please see initial consultation dated 9/24/18 for further details.    Physical Exam:  General:  Well appearing adult female in NAD.  Alert and oriented x 3.    HEENT:  Normocephalic.  Sclera anicteric.  MMM.    Lymph:  No palpable cervical, supraclavicular, or axillary LAD.    Chest:  CTA bilaterally.  No wheezes or crackles.  CV:  RRR.  Nl S1 and S2.  No audible m/r/g.  Breast:  Bilateral breasts are of increased fibroglandular density.  Right lower inner breast incision is with small area of fibrosis palpable just inferior to the medial edge of the incision.  There are no dominant or discretely palpable masses in either breast.  Bilateral nipples are inverted (chronic and unchanged from prior)  Abd:  Soft/NT/ND.   Ext:  No edema of the bilateral lower extremities.   Musculo:  Full ROM of the bilateral upper extremities.  Neuro:  Cranial  nerves grossly intact.  Gait is stable.  No tremor or dyskinetic movements.  Psych:  Mood and affect appear normal.  Skin:  No visible or concerning skin rashes or lesions.    Laboratory/Imaging Studies:  I personally reviewed the below images and laboratories in clinic today:    10/10/2024 MRI breast:  FINDINGS:  Right Breast:  Breast composition: Heterogeneous fibroglandular tissue  Background parenchymal enhancement: Mild  No concerning areas of enhancement.  Postsurgical changes of breast conservation therapy.     Right Axilla: No lymphadenopathy.   Right Internal Mammary Chain: No lymphadenopathy.      Left Breast:   Breast composition: Heterogeneous fibroglandular tissue  Background parenchymal enhancement: Mild  No concerning areas of enhancement.       Left Axilla: No lymphadenopathy.   Left Internal Mammary Chain: No lymphadenopathy.                                                                    IMPRESSION:   No MRI evidence of malignancy in either breast.    ASSESSMENT/PLAN:  Ms. Velazquez is a 69-year-old female with a h/o stage IA, T1c N0 M0, grade 1, ER positive, AR positive, HER2 non-amplified invasive mammary carcinoma of the right breast.  She is status post treatment with right breast lumpectomy and radiation.  On adjuvant curative intent treatment with anastrozole since 9/24/18.    1.  Right breast cancer:   Ms. Arana is 6 years, 1.5 months out from excision of a right breast cancer.  She continues on adjuvant curative intent treatment with anastrozole and is tolerating the medication well.  Plan is to continue anastrozole to complete a 7 year course (thru 9/24/2025).     There is no evidence of disease recurrence on exam today.    - I personally reviewed the images of the breast MRI performed on 10/10/2024 which is without suspicious enhancement in either breast and is without evidence of lymphadenopathy.  - Return to clinic in 6 months with bilateral mammograms the same day as the return  visit.    2.  Paz/Bipolar disorder:  No change since last visit.  Unlikely to be related to her breast cancer treatment as onset years after starting adjuvant therapy.    - She is on treatment with Depakote.  She follows with psychiatry, Dr Palomo.    3.  Bone Health:  DEXA 3/28/2023 with a lowest T-score of -0.9, improved from prior (DEXA in 01/2021 with a lowest T-score of -2.0).  She received 3 years of Zometa from 1/22/2021 - 6/21/2023.      Continue calcium 1200 mg, vitamin D 1000 international units and walk 30 minutes daily.    - will repeat a DEXA at the time of her return visit.    4.  Routine health maintenance:  No change since last visit.  Colonoscopy in 2015 was without concerning finding, next due in 09/2025.     5.  Hypertension:  She is taking amlodipine 5 mg PO daily.  Is measuring and recording blood pressures at home.  She expressed at times it is elevated.  I advised her to send the measurements to her PCP via [employer].     6.  Follow Up:  DEXA bone density scan, bilateral screening mammograms and visit with me 4/8/2025 or later.    I spent 30 minutes on the date of the encounter doing chart review, review of test results, interpretation of tests, patient visit, and documentation             Again, thank you for allowing me to participate in the care of your patient.        Sincerely,        Merari Tafoya MD

## 2024-10-15 ENCOUNTER — VIRTUAL VISIT (OUTPATIENT)
Dept: PSYCHIATRY | Facility: CLINIC | Age: 69
End: 2024-10-15
Payer: MEDICARE

## 2024-10-15 DIAGNOSIS — F06.1 CATATONIA: ICD-10-CM

## 2024-10-15 DIAGNOSIS — F31.74 BIPOLAR DISORDER, IN FULL REMISSION, MOST RECENT EPISODE MANIC (H): Primary | ICD-10-CM

## 2024-10-15 DIAGNOSIS — Z79.899 ENCOUNTER FOR LONG-TERM (CURRENT) USE OF MEDICATIONS: ICD-10-CM

## 2024-10-15 PROCEDURE — G2211 COMPLEX E/M VISIT ADD ON: HCPCS | Mod: 95 | Performed by: PSYCHIATRY & NEUROLOGY

## 2024-10-15 PROCEDURE — 99214 OFFICE O/P EST MOD 30 MIN: CPT | Mod: 95 | Performed by: PSYCHIATRY & NEUROLOGY

## 2024-10-15 RX ORDER — LORAZEPAM 0.5 MG/1
.5-1 TABLET ORAL
COMMUNITY

## 2024-10-15 RX ORDER — DIVALPROEX SODIUM 250 MG/1
250 TABLET, DELAYED RELEASE ORAL AT BEDTIME
Qty: 90 TABLET | Refills: 3 | Status: SHIPPED | OUTPATIENT
Start: 2024-10-15

## 2024-10-15 ASSESSMENT — PAIN SCALES - GENERAL: PAINLEVEL: NO PAIN (0)

## 2024-10-15 NOTE — PROGRESS NOTES
"Telemedicine Visit: The patient's condition can be safely assessed and treated via synchronous audio and visual telemedicine encounter.      Reason for Telemedicine Visit: Patient has requested telehealth visit    Originating Site (Patient Location): Patient's home    Distant Location (provider location):  Off-Site    Consent:  The patient/guardian has verbally consented to: the potential risks and benefits of telemedicine (video visit) versus in person care; bill my insurance or make self-payment for services provided; and responsibility for payment of non-covered services.     Mode of Communication:  Video Conference via Sociall    As the provider I attest to compliance with applicable laws and regulations related to telemedicine.          Outpatient Psychiatric Progress Note    Name: Keely Arana   : 1955                    Primary Care Provider: Maulik Nye MD   Therapist: None    PHQ-9 scores:      2023     7:56 AM 2023     2:18 PM 4/3/2024     8:42 AM   PHQ-9 SCORE   PHQ-9 Total Score MyChart 0 7 (Mild depression) 0   PHQ-9 Total Score 0    0 7 0       DAISY-7 scores:      2023    10:19 AM 9/10/2023     9:53 AM 10/29/2023     6:33 PM   DAISY-7 SCORE   Total Score  12 (moderate anxiety) 13 (moderate anxiety)   Total Score 0 12 13       Patient Identification:  Patient is a 69 year old,   White Not  or  female  who presents for return visit with me.  Patient is currently retired. Patient attended the video session with  Terry , who they agreed to have interview with. Patient prefers to be called: \"Jessica\".    Interim History:  I last saw Keely BART Arana for outpatient psychiatry return visit on 4/15/2024. During that appointment, we:   Present to the emergency room for further evaluation if symptoms significantly worsen, especially if abruptly.  Decrease Ativan/lorazepam (taking for catatonia) by 0.25 mg daily every 3 weeks until no longer taking " daily.  Please reach out right away if symptoms start to worsen on decreased doses of Ativan.   Continue Depakote  mg daily at bedtime for possible affective disorder, hx of cong.   Continue working with neurology clinic as needed and recommended by neurology.     10/15: Patient overall doing very well.  Had a good busy summer.  Busy with their 11 grandchildren.  Spent time at their cabin.  Mood stable.  No depression.  No cong.  Sleeping well.  Taking Depakote every night at bedtime.  No new side effects.  Off daily lorazepam.  No recurrence of catatonia symptoms.  No acute safety concerns.  No SI.  No problematic drug or alcohol use.    Per [facility], [facility], during today's team-based visit:  No [facility] appointment    Past Psychiatric Med Trials:  Psych Meds at Intake:  Olanzapine 5 mg at bedtime and 2.5 mg daily as needed     Past Psych Meds:  None  Hydroxyzine - not helpful, dry mouth  Quetiapine -stopped due to worsening catatonia  Olanzapine-stopped due to worsening catatonia  Mirtazapine-paradoxical effect?    Psychiatric ROS:  Keely Arana reports mood has been: Stable  Anxiety has been: Stable  Sleep has been: Stable  Cong sxs: None  Psychosis sxs: None  ADHD/ADD sxs: N/A  PTSD sxs: N/A  PHQ9 and GAD7 scores were reviewed today if completed.   Medication side effects: Denies  Current stressors include: Sxs and see HPI above  Coping mechanisms and supports include: Family, Hobbies and Friends    Current medications include:   Current Outpatient Medications   Medication Sig Dispense Refill    amLODIPine (NORVASC) 5 MG tablet Take 1 tablet (5 mg) by mouth daily 90 tablet 3    anastrozole (ARIMIDEX) 1 MG tablet Take 1 tablet (1 mg) by mouth daily 90 tablet 3    atorvastatin (LIPITOR) 10 MG tablet Take 1 tablet (10 mg) by mouth daily 90 tablet 3    calcium carbonate (OS-PADDY 500 MG [place]) 500 MG tablet Take 2 tablets by mouth daily With Magnesium,Zinc      divalproex sodium delayed-release  (DEPAKOTE) 250 MG DR tablet Take 1 tablet (250 mg) by mouth at bedtime 90 tablet 1    LYSINE PO Take 10,000 mg by mouth daily      Multiple Vitamins-Minerals (PRESERVISION AREDS 2 PO) Take 2 tablets by mouth daily      multivitamin w/minerals (THERA-VIT-M) tablet Take 1 tablet by mouth daily      rivaroxaban ANTICOAGULANT (XARELTO) 20 MG TABS tablet Take 20 mg by mouth      VITAMIN D, CHOLECALCIFEROL, PO Take 1,000 Units by mouth daily        No current facility-administered medications for this visit.       Past Medical/Surgical History:  Past Medical History:   Diagnosis Date    Anxiety     Atrial fibrillation (H)     s/p ablasion.    Depression     Hypertension     Insomnia     Iritis     Personal history of malignant neoplasm of breast 2018    s/p radiation, on maintenance chemo      has a past medical history of Anxiety, Atrial fibrillation (H), Depression, Hypertension, Insomnia, Iritis, and Personal history of malignant neoplasm of breast (2018).    She has no past medical history of Complication of anesthesia or Sleep apnea.    Social History:  Reviewed. No changes to social history except as noted above in HPI.    Vital Signs:   None taken since telemedicine visit    Labs:  Most recent laboratory results reviewed and the pertinent results include:     11/10/2023: Serum encephalopathy, autoimmune/paraneoplastic evaluation (sent out to [facility]): NEGATIVE    11/10/2023: Rheumatoid factor unremarkable/negative    11/10/2023: Depakote level 18.7  4/5/2024: Depakote lvl 13.1     Component  Ref Range & Units 2 mo ago     SSA Edb IgG Instrument Value  <7.0 U/mL <0.5    SSA (Ro) Antibody IgG  Negative Negative   Resulting Agency SCORE              Specimen Collected: 11/10/23  1:10 PM Last Resulted: 11/13/23  6:05 PM               Component  Ref Range & Units 2 mo ago     SSB Deb IgG Instrument Value  <7.0 U/mL <0.6    SSB (La) Antibody IgG  Negative Negative   Resulting Agency SCORE              Specimen Collected:  "11/10/23  1:10 PM Last Resulted: 11/13/23  6:05 PM               Component  Ref Range & Units 2 mo ago     JANETH interpretation  Negative Negative   Comment:  Negative:              <1:40  Borderline Positive:   1:40 - 1:80  Positive:              >1:80   Resulting Agency SCORE              Specimen Collected: 11/10/23  1:10 PM Last Resulted: 11/13/23  1:28 PM           Liver Function Studies -   Recent Labs   Lab Test 04/05/24  1113   PROTTOTAL 6.9   ALBUMIN 4.4   BILITOTAL 0.6   ALKPHOS 66   AST 18   ALT 20      Lab Results   Component Value Date    WBC 4.4 04/05/2024     Lab Results   Component Value Date    RBC 4.64 04/05/2024     Lab Results   Component Value Date    HGB 14.0 04/05/2024     Lab Results   Component Value Date    HCT 41.7 04/05/2024     No components found for: \"MCT\"  Lab Results   Component Value Date    MCV 90 04/05/2024     Lab Results   Component Value Date    MCH 30.2 04/05/2024     Lab Results   Component Value Date    MCHC 33.6 04/05/2024     Lab Results   Component Value Date    RDW 12.3 04/05/2024     Lab Results   Component Value Date     04/05/2024     Recent Labs   Lab Test 04/05/24  1113 05/02/23  1030   CHOL 198 175   HDL 59 68   * 91   TRIG 134 78     Review of Systems:  10 systems (general, cardiovascular, respiratory, eyes, ENT, endocrine, GI, , M/S, neurological) were reviewed. Most pertinent finding(s) is/are: denies fever, cough, persistent headaches, shortness of breath, chest pain, severe GI symptoms, trouble urinating, severe pain. The remaining systems are all unremarkable.    Mental Status Examination (via video visit today):  Appearance: Awake, alert, appears stated age, no acute distress, adequately groomed  Attitude:  cooperative, pleasant  Motor: No tongue thrusting/lip smacking  or perioral movements noted today, no tremor noted, no repetitive movements of any type  Oriented to:  person, place, date, situation  Attention Span and Concentration:  " normal  Speech: speech clear, fluid, normal volume, normal rate  Language: intact  Mood: Really good  Affect: Mood congruent, bright  Associations:  no loose associations  Thought Process: linear, logical, goal-directed  Thought Content:  no evidence of suicidal ideation or homicidal ideation, no evidence of psychotic thought, no auditory hallucinations present and no visual hallucinations present  Recent and Remote Memory: adequate for interview; not formally tested today  Fund of Knowledge: appropriate  Insight: good  Judgment: good  Impulse Control: good    Suicide Risk Assessment:  Today Keely Arana reports no suicidal ideation. Based on all available evidence including the factors cited above, Keely Arana does not appear to be at imminent risk for self-harm, does not meet criteria for a 72-hr hold, and therefore remains appropriate for ongoing outpatient level of care.  A thorough assessment of risk factors related to suicide and self-harm have been reviewed and are noted above. The patient convincingly denies suicidality on several occasions. Local community safety resources reviewed for patient to use if needed. There was no deceit detected, and the patient presented in a manner that was believable.     DSM5 Diagnosis:  Bipolar I Disorder, most recent episode manic in full remission  Anxiety, unspecified  CATATONIA - resolved on Ativan     RULED OUT paraneoplastic/autoimmune process (pt with work-up by neurology)    Medical comorbidities include:   Patient Active Problem List    Diagnosis Date Noted    Hyperlipidemia 04/04/2024     Priority: Medium    HTN (hypertension) 04/04/2024     Priority: Medium    Strain of muscle, fascia and tendon of other parts of biceps, right arm, initial encounter 04/04/2024     Priority: Medium    Mild cognitive impairment 10/07/2023     Priority: Medium    Unspecified mood (affective) disorder (H) 10/07/2023     Priority: Medium    Insomnia, unspecified type  09/29/2023     Priority: Medium    Anxiety 09/29/2023     Priority: Medium    Major depression 09/29/2023     Priority: Medium    History of cong 06/20/2023     Priority: Medium    Abnormal bone density screening 01/15/2021     Priority: Medium    Long term (current) use of aromatase inhibitors 01/15/2021     Priority: Medium    Malignant neoplasm of upper-outer quadrant of right breast in female, estrogen receptor positive (H) 08/23/2018     Priority: Medium    Symptomatic varicose veins of both lower extremities 11/10/2015     Priority: Medium       Psychosocial & Contextual Factors: see HPI above    Assessment:  From Intake, 5/12/2023:  Keely Arana is a 67-year-old female with relatively benign past psychiatric history leading up to recent suspected manic episode who presents today for psychiatric evaluation.  Patient with what appears to meet criteria for manic episode starting earlier this spring.  Patient started to have more trouble with sleep and was becoming more irritable leading to racing thoughts, increased goal directed activity, disorganized thoughts, more talkative than usual, etc.  Patient's family concerns since she was acting out of character and brought to the ER.  Patient started on olanzapine in the emergency room which has helped significantly to regulate sleep and mood.  Patient perhaps with some heightened energy today but not overtly manic.  Patient is currently tolerating olanzapine well and reports sleeping about 8 hours a night.  Discussed risks and benefits of staying on olanzapine.  Unclear if patient has had underlying bipolar disorder that has gone fairly undetected over the years.  Patient with relatives with bipolar disorder.  Patient did have recent brain imaging (MRI) that would not explain recent new onset cong.  Patient also with otherwise relatively benign labs including thyroid, liver panel, CMP, CBC, etc.  No new medications were introduced.  Patient denies any drug  use and no problematic alcohol use.  Patient stopped using her 1 glass of wine a week around the time this episode started to see if it would help to give up alcohol.  Patient does often have a few cups of coffee a day.  We will continue to monitor.  Recommend staying on olanzapine for at least 6 months to a year before trying to taper off in order to prevent relapse/recurrent cong symptoms.  No acute safety concerns today.  No SI.  No problematic drug or alcohol use.     6/14/2023:  Patient overall doing relatively well.  Mood has been stable.  No signs of cong.  We will decrease olanzapine to 2.5 mg at bedtime for the next few months to ensure ongoing stability of symptoms.  Could consider a trial off olanzapine at the end of the 3 months of decreased dose.  Patient and  will continue to remain vigilant while monitoring for any relapse of manic symptoms.  Patient had recent lipid panel and glucose completed.  Both within normal limits.  No acute safety concerns.  No SI.  No signs of tardive dyskinesia noted on video today.  No problematic drug or alcohol use.    9/11/2023:  Patient with suspected movement related side effects due to olanzapine use.  Patient with extreme anxiety and some worsening symptoms when olanzapine was stopped after 3 months on 2.5 mg dose.  We will restart olanzapine at 2.5 mg every other day and continue with slow taper and also get recommendations from psychiatric PharmD and possibly neurology.  Patient with possible tongue thrusting today versus extreme dry mouth from hydroxyzine use.  There was no tongue thrusting noted at last appointment in June.  Patient's family had noted possible parkinsonism like symptoms.   and patient will continue to monitor.  Referral placed for [facility] [place] for additional monitoring and talk therapy/support as well.  We will also start a trial with propranolol to see if that is helpful for some of the physical symptoms of anxiety and potentially some  symptoms being caused by olanzapine.  No acute safety concerns.  No SI.  No problematic drug or alcohol use.    9/22/2023:  Patient still not sleeping well and I would not want patient to get manic.  We did discuss possibility of something like Depakote at low-dose in order to avoid other agents like olanzapine and since we would like to start to taper off olanzapine (due to suspected parkinsonism).  Could also consider lithium.  Patient is not seeming manic at this time of interview - definitely seems anxious though. Pt sitting quite calmly and no pressured speech.  Difficult to assess if not sleeping because of anxiety versus onset of manic episode?  We will trial mirtazapine at bedtime to see if helpful for sleep without worsening any side effects. No acute safety concerns. No SI. No problematic drug or alcohol use. Pt also encouraged to utilize the propranolol.     10/23/2023:  Patient exhibiting multiple signs of suspected catatonia.  Patient also with symptoms that seem to improve with Ativan administration by family.  Recommended emergency room visit for evaluation for possible admission for treatment of her mental health condition since I suspect pt has catatonia and discussed limitation of my ability to examine pt via video.  Discussed risks of catatonia with family, including up to death. Discussed malignant catatonia and that catatonia can affect blood pressure and heart rate. Pt's vitals (BP and HR) were elevated at last check on record 10/11.  Patient and family declined emergency room visit at this time and prefer to try to manage patient at home with Ativan up to 1 mg 3 times daily.  Advised family and patient they are to bring patient to the emergency room if symptoms do not start to improve, or start to worsen, with Ativan administration.  Family instructed to hold doses of Ativan, or break doses in half, if patient starts to become too sedated or if gait becomes too unsteady on full dose.   Discussed what to look for if Ativan is going to be helpful for symptoms -starting to do more around the house, increased energy, talking more, etc.  Patient has follow-up visit with me in 1 week.  Neurology referral also placed to help evaluate for parkinsonism versus catatonia versus other.  Patient continues to have frequent lip smacking movements possibly related to current condition versus TD?  Patient continues to eat and drink.  Patient was alert and oriented today during interview.  No hold will be placed for emergent medical transport to the hospital, but I do have a low threshold if patient and family continue to refuse emergency room evaluation and if patient's symptoms continue to worsen.  This was discussed with the patient and family.  Both olanzapine and quetiapine discontinued due to suspicions for catatonia.  Labs also ordered to assess for infection, metabolic status, ammonia level, TSH, etc.    10/30/2023:  Patient with drastic improvement since starting Ativan/lorazepam for catatonia.  Still some ongoing ruminations, paranoia, restlessness.  Discussed possible causes of catatonia including suspected bipolar disorder, possible neurocognitive disorder, infection, others.  Patient's recent laboratory work unremarkable for any signs of infection or obvious causes of catatonia at this time.  Strongly suspicious of bipolar disorder causing cong.  Rule out neurocognitive disorder.  Neurology referral has been placed and pending scheduling.  We will also consider imaging again.  Patient had imaging completed this past spring but could consider again due to catatonia and change in mental status.  No acute safety concerns at this time.  Patient eating and sleeping well.  No suicidal thoughts.  No problematic drug or alcohol use.  Family continues to be incredibly supportive.  Patient very fearful of the hospitalization.  Patient is agreeable to starting Depakote to help with some of her symptoms, bipolar  disorder.  Patient's  open to ongoing discussions about possibility of ECT.  Patient is not as open to ECT at this time.  Patient/family will reach out if they would like an ECT referral.    11/14/2023:  Patient overall presenting significantly better today compared to most recent visit even.  No signs of catatonia today.  No perioral movements noted.  No tremor noted.  No delayed speech.  Attention and concentration intact for interview.  Alert and oriented x 4.  Patient did see neurology and there are tests still pending.  Patient needs to complete neuroimaging as ordered by neurology.  There are also paraneoplastic/autoimmune send out labs and process through [facility].  Multiple differentials to consider per neurology as well to explain patient's symptoms.  Patient will continue to follow-up with neurology to further rule out underlying medical causes for symptoms.  We will very slowly taper down lorazepam to a lower dose.  I still do not want to completely discontinue at this time.  We will continue Depakote due to ongoing improvements of symptoms and good tolerability.  Given improvements, and based on laboratory and imaging testing results, may need to consider repeat neuropsychological testing.  Would confer with neurology.  No acute safety concerns.  No SI.  No problematic drug or alcohol use.    1/15/2024:  Patient overall doing well.  Down to 0.5 mg 3 times daily of Ativan with no regression of catatonia symptoms.  Tolerating Depakote well with no negative side effects.  Updated labs ordered to check CBC and liver function.  We will continue to very slowly taper lorazepam.  No other medication changes today.  Recent serum encephalopathy, autoimmune/paraneoplastic panel sent to [place] unremarkable.  No other remarkable exam/test results.  No acute safety concerns.  No SI.  No problematic drug or alcohol use.  Could still consider repeat neuropsychological testing in the future if  relevant.    4/15/2024:  Patient overall continuing to do very well.  Remains at baseline while continuing to taper down lorazepam.  Monitoring weight gain and appetite from Depakote.  We will continue to taper down lorazepam until off daily dosing.  Patient can then hang on to some to take as needed.  Family and patient monitoring for symptoms closely.  Tolerating Depakote well.  Recent labs unremarkable.  No acute safety concerns.  No SI.  No problematic drug or alcohol use.  Patient will be seen back in 6 months.    10/15/2024:  Patient currently doing very well.  Mood has remained stable.  Sleeping well.  No signs of catatonia.  No new side effects from the medication.  Off daily lorazepam.  Continues on nightly Depakote.  Updated labs ordered for blood draw.  Patient will be seen back in another 6 months.  No acute safety concerns.  No SI.  No problematic drug or alcohol use.    Medication side effects and alternatives were reviewed. Health promotion activities recommended and reviewed today. All questions addressed. Education and counseling completed regarding risks and benefits of medications and psychotherapy options. Recommend therapy for additional support.     Treatment Plan:  Continue Ativan/lorazepam 0.25-1.0 mg daily as needed for sleep, anxiety.   Continue Depakote  mg daily at bedtime for bipolar disorder, hx of cong w/catatonia.   Have labs completed in next 1-4 weeks at any [facility] lab. Need to call your clinic ahead of time to schedule an appointment for lab due to limited hours and no walk-ins due to Covid-19 restrictions/changes.   Continue all other cares per primary care provider.   Continue all other medications as reviewed per electronic medical record today.   Safety plan reviewed. To the Emergency Department as needed or call after hours crisis line at [phone] or [phone]. [place] Crisis Text Line. Text [place] to 917126 or Suicide LifeLine Chat:  suicidepreventionlifeline.org/chat  Schedule an appointment with me in 6 months or sooner as needed. Call [facility] at [phone] to schedule.  Follow up with primary care provider as planned or for acute medical concerns.  Call the psychiatric nurse line with medication questions or concerns at [phone].  MyChart may be used to communicate with your provider, but this is not intended to be used for emergencies.    Previously discussed risks of benzodiazepine (Ativan, Xanax, Klonopin, Valium, etc) use including, but not limited to, sedation, tolerance, risk for addiction/dependence. Do not drink alcohol while taking benzodiazepines due to risk of trouble breathing and potential death. Do not drive or operate heavy machinery until it is known how the drug affects you. Discuss with physician or pharmacist before ever taking a benzodiazepine with a narcotic/opioid pain medication.  Watch for sedation and falls.    Administrative Billing:   Session Duration: 19 Minutes   Start: 9:53a  Stop: 10:12a    The longitudinal plan of care for the diagnosis(es)/condition(s) as documented were addressed during this visit. Due to the added complexity in care, I will continue to support Jessica in the subsequent management and with ongoing continuity of care.    Patient Status:  Patient continuous care/lang-term at this time.     Signed:   Reema Palomo DO  [facility]    Disclaimer: This note consists of symbols derived from keyboarding, dictation and/or voice recognition software. As a result, there may be errors in the script that have gone undetected. Please consider this when interpreting information found in this chart.

## 2024-10-15 NOTE — PROGRESS NOTES
"Virtual Visit Details    Type of service:  Video Visit   Video Start Time: {video visit start/end time for provider to select:511727}  Video End Time:{video visit start/end time for provider to select:816294}    Originating Location (pt. Location): {video visit patient location:876355::\"Home\"}  {PROVIDER LOCATION On-site should be selected for visits conducted from your clinic location or adjoining [facility] hospital, academic office, or other nearby [facility] building. Off-site should be selected for all other provider locations, including home:746597}  Distant Location (provider location):  {virtual location provider:136236}  Platform used for Video Visit: {Virtual Visit Platforms:317918::\"[employer]\"}  "

## 2024-10-15 NOTE — NURSING NOTE
[place]  [facility] [address]    Is the patient currently in the state of [place]? YES    Visit mode:VIDEO    If the visit is dropped, the patient can be reconnected by: VIDEO VISIT: Text to cell phone:   Telephone Information:   Mobile [phone]       Will anyone else be joining the visit? NO  (If patient encounters technical issues they should call [phone])    Are changes needed to the allergy or medication list? No    Are refills needed on medications prescribed by this physician? Discuss with provider    Rooming Documentation:  Questionnaire(s) completed    Reason for visit: RECHECK    Avery RIVER

## 2024-10-15 NOTE — PATIENT INSTRUCTIONS
Treatment Plan:  Continue Ativan/lorazepam 0.25-1.0 mg daily as needed for sleep, anxiety.   Continue Depakote  mg daily at bedtime for bipolar disorder, hx of cong w/catatonia.   Have labs completed in next 1-4 weeks at any [facility] lab. Need to call your clinic ahead of time to schedule an appointment for lab due to limited hours and no walk-ins due to Covid-19 restrictions/changes.   Continue all other cares per primary care provider.   Continue all other medications as reviewed per electronic medical record today.   Safety plan reviewed. To the Emergency Department as needed or call after hours crisis line at [phone] or [phone]. [place] Crisis Text Line. Text [place] to 670305 or Suicide LifeLine Chat: suicidepreventionPerio Sciencesline.org/chat  Schedule an appointment with me in 6 months or sooner as needed. Call [facility] at [phone] to schedule.  Follow up with primary care provider as planned or for acute medical concerns.  Call the psychiatric nurse line with medication questions or concerns at [phone].  [employer] may be used to communicate with your provider, but this is not intended to be used for emergencies.    Previously discussed risks of benzodiazepine (Ativan, Xanax, Klonopin, Valium, etc) use including, but not limited to, sedation, tolerance, risk for addiction/dependence. Do not drink alcohol while taking benzodiazepines due to risk of trouble breathing and potential death. Do not drive or operate heavy machinery until it is known how the drug affects you. Discuss with physician or pharmacist before ever taking a benzodiazepine with a narcotic/opioid pain medication.  Watch for sedation and falls.

## 2024-10-29 ENCOUNTER — MYC MEDICAL ADVICE (OUTPATIENT)
Dept: INTERNAL MEDICINE | Facility: CLINIC | Age: 69
End: 2024-10-29
Payer: MEDICARE

## 2024-10-29 DIAGNOSIS — I10 HTN (HYPERTENSION): ICD-10-CM

## 2024-10-29 RX ORDER — AMLODIPINE BESYLATE 5 MG/1
7.5 TABLET ORAL DAILY
Qty: 135 TABLET | Refills: 3 | Status: SHIPPED | OUTPATIENT
Start: 2024-10-29

## 2024-11-29 ENCOUNTER — MYC MEDICAL ADVICE (OUTPATIENT)
Dept: INTERNAL MEDICINE | Facility: CLINIC | Age: 69
End: 2024-11-29
Payer: MEDICARE

## 2024-11-29 DIAGNOSIS — I10 PRIMARY HYPERTENSION: Primary | ICD-10-CM

## 2024-11-29 DIAGNOSIS — I10 HTN (HYPERTENSION): ICD-10-CM

## 2024-12-02 RX ORDER — AMLODIPINE BESYLATE 5 MG/1
5 TABLET ORAL DAILY
Qty: 90 TABLET | Refills: 3 | Status: SHIPPED | OUTPATIENT
Start: 2024-12-02

## 2024-12-02 RX ORDER — LISINOPRIL 10 MG/1
10 TABLET ORAL DAILY
Qty: 30 TABLET | Refills: 3 | Status: SHIPPED | OUTPATIENT
Start: 2024-12-02

## 2024-12-15 NOTE — PROGRESS NOTES
"Triage & Transition Services [facility]     Progress Note    Patient: Jessica goes by \"Jessica,\" uses she/her pronouns  Date of Service: October 9, 2023  Site of Service: [facility]  Patient was seen in-person.     Presenting problem:  Please see initial [facility] Crisis Assessment completed by Funmilayo Zafar on 10/7/2023 for complete assessment information. Notable concerns include Anxiety, Health stressors, Memory concerns, Insomnia.     Individuals Present: Jessica & Karan Al [facility]    Session start: 1040  Session end: 1100  Session duration in minutes: 20  CPT utilized: 99708 - Psychotherapy (with patient) - 30 (16-37*) min    Current Presentation:   Patient shares that she is feeling a bit better today after trying a new medication last night for sleep.  She reports have been here about 2 weeks ago for similar concerns of not being able to sleep.  Patient has a blank stare during assessment, thoughts are slightly disjointed and needed to be asked the prompt at least twice before providing a delayed response.       Patient shares that she started the night last night sleeping in Sensory Room A \"but that room got too cold so I moved my chair back out into the milieu.\"   Patient denies depression currently and reports appetite is good.   When asked if she is feeling ready to discharge home, she got anxious and stated \"I hope I did not give the impression that I am wanting to go home.\"  Writer reminded patient that we do not have to decide disposition right now and can wait until after she meets with Psych Provider.      Patient talks about her previous episode in March 2023 where she experienced cong.  Patient does not believe their are any similarities between this episode and the cong episode.     Additional Collateral Information:  none     Mental Status Exam     Affect: Flat   Appearance: Appropriate    Attention Span/Concentration: Attentive  Eye Contact: Intense   Fund of Knowledge: Appropriate    Language /Speech " Content: Fluent   Language /Speech Volume: Normal    Language /Speech Rate/Productions: Normal    Recent Memory: Intact   Remote Memory: Intact   Mood: Anxious and Normal    Orientation to Person: Yes    Orientation to Place: Yes   Orientation to Time of Day: Yes    Orientation to Date: Yes    Situation (Do they understand why they are here?): Yes    Psychomotor Behavior: Normal    Thought Content: Clear   Thought Form: Intact     Diagnosis:    Unspecified mood (affective) disorder (H24) F39      Anxiety F41.9       Therapeutic Intervention(s):   Provided active listening, unconditional positive regard, and validation. Engaged in safety planning.  Engaged in cognitive restructuring/ reframing, looked at common cognitive distortions and challenged negative thoughts. Engaged in guided discovery, explored patient's perspectives and helped expand them through socratic dialogue. Engaged in activity scheduling and behavioral activation, looking at and reviewing the prior week's goals, problem solving any barriers and acknowledging successes, as well as setting new goals. Coached on coping techniques/relaxation skills to help improve distress tolerance and managing intense emotions. Taught the link between thoughts, feelings, and behaviors. Reviewed healthy living that supports positive mental health, including looking at sleep hygiene, regular movement, nutrition, and regular socialization. Engaged in social skills training. Identified and practiced coping skills. Identified stress relief practices.    Treatment Objective(s) Addressed:   The focus of this session was on rapport building, orienting the patient to therapy, identifying and practicing coping strategies, safety planning, assessing safety, and identifying treatment goals.     Progress Towards Goals:   Patient reports stable symptoms. Patient is making progress towards treatment goals as evidenced by one night of improved sleep, denies SI.     Case Management:    none     Plan and Clinical Summary and Substantiation of Recommendations:   Observation: Patient will remain in observation another night to assess for quality of sleep tonight with increased dose of sleep medication.  Patient was pleasant, engaged throughout the day.  She did present with intense, blank stare; responses were slightly delayed and disjointed, and participated in morning group.     Attending concurs with disposition: Yes         JAKOB Parry            Face Mask

## 2025-01-16 ENCOUNTER — TELEPHONE (OUTPATIENT)
Dept: INTERNAL MEDICINE | Facility: CLINIC | Age: 70
End: 2025-01-16
Payer: MEDICARE

## 2025-01-16 NOTE — TELEPHONE ENCOUNTER
Patient confirmed rescheduled appointment:  Date: April 21st   Time: 9:30am  Visit type: Physical   Provider: PCP  [place] [facility]

## 2025-01-29 ENCOUNTER — TELEPHONE (OUTPATIENT)
Dept: INTERNAL MEDICINE | Facility: CLINIC | Age: 70
End: 2025-01-29
Payer: MEDICARE

## 2025-02-12 NOTE — MR AVS SNAPSHOT
After Visit Summary   8/24/2018    Keely Velazquez    MRN: [id number]           Patient Information     Date Of Birth          1955        Visit Information        Provider Department      8/24/2018 9:00 AM Chilango Kruger MD [facility]        Today's Diagnoses     Malignant neoplasm of upper-outer quadrant of right breast in female, estrogen receptor positive (H)           Follow-ups after your visit        Your next 10 appointments already scheduled     Aug 31, 2018 11:30 AM CDT   US BREAST WIRE PLACEMENT RIGHT with  Breast Rad, UCBCUS1,  BREAST NURSE   [facility] ([facility])    [address]  [facility] [address] [phone]           Please bring a list of your medicines (including vitamins, minerals and over-the-counter drugs). Also, tell your doctor about any allergies you may have. Wear comfortable clothes and leave your valuables at home.  You do not need to do anything special to prepare for your exam.  Please call the Imaging Department at your exam site with any questions.            Aug 31, 2018 12:30 PM CDT   NM SENTINEL NODE INJECTION BILATERAL with [email or web address]   [facility] [place] Nuclear Medicine ([facility])    [address] [facility] [address] [phone]           Please bring a list of your medicines to the exam. (Include vitamins, minerals and over-the-counter drugs.) You should wear comfortable clothes. Leave your valuables at home. Please bring related prior results and films.  Tell your doctor:   If you are breastfeeding or may be pregnant.   If you have had a barium test within the past few days. Barium may change the results of certain exams.   If you think you may need sedation (medicine to help you relax).  You may eat and drink as normal.  Please call your Imaging Department at your exam site with any questions.            Aug 31,  Called patient and advised of sleep study results and Dr. Pena recommendations.  Patient prefers to follow up in the office.     Scheduled follow up appointment 2/18/25.    2018 12:30 PM CDT   MA POST PROCEDURE RIGHT with UCBA3   [facility] [place] [facility] ([facility])    [address]  [facility] [id number] [phone]           Do not use any powder, lotion or deodorant under your arms or on your breast. If you do, we will ask you to remove it before your exam.  Wear comfortable, two-piece clothing.  If you have any allergies, tell your care team.  Bring any previous mammograms from other facilities or have them mailed to the breast center.            Aug 31, 2018   Procedure with Chilango Kruger MD   [facility] ([facility])    [address] [place] [address]  5th Floor  [facility] [id number] [phone]           Located in the Clinics and Surgery Center at [address] [place] [address].   parking is very convenient and highly recommended.  is a $6 flat rate fee.  Both  and self parkers should enter the main arrival plaza from [facility]; parking attendants will direct you based on your parking preference.            Aug 31, 2018  3:00 PM CDT   MA BREAST SPECIMEN RIGHT with UCBA3   [facility] ([facility])    [address]  [facility] [address] [phone]           Do not use any powder, lotion or deodorant under your arms or on your breast. If you do, we will ask you to remove it before your exam.  Wear comfortable, two-piece clothing.  If you have any allergies, tell your care team.  Bring any previous mammograms from other facilities or have them mailed to the breast center.            Sep 14, 2018 10:15 AM CDT   (Arrive by 10:00 AM)   Post-Op with Chilango Kruger MD   [facility])    [address] [place] [address]  [address]  [facility] [id number] [phone]              Who to contact     If you have questions or need follow up  "information about today's clinic visit or your schedule please contact [facility] directly at [phone].  Normal or non-critical lab and imaging results will be communicated to you by Judic[employer], letter or phone within 4 business days after the clinic has received the results. If you do not hear from us within 7 days, please contact the clinic through Judicatahart or phone. If you have a critical or abnormal lab result, we will notify you by phone as soon as possible.  Submit refill requests through [employer] or call your pharmacy and they will forward the refill request to us. Please allow 3 business days for your refill to be completed.          Additional Information About Your Visit        Judicatahart Information     [employer] lets you send messages to your doctor, view your test results, renew your prescriptions, schedule appointments and more. To sign up, go to [email or web address] . Click on \"Log in\" on the left side of the screen, which will take you to the Welcome page. Then click on \"Sign up Now\" on the right side of the page.     You will be asked to enter the access code listed below, as well as some personal information. Please follow the directions to create your username and password.     Your access code is: [email or web address]  Expires: 2018 11:29 AM     Your access code will  in 90 days. If you need help or a new code, please call your [facility] clinic or [phone].        Care EveryWhere ID     This is your Care EveryWhere ID. This could be used by other organizations to access your [facility] medical records  [id number]        Your Vitals Were     Pulse Temperature Respirations Height Pulse Oximetry BMI (Body Mass Index)    87 97.3  F (36.3  C) (Oral) 16 1.727 m (5' 7.99\") 97% 24.18 kg/m2       Blood Pressure from Last 3 Encounters:   18 (!) 148/92   12/02/15 (!) 137/91   11/10/15 141/68    Weight from Last 3 Encounters:   18 72.1 kg (159 lb)   08/06/15 66.3 kg (146 lb 3.2 " oz)              We Performed the Following     [facility]-Operative Worksheet (Breast Center - Plastics)        Primary Care Provider Office Phone # Fax #    Sammie Cerna [phone]       [address] [address]        Equal Access to Services     JHONATANKIERRA VELIA : Hadii aad ku hadasho Soomaali, waaxda luqadaha, qaybta kaalmada adeegyada, waxay julianne sauln zan lopezloyanupam naranjo. So [facility] [phone].    ATENCIÓN: Si habla español, tiene a davidson disposición servicios gratuitos de asistencia lingüística. Amandaame al [phone].    We comply with applicable federal civil rights laws and [place] laws. We do not discriminate on the basis of race, color, national origin, age, disability, sex, sexual orientation, or gender identity.            Thank you!     Thank you for choosing [facility]  for your care. Our goal is always to provide you with excellent care. Hearing back from our patients is one way we can continue to improve our services. Please take a few minutes to complete the written survey that you may receive in the mail after your visit with us. Thank you!             Your Updated Medication List - Protect others around you: Learn how to safely use, store and throw away your medicines at www.disposemymeds.org.          This list is accurate as of 8/24/18 11:59 PM.  Always use your most recent med list.                   Brand Name Dispense Instructions for use Diagnosis    calcium carbonate 500 mg {elemental} 500 MG tablet    OS-PADDY     Take 500 mg by mouth 2 times daily With Magnesium,Zinc        LYSINE PO           Multi-vitamin Tabs tablet      Take 1 tablet by mouth daily        NORVASC PO      Take 5 mg by mouth daily        OMEGA-3 FISH OIL PO           order for DME     1 each    Please measure and distribute 1 pair of 20mmHg - 30mmHg thigh high open toe compression stockings. Jobst ultrasheer or equivalent.    Varicose vein       Progesterone 100 MG  Caps      Take 1 capsule by mouth daily        VITAMIN D (CHOLECALCIFEROL) PO      Take by mouth daily

## 2025-04-14 ENCOUNTER — ANCILLARY PROCEDURE (OUTPATIENT)
Dept: BONE DENSITY | Facility: CLINIC | Age: 70
End: 2025-04-14
Attending: INTERNAL MEDICINE
Payer: MEDICARE

## 2025-04-14 ENCOUNTER — ANCILLARY PROCEDURE (OUTPATIENT)
Dept: MAMMOGRAPHY | Facility: CLINIC | Age: 70
End: 2025-04-14
Payer: MEDICARE

## 2025-04-14 ENCOUNTER — VIRTUAL VISIT (OUTPATIENT)
Dept: PSYCHIATRY | Facility: CLINIC | Age: 70
End: 2025-04-14
Payer: MEDICARE

## 2025-04-14 DIAGNOSIS — F31.74 BIPOLAR DISORDER, IN FULL REMISSION, MOST RECENT EPISODE MANIC: Primary | ICD-10-CM

## 2025-04-14 DIAGNOSIS — Z79.811 LONG TERM (CURRENT) USE OF AROMATASE INHIBITORS: ICD-10-CM

## 2025-04-14 DIAGNOSIS — F41.9 ANXIETY: ICD-10-CM

## 2025-04-14 DIAGNOSIS — F06.1 CATATONIA: ICD-10-CM

## 2025-04-14 DIAGNOSIS — Z12.31 VISIT FOR SCREENING MAMMOGRAM: ICD-10-CM

## 2025-04-14 PROCEDURE — 1126F AMNT PAIN NOTED NONE PRSNT: CPT | Mod: 95 | Performed by: PSYCHIATRY & NEUROLOGY

## 2025-04-14 PROCEDURE — 98006 SYNCH AUDIO-VIDEO EST MOD 30: CPT | Performed by: PSYCHIATRY & NEUROLOGY

## 2025-04-14 PROCEDURE — 77080 DXA BONE DENSITY AXIAL: CPT | Performed by: INTERNAL MEDICINE

## 2025-04-14 PROCEDURE — 77067 SCR MAMMO BI INCL CAD: CPT | Performed by: RADIOLOGY

## 2025-04-14 PROCEDURE — G2211 COMPLEX E/M VISIT ADD ON: HCPCS | Performed by: PSYCHIATRY & NEUROLOGY

## 2025-04-14 PROCEDURE — 77063 BREAST TOMOSYNTHESIS BI: CPT | Performed by: RADIOLOGY

## 2025-04-14 ASSESSMENT — PAIN SCALES - GENERAL: PAINLEVEL_OUTOF10: NO PAIN (0)

## 2025-04-14 NOTE — PROGRESS NOTES
"Virtual Visit Details    Type of service:  Video Visit   Video Start Time: {video visit start/end time for provider to select:976281}  Video End Time:{video visit start/end time for provider to select:286132}    Originating Location (pt. Location): {video visit patient location:580174::\"Home\"}  {PROVIDER LOCATION On-site should be selected for visits conducted from your clinic location or adjoining [facility] hospital, academic office, or other nearby [facility] building. Off-site should be selected for all other provider locations, including home:370416}  Distant Location (provider location):  {virtual location provider:156257}  Platform used for Video Visit: {Virtual Visit Platforms:730783::\"P[employer]\"}  "

## 2025-04-14 NOTE — NURSING NOTE
Current patient location: [place]  [facility] [address]    Is the patient currently in the state of [place]? NO    Visit mode: VIDEO    If the visit is dropped, the patient can be reconnected by:VIDEO VISIT: Text to cell phone:   Telephone Information:   Mobile [phone]       Will anyone else be joining the visit? NO  (If patient encounters technical issues they should call [phone]    Are changes needed to the allergy or medication list? Pt stated no changes to allergies and Pt stated no med changes    Are refills needed on medications prescribed by this physician? NO    Rooming Documentation:  Questionnaire(s) completed    Reason for visit: ISIDRO RIVER

## 2025-04-14 NOTE — PROGRESS NOTES
Oncology Follow Up:  Date on this visit: 4/14/2025    Diagnosis:  [id number], grade I, ER positive, ME positive, HER-2 nonamplified invasive ductal carcinoma of the right breast. Oncotype DX recurrence score = 7.    Primary Physician: Sammie Cerna     History Of Present Illness:  Ms. Arana is a 69 year old female with right breast cancer.  Routine screening mammogram in 06/2018 was without concerning findings. Due to high breast density, her gynecologist recommended a breast MRI.  Breast MRI on 08/16/2018 showed nodular to non-mass enhancement measuring 1.6 cm in the right breast at 5 o'clock.    Right breast ultrasound confirmed a mass at 5 o'clock.  Right breast biopsy demonstrated a grade 1 invasive ductal carcinoma.  Estrogen-receptor staining was moderate in 95%; progesterone-receptor staining was strong in 96%.  HER-2 was nonamplified by immunohistochemistry with a score of 1+.  Right breast lumpectomy and sentinel lymph node procedure on 8/31/2018 demonstrated a 1.2 cm, grade 1 invasive mammary carcinoma.  There was associated intermediate-grade DCIS.  Surgical margins were negative; however, DCIS was 1 mm from the anterior margin.  A single sentinel lymph node was negative for malignancy. Oncotype DX recurrence score was 7.  She completed adjuvant radiation (4240 cGy to the whole breast and additional 1250  cGy to the lumpectomy site) on 11/8/18.  She has been on anastrozole since 9/24/18.  She completed 3 years of Zometa on 6/21/2023.     Interval History:  Ms. Arana comes into clinic today for an on treatment visit.  She is on adjuvant curative intent treatment with anastrozole.  She is accompanied today by her spouse.  In general, her health has been good since her last visit.  She denies concerning lumps, pain, or swelling of either breast.  She has had no cough, shortness of breath, or chest pain.  She denies new bone or joint aches or pains.  She confirms that she continues on letrozole and is  tolerating the medication well.  She specifically has no hot flashes, worsening mood disorder, or vaginal symptoms.  She has no abdominal complaints.  She and her  will be hosting their kids and their 11 grandchildren for [place] this weekend.    Past Medical/Surgical History:  Past Medical History:   Diagnosis Date    Anxiety     Atrial fibrillation (H)     s/p ablasion.    Depression     Hypertension     Insomnia     Iritis     Personal history of malignant neoplasm of breast 2018    s/p radiation, on maintenance chemo     Past Surgical History:   Procedure Laterality Date    CATARACT IOL, RT/LT Left 2006    HYSTERECTOMY  2016    LUMPECTOMY BREAST WITH SENTINEL NODE, COMBINED Right 8/31/2018    Procedure: COMBINED LUMPECTOMY BREAST WITH SENTINEL NODE;  Right Wire Localized Lumpectomy and Right [place] Lymph Node Biopsy;  Surgeon: Chilango Kruger MD;  Location: [facility] OR    OOPHORECTOMY  2014     Allergies:  Allergies as of 04/15/2025 - Reviewed 04/14/2025   Allergen Reaction Noted    Corticosteroids Dermatitis 03/18/2019    Neomycin Dermatitis 03/18/2019    Sulfa antibiotics Itching and Rash 08/06/2015    Cephalexin GI Disturbance 03/09/2022    Codeine Nausea and Vomiting 08/06/2015    Benzoyl peroxide Rash 03/23/2019    Nitrofurantoin Nausea and Vomiting and Nausea 08/06/2015    Silver Rash 12/03/2018     Current Medications:  Current Outpatient Medications   Medication Sig Dispense Refill    amLODIPine (NORVASC) 5 MG tablet Take 1 tablet (5 mg) by mouth daily. 90 tablet 3    anastrozole (ARIMIDEX) 1 MG tablet Take 1 tablet (1 mg) by mouth daily 90 tablet 3    atorvastatin (LIPITOR) 10 MG tablet Take 1 tablet (10 mg) by mouth daily 90 tablet 3    calcium carbonate (OS-PADDY 500 MG [place]) 500 MG tablet Take 2 tablets by mouth daily With Magnesium,Zinc      divalproex sodium delayed-release (DEPAKOTE) 250 MG DR tablet Take 1 tablet (250 mg) by mouth at bedtime. 90 tablet 3    lisinopril (ZESTRIL) 10 MG tablet  "Take 1 tablet (10 mg) by mouth daily. 90 tablet 1    LORazepam (ATIVAN) 0.5 MG tablet Take 0.5-1 mg by mouth nightly as needed for anxiety or sleep.      LYSINE PO Take 10,000 mg by mouth daily      Multiple Vitamins-Minerals (PRESERVISION AREDS 2 PO) Take 2 tablets by mouth daily      multivitamin w/minerals (THERA-VIT-M) tablet Take 1 tablet by mouth daily      rivaroxaban ANTICOAGULANT (XARELTO) 20 MG TABS tablet Take 20 mg by mouth      VITAMIN D, CHOLECALCIFEROL, PO Take 1,000 Units by mouth daily         Family and Social History:  Reviewed and unchanged from prior.  Please see initial consultation dated 9/24/18 for further details.    Physical Exam:  /75   Pulse 86   Temp 97.8  F (36.6  C) (Oral)   Resp 16   Ht 1.702 m (5' 7\")   Wt 80.7 kg (178 lb)   SpO2 99%   BMI 27.88 kg/m    General:  Well appearing adult female in NAD.  Alert and oriented x 3.    HEENT:  Normocephalic.  Sclera anicteric.  MMM.    Lymph:  No palpable cervical, supraclavicular, or axillary LAD.    Chest:  CTA bilaterally.  No wheezes or crackles.  CV:  RRR.  Nl S1 and S2.  No audible m/r/g.  Breast:  Bilateral breasts are of increased fibroglandular density.  Right lower inner breast lumpectomy changes.  There are no dominant or discretely palpable masses in either breast.  Bilateral nipples are inverted (chronic and unchanged from prior)  Abd:  Soft/NT/ND.   Ext:  No edema of the bilateral lower extremities.   Musculo:  Full ROM of the bilateral upper extremities.  Neuro:  Cranial nerves grossly intact.  Gait is stable.  No tremor or dyskinetic movements.  Psych:  Mood and affect appear normal.  Skin:  No visible or concerning skin rashes or lesions.    Laboratory/Imaging Studies:  I personally reviewed the below images while in clinic today:    4/14/2025 DEXA bone density scan:  Results   Lumbar Spine  T-score -1 ., BMD is 1.054 g/cm2.      Left femoral neck  T-score -0.5      Right femoral neck  T-score -0.1      Left Total " hip  T-score -0.3 , BMD is 1.001 g/cm2.     Right total hip  T-score 0, BMD is 1.002 g/cm2.    4/14/2025 Bilateral screening mammograms:  Findings: The breasts are heterogeneously dense, which may obscure small masses.  There are breast conservation changes in the right breast.  There is no radiographic evidence of malignancy.                                                                    IMPRESSION: ACR BI-RADS Category 2: Benign    ASSESSMENT/PLAN:  Ms. Velazquez is a 69-year-old female with a h/o stage IA, T1c N0 M0, grade 1, ER positive, AK positive, HER2 non-amplified invasive mammary carcinoma of the right breast.  She is status post treatment with right breast lumpectomy and radiation.  On adjuvant curative intent treatment with anastrozole since 9/24/18.    1.  Right breast cancer:   Ms. Arana is 6 years, 7.5 months out from excision of a right breast cancer.  She continues on adjuvant curative intent treatment with anastrozole and is tolerating the medication well.  Plan is to continue anastrozole to complete a 7 year course (thru 9/24/2025).     There is no evidence of disease recurrence on exam today.    - I personally reviewed the images of the bilateral screening mammograms performed today which are without new or concerning findings compared to previous.  - We were previously screening with both mammogram and breast MRI.  Now that she is 70 yo, we discussed it is okay to discontinue breast MRI and continue annual screening mammogram alone.  - Return to clinic in 6 months    2.  Paz/Bipolar disorder:  No change since last visit.  Unlikely to be related to her breast cancer treatment as onset was years after starting adjuvant therapy.    - She is on treatment with Depakote.  She follows with psychiatry, Dr Palomo.    3.  Osteopenia/at risk for osteoporosis:  DEXA 3/28/2023 with a lowest T-score of -0.9, improved from prior (DEXA in 01/2021 with a lowest T-score of -2.0).  She received 3 years of  Zometa from 1/22/2021 - 6/21/2023.      Continue calcium 1200 mg, vitamin D 1000 international units and walk 30 minutes daily.    - I personally reviewed the images of the DEXA performed on 4/14/2025 which show a lowest T-score of -1.0 in the lumbar spine c/w osteopenia, but relatively unchanged from previous.    4.  Routine health maintenance:  No change since last visit.  Colonoscopy in 2015 was without concerning finding, next due in 09/2025.     5.  Hypertension:  She is taking lisinopril 10 mg PO daily and amlodipine 5 mg PO daily.  Is measuring and recording blood pressures at home.    6.  Follow Up:    Return to clinic in 6 months for visit with me.    I spent 35 minutes on the date of the encounter doing chart review, review of test results, interpretation of tests, patient visit, documentation, discussion with other provider(s), and discussion with family.

## 2025-04-14 NOTE — PATIENT INSTRUCTIONS
Treatment Plan:  Continue Ativan/lorazepam 0.25-1.0 mg daily as needed for sleep, anxiety.   Continue Depakote  mg daily at bedtime for bipolar disorder, hx of cong w/catatonia.   Discussed we would order labs at your next appointment.  Continue all other cares per primary care provider.   Continue all other medications as reviewed per electronic medical record today.   Safety plan reviewed. To the Emergency Department as needed or call after hours crisis line at [phone] or [phone]. [place] Crisis Text Line. Text [place] to 487509 or Suicide LifeLine Chat: suicidepreventioneWings.comline.org/chat  Schedule an appointment with me in 6-8 months or sooner as needed. Call [facility] at [phone] to schedule.  Follow up with primary care provider as planned or for acute medical concerns.  Call the psychiatric nurse line with medication questions or concerns at [phone].  [employer] may be used to communicate with your provider, but this is not intended to be used for emergencies.    Previously discussed risks of benzodiazepine (Ativan, Xanax, Klonopin, Valium, etc) use including, but not limited to, sedation, tolerance, risk for addiction/dependence. Do not drink alcohol while taking benzodiazepines due to risk of trouble breathing and potential death. Do not drive or operate heavy machinery until it is known how the drug affects you. Discuss with physician or pharmacist before ever taking a benzodiazepine with a narcotic/opioid pain medication.  Watch for sedation and falls.    Patient Education   Collaborative Care Psychiatry Service  What to Expect  Here's what to expect from your Collaborative Care Psychiatry Service (CCPS).   About CCPS  CCPS means 2 people work together to help you get better. You'll meet with a behavioral health clinician and a psychiatric doctor. A behavioral health clinician helps people with mental health problems by talking with them. A psychiatric doctor helps  "people by giving them medicine.  How it works  At every visit, you'll see the behavioral health clinician (BHC) first. They'll talk with you about how you're doing and teach you how to feel better.   Then you'll see the psychiatric doctor. This doctor can help you deal with troubling thoughts and feelings by giving you medicine. They'll make sure you know the plan for your care.   CCPS usually takes 3 to 6 visits. If you need more visits, we may have you start seeing a different psychiatric doctor for ongoing care.  If you have any questions or concerns, we'll be glad to talk with you.  About visits  Be open  At your visits, please talk openly about your problems. It may feel hard, but it's the best way for us to help you.  Cancelling visits  If you can't come to your visit, please call us right away at [phone]. If you don't cancel at least 24 hours (1 full day) before your visit, that's \"late cancellation.\"  Being late to visits  Being very late is the same as not showing up. You will be a \"no show\" if:  Your appointment starts with a BHC, and you're more than 15 minutes late for a 30-minute (half hour) visit. This will also cancel your appointment with the psychiatric doctor.  Your appointment is with a psychiatric doctor only, and you're more than 15 minutes late for a 30-minute (half hour) visit.  Your appointment is with a psychiatric doctor only, and you're more than 30 minutes late for a 60-minute (full hour) visit.  If you cancel late or don't show up 2 times within 6 months, we may end your care.   Getting help between visits  If you need help between visits, you can call us Monday to Friday from 8 a.m. to 4:30 p.m. at [phone].  Emergency care  Call 911 or go to the nearest emergency department if your life or someone else's life is in danger.  Call 618 anytime to reach the national Suicide and Crisis hotline.  Medicine refills  To refill your medicine, call your pharmacy. You can also call [facility] at [phone], Monday to Friday, 8 a.m. to 4:30 p.m. It can take 1 to 3 business days to get a refill.   Forms, letters, and tests  You may have papers to fill out, like FMLA, short-term disability, and workability. We can help you with these forms at your visits, but you must have an appointment. You may need more than 1 visit for this, to be in an intensive therapy program, or both.  Before we can give you medicine for ADHD, we may refer you to get tested for it or confirm it another way.  We may not be able to give you an emotional support animal letter.  We don't do mental health checks ordered by the court.   We don't do mental health testing, but we can refer you to get tested.   Thank you for choosing us for your care.  For informational purposes only. Not to replace the advice of your health care provider. Copyright   2022 [facility]. All rights reserved. [employer] 333419 - Rev 11/24.

## 2025-04-14 NOTE — PROGRESS NOTES
"Telemedicine Visit: The patient's condition can be safely assessed and treated via synchronous audio and visual telemedicine encounter.      Reason for Telemedicine Visit: Patient has requested telehealth visit    Originating Site (Patient Location): Patient's home    Distant Location (provider location):  On-Site    Consent:  The patient/guardian has verbally consented to: the potential risks and benefits of telemedicine (video visit) versus in person care; bill my insurance or make self-payment for services provided; and responsibility for payment of non-covered services.     Mode of Communication:  Video Conference via [employer]    As the provider I attest to compliance with applicable laws and regulations related to telemedicine.          Outpatient Psychiatric Progress Note    Name: Keely Arana   : 1955                    Primary Care Provider: Maulik Nye MD   Therapist: None    PHQ-9 scores:      2023     7:56 AM 2023     2:18 PM 4/3/2024     8:42 AM   PHQ-9 SCORE   PHQ-9 Total Score MyChart 0 7 (Mild depression) 0   PHQ-9 Total Score 0    0 7 0       DAISY-7 scores:      2023    10:19 AM 9/10/2023     9:53 AM 10/29/2023     6:33 PM   DAISY-7 SCORE   Total Score  12 (moderate anxiety) 13 (moderate anxiety)   Total Score 0 12 13       Patient Identification:  Patient is a 69 year old,   White Not  or  female  who presents for return visit with me.  Patient is currently retired. Patient attended the video session with  Terry , who they agreed to have interview with. Patient prefers to be called: \"Jessica\".    Interim History:  I last saw Keely BART Arana for outpatient psychiatry return visit on 10/15/2024. During that appointment, we:   Present to the emergency room for further evaluation if symptoms significantly worsen, especially if abruptly.  Decrease Ativan/lorazepam (taking for catatonia) by 0.25 mg daily every 3 weeks until no longer taking " daily.  Please reach out right away if symptoms start to worsen on decreased doses of Ativan.   Continue Depakote  mg daily at bedtime for possible affective disorder, hx of cong.   Continue working with neurology clinic as needed and recommended by neurology.     4/14: Patient overall doing very well.  Mental health symptoms have remained stable.  Mood has been stable with no depression and no signs of cong.  No major anxiety that is not manageable.  No signs of catatonia.  Sleeping well.  Spent some time in January in [place].  Lots of time with grandchildren (11 of them).  Holidays went well.  No acute safety concerns.  No SI.  No problematic drug or alcohol use.  Has continued off lorazepam and has not used any as needed doses likely since before last visit even.  Little bit of weight gain but not sure that it could be attributed to Depakote.    Per [facility], Priscilla Craven, [facility], during today's team-based visit:  No [facility] appointment    Past Psychiatric Med Trials:  Psych Meds at Intake:  Olanzapine 5 mg at bedtime and 2.5 mg daily as needed     Past Psych Meds:  None  Hydroxyzine - not helpful, dry mouth  Quetiapine -stopped due to worsening catatonia  Olanzapine-stopped due to worsening catatonia  Mirtazapine-paradoxical effect?    Psychiatric ROS:  Keely Arana reports mood has been: Stable  Anxiety has been: Stable  Sleep has been: Stable  Cong sxs: None  Psychosis sxs: None  ADHD/ADD sxs: N/A  PTSD sxs: N/A  PHQ9 and GAD7 scores were reviewed today if completed.   Medication side effects: Denies  Current stressors include: Sxs and see HPI above  Coping mechanisms and supports include: Family, Hobbies and Friends    Current medications include:   Current Outpatient Medications   Medication Sig Dispense Refill    amLODIPine (NORVASC) 5 MG tablet Take 1 tablet (5 mg) by mouth daily. 90 tablet 3    anastrozole (ARIMIDEX) 1 MG tablet Take 1 tablet (1 mg) by mouth daily 90 tablet 3    atorvastatin (LIPITOR)  10 MG tablet Take 1 tablet (10 mg) by mouth daily 90 tablet 3    calcium carbonate (OS-PADDY 500 MG [place]. [place]) 500 MG tablet Take 2 tablets by mouth daily With Magnesium,Zinc      divalproex sodium delayed-release (DEPAKOTE) 250 MG DR tablet Take 1 tablet (250 mg) by mouth at bedtime. 90 tablet 3    lisinopril (ZESTRIL) 10 MG tablet Take 1 tablet (10 mg) by mouth daily. 90 tablet 1    LORazepam (ATIVAN) 0.5 MG tablet Take 0.5-1 mg by mouth nightly as needed for anxiety or sleep.      LYSINE PO Take 10,000 mg by mouth daily      Multiple Vitamins-Minerals (PRESERVISION AREDS 2 PO) Take 2 tablets by mouth daily      multivitamin w/minerals (THERA-VIT-M) tablet Take 1 tablet by mouth daily      rivaroxaban ANTICOAGULANT (XARELTO) 20 MG TABS tablet Take 20 mg by mouth      VITAMIN D, CHOLECALCIFEROL, PO Take 1,000 Units by mouth daily        No current facility-administered medications for this visit.     MNPMP:  05/07/202401/26/20241  Lorazepam 0.5 Mg Tablet  90.0030Al [id number] (9499)1/21.50 [id number]     Past Medical/Surgical History:  Past Medical History:   Diagnosis Date    Anxiety     Atrial fibrillation (H)     s/p ablasion.    Depression     Hypertension     Insomnia     Iritis     Personal history of malignant neoplasm of breast 2018    s/p radiation, on maintenance chemo      has a past medical history of Anxiety, Atrial fibrillation (H), Depression, Hypertension, Insomnia, Iritis, and Personal history of malignant neoplasm of breast (2018).    She has no past medical history of Complication of anesthesia or Sleep apnea.    Social History:  Reviewed. No changes to social history except as noted above in HPI.    Vital Signs:   None taken since telemedicine visit    Labs:  Most recent laboratory results reviewed and the pertinent results include:   Valproic acid level 18 on 12/27/2024  Lab Results   Component Value Date    AST 17 12/27/2024     Lab Results   Component Value Date    ALT 19 12/27/2024     No  "results found for: \"BILICONJ\"   Lab Results   Component Value Date    BILITOTAL 0.3 12/27/2024     Lab Results   Component Value Date    ALBUMIN 4.1 12/27/2024    ALBUMIN 3.5 06/29/2022    ALBUMIN 4.1 01/21/2021     Lab Results   Component Value Date    PROTTOTAL 6.9 12/27/2024      Lab Results   Component Value Date    ALKPHOS 75 12/27/2024     Lab Results   Component Value Date    WBC 5.1 12/27/2024     Lab Results   Component Value Date    RBC 4.67 12/27/2024     Lab Results   Component Value Date    HGB 13.8 12/27/2024     Lab Results   Component Value Date    HCT 41.7 12/27/2024     No components found for: \"MCT\"  Lab Results   Component Value Date    MCV 89 12/27/2024     Lab Results   Component Value Date    MCH 29.6 12/27/2024     Lab Results   Component Value Date    MCHC 33.1 12/27/2024     Lab Results   Component Value Date    RDW 12.1 12/27/2024     Lab Results   Component Value Date     12/27/2024     Last Comprehensive Metabolic Panel:  Lab Results   Component Value Date     12/27/2024    POTASSIUM 4.0 12/27/2024    CHLORIDE 105 12/27/2024    CO2 24 12/27/2024    ANIONGAP 10 12/27/2024     (H) 12/27/2024    BUN 16.6 12/27/2024    CR 0.66 12/27/2024    GFRESTIMATED >90 12/27/2024    PADDY 9.3 12/27/2024        Review of Systems:  10 systems (general, cardiovascular, respiratory, eyes, ENT, endocrine, GI, , M/S, neurological) were reviewed. Most pertinent finding(s) is/are: denies fever, cough, persistent headaches, shortness of breath, chest pain, severe GI symptoms, trouble urinating, severe pain. The remaining systems are all unremarkable.    Mental Status Examination (via video visit today):  Appearance: Awake, alert, appears stated age, no acute distress, adequately groomed  Attitude:  cooperative, pleasant  Motor: Still no tongue thrusting/lip smacking  or perioral movements noted today, no tremor noted, no repetitive movements of any type  Oriented to:  person, place, date, " situation  Attention Span and Concentration:  normal  Speech: speech clear, fluid, normal volume, normal rate  Language: intact  Mood: Really good  Affect: Mood congruent, bright  Associations:  no loose associations  Thought Process: linear, logical, goal-directed  Thought Content:  no evidence of suicidal ideation or homicidal ideation, no evidence of psychotic thought, no auditory hallucinations present and no visual hallucinations present  Recent and Remote Memory: adequate for interview; not formally tested today  Fund of Knowledge: appropriate  Insight: good  Judgment: good  Impulse Control: good    Suicide Risk Assessment:  Today Keely Arana reports no suicidal ideation. Based on all available evidence including the factors cited above, Keely Arana does not appear to be at imminent risk for self-harm, does not meet criteria for a 72-hr hold, and therefore remains appropriate for ongoing outpatient level of care.  A thorough assessment of risk factors related to suicide and self-harm have been reviewed and are noted above. The patient convincingly denies suicidality on several occasions. Local community safety resources reviewed for patient to use if needed. There was no deceit detected, and the patient presented in a manner that was believable.     DSM5 Diagnosis:  Bipolar I Disorder, most recent episode manic in full remission  Anxiety, unspecified  CATATONIA - resolved on Ativan and in full remission    RULED OUT paraneoplastic/autoimmune process (pt with work-up by neurology)    Medical comorbidities include:   Patient Active Problem List    Diagnosis Date Noted    Hyperlipidemia 04/04/2024     Priority: Medium    HTN (hypertension) 04/04/2024     Priority: Medium    Strain of muscle, fascia and tendon of other parts of biceps, right arm, initial encounter 04/04/2024     Priority: Medium    Mild cognitive impairment 10/07/2023     Priority: Medium    Unspecified mood (affective) disorder  10/07/2023     Priority: Medium    Insomnia, unspecified type 09/29/2023     Priority: Medium    Anxiety 09/29/2023     Priority: Medium    Major depression 09/29/2023     Priority: Medium    History of cong 06/20/2023     Priority: Medium    Abnormal bone density screening 01/15/2021     Priority: Medium    Long term (current) use of aromatase inhibitors 01/15/2021     Priority: Medium    Malignant neoplasm of upper-outer quadrant of right breast in female, estrogen receptor positive (H) 08/23/2018     Priority: Medium    Symptomatic varicose veins of both lower extremities 11/10/2015     Priority: Medium       Psychosocial & Contextual Factors: see HPI above    Assessment:  From Intake, 5/12/2023:  Keely Arana is a 67-year-old female with relatively benign past psychiatric history leading up to recent suspected manic episode who presents today for psychiatric evaluation.  Patient with what appears to meet criteria for manic episode starting earlier this spring.  Patient started to have more trouble with sleep and was becoming more irritable leading to racing thoughts, increased goal directed activity, disorganized thoughts, more talkative than usual, etc.  Patient's family concerns since she was acting out of character and brought to the ER.  Patient started on olanzapine in the emergency room which has helped significantly to regulate sleep and mood.  Patient perhaps with some heightened energy today but not overtly manic.  Patient is currently tolerating olanzapine well and reports sleeping about 8 hours a night.  Discussed risks and benefits of staying on olanzapine.  Unclear if patient has had underlying bipolar disorder that has gone fairly undetected over the years.  Patient with relatives with bipolar disorder.  Patient did have recent brain imaging (MRI) that would not explain recent new onset cong.  Patient also with otherwise relatively benign labs including thyroid, liver panel, CMP, CBC, etc.   No new medications were introduced.  Patient denies any drug use and no problematic alcohol use.  Patient stopped using her 1 glass of wine a week around the time this episode started to see if it would help to give up alcohol.  Patient does often have a few cups of coffee a day.  We will continue to monitor.  Recommend staying on olanzapine for at least 6 months to a year before trying to taper off in order to prevent relapse/recurrent cong symptoms.  No acute safety concerns today.  No SI.  No problematic drug or alcohol use.     6/14/2023:  Patient overall doing relatively well.  Mood has been stable.  No signs of cong.  We will decrease olanzapine to 2.5 mg at bedtime for the next few months to ensure ongoing stability of symptoms.  Could consider a trial off olanzapine at the end of the 3 months of decreased dose.  Patient and  will continue to remain vigilant while monitoring for any relapse of manic symptoms.  Patient had recent lipid panel and glucose completed.  Both within normal limits.  No acute safety concerns.  No SI.  No signs of tardive dyskinesia noted on video today.  No problematic drug or alcohol use.    9/11/2023:  Patient with suspected movement related side effects due to olanzapine use.  Patient with extreme anxiety and some worsening symptoms when olanzapine was stopped after 3 months on 2.5 mg dose.  We will restart olanzapine at 2.5 mg every other day and continue with slow taper and also get recommendations from psychiatric PharmD and possibly neurology.  Patient with possible tongue thrusting today versus extreme dry mouth from hydroxyzine use.  There was no tongue thrusting noted at last appointment in June.  Patient's family had noted possible parkinsonism like symptoms.   and patient will continue to monitor.  Referral placed for [facility] for additional monitoring and talk therapy/support as well.  We will also start a trial with propranolol to see if that is helpful for  some of the physical symptoms of anxiety and potentially some symptoms being caused by olanzapine.  No acute safety concerns.  No SI.  No problematic drug or alcohol use.    9/22/2023:  Patient still not sleeping well and I would not want patient to get manic.  We did discuss possibility of something like Depakote at low-dose in order to avoid other agents like olanzapine and since we would like to start to taper off olanzapine (due to suspected parkinsonism).  Could also consider lithium.  Patient is not seeming manic at this time of interview - definitely seems anxious though. Pt sitting quite calmly and no pressured speech.  Difficult to assess if not sleeping because of anxiety versus onset of manic episode?  We will trial mirtazapine at bedtime to see if helpful for sleep without worsening any side effects. No acute safety concerns. No SI. No problematic drug or alcohol use. Pt also encouraged to utilize the propranolol.     10/23/2023:  Patient exhibiting multiple signs of suspected catatonia.  Patient also with symptoms that seem to improve with Ativan administration by family.  Recommended emergency room visit for evaluation for possible admission for treatment of her mental health condition since I suspect pt has catatonia and discussed limitation of my ability to examine pt via video.  Discussed risks of catatonia with family, including up to death. Discussed malignant catatonia and that catatonia can affect blood pressure and heart rate. Pt's vitals (BP and HR) were elevated at last check on record 10/11.  Patient and family declined emergency room visit at this time and prefer to try to manage patient at home with Ativan up to 1 mg 3 times daily.  Advised family and patient they are to bring patient to the emergency room if symptoms do not start to improve, or start to worsen, with Ativan administration.  Family instructed to hold doses of Ativan, or break doses in half, if patient starts to become too  sedated or if gait becomes too unsteady on full dose.  Discussed what to look for if Ativan is going to be helpful for symptoms -starting to do more around the house, increased energy, talking more, etc.  Patient has follow-up visit with me in 1 week.  Neurology referral also placed to help evaluate for parkinsonism versus catatonia versus other.  Patient continues to have frequent lip smacking movements possibly related to current condition versus TD?  Patient continues to eat and drink.  Patient was alert and oriented today during interview.  No hold will be placed for emergent medical transport to the hospital, but I do have a low threshold if patient and family continue to refuse emergency room evaluation and if patient's symptoms continue to worsen.  This was discussed with the patient and family.  Both olanzapine and quetiapine discontinued due to suspicions for catatonia.  Labs also ordered to assess for infection, metabolic status, ammonia level, TSH, etc.    10/30/2023:  Patient with drastic improvement since starting Ativan/lorazepam for catatonia.  Still some ongoing ruminations, paranoia, restlessness.  Discussed possible causes of catatonia including suspected bipolar disorder, possible neurocognitive disorder, infection, others.  Patient's recent laboratory work unremarkable for any signs of infection or obvious causes of catatonia at this time.  Strongly suspicious of bipolar disorder causing cong.  Rule out neurocognitive disorder.  Neurology referral has been placed and pending scheduling.  We will also consider imaging again.  Patient had imaging completed this past spring but could consider again due to catatonia and change in mental status.  No acute safety concerns at this time.  Patient eating and sleeping well.  No suicidal thoughts.  No problematic drug or alcohol use.  Family continues to be incredibly supportive.  Patient very fearful of the hospitalization.  Patient is agreeable to  starting Depakote to help with some of her symptoms, bipolar disorder.  Patient's  open to ongoing discussions about possibility of ECT.  Patient is not as open to ECT at this time.  Patient/family will reach out if they would like an ECT referral.    11/14/2023:  Patient overall presenting significantly better today compared to most recent visit even.  No signs of catatonia today.  No perioral movements noted.  No tremor noted.  No delayed speech.  Attention and concentration intact for interview.  Alert and oriented x 4.  Patient did see neurology and there are tests still pending.  Patient needs to complete neuroimaging as ordered by neurology.  There are also paraneoplastic/autoimmune send out labs and process through [facility].  Multiple differentials to consider per neurology as well to explain patient's symptoms.  Patient will continue to follow-up with neurology to further rule out underlying medical causes for symptoms.  We will very slowly taper down lorazepam to a lower dose.  I still do not want to completely discontinue at this time.  We will continue Depakote due to ongoing improvements of symptoms and good tolerability.  Given improvements, and based on laboratory and imaging testing results, may need to consider repeat neuropsychological testing.  Would confer with neurology.  No acute safety concerns.  No SI.  No problematic drug or alcohol use.    1/15/2024:  Patient overall doing well.  Down to 0.5 mg 3 times daily of Ativan with no regression of catatonia symptoms.  Tolerating Depakote well with no negative side effects.  Updated labs ordered to check CBC and liver function.  We will continue to very slowly taper lorazepam.  No other medication changes today.  Recent serum encephalopathy, autoimmune/paraneoplastic panel sent to [place] unremarkable.  No other remarkable exam/test results.  No acute safety concerns.  No SI.  No problematic drug or alcohol use.  Could still consider repeat  neuropsychological testing in the future if relevant.    4/15/2024:  Patient overall continuing to do very well.  Remains at baseline while continuing to taper down lorazepam.  Monitoring weight gain and appetite from Depakote.  We will continue to taper down lorazepam until off daily dosing.  Patient can then hang on to some to take as needed.  Family and patient monitoring for symptoms closely.  Tolerating Depakote well.  Recent labs unremarkable.  No acute safety concerns.  No SI.  No problematic drug or alcohol use.  Patient will be seen back in 6 months.    10/15/2024:  Patient currently doing very well.  Mood has remained stable.  Sleeping well.  No signs of catatonia.  No new side effects from the medication.  Off daily lorazepam.  Continues on nightly Depakote.  Updated labs ordered for blood draw.  Patient will be seen back in another 6 months.  No acute safety concerns.  No SI.  No problematic drug or alcohol use.    4/14/2025:   Bipolar symptoms continue to remain in remission.  Patient doing very well.  Patient agreeable to holding onto lorazepam in the case she might need it in the future on an as-needed basis.  No longer using regularly for over a year.  No recurrence of catatonia symptoms.  No changes to Depakote today.  We will get updated labs in December.  No acute safety concerns.  No SI.  No problematic drug alcohol use.    Medication side effects and alternatives were reviewed. Health promotion activities recommended and reviewed today. All questions addressed. Education and counseling completed regarding risks and benefits of medications and psychotherapy options. Recommend therapy for additional support.     Treatment Plan:  Continue Ativan/lorazepam 0.25-1.0 mg daily as needed for sleep, anxiety.   Continue Depakote  mg daily at bedtime for bipolar disorder, hx of cong w/catatonia.   Discussed we would order labs at your next appointment.  Continue all other cares per primary care  provider.   Continue all other medications as reviewed per electronic medical record today.   Safety plan reviewed. To the Emergency Department as needed or call after hours crisis line at [phone] or [phone]. [place] Crisis Text Line. Text [place] to 332828 or Suicide LifeLine Chat: suicidepreventionlifeline.org/chat  Schedule an appointment with me in 6-8 months or sooner as needed. Call [facility] at [phone] to schedule.  Follow up with primary care provider as planned or for acute medical concerns.  Call the psychiatric nurse line with medication questions or concerns at [phone].  Winkhart may be used to communicate with your provider, but this is not intended to be used for emergencies.    Previously discussed risks of benzodiazepine (Ativan, Xanax, Klonopin, Valium, etc) use including, but not limited to, sedation, tolerance, risk for addiction/dependence. Do not drink alcohol while taking benzodiazepines due to risk of trouble breathing and potential death. Do not drive or operate heavy machinery until it is known how the drug affects you. Discuss with physician or pharmacist before ever taking a benzodiazepine with a narcotic/opioid pain medication.  Watch for sedation and falls.    Administrative Billing:   Session Duration: 17 Minutes   Start: 10:35a  Stop: 10:52a    The longitudinal plan of care for the diagnosis(es)/condition(s) as documented were addressed during this visit. Due to the added complexity in care, I will continue to support Jessica in the subsequent management and with ongoing continuity of care.    Patient Status:  Patient continuous care/lang-term at this time.     Signed:   Reema Palomo DO  [facility]    Disclaimer: This note consists of symbols derived from keyboarding, dictation and/or voice recognition software. As a result, there may be errors in the script that have gone undetected. Please consider this when interpreting information found in this  chart.

## 2025-04-15 ENCOUNTER — ONCOLOGY VISIT (OUTPATIENT)
Dept: ONCOLOGY | Facility: CLINIC | Age: 70
End: 2025-04-15
Attending: INTERNAL MEDICINE
Payer: MEDICARE

## 2025-04-15 VITALS
HEART RATE: 86 BPM | BODY MASS INDEX: 27.94 KG/M2 | HEIGHT: 67 IN | DIASTOLIC BLOOD PRESSURE: 75 MMHG | TEMPERATURE: 97.8 F | WEIGHT: 178 LBS | OXYGEN SATURATION: 99 % | RESPIRATION RATE: 16 BRPM | SYSTOLIC BLOOD PRESSURE: 124 MMHG

## 2025-04-15 DIAGNOSIS — C50.411 MALIGNANT NEOPLASM OF UPPER-OUTER QUADRANT OF RIGHT BREAST IN FEMALE, ESTROGEN RECEPTOR POSITIVE (H): Primary | ICD-10-CM

## 2025-04-15 DIAGNOSIS — E84.0 CYSTIC FIBROSIS OF THE LUNG (H): ICD-10-CM

## 2025-04-15 DIAGNOSIS — Z17.0 MALIGNANT NEOPLASM OF UPPER-OUTER QUADRANT OF RIGHT BREAST IN FEMALE, ESTROGEN RECEPTOR POSITIVE (H): Primary | ICD-10-CM

## 2025-04-15 DIAGNOSIS — Z91.89 AT RISK FOR OSTEOPOROSIS: ICD-10-CM

## 2025-04-15 DIAGNOSIS — E55.9 VITAMIN D DEFICIENCY: ICD-10-CM

## 2025-04-15 DIAGNOSIS — Z79.811 LONG TERM (CURRENT) USE OF AROMATASE INHIBITORS: ICD-10-CM

## 2025-04-15 PROCEDURE — G0463 HOSPITAL OUTPT CLINIC VISIT: HCPCS | Performed by: INTERNAL MEDICINE

## 2025-04-15 RX ORDER — ANASTROZOLE 1 MG/1
1 TABLET ORAL DAILY
Qty: 90 TABLET | Refills: 2 | Status: SHIPPED | OUTPATIENT
Start: 2025-04-15

## 2025-04-15 ASSESSMENT — PAIN SCALES - GENERAL: PAINLEVEL_OUTOF10: NO PAIN (0)

## 2025-04-15 NOTE — NURSING NOTE
"Oncology Rooming Note    April 15, 2025 9:11 AM   Keely Arana is a 69 year old female who presents for:    Chief Complaint   Patient presents with    Breast Cancer     Initial Vitals: /75   Pulse 86   Temp 97.8  F (36.6  C) (Oral)   Resp 16   Ht 1.702 m (5' 7\")   Wt 80.7 kg (178 lb)   SpO2 99%   BMI 27.88 kg/m   Estimated body mass index is 27.88 kg/m  as calculated from the following:    Height as of this encounter: 1.702 m (5' 7\").    Weight as of this encounter: 80.7 kg (178 lb). Body surface area is 1.95 meters squared.  No Pain (0) Comment: Data Unavailable   No LMP recorded. Patient is postmenopausal.  Allergies reviewed: Yes  Medications reviewed: Yes    Medications: Medication refills not needed today.  Pharmacy name entered into [facility]: [id number] [address]  AT [facility]    Frailty Screening:   Is the patient here for a new oncology consult visit in cancer care? 2. No    PHQ9:  Did this patient require a PHQ9?: No      Clinical concerns:        Fanny Mata CMA              "

## 2025-04-15 NOTE — LETTER
4/15/2025      Keely Arana  [address] [place], [place] [address] [facility] [id number]      Dear Colleague,    Thank you for referring your patient, Keely Arana, to the [facility]. Please see a copy of my visit note below.    Oncology Follow Up:  Date on this visit: 4/14/2025    Diagnosis:  [id number], grade I, ER positive, VA positive, HER-2 nonamplified invasive ductal carcinoma of the right breast. Oncotype DX recurrence score = 7.    Primary Physician: Sammie Cerna     History Of Present Illness:  Ms. Arana is a 69 year old female with right breast cancer.  Routine screening mammogram in 06/2018 was without concerning findings. Due to high breast density, her gynecologist recommended a breast MRI.  Breast MRI on 08/16/2018 showed nodular to non-mass enhancement measuring 1.6 cm in the right breast at 5 o'clock.    Right breast ultrasound confirmed a mass at 5 o'clock.  Right breast biopsy demonstrated a grade 1 invasive ductal carcinoma.  Estrogen-receptor staining was moderate in 95%; progesterone-receptor staining was strong in 96%.  HER-2 was nonamplified by immunohistochemistry with a score of 1+.  Right breast lumpectomy and sentinel lymph node procedure on 8/31/2018 demonstrated a 1.2 cm, grade 1 invasive mammary carcinoma.  There was associated intermediate-grade DCIS.  Surgical margins were negative; however, DCIS was 1 mm from the anterior margin.  A single sentinel lymph node was negative for malignancy. Oncotype DX recurrence score was 7.  She completed adjuvant radiation (4240 cGy to the whole breast and additional 1250  cGy to the lumpectomy site) on 11/8/18.  She has been on anastrozole since 9/24/18.  She completed 3 years of Zometa on 6/21/2023.     Interval History:  Ms. Arana comes into clinic today for an on treatment visit.  She is on adjuvant curative intent treatment with anastrozole.  She is accompanied today by her spouse.  In general, her health has been good  since her last visit.  She denies concerning lumps, pain, or swelling of either breast.  She has had no cough, shortness of breath, or chest pain.  She denies new bone or joint aches or pains.  She confirms that she continues on letrozole and is tolerating the medication well.  She specifically has no hot flashes, worsening mood disorder, or vaginal symptoms.  She has no abdominal complaints.  She and her  will be hosting their kids and their 11 grandchildren for [facility] this weekend.    Past Medical/Surgical History:  Past Medical History:   Diagnosis Date     Anxiety      Atrial fibrillation (H)     s/p ablasion.     Depression      Hypertension      Insomnia      Iritis      Personal history of malignant neoplasm of breast 2018    s/p radiation, on maintenance chemo     Past Surgical History:   Procedure Laterality Date     CATARACT IOL, RT/LT Left 2006     HYSTERECTOMY  2016     LUMPECTOMY BREAST WITH SENTINEL NODE, COMBINED Right 8/31/2018    Procedure: COMBINED LUMPECTOMY BREAST WITH SENTINEL NODE;  Right Wire Localized Lumpectomy and Right [place] Lymph Node Biopsy;  Surgeon: Chilango Kruger MD;  Location: [facility] OR     OOPHORECTOMY  2014     Allergies:  Allergies as of 04/15/2025 - Reviewed 04/14/2025   Allergen Reaction Noted     Corticosteroids Dermatitis 03/18/2019     Neomycin Dermatitis 03/18/2019     Sulfa antibiotics Itching and Rash 08/06/2015     Cephalexin GI Disturbance 03/09/2022     Codeine Nausea and Vomiting 08/06/2015     Benzoyl peroxide Rash 03/23/2019     Nitrofurantoin Nausea and Vomiting and Nausea 08/06/2015     Silver Rash 12/03/2018     Current Medications:  Current Outpatient Medications   Medication Sig Dispense Refill     amLODIPine (NORVASC) 5 MG tablet Take 1 tablet (5 mg) by mouth daily. 90 tablet 3     anastrozole (ARIMIDEX) 1 MG tablet Take 1 tablet (1 mg) by mouth daily 90 tablet 3     atorvastatin (LIPITOR) 10 MG tablet Take 1 tablet (10 mg) by mouth daily 90 tablet 3      "calcium carbonate (OS-PADDY 500 MG [place]. [place]) 500 MG tablet Take 2 tablets by mouth daily With Magnesium,Zinc       divalproex sodium delayed-release (DEPAKOTE) 250 MG DR tablet Take 1 tablet (250 mg) by mouth at bedtime. 90 tablet 3     lisinopril (ZESTRIL) 10 MG tablet Take 1 tablet (10 mg) by mouth daily. 90 tablet 1     LORazepam (ATIVAN) 0.5 MG tablet Take 0.5-1 mg by mouth nightly as needed for anxiety or sleep.       LYSINE PO Take 10,000 mg by mouth daily       Multiple Vitamins-Minerals (PRESERVISION AREDS 2 PO) Take 2 tablets by mouth daily       multivitamin w/minerals (THERA-VIT-M) tablet Take 1 tablet by mouth daily       rivaroxaban ANTICOAGULANT (XARELTO) 20 MG TABS tablet Take 20 mg by mouth       VITAMIN D, CHOLECALCIFEROL, PO Take 1,000 Units by mouth daily         Family and Social History:  Reviewed and unchanged from prior.  Please see initial consultation dated 9/24/18 for further details.    Physical Exam:  /75   Pulse 86   Temp 97.8  F (36.6  C) (Oral)   Resp 16   Ht 1.702 m (5' 7\")   Wt 80.7 kg (178 lb)   SpO2 99%   BMI 27.88 kg/m    General:  Well appearing adult female in NAD.  Alert and oriented x 3.    HEENT:  Normocephalic.  Sclera anicteric.  MMM.    Lymph:  No palpable cervical, supraclavicular, or axillary LAD.    Chest:  CTA bilaterally.  No wheezes or crackles.  CV:  RRR.  Nl S1 and S2.  No audible m/r/g.  Breast:  Bilateral breasts are of increased fibroglandular density.  Right lower inner breast lumpectomy changes.  There are no dominant or discretely palpable masses in either breast.  Bilateral nipples are inverted (chronic and unchanged from prior)  Abd:  Soft/NT/ND.   Ext:  No edema of the bilateral lower extremities.   Musculo:  Full ROM of the bilateral upper extremities.  Neuro:  Cranial nerves grossly intact.  Gait is stable.  No tremor or dyskinetic movements.  Psych:  Mood and affect appear normal.  Skin:  No visible or concerning skin rashes or " lesions.    Laboratory/Imaging Studies:  I personally reviewed the below images while in clinic today:    4/14/2025 DEXA bone density scan:  Results   Lumbar Spine  T-score -1 ., BMD is 1.054 g/cm2.      Left femoral neck  T-score -0.5      Right femoral neck  T-score -0.1      Left Total hip  T-score -0.3 , BMD is 1.001 g/cm2.     Right total hip  T-score 0, BMD is 1.002 g/cm2.    4/14/2025 Bilateral screening mammograms:  Findings: The breasts are heterogeneously dense, which may obscure small masses.  There are breast conservation changes in the right breast.  There is no radiographic evidence of malignancy.                                                                    IMPRESSION: ACR BI-RADS Category 2: Benign    ASSESSMENT/PLAN:  Ms. Velazquez is a 69-year-old female with a h/o stage IA, T1c N0 M0, grade 1, ER positive, OK positive, HER2 non-amplified invasive mammary carcinoma of the right breast.  She is status post treatment with right breast lumpectomy and radiation.  On adjuvant curative intent treatment with anastrozole since 9/24/18.    1.  Right breast cancer:   Ms. Arana is 6 years, 7.5 months out from excision of a right breast cancer.  She continues on adjuvant curative intent treatment with anastrozole and is tolerating the medication well.  Plan is to continue anastrozole to complete a 7 year course (thru 9/24/2025).     There is no evidence of disease recurrence on exam today.    - I personally reviewed the images of the bilateral screening mammograms performed today which are without new or concerning findings compared to previous.  - We were previously screening with both mammogram and breast MRI.  Now that she is 70 yo, we discussed it is okay to discontinue breast MRI and continue annual screening mammogram alone.  - Return to clinic in 6 months    2.  Paz/Bipolar disorder:  No change since last visit.  Unlikely to be related to her breast cancer treatment as onset was years after  starting adjuvant therapy.    - She is on treatment with Depakote.  She follows with psychiatry, Dr Palomo.    3.  Osteopenia/at risk for osteoporosis:  DEXA 3/28/2023 with a lowest T-score of -0.9, improved from prior (DEXA in 01/2021 with a lowest T-score of -2.0).  She received 3 years of Zometa from 1/22/2021 - 6/21/2023.      Continue calcium 1200 mg, vitamin D 1000 international units and walk 30 minutes daily.    - I personally reviewed the images of the DEXA performed on 4/14/2025 which show a lowest T-score of -1.0 in the lumbar spine c/w osteopenia, but relatively unchanged from previous.    4.  Routine health maintenance:  No change since last visit.  Colonoscopy in 2015 was without concerning finding, next due in 09/2025.     5.  Hypertension:  She is taking lisinopril 10 mg PO daily and amlodipine 5 mg PO daily.  Is measuring and recording blood pressures at home.    6.  Follow Up:    Return to clinic in 6 months for visit with me.    I spent 35 minutes on the date of the encounter doing chart review, review of test results, interpretation of tests, patient visit, documentation, discussion with other provider(s), and discussion with family.       Again, thank you for allowing me to participate in the care of your patient.        Sincerely,        Merari Tafoya MD    Electronically signed

## 2025-04-17 SDOH — HEALTH STABILITY: PHYSICAL HEALTH: ON AVERAGE, HOW MANY DAYS PER WEEK DO YOU ENGAGE IN MODERATE TO STRENUOUS EXERCISE (LIKE A BRISK WALK)?: 3 DAYS

## 2025-04-17 SDOH — HEALTH STABILITY: PHYSICAL HEALTH: ON AVERAGE, HOW MANY MINUTES DO YOU ENGAGE IN EXERCISE AT THIS LEVEL?: 40 MIN

## 2025-04-17 ASSESSMENT — SOCIAL DETERMINANTS OF HEALTH (SDOH): HOW OFTEN DO YOU GET TOGETHER WITH FRIENDS OR RELATIVES?: THREE TIMES A WEEK

## 2025-04-21 ENCOUNTER — LAB (OUTPATIENT)
Dept: LAB | Facility: CLINIC | Age: 70
End: 2025-04-21
Payer: MEDICARE

## 2025-04-21 ENCOUNTER — OFFICE VISIT (OUTPATIENT)
Dept: INTERNAL MEDICINE | Facility: CLINIC | Age: 70
End: 2025-04-21
Payer: MEDICARE

## 2025-04-21 VITALS
OXYGEN SATURATION: 99 % | WEIGHT: 179.3 LBS | SYSTOLIC BLOOD PRESSURE: 131 MMHG | HEART RATE: 89 BPM | BODY MASS INDEX: 28.14 KG/M2 | DIASTOLIC BLOOD PRESSURE: 75 MMHG | HEIGHT: 67 IN

## 2025-04-21 DIAGNOSIS — I10 PRIMARY HYPERTENSION: ICD-10-CM

## 2025-04-21 DIAGNOSIS — E78.00 PURE HYPERCHOLESTEROLEMIA: Primary | ICD-10-CM

## 2025-04-21 DIAGNOSIS — E78.5 HYPERLIPIDEMIA: ICD-10-CM

## 2025-04-21 DIAGNOSIS — Z23 NEED FOR PNEUMOCOCCAL VACCINE: ICD-10-CM

## 2025-04-21 DIAGNOSIS — E55.9 VITAMIN D DEFICIENCY: ICD-10-CM

## 2025-04-21 DIAGNOSIS — Z00.00 HEALTHCARE MAINTENANCE: ICD-10-CM

## 2025-04-21 LAB
ALBUMIN SERPL BCG-MCNC: 4.4 G/DL (ref 3.5–5.2)
ALP SERPL-CCNC: 74 U/L (ref 40–150)
ALT SERPL W P-5'-P-CCNC: 19 U/L (ref 0–50)
AST SERPL W P-5'-P-CCNC: 19 U/L (ref 0–45)
BILIRUB DIRECT SERPL-MCNC: 0.19 MG/DL (ref 0–0.3)
BILIRUB SERPL-MCNC: 0.5 MG/DL
CHOLEST SERPL-MCNC: 166 MG/DL
FASTING STATUS PATIENT QL REPORTED: YES
HDLC SERPL-MCNC: 59 MG/DL
LDLC SERPL CALC-MCNC: 92 MG/DL
NONHDLC SERPL-MCNC: 107 MG/DL
PROT SERPL-MCNC: 7.1 G/DL (ref 6.4–8.3)
TRIGL SERPL-MCNC: 75 MG/DL
VIT D+METAB SERPL-MCNC: 29 NG/ML (ref 20–50)

## 2025-04-21 PROCEDURE — G0439 PPPS, SUBSEQ VISIT: HCPCS | Performed by: HOSPITALIST

## 2025-04-21 PROCEDURE — 36415 COLL VENOUS BLD VENIPUNCTURE: CPT | Performed by: PATHOLOGY

## 2025-04-21 PROCEDURE — 3075F SYST BP GE 130 - 139MM HG: CPT | Performed by: HOSPITALIST

## 2025-04-21 PROCEDURE — 82306 VITAMIN D 25 HYDROXY: CPT | Performed by: INTERNAL MEDICINE

## 2025-04-21 PROCEDURE — 80076 HEPATIC FUNCTION PANEL: CPT | Performed by: PATHOLOGY

## 2025-04-21 PROCEDURE — G0009 ADMIN PNEUMOCOCCAL VACCINE: HCPCS | Performed by: HOSPITALIST

## 2025-04-21 PROCEDURE — 99000 SPECIMEN HANDLING OFFICE-LAB: CPT | Performed by: PATHOLOGY

## 2025-04-21 PROCEDURE — 90677 PCV20 VACCINE IM: CPT | Performed by: HOSPITALIST

## 2025-04-21 PROCEDURE — 80061 LIPID PANEL: CPT | Performed by: PATHOLOGY

## 2025-04-21 PROCEDURE — 3078F DIAST BP <80 MM HG: CPT | Performed by: HOSPITALIST

## 2025-04-21 RX ORDER — AMLODIPINE BESYLATE 5 MG/1
5 TABLET ORAL DAILY
Qty: 90 TABLET | Refills: 3 | Status: SHIPPED | OUTPATIENT
Start: 2025-04-21

## 2025-04-21 RX ORDER — LISINOPRIL 20 MG/1
20 TABLET ORAL DAILY
Qty: 90 TABLET | Refills: 3 | Status: SHIPPED | OUTPATIENT
Start: 2025-04-21

## 2025-04-21 NOTE — ASSESSMENT & PLAN NOTE
- Await colonoscopy results with [facility]. Patient states repeat at 5 years again.   - Recent mammogram benign, repeat in 1-2 years.   - Recommend COVID19 vaccine before flights by about 1 month or in the fall again with Influenza vaccine.   - Consider RSV vaccine at the pharmacy in the future.

## 2025-04-21 NOTE — PATIENT INSTRUCTIONS
- Labs for vitamin D, Lipid and LFTs.   - Increase lisinopril to 20mg daily. Continue on amlodipine at 5mg daily for now.   - Repeat labs for BMP in 1-2 weeks.   - Recommend COVID19 vaccine before flights by about 1 month or in the fall again with Influenza vaccine.   - [id number] vaccine today.     Follow up again in 1 year or as needed.

## 2025-04-21 NOTE — ASSESSMENT & PLAN NOTE
- Increase lisinopril to 20mg daily. Continue on amlodipine at 5mg daily for now.   - Repeat labs for BMP in 1-2 weeks.

## 2025-04-21 NOTE — PROGRESS NOTES
"Preventive Care Visit  [facility]  Maulik Nye MD, Internal Medicine  Apr 21, 2025      Assessment & Plan   Problem List Items Addressed This Visit       Need for pneumococcal vaccine     - [id number] vaccine today.          Relevant Orders    PNEUMOCOCCAL 20 VALENT CONJUGATE (PREVNAR 20) (Completed)    Hyperlipidemia - Primary     - Obtain Lipid and LFTs today.   - Continued on atorvastatin 10mg daily.          Relevant Orders    Hepatic panel (Albumin, ALT, AST, Bili, Alk Phos, TP) (Completed)    Lipid panel reflex to direct LDL Fasting (Completed)    HTN (hypertension)     - Increase lisinopril to 20mg daily. Continue on amlodipine at 5mg daily for now.   - Repeat labs for BMP in 1-2 weeks.          Relevant Medications    amLODIPine (NORVASC) 5 MG tablet    lisinopril (ZESTRIL) 20 MG tablet    Other Relevant Orders    Basic metabolic panel  (Ca, Cl, CO2, Creat, Gluc, K, Na, BUN)    Healthcare maintenance     - Await colonoscopy results with [facility]. Patient states repeat at 5 years again.   - Recent mammogram benign, repeat in 1-2 years.   - Recommend COVID19 vaccine before flights by about 1 month or in the fall again with Influenza vaccine.   - Consider RSV vaccine at the pharmacy in the future.              Patient has been advised of split billing requirements and indicates understanding: Yes       BMI  Estimated body mass index is 27.94 kg/m  as calculated from the following:    Height as of this encounter: 1.706 m (5' 7.17\").    Weight as of this encounter: 81.3 kg (179 lb 4.8 oz).       Counseling  Appropriate preventive services were addressed with this patient via screening, questionnaire, or discussion as appropriate for fall prevention, nutrition, physical activity, Tobacco-use cessation, social engagement, weight loss and cognition.  Checklist reviewing preventive services available has been given to the patient.  Reviewed patient's diet, addressing " concerns and/or questions.   She is at risk for lack of exercise and has been provided with information to increase physical activity for the benefit of her well-being.   Patient reported safety concerns were addressed today.  Follow-up  Return in about 1 year (around 4/21/2026).    Subjective   Keely Arana is a 69 year old female that presents in clinic today for the following:     Chief Complaint   Patient presents with    Physical     1. Medication management   2. Cholesterol: Pt is fasting for labs  3. Blood pressure medication: request for a separate 2.5 mg amlodipine med          4/21/2025     9:13 AM   Additional Questions   Roomed by Mikey GAYLE  Patient mentions she has continued with use of amlodipine 1.5 tablets of the 5mg tablets and lisinopril 10mg daily. She noticed some increased swelling to her legs when going from 5mg to 7.5mg. She mentions 1/2 the tablet of amlodipine and does crumble some cutting it in half.     Mentions she just got done with colonoscopy with [facility] and was told she needed to repeat colon cancer screening after 5 years, finding a few polyps. Discussed I have not seen reports come back yet. Discussed also that I received results for Dexa scan and recommended repeat at 4 years. Recent mammogram was benign as well.     Advance Care Planning  Did not discuss at visit today.         4/17/2025   General Health   How would you rate your overall physical health? Good   Feel stress (tense, anxious, or unable to sleep) Not at all         4/17/2025   Nutrition   Diet: Regular (no restrictions)         4/17/2025   Exercise   Days per week of moderate/strenous exercise 3 days   Average minutes spent exercising at this level 40 min         4/17/2025   Social Factors   Frequency of gathering with friends or relatives Three times a week   Worry food won't last until get money to buy more No   Food not last or not have enough money for food? No   Do you have housing? (Housing is  defined as stable permanent housing and does not include staying ouside in a car, in a tent, in an abandoned building, in an overnight shelter, or couch-surfing.) Yes   Are you worried about losing your housing? No   Lack of transportation? No   Unable to get utilities (heat,electricity)? No         4/17/2025   Fall Risk   Fallen 2 or more times in the past year? No    No   Trouble with walking or balance? No    No       Multiple values from one day are sorted in reverse-chronological order          4/17/2025   Activities of Daily Living- Home Safety   Needs help with the following daily activites None of the above   Safety concerns in the home No grab bars in the bathroom         4/17/2025   Dental   Dentist two times every year? Yes         4/17/2025   Hearing Screening   Hearing concerns? None of the above         4/17/2025   Driving Risk Screening   Patient/family members have concerns about driving No         4/17/2025   General Alertness/Fatigue Screening   Have you been more tired than usual lately? No         4/17/2025   Urinary Incontinence Screening   Bothered by leaking urine in past 6 months No         Today's PHQ-9 Score:       4/3/2024     8:42 AM   PHQ-9 SCORE   PHQ-9 Total Score MyChart 0   PHQ-9 Total Score 0           4/17/2025   Substance Use   Alcohol more than 3/day or more than 7/wk No   Do you have a current opioid prescription? No   How severe/bad is pain from 1 to 10? 0/10 (No Pain)   Do you use any other substances recreationally? No     Social History     Tobacco Use    Smoking status: Never    Smokeless tobacco: Never   Vaping Use    Vaping status: Never Used   Substance Use Topics    Alcohol use: Not Currently     Alcohol/week: 3.0 standard drinks of alcohol     Types: 3 Standard drinks or equivalent per week     Comment: rarely. Patient drinks occasionally, but is currently abstaining due to new medication    Drug use: No           4/14/2025   LAST FHS-7 RESULTS   1st degree relative  breast or ovarian cancer No   Any relative bilateral breast cancer No   Any male have breast cancer No   Any ONE woman have BOTH breast AND ovarian cancer No   Any woman with breast cancer before 50yrs No   2 or more relatives with breast AND/OR ovarian cancer No   2 or more relatives with breast AND/OR bowel cancer No        Mammogram Screening - Mammogram every 1-2 years updated in Health Maintenance based on mutual decision making      History of abnormal Pap smear: No - age 65 or older with adequate negative prior screening test results (3 consecutive negative cytology results, 2 consecutive negative cotesting results, or 2 consecutive negative HrHPV test results within 10 years, with the most recent test occurring within the recommended screening interval for the test used)       ASCVD Risk   The 10-year ASCVD risk score (Saira ESPINOZA, et al., 2019) is: 13%    Values used to calculate the score:      Age: 69 years      Sex: Female      Is Non- : No      Diabetic: No      Tobacco smoker: No      Systolic Blood Pressure: 131 mmHg      Is BP treated: Yes      HDL Cholesterol: 59 mg/dL      Total Cholesterol: 198 mg/dL    Fracture Risk Assessment Tool  Link to Frax Calculator  Use the information below to complete the Frax calculator  : 1955  Sex: female  Weight (kg): 81.3 kg (actual weight)  Height (cm): 170.6 cm  Previous Fragility Fracture:  No  History of parent with fractured hip:  No  Current Smoking:  No  Patient has been on glucocorticoids for more than 3 months (5mg/day or more): No  Rheumatoid Arthritis on Problem List:  No  Secondary Osteoporosis on Problem List:  No  Consumes 3 or more units of alcohol per day: No  Femoral Neck BMD (g/cm2)            Reviewed and updated as needed this visit by Provider                      Current providers sharing in care for this patient include:  Patient Care Team:  Maulik Ney MD as PCP - General (Internal  "Medicine)  Esvin Schwartz MD as MD (Radiology)  Merari Tafoya MD as MD (Breast Oncology)  Merari Tafoya MD as Assigned Cancer Care Provider  Maulik Nye MD as Assigned PCP  Reema Palomo DO as Assigned Behavioral Health Provider  Dr. Nye as PCP  [place] as Therapist  Shruti Soriano, PharmD as Pharmacist (Pharmacist)  Shruti Soriano PharmD as Assigned MTM Pharmacist  Kaiden Cotto MD as Assigned Neuroscience Provider  Hillary Billings, RN as Specialty Care Coordinator (Hematology & Oncology)    The following health maintenance items are reviewed in Epic and correct as of today:  Health Maintenance   Topic Date Due    ANNUAL REVIEW OF HM ORDERS  Never done    ADVANCE CARE PLANNING  Never done    CYSTIC FIBROSIS ANNUAL STUDY  Never done    RSV VACCINE (1 - Risk 60-74 years 1-dose series) Never done    MEDICARE ANNUAL WELLNESS VISIT  04/05/2024    COVID-19 Vaccine (8 - 2024-25 season) 03/18/2025    LIPID  04/05/2025    BMP  12/27/2025    MAMMO SCREENING  04/14/2026    FALL RISK ASSESSMENT  04/21/2026    DTAP/TDAP/TD IMMUNIZATION (7 - Td or Tdap) 09/26/2027    DIABETES SCREENING  12/27/2027    CYSTIC FIBROSIS: COLONOSCOPY  03/27/2028    COLORECTAL CANCER SCREENING  03/27/2035    DEXA  04/14/2040    HEPATITIS C SCREENING  Completed    INFLUENZA VACCINE  Completed    Pneumococcal Vaccine: 50+ Years  Completed    ZOSTER IMMUNIZATION  Completed    HPV IMMUNIZATION  Aged Out    MENINGITIS IMMUNIZATION  Aged Out         Review of Systems  Constitutional, neuro, ENT, endocrine, pulmonary, cardiac, gastrointestinal, genitourinary, musculoskeletal, integument and psychiatric systems are negative, except as otherwise noted.     Objective    Exam  /75 (BP Location: Right arm, Patient Position: Sitting, Cuff Size: Adult Regular)   Pulse 89   Ht 1.706 m (5' 7.17\")   Wt 81.3 kg (179 lb 4.8 oz)   SpO2 99%   BMI 27.94 kg/m     Estimated body mass index is " "27.94 kg/m  as calculated from the following:    Height as of this encounter: 1.706 m (5' 7.17\").    Weight as of this encounter: 81.3 kg (179 lb 4.8 oz).    Physical Exam  GENERAL: alert and no distress  EYES: Eyes grossly normal to inspection, PERRL and conjunctivae and sclerae normal  RESP: lungs clear to auscultation - no rales, rhonchi or wheezes  CV: regular rate and rhythm, normal S1 S2, no S3 or S4, no murmur, click or rub, no peripheral edema  MS: no gross musculoskeletal defects noted, mild non pitting edema at both ankles with varicose veins present.   SKIN: no suspicious lesions or rashes  NEURO: Normal strength and tone, mentation intact and speech normal  PSYCH: mentation appears normal, affect normal/bright        4/21/2025   Mini Cog   Clock Draw Score 2 Normal   3 Item Recall 3 objects recalled   Mini Cog Total Score 5              Signed Electronically by: Maulik Nye MD    "

## 2025-04-24 LAB — VIT D+METAB SERPL-MCNC: 29 NG/ML (ref 20–50)

## 2025-06-15 ENCOUNTER — HEALTH MAINTENANCE LETTER (OUTPATIENT)
Age: 70
End: 2025-06-15

## 2025-07-02 NOTE — TELEPHONE ENCOUNTER
Medication:anastrozole  Last prescribing provider:last prescribed on 4/15/2025, 90day supply with 2 refills, pt should have refills on file for another 6 monhts  Last clinic visit date: 4/15/2025 Dr. Tafoya    1325 Spoke to [facility] pharmacy, Jessica picked up her refill recently and they are good for another refill.

## 2025-08-21 ENCOUNTER — TRANSFERRED RECORDS (OUTPATIENT)
Dept: HEALTH INFORMATION MANAGEMENT | Facility: CLINIC | Age: 70
End: 2025-08-21
Payer: MEDICARE

## 2025-08-29 ENCOUNTER — TRANSFERRED RECORDS (OUTPATIENT)
Dept: HEALTH INFORMATION MANAGEMENT | Facility: CLINIC | Age: 70
End: 2025-08-29
Payer: MEDICARE

## (undated) DEVICE — LINEN TOWEL PACK X5 5464

## (undated) DEVICE — PAD CHUX UNDERPAD 30X30"

## (undated) DEVICE — SU VICRYL 3-0 SH 27" J316H

## (undated) DEVICE — SUCTION MANIFOLD NEPTUNE 2 SYS 1 PORT 702-025-000

## (undated) DEVICE — GLOVE PROTEXIS POWDER FREE SMT 8.0  2D72PT80X

## (undated) DEVICE — ESU ELEC BLADE 2.75" COATED/INSULATED E1455

## (undated) DEVICE — BASIN SET SINGLE STERILE 13752-624

## (undated) DEVICE — SU DERMABOND ADVANCED .7ML DNX12

## (undated) DEVICE — SU SILK 3-0 SH 30" K832H

## (undated) DEVICE — MARKER MARGIN MARKER STD 6 COLOR SGL USE MMS6

## (undated) DEVICE — CLIP HORIZON MED BLUE 002200

## (undated) DEVICE — DRAPE IOBAN INCISE 23X17" 6650EZ

## (undated) DEVICE — NDL BLUNT 18GA 1" W/O FILTER 305181

## (undated) DEVICE — SU PDS II 4-0 FS-2 27" Z422H

## (undated) DEVICE — NDL 27GA 1.25" 305136

## (undated) DEVICE — SYR 05ML LL W/O NDL

## (undated) DEVICE — DRAPE LAP TRANSVERSE 29421

## (undated) DEVICE — PREP CHLORAPREP 26ML TINTED ORANGE  260815

## (undated) DEVICE — SOL NACL 0.9% IRRIG 500ML BOTTLE 2F7123

## (undated) DEVICE — SOL WATER IRRIG 500ML BOTTLE 2F7113

## (undated) DEVICE — NDL 25GA 2"  8881200441

## (undated) DEVICE — DRAPE SHEET MED 44X70" 9355

## (undated) DEVICE — PREP PAD ALCOHOL 6818

## (undated) DEVICE — CLIP HORIZON SM RED WIDE SLOT 001201

## (undated) DEVICE — PACK MINOR CUSTOM ASC

## (undated) DEVICE — BNDG COBAN 2"X5YDS CO-FLEX UNSTERILE ASSRTD CLRS LF 5200CP

## (undated) DEVICE — ESU GROUND PAD ADULT W/CORD E7507

## (undated) RX ORDER — OXYCODONE HYDROCHLORIDE 5 MG/1
TABLET ORAL
Status: DISPENSED
Start: 2018-08-31

## (undated) RX ORDER — LIDOCAINE HYDROCHLORIDE AND EPINEPHRINE 10; 10 MG/ML; UG/ML
INJECTION, SOLUTION INFILTRATION; PERINEURAL
Status: DISPENSED
Start: 2018-08-31

## (undated) RX ORDER — ISOSULFAN BLUE 50 MG/5ML
INJECTION, SOLUTION SUBCUTANEOUS
Status: DISPENSED
Start: 2018-08-31

## (undated) RX ORDER — ACETAMINOPHEN 325 MG/1
TABLET ORAL
Status: DISPENSED
Start: 2018-08-31

## (undated) RX ORDER — GABAPENTIN 300 MG/1
CAPSULE ORAL
Status: DISPENSED
Start: 2018-08-31

## (undated) RX ORDER — BUPIVACAINE HYDROCHLORIDE 2.5 MG/ML
INJECTION, SOLUTION INFILTRATION; PERINEURAL
Status: DISPENSED
Start: 2018-08-31

## (undated) RX ORDER — FENTANYL CITRATE 50 UG/ML
INJECTION, SOLUTION INTRAMUSCULAR; INTRAVENOUS
Status: DISPENSED
Start: 2018-08-31